# Patient Record
Sex: FEMALE | Race: WHITE | NOT HISPANIC OR LATINO | Employment: OTHER | URBAN - METROPOLITAN AREA
[De-identification: names, ages, dates, MRNs, and addresses within clinical notes are randomized per-mention and may not be internally consistent; named-entity substitution may affect disease eponyms.]

---

## 2017-01-19 ENCOUNTER — COMMUNICATION - HEALTHEAST (OUTPATIENT)
Dept: NURSING | Facility: CLINIC | Age: 62
End: 2017-01-19

## 2017-01-19 ENCOUNTER — AMBULATORY - HEALTHEAST (OUTPATIENT)
Dept: INTERNAL MEDICINE | Facility: CLINIC | Age: 62
End: 2017-01-19

## 2017-01-19 DIAGNOSIS — E03.9 HYPOTHYROID: ICD-10-CM

## 2017-01-23 ENCOUNTER — COMMUNICATION - HEALTHEAST (OUTPATIENT)
Dept: NURSING | Facility: CLINIC | Age: 62
End: 2017-01-23

## 2017-01-23 ENCOUNTER — AMBULATORY - HEALTHEAST (OUTPATIENT)
Dept: LAB | Facility: CLINIC | Age: 62
End: 2017-01-23

## 2017-01-23 DIAGNOSIS — Z95.2 S/P AVR (AORTIC VALVE REPLACEMENT): ICD-10-CM

## 2017-01-23 DIAGNOSIS — E03.9 HYPOTHYROID: ICD-10-CM

## 2017-01-24 ENCOUNTER — COMMUNICATION - HEALTHEAST (OUTPATIENT)
Dept: INTERNAL MEDICINE | Facility: CLINIC | Age: 62
End: 2017-01-24

## 2017-03-01 ENCOUNTER — AMBULATORY - HEALTHEAST (OUTPATIENT)
Dept: LAB | Facility: CLINIC | Age: 62
End: 2017-03-01

## 2017-03-01 ENCOUNTER — COMMUNICATION - HEALTHEAST (OUTPATIENT)
Dept: INTERNAL MEDICINE | Facility: CLINIC | Age: 62
End: 2017-03-01

## 2017-03-01 DIAGNOSIS — Z95.2 S/P AVR (AORTIC VALVE REPLACEMENT): ICD-10-CM

## 2017-03-13 ENCOUNTER — COMMUNICATION - HEALTHEAST (OUTPATIENT)
Dept: CARDIOLOGY | Facility: CLINIC | Age: 62
End: 2017-03-13

## 2017-03-13 ENCOUNTER — COMMUNICATION - HEALTHEAST (OUTPATIENT)
Dept: INTERNAL MEDICINE | Facility: CLINIC | Age: 62
End: 2017-03-13

## 2017-03-13 DIAGNOSIS — I10 BENIGN HYPERTENSION: ICD-10-CM

## 2017-03-28 ENCOUNTER — COMMUNICATION - HEALTHEAST (OUTPATIENT)
Dept: INTERNAL MEDICINE | Facility: CLINIC | Age: 62
End: 2017-03-28

## 2017-03-28 ENCOUNTER — OFFICE VISIT - HEALTHEAST (OUTPATIENT)
Dept: PODIATRY | Age: 62
End: 2017-03-28

## 2017-03-28 DIAGNOSIS — M77.8 CAPSULITIS OF FOOT, LEFT: ICD-10-CM

## 2017-03-28 DIAGNOSIS — M21.6X2 PLANTAR FLEXED METATARSAL, LEFT: ICD-10-CM

## 2017-03-30 ENCOUNTER — COMMUNICATION - HEALTHEAST (OUTPATIENT)
Dept: INTERNAL MEDICINE | Facility: CLINIC | Age: 62
End: 2017-03-30

## 2017-04-08 ENCOUNTER — COMMUNICATION - HEALTHEAST (OUTPATIENT)
Dept: INTERNAL MEDICINE | Facility: CLINIC | Age: 62
End: 2017-04-08

## 2017-04-13 ENCOUNTER — COMMUNICATION - HEALTHEAST (OUTPATIENT)
Dept: NURSING | Facility: CLINIC | Age: 62
End: 2017-04-13

## 2017-04-13 ENCOUNTER — AMBULATORY - HEALTHEAST (OUTPATIENT)
Dept: LAB | Facility: CLINIC | Age: 62
End: 2017-04-13

## 2017-04-13 DIAGNOSIS — Z95.2 S/P AVR (AORTIC VALVE REPLACEMENT): ICD-10-CM

## 2017-04-26 ENCOUNTER — COMMUNICATION - HEALTHEAST (OUTPATIENT)
Dept: PODIATRY | Age: 62
End: 2017-04-26

## 2017-05-03 ENCOUNTER — COMMUNICATION - HEALTHEAST (OUTPATIENT)
Dept: PODIATRY | Age: 62
End: 2017-05-03

## 2017-05-03 ENCOUNTER — AMBULATORY - HEALTHEAST (OUTPATIENT)
Dept: CARDIOLOGY | Facility: CLINIC | Age: 62
End: 2017-05-03

## 2017-05-03 DIAGNOSIS — Z95.0 PACEMAKER: ICD-10-CM

## 2017-05-03 ASSESSMENT — MIFFLIN-ST. JEOR: SCORE: 1229

## 2017-05-16 ENCOUNTER — OFFICE VISIT - HEALTHEAST (OUTPATIENT)
Dept: PODIATRY | Age: 62
End: 2017-05-16

## 2017-05-16 ENCOUNTER — COMMUNICATION - HEALTHEAST (OUTPATIENT)
Dept: INTERNAL MEDICINE | Facility: CLINIC | Age: 62
End: 2017-05-16

## 2017-05-16 DIAGNOSIS — M89.8X7 EXOSTOSIS OF BONE OF FOOT: ICD-10-CM

## 2017-05-17 ENCOUNTER — RECORDS - HEALTHEAST (OUTPATIENT)
Dept: ADMINISTRATIVE | Facility: OTHER | Age: 62
End: 2017-05-17

## 2017-05-22 ENCOUNTER — AMBULATORY - HEALTHEAST (OUTPATIENT)
Dept: CARDIOLOGY | Facility: CLINIC | Age: 62
End: 2017-05-22

## 2017-05-22 DIAGNOSIS — Z95.0 PACEMAKER: ICD-10-CM

## 2017-05-22 LAB — HCC DEVICE COMMENTS: NORMAL

## 2017-05-23 ENCOUNTER — AMBULATORY - HEALTHEAST (OUTPATIENT)
Dept: LAB | Facility: CLINIC | Age: 62
End: 2017-05-23

## 2017-05-23 ENCOUNTER — COMMUNICATION - HEALTHEAST (OUTPATIENT)
Dept: NURSING | Facility: CLINIC | Age: 62
End: 2017-05-23

## 2017-05-23 DIAGNOSIS — Z95.2 S/P AVR (AORTIC VALVE REPLACEMENT): ICD-10-CM

## 2017-06-12 ENCOUNTER — COMMUNICATION - HEALTHEAST (OUTPATIENT)
Dept: INTERNAL MEDICINE | Facility: CLINIC | Age: 62
End: 2017-06-12

## 2017-06-13 ENCOUNTER — COMMUNICATION - HEALTHEAST (OUTPATIENT)
Dept: CARDIOLOGY | Facility: CLINIC | Age: 62
End: 2017-06-13

## 2017-06-13 DIAGNOSIS — I10 BENIGN HYPERTENSION: ICD-10-CM

## 2017-06-16 ENCOUNTER — OFFICE VISIT - HEALTHEAST (OUTPATIENT)
Dept: INTERNAL MEDICINE | Facility: CLINIC | Age: 62
End: 2017-06-16

## 2017-06-16 ENCOUNTER — COMMUNICATION - HEALTHEAST (OUTPATIENT)
Dept: INTERNAL MEDICINE | Facility: CLINIC | Age: 62
End: 2017-06-16

## 2017-06-16 ENCOUNTER — COMMUNICATION - HEALTHEAST (OUTPATIENT)
Dept: NURSING | Facility: CLINIC | Age: 62
End: 2017-06-16

## 2017-06-16 DIAGNOSIS — Z95.2 S/P AVR (AORTIC VALVE REPLACEMENT): ICD-10-CM

## 2017-06-16 DIAGNOSIS — Z01.818 PREOP EXAM FOR INTERNAL MEDICINE: ICD-10-CM

## 2017-06-16 DIAGNOSIS — E03.9 ACQUIRED HYPOTHYROIDISM: ICD-10-CM

## 2017-06-16 ASSESSMENT — MIFFLIN-ST. JEOR: SCORE: 1206.32

## 2017-06-17 LAB
ATRIAL RATE - MUSE: 70 BPM
DIASTOLIC BLOOD PRESSURE - MUSE: NORMAL MMHG
INTERPRETATION ECG - MUSE: NORMAL
P AXIS - MUSE: 59 DEGREES
PR INTERVAL - MUSE: 234 MS
QRS DURATION - MUSE: 146 MS
QT - MUSE: 444 MS
QTC - MUSE: 479 MS
R AXIS - MUSE: 22 DEGREES
SYSTOLIC BLOOD PRESSURE - MUSE: NORMAL MMHG
T AXIS - MUSE: 32 DEGREES
VENTRICULAR RATE- MUSE: 70 BPM

## 2017-06-22 ENCOUNTER — ANESTHESIA - HEALTHEAST (OUTPATIENT)
Dept: SURGERY | Facility: CLINIC | Age: 62
End: 2017-06-22

## 2017-06-23 ENCOUNTER — AMBULATORY - HEALTHEAST (OUTPATIENT)
Dept: PODIATRY | Facility: CLINIC | Age: 62
End: 2017-06-23

## 2017-06-23 ENCOUNTER — COMMUNICATION - HEALTHEAST (OUTPATIENT)
Dept: INTERNAL MEDICINE | Facility: CLINIC | Age: 62
End: 2017-06-23

## 2017-06-23 ENCOUNTER — SURGERY - HEALTHEAST (OUTPATIENT)
Dept: SURGERY | Facility: CLINIC | Age: 62
End: 2017-06-23

## 2017-06-23 DIAGNOSIS — M89.8X7 EXOSTOSIS OF BONE OF FOOT: ICD-10-CM

## 2017-06-23 ASSESSMENT — MIFFLIN-ST. JEOR: SCORE: 1202.64

## 2017-06-27 ENCOUNTER — COMMUNICATION - HEALTHEAST (OUTPATIENT)
Dept: INTERNAL MEDICINE | Facility: CLINIC | Age: 62
End: 2017-06-27

## 2017-06-27 ENCOUNTER — OFFICE VISIT - HEALTHEAST (OUTPATIENT)
Dept: PODIATRY | Age: 62
End: 2017-06-27

## 2017-06-27 DIAGNOSIS — M89.30 HYPERTROPHY OF BONE: ICD-10-CM

## 2017-07-05 ENCOUNTER — COMMUNICATION - HEALTHEAST (OUTPATIENT)
Dept: NURSING | Facility: CLINIC | Age: 62
End: 2017-07-05

## 2017-07-05 ENCOUNTER — AMBULATORY - HEALTHEAST (OUTPATIENT)
Dept: LAB | Facility: CLINIC | Age: 62
End: 2017-07-05

## 2017-07-05 DIAGNOSIS — Z95.2 S/P AVR (AORTIC VALVE REPLACEMENT): ICD-10-CM

## 2017-07-06 ENCOUNTER — OFFICE VISIT - HEALTHEAST (OUTPATIENT)
Dept: PODIATRY | Facility: CLINIC | Age: 62
End: 2017-07-06

## 2017-07-06 ENCOUNTER — COMMUNICATION - HEALTHEAST (OUTPATIENT)
Dept: INTERNAL MEDICINE | Facility: CLINIC | Age: 62
End: 2017-07-06

## 2017-07-06 DIAGNOSIS — M89.30 HYPERTROPHY OF BONE: ICD-10-CM

## 2017-07-14 ENCOUNTER — COMMUNICATION - HEALTHEAST (OUTPATIENT)
Dept: NURSING | Facility: CLINIC | Age: 62
End: 2017-07-14

## 2017-07-14 DIAGNOSIS — Z95.2 S/P AVR (AORTIC VALVE REPLACEMENT): ICD-10-CM

## 2017-07-26 ENCOUNTER — COMMUNICATION - HEALTHEAST (OUTPATIENT)
Dept: NURSING | Facility: CLINIC | Age: 62
End: 2017-07-26

## 2017-08-02 ENCOUNTER — COMMUNICATION - HEALTHEAST (OUTPATIENT)
Dept: NURSING | Facility: CLINIC | Age: 62
End: 2017-08-02

## 2017-08-02 ENCOUNTER — AMBULATORY - HEALTHEAST (OUTPATIENT)
Dept: LAB | Facility: CLINIC | Age: 62
End: 2017-08-02

## 2017-08-02 DIAGNOSIS — Z95.2 S/P AVR (AORTIC VALVE REPLACEMENT): ICD-10-CM

## 2017-08-20 ENCOUNTER — COMMUNICATION - HEALTHEAST (OUTPATIENT)
Dept: INTERNAL MEDICINE | Facility: CLINIC | Age: 62
End: 2017-08-20

## 2017-08-20 DIAGNOSIS — E78.5 HYPERLIPEMIA: ICD-10-CM

## 2017-08-21 ENCOUNTER — AMBULATORY - HEALTHEAST (OUTPATIENT)
Dept: CARDIOLOGY | Facility: CLINIC | Age: 62
End: 2017-08-21

## 2017-08-21 DIAGNOSIS — Z95.0 PACEMAKER: ICD-10-CM

## 2017-08-21 LAB — HCC DEVICE COMMENTS: NORMAL

## 2017-08-30 ENCOUNTER — AMBULATORY - HEALTHEAST (OUTPATIENT)
Dept: LAB | Facility: CLINIC | Age: 62
End: 2017-08-30

## 2017-08-30 ENCOUNTER — COMMUNICATION - HEALTHEAST (OUTPATIENT)
Dept: NURSING | Facility: CLINIC | Age: 62
End: 2017-08-30

## 2017-08-30 DIAGNOSIS — Z95.2 S/P AVR (AORTIC VALVE REPLACEMENT): ICD-10-CM

## 2017-09-28 ENCOUNTER — COMMUNICATION - HEALTHEAST (OUTPATIENT)
Dept: CARDIOLOGY | Facility: CLINIC | Age: 62
End: 2017-09-28

## 2017-09-28 ENCOUNTER — COMMUNICATION - HEALTHEAST (OUTPATIENT)
Dept: INTERNAL MEDICINE | Facility: CLINIC | Age: 62
End: 2017-09-28

## 2017-09-28 DIAGNOSIS — I10 BENIGN HYPERTENSION: ICD-10-CM

## 2017-09-29 ENCOUNTER — COMMUNICATION - HEALTHEAST (OUTPATIENT)
Dept: INTERNAL MEDICINE | Facility: CLINIC | Age: 62
End: 2017-09-29

## 2017-10-02 ENCOUNTER — RECORDS - HEALTHEAST (OUTPATIENT)
Dept: ADMINISTRATIVE | Facility: OTHER | Age: 62
End: 2017-10-02

## 2017-10-03 ENCOUNTER — AMBULATORY - HEALTHEAST (OUTPATIENT)
Dept: LAB | Facility: CLINIC | Age: 62
End: 2017-10-03

## 2017-10-03 ENCOUNTER — COMMUNICATION - HEALTHEAST (OUTPATIENT)
Dept: NURSING | Facility: CLINIC | Age: 62
End: 2017-10-03

## 2017-10-03 DIAGNOSIS — Z95.2 S/P AVR (AORTIC VALVE REPLACEMENT): ICD-10-CM

## 2017-10-05 ENCOUNTER — COMMUNICATION - HEALTHEAST (OUTPATIENT)
Dept: INTERNAL MEDICINE | Facility: CLINIC | Age: 62
End: 2017-10-05

## 2017-10-10 ENCOUNTER — COMMUNICATION - HEALTHEAST (OUTPATIENT)
Dept: INTERNAL MEDICINE | Facility: CLINIC | Age: 62
End: 2017-10-10

## 2017-10-10 ENCOUNTER — RECORDS - HEALTHEAST (OUTPATIENT)
Dept: ADMINISTRATIVE | Facility: OTHER | Age: 62
End: 2017-10-10

## 2017-10-10 ENCOUNTER — RECORDS - HEALTHEAST (OUTPATIENT)
Dept: BONE DENSITY | Facility: CLINIC | Age: 62
End: 2017-10-10

## 2017-10-10 DIAGNOSIS — Z95.2 S/P AVR (AORTIC VALVE REPLACEMENT): ICD-10-CM

## 2017-10-10 DIAGNOSIS — Z13.820 ENCOUNTER FOR SCREENING FOR OSTEOPOROSIS: ICD-10-CM

## 2017-10-10 DIAGNOSIS — Z95.2 S/P AORTIC VALVE REPLACEMENT: ICD-10-CM

## 2017-10-23 ENCOUNTER — OFFICE VISIT - HEALTHEAST (OUTPATIENT)
Dept: INTERNAL MEDICINE | Facility: CLINIC | Age: 62
End: 2017-10-23

## 2017-10-23 ENCOUNTER — RECORDS - HEALTHEAST (OUTPATIENT)
Dept: GENERAL RADIOLOGY | Facility: CLINIC | Age: 62
End: 2017-10-23

## 2017-10-23 DIAGNOSIS — R07.9 CHEST PAIN: ICD-10-CM

## 2017-10-23 DIAGNOSIS — M54.9 BACK PAIN: ICD-10-CM

## 2017-10-23 DIAGNOSIS — M54.9 DORSALGIA, UNSPECIFIED: ICD-10-CM

## 2017-10-23 ASSESSMENT — MIFFLIN-ST. JEOR: SCORE: 1206.32

## 2017-10-25 ENCOUNTER — COMMUNICATION - HEALTHEAST (OUTPATIENT)
Dept: INTERNAL MEDICINE | Facility: CLINIC | Age: 62
End: 2017-10-25

## 2017-11-03 ENCOUNTER — OFFICE VISIT - HEALTHEAST (OUTPATIENT)
Dept: CARDIOLOGY | Facility: CLINIC | Age: 62
End: 2017-11-03

## 2017-11-03 DIAGNOSIS — Z95.0 PACEMAKER: ICD-10-CM

## 2017-11-03 DIAGNOSIS — Z95.2 S/P AVR (AORTIC VALVE REPLACEMENT): ICD-10-CM

## 2017-11-03 DIAGNOSIS — R53.83 FATIGUE: ICD-10-CM

## 2017-11-03 DIAGNOSIS — I49.8 CHRONOTROPIC INCOMPETENCE WITH SINUS NODE DYSFUNCTION: ICD-10-CM

## 2017-11-03 ASSESSMENT — MIFFLIN-ST. JEOR: SCORE: 1210.86

## 2017-11-05 ENCOUNTER — RECORDS - HEALTHEAST (OUTPATIENT)
Dept: ADMINISTRATIVE | Facility: OTHER | Age: 62
End: 2017-11-05

## 2017-11-09 ENCOUNTER — HOSPITAL ENCOUNTER (OUTPATIENT)
Dept: CARDIOLOGY | Facility: CLINIC | Age: 62
Discharge: HOME | End: 2017-11-09
Attending: INTERNAL MEDICINE

## 2017-11-09 DIAGNOSIS — R53.83 FATIGUE: ICD-10-CM

## 2017-11-09 DIAGNOSIS — Z95.2 S/P AVR (AORTIC VALVE REPLACEMENT): ICD-10-CM

## 2017-11-09 ASSESSMENT — MIFFLIN-ST. JEOR: SCORE: 1210.86

## 2017-11-10 LAB
AORTIC VALVE MEAN VELOCITY: 233 CM/S
ASCENDING AORTA: 3.6 CM
AV DIMENSIONLESS INDEX VTI: 0.3
AV MEAN GRADIENT: 26 MMHG
AV PEAK GRADIENT: 57.2 MMHG
AV VALVE AREA: 1.2 CM2
AV VELOCITY RATIO: 0.3
BSA FOR ECHO PROCEDURE: 1.75 M2
CV BLOOD PRESSURE: NORMAL MMHG
CV ECHO HEIGHT: 64 IN
CV ECHO WEIGHT: 149 LBS
DOP CALC AO PEAK VEL: 378 CM/S
DOP CALC AO VTI: 69.7 CM
DOP CALC LVOT AREA: 3.46 CM2
DOP CALC LVOT DIAMETER: 2.1 CM
DOP CALC LVOT PEAK VEL: 120 CM/S
DOP CALC LVOT STROKE VOLUME: 82.7 CM3
DOP CALC MV VTI: 46.9 CM
DOP CALC MV VTI: 48.8 CM
DOP CALC MV VTI: 49.9 CM
DOP CALC MV VTI: 50 CM
DOP CALC MV VTI: 54 CM
DOP CALCLVOT PEAK VEL VTI: 23.9 CM
EJECTION FRACTION: 57 % (ref 55–75)
FRACTIONAL SHORTENING: 38.6 % (ref 28–44)
INTERVENTRICULAR SEPTUM IN END DIASTOLE: 1.5 CM (ref 0.6–0.9)
IVS/PW RATIO: 1.7
LA AREA 1: 20.2 CM2
LA AREA 2: 24 CM2
LEFT ATRIUM LENGTH: 5.27 CM
LEFT ATRIUM SIZE: 3.3 CM
LEFT ATRIUM VOLUME INDEX: 44.7 ML/M2
LEFT ATRIUM VOLUME: 78.2 CM3
LEFT VENTRICLE CARDIAC INDEX: 3.1 L/MIN/M2
LEFT VENTRICLE CARDIAC OUTPUT: 5.5 L/MIN
LEFT VENTRICLE DIASTOLIC VOLUME INDEX: 44 CM3/M2 (ref 34–74)
LEFT VENTRICLE DIASTOLIC VOLUME: 77 CM3 (ref 46–106)
LEFT VENTRICLE HEART RATE: 66 BPM
LEFT VENTRICLE MASS INDEX: 109.3 G/M2
LEFT VENTRICLE SYSTOLIC VOLUME INDEX: 18.9 CM3/M2 (ref 11–31)
LEFT VENTRICLE SYSTOLIC VOLUME: 33 CM3 (ref 14–42)
LEFT VENTRICULAR INTERNAL DIMENSION IN DIASTOLE: 4.4 CM (ref 3.8–5.2)
LEFT VENTRICULAR INTERNAL DIMENSION IN SYSTOLE: 2.7 CM (ref 2.2–3.5)
LEFT VENTRICULAR MASS: 191.3 G
LEFT VENTRICULAR OUTFLOW TRACT MEAN GRADIENT: 3 MMHG
LEFT VENTRICULAR OUTFLOW TRACT MEAN VELOCITY: 83.1 CM/S
LEFT VENTRICULAR OUTFLOW TRACT PEAK GRADIENT: 6 MMHG
LEFT VENTRICULAR POSTERIOR WALL IN END DIASTOLE: 0.9 CM (ref 0.6–0.9)
LV STROKE VOLUME INDEX: 47.3 ML/M2
MITRAL VALVE DECELERATION SLOPE: 4240 MM/S2
MITRAL VALVE DECELERATION SLOPE: 5410 MM/S2
MITRAL VALVE DECELERATION SLOPE: 6580 MM/S2
MITRAL VALVE E/A RATIO: 1
MITRAL VALVE MEAN INFLOW VELOCITY: 102 CM/S
MITRAL VALVE MEAN INFLOW VELOCITY: 108 CM/S
MITRAL VALVE MEAN INFLOW VELOCITY: 92.2 CM/S
MITRAL VALVE MEAN INFLOW VELOCITY: 94.1 CM/S
MITRAL VALVE MEAN INFLOW VELOCITY: 99.1 CM/S
MITRAL VALVE PEAK VELOCITY: 140 CM/S
MITRAL VALVE PEAK VELOCITY: 151 CM/S
MITRAL VALVE PEAK VELOCITY: 152 CM/S
MITRAL VALVE PEAK VELOCITY: 154 CM/S
MITRAL VALVE PEAK VELOCITY: 159 CM/S
MITRAL VALVE PRESSURE HALF-TIME: 80 MS
MV AVERAGE E/E' RATIO: 15.7 CM/S
MV DECELERATION TIME: 327 MS
MV E'TISSUE VEL-LAT: 7.8 CM/S
MV E'TISSUE VEL-MED: 6.34 CM/S
MV LATERAL E/E' RATIO: 14.2
MV MEAN GRADIENT: 5 MMHG
MV MEDIAL E/E' RATIO: 17.5
MV PEAK A VELOCITY: 108 CM/S
MV PEAK E VELOCITY: 111 CM/S
MV VALVE AREA PRESSURE 1/2 METHOD: 2.8 CM2
NUC REST DIASTOLIC VOLUME INDEX: 2384 LBS
NUC REST SYSTOLIC VOLUME INDEX: 64 IN
TRICUSPID REGURGITATION PEAK PRESSURE GRADIENT: 22.7 MMHG
TRICUSPID VALVE ANULAR PLANE SYSTOLIC EXCURSION: 1.5 CM
TRICUSPID VALVE PEAK REGURGITANT VELOCITY: 238 CM/S

## 2017-11-17 ENCOUNTER — AMBULATORY - HEALTHEAST (OUTPATIENT)
Dept: CARDIOLOGY | Facility: CLINIC | Age: 62
End: 2017-11-17

## 2017-11-17 DIAGNOSIS — Z95.0 PACEMAKER: ICD-10-CM

## 2017-11-17 LAB — HCC DEVICE COMMENTS: NORMAL

## 2017-11-21 ENCOUNTER — COMMUNICATION - HEALTHEAST (OUTPATIENT)
Dept: NURSING | Facility: CLINIC | Age: 62
End: 2017-11-21

## 2017-11-28 ENCOUNTER — COMMUNICATION - HEALTHEAST (OUTPATIENT)
Dept: NURSING | Facility: CLINIC | Age: 62
End: 2017-11-28

## 2017-11-28 ENCOUNTER — AMBULATORY - HEALTHEAST (OUTPATIENT)
Dept: LAB | Facility: CLINIC | Age: 62
End: 2017-11-28

## 2017-11-28 DIAGNOSIS — Z95.2 S/P AVR (AORTIC VALVE REPLACEMENT): ICD-10-CM

## 2017-12-14 ENCOUNTER — COMMUNICATION - HEALTHEAST (OUTPATIENT)
Dept: INTERNAL MEDICINE | Facility: CLINIC | Age: 62
End: 2017-12-14

## 2017-12-14 DIAGNOSIS — Z95.2 S/P AORTIC VALVE REPLACEMENT: ICD-10-CM

## 2017-12-16 ENCOUNTER — COMMUNICATION - HEALTHEAST (OUTPATIENT)
Dept: CARDIOLOGY | Facility: CLINIC | Age: 62
End: 2017-12-16

## 2017-12-16 DIAGNOSIS — I10 BENIGN HYPERTENSION: ICD-10-CM

## 2017-12-28 ENCOUNTER — COMMUNICATION - HEALTHEAST (OUTPATIENT)
Dept: INTERNAL MEDICINE | Facility: CLINIC | Age: 62
End: 2017-12-28

## 2018-01-01 ENCOUNTER — COMMUNICATION - HEALTHEAST (OUTPATIENT)
Dept: INTERNAL MEDICINE | Facility: CLINIC | Age: 63
End: 2018-01-01

## 2018-01-03 ENCOUNTER — HOSPITAL ENCOUNTER (OUTPATIENT)
Dept: MAMMOGRAPHY | Facility: CLINIC | Age: 63
Discharge: HOME | End: 2018-01-03
Attending: INTERNAL MEDICINE

## 2018-01-03 DIAGNOSIS — Z12.31 VISIT FOR SCREENING MAMMOGRAM: ICD-10-CM

## 2018-01-11 ENCOUNTER — OFFICE VISIT - HEALTHEAST (OUTPATIENT)
Dept: INTERNAL MEDICINE | Facility: CLINIC | Age: 63
End: 2018-01-11

## 2018-01-11 ENCOUNTER — COMMUNICATION - HEALTHEAST (OUTPATIENT)
Dept: NURSING | Facility: CLINIC | Age: 63
End: 2018-01-11

## 2018-01-11 DIAGNOSIS — Z95.2 S/P AVR (AORTIC VALVE REPLACEMENT): ICD-10-CM

## 2018-01-11 DIAGNOSIS — J06.9 URI (UPPER RESPIRATORY INFECTION): ICD-10-CM

## 2018-01-11 LAB — INR PPP: 3.3 (ref 0.9–1.1)

## 2018-01-11 ASSESSMENT — MIFFLIN-ST. JEOR: SCORE: 1206.32

## 2018-01-22 ENCOUNTER — AMBULATORY - HEALTHEAST (OUTPATIENT)
Dept: LAB | Facility: CLINIC | Age: 63
End: 2018-01-22

## 2018-01-22 ENCOUNTER — COMMUNICATION - HEALTHEAST (OUTPATIENT)
Dept: NURSING | Facility: CLINIC | Age: 63
End: 2018-01-22

## 2018-01-22 DIAGNOSIS — Z95.2 S/P AVR (AORTIC VALVE REPLACEMENT): ICD-10-CM

## 2018-01-22 LAB — INR PPP: 4 (ref 0.9–1.1)

## 2018-02-12 ENCOUNTER — COMMUNICATION - HEALTHEAST (OUTPATIENT)
Dept: NURSING | Facility: CLINIC | Age: 63
End: 2018-02-12

## 2018-02-12 ENCOUNTER — AMBULATORY - HEALTHEAST (OUTPATIENT)
Dept: LAB | Facility: CLINIC | Age: 63
End: 2018-02-12

## 2018-02-12 DIAGNOSIS — Z95.2 S/P AVR (AORTIC VALVE REPLACEMENT): ICD-10-CM

## 2018-02-12 LAB — INR PPP: 2.9 (ref 0.9–1.1)

## 2018-02-15 ENCOUNTER — AMBULATORY - HEALTHEAST (OUTPATIENT)
Dept: CARDIOLOGY | Facility: CLINIC | Age: 63
End: 2018-02-15

## 2018-02-15 DIAGNOSIS — Z95.0 PACEMAKER: ICD-10-CM

## 2018-02-15 LAB — HCC DEVICE COMMENTS: NORMAL

## 2018-02-18 ENCOUNTER — COMMUNICATION - HEALTHEAST (OUTPATIENT)
Dept: INTERNAL MEDICINE | Facility: CLINIC | Age: 63
End: 2018-02-18

## 2018-02-18 DIAGNOSIS — Z95.2 S/P AORTIC VALVE REPLACEMENT: ICD-10-CM

## 2018-02-18 DIAGNOSIS — Z79.01 LONG-TERM (CURRENT) USE OF ANTICOAGULANTS: ICD-10-CM

## 2018-02-26 ENCOUNTER — RECORDS - HEALTHEAST (OUTPATIENT)
Dept: ADMINISTRATIVE | Facility: OTHER | Age: 63
End: 2018-02-26

## 2018-03-02 ENCOUNTER — AMBULATORY - HEALTHEAST (OUTPATIENT)
Dept: LAB | Facility: CLINIC | Age: 63
End: 2018-03-02

## 2018-03-02 ENCOUNTER — COMMUNICATION - HEALTHEAST (OUTPATIENT)
Dept: NURSING | Facility: CLINIC | Age: 63
End: 2018-03-02

## 2018-03-02 DIAGNOSIS — Z95.2 S/P AVR (AORTIC VALVE REPLACEMENT): ICD-10-CM

## 2018-03-02 LAB — INR PPP: 2.7 (ref 0.9–1.1)

## 2018-03-28 ENCOUNTER — COMMUNICATION - HEALTHEAST (OUTPATIENT)
Dept: INTERNAL MEDICINE | Facility: CLINIC | Age: 63
End: 2018-03-28

## 2018-03-31 ENCOUNTER — COMMUNICATION - HEALTHEAST (OUTPATIENT)
Dept: INTERNAL MEDICINE | Facility: CLINIC | Age: 63
End: 2018-03-31

## 2018-04-03 ENCOUNTER — COMMUNICATION - HEALTHEAST (OUTPATIENT)
Dept: ANTICOAGULATION | Facility: CLINIC | Age: 63
End: 2018-04-03

## 2018-04-03 ENCOUNTER — AMBULATORY - HEALTHEAST (OUTPATIENT)
Dept: LAB | Facility: CLINIC | Age: 63
End: 2018-04-03

## 2018-04-03 DIAGNOSIS — Z95.2 S/P AVR (AORTIC VALVE REPLACEMENT): ICD-10-CM

## 2018-04-03 LAB — INR PPP: 2.1 (ref 0.9–1.1)

## 2018-05-16 ENCOUNTER — AMBULATORY - HEALTHEAST (OUTPATIENT)
Dept: LAB | Facility: CLINIC | Age: 63
End: 2018-05-16

## 2018-05-16 ENCOUNTER — COMMUNICATION - HEALTHEAST (OUTPATIENT)
Dept: ANTICOAGULATION | Facility: CLINIC | Age: 63
End: 2018-05-16

## 2018-05-16 DIAGNOSIS — Z95.2 S/P AVR (AORTIC VALVE REPLACEMENT): ICD-10-CM

## 2018-05-16 LAB — INR PPP: 2.3 (ref 0.9–1.1)

## 2018-05-25 ENCOUNTER — AMBULATORY - HEALTHEAST (OUTPATIENT)
Dept: CARDIOLOGY | Facility: CLINIC | Age: 63
End: 2018-05-25

## 2018-05-25 DIAGNOSIS — Z95.0 PACEMAKER: ICD-10-CM

## 2018-05-25 ASSESSMENT — MIFFLIN-ST. JEOR: SCORE: 1232.41

## 2018-05-29 LAB
HCC DEVICE COMMENTS: NORMAL
HCC DEVICE IMPLANTING PROVIDER: NORMAL
HCC DEVICE MANUFACTURE: NORMAL
HCC DEVICE MODEL: NORMAL
HCC DEVICE SERIAL NUMBER: NORMAL
HCC DEVICE TYPE: NORMAL

## 2018-06-09 ENCOUNTER — COMMUNICATION - HEALTHEAST (OUTPATIENT)
Dept: CARDIOLOGY | Facility: CLINIC | Age: 63
End: 2018-06-09

## 2018-06-09 DIAGNOSIS — I10 BENIGN HYPERTENSION: ICD-10-CM

## 2018-06-19 ENCOUNTER — COMMUNICATION - HEALTHEAST (OUTPATIENT)
Dept: ANTICOAGULATION | Facility: CLINIC | Age: 63
End: 2018-06-19

## 2018-06-19 DIAGNOSIS — Z95.2 S/P AVR (AORTIC VALVE REPLACEMENT): ICD-10-CM

## 2018-06-22 ENCOUNTER — COMMUNICATION - HEALTHEAST (OUTPATIENT)
Dept: INTERNAL MEDICINE | Facility: CLINIC | Age: 63
End: 2018-06-22

## 2018-06-22 ENCOUNTER — AMBULATORY - HEALTHEAST (OUTPATIENT)
Dept: INTERNAL MEDICINE | Facility: CLINIC | Age: 63
End: 2018-06-22

## 2018-06-22 DIAGNOSIS — E03.9 HYPOTHYROIDISM: ICD-10-CM

## 2018-06-24 ENCOUNTER — COMMUNICATION - HEALTHEAST (OUTPATIENT)
Dept: INTERNAL MEDICINE | Facility: CLINIC | Age: 63
End: 2018-06-24

## 2018-06-25 ENCOUNTER — AMBULATORY - HEALTHEAST (OUTPATIENT)
Dept: LAB | Facility: CLINIC | Age: 63
End: 2018-06-25

## 2018-06-25 ENCOUNTER — COMMUNICATION - HEALTHEAST (OUTPATIENT)
Dept: ANTICOAGULATION | Facility: CLINIC | Age: 63
End: 2018-06-25

## 2018-06-25 DIAGNOSIS — E03.9 HYPOTHYROIDISM: ICD-10-CM

## 2018-06-25 DIAGNOSIS — Z95.2 S/P AVR (AORTIC VALVE REPLACEMENT): ICD-10-CM

## 2018-06-25 LAB
INR PPP: 4.3 (ref 0.9–1.1)
TSH SERPL DL<=0.005 MIU/L-ACNC: 1.4 UIU/ML (ref 0.3–5)

## 2018-06-26 ENCOUNTER — COMMUNICATION - HEALTHEAST (OUTPATIENT)
Dept: INTERNAL MEDICINE | Facility: CLINIC | Age: 63
End: 2018-06-26

## 2018-06-28 ENCOUNTER — COMMUNICATION - HEALTHEAST (OUTPATIENT)
Dept: INTERNAL MEDICINE | Facility: CLINIC | Age: 63
End: 2018-06-28

## 2018-07-11 ENCOUNTER — AMBULATORY - HEALTHEAST (OUTPATIENT)
Dept: LAB | Facility: CLINIC | Age: 63
End: 2018-07-11

## 2018-07-11 ENCOUNTER — COMMUNICATION - HEALTHEAST (OUTPATIENT)
Dept: ANTICOAGULATION | Facility: CLINIC | Age: 63
End: 2018-07-11

## 2018-07-11 DIAGNOSIS — Z95.2 S/P AVR (AORTIC VALVE REPLACEMENT): ICD-10-CM

## 2018-07-11 LAB — INR PPP: 2.9 (ref 0.9–1.1)

## 2018-08-01 ENCOUNTER — COMMUNICATION - HEALTHEAST (OUTPATIENT)
Dept: ANTICOAGULATION | Facility: CLINIC | Age: 63
End: 2018-08-01

## 2018-08-06 ENCOUNTER — COMMUNICATION - HEALTHEAST (OUTPATIENT)
Dept: ANTICOAGULATION | Facility: CLINIC | Age: 63
End: 2018-08-06

## 2018-08-06 ENCOUNTER — AMBULATORY - HEALTHEAST (OUTPATIENT)
Dept: LAB | Facility: CLINIC | Age: 63
End: 2018-08-06

## 2018-08-06 ENCOUNTER — COMMUNICATION - HEALTHEAST (OUTPATIENT)
Dept: INTERNAL MEDICINE | Facility: CLINIC | Age: 63
End: 2018-08-06

## 2018-08-06 DIAGNOSIS — R30.0 DYSURIA: ICD-10-CM

## 2018-08-06 DIAGNOSIS — Z95.2 S/P AVR (AORTIC VALVE REPLACEMENT): ICD-10-CM

## 2018-08-06 LAB
ALBUMIN UR-MCNC: ABNORMAL MG/DL
APPEARANCE UR: ABNORMAL
BACTERIA #/AREA URNS HPF: ABNORMAL HPF
BILIRUB UR QL STRIP: NEGATIVE
COLOR UR AUTO: YELLOW
GLUCOSE UR STRIP-MCNC: NEGATIVE MG/DL
HGB UR QL STRIP: ABNORMAL
INR PPP: 3.1 (ref 0.9–1.1)
KETONES UR STRIP-MCNC: NEGATIVE MG/DL
LEUKOCYTE ESTERASE UR QL STRIP: ABNORMAL
NITRATE UR QL: NEGATIVE
PH UR STRIP: 6 [PH] (ref 5–8)
RBC #/AREA URNS AUTO: ABNORMAL HPF
SP GR UR STRIP: >=1.03 (ref 1–1.03)
SQUAMOUS #/AREA URNS AUTO: ABNORMAL LPF
UROBILINOGEN UR STRIP-ACNC: ABNORMAL
WBC #/AREA URNS AUTO: ABNORMAL HPF

## 2018-08-08 ENCOUNTER — COMMUNICATION - HEALTHEAST (OUTPATIENT)
Dept: INTERNAL MEDICINE | Facility: CLINIC | Age: 63
End: 2018-08-08

## 2018-08-08 ENCOUNTER — COMMUNICATION - HEALTHEAST (OUTPATIENT)
Dept: ANTICOAGULATION | Facility: CLINIC | Age: 63
End: 2018-08-08

## 2018-08-08 DIAGNOSIS — Z95.2 S/P AVR (AORTIC VALVE REPLACEMENT): ICD-10-CM

## 2018-08-08 LAB — BACTERIA SPEC CULT: ABNORMAL

## 2018-08-10 ENCOUNTER — AMBULATORY - HEALTHEAST (OUTPATIENT)
Dept: LAB | Facility: CLINIC | Age: 63
End: 2018-08-10

## 2018-08-10 ENCOUNTER — COMMUNICATION - HEALTHEAST (OUTPATIENT)
Dept: ANTICOAGULATION | Facility: CLINIC | Age: 63
End: 2018-08-10

## 2018-08-10 DIAGNOSIS — Z95.2 S/P AVR (AORTIC VALVE REPLACEMENT): ICD-10-CM

## 2018-08-10 LAB — INR PPP: 4 (ref 0.9–1.1)

## 2018-08-11 ENCOUNTER — COMMUNICATION - HEALTHEAST (OUTPATIENT)
Dept: INTERNAL MEDICINE | Facility: CLINIC | Age: 63
End: 2018-08-11

## 2018-08-11 DIAGNOSIS — E78.5 HYPERLIPEMIA: ICD-10-CM

## 2018-08-15 ENCOUNTER — AMBULATORY - HEALTHEAST (OUTPATIENT)
Dept: CARDIOLOGY | Facility: CLINIC | Age: 63
End: 2018-08-15

## 2018-08-15 DIAGNOSIS — Z95.0 PACEMAKER: ICD-10-CM

## 2018-08-17 ENCOUNTER — AMBULATORY - HEALTHEAST (OUTPATIENT)
Dept: LAB | Facility: CLINIC | Age: 63
End: 2018-08-17

## 2018-08-17 ENCOUNTER — COMMUNICATION - HEALTHEAST (OUTPATIENT)
Dept: ANTICOAGULATION | Facility: CLINIC | Age: 63
End: 2018-08-17

## 2018-08-17 DIAGNOSIS — Z95.2 S/P AVR (AORTIC VALVE REPLACEMENT): ICD-10-CM

## 2018-08-17 LAB — INR PPP: 2.3 (ref 0.9–1.1)

## 2018-08-27 ENCOUNTER — COMMUNICATION - HEALTHEAST (OUTPATIENT)
Dept: INTERNAL MEDICINE | Facility: CLINIC | Age: 63
End: 2018-08-27

## 2018-08-27 DIAGNOSIS — Z79.01 LONG-TERM (CURRENT) USE OF ANTICOAGULANTS: ICD-10-CM

## 2018-08-27 DIAGNOSIS — Z95.2 S/P AORTIC VALVE REPLACEMENT: ICD-10-CM

## 2018-08-31 ENCOUNTER — COMMUNICATION - HEALTHEAST (OUTPATIENT)
Dept: ANTICOAGULATION | Facility: CLINIC | Age: 63
End: 2018-08-31

## 2018-08-31 ENCOUNTER — AMBULATORY - HEALTHEAST (OUTPATIENT)
Dept: LAB | Facility: CLINIC | Age: 63
End: 2018-08-31

## 2018-08-31 DIAGNOSIS — Z95.2 S/P AVR (AORTIC VALVE REPLACEMENT): ICD-10-CM

## 2018-08-31 LAB — INR PPP: 3.4 (ref 0.9–1.1)

## 2018-09-21 ENCOUNTER — COMMUNICATION - HEALTHEAST (OUTPATIENT)
Dept: INTERNAL MEDICINE | Facility: CLINIC | Age: 63
End: 2018-09-21

## 2018-09-21 ENCOUNTER — AMBULATORY - HEALTHEAST (OUTPATIENT)
Dept: LAB | Facility: CLINIC | Age: 63
End: 2018-09-21

## 2018-09-21 ENCOUNTER — COMMUNICATION - HEALTHEAST (OUTPATIENT)
Dept: ANTICOAGULATION | Facility: CLINIC | Age: 63
End: 2018-09-21

## 2018-09-21 DIAGNOSIS — Z95.2 S/P AVR (AORTIC VALVE REPLACEMENT): ICD-10-CM

## 2018-09-21 LAB — INR PPP: 3.3 (ref 0.9–1.1)

## 2018-09-24 ENCOUNTER — COMMUNICATION - HEALTHEAST (OUTPATIENT)
Dept: INTERNAL MEDICINE | Facility: CLINIC | Age: 63
End: 2018-09-24

## 2018-09-27 ENCOUNTER — COMMUNICATION - HEALTHEAST (OUTPATIENT)
Dept: INTERNAL MEDICINE | Facility: CLINIC | Age: 63
End: 2018-09-27

## 2018-09-28 ENCOUNTER — COMMUNICATION - HEALTHEAST (OUTPATIENT)
Dept: ANTICOAGULATION | Facility: CLINIC | Age: 63
End: 2018-09-28

## 2018-09-28 DIAGNOSIS — Z95.2 H/O MECHANICAL AORTIC VALVE REPLACEMENT: ICD-10-CM

## 2018-10-01 ENCOUNTER — COMMUNICATION - HEALTHEAST (OUTPATIENT)
Dept: CARE COORDINATION | Facility: CLINIC | Age: 63
End: 2018-10-01

## 2018-10-01 ENCOUNTER — COMMUNICATION - HEALTHEAST (OUTPATIENT)
Dept: INTERNAL MEDICINE | Facility: CLINIC | Age: 63
End: 2018-10-01

## 2018-10-04 ENCOUNTER — COMMUNICATION - HEALTHEAST (OUTPATIENT)
Dept: INTERNAL MEDICINE | Facility: CLINIC | Age: 63
End: 2018-10-04

## 2018-10-05 ENCOUNTER — COMMUNICATION - HEALTHEAST (OUTPATIENT)
Dept: ANTICOAGULATION | Facility: CLINIC | Age: 63
End: 2018-10-05

## 2018-10-05 ENCOUNTER — OFFICE VISIT - HEALTHEAST (OUTPATIENT)
Dept: INTERNAL MEDICINE | Facility: CLINIC | Age: 63
End: 2018-10-05

## 2018-10-05 DIAGNOSIS — Z95.2 S/P AVR (AORTIC VALVE REPLACEMENT): ICD-10-CM

## 2018-10-05 DIAGNOSIS — K52.9 COLITIS: ICD-10-CM

## 2018-10-05 DIAGNOSIS — Z95.2 H/O MECHANICAL AORTIC VALVE REPLACEMENT: ICD-10-CM

## 2018-10-05 LAB — INR PPP: 2.5 (ref 0.9–1.1)

## 2018-10-05 ASSESSMENT — MIFFLIN-ST. JEOR: SCORE: 1192.72

## 2018-10-11 ENCOUNTER — COMMUNICATION - HEALTHEAST (OUTPATIENT)
Dept: INTERNAL MEDICINE | Facility: CLINIC | Age: 63
End: 2018-10-11

## 2018-10-12 ENCOUNTER — COMMUNICATION - HEALTHEAST (OUTPATIENT)
Dept: INTERNAL MEDICINE | Facility: CLINIC | Age: 63
End: 2018-10-12

## 2018-10-19 ENCOUNTER — COMMUNICATION - HEALTHEAST (OUTPATIENT)
Dept: ANTICOAGULATION | Facility: CLINIC | Age: 63
End: 2018-10-19

## 2018-10-19 ENCOUNTER — OFFICE VISIT - HEALTHEAST (OUTPATIENT)
Dept: INTERNAL MEDICINE | Facility: CLINIC | Age: 63
End: 2018-10-19

## 2018-10-19 ENCOUNTER — AMBULATORY - HEALTHEAST (OUTPATIENT)
Dept: LAB | Facility: CLINIC | Age: 63
End: 2018-10-19

## 2018-10-19 DIAGNOSIS — K52.9 COLITIS: ICD-10-CM

## 2018-10-19 DIAGNOSIS — Z95.2 S/P AVR (AORTIC VALVE REPLACEMENT): ICD-10-CM

## 2018-10-19 DIAGNOSIS — Z95.2 H/O MECHANICAL AORTIC VALVE REPLACEMENT: ICD-10-CM

## 2018-10-19 LAB — INR PPP: 3.5 (ref 0.9–1.1)

## 2018-10-19 ASSESSMENT — MIFFLIN-ST. JEOR: SCORE: 1188.18

## 2018-11-05 ENCOUNTER — RECORDS - HEALTHEAST (OUTPATIENT)
Dept: ADMINISTRATIVE | Facility: OTHER | Age: 63
End: 2018-11-05

## 2018-11-06 ENCOUNTER — AMBULATORY - HEALTHEAST (OUTPATIENT)
Dept: INTERNAL MEDICINE | Facility: CLINIC | Age: 63
End: 2018-11-06

## 2018-11-06 ENCOUNTER — COMMUNICATION - HEALTHEAST (OUTPATIENT)
Dept: INTERNAL MEDICINE | Facility: CLINIC | Age: 63
End: 2018-11-06

## 2018-11-06 DIAGNOSIS — R10.84 ABDOMINAL PAIN, GENERALIZED: ICD-10-CM

## 2018-11-07 ENCOUNTER — COMMUNICATION - HEALTHEAST (OUTPATIENT)
Dept: INTERNAL MEDICINE | Facility: CLINIC | Age: 63
End: 2018-11-07

## 2018-11-07 DIAGNOSIS — E78.5 HYPERLIPEMIA: ICD-10-CM

## 2018-11-08 ENCOUNTER — RECORDS - HEALTHEAST (OUTPATIENT)
Dept: ADMINISTRATIVE | Facility: OTHER | Age: 63
End: 2018-11-08

## 2018-11-12 ENCOUNTER — AMBULATORY - HEALTHEAST (OUTPATIENT)
Dept: CARDIOLOGY | Facility: CLINIC | Age: 63
End: 2018-11-12

## 2018-11-12 DIAGNOSIS — Z95.0 PACEMAKER: ICD-10-CM

## 2018-11-23 ENCOUNTER — COMMUNICATION - HEALTHEAST (OUTPATIENT)
Dept: ANTICOAGULATION | Facility: CLINIC | Age: 63
End: 2018-11-23

## 2018-11-27 ENCOUNTER — COMMUNICATION - HEALTHEAST (OUTPATIENT)
Dept: ANTICOAGULATION | Facility: CLINIC | Age: 63
End: 2018-11-27

## 2018-11-27 ENCOUNTER — AMBULATORY - HEALTHEAST (OUTPATIENT)
Dept: LAB | Facility: CLINIC | Age: 63
End: 2018-11-27

## 2018-11-27 ENCOUNTER — COMMUNICATION - HEALTHEAST (OUTPATIENT)
Dept: CARDIOLOGY | Facility: CLINIC | Age: 63
End: 2018-11-27

## 2018-11-27 DIAGNOSIS — Z95.2 H/O MECHANICAL AORTIC VALVE REPLACEMENT: ICD-10-CM

## 2018-11-27 DIAGNOSIS — I10 BENIGN HYPERTENSION: ICD-10-CM

## 2018-11-27 DIAGNOSIS — Z95.2 S/P AVR (AORTIC VALVE REPLACEMENT): ICD-10-CM

## 2018-11-27 LAB — INR PPP: 3 (ref 0.9–1.1)

## 2018-12-21 ENCOUNTER — RECORDS - HEALTHEAST (OUTPATIENT)
Dept: LAB | Facility: CLINIC | Age: 63
End: 2018-12-21

## 2018-12-21 LAB
HBV SURFACE AG SERPL QL IA: NEGATIVE
HIV 1+2 AB+HIV1 P24 AG SERPL QL IA: NEGATIVE

## 2018-12-22 LAB — HCV AB SERPL QL IA: NEGATIVE

## 2018-12-24 ENCOUNTER — COMMUNICATION - HEALTHEAST (OUTPATIENT)
Dept: ANTICOAGULATION | Facility: CLINIC | Age: 63
End: 2018-12-24

## 2018-12-25 ENCOUNTER — COMMUNICATION - HEALTHEAST (OUTPATIENT)
Dept: INTERNAL MEDICINE | Facility: CLINIC | Age: 63
End: 2018-12-25

## 2018-12-27 ENCOUNTER — OFFICE VISIT - HEALTHEAST (OUTPATIENT)
Dept: INTERNAL MEDICINE | Facility: CLINIC | Age: 63
End: 2018-12-27

## 2018-12-27 ENCOUNTER — COMMUNICATION - HEALTHEAST (OUTPATIENT)
Dept: ANTICOAGULATION | Facility: CLINIC | Age: 63
End: 2018-12-27

## 2018-12-27 DIAGNOSIS — Z95.2 H/O PROSTHETIC HEART VALVE: ICD-10-CM

## 2018-12-27 DIAGNOSIS — Z95.2 H/O MECHANICAL AORTIC VALVE REPLACEMENT: ICD-10-CM

## 2018-12-27 DIAGNOSIS — Z95.2 S/P AVR (AORTIC VALVE REPLACEMENT): ICD-10-CM

## 2018-12-27 LAB — INR PPP: 3.5 (ref 0.9–1.1)

## 2018-12-27 ASSESSMENT — MIFFLIN-ST. JEOR: SCORE: 1197.25

## 2019-01-15 ENCOUNTER — HOSPITAL ENCOUNTER (OUTPATIENT)
Dept: MAMMOGRAPHY | Facility: CLINIC | Age: 64
Discharge: HOME | End: 2019-01-15
Attending: INTERNAL MEDICINE

## 2019-01-15 DIAGNOSIS — Z12.31 VISIT FOR SCREENING MAMMOGRAM: ICD-10-CM

## 2019-01-17 ENCOUNTER — COMMUNICATION - HEALTHEAST (OUTPATIENT)
Dept: ANTICOAGULATION | Facility: CLINIC | Age: 64
End: 2019-01-17

## 2019-01-17 ENCOUNTER — RECORDS - HEALTHEAST (OUTPATIENT)
Dept: LAB | Facility: CLINIC | Age: 64
End: 2019-01-17

## 2019-01-17 ENCOUNTER — AMBULATORY - HEALTHEAST (OUTPATIENT)
Dept: LAB | Facility: CLINIC | Age: 64
End: 2019-01-17

## 2019-01-17 DIAGNOSIS — Z95.2 S/P AVR (AORTIC VALVE REPLACEMENT): ICD-10-CM

## 2019-01-17 DIAGNOSIS — Z95.2 H/O MECHANICAL AORTIC VALVE REPLACEMENT: ICD-10-CM

## 2019-01-17 LAB
ALBUMIN SERPL-MCNC: 3.6 G/DL (ref 3.5–5)
ALP SERPL-CCNC: 55 U/L (ref 45–120)
ALT SERPL W P-5'-P-CCNC: 25 U/L (ref 0–45)
AST SERPL W P-5'-P-CCNC: 27 U/L (ref 0–40)
BILIRUB DIRECT SERPL-MCNC: 0.2 MG/DL
BILIRUB SERPL-MCNC: 0.7 MG/DL (ref 0–1)
HBV SURFACE AG SERPL QL IA: NEGATIVE
HCV AB SERPL QL IA: NEGATIVE
HIV 1+2 AB+HIV1 P24 AG SERPL QL IA: NEGATIVE
INR PPP: 2.5 (ref 0.9–1.1)
PROT SERPL-MCNC: 6.8 G/DL (ref 6–8)

## 2019-01-31 ENCOUNTER — AMBULATORY - HEALTHEAST (OUTPATIENT)
Dept: LAB | Facility: CLINIC | Age: 64
End: 2019-01-31

## 2019-01-31 ENCOUNTER — COMMUNICATION - HEALTHEAST (OUTPATIENT)
Dept: ANTICOAGULATION | Facility: CLINIC | Age: 64
End: 2019-01-31

## 2019-01-31 DIAGNOSIS — Z95.2 H/O MECHANICAL AORTIC VALVE REPLACEMENT: ICD-10-CM

## 2019-01-31 DIAGNOSIS — Z95.2 S/P AVR (AORTIC VALVE REPLACEMENT): ICD-10-CM

## 2019-01-31 LAB — INR PPP: 2.2 (ref 0.9–1.1)

## 2019-02-04 ENCOUNTER — COMMUNICATION - HEALTHEAST (OUTPATIENT)
Dept: INTERNAL MEDICINE | Facility: CLINIC | Age: 64
End: 2019-02-04

## 2019-02-04 DIAGNOSIS — E78.5 HYPERLIPEMIA: ICD-10-CM

## 2019-02-08 ENCOUNTER — AMBULATORY - HEALTHEAST (OUTPATIENT)
Dept: CARDIOLOGY | Facility: CLINIC | Age: 64
End: 2019-02-08

## 2019-02-08 DIAGNOSIS — Z95.0 CARDIAC PACEMAKER IN SITU: ICD-10-CM

## 2019-02-10 ENCOUNTER — AMBULATORY - HEALTHEAST (OUTPATIENT)
Dept: CARDIOLOGY | Facility: CLINIC | Age: 64
End: 2019-02-10

## 2019-02-10 DIAGNOSIS — Z95.0 CARDIAC PACEMAKER IN SITU: ICD-10-CM

## 2019-02-26 ENCOUNTER — COMMUNICATION - HEALTHEAST (OUTPATIENT)
Dept: ANTICOAGULATION | Facility: CLINIC | Age: 64
End: 2019-02-26

## 2019-02-26 ENCOUNTER — AMBULATORY - HEALTHEAST (OUTPATIENT)
Dept: LAB | Facility: CLINIC | Age: 64
End: 2019-02-26

## 2019-02-26 DIAGNOSIS — Z95.2 S/P AVR (AORTIC VALVE REPLACEMENT): ICD-10-CM

## 2019-02-26 DIAGNOSIS — Z95.2 H/O MECHANICAL AORTIC VALVE REPLACEMENT: ICD-10-CM

## 2019-02-26 LAB — INR PPP: 3 (ref 0.9–1.1)

## 2019-02-27 ENCOUNTER — COMMUNICATION - HEALTHEAST (OUTPATIENT)
Dept: INTERNAL MEDICINE | Facility: CLINIC | Age: 64
End: 2019-02-27

## 2019-02-27 ENCOUNTER — COMMUNICATION - HEALTHEAST (OUTPATIENT)
Dept: CARDIOLOGY | Facility: CLINIC | Age: 64
End: 2019-02-27

## 2019-02-27 DIAGNOSIS — I10 BENIGN HYPERTENSION: ICD-10-CM

## 2019-03-14 ENCOUNTER — COMMUNICATION - HEALTHEAST (OUTPATIENT)
Dept: TELEHEALTH | Facility: CLINIC | Age: 64
End: 2019-03-14

## 2019-03-23 ENCOUNTER — COMMUNICATION - HEALTHEAST (OUTPATIENT)
Dept: INTERNAL MEDICINE | Facility: CLINIC | Age: 64
End: 2019-03-23

## 2019-03-23 DIAGNOSIS — Z95.0 CARDIAC PACEMAKER IN SITU: ICD-10-CM

## 2019-03-27 ENCOUNTER — RECORDS - HEALTHEAST (OUTPATIENT)
Dept: LAB | Facility: CLINIC | Age: 64
End: 2019-03-27

## 2019-03-27 LAB
HCV AB SERPL QL IA: NEGATIVE
HIV 1+2 AB+HIV1 P24 AG SERPL QL IA: NEGATIVE

## 2019-03-28 ENCOUNTER — OFFICE VISIT - HEALTHEAST (OUTPATIENT)
Dept: INTERNAL MEDICINE | Facility: CLINIC | Age: 64
End: 2019-03-28

## 2019-03-28 ENCOUNTER — COMMUNICATION - HEALTHEAST (OUTPATIENT)
Dept: ANTICOAGULATION | Facility: CLINIC | Age: 64
End: 2019-03-28

## 2019-03-28 DIAGNOSIS — Z95.2 S/P AVR (AORTIC VALVE REPLACEMENT): ICD-10-CM

## 2019-03-28 DIAGNOSIS — Z95.2 H/O MECHANICAL AORTIC VALVE REPLACEMENT: ICD-10-CM

## 2019-03-28 DIAGNOSIS — R10.11 ABDOMINAL PAIN, RIGHT UPPER QUADRANT: ICD-10-CM

## 2019-03-28 LAB — INR PPP: 3.7 (ref 0.9–1.1)

## 2019-03-28 ASSESSMENT — MIFFLIN-ST. JEOR: SCORE: 1201.79

## 2019-04-01 ENCOUNTER — COMMUNICATION - HEALTHEAST (OUTPATIENT)
Dept: ANTICOAGULATION | Facility: CLINIC | Age: 64
End: 2019-04-01

## 2019-04-01 ENCOUNTER — AMBULATORY - HEALTHEAST (OUTPATIENT)
Dept: LAB | Facility: CLINIC | Age: 64
End: 2019-04-01

## 2019-04-01 DIAGNOSIS — Z95.2 S/P AVR (AORTIC VALVE REPLACEMENT): ICD-10-CM

## 2019-04-01 DIAGNOSIS — Z95.2 H/O MECHANICAL AORTIC VALVE REPLACEMENT: ICD-10-CM

## 2019-04-01 LAB — INR PPP: 3.2 (ref 0.9–1.1)

## 2019-04-11 ENCOUNTER — COMMUNICATION - HEALTHEAST (OUTPATIENT)
Dept: ANTICOAGULATION | Facility: CLINIC | Age: 64
End: 2019-04-11

## 2019-04-11 ENCOUNTER — AMBULATORY - HEALTHEAST (OUTPATIENT)
Dept: LAB | Facility: CLINIC | Age: 64
End: 2019-04-11

## 2019-04-11 DIAGNOSIS — Z95.2 H/O MECHANICAL AORTIC VALVE REPLACEMENT: ICD-10-CM

## 2019-04-11 DIAGNOSIS — Z95.2 S/P AVR (AORTIC VALVE REPLACEMENT): ICD-10-CM

## 2019-04-11 LAB — INR PPP: 3.3 (ref 0.9–1.1)

## 2019-04-21 ENCOUNTER — COMMUNICATION - HEALTHEAST (OUTPATIENT)
Dept: INTERNAL MEDICINE | Facility: CLINIC | Age: 64
End: 2019-04-21

## 2019-04-21 DIAGNOSIS — Z79.01 LONG TERM CURRENT USE OF ANTICOAGULANT THERAPY: ICD-10-CM

## 2019-04-21 DIAGNOSIS — Z95.2 S/P AORTIC VALVE REPLACEMENT: ICD-10-CM

## 2019-05-02 ENCOUNTER — AMBULATORY - HEALTHEAST (OUTPATIENT)
Dept: LAB | Facility: CLINIC | Age: 64
End: 2019-05-02

## 2019-05-02 ENCOUNTER — AMBULATORY - HEALTHEAST (OUTPATIENT)
Dept: INTERNAL MEDICINE | Facility: CLINIC | Age: 64
End: 2019-05-02

## 2019-05-02 ENCOUNTER — COMMUNICATION - HEALTHEAST (OUTPATIENT)
Dept: ANTICOAGULATION | Facility: CLINIC | Age: 64
End: 2019-05-02

## 2019-05-02 DIAGNOSIS — Z00.00 ROUTINE GENERAL MEDICAL EXAMINATION AT A HEALTH CARE FACILITY: ICD-10-CM

## 2019-05-02 DIAGNOSIS — Z95.2 S/P AVR (AORTIC VALVE REPLACEMENT): ICD-10-CM

## 2019-05-02 DIAGNOSIS — Z95.2 H/O MECHANICAL AORTIC VALVE REPLACEMENT: ICD-10-CM

## 2019-05-02 LAB — INR PPP: 2.8 (ref 0.9–1.1)

## 2019-05-03 ENCOUNTER — COMMUNICATION - HEALTHEAST (OUTPATIENT)
Dept: INTERNAL MEDICINE | Facility: CLINIC | Age: 64
End: 2019-05-03

## 2019-05-03 DIAGNOSIS — E78.5 HYPERLIPEMIA: ICD-10-CM

## 2019-05-04 ENCOUNTER — COMMUNICATION - HEALTHEAST (OUTPATIENT)
Dept: INTERNAL MEDICINE | Facility: CLINIC | Age: 64
End: 2019-05-04

## 2019-05-04 DIAGNOSIS — Z00.00 ROUTINE GENERAL MEDICAL EXAMINATION AT A HEALTH CARE FACILITY: ICD-10-CM

## 2019-05-10 ENCOUNTER — AMBULATORY - HEALTHEAST (OUTPATIENT)
Dept: CARDIOLOGY | Facility: CLINIC | Age: 64
End: 2019-05-10

## 2019-05-10 DIAGNOSIS — Z95.0 CARDIAC PACEMAKER IN SITU: ICD-10-CM

## 2019-05-21 ENCOUNTER — COMMUNICATION - HEALTHEAST (OUTPATIENT)
Dept: ANTICOAGULATION | Facility: CLINIC | Age: 64
End: 2019-05-21

## 2019-05-21 DIAGNOSIS — Z95.2 H/O MECHANICAL AORTIC VALVE REPLACEMENT: ICD-10-CM

## 2019-05-31 ENCOUNTER — AMBULATORY - HEALTHEAST (OUTPATIENT)
Dept: LAB | Facility: CLINIC | Age: 64
End: 2019-05-31

## 2019-05-31 ENCOUNTER — COMMUNICATION - HEALTHEAST (OUTPATIENT)
Dept: ANTICOAGULATION | Facility: CLINIC | Age: 64
End: 2019-05-31

## 2019-05-31 DIAGNOSIS — Z95.2 H/O MECHANICAL AORTIC VALVE REPLACEMENT: ICD-10-CM

## 2019-05-31 DIAGNOSIS — Z00.00 ROUTINE GENERAL MEDICAL EXAMINATION AT A HEALTH CARE FACILITY: ICD-10-CM

## 2019-05-31 LAB
INR PPP: 2.8 (ref 0.9–1.1)
TSH SERPL DL<=0.005 MIU/L-ACNC: 1.86 UIU/ML (ref 0.3–5)

## 2019-06-03 ENCOUNTER — COMMUNICATION - HEALTHEAST (OUTPATIENT)
Dept: INTERNAL MEDICINE | Facility: CLINIC | Age: 64
End: 2019-06-03

## 2019-06-21 ENCOUNTER — COMMUNICATION - HEALTHEAST (OUTPATIENT)
Dept: INTERNAL MEDICINE | Facility: CLINIC | Age: 64
End: 2019-06-21

## 2019-06-21 DIAGNOSIS — Z95.0 CARDIAC PACEMAKER IN SITU: ICD-10-CM

## 2019-06-23 ENCOUNTER — COMMUNICATION - HEALTHEAST (OUTPATIENT)
Dept: INTERNAL MEDICINE | Facility: CLINIC | Age: 64
End: 2019-06-23

## 2019-06-23 DIAGNOSIS — Z00.00 ROUTINE GENERAL MEDICAL EXAMINATION AT A HEALTH CARE FACILITY: ICD-10-CM

## 2019-07-03 ENCOUNTER — COMMUNICATION - HEALTHEAST (OUTPATIENT)
Dept: ANTICOAGULATION | Facility: CLINIC | Age: 64
End: 2019-07-03

## 2019-07-03 ENCOUNTER — AMBULATORY - HEALTHEAST (OUTPATIENT)
Dept: LAB | Facility: CLINIC | Age: 64
End: 2019-07-03

## 2019-07-03 ENCOUNTER — AMBULATORY - HEALTHEAST (OUTPATIENT)
Dept: CARDIOLOGY | Facility: CLINIC | Age: 64
End: 2019-07-03

## 2019-07-03 DIAGNOSIS — Z95.2 H/O MECHANICAL AORTIC VALVE REPLACEMENT: ICD-10-CM

## 2019-07-03 DIAGNOSIS — Z95.0 CARDIAC PACEMAKER IN SITU: ICD-10-CM

## 2019-07-03 LAB
HCC DEVICE COMMENTS: NORMAL
HCC DEVICE IMPLANTING PROVIDER: NORMAL
HCC DEVICE MANUFACTURE: NORMAL
HCC DEVICE MODEL: NORMAL
HCC DEVICE SERIAL NUMBER: NORMAL
HCC DEVICE TYPE: NORMAL
INR PPP: 2.5 (ref 0.9–1.1)

## 2019-07-03 ASSESSMENT — MIFFLIN-ST. JEOR: SCORE: 1179.11

## 2019-07-09 ENCOUNTER — OFFICE VISIT - HEALTHEAST (OUTPATIENT)
Dept: INTERNAL MEDICINE | Facility: CLINIC | Age: 64
End: 2019-07-09

## 2019-07-09 ENCOUNTER — RECORDS - HEALTHEAST (OUTPATIENT)
Dept: GENERAL RADIOLOGY | Facility: CLINIC | Age: 64
End: 2019-07-09

## 2019-07-09 DIAGNOSIS — M25.562 ACUTE PAIN OF LEFT KNEE: ICD-10-CM

## 2019-07-09 DIAGNOSIS — M25.562 PAIN IN LEFT KNEE: ICD-10-CM

## 2019-07-09 ASSESSMENT — MIFFLIN-ST. JEOR: SCORE: 1206.32

## 2019-08-15 ENCOUNTER — AMBULATORY - HEALTHEAST (OUTPATIENT)
Dept: LAB | Facility: CLINIC | Age: 64
End: 2019-08-15

## 2019-08-15 ENCOUNTER — COMMUNICATION - HEALTHEAST (OUTPATIENT)
Dept: ANTICOAGULATION | Facility: CLINIC | Age: 64
End: 2019-08-15

## 2019-08-15 DIAGNOSIS — Z95.2 H/O MECHANICAL AORTIC VALVE REPLACEMENT: ICD-10-CM

## 2019-08-15 LAB — INR PPP: 2.4 (ref 0.9–1.1)

## 2019-09-01 ENCOUNTER — ANCILLARY PROCEDURE (OUTPATIENT)
Dept: GENERAL RADIOLOGY | Facility: CLINIC | Age: 64
End: 2019-09-01
Attending: INTERNAL MEDICINE
Payer: COMMERCIAL

## 2019-09-01 ENCOUNTER — OFFICE VISIT (OUTPATIENT)
Dept: URGENT CARE | Facility: URGENT CARE | Age: 64
End: 2019-09-01
Payer: COMMERCIAL

## 2019-09-01 VITALS
OXYGEN SATURATION: 100 % | TEMPERATURE: 97.1 F | SYSTOLIC BLOOD PRESSURE: 112 MMHG | BODY MASS INDEX: 24 KG/M2 | WEIGHT: 142 LBS | DIASTOLIC BLOOD PRESSURE: 69 MMHG | HEART RATE: 64 BPM

## 2019-09-01 DIAGNOSIS — S80.11XA CONTUSION OF RIGHT LEG, INITIAL ENCOUNTER: ICD-10-CM
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
## 2019-09-01 DIAGNOSIS — S99.911A ANKLE INJURY, RIGHT, INITIAL ENCOUNTER: ICD-10-CM

## 2019-09-01 DIAGNOSIS — S89.91XA INJURY OF LOWER EXTREMITY, RIGHT, INITIAL ENCOUNTER: ICD-10-CM
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
## 2019-09-01 PROCEDURE — 73590 X-RAY EXAM OF LOWER LEG: CPT | Mod: RT

## 2019-09-01 PROCEDURE — 73610 X-RAY EXAM OF ANKLE: CPT | Mod: RT

## 2019-09-01 PROCEDURE — 99203 OFFICE O/P NEW LOW 30 MIN: CPT | Performed by: INTERNAL MEDICINE

## 2019-09-01 RX ORDER — HYDROCHLOROTHIAZIDE 12.5 MG/1
12.5 CAPSULE ORAL DAILY
COMMUNITY
End: 2022-01-12

## 2019-09-01 ASSESSMENT — ENCOUNTER SYMPTOMS: NUMBNESS: 0

## 2019-09-01 NOTE — PROGRESS NOTES
SUBJECTIVE:   Tammy Law is a 63 year old female presenting with a chief complaint of   Chief Complaint   Patient presents with     Urgent Care     Pt in clinic to have eval for right leg pain due to injury .     Musculoskeletal Problem       She is a new patient of [facility].    MS Injury/Pain    Onset of symptoms was 1 week(s) ago.  Location: right ankle and leg  Context:       The injury happened while at brother's home      Mechanism: fall , foot was stuck between two palettes & patient fell foreward      Patient experienced immediate pain, immediate swelling, was able to bear weight  after injury  Course of symptoms is same.    She has been walking since injury  Current and Associated symptoms: Pain, Swelling and Bruising  Denies  Decreased range of motion and Stiffness  Aggravating Factors: none  Therapies to improve symptoms include: ice and Tylenol  This is the first time this type of problem has occurred for this patient.     Patient concerned for blood clot  Review of Systems   Neurological: Negative for numbness.       Past Medical History:   Diagnosis Date     Depression      High cholesterol      Hypertension      Hypothyroidism      No family history on file.  Current Outpatient Medications   Medication Sig Dispense Refill     CITALOPRAM HYDROBROMIDE 20 MG OR TABS 2 TABLETS DAILY       COUMADIN 7.5 MG OR TABS 7.5 day 1 then 5 mg next 6 days       hydrochlorothiazide (MICROZIDE) 12.5 MG capsule Take 12.5 mg by mouth daily       LEVOTHYROXINE SODIUM 75 MCG OR TABS 1 TABLET DAILY       order for DME Equipment being ordered: tall cam boot with crutches 2 Device 0     SIMVASTATIN 20 MG OR TABS 1 TABLET EVERY EVENING       Est Estrogens-Methyltest (ESTRATEST PO) Take  by mouth.       Social History     Tobacco Use     Smoking status: Never Smoker     Smokeless tobacco: Never Used   Substance Use Topics     Alcohol use: Not on file       OBJECTIVE  /69   Pulse 64   Temp 97.1  F (36.2  C)  (Oral)   Wt 64.4 kg (142 lb)   SpO2 100%   BMI 24.00 kg/m      Physical Exam   Constitutional: She appears well-developed and well-nourished.   Musculoskeletal:   right lower extremity    Examination of right knee reveals no effusion, pain or joint instability.  There is significant swelling and ecchymosis noted of right lower extremity from anterior shin to involvement of ankle  Significant pain is elicited with palpation of mid tib-fib area   No calf tenderness  Examination of ankle Reveals medial malleoli and ligamental tenderness     foot exam is normal          Vitals reviewed.                  Labs:  No results found for this or any previous visit (from the past 24 hour(s)).    X-Ray was done, my findings are: no fracture     ASSESSMENT:      ICD-10-CM    1. Fall on same level from slipping, tripping, or stumbling, initial encounter W01.0XXA XR Tibia & Fibula Right 2 Views     XR Ankle Right G/E 3 Views     order for DME   2. Injury of lower extremity, right, initial encounter S89.91XA XR Tibia & Fibula Right 2 Views     XR Ankle Right G/E 3 Views     order for DME   3. Ankle injury, right, initial encounter S99.911A XR Tibia & Fibula Right 2 Views     XR Ankle Right G/E 3 Views     order for DME   4. Contusion of right leg, initial encounter S80.11XA         Medical Decision Making:    Differential Diagnosis:  MS Injury Pain: sprain, fracture and contusion    Serious Comorbid Conditions:  Adult:  Anticoagulation    PLAN:    MS Injury/Pain  ice, elevate, rest, Ibuprofen and cam boot with crutch walking    Followup:    Recheck 2 weeks    Patient Instructions       ice, elevate, rest, tylenol  and   cam boot with crutch walking        Patient Education     Treating Strains and Sprains    Strains and sprains happen when muscles or other soft tissues near your bones stretch or tear. These injuries can cause bruising, swelling, and pain. To ease your discomfort and speed the healing of your strain or sprain,  follow the tips below. Remember, a strain or sprain can take 6 to 8 weeks to heal.  Important Note: Do not give aspirin to children or teens without discussing it with your healthcare provider first.  Ice first, heat later    Use ice for the first 24 to 48 hours after injury. Ice helps prevent swelling and reduce pain. Ice the injury for no more than 20 minutes at a time and allow at least 20 minutes between icing sessions.    Apply heat after the first 72 hours, once the swelling has gone down. Heat relaxes muscles and increases blood flow. Soak the injured area in warm water or use a heating pad set on low for no more than 15 minutes at a time.  Wrap and elevate    Wrap an injured limb firmly with an elastic bandage. This provides support and helps prevent swelling. Don t wear an elastic bandage overnight. Watch for tingling, numbness, or increased pain. Remove the bandage immediately if any of these occurs.    Elevate the injured area to help reduce swelling and throbbing. It s best to raise an injured limb above the level of your heart.  Medicines    Over-the-counter medicines such as acetaminophen or ibuprofen can help reduce pain. Some also help reduce swelling.    Take medicine only as directed.    Rest the area even if medicines are controlling the pain.  Rest    Rest the injured area by not using it for 24 hours.    When you re ready, return slowly to your normal activities. Rest the injured area often.    Don t use or walk on an injured limb if it hurts.  Date Last Reviewed: 1/1/2018 2000-2018 The [employer]. [address] [place] [address], [place] [address]. All rights reserved. This information is not intended as a substitute for professional medical care. Always follow your healthcare professional's instructions.           Patient Education     Understanding Bone Bruise (Bone Contusion)  A bone bruise is an injury to a bone that is less severe than a bone fracture. Bone bruises are fairly common.  They can happen to people of all ages. Any type of bone in your body can be bruised. Other injuries often happen along with a bone bruise, such as damage to nearby ligaments.  What happens when a bone is bruised?  Bone is made of different kinds of tissue. The periosteum is a thin layer of tissue that covers most of a bone. Where bones come together, there is usually a layer of cartilage at the edges. The bone here is called subchondral bone. Deep inside the bone is an area called the medulla. It contains the bone marrow and fibrous tissue called trabeculae.  With a bone fracture, all of the trabeculae in a region of bone have broken. But with a bone bruise, an injury only damages some of these trabeculae. An injury might cause blood to build up in the area beneath the periosteum. This causes a subperiosteal hematoma, a type of bone bruise. An injury might also cause bleeding and swelling in the area between your cartilage and the bone beneath it. This causes a subchondral bone bruise. Or bleeding and swelling can occur in the medulla of your bone. This is called an intraosseous bone bruise.  What causes a bone bruise?  Injury of any kind can cause a bone bruise. Sports injuries, motor vehicle accidents, or falls from a height can cause them. Twisting injuries that cause joint sprains can also cause a bone bruise. Health conditions like arthritis may also lead to a bone bruise. This is because arthritis causes bone surfaces to grind against each other. Child abuse is another cause of bone bruises.  Symptoms of a bone bruise  Symptoms of a bone bruise can include:    Pain and soreness in the injured area    Swelling in the area and soft tissues around it    Change in color of the injured area    Swelling or stiffness of an injured joint  This pain is often more severe and lasts longer than a soft tissue injury. How severe your symptoms are and how long they last depends on how severe the bone bruise is.  Diagnosing a  bone bruise  Your healthcare provider will ask you about your medical history and symptoms. He or she will ask how you got your injury. Your provider will examine the injured area to check for pain, bruising, and swelling. After the exam, your health care provider may be able to tell if you have a bone bruise.  A bone bruise doesn t show up on an X-ray. But you may be given an X-ray to rule out a bone fracture. A fracture may need a different kind of treatment. An MRI can confirm a bone bruise. But your healthcare provider will likely only give you an MRI if your symptoms don t get better.  Date Last Reviewed: 4/1/2017 2000-2018 [employer]. [address] [place] [address], [place] [address]. All rights reserved. This information is not intended as a substitute for professional medical care. Always follow your healthcare professional's instructions.           Patient Education     Treatment for Bone Bruise (Bone Contusion)  A bone bruise is an injury to a bone that is less severe than a bone fracture. Bone bruises are fairly common. They can happen to people of all ages. Any type of bone in your body can get a bone bruise. Other injuries often happen along with a bone bruise, such as damage to nearby ligaments.  Types of treatment  Treatment for a bone bruise may include:    Resting the bone or joint    Putting an ice pack on the area several times a day    Raising the injury above the level of your heart to reduce swelling    Taking medicine to reduce pain and swelling    Wearing a brace or other device to limit movement  Your healthcare provider may give you advice about your diet. This is because eating a diet that is rich in calcium, vitamin D, and protein can help you heal. Your healthcare provider may ask you not to use certain over-the-counter medicines for pain. Some of these may delay normal bone healing. If you smoke, your healthcare provider will advise you to stop smoking. Smoking can also delay  bone healing.  Your healthcare provider will tell you how long you should not put weight on your bone. Most bone bruises slowly heal over 2 to 4 months. A larger bone bruise may take longer to heal. You may not be able to return to sports activities for weeks or months. If your symptoms don t go away, your healthcare provider may order an MRI.  Possible complications of a bone bruise  Most bone bruises heal without any problems. If your bone bruise is very large, your body may have trouble getting blood flow back to the area. This can cause avascular necrosis of the bone. This leads to death of that part of the bone.  When to call the healthcare provider  Call your healthcare provider if your symptoms don t start to get better in a few days. Call him or her right away if you have any severe symptoms, such as a high fever.  Date Last Reviewed: 5/1/2018 2000-2018 [employer]. [address] [place] [address], [place] [address]. All rights reserved. This information is not intended as a substitute for professional medical care. Always follow your healthcare professional's instructions.             '

## 2019-09-01 NOTE — PATIENT INSTRUCTIONS
ice, elevate, rest, tylenol  and   cam boot with crutch walking        Patient Education     Treating Strains and Sprains    Strains and sprains happen when muscles or other soft tissues near your bones stretch or tear. These injuries can cause bruising, swelling, and pain. To ease your discomfort and speed the healing of your strain or sprain, follow the tips below. Remember, a strain or sprain can take 6 to 8 weeks to heal.  Important Note: Do not give aspirin to children or teens without discussing it with your healthcare provider first.  Ice first, heat later    Use ice for the first 24 to 48 hours after injury. Ice helps prevent swelling and reduce pain. Ice the injury for no more than 20 minutes at a time and allow at least 20 minutes between icing sessions.    Apply heat after the first 72 hours, once the swelling has gone down. Heat relaxes muscles and increases blood flow. Soak the injured area in warm water or use a heating pad set on low for no more than 15 minutes at a time.  Wrap and elevate    Wrap an injured limb firmly with an elastic bandage. This provides support and helps prevent swelling. Don t wear an elastic bandage overnight. Watch for tingling, numbness, or increased pain. Remove the bandage immediately if any of these occurs.    Elevate the injured area to help reduce swelling and throbbing. It s best to raise an injured limb above the level of your heart.  Medicines    Over-the-counter medicines such as acetaminophen or ibuprofen can help reduce pain. Some also help reduce swelling.    Take medicine only as directed.    Rest the area even if medicines are controlling the pain.  Rest    Rest the injured area by not using it for 24 hours.    When you re ready, return slowly to your normal activities. Rest the injured area often.    Don t use or walk on an injured limb if it hurts.  Date Last Reviewed: 1/1/2018 2000-2018 The [employer]. [address] [facility], [facility] [place]  [address]. All rights reserved. This information is not intended as a substitute for professional medical care. Always follow your healthcare professional's instructions.           Patient Education     Understanding Bone Bruise (Bone Contusion)  A bone bruise is an injury to a bone that is less severe than a bone fracture. Bone bruises are fairly common. They can happen to people of all ages. Any type of bone in your body can be bruised. Other injuries often happen along with a bone bruise, such as damage to nearby ligaments.  What happens when a bone is bruised?  Bone is made of different kinds of tissue. The periosteum is a thin layer of tissue that covers most of a bone. Where bones come together, there is usually a layer of cartilage at the edges. The bone here is called subchondral bone. Deep inside the bone is an area called the medulla. It contains the bone marrow and fibrous tissue called trabeculae.  With a bone fracture, all of the trabeculae in a region of bone have broken. But with a bone bruise, an injury only damages some of these trabeculae. An injury might cause blood to build up in the area beneath the periosteum. This causes a subperiosteal hematoma, a type of bone bruise. An injury might also cause bleeding and swelling in the area between your cartilage and the bone beneath it. This causes a subchondral bone bruise. Or bleeding and swelling can occur in the medulla of your bone. This is called an intraosseous bone bruise.  What causes a bone bruise?  Injury of any kind can cause a bone bruise. Sports injuries, motor vehicle accidents, or falls from a height can cause them. Twisting injuries that cause joint sprains can also cause a bone bruise. Health conditions like arthritis may also lead to a bone bruise. This is because arthritis causes bone surfaces to grind against each other. Child abuse is another cause of bone bruises.  Symptoms of a bone bruise  Symptoms of a bone bruise can  include:    Pain and soreness in the injured area    Swelling in the area and soft tissues around it    Change in color of the injured area    Swelling or stiffness of an injured joint  This pain is often more severe and lasts longer than a soft tissue injury. How severe your symptoms are and how long they last depends on how severe the bone bruise is.  Diagnosing a bone bruise  Your healthcare provider will ask you about your medical history and symptoms. He or she will ask how you got your injury. Your provider will examine the injured area to check for pain, bruising, and swelling. After the exam, your health care provider may be able to tell if you have a bone bruise.  A bone bruise doesn t show up on an X-ray. But you may be given an X-ray to rule out a bone fracture. A fracture may need a different kind of treatment. An MRI can confirm a bone bruise. But your healthcare provider will likely only give you an MRI if your symptoms don t get better.  Date Last Reviewed: 4/1/2017 2000-2018 The [employer]. [address] [place] [address]. All rights reserved. This information is not intended as a substitute for professional medical care. Always follow your healthcare professional's instructions.           Patient Education     Treatment for Bone Bruise (Bone Contusion)  A bone bruise is an injury to a bone that is less severe than a bone fracture. Bone bruises are fairly common. They can happen to people of all ages. Any type of bone in your body can get a bone bruise. Other injuries often happen along with a bone bruise, such as damage to nearby ligaments.  Types of treatment  Treatment for a bone bruise may include:    Resting the bone or joint    Putting an ice pack on the area several times a day    Raising the injury above the level of your heart to reduce swelling    Taking medicine to reduce pain and swelling    Wearing a brace or other device to limit movement  Your healthcare  provider may give you advice about your diet. This is because eating a diet that is rich in calcium, vitamin D, and protein can help you heal. Your healthcare provider may ask you not to use certain over-the-counter medicines for pain. Some of these may delay normal bone healing. If you smoke, your healthcare provider will advise you to stop smoking. Smoking can also delay bone healing.  Your healthcare provider will tell you how long you should not put weight on your bone. Most bone bruises slowly heal over 2 to 4 months. A larger bone bruise may take longer to heal. You may not be able to return to sports activities for weeks or months. If your symptoms don t go away, your healthcare provider may order an MRI.  Possible complications of a bone bruise  Most bone bruises heal without any problems. If your bone bruise is very large, your body may have trouble getting blood flow back to the area. This can cause avascular necrosis of the bone. This leads to death of that part of the bone.  When to call the healthcare provider  Call your healthcare provider if your symptoms don t start to get better in a few days. Call him or her right away if you have any severe symptoms, such as a high fever.  Date Last Reviewed: 5/1/2018 2000-2018 [employer]. [address] [place] [address], [place] [address]. All rights reserved. This information is not intended as a substitute for professional medical care. Always follow your healthcare professional's instructions.

## 2019-09-19 ENCOUNTER — COMMUNICATION - HEALTHEAST (OUTPATIENT)
Dept: INTERNAL MEDICINE | Facility: CLINIC | Age: 64
End: 2019-09-19

## 2019-09-19 DIAGNOSIS — Z95.0 CARDIAC PACEMAKER IN SITU: ICD-10-CM

## 2019-09-19 DIAGNOSIS — E03.9 HYPOTHYROIDISM, UNSPECIFIED TYPE: ICD-10-CM

## 2019-09-20 ENCOUNTER — OFFICE VISIT - HEALTHEAST (OUTPATIENT)
Dept: CARDIOLOGY | Facility: CLINIC | Age: 64
End: 2019-09-20

## 2019-09-20 DIAGNOSIS — R53.83 FATIGUE: ICD-10-CM

## 2019-09-20 DIAGNOSIS — M54.2 NECK PAIN: ICD-10-CM

## 2019-09-20 DIAGNOSIS — Z95.0 CARDIAC PACEMAKER IN SITU: ICD-10-CM

## 2019-09-20 DIAGNOSIS — Z95.2 H/O MECHANICAL AORTIC VALVE REPLACEMENT: ICD-10-CM

## 2019-09-20 LAB
ATRIAL RATE - MUSE: 77 BPM
DIASTOLIC BLOOD PRESSURE - MUSE: NORMAL
INTERPRETATION ECG - MUSE: NORMAL
P AXIS - MUSE: 74 DEGREES
PR INTERVAL - MUSE: 198 MS
QRS DURATION - MUSE: 144 MS
QT - MUSE: 444 MS
QTC - MUSE: 502 MS
R AXIS - MUSE: 58 DEGREES
SYSTOLIC BLOOD PRESSURE - MUSE: NORMAL
T AXIS - MUSE: 44 DEGREES
VENTRICULAR RATE- MUSE: 77 BPM

## 2019-09-20 ASSESSMENT — MIFFLIN-ST. JEOR: SCORE: 1187.61

## 2019-09-26 ENCOUNTER — COMMUNICATION - HEALTHEAST (OUTPATIENT)
Dept: ANTICOAGULATION | Facility: CLINIC | Age: 64
End: 2019-09-26

## 2019-09-26 ENCOUNTER — AMBULATORY - HEALTHEAST (OUTPATIENT)
Dept: LAB | Facility: CLINIC | Age: 64
End: 2019-09-26

## 2019-09-26 DIAGNOSIS — Z95.2 H/O MECHANICAL AORTIC VALVE REPLACEMENT: ICD-10-CM

## 2019-09-26 LAB — INR PPP: 3.2 (ref 0.9–1.1)

## 2019-10-21 ENCOUNTER — COMMUNICATION - HEALTHEAST (OUTPATIENT)
Dept: INTERNAL MEDICINE | Facility: CLINIC | Age: 64
End: 2019-10-21

## 2019-10-21 DIAGNOSIS — Z79.01 LONG TERM CURRENT USE OF ANTICOAGULANT THERAPY: ICD-10-CM

## 2019-10-21 DIAGNOSIS — Z95.2 S/P AORTIC VALVE REPLACEMENT: ICD-10-CM

## 2019-11-06 ENCOUNTER — AMBULATORY - HEALTHEAST (OUTPATIENT)
Dept: CARDIOLOGY | Facility: CLINIC | Age: 64
End: 2019-11-06

## 2019-11-06 DIAGNOSIS — Z95.0 CARDIAC PACEMAKER IN SITU: ICD-10-CM

## 2019-11-06 DIAGNOSIS — I49.5 SSS (SICK SINUS SYNDROME) (H): ICD-10-CM

## 2019-11-14 ENCOUNTER — AMBULATORY - HEALTHEAST (OUTPATIENT)
Dept: LAB | Facility: CLINIC | Age: 64
End: 2019-11-14

## 2019-11-14 ENCOUNTER — COMMUNICATION - HEALTHEAST (OUTPATIENT)
Dept: ANTICOAGULATION | Facility: CLINIC | Age: 64
End: 2019-11-14

## 2019-11-14 DIAGNOSIS — Z95.2 H/O MECHANICAL AORTIC VALVE REPLACEMENT: ICD-10-CM

## 2019-11-14 LAB — INR PPP: 3.2 (ref 0.9–1.1)

## 2019-12-24 ENCOUNTER — COMMUNICATION - HEALTHEAST (OUTPATIENT)
Dept: INTERNAL MEDICINE | Facility: CLINIC | Age: 64
End: 2019-12-24

## 2019-12-24 DIAGNOSIS — Z95.2 S/P AORTIC VALVE REPLACEMENT: ICD-10-CM

## 2019-12-24 DIAGNOSIS — Z79.01 LONG TERM CURRENT USE OF ANTICOAGULANT THERAPY: ICD-10-CM

## 2019-12-30 ENCOUNTER — AMBULATORY - HEALTHEAST (OUTPATIENT)
Dept: LAB | Facility: CLINIC | Age: 64
End: 2019-12-30

## 2019-12-30 ENCOUNTER — COMMUNICATION - HEALTHEAST (OUTPATIENT)
Dept: ANTICOAGULATION | Facility: CLINIC | Age: 64
End: 2019-12-30

## 2019-12-30 DIAGNOSIS — Z95.2 H/O MECHANICAL AORTIC VALVE REPLACEMENT: ICD-10-CM

## 2019-12-30 LAB — INR PPP: 3.1 (ref 0.9–1.1)

## 2020-01-07 ENCOUNTER — COMMUNICATION - HEALTHEAST (OUTPATIENT)
Dept: ANTICOAGULATION | Facility: CLINIC | Age: 65
End: 2020-01-07

## 2020-01-07 DIAGNOSIS — Z95.2 H/O MECHANICAL AORTIC VALVE REPLACEMENT: ICD-10-CM

## 2020-02-03 ENCOUNTER — AMBULATORY - HEALTHEAST (OUTPATIENT)
Dept: CARDIOLOGY | Facility: CLINIC | Age: 65
End: 2020-02-03

## 2020-02-03 DIAGNOSIS — I49.5 SSS (SICK SINUS SYNDROME) (H): ICD-10-CM

## 2020-02-03 DIAGNOSIS — Z95.0 CARDIAC PACEMAKER IN SITU: ICD-10-CM

## 2020-02-12 ENCOUNTER — COMMUNICATION - HEALTHEAST (OUTPATIENT)
Dept: ANTICOAGULATION | Facility: CLINIC | Age: 65
End: 2020-02-12

## 2020-02-12 ENCOUNTER — AMBULATORY - HEALTHEAST (OUTPATIENT)
Dept: LAB | Facility: CLINIC | Age: 65
End: 2020-02-12

## 2020-02-12 ENCOUNTER — HOSPITAL ENCOUNTER (OUTPATIENT)
Dept: MAMMOGRAPHY | Facility: CLINIC | Age: 65
Discharge: HOME | End: 2020-02-12
Attending: INTERNAL MEDICINE

## 2020-02-12 DIAGNOSIS — Z95.2 H/O MECHANICAL AORTIC VALVE REPLACEMENT: ICD-10-CM

## 2020-02-12 DIAGNOSIS — Z12.31 SCREENING MAMMOGRAM, ENCOUNTER FOR: ICD-10-CM

## 2020-02-12 LAB — INR PPP: 2.3 (ref 0.9–1.1)

## 2020-03-12 ENCOUNTER — COMMUNICATION - HEALTHEAST (OUTPATIENT)
Dept: INTERNAL MEDICINE | Facility: CLINIC | Age: 65
End: 2020-03-12

## 2020-03-12 DIAGNOSIS — E03.9 ACQUIRED HYPOTHYROIDISM: ICD-10-CM

## 2020-03-12 DIAGNOSIS — E03.9 HYPOTHYROIDISM, UNSPECIFIED TYPE: ICD-10-CM

## 2020-03-18 ENCOUNTER — COMMUNICATION - HEALTHEAST (OUTPATIENT)
Dept: PHARMACY | Facility: CLINIC | Age: 65
End: 2020-03-18

## 2020-03-18 DIAGNOSIS — I10 BENIGN HYPERTENSION: ICD-10-CM

## 2020-03-20 ENCOUNTER — COMMUNICATION - HEALTHEAST (OUTPATIENT)
Dept: PHARMACY | Facility: CLINIC | Age: 65
End: 2020-03-20

## 2020-03-20 DIAGNOSIS — E03.9 ACQUIRED HYPOTHYROIDISM: ICD-10-CM

## 2020-03-20 DIAGNOSIS — E03.9 HYPOTHYROIDISM, UNSPECIFIED TYPE: ICD-10-CM

## 2020-03-31 ENCOUNTER — COMMUNICATION - HEALTHEAST (OUTPATIENT)
Dept: ANTICOAGULATION | Facility: CLINIC | Age: 65
End: 2020-03-31

## 2020-03-31 ENCOUNTER — AMBULATORY - HEALTHEAST (OUTPATIENT)
Dept: LAB | Facility: CLINIC | Age: 65
End: 2020-03-31

## 2020-03-31 DIAGNOSIS — Z95.2 H/O MECHANICAL AORTIC VALVE REPLACEMENT: ICD-10-CM

## 2020-03-31 LAB — INR PPP: 3.5 (ref 0.9–1.1)

## 2020-04-22 ENCOUNTER — COMMUNICATION - HEALTHEAST (OUTPATIENT)
Dept: ANTICOAGULATION | Facility: CLINIC | Age: 65
End: 2020-04-22

## 2020-04-22 ENCOUNTER — OFFICE VISIT - HEALTHEAST (OUTPATIENT)
Dept: INTERNAL MEDICINE | Facility: CLINIC | Age: 65
End: 2020-04-22

## 2020-04-22 DIAGNOSIS — Z95.2 S/P AVR (AORTIC VALVE REPLACEMENT): ICD-10-CM

## 2020-04-22 DIAGNOSIS — R10.11 ABDOMINAL PAIN, RIGHT UPPER QUADRANT: ICD-10-CM

## 2020-04-23 ENCOUNTER — OFFICE VISIT - HEALTHEAST (OUTPATIENT)
Dept: INTERNAL MEDICINE | Facility: CLINIC | Age: 65
End: 2020-04-23

## 2020-04-23 ENCOUNTER — HOSPITAL ENCOUNTER (OUTPATIENT)
Dept: CT IMAGING | Facility: CLINIC | Age: 65
Discharge: HOME | End: 2020-04-23

## 2020-04-23 ENCOUNTER — COMMUNICATION - HEALTHEAST (OUTPATIENT)
Dept: INTERNAL MEDICINE | Facility: CLINIC | Age: 65
End: 2020-04-23

## 2020-04-23 DIAGNOSIS — R10.9 FLANK PAIN: ICD-10-CM

## 2020-04-23 DIAGNOSIS — E03.9 HYPOTHYROIDISM, UNSPECIFIED TYPE: ICD-10-CM

## 2020-04-23 DIAGNOSIS — R10.84 ABDOMINAL PAIN, GENERALIZED: ICD-10-CM

## 2020-04-23 LAB
ALBUMIN SERPL-MCNC: 4 G/DL (ref 3.5–5)
ALBUMIN UR-MCNC: NEGATIVE MG/DL
ALP SERPL-CCNC: 66 U/L (ref 45–120)
ALT SERPL W P-5'-P-CCNC: 28 U/L (ref 0–45)
AMYLASE SERPL-CCNC: 60 U/L (ref 5–120)
ANION GAP SERPL CALCULATED.3IONS-SCNC: 9 MMOL/L (ref 5–18)
APPEARANCE UR: CLEAR
AST SERPL W P-5'-P-CCNC: 26 U/L (ref 0–40)
BACTERIA #/AREA URNS HPF: ABNORMAL HPF
BILIRUB SERPL-MCNC: 0.6 MG/DL (ref 0–1)
BILIRUB UR QL STRIP: NEGATIVE
BUN SERPL-MCNC: 10 MG/DL (ref 8–22)
CALCIUM SERPL-MCNC: 9.3 MG/DL (ref 8.5–10.5)
CHLORIDE BLD-SCNC: 101 MMOL/L (ref 98–107)
CO2 SERPL-SCNC: 30 MMOL/L (ref 22–31)
COLOR UR AUTO: YELLOW
CREAT BLD-MCNC: 0.9 MG/DL (ref 0.6–1.1)
CREAT SERPL-MCNC: 0.82 MG/DL (ref 0.6–1.1)
ERYTHROCYTE [DISTWIDTH] IN BLOOD BY AUTOMATED COUNT: 12.1 % (ref 11–14.5)
GFR SERPL CREATININE-BSD FRML MDRD: >60 ML/MIN/1.73M2
GFR SERPL CREATININE-BSD FRML MDRD: >60 ML/MIN/1.73M2
GLUCOSE BLD-MCNC: 70 MG/DL (ref 70–125)
GLUCOSE UR STRIP-MCNC: NEGATIVE MG/DL
HCT VFR BLD AUTO: 40.8 % (ref 35–47)
HGB BLD-MCNC: 13.8 G/DL (ref 12–16)
HGB UR QL STRIP: ABNORMAL
KETONES UR STRIP-MCNC: NEGATIVE MG/DL
LEUKOCYTE ESTERASE UR QL STRIP: NEGATIVE
LIPASE SERPL-CCNC: 30 U/L (ref 0–52)
MCH RBC QN AUTO: 29.7 PG (ref 27–34)
MCHC RBC AUTO-ENTMCNC: 33.8 G/DL (ref 32–36)
MCV RBC AUTO: 88 FL (ref 80–100)
NITRATE UR QL: NEGATIVE
PH UR STRIP: 5.5 [PH] (ref 5–8)
PLATELET # BLD AUTO: 196 THOU/UL (ref 140–440)
PMV BLD AUTO: 6.7 FL (ref 7–10)
POTASSIUM BLD-SCNC: 3.6 MMOL/L (ref 3.5–5)
PROT SERPL-MCNC: 7.1 G/DL (ref 6–8)
RBC # BLD AUTO: 4.64 MILL/UL (ref 3.8–5.4)
RBC #/AREA URNS AUTO: ABNORMAL HPF
SODIUM SERPL-SCNC: 140 MMOL/L (ref 136–145)
SP GR UR STRIP: 1.02 (ref 1–1.03)
SQUAMOUS #/AREA URNS AUTO: ABNORMAL LPF
TSH SERPL DL<=0.005 MIU/L-ACNC: 0.74 UIU/ML (ref 0.3–5)
UROBILINOGEN UR STRIP-ACNC: ABNORMAL
WBC #/AREA URNS AUTO: ABNORMAL HPF
WBC: 4.7 THOU/UL (ref 4–11)

## 2020-04-23 ASSESSMENT — MIFFLIN-ST. JEOR: SCORE: 1178.64

## 2020-04-28 ENCOUNTER — COMMUNICATION - HEALTHEAST (OUTPATIENT)
Dept: LAB | Facility: CLINIC | Age: 65
End: 2020-04-28

## 2020-05-04 ENCOUNTER — AMBULATORY - HEALTHEAST (OUTPATIENT)
Dept: CARDIOLOGY | Facility: CLINIC | Age: 65
End: 2020-05-04

## 2020-05-04 DIAGNOSIS — I49.5 SSS (SICK SINUS SYNDROME) (H): ICD-10-CM

## 2020-05-04 DIAGNOSIS — Z95.0 CARDIAC PACEMAKER IN SITU: ICD-10-CM

## 2020-05-06 ENCOUNTER — COMMUNICATION - HEALTHEAST (OUTPATIENT)
Dept: INTERNAL MEDICINE | Facility: CLINIC | Age: 65
End: 2020-05-06

## 2020-05-06 DIAGNOSIS — Z95.2 H/O MECHANICAL AORTIC VALVE REPLACEMENT: ICD-10-CM

## 2020-05-15 ENCOUNTER — COMMUNICATION - HEALTHEAST (OUTPATIENT)
Dept: ANTICOAGULATION | Facility: CLINIC | Age: 65
End: 2020-05-15

## 2020-05-15 ENCOUNTER — AMBULATORY - HEALTHEAST (OUTPATIENT)
Dept: LAB | Facility: CLINIC | Age: 65
End: 2020-05-15

## 2020-05-15 DIAGNOSIS — Z95.2 S/P AVR (AORTIC VALVE REPLACEMENT): ICD-10-CM

## 2020-05-15 DIAGNOSIS — Z95.2 H/O MECHANICAL AORTIC VALVE REPLACEMENT: ICD-10-CM

## 2020-05-15 LAB — INR PPP: 2.4 (ref 0.9–1.1)

## 2020-05-19 ENCOUNTER — COMMUNICATION - HEALTHEAST (OUTPATIENT)
Dept: PHARMACY | Facility: CLINIC | Age: 65
End: 2020-05-19

## 2020-05-19 DIAGNOSIS — E78.5 HYPERLIPEMIA: ICD-10-CM

## 2020-06-19 ENCOUNTER — COMMUNICATION - HEALTHEAST (OUTPATIENT)
Dept: ANTICOAGULATION | Facility: CLINIC | Age: 65
End: 2020-06-19

## 2020-06-22 ENCOUNTER — COMMUNICATION - HEALTHEAST (OUTPATIENT)
Dept: INTERNAL MEDICINE | Facility: CLINIC | Age: 65
End: 2020-06-22

## 2020-06-22 ENCOUNTER — COMMUNICATION - HEALTHEAST (OUTPATIENT)
Dept: PHARMACY | Facility: CLINIC | Age: 65
End: 2020-06-22

## 2020-06-22 DIAGNOSIS — Z79.01 LONG TERM CURRENT USE OF ANTICOAGULANT THERAPY: ICD-10-CM

## 2020-06-22 DIAGNOSIS — Z95.2 S/P AORTIC VALVE REPLACEMENT: ICD-10-CM

## 2020-06-24 ENCOUNTER — COMMUNICATION - HEALTHEAST (OUTPATIENT)
Dept: ANTICOAGULATION | Facility: CLINIC | Age: 65
End: 2020-06-24

## 2020-06-24 ENCOUNTER — AMBULATORY - HEALTHEAST (OUTPATIENT)
Dept: LAB | Facility: CLINIC | Age: 65
End: 2020-06-24

## 2020-06-24 DIAGNOSIS — Z95.2 S/P AVR (AORTIC VALVE REPLACEMENT): ICD-10-CM

## 2020-06-24 DIAGNOSIS — Z95.2 H/O MECHANICAL AORTIC VALVE REPLACEMENT: ICD-10-CM

## 2020-06-24 LAB — INR PPP: 2.7 (ref 0.9–1.1)

## 2020-07-27 ENCOUNTER — COMMUNICATION - HEALTHEAST (OUTPATIENT)
Dept: PHARMACY | Facility: CLINIC | Age: 65
End: 2020-07-27

## 2020-07-27 DIAGNOSIS — Z95.0 CARDIAC PACEMAKER IN SITU: ICD-10-CM

## 2020-07-29 ENCOUNTER — COMMUNICATION - HEALTHEAST (OUTPATIENT)
Dept: INTERNAL MEDICINE | Facility: CLINIC | Age: 65
End: 2020-07-29

## 2020-07-29 DIAGNOSIS — Z95.0 CARDIAC PACEMAKER IN SITU: ICD-10-CM

## 2020-07-30 ENCOUNTER — AMBULATORY - HEALTHEAST (OUTPATIENT)
Dept: LAB | Facility: CLINIC | Age: 65
End: 2020-07-30

## 2020-07-30 ENCOUNTER — COMMUNICATION - HEALTHEAST (OUTPATIENT)
Dept: ANTICOAGULATION | Facility: CLINIC | Age: 65
End: 2020-07-30

## 2020-07-30 DIAGNOSIS — Z95.2 H/O MECHANICAL AORTIC VALVE REPLACEMENT: ICD-10-CM

## 2020-07-30 DIAGNOSIS — Z95.2 S/P AVR (AORTIC VALVE REPLACEMENT): ICD-10-CM

## 2020-07-30 LAB — INR PPP: 3.6 (ref 0.9–1.1)

## 2020-08-03 ENCOUNTER — AMBULATORY - HEALTHEAST (OUTPATIENT)
Dept: CARDIOLOGY | Facility: CLINIC | Age: 65
End: 2020-08-03

## 2020-08-03 DIAGNOSIS — Z95.0 CARDIAC PACEMAKER IN SITU: ICD-10-CM

## 2020-08-03 DIAGNOSIS — I49.5 SSS (SICK SINUS SYNDROME) (H): ICD-10-CM

## 2020-08-14 ENCOUNTER — AMBULATORY - HEALTHEAST (OUTPATIENT)
Dept: LAB | Facility: CLINIC | Age: 65
End: 2020-08-14

## 2020-08-14 ENCOUNTER — COMMUNICATION - HEALTHEAST (OUTPATIENT)
Dept: ANTICOAGULATION | Facility: CLINIC | Age: 65
End: 2020-08-14

## 2020-08-14 DIAGNOSIS — Z95.2 S/P AVR (AORTIC VALVE REPLACEMENT): ICD-10-CM

## 2020-08-14 DIAGNOSIS — Z95.2 H/O MECHANICAL AORTIC VALVE REPLACEMENT: ICD-10-CM

## 2020-08-14 LAB — INR PPP: 3.2 (ref 0.9–1.1)

## 2020-09-01 ENCOUNTER — COMMUNICATION - HEALTHEAST (OUTPATIENT)
Dept: ANTICOAGULATION | Facility: CLINIC | Age: 65
End: 2020-09-01

## 2020-09-01 ENCOUNTER — AMBULATORY - HEALTHEAST (OUTPATIENT)
Dept: LAB | Facility: CLINIC | Age: 65
End: 2020-09-01

## 2020-09-01 ENCOUNTER — RECORDS - HEALTHEAST (OUTPATIENT)
Dept: ANTICOAGULATION | Facility: CLINIC | Age: 65
End: 2020-09-01

## 2020-09-01 DIAGNOSIS — Z95.2 H/O MECHANICAL AORTIC VALVE REPLACEMENT: ICD-10-CM

## 2020-09-01 DIAGNOSIS — Z95.2 S/P AVR (AORTIC VALVE REPLACEMENT): ICD-10-CM

## 2020-09-01 LAB — INR PPP: 5.1 (ref 0.9–1.1)

## 2020-09-11 ENCOUNTER — COMMUNICATION - HEALTHEAST (OUTPATIENT)
Dept: ANTICOAGULATION | Facility: CLINIC | Age: 65
End: 2020-09-11

## 2020-09-11 ENCOUNTER — AMBULATORY - HEALTHEAST (OUTPATIENT)
Dept: LAB | Facility: CLINIC | Age: 65
End: 2020-09-11

## 2020-09-11 DIAGNOSIS — Z95.2 S/P AVR (AORTIC VALVE REPLACEMENT): ICD-10-CM

## 2020-09-11 DIAGNOSIS — Z95.2 H/O MECHANICAL AORTIC VALVE REPLACEMENT: ICD-10-CM

## 2020-09-11 LAB — INR PPP: 2.8 (ref 0.9–1.1)

## 2020-09-25 ENCOUNTER — COMMUNICATION - HEALTHEAST (OUTPATIENT)
Dept: INTERNAL MEDICINE | Facility: CLINIC | Age: 65
End: 2020-09-25

## 2020-09-25 DIAGNOSIS — Z95.2 H/O MECHANICAL AORTIC VALVE REPLACEMENT: ICD-10-CM

## 2020-09-29 ENCOUNTER — COMMUNICATION - HEALTHEAST (OUTPATIENT)
Dept: ANTICOAGULATION | Facility: CLINIC | Age: 65
End: 2020-09-29

## 2020-09-29 ENCOUNTER — AMBULATORY - HEALTHEAST (OUTPATIENT)
Dept: LAB | Facility: CLINIC | Age: 65
End: 2020-09-29

## 2020-09-29 DIAGNOSIS — Z95.2 H/O MECHANICAL AORTIC VALVE REPLACEMENT: ICD-10-CM

## 2020-09-29 DIAGNOSIS — Z95.2 S/P AVR (AORTIC VALVE REPLACEMENT): ICD-10-CM

## 2020-09-29 LAB — INR PPP: 3.2 (ref 0.9–1.1)

## 2020-10-02 ENCOUNTER — OFFICE VISIT - HEALTHEAST (OUTPATIENT)
Dept: INTERNAL MEDICINE | Facility: CLINIC | Age: 65
End: 2020-10-02

## 2020-10-02 ENCOUNTER — RECORDS - HEALTHEAST (OUTPATIENT)
Dept: GENERAL RADIOLOGY | Facility: CLINIC | Age: 65
End: 2020-10-02

## 2020-10-02 DIAGNOSIS — R60.9 EDEMA, UNSPECIFIED TYPE: ICD-10-CM

## 2020-10-02 DIAGNOSIS — M54.9 BACK PAIN, UNSPECIFIED BACK LOCATION, UNSPECIFIED BACK PAIN LATERALITY, UNSPECIFIED CHRONICITY: ICD-10-CM

## 2020-10-02 DIAGNOSIS — M54.9 DORSALGIA, UNSPECIFIED: ICD-10-CM

## 2020-10-02 DIAGNOSIS — Z13.220 SCREENING FOR HYPERLIPIDEMIA: ICD-10-CM

## 2020-10-02 DIAGNOSIS — R63.4 WEIGHT LOSS: ICD-10-CM

## 2020-10-02 LAB
ALBUMIN SERPL-MCNC: 4.4 G/DL (ref 3.5–5)
ALP SERPL-CCNC: 72 U/L (ref 45–120)
ALT SERPL W P-5'-P-CCNC: 24 U/L (ref 0–45)
AMYLASE SERPL-CCNC: 63 U/L (ref 5–120)
ANION GAP SERPL CALCULATED.3IONS-SCNC: 8 MMOL/L (ref 5–18)
AST SERPL W P-5'-P-CCNC: 22 U/L (ref 0–40)
BILIRUB DIRECT SERPL-MCNC: 0.2 MG/DL
BILIRUB SERPL-MCNC: 0.6 MG/DL (ref 0–1)
BUN SERPL-MCNC: 19 MG/DL (ref 8–22)
CALCIUM SERPL-MCNC: 9.3 MG/DL (ref 8.5–10.5)
CHLORIDE BLD-SCNC: 98 MMOL/L (ref 98–107)
CHOLEST SERPL-MCNC: 176 MG/DL
CO2 SERPL-SCNC: 34 MMOL/L (ref 22–31)
CREAT SERPL-MCNC: 0.91 MG/DL (ref 0.6–1.1)
ERYTHROCYTE [DISTWIDTH] IN BLOOD BY AUTOMATED COUNT: 12.6 % (ref 11–14.5)
FASTING STATUS PATIENT QL REPORTED: YES
GFR SERPL CREATININE-BSD FRML MDRD: >60 ML/MIN/1.73M2
GLUCOSE BLD-MCNC: 93 MG/DL (ref 70–125)
HCT VFR BLD AUTO: 42.7 % (ref 35–47)
HDLC SERPL-MCNC: 80 MG/DL
HGB BLD-MCNC: 14.1 G/DL (ref 12–16)
LDLC SERPL CALC-MCNC: 82 MG/DL
LIPASE SERPL-CCNC: 41 U/L (ref 0–52)
MCH RBC QN AUTO: 29.1 PG (ref 27–34)
MCHC RBC AUTO-ENTMCNC: 32.9 G/DL (ref 32–36)
MCV RBC AUTO: 88 FL (ref 80–100)
PLATELET # BLD AUTO: 247 THOU/UL (ref 140–440)
PMV BLD AUTO: 7 FL (ref 7–10)
POTASSIUM BLD-SCNC: 3.9 MMOL/L (ref 3.5–5)
PROT SERPL-MCNC: 7.2 G/DL (ref 6–8)
RBC # BLD AUTO: 4.83 MILL/UL (ref 3.8–5.4)
SODIUM SERPL-SCNC: 140 MMOL/L (ref 136–145)
TRIGL SERPL-MCNC: 70 MG/DL
TSH SERPL DL<=0.005 MIU/L-ACNC: 1.28 UIU/ML (ref 0.3–5)
WBC: 4.5 THOU/UL (ref 4–11)

## 2020-10-02 ASSESSMENT — MIFFLIN-ST. JEOR: SCORE: 1146.89

## 2020-10-22 ENCOUNTER — COMMUNICATION - HEALTHEAST (OUTPATIENT)
Dept: INTERNAL MEDICINE | Facility: CLINIC | Age: 65
End: 2020-10-22

## 2020-10-27 ENCOUNTER — COMMUNICATION - HEALTHEAST (OUTPATIENT)
Dept: ANTICOAGULATION | Facility: CLINIC | Age: 65
End: 2020-10-27

## 2020-10-27 ENCOUNTER — AMBULATORY - HEALTHEAST (OUTPATIENT)
Dept: LAB | Facility: CLINIC | Age: 65
End: 2020-10-27

## 2020-10-27 DIAGNOSIS — Z95.2 H/O MECHANICAL AORTIC VALVE REPLACEMENT: ICD-10-CM

## 2020-10-27 DIAGNOSIS — Z95.2 S/P AVR (AORTIC VALVE REPLACEMENT): ICD-10-CM

## 2020-10-27 LAB — INR PPP: 3.5 (ref 0.9–1.1)

## 2020-11-02 ENCOUNTER — AMBULATORY - HEALTHEAST (OUTPATIENT)
Dept: CARDIOLOGY | Facility: CLINIC | Age: 65
End: 2020-11-02

## 2020-11-02 DIAGNOSIS — Z95.0 CARDIAC PACEMAKER IN SITU: ICD-10-CM

## 2020-11-02 DIAGNOSIS — I49.5 SSS (SICK SINUS SYNDROME) (H): ICD-10-CM

## 2020-11-09 ENCOUNTER — COMMUNICATION - HEALTHEAST (OUTPATIENT)
Dept: PHARMACY | Facility: CLINIC | Age: 65
End: 2020-11-09

## 2020-11-09 DIAGNOSIS — R60.9 EDEMA, UNSPECIFIED TYPE: ICD-10-CM

## 2020-11-10 ENCOUNTER — COMMUNICATION - HEALTHEAST (OUTPATIENT)
Dept: ANTICOAGULATION | Facility: CLINIC | Age: 65
End: 2020-11-10

## 2020-11-10 ENCOUNTER — AMBULATORY - HEALTHEAST (OUTPATIENT)
Dept: LAB | Facility: CLINIC | Age: 65
End: 2020-11-10

## 2020-11-10 DIAGNOSIS — Z95.2 S/P AVR (AORTIC VALVE REPLACEMENT): ICD-10-CM

## 2020-11-10 DIAGNOSIS — Z95.2 H/O MECHANICAL AORTIC VALVE REPLACEMENT: ICD-10-CM

## 2020-11-10 LAB — INR PPP: 4.6 (ref 0.9–1.1)

## 2020-11-19 ENCOUNTER — AMBULATORY - HEALTHEAST (OUTPATIENT)
Dept: LAB | Facility: CLINIC | Age: 65
End: 2020-11-19

## 2020-11-19 ENCOUNTER — COMMUNICATION - HEALTHEAST (OUTPATIENT)
Dept: ANTICOAGULATION | Facility: CLINIC | Age: 65
End: 2020-11-19

## 2020-11-19 ENCOUNTER — COMMUNICATION - HEALTHEAST (OUTPATIENT)
Dept: INTERNAL MEDICINE | Facility: CLINIC | Age: 65
End: 2020-11-19

## 2020-11-19 DIAGNOSIS — Z95.2 H/O MECHANICAL AORTIC VALVE REPLACEMENT: ICD-10-CM

## 2020-11-19 DIAGNOSIS — Z95.2 S/P AVR (AORTIC VALVE REPLACEMENT): ICD-10-CM

## 2020-11-19 LAB — INR PPP: 2.8 (ref 0.9–1.1)

## 2020-12-15 ENCOUNTER — COMMUNICATION - HEALTHEAST (OUTPATIENT)
Dept: CARDIOLOGY | Facility: CLINIC | Age: 65
End: 2020-12-15

## 2020-12-16 ENCOUNTER — COMMUNICATION - HEALTHEAST (OUTPATIENT)
Dept: ANTICOAGULATION | Facility: CLINIC | Age: 65
End: 2020-12-16

## 2020-12-16 ENCOUNTER — OFFICE VISIT - HEALTHEAST (OUTPATIENT)
Dept: CARDIOLOGY | Facility: CLINIC | Age: 65
End: 2020-12-16

## 2020-12-16 ENCOUNTER — AMBULATORY - HEALTHEAST (OUTPATIENT)
Dept: CARDIOLOGY | Facility: CLINIC | Age: 65
End: 2020-12-16

## 2020-12-16 DIAGNOSIS — I49.5 SSS (SICK SINUS SYNDROME) (H): ICD-10-CM

## 2020-12-16 DIAGNOSIS — Z95.2 S/P AVR (AORTIC VALVE REPLACEMENT): ICD-10-CM

## 2020-12-16 DIAGNOSIS — Z95.2 H/O MECHANICAL AORTIC VALVE REPLACEMENT: ICD-10-CM

## 2020-12-16 LAB — POC INR - HE - HISTORICAL: 2.2 (ref 0.9–1.1)

## 2021-01-25 ENCOUNTER — OFFICE VISIT - HEALTHEAST (OUTPATIENT)
Dept: INTERNAL MEDICINE | Facility: CLINIC | Age: 66
End: 2021-01-25

## 2021-01-25 ENCOUNTER — COMMUNICATION - HEALTHEAST (OUTPATIENT)
Dept: ANTICOAGULATION | Facility: CLINIC | Age: 66
End: 2021-01-25

## 2021-01-25 DIAGNOSIS — R42 DIZZINESS: ICD-10-CM

## 2021-01-25 DIAGNOSIS — Z95.2 H/O MECHANICAL AORTIC VALVE REPLACEMENT: ICD-10-CM

## 2021-01-25 DIAGNOSIS — R10.9 ABDOMINAL DISCOMFORT: ICD-10-CM

## 2021-01-25 DIAGNOSIS — E03.9 ACQUIRED HYPOTHYROIDISM: ICD-10-CM

## 2021-01-25 DIAGNOSIS — Z00.00 ROUTINE GENERAL MEDICAL EXAMINATION AT A HEALTH CARE FACILITY: ICD-10-CM

## 2021-01-25 DIAGNOSIS — Z95.2 S/P AVR (AORTIC VALVE REPLACEMENT): ICD-10-CM

## 2021-01-25 LAB
ERYTHROCYTE [DISTWIDTH] IN BLOOD BY AUTOMATED COUNT: 12.5 % (ref 11–14.5)
HCT VFR BLD AUTO: 36.2 % (ref 35–47)
HGB BLD-MCNC: 12.5 G/DL (ref 12–16)
INR PPP: 2.4 (ref 0.9–1.1)
MCH RBC QN AUTO: 28.8 PG (ref 27–34)
MCHC RBC AUTO-ENTMCNC: 34.4 G/DL (ref 32–36)
MCV RBC AUTO: 84 FL (ref 80–100)
PLATELET # BLD AUTO: 210 THOU/UL (ref 140–440)
PMV BLD AUTO: 6.8 FL (ref 7–10)
RBC # BLD AUTO: 4.32 MILL/UL (ref 3.8–5.4)
WBC: 5.6 THOU/UL (ref 4–11)

## 2021-01-25 ASSESSMENT — MIFFLIN-ST. JEOR: SCORE: 1146.89

## 2021-01-26 LAB
ALBUMIN SERPL-MCNC: 4.1 G/DL (ref 3.5–5)
ALP SERPL-CCNC: 60 U/L (ref 45–120)
ALT SERPL W P-5'-P-CCNC: 21 U/L (ref 0–45)
AMYLASE SERPL-CCNC: 69 U/L (ref 5–120)
ANION GAP SERPL CALCULATED.3IONS-SCNC: 9 MMOL/L (ref 5–18)
AST SERPL W P-5'-P-CCNC: 25 U/L (ref 0–40)
BILIRUB DIRECT SERPL-MCNC: 0.2 MG/DL
BILIRUB SERPL-MCNC: 0.5 MG/DL (ref 0–1)
BUN SERPL-MCNC: 20 MG/DL (ref 8–22)
CALCIUM SERPL-MCNC: 9.4 MG/DL (ref 8.5–10.5)
CHLORIDE BLD-SCNC: 104 MMOL/L (ref 98–107)
CO2 SERPL-SCNC: 27 MMOL/L (ref 22–31)
CREAT SERPL-MCNC: 0.83 MG/DL (ref 0.6–1.1)
GFR SERPL CREATININE-BSD FRML MDRD: >60 ML/MIN/1.73M2
GLUCOSE BLD-MCNC: 86 MG/DL (ref 70–125)
LIPASE SERPL-CCNC: 54 U/L (ref 0–52)
POTASSIUM BLD-SCNC: 3.9 MMOL/L (ref 3.5–5)
PROT SERPL-MCNC: 6.9 G/DL (ref 6–8)
SODIUM SERPL-SCNC: 140 MMOL/L (ref 136–145)
TSH SERPL DL<=0.005 MIU/L-ACNC: 1.59 UIU/ML (ref 0.3–5)

## 2021-01-29 ENCOUNTER — AMBULATORY - HEALTHEAST (OUTPATIENT)
Dept: CARDIOLOGY | Facility: CLINIC | Age: 66
End: 2021-01-29

## 2021-01-29 DIAGNOSIS — Z95.0 CARDIAC PACEMAKER IN SITU: ICD-10-CM

## 2021-01-29 DIAGNOSIS — I49.5 SSS (SICK SINUS SYNDROME) (H): ICD-10-CM

## 2021-02-10 ENCOUNTER — HOSPITAL ENCOUNTER (OUTPATIENT)
Dept: ULTRASOUND IMAGING | Facility: HOSPITAL | Age: 66
Discharge: HOME | End: 2021-02-10

## 2021-02-10 DIAGNOSIS — R10.9 ABDOMINAL DISCOMFORT: ICD-10-CM

## 2021-02-17 ENCOUNTER — AMBULATORY - HEALTHEAST (OUTPATIENT)
Dept: ANTICOAGULATION | Facility: CLINIC | Age: 66
End: 2021-02-17

## 2021-02-17 DIAGNOSIS — Z95.2 H/O MECHANICAL AORTIC VALVE REPLACEMENT: ICD-10-CM

## 2021-03-02 ENCOUNTER — COMMUNICATION - HEALTHEAST (OUTPATIENT)
Dept: ANTICOAGULATION | Facility: CLINIC | Age: 66
End: 2021-03-02

## 2021-03-08 ENCOUNTER — COMMUNICATION - HEALTHEAST (OUTPATIENT)
Dept: PHARMACY | Facility: CLINIC | Age: 66
End: 2021-03-08

## 2021-03-08 DIAGNOSIS — E03.9 HYPOTHYROIDISM, UNSPECIFIED TYPE: ICD-10-CM

## 2021-03-08 DIAGNOSIS — E03.9 ACQUIRED HYPOTHYROIDISM: ICD-10-CM

## 2021-03-10 ENCOUNTER — COMMUNICATION - HEALTHEAST (OUTPATIENT)
Dept: ANTICOAGULATION | Facility: CLINIC | Age: 66
End: 2021-03-10

## 2021-03-10 ENCOUNTER — AMBULATORY - HEALTHEAST (OUTPATIENT)
Dept: LAB | Facility: CLINIC | Age: 66
End: 2021-03-10

## 2021-03-10 DIAGNOSIS — Z95.2 S/P AVR (AORTIC VALVE REPLACEMENT): ICD-10-CM

## 2021-03-10 DIAGNOSIS — Z95.2 H/O MECHANICAL AORTIC VALVE REPLACEMENT: ICD-10-CM

## 2021-03-10 LAB — INR PPP: 2 (ref 0.9–1.1)

## 2021-03-19 ENCOUNTER — RECORDS - HEALTHEAST (OUTPATIENT)
Dept: ADMINISTRATIVE | Facility: OTHER | Age: 66
End: 2021-03-19

## 2021-03-19 ENCOUNTER — COMMUNICATION - HEALTHEAST (OUTPATIENT)
Dept: ANTICOAGULATION | Facility: CLINIC | Age: 66
End: 2021-03-19

## 2021-03-19 DIAGNOSIS — Z95.2 H/O MECHANICAL AORTIC VALVE REPLACEMENT: ICD-10-CM

## 2021-03-19 LAB — INR PPP: 2 (ref 0.9–1.1)

## 2021-03-24 ENCOUNTER — COMMUNICATION - HEALTHEAST (OUTPATIENT)
Dept: ANTICOAGULATION | Facility: CLINIC | Age: 66
End: 2021-03-24

## 2021-03-24 ENCOUNTER — RECORDS - HEALTHEAST (OUTPATIENT)
Dept: ADMINISTRATIVE | Facility: OTHER | Age: 66
End: 2021-03-24

## 2021-03-24 DIAGNOSIS — Z95.2 H/O MECHANICAL AORTIC VALVE REPLACEMENT: ICD-10-CM

## 2021-03-24 LAB — INR PPP: 2.5 (ref 0.9–1.1)

## 2021-03-29 ENCOUNTER — HOSPITAL ENCOUNTER (OUTPATIENT)
Dept: MAMMOGRAPHY | Facility: CLINIC | Age: 66
Discharge: HOME | End: 2021-03-29

## 2021-03-29 DIAGNOSIS — Z00.00 ROUTINE GENERAL MEDICAL EXAMINATION AT A HEALTH CARE FACILITY: ICD-10-CM

## 2021-03-31 ENCOUNTER — RECORDS - HEALTHEAST (OUTPATIENT)
Dept: ADMINISTRATIVE | Facility: OTHER | Age: 66
End: 2021-03-31

## 2021-03-31 ENCOUNTER — COMMUNICATION - HEALTHEAST (OUTPATIENT)
Dept: ANTICOAGULATION | Facility: CLINIC | Age: 66
End: 2021-03-31

## 2021-03-31 DIAGNOSIS — Z95.2 H/O MECHANICAL AORTIC VALVE REPLACEMENT: ICD-10-CM

## 2021-03-31 LAB — INR PPP: 2.7 (ref 0.9–1.1)

## 2021-04-02 ENCOUNTER — COMMUNICATION - HEALTHEAST (OUTPATIENT)
Dept: SCHEDULING | Facility: CLINIC | Age: 66
End: 2021-04-02

## 2021-04-08 ENCOUNTER — RECORDS - HEALTHEAST (OUTPATIENT)
Dept: GENERAL RADIOLOGY | Facility: CLINIC | Age: 66
End: 2021-04-08

## 2021-04-08 ENCOUNTER — OFFICE VISIT - HEALTHEAST (OUTPATIENT)
Dept: FAMILY MEDICINE | Facility: CLINIC | Age: 66
End: 2021-04-08

## 2021-04-08 DIAGNOSIS — M25.551 HIP PAIN, RIGHT: ICD-10-CM

## 2021-04-08 DIAGNOSIS — M25.551 PAIN IN RIGHT HIP: ICD-10-CM

## 2021-04-08 DIAGNOSIS — K59.01 SLOW TRANSIT CONSTIPATION: ICD-10-CM

## 2021-04-14 ENCOUNTER — COMMUNICATION - HEALTHEAST (OUTPATIENT)
Dept: INTERNAL MEDICINE | Facility: CLINIC | Age: 66
End: 2021-04-14

## 2021-04-14 ENCOUNTER — AMBULATORY - HEALTHEAST (OUTPATIENT)
Dept: ANTICOAGULATION | Facility: CLINIC | Age: 66
End: 2021-04-14

## 2021-04-14 ENCOUNTER — COMMUNICATION - HEALTHEAST (OUTPATIENT)
Dept: ANTICOAGULATION | Facility: CLINIC | Age: 66
End: 2021-04-14

## 2021-04-14 ENCOUNTER — RECORDS - HEALTHEAST (OUTPATIENT)
Dept: ADMINISTRATIVE | Facility: OTHER | Age: 66
End: 2021-04-14

## 2021-04-14 DIAGNOSIS — Z95.2 H/O MECHANICAL AORTIC VALVE REPLACEMENT: ICD-10-CM

## 2021-04-14 LAB — INR PPP: 2 (ref 0.9–1.1)

## 2021-04-21 ENCOUNTER — RECORDS - HEALTHEAST (OUTPATIENT)
Dept: ADMINISTRATIVE | Facility: OTHER | Age: 66
End: 2021-04-21

## 2021-04-21 ENCOUNTER — COMMUNICATION - HEALTHEAST (OUTPATIENT)
Dept: ANTICOAGULATION | Facility: CLINIC | Age: 66
End: 2021-04-21

## 2021-04-21 DIAGNOSIS — Z95.2 H/O MECHANICAL AORTIC VALVE REPLACEMENT: ICD-10-CM

## 2021-04-21 LAB — INR PPP: 2.1 (ref 0.9–1.1)

## 2021-04-23 ENCOUNTER — OFFICE VISIT - HEALTHEAST (OUTPATIENT)
Dept: FAMILY MEDICINE | Facility: CLINIC | Age: 66
End: 2021-04-23

## 2021-04-23 DIAGNOSIS — Z76.89 ESTABLISHING CARE WITH NEW DOCTOR, ENCOUNTER FOR: ICD-10-CM

## 2021-04-28 ENCOUNTER — AMBULATORY - HEALTHEAST (OUTPATIENT)
Dept: ANTICOAGULATION | Facility: CLINIC | Age: 66
End: 2021-04-28

## 2021-04-28 DIAGNOSIS — Z95.2 S/P AVR (AORTIC VALVE REPLACEMENT): ICD-10-CM

## 2021-04-29 ENCOUNTER — AMBULATORY - HEALTHEAST (OUTPATIENT)
Dept: CARDIOLOGY | Facility: CLINIC | Age: 66
End: 2021-04-29

## 2021-04-29 DIAGNOSIS — I49.5 SSS (SICK SINUS SYNDROME) (H): ICD-10-CM

## 2021-04-29 DIAGNOSIS — Z95.0 CARDIAC PACEMAKER IN SITU: ICD-10-CM

## 2021-05-05 ENCOUNTER — COMMUNICATION - HEALTHEAST (OUTPATIENT)
Dept: ANTICOAGULATION | Facility: CLINIC | Age: 66
End: 2021-05-05

## 2021-05-05 ENCOUNTER — RECORDS - HEALTHEAST (OUTPATIENT)
Dept: ADMINISTRATIVE | Facility: OTHER | Age: 66
End: 2021-05-05

## 2021-05-05 ENCOUNTER — COMMUNICATION - HEALTHEAST (OUTPATIENT)
Dept: PHARMACY | Facility: CLINIC | Age: 66
End: 2021-05-05

## 2021-05-05 DIAGNOSIS — Z95.2 H/O MECHANICAL AORTIC VALVE REPLACEMENT: ICD-10-CM

## 2021-05-05 DIAGNOSIS — E78.5 HYPERLIPEMIA: ICD-10-CM

## 2021-05-05 LAB — INR PPP: 2.1 (ref 0.9–1.1)

## 2021-05-19 ENCOUNTER — COMMUNICATION - HEALTHEAST (OUTPATIENT)
Dept: ANTICOAGULATION | Facility: CLINIC | Age: 66
End: 2021-05-19

## 2021-05-19 ENCOUNTER — RECORDS - HEALTHEAST (OUTPATIENT)
Dept: ADMINISTRATIVE | Facility: OTHER | Age: 66
End: 2021-05-19

## 2021-05-19 DIAGNOSIS — Z95.2 H/O MECHANICAL AORTIC VALVE REPLACEMENT: ICD-10-CM

## 2021-05-19 LAB — INR PPP: 2.3 (ref 0.9–1.1)

## 2021-05-24 ENCOUNTER — RECORDS - HEALTHEAST (OUTPATIENT)
Dept: ADMINISTRATIVE | Facility: CLINIC | Age: 66
End: 2021-05-24

## 2021-05-25 ENCOUNTER — RECORDS - HEALTHEAST (OUTPATIENT)
Dept: ADMINISTRATIVE | Facility: CLINIC | Age: 66
End: 2021-05-25

## 2021-05-26 ENCOUNTER — RECORDS - HEALTHEAST (OUTPATIENT)
Dept: ADMINISTRATIVE | Facility: CLINIC | Age: 66
End: 2021-05-26

## 2021-05-26 ENCOUNTER — OFFICE VISIT - HEALTHEAST (OUTPATIENT)
Dept: PHYSICAL THERAPY | Facility: REHABILITATION | Age: 66
End: 2021-05-26

## 2021-05-26 DIAGNOSIS — M25.551 ACUTE RIGHT HIP PAIN: ICD-10-CM

## 2021-05-27 ENCOUNTER — RECORDS - HEALTHEAST (OUTPATIENT)
Dept: ADMINISTRATIVE | Facility: CLINIC | Age: 66
End: 2021-05-27

## 2021-05-27 NOTE — PROGRESS NOTES
Office Visit - Follow up    Tammy Law   63 y.o. female    Date of Visit: 3/28/2019    Chief Complaint   Patient presents with     Abdominal Pain     Nasal Congestion     x 6 days     Hypothyroidism       Subjective: Abdominal pain right side.    Status post aortic valve replacement chronic warfarin therapy INR check is due today she is status post also permanent pacemaker.  Aortic valve disease.    Head congestion 1 Z-Kuldeep for possible sinusitis given.      Abdominal pain is described as a dull ache right side comes and goes no features to suggest diarrhea or constipation Metamucil suggested.  Prior hospitalization for about abdominal pain occurred last summer Crohn's versus gallbladder disease but never substantiated.  Multiple diagnostic tests done she is not interested in repeating the diagnostic test at this juncture.    No blood in stool or urine she is short of breath at times but no chest pain medication list is reviewed.  Allergic to meperidine and sulfa.    Mammogram allCLEAR January 15, 2019 colonoscopy dated November 5, 2018 with Dr. Rojas in [place] [facility] normal.  She does have a prior history of colon polyps.  Not found on last colonoscopy.    During the night she is not awakened with abdominal pain there is no blood in stool or urine there is no associated weight loss or anorexia or nausea or vomiting.  The stools are not melanotic.  ROS: A comprehensive review of systems was performed and was otherwise negative    Medications:  Prior to Admission medications    Medication Sig Start Date End Date Taking? Authorizing Provider   acetaminophen (TYLENOL) 500 MG tablet Take 1 tablet (500 mg total) by mouth every 6 (six) hours as needed. 9/27/18  Yes Jesus Smith MD   citalopram (CELEXA) 40 MG tablet TAKE 1/2 TABLET BY MOUTH EVERY DAY 3/23/19  Yes Arnold Anderson MD   furosemide (LASIX) 40 MG tablet TAKE 1 TABLET BY MOUTH EVERY OTHER DAY AS NEEDED FOR SWELLING  Patient taking  differently: 1-2 weekly 6/12/17  Yes Arnold Anderson MD   hydroCHLOROthiazide (MICROZIDE) 12.5 mg capsule TAKE 1 CAPSULE(12.5 MG) BY MOUTH DAILY 2/28/19  Yes Arnold Anderson MD   levothyroxine (SYNTHROID, LEVOTHROID) 75 MCG tablet TAKE 1 TABLET BY MOUTH EVERY DAY 3/28/18  Yes Arnold Anderson MD   simvastatin (ZOCOR) 40 MG tablet TAKE 1/2 TABLET BY MOUTH EVERY DAY 2/4/19  Yes Arnold Anderson MD   warfarin (COUMADIN) 2.5 MG tablet Take by mouth See Admin Instructions. 7.5 mg Mon/Wed; 5 mg ROW   Yes PROVIDER, HISTORICAL   levothyroxine (SYNTHROID, LEVOTHROID) 75 MCG tablet TAKE 1 TABLET BY MOUTH EVERY DAY 3/23/19 3/28/19  Arnold Anderson MD       Allergies:   Allergies   Allergen Reactions     Meperidine Other (See Comments)     hallucinations      Sulfa (Sulfonamide Antibiotics) Itching and Rash       Immunizations:   Immunization History   Administered Date(s) Administered     Pneumo Polysac 23-V 10/25/2004     Tdap 03/11/2008     ZOSTER, LIVE 11/25/2016       Exam Chest clear to auscultation and percussion.  Heart tones regular rhythm without murmur rub or gallop.  Abdomen soft nontender no organomegaly.  No peritoneal signs.  Extremities free of edema cyanosis or clubbing.  Neck veins nondistended no thyromegaly or scleral icterus noted, carotids full.  Skin warm and dry easily conversant good spirited.  Normal intelligence.  Neurologically intact no gross localizing findings.  102/56 pulse 79 regular respirations 18 and unlabored O2 sats 97%.    Weight up 1 pound BMI 25.2.  She does have lateral and rotatory gaze nystagmus congenital.  Normal prosthetic valve tones noted at the aortic position without murmur heard in systole or diastole no S3.  No neck vein distention no leg edema belly soft benign no findings to suggest acute abdominal process no peritoneal signs.  Belly is soft flat scaphoid.  Not distended.  Bowel sounds are present.  And normal in consistency.    Assessment and  Plan  Abdominal pain uncertain etiology negative workup summer 2018 suggest [place] [facility] consult.    Aortic valve disease status post aortic valve replacement permanent pacemaker on chronic warfarin therapy INR check is due today.    Multiple drug allergies including meperidine and sulfa.    Sinusitis since Friday last Z-Kuldeep today.    Time: total time spent with the patient was 40 minutes of which >50% was spent in counseling and coordination of care    The following high BMI interventions were performed this visit: encouragement to exercise    Arnold Anderson MD    Patient Active Problem List   Diagnosis     Congenital Nystagmus     Hypothyroidism, unspecified type     Mammogram Inconclusive Due To Dense Breasts     Anterior Wall Chest Pain With Respiration     H/O mechanical aortic valve replacement     Plantar flexed metatarsal, right     Bradycardia     Chronotropic incompetence with sinus node dysfunction (H)     Acquired hypothyroidism     Cardiac pacemaker in situ     Midline low back pain without sciatica     Hypertrophy of bone     Chest pain     H/O prosthetic heart valve     Epigastric pain     Biliary colic     Preoperative cardiovascular examination

## 2021-05-27 NOTE — TELEPHONE ENCOUNTER
ANTICOAGULATION  MANAGEMENT    Assessment     Today's INR result of 3.2 is Therapeutic (goal INR of 2.0-3.5)        Warfarin taken as previously instructed    No new diet changes affecting INR    Interaction between z alfie and warfarin may be affecting INR finished 3/31    Continues to tolerate warfarin with no reported s/s of bleeding or thromboembolism     Previous INR was Therapeutic    Plan:     Spoke with Tammy regarding INR result and instructed:     Warfarin Dosing Instructions:  Continue current warfarin dose 7.5 mg daily on sat; and 5 mg daily rest of week  (0 % change)    Instructed patient to follow up no later than: 1-2 weeks      Bisi verbalizes understanding and agrees to warfarin dosing plan.    Instructed to call the [facility] for any changes, questions or concerns. (#[phone])   ?   Pamella Sy RN    Subjective/Objective:      Tammy M Ani, a 63 y.o. female is on warfarin.     Tammy reports:     Home warfarin dose: as updated on anticoagulation calendar per template     Missed doses: No     Medication changes:  No     S/S of bleeding or thromboembolism:  No     New Injury or illness:  No     Changes in diet or alcohol consumption:  No     Upcoming surgery, procedure or cardioversion:  No    Anticoagulation Episode Summary     Current INR goal:   2.0-3.5   TTR:   85.4 % (4.3 y)   Next INR check:   4/15/2019   INR from last check:   3.20 (4/1/2019)   Weekly max warfarin dose:      Target end date:      INR check location:      Preferred lab:      Send INR reminders to:   ANTICOAGULATION POOL C (DTN,VAD,CGR,GAV)    Indications    H/O mechanical aortic valve replacement [Z95.2]           Comments:            Anticoagulation Care Providers     Provider Role Specialty Phone number    Arnold Anderson MD Referring Internal Medicine [phone]

## 2021-05-27 NOTE — TELEPHONE ENCOUNTER
ANTICOAGULATION  MANAGEMENT    Assessment     Today's INR result of 3.3 is Therapeutic (goal INR of 2.0-3.5)        Warfarin taken as previously instructed    No new diet changes affecting INR    No new medication/supplements affecting INR    Continues to tolerate warfarin with no reported s/s of bleeding or thromboembolism     Previous INR was Therapeutic    Plan:     Spoke with Tammy regarding INR result and instructed:     Warfarin Dosing Instructions:  Continue current warfarin dose    7.5 mg every Sat; 5 mg all other days        (0 % change)    Instructed patient to follow up no later than: 3 weeks appointment made    Education provided: importance of therapeutic range and target INR goal and significance of current INR result    Tammy verbalizes understanding and agrees to warfarin dosing plan.    Instructed to call the [facility] for any changes, questions or concerns. (#[phone])   ?   Carolynn Lucas RN    Subjective/Objective:      Tammy M Ani, a 63 y.o. female is on warfarin.     Tammy reports:     Home warfarin dose: as updated on anticoagulation calendar per template     Missed doses: No     Medication changes:  No     S/S of bleeding or thromboembolism:  No     New Injury or illness:  No     Changes in diet or alcohol consumption:  No     Upcoming surgery, procedure or cardioversion:  No    Anticoagulation Episode Summary     Current INR goal:   2.0-3.5   TTR:   85.5 % (4.3 y)   Next INR check:   5/2/2019   INR from last check:   3.30 (4/11/2019)   Weekly max warfarin dose:      Target end date:      INR check location:      Preferred lab:      Send INR reminders to:   ANTICOAGULATION POOL C (DTN,VAD,CGR,GAV)    Indications    H/O mechanical aortic valve replacement [Z95.2]           Comments:            Anticoagulation Care Providers     Provider Role Specialty Phone number    Arnold Anderson MD Referring Internal Medicine [phone]

## 2021-05-27 NOTE — TELEPHONE ENCOUNTER
ANTICOAGULATION  MANAGEMENT    Assessment     Today's INR result of 3.70 is Supratherapeutic (goal INR of 2.0 - 3.5)        Warfarin taken as previously instructed    No new diet changes affecting INR    Potential interaction between Azithromycin and warfarin which may affect subsequent INRs    Continues to tolerate warfarin with no reported s/s of bleeding or thromboembolism     Previous INR was Therapeutic at 3.00 on 2/26/19.    Plan:     Spoke with Bisi regarding INR result and instructed:     Warfarin Dosing Instructions:   (has 2.5mg tabs)   - advised one time lower dose tonight, then continue current warfarin dose 7.5 mg daily on Saturdays; and 5 mg daily rest of week.    Instructed patient to follow up no later than:   Check INR on the 4th day of ABX therapy.   - scheduled on 4/1/19 @ [facility].    Education provided: target INR goal and significance of current INR result, potential interaction between warfarin and Azithromycin, importance of notifying clinic for changes in medications and importance of notifying clinic for diarrhea, nausea/vomiting, reduced intake and/or illness    Bisi verbalizes understanding and agrees to warfarin dosing plan.    Instructed to call the [facility] for any changes, questions or concerns. (#[phone])   ?   Andree Rdz RN    Subjective/Objective:      Tammy Law, a 63 y.o. female is on warfarin.     Tammy reports:     Home warfarin dose: as updated on anticoagulation calendar per template     Missed doses: No     Medication changes:  Yes:  Azithromycin.  Reported she will start tonight, 3/28.     S/S of bleeding or thromboembolism:  No     New Injury or illness:  Yes: OV today with Dr. Anderson,  complained of head congestion with possible sinusitis.   - also, reported intermittent right upper quadrant abdominal pains, referred to GI.     Changes in diet or alcohol consumption:  No.  GI problems not affecting her eating.     Upcoming surgery, procedure  or cardioversion:  No    Anticoagulation Episode Summary     Current INR goal:   2.0-3.5   TTR:   85.5 % (4.3 y)   Next INR check:   4/2/2019   INR from last check:   3.70! (3/28/2019)   Weekly max warfarin dose:      Target end date:      INR check location:      Preferred lab:      Send INR reminders to:   ANTICOAGULATION POOL C (DTN,VAD,CGR,GAV)    Indications    H/O mechanical aortic valve replacement [Z95.2]           Comments:            Anticoagulation Care Providers     Provider Role Specialty Phone number    Arnold Anderson MD Referring Internal Medicine [phone]

## 2021-05-28 ENCOUNTER — RECORDS - HEALTHEAST (OUTPATIENT)
Dept: ADMINISTRATIVE | Facility: CLINIC | Age: 66
End: 2021-05-28

## 2021-05-28 NOTE — TELEPHONE ENCOUNTER
ANTICOAGULATION  MANAGEMENT    Assessment     Today's INR result of 2.80 is Therapeutic (goal INR of 2.0 - 3.5)        Warfarin taken as previously instructed    No new diet changes affecting INR    No new medication/supplements affecting INR    Continues to tolerate warfarin with no reported s/s of bleeding or thromboembolism     Previous INR was Therapeutic at 3.30 on 4/11/19.    Plan:     Spoke with Bisi regarding INR result and instructed:    1.  Reported she will be due to TSH level.  Will have Dr. Anderson enter a future order for TSH.   2.  Also wanted to get SHINGREX 2 part vaccination.  Advised to call insurance to check for coverage at the Pharmacy.  Not really paying when injection given at clinic.  She agreed to call her insurance.    Warfarin Dosing Instructions:   (has 2.5mg tabs)   - Continue current warfarin dose 7.5 mg daily on Saturdays; and 5 mg daily rest of week.    Instructed patient to follow up no later than:  4 wks.   - scheduled on 5/30/19 @ [facility].    Education provided: importance of consistent vitamin K intake, target INR goal and significance of current INR result and importance of notifying clinic for changes in medications    Bisi verbalizes understanding and agrees to warfarin dosing plan.    Instructed to call the [facility] for any changes, questions or concerns. (#[phone])   ?   Andree Rdz RN    Subjective/Objective:      Tammy SEGOVIA Ani, a 63 y.o. female is on warfarin.     Tammy reports:     Home warfarin dose: as updated on anticoagulation calendar per template     Missed doses: No     Medication changes:  No     S/S of bleeding or thromboembolism:  No     New Injury or illness:  No     Changes in diet or alcohol consumption:  No     Upcoming surgery, procedure or cardioversion:  No    Anticoagulation Episode Summary     Current INR goal:   2.0-3.5   TTR:   85.7 % (4.4 y)   Next INR check:   5/30/2019   INR from last check:   2.80 (5/2/2019)   Weekly max  warfarin dose:      Target end date:      INR check location:      Preferred lab:      Send INR reminders to:   ANTICOAGULATION POOL C (DTN,VAD,CGR,GAV)    Indications    H/O mechanical aortic valve replacement [Z95.2]           Comments:            Anticoagulation Care Providers     Provider Role Specialty Phone number    Arnold Anderson MD Referring Internal Medicine [phone]

## 2021-05-28 NOTE — TELEPHONE ENCOUNTER
Refill Approved    Rx renewed per Medication Renewal Policy. Medication was last renewed on 2/4/19.    Camryn Carrizales, [facility] Triage/Med Refill 5/3/2019     Requested Prescriptions   Pending Prescriptions Disp Refills     simvastatin (ZOCOR) 40 MG tablet [Pharmacy Med Name: SIMVASTATIN 40MG TABLETS] 45 tablet 0     Sig: TAKE 1/2 TABLET BY MOUTH EVERY DAY       Statins Refill Protocol (Hmg CoA Reductase Inhibitors) Passed - 5/3/2019  3:44 AM        Passed - PCP or prescribing provider visit in past 12 months      Last office visit with prescriber/PCP: 3/28/2019 Arnold Anderson MD OR same dept: 3/28/2019 Arnold Anderson MD OR same specialty: 3/28/2019 Arnold Anderson MD  Last physical: 5/19/2016 Last MTM visit: Visit date not found   Next visit within 3 mo: Visit date not found  Next physical within 3 mo: Visit date not found  Prescriber OR PCP: Arnold Anderson MD  Last diagnosis associated with med order: 1. Hyperlipemia  - simvastatin (ZOCOR) 40 MG tablet [Pharmacy Med Name: SIMVASTATIN 40MG TABLETS]; TAKE 1/2 TABLET BY MOUTH EVERY DAY  Dispense: 45 tablet; Refill: 0    If protocol passes may refill for 12 months if within 3 months of last provider visit (or a total of 15 months).

## 2021-05-28 NOTE — TELEPHONE ENCOUNTER
Raffy Anderson.    Bisi, said she will be due to TSH level.  Can you put in a future order for TSH?   (last TSH was in 6/25/18).    Thank you.

## 2021-05-28 NOTE — TELEPHONE ENCOUNTER
RN cannot approve Refill Request    RN can NOT refill this medication as the directions do not match.     Last office visit: 3/28/2019 Arnold Anderson MD Last Physical: 5/19/2016 Last MTM visit: Visit date not found Last visit same specialty: 3/28/2019 Arnold Anderson MD.  Next visit within 3 mo: Visit date not found  Next physical within 3 mo: Visit date not found      Natalie Ortiz, [facility] Triage/Med Refill 5/5/2019    Requested Prescriptions   Pending Prescriptions Disp Refills     furosemide (LASIX) 40 MG tablet 45 tablet 0       Diuretics/Combination Diuretics Refill Protocol  Passed - 5/4/2019  4:11 PM        Passed - Visit with PCP or prescribing provider visit in past 12 months     Last office visit with prescriber/PCP: 3/28/2019 Arnold Anderson MD OR same dept: 3/28/2019 Arnold Anderson MD OR same specialty: 3/28/2019 Arnold Anderson MD  Last physical: 5/19/2016 Last MTM visit: Visit date not found   Next visit within 3 mo: Visit date not found  Next physical within 3 mo: Visit date not found  Prescriber OR PCP: Arnold Anderson MD  Last diagnosis associated with med order: There are no diagnoses linked to this encounter.  If protocol passes may refill for 12 months if within 3 months of last provider visit (or a total of 15 months).             Passed - Serum Potassium in past 12 months      Lab Results   Component Value Date    Potassium 3.7 12/21/2018    Potassium 3.7 12/21/2018             Passed - Serum Sodium in past 12 months      Lab Results   Component Value Date    Sodium 140 12/21/2018    Sodium 140 12/21/2018             Passed - Blood pressure on file in past 12 months     BP Readings from Last 1 Encounters:   03/28/19 102/56             Passed - Serum Creatinine in past 12 months      Creatinine   Date Value Ref Range Status   12/21/2018 0.78 0.60 - 1.10 mg/dL Final   12/21/2018 0.78 0.60 - 1.10 mg/dL Final

## 2021-05-29 ENCOUNTER — RECORDS - HEALTHEAST (OUTPATIENT)
Dept: ADMINISTRATIVE | Facility: CLINIC | Age: 66
End: 2021-05-29

## 2021-05-29 NOTE — TELEPHONE ENCOUNTER
Anticoagulation Annual Referral Renewal Review    Tammy Law's chart reviewed for annual renewal of referral to anticoagulation monitoring.        Criteria for anticoagulation nurse and/or pharmacist renewal met   Warfarin indication: Mechanical AVR Yes, per indication   Current with INR monitoring/compliant Yes Yes   Date of last office visit 03/28/2019 Yes, had office visit within last year   Time in Therapeutic Range (TTR) 92.46 % Yes, TTR > 60%       Tammy Law met all criteria for anticoagulation management program initiated renewal.  New INR standing orders and anticoagulation referral renewal placed.      Andree Rdz RN  3:03 PM

## 2021-05-29 NOTE — TELEPHONE ENCOUNTER
ANTICOAGULATION  MANAGEMENT    Assessment     Today's INR result of 2.80 is Therapeutic (goal INR of 2.0 - 3.5)        Warfarin taken as previously instructed    No new diet changes affecting INR    No new medication/supplements affecting INR    Continues to tolerate warfarin with no reported s/s of bleeding or thromboembolism     Previous INR was Therapeutic at 2.80 on 5/2/19.    Plan:     Spoke with Bisi regarding INR result and instructed:     Warfarin Dosing Instructions:   (has 2.5mg tabs)   - Continue current warfarin dose 7.5 mg daily on Saturdays; and 5 mg daily rest of week.    Instructed patient to follow up no later than:  4-6 wks.   - scheduled on 7/8/19 during f/u visit with Dr. Anderson.    Education provided: target INR goal and significance of current INR result    Bisi verbalizes understanding and agrees to warfarin dosing plan.    Instructed to call the [facility] for any changes, questions or concerns. (#[phone])   ?   Andree Rdz RN    Subjective/Objective:      Tammy M Ani, a 63 y.o. female is on warfarin.     Tammy reports:     Home warfarin dose: as updated on anticoagulation calendar per template     Missed doses: No     Medication changes:  No     S/S of bleeding or thromboembolism:  No     New Injury or illness:  No     Changes in diet or alcohol consumption:  No     Upcoming surgery, procedure or cardioversion:  No    Anticoagulation Episode Summary     Current INR goal:   2.0-3.5   TTR:   86.0 % (4.4 y)   Next INR check:   7/12/2019   INR from last check:   2.80 (5/31/2019)   Weekly max warfarin dose:      Target end date:      INR check location:      Preferred lab:      Send INR reminders to:   [facility]    Indications    H/O mechanical aortic valve replacement [Z95.2]           Comments:            Anticoagulation Care Providers     Provider Role Specialty Phone number    Arnold Anderson MD Referring Internal Medicine [phone]

## 2021-05-30 ENCOUNTER — RECORDS - HEALTHEAST (OUTPATIENT)
Dept: ADMINISTRATIVE | Facility: CLINIC | Age: 66
End: 2021-05-30

## 2021-05-30 NOTE — PROGRESS NOTES
Office Visit   Tammy Law   63 y.o. female    Date of Visit: 7/9/2019    Chief Complaint   Patient presents with     Knee Pain     For 1 week        Assessment and Plan   1. Acute pain of left knee  This is been going on now for a week.  Unfortunately she cannot take anti-inflammatory medications that she is on Coumadin.  I think an x-ray would be beneficial.  She is having pretty significant pain so I will refer her to Ortho to determine next steps.  She is in agreement with this plan.  Also discussed using compression, ice, and rest if able.  - XR Knee Left 1 or 2 VWS; Future  - Ambulatory referral to Orthopedics         No follow-ups on file.     History of Present Illness   This 63 y.o. old female comes in with left knee pain.  Is been going on for a week.  She did not have any injury but notes an ongoing aching in the medial aspect of her knee.  She works as a  and its difficult with ambulating.  She does not feel that it is going to give out on her.  She has not noticed any redness, warmth, swelling or other abnormalities.  Sometimes the aching goes down her leg a little bit but she is not having any calf pain or swelling.  She has no other acute concerns today.    Review of Systems: As above, systems otherwise reviewed and negative.     Medications, Allergies and Problem List   Patient Active Problem List   Diagnosis     Congenital Nystagmus     Hypothyroidism, unspecified type     Mammogram Inconclusive Due To Dense Breasts     Anterior Wall Chest Pain With Respiration     H/O mechanical aortic valve replacement     Bradycardia     Chronotropic incompetence with sinus node dysfunction (H)     Acquired hypothyroidism     Cardiac pacemaker in situ, dual chamber     Midline low back pain without sciatica     Hypertrophy of bone     Chest pain     H/O prosthetic heart valve     Epigastric pain     Biliary colic     Preoperative cardiovascular examination     Current Outpatient Medications  "  Medication Sig Dispense Refill     acetaminophen (TYLENOL) 500 MG tablet Take 1 tablet (500 mg total) by mouth every 6 (six) hours as needed. (Patient taking differently: Take 500 mg by mouth as needed.       )  0     citalopram (CELEXA) 40 MG tablet TAKE 1/2 TABLET BY MOUTH EVERY DAY 45 tablet 0     furosemide (LASIX) 40 MG tablet TAKE 1 TABLET(40 MG) BY MOUTH DAILY 45 tablet 0     hydroCHLOROthiazide (MICROZIDE) 12.5 mg capsule TAKE 1 CAPSULE(12.5 MG) BY MOUTH DAILY 90 capsule 3     levothyroxine (SYNTHROID, LEVOTHROID) 75 MCG tablet TAKE 1 TABLET BY MOUTH EVERY DAY 90 tablet 0     simvastatin (ZOCOR) 40 MG tablet TAKE 1/2 TABLET BY MOUTH EVERY DAY 45 tablet 3     warfarin (COUMADIN/JANTOVEN) 2.5 MG tablet Take TWO tablets (5mg) to THREE tablets (7.5mg) by mouth daily, as directed.  Adjust dose based on INR result. 190 tablet 1     amoxicillin (AMOXIL) 500 MG capsule Take 2,000 mg by mouth as needed (Before Dental appointments).         0     No current facility-administered medications for this visit.      Allergies   Allergen Reactions     Meperidine Other (See Comments)     hallucinations      Sulfa (Sulfonamide Antibiotics) Itching and Rash          Physical Exam     BP 94/58 (Patient Site: Left Arm, Patient Position: Sitting)   Pulse 66   Ht 5' 4\" (1.626 m)   Wt 148 lb (67.1 kg)   LMP 12/29/1999   SpO2 98%   BMI 25.40 kg/m      General: This is an alert, well-appearing female, no apparent distress.  Knee exam: Not able to reproduce the pain.  There is no significant swelling, redness, or warmth.  No laxity is noted in the joint.  She has no calf pain or swelling.     Additional Information   Social History     Tobacco Use     Smoking status: Never Smoker     Smokeless tobacco: Never Used   Substance Use Topics     Alcohol use: Yes     Comment: twice per year     Drug use: No          Paulette Lopez MD    "

## 2021-05-30 NOTE — TELEPHONE ENCOUNTER
ANTICOAGULATION  MANAGEMENT    Assessment     Today's INR result of 2.50 is Therapeutic (goal INR of 2.0 - 3.5)        Warfarin taken as previously instructed    No new diet changes affecting INR    No new medication/supplements affecting INR    Continues to tolerate warfarin with no reported s/s of bleeding or thromboembolism     Previous INR was Therapeutic at 2.80 on 5/31/19     Plan:     Left a detailed message for Bisi regarding INR result and instructed:     Warfarin Dosing Instructions:  (has 2.5mg tabs)   - Continue current warfarin dose 7.5 mg daily on Saturday; and 5 mg daily rest of week.    Instructed patient to follow up no later than:  6 wks.    Education provided: importance of consistent vitamin K intake, target INR goal and significance of current INR result, importance of notifying clinic for changes in medications, monitoring for bleeding signs and symptoms and importance of notifying clinic for diarrhea, nausea/vomiting, reduced intake and/or illness    Instructed to call the [facility] for any changes, questions or concerns. (#[phone])   ?   Andree Rdz RN    Subjective/Objective:      Tammy LUCA Law, a 63 y.o. female is on warfarin.     Tammy reports:     Home warfarin dose: as updated on anticoagulation calendar per template     Missed doses: No     Medication changes:  No     S/S of bleeding or thromboembolism:  No     New Injury or illness:  No     Changes in diet or alcohol consumption:  No     Upcoming surgery, procedure or cardioversion:  No    Anticoagulation Episode Summary     Current INR goal:   2.0-3.5   TTR:   86.2 % (4.5 y)   Next INR check:   8/14/2019   INR from last check:   2.50 (7/3/2019)   Weekly max warfarin dose:      Target end date:      INR check location:      Preferred lab:      Send INR reminders to:   [facility]    Indications    H/O mechanical aortic valve replacement [Z95.2]           Comments:            Anticoagulation Care  Providers     Provider Role Specialty Phone number    Arnold Anderson MD Referring Internal Medicine [phone]

## 2021-05-30 NOTE — PROGRESS NOTES
In clinic device check with Device RN.  Please see link for full device report.  Patient was informed of results and next follow up during today's visit.

## 2021-05-31 ENCOUNTER — RECORDS - HEALTHEAST (OUTPATIENT)
Dept: ADMINISTRATIVE | Facility: CLINIC | Age: 66
End: 2021-05-31

## 2021-05-31 VITALS — HEIGHT: 64 IN | WEIGHT: 153 LBS | BODY MASS INDEX: 26.12 KG/M2

## 2021-05-31 VITALS — WEIGHT: 147.19 LBS | BODY MASS INDEX: 25.13 KG/M2 | HEIGHT: 64 IN

## 2021-05-31 VITALS — WEIGHT: 148 LBS | HEIGHT: 64 IN | BODY MASS INDEX: 25.27 KG/M2

## 2021-05-31 VITALS — BODY MASS INDEX: 25.27 KG/M2 | HEIGHT: 64 IN | WEIGHT: 148 LBS

## 2021-05-31 VITALS — BODY MASS INDEX: 25.44 KG/M2 | WEIGHT: 149 LBS | HEIGHT: 64 IN

## 2021-05-31 VITALS — WEIGHT: 149 LBS | BODY MASS INDEX: 25.44 KG/M2 | HEIGHT: 64 IN

## 2021-05-31 NOTE — TELEPHONE ENCOUNTER
ANTICOAGULATION  MANAGEMENT    Assessment     Today's INR result of 2.40 is Therapeutic (goal INR of 2.0 - 3.5)        Warfarin taken as previously instructed    No new diet changes affecting INR    No new medication/supplements affecting INR    Continues to tolerate warfarin with no reported s/s of bleeding or thromboembolism     Previous INR was Therapeutic at 2.50 on 7/3/19.    (last 6 INR's therapeutic).    Plan:     Spoke with Bisi regarding INR result and instructed:    1.  If she will be vaccinated with 2-part Shingrex on 8/16, advised to check INR I 7-10 days, to ensure INR stability.  Verbalized and agreed with plan.    Warfarin Dosing Instructions:    - Continue current warfarin dose 7.5 mg daily on Saturday; and 5 mg daily rest of week.    Instructed patient to follow up no later than:  6-8 wks.    Education provided: importance of consistent vitamin K intake, target INR goal and significance of current INR result and importance of notifying clinic for changes in medications    Bisi verbalizes understanding and agrees to warfarin dosing plan.    Instructed to call the [facility] for any changes, questions or concerns. (#[phone])   ?   Andree Rdz RN    Subjective/Objective:      Tammy SEGOVIA Sedaryl, a 63 y.o. female is on warfarin.     Tammy reports:     Home warfarin dose: as updated on anticoagulation calendar per template     Missed doses: No     Medication changes:  No.  However, reported getting the 2-part Shingrex vaccination on 8/16/19.     S/S of bleeding or thromboembolism:  No     New Injury or illness:  No     Changes in diet or alcohol consumption:  No     Upcoming surgery, procedure or cardioversion:  No    Anticoagulation Episode Summary     Current INR goal:   2.0-3.5   TTR:   86.6 % (4.7 y)   Next INR check:   10/10/2019   INR from last check:   2.40 (8/15/2019)   Weekly max warfarin dose:      Target end date:      INR check location:      Preferred lab:      Send INR  reminders to:   [facility]    Indications    H/O mechanical aortic valve replacement [Z95.2]           Comments:            Anticoagulation Care Providers     Provider Role Specialty Phone number    Arnold Anderson MD Referring Internal Medicine [phone]

## 2021-06-01 ENCOUNTER — RECORDS - HEALTHEAST (OUTPATIENT)
Dept: ADMINISTRATIVE | Facility: CLINIC | Age: 66
End: 2021-06-01

## 2021-06-01 VITALS — WEIGHT: 152 LBS | BODY MASS INDEX: 25.33 KG/M2 | HEIGHT: 65 IN

## 2021-06-01 NOTE — TELEPHONE ENCOUNTER
Refill Approved    Rx renewed per Medication Renewal Policy. Medication was last renewed on 6/21/19.3/28/18    Camryn Carrizales, [facility] Triage/Med Refill 9/19/2019     Requested Prescriptions   Pending Prescriptions Disp Refills     levothyroxine (SYNTHROID, LEVOTHROID) 75 MCG tablet [Pharmacy Med Name: LEVOTHYROXINE 0.075MG (75MCG) TABS] 90 tablet 0     Sig: TAKE 1 TABLET BY MOUTH EVERY DAY       Thyroid Hormones Protocol Passed - 9/19/2019  3:44 AM        Passed - Provider visit in past 12 months or next 3 months     Last office visit with prescriber/PCP: 3/28/2019 Arnold Anderson MD OR same dept: 7/9/2019 Paulette Lopez MD OR same specialty: 7/9/2019 Paulette Lopez MD  Last physical: 5/19/2016 Last MTM visit: Visit date not found   Next visit within 3 mo: Visit date not found  Next physical within 3 mo: Visit date not found  Prescriber OR PCP: Arnold Anderson MD  Last diagnosis associated with med order: 1. Cardiac pacemaker in situ  - citalopram (CELEXA) 40 MG tablet [Pharmacy Med Name: CITALOPRAM 40MG TABLETS]; TAKE 1/2 TABLET BY MOUTH EVERY DAY  Dispense: 45 tablet; Refill: 0    If protocol passes may refill for 12 months if within 3 months of last provider visit (or a total of 15 months).             Passed - TSH on file in past 12 months for patient age 12 & older     TSH   Date Value Ref Range Status   05/31/2019 1.86 0.30 - 5.00 uIU/mL Final                   citalopram (CELEXA) 40 MG tablet [Pharmacy Med Name: CITALOPRAM 40MG TABLETS] 45 tablet 0     Sig: TAKE 1/2 TABLET BY MOUTH EVERY DAY       SSRI Refill Protocol  Passed - 9/19/2019  3:44 AM        Passed - PCP or prescribing provider visit in last year     Last office visit with prescriber/PCP: 3/28/2019 Arnold Anderson MD OR same dept: 7/9/2019 Paulette Lopez MD OR same specialty: 7/9/2019 Paulette Lopez MD  Last physical: 5/19/2016 Last MTM visit: Visit date not found   Next visit within 3 mo: Visit date not found   Next physical within 3 mo: Visit date not found  Prescriber OR PCP: Arnold Anderson MD  Last diagnosis associated with med order: 1. Cardiac pacemaker in situ  - citalopram (CELEXA) 40 MG tablet [Pharmacy Med Name: CITALOPRAM 40MG TABLETS]; TAKE 1/2 TABLET BY MOUTH EVERY DAY  Dispense: 45 tablet; Refill: 0    If protocol passes may refill for 12 months if within 3 months of last provider visit (or a total of 15 months).

## 2021-06-01 NOTE — TELEPHONE ENCOUNTER
ANTICOAGULATION  MANAGEMENT    Assessment     Today's INR result of 3.20 is Therapeutic (goal INR of 2.0 - 3.5)        Warfarin taken as previously instructed    No new diet changes affecting INR    No new medication/supplements affecting INR    Continues to tolerate warfarin with no reported s/s of bleeding or thromboembolism     Previous INR was Therapeutic at 2.40 on 8/15/19.    Plan:     Spoke with Bisi regarding INR result and instructed:     Warfarin Dosing Instructions:    - Continue current warfarin dose 7.5 mg daily on Saturday; and 5 mg daily rest of week.    Instructed patient to follow up no later than:  8 wks.    Education provided: importance of consistent vitamin K intake, target INR goal and significance of current INR result and importance of notifying clinic for changes in medications    Bisi verbalizes understanding and agrees to warfarin dosing plan.    Instructed to call the [facility] for any changes, questions or concerns. (#[phone])   ?   Andree Rdz RN    Subjective/Objective:      Tammy M Ani, a 63 y.o. female is on warfarin.     Tammy reports:     Home warfarin dose: verbally confirmed home dose with Bisi and updated on anticoagulation calendar     Missed doses: No     Medication changes:  No     S/S of bleeding or thromboembolism:  No     New Injury or illness:  No     Changes in diet or alcohol consumption:  No     Upcoming surgery, procedure or cardioversion:  No    Anticoagulation Episode Summary     Current INR goal:   2.0-3.5   TTR:   95.9 %   Next INR check:   11/21/2019   INR from last check:   3.20 (9/26/2019)   Weekly max warfarin dose:      Target end date:      INR check location:      Preferred lab:      Send INR reminders to:   [facility]    Indications    H/O mechanical aortic valve replacement [Z95.2]           Comments:            Anticoagulation Care Providers     Provider Role Specialty Phone number    Arnold Anderson MD  [facility] Internal Medicine [phone]

## 2021-06-02 ENCOUNTER — RECORDS - HEALTHEAST (OUTPATIENT)
Dept: ADMINISTRATIVE | Facility: CLINIC | Age: 66
End: 2021-06-02

## 2021-06-02 ENCOUNTER — AMBULATORY - HEALTHEAST (OUTPATIENT)
Dept: ANTICOAGULATION | Facility: CLINIC | Age: 66
End: 2021-06-02

## 2021-06-02 VITALS — HEIGHT: 64 IN | WEIGHT: 147 LBS | BODY MASS INDEX: 25.1 KG/M2

## 2021-06-02 VITALS — WEIGHT: 146 LBS | BODY MASS INDEX: 24.92 KG/M2 | HEIGHT: 64 IN

## 2021-06-02 VITALS — BODY MASS INDEX: 24.75 KG/M2 | WEIGHT: 145 LBS | HEIGHT: 64 IN

## 2021-06-02 VITALS — BODY MASS INDEX: 24.59 KG/M2 | HEIGHT: 64 IN | WEIGHT: 144 LBS

## 2021-06-02 DIAGNOSIS — Z95.2 H/O MECHANICAL AORTIC VALVE REPLACEMENT: ICD-10-CM

## 2021-06-02 LAB — INR PPP: 2.5 (ref 0.9–1.1)

## 2021-06-03 VITALS — BODY MASS INDEX: 24.24 KG/M2 | WEIGHT: 142 LBS | HEIGHT: 64 IN

## 2021-06-03 VITALS
HEART RATE: 80 BPM | SYSTOLIC BLOOD PRESSURE: 100 MMHG | BODY MASS INDEX: 24.41 KG/M2 | RESPIRATION RATE: 16 BRPM | WEIGHT: 143 LBS | HEIGHT: 64 IN | DIASTOLIC BLOOD PRESSURE: 58 MMHG

## 2021-06-03 VITALS — BODY MASS INDEX: 25.27 KG/M2 | HEIGHT: 64 IN | WEIGHT: 148 LBS

## 2021-06-03 NOTE — TELEPHONE ENCOUNTER
ANTICOAGULATION  MANAGEMENT    Assessment     Today's INR result of 3.20 is Therapeutic (goal INR of 2.0 - 3.5)        Warfarin taken as previously instructed    No new diet changes affecting INR    No new medication/supplements affecting INR    Continues to tolerate warfarin with no reported s/s of bleeding or thromboembolism     Previous INR was Therapeutic at 3.20 on 9/26/19.   (last 8 INR's therapeutic).    Plan:     Spoke with Bisi regarding INR result and instructed:     Warfarin Dosing Instructions:    - Continue current warfarin dose 7.5 mg daily on Saturdays; and 5 mg daily rest of week.    Instructed patient to follow up no later than:  8 wks.    Education provided: importance of consistent vitamin K intake, target INR goal and significance of current INR result, monitoring for bleeding signs and symptoms and when to seek medical attention/emergency care    Bisi verbalizes understanding and agrees to warfarin dosing plan.    Instructed to call the [facility] for any changes, questions or concerns. (#[phone])   ?   Andree Rdz RN    Subjective/Objective:      Tammy LUCA Law, a 64 y.o. female is on warfarin.     Tammy reports:     Home warfarin dose: as updated on anticoagulation calendar per template     Missed doses: No     Medication changes:  No     S/S of bleeding or thromboembolism:  Yes: reported noticing a bruise on her arm, but does not recall hitting and had no injury.     New Injury or illness:  No     Changes in diet or alcohol consumption:  No     Upcoming surgery, procedure or cardioversion:  No    Anticoagulation Episode Summary     Current INR goal:   2.0-3.5   TTR:   97.2 %   Next INR check:   1/9/2020   INR from last check:   3.20 (11/14/2019)   Weekly max warfarin dose:      Target end date:      INR check location:      Preferred lab:      Send INR reminders to:   [facility]    Indications    H/O mechanical aortic valve replacement [Z95.2]            Comments:            Anticoagulation Care Providers     Provider Role Specialty Phone number    Arnold Anderson MD Referring Internal Medicine [phone]

## 2021-06-04 NOTE — TELEPHONE ENCOUNTER
ANTICOAGULATION  MANAGEMENT    Assessment     Today's INR result of 3.1 is Therapeutic (goal INR of 2.0-3.5)        Warfarin taken as previously instructed    No new diet changes affecting INR    No new medication/supplements affecting INR    Continues to tolerate warfarin with no reported s/s of bleeding or thromboembolism     Previous INR was Therapeutic    Plan:     Spoke with Tammy regarding INR result and instructed:     Warfarin Dosing Instructions:  Continue current warfarin dose 7.5 mg daily on Sundays; and 5 mg daily rest of week  (0 % change)    Instructed patient to follow up no later than: 8 weeks.    Education provided: importance of therapeutic range, importance of following up for INR monitoring at instructed interval and importance of taking warfarin as instructed    Tammy verbalizes understanding and agrees to warfarin dosing plan.    Instructed to call the [facility] for any changes, questions or concerns. (#[phone])   ?   Agnieszka Ruth RN    Subjective/Objective:      Tammy M Ani, a 64 y.o. female is on warfarin.     Tammy reports:     Home warfarin dose: verbally confirmed home dose with Tammy and updated on anticoagulation calendar     Missed doses: No     Medication changes:  No     S/S of bleeding or thromboembolism:  No     New Injury or illness:  No     Changes in diet or alcohol consumption:  No     Upcoming surgery, procedure or cardioversion:  No    Anticoagulation Episode Summary     Current INR goal:   2.0-3.5   TTR:   97.2 % (1 y)   Next INR check:   2/24/2020   INR from last check:   3.10 (12/30/2019)   Weekly max warfarin dose:      Target end date:      INR check location:      Preferred lab:      Send INR reminders to:   [facility]    Indications    H/O mechanical aortic valve replacement [Z95.2]           Comments:            Anticoagulation Care Providers     Provider Role Specialty Phone number    Arnold Anderson MD  [facility] Internal Medicine [phone]

## 2021-06-05 NOTE — TELEPHONE ENCOUNTER
ANTICOAGULATION  MANAGEMENT: Discharge Review    Tammy Law chart reviewed for anticoagulation continuity of care    Emergency room visit on  1/6/2020 for lightheadedness, lost her balance, and right arm weakness.  Stroke code was called.    - not TPA candidate d/t on warfarin.   - CT of head / neck was negative for evidence of hge, stroke, bleeding or cancer.    Discharge disposition: Home    INR Results:       Recent labs: (last 7 days)     01/06/20  1520   INR 2.30*       Warfarin inpatient management: home regimen continued    Warfarin discharge instructions: home regimen continued     Medication Changes Affecting Anticoagulation: No    Additional Factors Affecting Anticoagulation: No    Plan     No adjustment to anticoagulation plan needed      Patient not contacted    Anticoagulation calendar updated    Andree Rdz RN

## 2021-06-06 NOTE — TELEPHONE ENCOUNTER
Refill Approved    Rx renewed per Medication Renewal Policy. Medication was last renewed on 9/19/19  OV 9/20/19.    Karlene Bunch, [facility] Triage/Med Refill 3/14/2020     Requested Prescriptions   Pending Prescriptions Disp Refills     levothyroxine (SYNTHROID, LEVOTHROID) 75 MCG tablet [Pharmacy Med Name: LEVOTHYROXINE 0.075MG (75MCG) TABS] 90 tablet 0     Sig: TAKE 1 TABLET BY MOUTH EVERY DAY       Thyroid Hormones Protocol Passed - 3/12/2020  3:45 AM        Passed - Provider visit in past 12 months or next 3 months     Last office visit with prescriber/PCP: 3/28/2019 Arnold Anderson MD OR same dept: 7/9/2019 Paulette Lopez MD OR same specialty: 7/9/2019 Paulette Lopez MD  Last physical: 5/19/2016 Last MTM visit: Visit date not found   Next visit within 3 mo: Visit date not found  Next physical within 3 mo: Visit date not found  Prescriber OR PCP: Arnold Anderson MD  Last diagnosis associated with med order: There are no diagnoses linked to this encounter.  If protocol passes may refill for 12 months if within 3 months of last provider visit (or a total of 15 months).             Passed - TSH on file in past 12 months for patient age 12 & older     TSH   Date Value Ref Range Status   05/31/2019 1.86 0.30 - 5.00 uIU/mL Final

## 2021-06-07 NOTE — PROGRESS NOTES
Office Visit   Tammy Law   64 y.o. female    Date of Visit: 4/23/2020    Chief Complaint   Patient presents with     Abdominal Pain     For 1 week, no constipation or diarrhea, no appetite        Assessment and Plan   1. Abdominal pain, generalized  She has pretty significant abdominal pain.  Her exam is nonspecific today.  I am going to check blood work including a CBC, amylase, lipase and metabolic panel.  We will also send her urine test.  I am going to set up a CT scan to look at her abdomen.  She was due for 1 anyway due to a history of a pancreatic cyst that needed follow-up.  I will get back to her with her test results.  We did discuss the possibility that this could be due to dyspepsia or gastritis.  Therefore I am going to give her a prescription for omeprazole to take daily.  If that helps then she will continue on it for 2 to 3 months.  We will get back to her with the results.  She is in agreement with this plan.  - HM2(CBC w/o Differential)  - Urinalysis-UC if Indicated  - Comprehensive Metabolic Panel  - Amylase  - Lipase  - CT Abdomen Pelvis Without Oral With IV Contrast; Future  - omeprazole (PRILOSEC) 20 MG capsule; Take 1 capsule (20 mg total) by mouth daily before breakfast.  Dispense: 90 capsule; Refill: 1    2. Flank pain  - HM2(CBC w/o Differential)  - Urinalysis-UC if Indicated  - Comprehensive Metabolic Panel  - Amylase  - Lipase  - CT Abdomen Pelvis Without Oral With IV Contrast; Future    3. Hypothyroidism, unspecified type  - Thyroid Cascade       Return if symptoms worsen or fail to improve.     History of Present Illness   This 64 y.o. old female comes in with 1 week of abdominal discomfort.  She describes a 5 out of 10 pain that is in the right upper quadrant and into the right flank area.  It is a constant pain but does wax and wane somewhat.  Her appetite has been decreased and actually in the last 2 days has not eaten much at all.  She has felt chilled but has not had  a documented fever.  No diarrhea.  No black or tarry stools.  No vomiting.  She has no other acute concerns today.    Review of Systems: As above, systems otherwise reviewed and negative.     Medications, Allergies and Problem List   Patient Active Problem List   Diagnosis     Congenital Nystagmus     Hypothyroidism, unspecified type     Mammogram Inconclusive Due To Dense Breasts     Anterior Wall Chest Pain With Respiration     H/O mechanical aortic valve replacement     Bradycardia     Chronotropic incompetence with sinus node dysfunction (H)     Acquired hypothyroidism     Cardiac pacemaker in situ, dual chamber     Midline low back pain without sciatica     Hypertrophy of bone     Chest pain     H/O prosthetic heart valve     Epigastric pain     Biliary colic     Preoperative cardiovascular examination     SSS (sick sinus syndrome) (H)     Current Outpatient Medications   Medication Sig Dispense Refill     acetaminophen (TYLENOL) 500 MG tablet Take 1 tablet (500 mg total) by mouth every 6 (six) hours as needed. (Patient taking differently: Take 500 mg by mouth as needed.       )  0     citalopram (CELEXA) 40 MG tablet TAKE 1/2 TABLET BY MOUTH EVERY DAY 45 tablet 2     furosemide (LASIX) 40 MG tablet TAKE 1 TABLET(40 MG) BY MOUTH DAILY 45 tablet 0     hydroCHLOROthiazide (MICROZIDE) 12.5 mg capsule TAKE 1 CAPSULE(12.5 MG) BY MOUTH DAILY 90 capsule 3     levothyroxine (SYNTHROID, LEVOTHROID) 75 MCG tablet Take 1 tablet (75 mcg total) by mouth daily. 90 tablet 3     simvastatin (ZOCOR) 40 MG tablet TAKE 1/2 TABLET BY MOUTH EVERY DAY 45 tablet 3     warfarin ANTICOAGULANT (COUMADIN/JANTOVEN) 2.5 MG tablet Take 2 tablets (5mg) to 3 tablets (7.5mg) by mouth daily, as directed.  Adjust dose based on INR results. 190 tablet 1     amoxicillin (AMOXIL) 500 MG capsule Take 2,000 mg by mouth as needed (Before Dental appointments).         0     omeprazole (PRILOSEC) 20 MG capsule Take 1 capsule (20 mg total) by mouth daily  "before breakfast. 90 capsule 1     No current facility-administered medications for this visit.      Allergies   Allergen Reactions     Meperidine Other (See Comments)     hallucinations      Sulfa (Sulfonamide Antibiotics) Itching and Rash          Physical Exam     /70 (Patient Site: Left Arm, Patient Position: Sitting)   Pulse 76   Temp 98.7  F (37.1  C)   Ht 5' 4\" (1.626 m)   Wt 143 lb (64.9 kg)   LMP 12/29/1999   SpO2 99%   BMI 24.55 kg/m      General:  Patient is alert and in no apparent distress.  Neck:  Supple with no adenopathy or thyroid abnormality noted.  Cardiovascular:  Regular rate and rhythm, mechanical S1 and normal S2.  Pulmonary:  Lungs are clear to auscultation bilaterally with normal respiratory effort.  Gastrointestinal:  Abdomen is soft, non-distended, with no organomegaly, rebound or guarding.  She has no severe pain with deep palpation.  .         Additional Information   Social History     Tobacco Use     Smoking status: Never Smoker     Smokeless tobacco: Never Used   Substance Use Topics     Alcohol use: Yes     Comment: twice per year     Drug use: No            Paulette Lopez MD    "

## 2021-06-07 NOTE — PROGRESS NOTES
"Tammy Law is a 64 y.o. female who is being evaluated via a billable telephone visit.      The patient has been notified of following:     \"This telephone visit will be conducted via a call between you and your physician/provider. We have found that certain health care needs can be provided without the need for a physical exam.  This service lets us provide the care you need with a short phone conversation.  If a prescription is necessary we can send it directly to your pharmacy.  If lab work is needed we can place an order for that and you can then stop by our lab to have the test done at a later time.    Telephone visits are billed at different rates depending on your insurance coverage. During this emergency period, for some insurers they may be billed the same as an in-person visit.  Please reach out to your insurance provider with any questions.    If during the course of the call the physician/provider feels a telephone visit is not appropriate, you will not be charged for this service.\"    Patient has given verbal consent to a Telephone visit? Yes    Patient would like to receive their AVS by AVS Preference: Daryl.    Additional provider notes: See note. This is a 64-year-old woman who is a patient of Dr. Arnold Laurent.  Calls for a visit today because of some right upper quadrant abdominal pain.  This started about 1 week ago.  She does not relate it to any specific foods or to meals.  There does not seem to be any worsening with any particular movement or activity.  No nausea or vomiting.  No bowel issues.  She feels that the pain is now becoming more constant and a little more severe.  She denies any fever or chills.  She reports a similar incident a year or so ago and had an a fairly extensive work-up in the hospital but has not had any significant issues requiring follow-up until this time.  No other specific symptoms or concerns at this time.  Denies the presence of any rash in the " area.    Assessment/Plan:    Persistent right upper quadrant abdominal pain.  Given her symptoms I really think she needs to be examined.  I discussed this with her.  I do not have any good recommendations to make in terms of medications or other treatments without better evaluation.  We will see if we can set her up for a lab appointment with 1 of our physicians at the [place] [facility] tomorrow.    Phone call duration:  10 minutes    Shea Cancino CMA

## 2021-06-07 NOTE — TELEPHONE ENCOUNTER
Tammy has a lab appointment at [facility] for INR.  She will need to go to another location for this.

## 2021-06-08 NOTE — TELEPHONE ENCOUNTER
FYI - Status Update  Who is Calling: Patient  Update: Wanting to speak to [facility] INR nurse in regards to a new medication she started taking about a week ago. She stated that the pills could effect her INR and would like to discuss her concerns.  Okay to leave a detailed message?:  Yes

## 2021-06-08 NOTE — TELEPHONE ENCOUNTER
Called and spoke with Bisi,     - new RX for Prilosec 20mg daily since 4/23/20.    (per [employer], Concurrent use of WARFARIN and OMEPRAZOLE may result in elevations of International Normalized Ratio serum values and potentiation of anticoagulant effects).     - INR scheduled on 5/7/20 @ MID.

## 2021-06-09 NOTE — TELEPHONE ENCOUNTER
ANTICOAGULATION  MANAGEMENT PROGRAM    Tammy Law is overdue for INR check.     Left message to call and schedule INR appointment as soon as possible.      Andree Rdz RN

## 2021-06-09 NOTE — PROGRESS NOTES
Subjective findings: The patient returned to the clinic today for pain at the second metatarsal phalangeal joint left foot.  The patient's approximately 8 months status post second metatarsal head resection.  She describes it as a very sharp pain with ambulation and pressure.       Objective findings: Nails bilateral feet normal length and color.  Skin bilateral feet warm and intact.  DP and PT pulses are palpable bilaterally.  Capillary refill less than 2 seconds bilaterally.  Negative clonus, negative Babinski bilateral feet.  Range of motion within normal limits bilateral feet.  Muscle power +5 over 5 bilaterally in all compartments.  There is pain at the level of the second metatarsal phalangeal joint right foot.  There is pain on dorsiflexion of the second MPJ right foot.  No erythema noted.  There is a firm palpable mass on the dorsal aspect of the second metatarsal phalangeal joint right foot.      Assessment: Capsulitis second MPJ right foot      Plan: An x-ray of the right foot was taken today.  I informed the patient that it's appears to be a small bone spur on the dorsal aspect of the remaining portion of the head of the second metatarsal.  I told the patient that I also believe there is some inflammation of the joint capsule.  The patient was given a cortisone injection today at the second MPJ right foot consisting of 1/4 mL of dexamethasone sodium phosphate and 1/4 mL of 2% lidocaine plain.  I informed the patient that if she does not improve and exostectomy may be necessary.

## 2021-06-09 NOTE — TELEPHONE ENCOUNTER
ANTICOAGULATION  MANAGEMENT    Assessment     Today's INR result of 2.70 is Therapeutic (goal INR of 2.0 - 3.5)        Warfarin taken as previously instructed    No new diet changes affecting INR    No new medication/supplements affecting INR    Continues to tolerate warfarin with no reported s/s of bleeding or thromboembolism     Previous INR was Therapeutic at 2.40 on 5/15/20.  (8+ therapeutic INR's)    Plan:     Spoke with Bisi regarding INR result and instructed:     Warfarin Dosing Instructions:  (has 2.5mg tabs)   - Continue current warfarin dose 7.5 mg daily on Saturdays; and 5 mg daily rest of week.    Instructed patient to follow up no later than:  8 wks.    Education provided: importance of consistent vitamin K intake and target INR goal and significance of current INR result    Bisi verbalizes understanding and agrees to warfarin dosing plan.    Instructed to call the [facility] for any changes, questions or concerns. (#[phone])   ?   Andree Rdz RN    Subjective/Objective:      Tammy M Ani, a 64 y.o. female is on warfarin.     Tammy reports:     Home warfarin dose: as updated on anticoagulation calendar per template     Missed doses: No     Medication changes:  No     S/S of bleeding or thromboembolism:  No     New Injury or illness:  No     Changes in diet or alcohol consumption:  No     Upcoming surgery, procedure or cardioversion:  No    Anticoagulation Episode Summary     Current INR goal:   2.0-3.5   TTR:   100.0 % (1 y)   Next INR check:   8/19/2020   INR from last check:   2.70 (6/24/2020)   Weekly max warfarin dose:      Target end date:      INR check location:      Preferred lab:      Send INR reminders to:   [facility]    Indications    H/O mechanical aortic valve replacement [Z95.2]           Comments:            Anticoagulation Care Providers     Provider Role Specialty Phone number    Arnold Anderson MD Referring Internal Medicine  [phone]

## 2021-06-10 NOTE — PROGRESS NOTES
Subjective findings: The patient returned to the clinic today for pain at the second metatarsal phalangeal joint left foot. The patient's status post second metatarsal head resection. She describes it as a very sharp pain with ambulation and pressure.       Objective findings: Nails bilateral feet normal length and color. Skin bilateral feet warm and intact. DP and PT pulses are palpable bilaterally. Capillary refill less than 2 seconds bilaterally. Negative clonus, negative Babinski bilateral feet. Range of motion within normal limits bilateral feet. Muscle power +5 over 5 bilaterally in all compartments.  There is pain at the level of the second metatarsal phalangeal joint right foot.  There is pain on dorsiflexion of the second MPJ right foot.  There is a sharp pain on palpation of the dorsal aspect of the head of the second metatarsal.  No erythema noted. There is a firm palpable mass on the dorsal aspect of the second metatarsal phalangeal joint right foot.       Assessment: Dorsal exostosis second metatarsal right foot      Plan: I am recommending an exostectomy of the second metatarsal right foot.  I informed the patient that this should increase her range of motion and decrease her pain.  She was told that this procedure will be done on an outpatient basis under local anesthesia with IV sedation.  She was told to proceed with take approximately 10 minutes to perform and she will be then discharged and able to weight-bear in a postop shoe for 2 weeks.  I have asked the patient to go n.p.o. at midnight prior to the procedure.  She is to discontinue her Coumadin for 7 days prior to the procedure.  She is also to see her primary care physician for preoperative consultation.

## 2021-06-10 NOTE — TELEPHONE ENCOUNTER
ANTICOAGULATION  MANAGEMENT    Assessment     Today's INR result of 3.2 is Therapeutic (goal INR of 2.0-3.5)        Warfarin taken as previously instructed    No new diet changes affecting INR    No new medication/supplements affecting INR    Continues to tolerate warfarin with no reported s/s of bleeding or thromboembolism     Previous INR was Supratherapeutic    Plan:     Spoke on phone with Tammy regarding INR result and instructed:     Warfarin Dosing Instructions:  Continue current warfarin dose 7.5 mg daily on Sat; and 5 mg daily rest of week  (0 % change)    Instructed patient to follow up no later than: 2-3 weeks    Education provided: target INR goal and significance of current INR result, importance of following up for INR monitoring at instructed interval, importance of taking warfarin as instructed, importance of notifying clinic for changes in medications and importance of notifying clinic for diarrhea, nausea/vomiting, reduced intake and/or illness    Tammy verbalizes understanding and agrees to warfarin dosing plan.    Instructed to call the [facility] for any changes, questions or concerns. (#[phone])   ?   Cheyanne Hampton RN    Subjective/Objective:      Tammy Law, a 64 y.o. female is on warfarin.     Tammy reports:     Home warfarin dose: verbally confirmed home dose with Tammy and updated on anticoagulation calendar     Missed doses: No     Medication changes:  No     S/S of bleeding or thromboembolism:  No     New Injury or illness:  No     Changes in diet or alcohol consumption:  No     Upcoming surgery, procedure or cardioversion:  No    Anticoagulation Episode Summary     Current INR goal:   2.0-3.5   TTR:   97.9 % (1 y)   Next INR check:   9/4/2020   INR from last check:   3.20 (8/14/2020)   Weekly max warfarin dose:      Target end date:      INR check location:      Preferred lab:      Send INR reminders to:   [facility]    Indications    H/O  mechanical aortic valve replacement [Z95.2]           Comments:            Anticoagulation Care Providers     Provider Role Specialty Phone number    Arnold Anderosn MD Referring Internal Medicine [phone]

## 2021-06-10 NOTE — TELEPHONE ENCOUNTER
ANTICOAGULATION  MANAGEMENT    Assessment     Today's INR result of 3.60 is Supratherapeutic (goal INR of 2.0 - 3.5)        Warfarin taken as previously instructed    No new diet changes affecting INR    No new medication/supplements affecting INR    Continues to tolerate warfarin with no reported s/s of bleeding or thromboembolism     Previous INR was Therapeutic at 2.70 on 6/24/20.   (last 14 INR's therapeutic).    Feeling very stressed out, personal issues going on, and took time off from work. Also her mom recently passed away.    Plan:     Spoke with Bisi regarding INR result and instructed:     Warfarin Dosing Instructions:  (has 2.5mg tabs)   - just for today, advised one time lower dose with 2.5mg warfarin,   - then continue current warfarin dose 7.5 mg daily on Saturdays; and 5 mg daily rest of week.    Instructed patient to follow up no later than:  2 wks.   - INR scheduled on 8/12/20 @ [facility].    Education provided: target INR goal and significance of current INR result    Bisi verbalizes understanding and agrees to warfarin dosing plan.    Instructed to call the [facility] for any changes, questions or concerns. (#[phone])   ?   Andree Rdz RN    Subjective/Objective:      Tammy SEGOVIA Ani, a 64 y.o. female is on warfarin.     Tammy reports:     Home warfarin dose: as updated on anticoagulation calendar per template     Missed doses: No     Medication changes:  No     S/S of bleeding or thromboembolism:  No     New Injury or illness:  No     Changes in diet or alcohol consumption:  No     Upcoming surgery, procedure or cardioversion:  No    Anticoagulation Episode Summary     Current INR goal:   2.0-3.5   TTR:   98.9 % (1 y)   Next INR check:   8/13/2020   INR from last check:   3.60! (7/30/2020)   Weekly max warfarin dose:      Target end date:      INR check location:      Preferred lab:      Send INR reminders to:   [facility]    Indications    H/O mechanical aortic  valve replacement [Z95.2]           Comments:            Anticoagulation Care Providers     Provider Role Specialty Phone number    Arnold Anderson MD Referring Internal Medicine [phone]

## 2021-06-11 ENCOUNTER — OFFICE VISIT - HEALTHEAST (OUTPATIENT)
Dept: PHYSICAL THERAPY | Facility: REHABILITATION | Age: 66
End: 2021-06-11

## 2021-06-11 DIAGNOSIS — M25.551 ACUTE RIGHT HIP PAIN: ICD-10-CM

## 2021-06-11 NOTE — PROGRESS NOTES
[facility] Follow Up Note    Tammy Law   61 y.o. female    Date of Visit: 6/16/2017    Chief Complaint   Patient presents with     Pre-op Exam     Dr Mccauley, Right 2nd toe, [place], 6/23     Subjective  This is a 61-year-old patient of Dr. Arnold Anderson.  She comes in today for preoperative evaluation.  She has had ongoing toe problems and had a procedure last year.  She sees Dr. Jessee Mccauley and he has scheduled her for another procedure on the toe on June 23.  For the most part she has been feeling fine and has no particular problems or concerns other than the toe at this time.  Her cardiac status has been stable.  She continues to work on a regular basis.  There have been no apparent changes in her health history since her last visit to the office.  Her medications are as listed.    Past surgical history: She has had an aortic valve replacement done in 1995, removal of breast cyst, hysterectomy, hemorrhoidectomy, carpal tunnel repair, and previous toe surgery.    Past medical history: She has the history of the valvular heart disease, hyperlipidemia, hypothyroidism, mild anxiety, and last year was noticed to have a sinus node dysfunction and had a pacemaker placed.    Social history: She is not a smoker.  She currently works at [employer].    Family history: This is noncontributory to the current issue.    ROS A comprehensive review of systems was performed and was otherwise negative    Medications, allergies, and problem list were reviewed and updated    Exam  General Appearance:   On examination her blood pressure is 110/60.  Weight is 148 pounds and height is 64 inches.  BMI is 25.40.    Eyes: Pupils are equal and conjunctiva are normal.    Ears nose and throat: Normal.    Neck: Supple with no masses and no neck vein distention.  No thyroid enlargement.    Lungs: Clear.    Cardiovascular: Heart rhythm is stable with pacemaker.  Rate is in the 60s.  No  gallops or murmurs.  Carotid pulses are full with no bruits.  No peripheral edema.    GI: Abdomen is soft and nontender with no distention.  No masses or organomegaly.    Musculoskeletal: Head and neck are normal to inspection with good range of motion.  Good strength and range of motion in all extremities.    Neurologic: Cranial nerves are intact.  Gait is normal.    Psychiatric: The patient is alert and oriented ×3.      Assessment/Plan  1. Preop exam for internal medicine  Electrocardiogram Perform and Read    Hemoglobin    Potassium   2. Acquired hypothyroidism  Thyroid Stimulating Hormone (TSH)   3. S/P AVR (aortic valve replacement)  INR     I do not see any contraindication to the proposed surgery.  There have been no apparent bleeding or anesthesia problems in the past.  Surgery recommended staying off the Coumadin for 1 week and so she will stop that tonight.  As the valve replacement is over 20 years in the past there is probably no need to bridge the patient with Lovenox.  We will check her hemoglobin and potassium.  I have no additional recommendations.  All information will be available to surgery next week.      Sumanth Patel MD      Current Outpatient Prescriptions on File Prior to Visit   Medication Sig     cholecalciferol, vitamin D3, 1,000 unit tablet Take 1,000 Units by mouth daily.     citalopram (CELEXA) 40 MG tablet TAKE 1/2 TABLET BY MOUTH EVERY DAY     estrogens, conjugated,-methylTESTOSTERone (ESTRATEST HS) 0.625-1.25 mg per tablet Take 1 tablet by mouth daily.     furosemide (LASIX) 40 MG tablet TAKE 1 TABLET BY MOUTH EVERY OTHER DAY AS NEEDED FOR SWELLING     gabapentin (NEURONTIN) 100 MG capsule Increase dose as directed by chart.  May increase to 1 tabs 3 times daily     hydroCHLOROthiazide (MICROZIDE) 12.5 mg capsule TAKE 1 CAPSULE BY MOUTH DAILY     levothyroxine (SYNTHROID, LEVOTHROID) 75 MCG tablet TAKE 1 TABLET BY MOUTH EVERY DAY     simvastatin (ZOCOR) 40 MG tablet TAKE ONE-HALF  TABLET BY MOUTH EVERY DAY     warfarin (COUMADIN) 2.5 MG tablet TAKE UP TO 2 TABLETS BY MOUTH DAILY AS DIRECTED BY COUMADIN CLINIC     warfarin (COUMADIN) 2.5 MG tablet TAKE UP TO 2 TABLETS BY MOUTH DAILY AS DIRECTED BY COUMADIN CLINIC     citalopram (CELEXA) 40 MG tablet TAKE 1/2 TABLET BY MOUTH EVERY DAY     citalopram (CELEXA) 40 MG tablet TAKE 1/2 TABLET BY MOUTH EVERY DAY     levothyroxine (SYNTHROID, LEVOTHROID) 75 MCG tablet TAKE 1 TABLET BY MOUTH EVERY DAY     levothyroxine (SYNTHROID, LEVOTHROID) 75 MCG tablet TAKE 1 TABLET BY MOUTH EVERY DAY     warfarin (COUMADIN) 2.5 MG tablet TAKE UP TO 2 TABLETS BY MOUTH DAILY AS DIRECTED BY COUMADIN CLINIC     No current facility-administered medications on file prior to visit.      Allergies   Allergen Reactions     Meperidine Other (See Comments)     hallucinations      Sulfa (Sulfonamide Antibiotics) Itching and Rash     Social History   Substance Use Topics     Smoking status: Never Smoker     Smokeless tobacco: Never Used     Alcohol use Yes      Comment: twice per year

## 2021-06-11 NOTE — PROGRESS NOTES
Subjective findings: The patient return to the clinic today for postop visit #2, 2 weesk status post partial second metatarsal head resection right foot.  The patient is in good spirits.     Objective findings: The dressings were removed and the wound margins are well healed.  There is minimal edema noted.  There is no erythema or cellulitis noted.  Neurovascular status is intact.      Assessment: Hypertrophic bone right foot     Plan: Removed all sutures.  The patient was instructed to begin wearing normal shoes and returning to normal activity.  She is to return to the clinic as needed.

## 2021-06-11 NOTE — TELEPHONE ENCOUNTER
ANTICOAGULATION  MANAGEMENT    Assessment     Today's INR result of 5.1 is Supratherapeutic (goal INR of 2.0-3.5)        Warfarin taken as previously instructed    Change in alcohol intake may be affecting INR - had 3 beers last Sat and she stated that she does not usually drink.    No new medication/supplements affecting INR    Continues to tolerate warfarin with no reported s/s of      thromboembolism     Reported bruise on her left elbow- stated that she cannot remember how she got it but it is going down now.    Previous INR was Therapeutic    Plan:     Spoke on phone with Tammy regarding INR result and instructed:     Warfarin Dosing Instructions:  Hold warfarin doses today and tomorrow then continue current warfarin dose    7.5 mg every Sat; 5 mg all other days      (0 % change)    Instructed patient to follow up no later than: 7-10 days- appointment made.    Education provided: impact of vitamin K foods on INR, potential interaction between warfarin and alcohol, monitoring for bleeding signs and symptoms and when to seek medical attention/emergency care    Tammy verbalizes understanding and agrees to warfarin dosing plan.    Instructed to call the [facility] for any changes, questions or concerns. (#[phone])   ?   Carolynn Lucas RN    Subjective/Objective:      Tammy SEGOVIA Ani, a 64 y.o. female is on warfarin.     Tammy reports:     Home warfarin dose: as updated on anticoagulation calendar per template     Missed doses: No     Medication changes:  No     S/S of bleeding or thromboembolism:  Yes: bruise on left elbow- see above.     New Injury or illness:  No     Changes in diet or alcohol consumption:  Yes: had 3 beers last Sat and stated that she does not normally drink alcohol.     Upcoming surgery, procedure or cardioversion:  No    Anticoagulation Episode Summary     Current INR goal:   2.0-3.5   TTR:   93.7 % (1 y)   Next INR check:   9/8/2020   INR from last check:   5.10!  (9/1/2020)   Weekly max warfarin dose:      Target end date:      INR check location:      Preferred lab:      Send INR reminders to:   [facility]    Indications    H/O mechanical aortic valve replacement [Z95.2]           Comments:            Anticoagulation Care Providers     Provider Role Specialty Phone number    Arnold Anderson MD Referring Internal Medicine [phone]

## 2021-06-11 NOTE — TELEPHONE ENCOUNTER
Who is calling:  Tammy  Reason for Call:  Patient would like to know if she should adjust her dosing if she has a couple beers this weekend.  Date of last appointment with primary care: 4/23/2020  Okay to leave a detailed message: Yes

## 2021-06-11 NOTE — ANESTHESIA CARE TRANSFER NOTE
Last vitals:   Vitals:    06/23/17 1124   BP: 114/72   Pulse: 65   Resp: 14   Temp: 36.4  C (97.5  F)   SpO2: 100%     Patient's level of consciousness is drowsy  Spontaneous respirations: yes  Maintains airway independently: yes  Dentition unchanged: yes  Oropharynx: oropharynx clear of all foreign objects    QCDR Measures:  ASA# 20 - Surgical Safety Checklist: ASA20A - Safety Checks Done  PQRS# 430 - Adult PONV Prevention: 4558F - Pt received => 2 anti-emetic agents (different classes) preop & intraop  ASA# 8 - Peds PONV Prevention: NA - Not pediatric patient, not GA or 2 or more risk factors NOT present  PQRS# 424 - Josselin-op Temp Management: 4559F - At least one body temp DOCUMENTED => 35.5C or 95.9F within required timeframe  PQRS# 426 - PACU Transfer Protocol: - Transfer of care checklist used  ASA# 14 - Acute Post-op Pain: ASA14B - Patient did NOT experience pain >= 7 out of 10

## 2021-06-11 NOTE — ANESTHESIA POSTPROCEDURE EVALUATION
Patient: Tammy Law  EXOSTECTOMY 2ND METATARSAL RIGHT FOOT  Anesthesia type: MAC    Patient location: PACU phase 2  Last vitals:   Vitals:    06/23/17 1212   BP: 107/61   Pulse: 64   Resp: 16   Temp:    SpO2: 98%   Late entry owing to clinical demands  Case done mAC  Post vital signs: stable  Level of consciousness: awake and responds to simple questions  Post-anesthesia pain: pain controlled  Post-anesthesia nausea and vomiting: no  Pulmonary: unassisted, return to baseline  Cardiovascular: stable and blood pressure at baseline  Hydration: adequate  Anesthetic events: no    QCDR Measures:  ASA# 11 - Josselin-op Cardiac Arrest: ASA11B - Patient did NOT experience unanticipated cardiac arrest  ASA# 12 - Josselin-op Mortality Rate: ASA12B - Patient did NOT die  ASA# 13 - PACU Re-Intubation Rate: ASA13B - Patient did NOT require a new airway mgmt  ASA# 10 - Composite Anes Safety: ASA10A - No serious adverse event  ASA# 38 - New Corneal Injury: ASA38A - No new exposure keratitis or corneal abrasion in PACU    Additional Notes:

## 2021-06-11 NOTE — PROGRESS NOTES
Subjective findings: The patient return to the clinic today for postop visit #1, 1 week status post partial second metatarsal head resection right foot.  The patient is in good spirits.  She complained of mild to moderate pain.    Objective findings: The dressings were removed and the wound margins are well coaptated and maintained.  There is minimal edema noted.  There is no erythema or cellulitis noted.  Neurovascular status is intact.  Vital signs are stable.    Assessment: Hypertrophic bone right foot    Plan: Applied a sterile dressing to the right foot.  The patient was instructed to return to the clinic in 1 week for postop visit #2 at which time sutures will be removed in a postop x-ray will be taken.

## 2021-06-11 NOTE — TELEPHONE ENCOUNTER
ACN called and spoke with patient and she stated that she will be having alcoholic drinks this weekend.  Noted that INR was high at 5.1 on 9/1/2020 due to interaction between alcohol and warfarin.    Patient stated that she does not eat as much greens and will not be increasing intake over the weekend.     Instructed patient to take one time lower dose of 2.5 mg tomorrow then continue current doses of 7.5 mg on Sat then 5 mg all other days.    Appointment made for 9/29/2020.    She has no other concerns at this time.    Carolynn Lucas RN

## 2021-06-11 NOTE — ANESTHESIA PREPROCEDURE EVALUATION
Anesthesia Evaluation      Patient summary reviewed   No history of anesthetic complications     Airway   Mallampati: II  Neck ROM: full   Pulmonary - negative ROS and normal exam                          Cardiovascular - normal exam  Exercise tolerance: > or = 4 METS  (+) pacemaker, valvular problems/murmurs, , hypercholesterolemia,      Neuro/Psych    (+) anxiety/panic attacks,     Endo/Other    (+) hypothyroidism,      GI/Hepatic/Renal - negative ROS      Other findings: Congenital nystagmus. Pacer for sinus node dysfunction.  S/P AVR, off coumadin 1 week, INR pending.  ^/16/17: K 3.9, Hg 12.1, INR 2.10.      Dental - normal exam                        Anesthesia Plan  Planned anesthetic: MAC    ASA 3     Anesthetic plan and risks discussed with: patient    Post-op plan: routine recovery

## 2021-06-11 NOTE — TELEPHONE ENCOUNTER
ANTICOAGULATION  MANAGEMENT    Assessment     Today's INR result of 2.80 is Therapeutic (goal INR of 2.0 - 3.5)        Warfarin taken as previously instructed    No new diet changes affecting INR    No new medication/supplements affecting INR    Continues to tolerate warfarin with no reported s/s of bleeding or thromboembolism     Previous INR was Supratherapeutic at 5.10 on 9/1/20.    Plan:     Left detailed message for Bisi regarding INR result and instructed:     Warfarin Dosing Instructions:    - Continue current warfarin dose 7.5 mg daily on Saturday; and 5 mg daily rest of week.    Instructed patient to follow up no later than:  2 wks.    Education provided: potential interaction between warfarin and alcohol and target INR goal and significance of current INR result    Instructed to call the [facility] for any changes, questions or concerns. (#[phone])   ?   Andree Rdz RN    Subjective/Objective:      Tammy M Ani, a 64 y.o. female is on warfarin.     Tammy reports:     Home warfarin dose: as updated on anticoagulation calendar per template     Missed doses: No     Medication changes:  No     S/S of bleeding or thromboembolism:  No     New Injury or illness:  No     Changes in diet or alcohol consumption:  No     Upcoming surgery, procedure or cardioversion:  No    Anticoagulation Episode Summary     Current INR goal:   2.0-3.5   TTR:   91.8 % (1 y)   Next INR check:   9/25/2020   INR from last check:   2.80 (9/11/2020)   Weekly max warfarin dose:      Target end date:      INR check location:      Preferred lab:      Send INR reminders to:   [facility]    Indications    H/O mechanical aortic valve replacement [Z95.2]           Comments:            Anticoagulation Care Providers     Provider Role Specialty Phone number    Arnold Anderson MD Referring Internal Medicine [phone]

## 2021-06-11 NOTE — TELEPHONE ENCOUNTER
ANTICOAGULATION  MANAGEMENT    Assessment     Today's INR result of 3.20 is Therapeutic (goal INR of 2.0 - 3.5)        Warfarin taken as previously instructed    No new diet changes affecting INR    No new medication/supplements affecting INR    Continues to tolerate warfarin with no reported s/s of bleeding or thromboembolism     Previous INR was Therapeutic at 2.80 on 9/11/20.    Plan:     Left detailed message for Bisi regarding INR result and instructed:     Warfarin Dosing Instructions:    - Continue current warfarin dose 7.5 mg daily on Saturdays; and 5 mg daily rest of week.    Instructed patient to follow up no later than:  4 wks.    Education provided: target INR goal and significance of current INR result    Instructed to call the [facility] for any changes, questions or concerns. (#[phone])   ?   Andree Rdz RN    Subjective/Objective:      Tammy SEGOVIA Sedaryl, a 64 y.o. female is on warfarin.     Tammy reports:     Home warfarin dose: as updated on anticoagulation calendar per template     Missed doses: No     Medication changes:  No     S/S of bleeding or thromboembolism:  No     New Injury or illness:  No     Changes in diet or alcohol consumption:  No     Upcoming surgery, procedure or cardioversion:  No    Anticoagulation Episode Summary     Current INR goal:   2.0-3.5   TTR:   91.8 % (1 y)   Next INR check:   10/27/2020   INR from last check:   3.20 (9/29/2020)   Weekly max warfarin dose:      Target end date:      INR check location:      Preferred lab:      Send INR reminders to:   [facility]    Indications    H/O mechanical aortic valve replacement [Z95.2]           Comments:            Anticoagulation Care Providers     Provider Role Specialty Phone number    Arnold Anderson MD Referring Internal Medicine [phone]

## 2021-06-12 NOTE — PROGRESS NOTES
Office Visit   Tammy Law   65 y.o. female    Date of Visit: 10/2/2020    Chief Complaint   Patient presents with     Back Pain     Burning sharp back pain for a couple months        Assessment and Plan   1. Back pain, unspecified back location, unspecified back pain laterality, unspecified chronicity  Back pain continues.  It seems to be worsening.  I am going to get an x-ray of her spine and have her seen in the clinic.  We will also recheck some blood work to make sure that this is not an underlying problem with her pancreas or liver.  She is in agreement with this plan.  - XR Lumbar Spine 2 or 3 VWS  - XR Thoracic Spine 2 VWS; Future  - Ambulatory referral to [facility]  - Amylase  - Lipase  - Hepatic Profile    2. Weight loss  Due to the weight loss I will get a chest x-ray.  I also reviewed her CT scan from April that did not show any worrisome abnormalities.  Again I will recheck her labs today.  We will see her back in about 4 to 6 weeks to reassess and make sure that her weight loss stabilizes.  - HM2(CBC w/o Differential)  - Thyroid Cascade  - Basic Metabolic Panel  - Amylase  - Lipase  - Hepatic Profile  - XR Chest 2 Views    3. Edema, unspecified type  - furosemide (LASIX) 40 MG tablet; Take 1 tablet (40 mg total) by mouth daily.  Dispense: 45 tablet; Refill: 0  - Thyroid Hubbard  - Basic Metabolic Panel    4. Screening for hyperlipidemia  - Lipid Cascade         Return in about 6 weeks (around 2020) for Routine preventive.     History of Present Illness   This 65 y.o. old female comes in due to worsening back pain.  Over the last couple of months she has had worsening pain in the mid spine.  It seems to wrap around her abdomen.  Seems to get worse with certain movements.  It does not bother her at night.  She does not have any symptoms after eating.  She does note that she has lost weight about 8 pounds over the last few months.  Her mom  and she has had some stressors but also  she just feels that there is something wrong.  She has not had a change in her bowels.  She does not get any pain when she eats.  She has not had any cough or other new symptoms.    Review of Systems: As above, systems otherwise reviewed and negative.     Medications, Allergies and Problem List   Patient Active Problem List   Diagnosis     Congenital Nystagmus     H/O mechanical aortic valve replacement     Acquired hypothyroidism     Cardiac pacemaker in situ, dual chamber     Epigastric pain     Current Outpatient Medications   Medication Sig Dispense Refill     acetaminophen (TYLENOL) 500 MG tablet Take 1 tablet (500 mg total) by mouth every 6 (six) hours as needed. (Patient taking differently: Take 500 mg by mouth as needed.       )  0     cholecalciferol, vitamin D3, 1,000 unit (25 mcg) tablet Take 2,000 Units by mouth daily.       citalopram (CELEXA) 40 MG tablet Take 0.5 tablets (20 mg total) by mouth daily. 45 tablet 3     hydroCHLOROthiazide (MICROZIDE) 12.5 mg capsule TAKE 1 CAPSULE(12.5 MG) BY MOUTH DAILY 90 capsule 3     levothyroxine (SYNTHROID, LEVOTHROID) 75 MCG tablet Take 1 tablet (75 mcg total) by mouth daily. 90 tablet 3     simvastatin (ZOCOR) 20 MG tablet Take 1 tablet (20 mg total) by mouth daily. 90 tablet 3     warfarin ANTICOAGULANT (COUMADIN/JANTOVEN) 2.5 MG tablet Take 2 tablets (5mg) to 3 tablets (7.5mg) by mouth daily, as directed.  Adjust dose based on INR results. 190 tablet 3     amoxicillin (AMOXIL) 500 MG capsule Take 2,000 mg by mouth as needed (Before Dental appointments).         0     furosemide (LASIX) 40 MG tablet Take 1 tablet (40 mg total) by mouth daily. 45 tablet 0     No current facility-administered medications for this visit.      Allergies   Allergen Reactions     Meperidine Other (See Comments)     hallucinations      Sulfa (Sulfonamide Antibiotics) Itching and Rash          Physical Exam     /64 (Patient Site: Left Arm, Patient Position: Sitting)   Pulse 76    "Ht 5' 4\" (1.626 m)   Wt 136 lb (61.7 kg)   LMP 12/29/1999   SpO2 98%   BMI 23.34 kg/m      General:  Patient is alert and in no apparent distress.  Neck:  Supple with no adenopathy or thyroid abnormality noted.  Cardiovascular:  Regular rate and rhythm, normal S1/S2, no murmurs, rubs, or gallop.  Pulmonary:  Lungs are clear to auscultation bilaterally with normal respiratory effort.  Gastrointestinal:  Abdomen is soft, non-tender, non-distended, with no organomegaly, rebound or guarding.  Back: She does have some reproducible pain with palpation in the mid thoracic spine.  No CVA tenderness or other abnormalities are noted.  Neurologic Cranial nerves are intact.  No focal deficits.           Additional Information   Social History     Tobacco Use     Smoking status: Never Smoker     Smokeless tobacco: Never Used   Substance Use Topics     Alcohol use: Yes     Comment: twice per year     Drug use: No            Paulette Lopez MD    "

## 2021-06-12 NOTE — TELEPHONE ENCOUNTER
ANTICOAGULATION  MANAGEMENT    Assessment     Today's INR result of 3.5 is Therapeutic (goal INR of 2.0-3.5)        Less warfarin taken than instructed which may be affecting INR    No new diet changes affecting INR    No new medication/supplements affecting INR    Continues to tolerate warfarin with no reported s/s or thromboembolism     Bleeding from gums this past weekend x 2 days. See notes below    Previous INR was Therapeutic    Plan:     Spoke on phone with Tammy regarding INR result and instructed:     Warfarin Dosing Instructions:  Continue current warfarin dose 7.5 mg daily on Sat; and 5 mg daily rest of week      Instructed patient to follow up no later than: 2 weeks, d/t INR at higher end of goal range with less warfarin taken this week and patient had bleeding this past weekend    Education provided: target INR goal and significance of current INR result, monitoring for bleeding signs and symptoms and when to seek medical attention/emergency care    Tammy verbalizes understanding and agrees to warfarin dosing plan.    Instructed to call the [facility] for any changes, questions or concerns. (#[phone])   ?   Lisa Mayer RN    Subjective/Objective:      Tammy Law, a 65 y.o. female is on warfarin.     Tammy reports:     Home warfarin dose: verbally confirmed home dose with Tammy and updated on anticoagulation calendar     Missed doses: Not missed, but d/t gums oozing on Saturday she elected to only take 5mg instead of her planned 7.5mg dose     Medication changes:  No     S/S of bleeding or thromboembolism:  Yes: noted her gums were bleeding a lot with flossing on Saturday. Continued to ooze for almost 2 days. Now has stopped.      New Injury or illness:  No     Changes in diet or alcohol consumption:  No     Upcoming surgery, procedure or cardioversion:  No    Anticoagulation Episode Summary     Current INR goal:  2.0-3.5   TTR:  91.8 % (1 y)   Next INR check:   11/17/2020   INR from last check:  3.50 (10/27/2020)   Weekly max warfarin dose:     Target end date:     INR check location:     Preferred lab:     Send INR reminders to:  [facility]    Indications    H/O mechanical aortic valve replacement [Z95.2]           Comments:           Anticoagulation Care Providers     Provider Role Specialty Phone number    Arnold Anderson MD Referring Internal Medicine [phone]

## 2021-06-13 NOTE — TELEPHONE ENCOUNTER
Called Bisi back.     - gave her instructions - to continue 5mg warfarin daily.     - INR scheduled on 12/16/20 during f/u visit @ [facility]/    (she does not drive; and will combine INR visit on this day.)

## 2021-06-13 NOTE — TELEPHONE ENCOUNTER
Wellness Screening Tool  Symptom Screening:  Do you have one of the following NEW symptoms:    Fever (subjective or >100.0)?  No    A new cough?  No    Shortness of breath?  No     Chills? No     New loss of taste or smell? No     Generalized body aches? No     New persistent headache? No     New sore throat? No     Nausea, vomiting, or diarrhea?  No    Within the past 2 weeks, have you been exposed to someone with a known positive illness below:    COVID-19 (known or suspected)?  No    Chicken pox?  No    Mealses?  No    Pertussis?  No    Patient notified of visitor policy- No visitors are allowed to accompany the patient at this time. Yes  Pt informed to wear a mask: Yes  Pt notified if they develop any symptoms listed above, prior to their appointment, they are to call the clinic directly at [phone] for further instructions.  Yes  Patient's appointment status: Patient will be seen in clinic as scheduled on 12/16

## 2021-06-13 NOTE — TELEPHONE ENCOUNTER
Who is calling:  patient  Reason for Call:  Patient states she was returning a missed call from [facility].  Patient states when [facility] called she accidentally hit the hang up button instead of the answer one.   Date of last appointment with primary care:   Okay to leave a detailed message: Yes

## 2021-06-13 NOTE — PROGRESS NOTES
[facility] Follow Up Note    Tammy Law   62 y.o. female    Date of Visit: 10/23/2017    Chief Complaint   Patient presents with     Back Pain     starting to move to the front     Subjective  This is a 62-year-old lady who is a patient of Dr. Arnold Anderson.  I was actually the last physician in our office to see her for a preoperative evaluation earlier this summer prior to foot surgery.  She comes in today with a somewhat unusual story of back pain.  She actually tells me this started almost a year ago and is just to the right lateral side of the thoracic spine.  The pain is occasionally radiated around to the right side of the chest wall.  She denies ever having seen a rash in the area.  The pain has continued off and on since that time.  She did not mention this at the time of her preoperative evaluation.  She comes in now, however, because over the past 1 month the pain has become significantly worse.  It does not hurt with coughing or deep breathing and she has not felt short of breath.  But if she is up and moving around at times it would be fairly severe pain to the right of the thoracic spine with radiation around the right chest wall.  It apparently has not kept her from doing her usual job.  Otherwise she has been doing well and offers no other complaints.    ROS A comprehensive review of systems was performed and was otherwise negative    Medications, allergies, and problem list were reviewed and updated    Exam  General Appearance:   On examination her blood pressure is 108/52.  Weight is 148 pounds and height is 64 inches.  BMI is 25.40.    Heart is in a sinus rhythm with a rate of 82 and no ectopy.    Lungs are clear.    There is some tenderness to the right of the thoracic spine at about the T5 or T6 level.  There is no palpable tenderness along the chest wall to the right of that.  No palpable muscle spasm.  No palpable masses.    The patient is alert and  oriented ×3.      Assessment/Plan  1. Back pain  XR Thoracic Spine 2 VWS   2. Chest pain  XR Chest PA and Lateral     Mid back pain at the thoracic level with radiation around the right side of the chest wall.  This could be neuropathic pain but this seems a little unusual for a postherpetic shingles pain.  Examination is unremarkable.  I did discuss this with patient at length and suggested we start by doing a thoracic spine x-ray as well as a chest x-ray.  Once these reports are back I will call her and we will decide what our next step needs to be.  She seems comfortable with this approach we will be following up with her within the next day or 2.    Total time of this office visit was 25 minutes with greater than 50% of the time spent in care coordination and patient counseling.      Sumanth Patel MD      Current Outpatient Prescriptions on File Prior to Visit   Medication Sig     cholecalciferol, vitamin D3, 1,000 unit tablet Take 1,000 Units by mouth daily.     citalopram (CELEXA) 40 MG tablet TAKE 1/2 TABLET BY MOUTH EVERY DAY     estrogens, conjugated,-methylTESTOSTERone (ESTRATEST HS) 0.625-1.25 mg per tablet Take 1 tablet by mouth daily.     furosemide (LASIX) 40 MG tablet TAKE 1 TABLET BY MOUTH EVERY OTHER DAY AS NEEDED FOR SWELLING     hydroCHLOROthiazide (MICROZIDE) 12.5 mg capsule Take 1 capsule (12.5 mg total) by mouth daily.     levothyroxine (SYNTHROID, LEVOTHROID) 75 MCG tablet TAKE 1 TABLET BY MOUTH EVERY DAY     oxyCODONE-acetaminophen (PERCOCET) 5-325 mg per tablet Take 1 tablet by mouth every 4 (four) hours as needed for pain.     simvastatin (ZOCOR) 40 MG tablet TAKE 1/2 TABLET BY MOUTH EVERY DAY     warfarin (COUMADIN) 2.5 MG tablet Take 2 to 3 tablets (5 to 7.5 mg) by mouth daily. Adjust dose based on INR results as directed.     levothyroxine (SYNTHROID, LEVOTHROID) 75 MCG tablet TAKE 1 TABLET BY MOUTH EVERY DAY     levothyroxine (SYNTHROID, LEVOTHROID) 75 MCG tablet TAKE 1 TABLET BY  MOUTH EVERY DAY     warfarin (COUMADIN) 2.5 MG tablet TAKE UP TO 2 TABLETS BY MOUTH DAILY AS DIRECTED BY [facility] CLINIC     No current facility-administered medications on file prior to visit.      Allergies   Allergen Reactions     Meperidine Other (See Comments)     hallucinations      Sulfa (Sulfonamide Antibiotics) Itching and Rash     Social History   Substance Use Topics     Smoking status: Never Smoker     Smokeless tobacco: Never Used     Alcohol use Yes      Comment: twice per year

## 2021-06-13 NOTE — TELEPHONE ENCOUNTER
Anticoagulation Management    Discussed INR home monitoring program with Bisi reviewing:      [employer] requirements: >= 3 months of anticoagulation therapy, indication for chronic anticoagulation and order from provider    Required testing frequency (q1-2 weeks)    Home meters, testing supplies, meter training, and reporting of INR results done through an outside company. Patient would be contacted by home monitoring company to review insurance coverage with home monitoring company prior to enrolling.    [facility].    Home monitoring application may take several weeks and must continue to follow up with recommended INR monitoring in clinic until receives monitor and training completed.     Home monitoring terms reviewed with patient      Patient agrees to frequency of testing as directed by referring provider ( weekly or biweekly) Yes    Testing to be performed during business hours of [facility] Yes    Patient agrees they have the skill ( or a designated caregiver) necessary to perform the self test Yes    Patient agrees to report all INR results to INR home monitoring company Yes    Patient agrees to have additional INR test in clinic if a home result is critical Yes    Patient agrees to schedule an INR test at a [facility] clinic yearly for technique observation and quality check of INR results with their home meter Yes    Patient agrees to use a [facility] approved service provider and device for home monitoring Yes          Tammy Law is interested home INR monitoring and requests order be submitted.        Andree Rdz RN

## 2021-06-13 NOTE — TELEPHONE ENCOUNTER
ANTICOAGULATION  MANAGEMENT    Assessment     Today's INR result of 2.80 is Therapeutic (goal INR of 2.0 - 3.5)        Warfarin recently held as instructed which may be affecting INR    - held warfarin on 11/10, as directed.    No new diet changes affecting INR    No new medication/supplements affecting INR    Continues to tolerate warfarin with no reported s/s of bleeding or thromboembolism     Previous INR was Supratherapeutic at 4.60 on 11/10/20.    Plan:     Left detailed message for Bisi  regarding INR result and instructed:     Warfarin Dosing Instructions:    - Continue current warfarin dose 5 mg daily.    Instructed patient to follow up no later than:  2 wks.    Education provided: importance of consistent vitamin K intake and target INR goal and significance of current INR result    Instructed to call the [facility] for any changes, questions or concerns. (#[phone])   ?   Andree Rdz RN    Subjective/Objective:      Tammy Law, a 65 y.o. female is on warfarin.     Tammy reports:     Home warfarin dose: as updated on anticoagulation calendar per template     Missed doses: No     Medication changes:  No     S/S of bleeding or thromboembolism:  No     New Injury or illness:  No     Changes in diet or alcohol consumption:  No     Upcoming surgery, procedure or cardioversion:  No    Anticoagulation Episode Summary     Current INR goal:  2.0-3.5   TTR:  86.5 % (1 y)   Next INR check:  12/3/2020   INR from last check:  2.80 (11/19/2020)   Weekly max warfarin dose:     Target end date:     INR check location:     Preferred lab:     Send INR reminders to:  [facility]    Indications    H/O mechanical aortic valve replacement [Z95.2]           Comments:           Anticoagulation Care Providers     Provider Role Specialty Phone number    Arnold Anderson MD Referring Internal Medicine [phone]

## 2021-06-13 NOTE — TELEPHONE ENCOUNTER
ANTICOAGULATION  MANAGEMENT    Assessment     Today's INR result of 2.20 is Therapeutic (goal INR of 2.0 - 3.5)        Warfarin taken as previously instructed    No new diet changes affecting INR    No new medication/supplements affecting INR    Continues to tolerate warfarin with no reported s/s of bleeding or thromboembolism     Previous INR was Therapeutic at 2.80 on 11/19/20.    Plan:     Spoke on phone with Bisi regarding INR result and instructed:   - Bisi is interested in obtaining an INR home monitor.  Will get her enrolled to get one.    Warfarin Dosing Instructions:    - Continue current warfarin dose 5 mg daily.    Instructed patient to follow up no later than:  4 wks.    Education provided: importance of consistent vitamin K intake, target INR goal and significance of current INR result, importance of notifying clinic for changes in medications, monitoring for bleeding signs and symptoms, when to seek medical attention/emergency care and importance of notifying clinic for diarrhea, nausea/vomiting, reduced intake and/or illness    Bisi verbalizes understanding and agrees to warfarin dosing plan.    Instructed to call the [facility] for any changes, questions or concerns. (#[phone])   ?   Andree Rdz RN    Subjective/Objective:      Tammy Law, a 65 y.o. female is on warfarin.     Tammy reports:     Home warfarin dose: as updated on anticoagulation calendar per template     Missed doses: No     Medication changes:  No     S/S of bleeding or thromboembolism:  No     New Injury or illness:  No     Changes in diet or alcohol consumption:  No     Upcoming surgery, procedure or cardioversion:  No    Anticoagulation Episode Summary     Current INR goal:  2.0-3.5   TTR:  86.5 % (1 y)   Next INR check:  1/13/2021   INR from last check:  2.20 (12/16/2020)   Weekly max warfarin dose:     Target end date:     INR check location:     Preferred lab:     [facility]    Indications    H/O mechanical aortic valve replacement [Z95.2]           Comments:           Anticoagulation Care Providers     Provider Role Specialty Phone number    Arnold Anderson MD Referring Internal Medicine [phone]

## 2021-06-14 NOTE — TELEPHONE ENCOUNTER
Anticoagulation Management    Unable to reach Tammy today.    Today's INR result of 2.4 is Therapeutic (goal INR of 2.0-3.5).     Follow up required to follow with changes after OV today.    Left message to continue current dose of warfarin 5 mg tonight.       ACN to follow up    Carolynn Lucas RN

## 2021-06-14 NOTE — PROGRESS NOTES
Office Visit - Physical   Tammy Law   65 y.o.  female    Date of visit: 1/25/2021  Physician: Paulette Lopez MD     Assessment and Plan   1. Routine general medical examination at a health care facility  She has a normal examination today.  We will go ahead and set up her mammogram.  She is otherwise up-to-date on age-appropriate health maintenance.  - Mammo Screening Bilateral; Future    2. H/O mechanical aortic valve replacement  She has done well and is on Coumadin.  I do think that she would benefit from a home monitoring system and I have signed an order for that.    3. Acquired hypothyroidism  - Thyroid Cascade    4. Dizziness  She has been having some mild dizziness and her blood pressure is little low today.  Going to have her stop the hydrochlorothiazide and continue to monitor her blood pressure.  We will also check blood work today.    5. Abdominal discomfort  She gets some occasional abdominal pain that could be gallbladder related.  We will check her ultrasound and her labs.  - HM2(CBC w/o Differential)  - Basic Metabolic Panel  - Hepatic Profile  - Lipase  - Amylase  - US Abdomen Limited; Future          No follow-ups on file.     Chief Complaint   Chief Complaint   Patient presents with     Annual Exam     Pt is not fasting        Patient Profile   Social History     Social History Narrative     Not on file        Past Medical History   Patient Active Problem List   Diagnosis     Congenital Nystagmus     H/O mechanical aortic valve replacement     Acquired hypothyroidism     Cardiac pacemaker in situ, dual chamber     Epigastric pain     SSS (sick sinus syndrome) (H)       Past Surgical History  She has a past surgical history that includes pr rplcmt prost aortic valve open xcp homogrf/stent; Breast biopsy (Left, y-6); Breast cyst excision; Hemmorhoidectomy; Tympanostomy tube placement; Carpal tunnel release (Bilateral); Hysterectomy (2000); Oophorectomy (2000); Toe Surgery; Cardiac  catheterization; Cardiac pacemaker placement; and pr part remv bone metatarsal head,ea (Right, 6/23/2017).     History of Present Illness   This 65 y.o. old comes in for general exam.  She is going to be due for mammogram.  We reviewed age-appropriate health maintenance and she is otherwise up-to-date.  She has a history of an aortic valve replacement and has not had any problems with chest pain or palpitations.  She would like to get a home INR monitoring system which I think would be beneficial.  Her blood pressure is a little bit on the low side today and she does have some episodes of dizziness.  She is on hydrochlorothiazide and I think she could stop it.  Recommend she monitor her blood pressure outside the clinic.  She also occasionally gets right upper quadrant abdominal pain.  It comes on after eating.  It lasts about an hour or so.  Wonders about her gallbladder.  She has no other acute concerns today.    Review of Systems: A comprehensive review of systems was negative except as noted.     Medications and Allergies   Current Outpatient Medications   Medication Sig Dispense Refill     cholecalciferol, vitamin D3, 1,000 unit (25 mcg) tablet Take 2,000 Units by mouth daily.       citalopram (CELEXA) 40 MG tablet Take 0.5 tablets (20 mg total) by mouth daily. 45 tablet 3     furosemide (LASIX) 40 MG tablet Take 1 tablet (40 mg total) by mouth daily. (Patient taking differently: Take 40 mg by mouth as needed. ) 90 tablet 3     levothyroxine (SYNTHROID, LEVOTHROID) 75 MCG tablet Take 1 tablet (75 mcg total) by mouth daily. 90 tablet 3     simvastatin (ZOCOR) 20 MG tablet Take 1 tablet (20 mg total) by mouth daily. 90 tablet 3     warfarin ANTICOAGULANT (COUMADIN/JANTOVEN) 2.5 MG tablet Take 2 tablets (5mg) to 3 tablets (7.5mg) by mouth daily, as directed.  Adjust dose based on INR results. 190 tablet 3     amoxicillin (AMOXIL) 500 MG capsule Take 2,000 mg by mouth as needed (Before Dental appointments).          "0     No current facility-administered medications for this visit.      Allergies   Allergen Reactions     Meperidine Other (See Comments)     hallucinations      Sulfa (Sulfonamide Antibiotics) Itching and Rash        Family and Social History   Family History   Problem Relation Age of Onset     Cancer Paternal Grandfather         Stomach     Ovarian cancer Cousin      Pacemaker Mother      Diabetes Mother      Heart attack Father      No Medical Problems Sister      No Medical Problems Daughter      No Medical Problems Maternal Grandmother      No Medical Problems Maternal Grandfather      No Medical Problems Paternal Grandmother      No Medical Problems Maternal Aunt      No Medical Problems Paternal Aunt      BRCA 1/2 Neg Hx      Breast cancer Neg Hx      Colon cancer Neg Hx      Endometrial cancer Neg Hx         Social History     Tobacco Use     Smoking status: Never Smoker     Smokeless tobacco: Never Used   Substance Use Topics     Alcohol use: Yes     Comment: twice per year     Drug use: No        Physical Exam   General Appearance:   This is an alert female, sitting comfortably, no apparent distress.    BP 90/58 (Patient Site: Left Arm, Patient Position: Sitting)   Pulse 72   Temp 97.4  F (36.3  C)   Ht 5' 4\" (1.626 m)   Wt 136 lb (61.7 kg)   LMP 12/29/1999   SpO2 98%   BMI 23.34 kg/m      HEENT:  Normocephalic, atraumatic.    NECK: Supple with no lymphadenopathy.  Thyroid was normal.  RESPIRATORY: Normal respiratory effort.  Lungs were clear to ausculation both anteriorly and posteriorly.  No wheezes or rales were detected.   CARDIOVASCULAR: Heart rate and rhythm were normal.  Mechanical S1 and S2 were normal and there were no extra sounds or murmurs. Dorsalis pedis pulses were normal.  Jugular venous pressure was normal.  There was no peripheral edema.  GASTROINTESTINAL:  Bowel sounds were present.  Abdomen was soft, nontender, nondistended, with no organomegaly detected.  No rebound or guarding. "  I am not able to reproduce her abdominal pain.  MUSCULOSKELETAL: Skeletal configuration was normal and muscle mass was normal for age. Joint appearance was overall normal.  LYMPHATIC: There were no enlarged nodes.  SKIN/HAIR/NAILS: Skin was warm and well perfused.  There were no skin lesions or rashes noted.  Hair and nails were normal.  NEUROLOGIC: The patient was alert and oriented to person, place, time, and circumstance. Speech was normal. Cranial nerves were normal. No focal defecits were noted.  PSYCHIATRIC:  Mood and affect were normal and the patient had normal recent and remote memory. The patient's judgment and insight were normal.  CHEST WALL/BREASTS: Normal breast tissue with no masses or abnormalities.       Additional Information        Paulette Lopez MD  Internal Medicine  Contact me at [phone]

## 2021-06-14 NOTE — TELEPHONE ENCOUNTER
ANTICOAGULATION  MANAGEMENT    Assessment     Today's INR result of 2.40 is Therapeutic (goal INR of 2.0 - 3.5)        Warfarin taken as previously instructed    No new diet changes affecting INR    No new medication/supplements affecting INR    Continues to tolerate warfarin with no reported s/s of bleeding or thromboembolism     Previous INR was Therapeutic at 2.20 on 12/16/20.    Transferred care with Dr. Paulette Lopez @ [facility].  (former patient of Dr. Anderson)    Plan:     Left detailed message for Bisi regarding INR result and instructed:     Warfarin Dosing Instructions:    - Continue current warfarin dose 5 mg daily.    Instructed patient to follow up no later than:  4 wks.    Education provided: importance of consistent vitamin K intake and target INR goal and significance of current INR result    Instructed to call the [facility] for any changes, questions or concerns. (#[phone])   ?   Andree Rdz RN    Subjective/Objective:      Tammy Law, a 65 y.o. female is on warfarin.     Tammy reports:     Home warfarin dose: as updated on anticoagulation calendar per template     Missed doses: No     Medication changes:  No     S/S of bleeding or thromboembolism:  No     New Injury or illness:  No     Changes in diet or alcohol consumption:  No     Upcoming surgery, procedure or cardioversion:  No    Anticoagulation Episode Summary     Current INR goal:  2.0-3.5   TTR:  86.5 % (1 y)   Next INR check:  2/22/2021   INR from last check:  2.40 (1/25/2021)   Weekly max warfarin dose:     Target end date:     INR check location:     Preferred lab:     Send INR reminders to:  [facility]    Indications    H/O mechanical aortic valve replacement [Z95.2]           Comments:           Anticoagulation Care Providers     Provider Role Specialty Phone number    Arnold Anderson MD Referring Internal Medicine [phone]

## 2021-06-15 NOTE — TELEPHONE ENCOUNTER
ANTICOAGULATION  MANAGEMENT    Assessment     Today's INR result of 2.00 is Therapeutic (goal INR of 2.0 - 3.5)        Warfarin taken as previously instructed    No new diet changes affecting INR    No new medication/supplements affecting INR    Continues to tolerate warfarin with no reported s/s of bleeding or thromboembolism     Previous INR was Therapeutic at 2.40 on 1/25/21.    Still waiting for home monitor.  [employer] has received her information and checking with Insurance and Demographics.    Plan:     Spoke on phone with Bisi regarding INR result and instructed:      Warfarin Dosing Instructions:    - Continue current warfarin dose 5 mg daily.    Instructed patient to follow up no later than: 4 wks.    Education provided: importance of consistent vitamin K intake and target INR goal and significance of current INR result    Bisi verbalizes understanding and agrees to warfarin dosing plan.    Instructed to call the [facility] for any changes, questions or concerns. (#[phone])   ?   Andree Rdz RN    Subjective/Objective:      Tammy Law, a 65 y.o. female is on warfarin. Bisi Mathews reports:     Home warfarin dose: as updated on anticoagulation calendar per template     Missed doses: No     Medication changes:  No     S/S of bleeding or thromboembolism:  No     New Injury or illness:  No     Changes in diet or alcohol consumption:  No     Upcoming surgery, procedure or cardioversion:  No    Anticoagulation Episode Summary     Current INR goal:  2.0-3.5   TTR:  86.5 % (1 y)   Next INR check:  4/7/2021   INR from last check:  2.00 (3/10/2021)   Weekly max warfarin dose:     Target end date:     INR check location:     Preferred lab:     Send INR reminders to:  [facility]    Indications    H/O mechanical aortic valve replacement [Z95.2]           Comments:           Anticoagulation Care Providers     Provider Role Specialty Phone number    Arnold Anderson MD Referring  Internal Medicine [phone]

## 2021-06-16 ENCOUNTER — COMMUNICATION - HEALTHEAST (OUTPATIENT)
Dept: ANTICOAGULATION | Facility: CLINIC | Age: 66
End: 2021-06-16

## 2021-06-16 ENCOUNTER — TRANSFERRED RECORDS (OUTPATIENT)
Dept: HEALTH INFORMATION MANAGEMENT | Facility: CLINIC | Age: 66
End: 2021-06-16

## 2021-06-16 DIAGNOSIS — Z95.2 H/O MECHANICAL AORTIC VALVE REPLACEMENT: ICD-10-CM

## 2021-06-16 LAB — INR PPP: 2.3 (ref 0.9–1.1)

## 2021-06-16 NOTE — TELEPHONE ENCOUNTER
ANTICOAGULATION  MANAGEMENT-Patient Home Monitoring Result    Assessment     Therapeutic INR result of 2.0 (goal INR of 2.0 - 3.5) received via fax from [employer] home INR monitoring company.        Previous INR was Therapeutic at 2.70 on 3/31/21.    Tammy Law last contacted by phone: 3/24/21.    Plan     Per home monitoring agreement with patient, patient was NOT contacted regarding therapeutic result today.  Patient is to continue current dose and continue to check INR with home monitor per protocol.  ?   Andree Rdz RN    Subjective/Objective:      Tammy Law, a 65 y.o. female  is established on warfarin using a home INR monitor.    Anticoagulation Episode Summary     Current INR goal:  2.0-3.5   TTR:  86.5 % (1 y)   Next INR check:  4/28/2021   INR from last check:  2.00 (4/14/2021)   Weekly max warfarin dose:     Target end date:     INR check location:     Preferred lab:     Send INR reminders to:  [facility]    Indications    H/O mechanical aortic valve replacement [Z95.2]           Comments:           Anticoagulation Care Providers     Provider Role Specialty Phone number    Arnold Anderson MD Referring Internal Medicine [phone]

## 2021-06-16 NOTE — PATIENT INSTRUCTIONS - HE
1) Take miralax 1 capful daily until you have a large or loose stool.  Let us know if your right abdominal pain does not improve after you have this.

## 2021-06-16 NOTE — TELEPHONE ENCOUNTER
ANTICOAGULATION  MANAGEMENT PROGRAM    Tammy Law is overdue for INR check.     Left message to check INR with home meter and call results to home monitoring company as soon as possible.      Andree Rdz RN

## 2021-06-16 NOTE — TELEPHONE ENCOUNTER
ANTICOAGULATION  MANAGEMENT    Assessment     Today's INR result of 2.00 is Therapeutic (goal INR of 2.0-3.5)        Warfarin taken as previously instructed    No new diet changes affecting INR    No new medication/supplements affecting INR    Continues to tolerate warfarin with no reported s/s of bleeding or thromboembolism     Previous INR was Therapeutic at 2.00 on 3/10/21    Just received her home monitor with education and 1st INR testing today.     Plan:     Spoke on phone with Bisi regarding INR result and instructed:      Warfarin Dosing Instructions:    - Continue current warfarin dose 5 mg daily.    Instructed patient to follow up no later than:  One wk.   - will check INR wkly for now, till she feels comfortable checking INR herself.    Education provided: importance of consistent vitamin K intake, target INR goal and significance of current INR result and importance of notifying clinic for changes in medications    Bisi verbalizes understanding and agrees to warfarin dosing plan.    Instructed to call the [facility] for any changes, questions or concerns. (#[phone])   ?   Andree Rdz RN    Subjective/Objective:      Tammy SEGOVIA Ani, a 65 y.o. female is on warfarin. Bisi Mathews reports:     Home warfarin dose: as updated on anticoagulation calendar per template     Missed doses: No     Medication changes:  No     S/S of bleeding or thromboembolism:  No     New Injury or illness:  No     Changes in diet or alcohol consumption:  No     Upcoming surgery, procedure or cardioversion:  No    Anticoagulation Episode Summary     Current INR goal:  2.0-3.5   TTR:  86.5 % (1 y)   Next INR check:  3/26/2021   INR from last check:  2.00 (3/19/2021)   Weekly max warfarin dose:     Target end date:     INR check location:     Preferred lab:     Send INR reminders to:  [facility]    Indications    H/O mechanical aortic valve replacement [Z95.2]           Comments:           Anticoagulation  Care Providers     Provider Role Specialty Phone number    Arnold Anderson MD Referring Internal Medicine [phone]

## 2021-06-16 NOTE — PROGRESS NOTES
"    Assessment & Plan     Tammy was seen today for hip pain.    Diagnoses and all orders for this visit:    Hip pain, right  -     XR Pelvis W 2 Vw Hips Bilateral; Future  -     Ambulatory referral to Adult PT- Internal  I reviewed the x-ray personally with the patient.  Her hip joints appear to be normal without fracture.  No bone lesions noted, which was one of her main concerns.  I did discuss that likely this is muscular in nature.  I will refer her to physical therapy.  I discussed if her symptoms worsen or do not improve, to notify our clinic.  I did recommend Tylenol for pain which should be compatible with Coumadin.    Slow transit constipation  Incidentally on the x-ray, patient's colon did appear to be full of stool.  This could account for her mid abdominal pain.  I did recommend MiraLAX daily until she has a large or loose stool.  After that I did recommend starting on daily Benefiber.         Return in about 2 weeks (around 4/22/2021) for if symptoms worsen or do not improve..    Kinza Hammond MD  [facility]    Subjective   Tammy Law is 65 y.o. and presents today for the following health issues   HPI   1) Right hip pain:  Problems with right hip \"for awhile\" but in the past week, now going down the leg and aches. There is no injury that she can recall.  Prolonged sitting or standing can hurt. Putting her shoe on hurts.  Cousin had bone cancer in his hip.  No swelling or locking, no pain with positional changes.  Sleep is okay.  She cannot take NSAIDs due to being on coumadin for her AVR.    Patient Active Problem List    Diagnosis Date Noted     SSS (sick sinus syndrome) (H) 11/02/2020     Epigastric pain      Cardiac pacemaker in situ, dual chamber 10/21/2016     Acquired hypothyroidism      H/O mechanical aortic valve replacement 11/04/2014     Congenital Nystagmus      Current Outpatient Medications   Medication Instructions     amoxicillin (AMOXIL) 2,000 " mg, Oral, As needed     cholecalciferol (vitamin D3) 2,000 Units, Oral, DAILY     citalopram (CELEXA) 20 mg, Oral, DAILY     furosemide (LASIX) 40 mg, Oral, DAILY     levothyroxine (SYNTHROID, LEVOTHROID) 75 mcg, Oral, DAILY     simvastatin (ZOCOR) 20 mg, Oral, DAILY     warfarin ANTICOAGULANT (COUMADIN/JANTOVEN) 2.5 MG tablet Take 2 tablets (5mg) to 3 tablets (7.5mg) by mouth daily, as directed.  Adjust dose based on INR results.           Objective    /62 (Patient Site: Left Arm, Patient Position: Sitting, Cuff Size: Adult Regular)   Pulse 79   Wt 138 lb 5 oz (62.7 kg)   LMP 12/29/1999   SpO2 97%   BMI 23.74 kg/m    Body mass index is 23.74 kg/m .  Physical Exam  Gen:  A+O x 3, NAD  MSK: Right hip  Inspection: No swelling or redness  Palpation: There is no pain to palpation of the lateral trochanteric bursa.  She does have pain of the right tensor fascia александр anterior to the right trochanter.  She also has pain along the iliopsoas.  Patient also has pain at the mid right abdomen.  This is not near McBurney's point.  No evidence of hernia.  Range of motion: Normal hip flexion and extension.  She does have pain in the spaces indicated above when she is flexing against resistance.  Normal internal and external rotation.        EXAM: XR PELVIS W 2 VW HIPS BILATERAL  LOCATION: [facility]  DATE/TIME: 4/8/2021 6:00 PM     INDICATION: Right sided hip pain radiating to lateral thigh and mid abdomen.  COMPARISON: None.     IMPRESSION:   Normal joint spaces and alignment. No fracture. Symphyseal and SI joints appear normal

## 2021-06-16 NOTE — TELEPHONE ENCOUNTER
ANTICOAGULATION  MANAGEMENT    Assessment     Today's INR result of 2.50 is Therapeutic (goal INR of 2.0 - 3.5)        Warfarin taken as previously instructed    No new diet changes affecting INR    No new medication/supplements affecting INR    Continues to tolerate warfarin with no reported s/s of bleeding or thromboembolism     Previous INR was Therapeutic at 2.00 on 3/19/21.    Plan:     Spoke on phone with Bisi regarding INR result and instructed:   - if Bisi feels comfortable in checking INR, OK to go every 2 wks on Wednesdays on next INR.      Warfarin Dosing Instructions:    - Continue current warfarin dose 5 mg daily.    Instructed patient to follow up no later than:  One wk with home INR monitor thru [employer].    Education provided: importance of consistent vitamin K intake and target INR goal and significance of current INR result    Bisi verbalizes understanding and agrees to warfarin dosing plan.    Instructed to call the [facility] for any changes, questions or concerns. (#[phone])   ?   Andree Rdz RN    Subjective/Objective:      Tammy SEGOVIA Ani, a 65 y.o. female is on warfarin. Bisi Mathews reports:     Home warfarin dose: verbally confirmed home dose with Bisi and updated on anticoagulation calendar     Missed doses: No     Medication changes:  No     S/S of bleeding or thromboembolism:  No     New Injury or illness:  No     Changes in diet or alcohol consumption:  No     Upcoming surgery, procedure or cardioversion:  No    Anticoagulation Episode Summary     Current INR goal:  2.0-3.5   TTR:  86.5 % (1 y)   Next INR check:  3/31/2021   INR from last check:  2.50 (3/24/2021)   Weekly max warfarin dose:     Target end date:     INR check location:     Preferred lab:     Send INR reminders to:  [facility]    Indications    H/O mechanical aortic valve replacement [Z95.2]           Comments:           Anticoagulation Care Providers     Provider Role Specialty Phone number     Arnold Anderson MD [facility] Internal Medicine [phone]

## 2021-06-16 NOTE — TELEPHONE ENCOUNTER
INR result is 2.0  INR   Date Value Ref Range Status   03/31/2021 2.70 (!) 0.90 - 1.10 Final       Will the patient be seen, or did they already see, MD or CNP today? No    Most Recent Warfarin dose day/week  Sunday Monday Tuesday Wednesday Thursday Friday Saturday      5 mg 5 mg 5 mg 5 mg     Sunday Monday Tuesday Wednesday Thursday Friday Saturday   missed 5 mg 5 mg           Has the patient missed any doses of Coumadin, Warfarin, Jantoven in the past 7 days? Yes 4/11    Has the patients medications changed since the last visit? No    Has the patient experienced any bleeding recently? No    Has the patient experienced any injuries or illness recently? No    Has the patient experienced any 'new' shortness of breath, severe headaches, or changes in vision recently? No    Has the patient had any changes in their diet, or alcohol consumption? No    Is the patient here today to prepare for any type of upcoming surgery, procedure, or for a cardioversion procedure? No    What phone number can we reach the patient at today? [phone] [place].

## 2021-06-16 NOTE — TELEPHONE ENCOUNTER
Called and spoke to patient, reviewed call by exception for home monitoring.     Patient verbalized understanding and had no other questions.

## 2021-06-16 NOTE — TELEPHONE ENCOUNTER
Received a faxed INR result for Tammy Law  From [facility]  INR result dated 4/21/2021 is 2.1

## 2021-06-16 NOTE — TELEPHONE ENCOUNTER
Received a faxed INR result for Tammy Law  From [facility]  INR result dated 3/24/2021 is 2.5

## 2021-06-16 NOTE — TELEPHONE ENCOUNTER
Incoming fax received from [employer].  INR result of 2.0 on 3/19/2021.    Appears this is Bisi's first home monitor result.

## 2021-06-16 NOTE — PROGRESS NOTES
SUBJECTIVE       Tammy Law is a 65 y.o.  female with a PMH significant for:     Patient Active Problem List   Diagnosis     Congenital Nystagmus     H/O mechanical aortic valve replacement     Acquired hypothyroidism     Cardiac pacemaker in situ, dual chamber     Epigastric pain     SSS (sick sinus syndrome) (H)     She presents to establish care.    Patient is due for tetanus shot and would like to complete this today.    Patient is mildly concerned about her weight.  Patient notes she recently lost a few pounds.  Over the last couple days she has gained this back.  Patient notes no change in her eating habits.  Patient does feel little puffy overall.  Here are her last weights.    Wt Readings from Last 3 Encounters:   04/23/21 140 lb 9.6 oz (63.8 kg)   04/08/21 138 lb 5 oz (62.7 kg)   01/25/21 136 lb (61.7 kg)     Patient's TSH was normal on last check.  Advised that a change in weight of 5 to 10 pounds can be normal.  This may be due to fluctuations in water and salt.  Patient has no history of heart failure.  Patient denies any swelling or difficulty breathing upon laying.  Advised that we can continue to watch this.  Her weight is currently within normal limits.    Patient has no other acute concerns today    PMHx, PSHx, Social Hx, Family Hx, Medications, and Allergies reviewed and updated in chart as needed.         REVIEW OF SYSTEMS     Pertinent items are noted in HPI.        OBJECTIVE     Vitals:    04/23/21 1239   BP: 110/60   Patient Site: Left Arm   Patient Position: Sitting   Cuff Size: Adult Regular   Pulse: 80   Temp: 98.1  F (36.7  C)   TempSrc: Tympanic   SpO2: 98%   Weight: 140 lb 9.6 oz (63.8 kg)     Body mass index is 24.13 kg/m .    Constitutional: Awake, alert, cooperative, no apparent distress, and appears stated age.  Eyes: Lids and lashes normal, sclera clear, conjunctiva normal.  ENT: Normocephalic, without obvious abnormality, atramatic,   Lungs: No increased work of  breathing, good air exchange, clear to auscultation bilaterally, no crackles or wheezing.  Cardiovascular: Regular rate and rhythm, systolic murmur with harsh S2 best auscultated over pulmonic pole, and no murmur noted.  Abdomen: Normal bowel sounds, soft, non-distended, non-tender, no masses palpated, no hepatosplenomegally.  Musculoskeletal: No redness, warmth, or swelling of the joints.  Full range of motion noted.   Neurologic: Awake, alert, oriented to name, place and time.  Cranial nerves II-XII are grossly intact and gait is normal.    Results for orders placed or performed in visit on 04/21/21   INR   Result Value Ref Range    INR 2.10 (!) 0.90 - 1.10       ASSESSMENT AND PLAN     Tammy was seen today for establish care and medication management.    Diagnoses and all orders for this visit:    Establishing care with new doctor, encounter for: Offered reassurance regarding patient's weight.  Patient due for Tdap but otherwise up-to-date with all other screenings.  Patient to return for routine preventative in January.    Other orders  -     Td, Preservative Free (green label)    Christina Hernandez DO

## 2021-06-16 NOTE — TELEPHONE ENCOUNTER
ANTICOAGULATION  MANAGEMENT-Patient Home Monitoring Result    Assessment     Therapeutic INR result of 2.7 (goal INR of 2.0-3.5) received via fax from [employer] home INR monitoring company.        Previous INR was Therapeutic    Tammy Law last contacted by phone: 3/24/21    Plan     Per home monitoring agreement with patient, patient was NOT contacted regarding therapeutic result today.  Patient is to continue current dose and continue to check INR with home monitor per protocol.  ?   Agnieszka Ruth RN    Subjective/Objective:      Tammy Law, a 65 y.o. female  is established on warfarin using a home INR monitor.    Anticoagulation Episode Summary     Current INR goal:  2.0-3.5   TTR:  86.5 % (1 y)   Next INR check:  4/7/2021   INR from last check:  2.70 (3/31/2021)   Weekly max warfarin dose:     Target end date:     INR check location:     Preferred lab:     Send INR reminders to:  [facility]    Indications    H/O mechanical aortic valve replacement [Z95.2]           Comments:           Anticoagulation Care Providers     Provider Role Specialty Phone number    Arnold Anderson MD Referring Internal Medicine [phone]

## 2021-06-16 NOTE — TELEPHONE ENCOUNTER
ANTICOAGULATION  MANAGEMENT    Assessment     Today's INR result of 2.1 is Therapeutic (goal INR of 2.0-3.5)  [employer] home monitor result       Warfarin taken as previously instructed    No new diet changes affecting INR    No interaction expected between amoxicillin and warfarin.  Patient took a one dose dose of amoxicillin for dental work       Continues to tolerate warfarin with no reported s/s of bleeding or thromboembolism     Previous INR was Therapeutic    Plan:     Spoke on phone with Bisi regarding INR result and instructed:      Warfarin Dosing Instructions:  Continue current warfarin dose 5 mg daily     Instructed patient to follow up no later than: 1 week with home monitor     Education provided: potential interaction between warfarin and Amoxicillin and importance of notifying clinic for changes in medications    Bisi verbalizes understanding and agrees to warfarin dosing plan.    Instructed to call the [facility] for any changes, questions or concerns. (#[phone])   ?   Marlyn Taveras RN    Subjective/Objective:      Tammy Law, a 65 y.o. female is on warfarin. Bisi Mathews reports:     Home warfarin dose: verbally confirmed home dose with Bisi and updated on anticoagulation calendar     Missed doses: No     Medication changes:  Yes: one time amoxicillin dose      S/S of bleeding or thromboembolism:  No     New Injury or illness:  No     Changes in diet or alcohol consumption:  No     Upcoming surgery, procedure or cardioversion:  No    Anticoagulation Episode Summary     Current INR goal:  2.0-3.5   TTR:  86.5 % (1 y)   Next INR check:  4/28/2021   INR from last check:  2.10 (4/21/2021)   Weekly max warfarin dose:     Target end date:     INR check location:     Preferred lab:     Send INR reminders to:  [facility]    Indications    H/O mechanical aortic valve replacement [Z95.2]           Comments:           Anticoagulation Care Providers     Provider Role Specialty Phone  number    Arnold Anderson MD [facility] Internal Medicine [phone]

## 2021-06-16 NOTE — TELEPHONE ENCOUNTER
Who is calling:  Patient   Reason for Call:  Discuss INR  Date of last appointment with primary care:   Okay to leave a detailed message: Yes    Patient states she never got a call yesterday.

## 2021-06-17 NOTE — TELEPHONE ENCOUNTER
ANTICOAGULATION  MANAGEMENT-Patient Home Monitoring Result    Assessment     Therapeutic INR result of 2.1 (goal INR of 2.0-3.5) received via fax from [employer] home INR monitoring company.        Previous INR was Therapeutic    Tammy Law last contacted by phone: 4/21    Plan     Per home monitoring agreement with patient, patient was NOT contacted regarding therapeutic result today.  Patient is to continue current dose and continue to check INR with home monitor per protocol.  ?   Pamella Sy RN    Subjective/Objective:      Tammy Law, a 65 y.o. female  is established on warfarin using a home INR monitor.    Anticoagulation Episode Summary     Current INR goal:  2.0-3.5   TTR:  86.5 % (1 y)   Next INR check:  5/19/2021   INR from last check:  2.10 (5/5/2021)   Weekly max warfarin dose:     Target end date:     INR check location:     Preferred lab:     Send INR reminders to:  [facility]    Indications    H/O mechanical aortic valve replacement [Z95.2]           Comments:           Anticoagulation Care Providers     Provider Role Specialty Phone number    Arnold Anderson MD Referring Internal Medicine [phone]

## 2021-06-17 NOTE — PROGRESS NOTES
[facility]   Hip Initial Evaluation    Patient Name: Tammy Law  Date of evaluation: 5/26/2021  Referral Diagnosis: hip pain, right  (Right hip pain: suspect psoas and right tensor fascia александр.)  Referring provider: Kinza Hammond MD  Visit Diagnosis:     ICD-10-CM    1. Acute right hip pain  M25.551      Precautions:    Congenital Nystagmus     H/O mechanical aortic valve replacement     Acquired hypothyroidism     Cardiac pacemaker in situ, dual chamber     Epigastric pain     SSS (sick sinus syndrome) (H)     Assessment:   Pt is a 65 y.o. year old female with right hip pain and associated sciatica that started 1 month ago.  Patient has difficulty with sleeping on her right side, sitting and performing sit to stand secondary to generalized hip pain with pain down posterior leg/knee/ankle.  Patient appears motivated to participate in Physical Therapy and present with a good Physical Therapy prognosis for resolution of activities limitations.     Pt. is appropriate for skilled PT intervention as outlined in the Plan of Care (POC).  Pt. is a good candidate for skilled PT services to improve pain levels and function.    Goals:  Pt. will be independent with home exercise program in : 6 weeks  Pt. will report decreased intensity, frequency of : Pain;Comment  Comment:: in right hip <4/10 in 10 weeks    Pt will: put on shoes without difficulty in 10 weeks.  Pt will: sit cross legged on floor without pain >2/10 in 10 weeks.  Pt will: sleep on left hip without pain >1/10 in 12 weeks.    Patient's expectations/goals are realistic.    Barriers to Learning or Achieving Goals:  No Barriers.       Plan / Patient Instructions:        Plan of Care:   Authorization / Certification Start Date: 05/26/21  Authorization / Certification End Date: 08/04/21  Authorization / Certification Number of Visits: 8-10  Patient Related Instruction: Nature of Condition;Treatment plan and rationale;Self Care instruction;Basis  "of treatment;Body mechanics;Next steps;Expected outcome;Precautions  Times per Week: 1  Number of Weeks: 8-10  Number of Visits: 8-10  Discharge Planning: met goals  Therapeutic Exercise: ROM;Stretching;Strengthening  Neuromuscular Reeducation: kinesio tape;posture;core  Equipment: theraband      POC and pathology of condition were reviewed with patient.  Pt. is in agreement with the Plan of Care     Plan for next visit:   Counterstrain   Butterfly stretch   Adductor stretch   HS stretch   Clam   Assess pt sitting in cross legged position   Review HEP.   Ask about ice   Walking program      Subjective:     21: Kinza Hammond MD: Problems with right hip \"for awhile\" but in the past week, now going down the leg and aches. There is no injury that she can recall.  Prolonged sitting or standing can hurt. Putting her shoe on hurts.  Cousin had bone cancer in his hip.  No swelling or locking, no pain with positional changes.  Sleep is okay.    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     Pain started 1 month ago gradual. At times pain feels like it is improving.     Functional limitations are described as occurring with:   Pain with laying on right side - wakes up 2x a night.   Sitting is the worse.   No increase pain with standing or walking.   Sit to stand.     Patient reports benefit from:  nothing  - tried ice but not helpful, tried Tylenol     Objective:      Note: Items left blank indicates the item was not performed or not indicated at the time of the evaluation.    Patient Outcome Measures :    Lower Extremity Functional Scale (_/80): 36     Scores range from 0-80, where a score of 80 represents maximum " function. The minimal clinically important difference is a positive change of 9 points.    Hip Examination  1. Acute right hip pain       Precautions/Restrictions: see above   Involved Side: Right  Posture Observation:      General sitting posture is  fair.  Assistive Device: None  Lumbar Clearing:  Flexion: fingertips to floor- achy with return to standing   SB limited and painful to the R - pain with weight bearing on R   Limited and painful to L as well but not as severe   Rotation WFL     Hip ROM:    Date: 05/26/21     Hip ROM( ) PROM in degrees AROM in degrees AROM in degrees    Right Left Right Left Right Left   Hip Flexion (0-120 ) 120 120       Hip Abduction (0-45 ) 45 - pain with return to neutral  45       Hip External Rotation (0-50 ) 50+ mild pain  50+       Hip Internal Rotation (0-40 ) 40 40       Hip Extension (0-15 )          PROM in degrees PROM in degrees PROM in degrees    Right Left Right Left Right Left   Hip Flexion (0-120 )         Hip Abduction (0-45 )         Hip External Rotation (0-50 )         Hip Internal Rotation (0-40 )         Hip Extension (0-15 )           Hip/Knee Strength     Date: 05/26/21     Hip/Knee Strength (/5) MMT MMT MMT    Right Left Right Left Right Left   Hip Flexion Pain 4 4+       Hip Abduction         Hip Adduction         Hip Extension         Hip External Rotation 4+ 4+       Hip Internal Rotation 4+ 4+       Knee Extension 5 5       Knee Flexion 4+ 4+         Ankle/Foot Strength (/5) MMT MMT MMT    Right Left Right Left Right Left   Dorsiflexion 5 5       Great toe extension: 5/5 on L 4-/5 on R    Palpation: soreness in piriformis in S/L position   SI joint in prone   L2-3, L1-2 spring testing pain   Tightness in QL and lumbar erectors bilaterally.     SLR: + on R at 80 degrees   L 90+ degrees - negative     Hip Special Tests     OA Right (+/-) Left (+/-) Intra Articular Right (+/-) Left (+/-)   Hip Scour   VIRGILIO - SI joint, GINO, labral tear  + -    Test  Cluster  -Hip pain  -Hip IR <15   -Hip Flex <115    FADIR - anterior impingement      Test Cluster  -Painful Hip IR  ->50 years old  -Morning Stiffness <60 min   Passive Supine Rotation Test     Misc. Right (+/-) Left (+/-) Stinchfield Test (SLR Against Resistance) - anterior groin pain      Ely s - prone rectus   DEXRIT - dynamic external rotatory impingement test (flex - wide arc abd, ER, ext)      Taina s - ITB    DIRI - dynamic internal rotatory impingement test (flex- wide arc add, IR, ext)     Trendelenburg   Posterior Rim Impingement     SIJ Right (+/-) Left (+/-) Lateral Rim Impingement      SIJ Compression   Sumanth test (iliopsoas/RF vs TFL)     SIJ Distraction   Other     POSH Test - thigh thrust   Other     Sacral Thrust   Other       Exercises:  Exercise #1: SKTC hold 20-30 seconds 1-2 sets 2-3x a dayu  Comment #1: DKTC hold 20-30 seconds 1-2 sets 2-3x a day  Exercise #2: piriformis stretch in sitting with right knee to contralateral shoulder hold 20-30 seconds 2-3x a day (increased LE pain with pushing knee away)  Comment #2: Figure-4 piriformis stretch in supine hold 20-30 seconds on 1-2 sets  R 2-3x a day  Exercise #3: apply ice to R low back/PSIS area 1x a day 20 min after exercsies    Treatment Today     TREATMENT MINUTES COMMENTS   Evaluation 30 Hip: Patient educated on pathology  Discussed POC   Self-care/ Home management     Manual therapy 8  STM to R hip in S/L: piriformis - general hip mobility    Neuromuscular Re-education     Therapeutic Activity     Therapeutic Exercises 10 Demo/performance of HEP     Gait training     Modality__________________                Total 48    Blank areas are intentional and mean the treatment did not include these items.     PT Evaluation Code: (Please list factors)  Patient History/Comorbidities: see above  Examination: see above  Clinical Presentation: stable  Clinical Decision Making: low    Patient History/  Comorbidities Examination  (body structures and  functions, activity limitations, and/or participation restrictions) Clinical Presentation Clinical Decision Making (Complexity)   No documented Comorbidities or personal factors 1-2 Elements Stable and/or uncomplicated Low   1-2 documented comorbidities or personal factor 3 Elements Evolving clinical presentation with changing characteristics Moderate   3-4 documented comorbidities or personal factors 4 or more Unstable and unpredictable High                Veronica Haider, PT, DPT  5/26/2021  7:04 AM

## 2021-06-17 NOTE — TELEPHONE ENCOUNTER
Telephone Encounter by Agnieszka Ruth RN at 3/31/2020  1:57 PM     Author: Agnieszka Ruth RN Service: -- Author Type: Registered Nurse    Filed: 3/31/2020  2:13 PM Encounter Date: 3/31/2020 Status: Attested    : Agnieszka Ruth RN (Registered Nurse) Cosigner: Arnold Anderson MD at 3/31/2020  2:26 PM    Attestation signed by Arnold Anderson MD at 3/31/2020  2:26 PM    ok                ANTICOAGULATION  MANAGEMENT    Assessment     Today's INR result of 3.5 is Therapeutic (goal INR of 2.0-3.5)        Warfarin taken as previously instructed    No new diet changes affecting INR    No new medication/supplements affecting INR    Continues to tolerate warfarin with no reported s/s of bleeding or thromboembolism     Previous INR was Therapeutic    Plan:     Spoke with Tammy regarding INR result and instructed:     Warfarin Dosing Instructions:  Continue current warfarin dose 7.5 mg daily on Saturdays; and 5 mg daily rest of week  (0 % change)    Instructed patient to follow up no later than: 8 weeks.    Education provided: importance of therapeutic range, importance of following up for INR monitoring at instructed interval and importance of taking warfarin as instructed    Tammy verbalizes understanding and agrees to warfarin dosing plan.    Instructed to call the [facility] for any changes, questions or concerns. (#[phone])   ?   Agnieszka Ruth RN    Subjective/Objective:      Tammy Law, a 64 y.o. female is on warfarin.     Tammy reports:     Home warfarin dose: verbally confirmed home dose with Tammy and updated on anticoagulation calendar     Missed doses: No     Medication changes:  No     S/S of bleeding or thromboembolism:  No     New Injury or illness:  No     Changes in diet or alcohol consumption:  No     Upcoming surgery, procedure or cardioversion:  No    Anticoagulation Episode Summary     Current INR goal:   2.0-3.5   TTR:   100.0 % (1  y)   Next INR check:   5/26/2020   INR from last check:   3.50 (3/31/2020)   Weekly max warfarin dose:      Target end date:      INR check location:      Preferred lab:      Send INR reminders to:   [facility]    Indications    H/O mechanical aortic valve replacement [Z95.2]           Comments:            Anticoagulation Care Providers     Provider Role Specialty Phone number    Arnold Anderson MD Referring Internal Medicine [phone]

## 2021-06-17 NOTE — TELEPHONE ENCOUNTER
Telephone Encounter by Andree Rdz RN at 11/10/2020  4:19 PM     Author: Andree Rdz RN Service: -- Author Type: Registered Nurse    Filed: 11/10/2020  4:34 PM Encounter Date: 11/10/2020 Status: Signed    : Andree Rdz RN (Registered Nurse)       ANTICOAGULATION  MANAGEMENT    Assessment     Today's INR result of 4.60 is Supratherapeutic (goal INR of 2.0 - 3.5)        Warfarin taken as previously instructed    No new diet changes affecting INR    No new medication/supplements affecting INR    Continues to tolerate warfarin with  reported s/sx of BRUISING ON HER LEGS.    Previous INR was Therapeutic at 3.50 on 10/27/20.    Reported has lost 8 lbs in the last 2 mos.    Plan:     Spoke on phone with Bisi regarding INR result and instructed:    1.  Advised to follow safety precautions.    Warfarin Dosing Instructions:    - today, advised to HOLD warfarin dose.   - tomorrow, take one time lower dose with 2.5mg warfarin.   - then change warfarin dose to 5 mg daily.   - (6.7 % change)    Instructed patient to follow up no later than:  1-2 wks.   - INR scheduled on 11/19/20 @ [facility].    Education provided: importance of consistent vitamin K intake, impact of vitamin K foods on INR, target INR goal and significance of current INR result, monitoring for bleeding signs and symptoms and when to seek medical attention/emergency care    Bisi verbalizes understanding and agrees to warfarin dosing plan.    Instructed to call the [facility] for any changes, questions or concerns. (#[phone])   ?   Andree Rdz RN    Subjective/Objective:      Tammy Law, a 65 y.o. female is on warfarin.     Tammy reports:     Home warfarin dose: as updated on anticoagulation calendar per template     Missed doses: No     Medication changes:  No     S/S of bleeding or thromboembolism:  No.  However, bruising on her legs.     New Injury or illness:  No     Changes in diet or alcohol consumption:   No     Upcoming surgery, procedure or cardioversion:  No    Anticoagulation Episode Summary     Current INR goal:  2.0-3.5   TTR:  88.0 % (1 y)   Next INR check:  11/19/2020   INR from last check:  4.60 (11/10/2020)   Weekly max warfarin dose:     Target end date:     INR check location:     Preferred lab:     Send INR reminders to:  [facility]    Indications    H/O mechanical aortic valve replacement [Z95.2]           Comments:           Anticoagulation Care Providers     Provider Role Specialty Phone number    Arnold Anderson MD Referring Internal Medicine [phone]

## 2021-06-17 NOTE — TELEPHONE ENCOUNTER
Telephone Encounter by Andree Rdz RN at 4/22/2020  9:07 AM     Author: Andree Rdz RN Service: -- Author Type: Registered Nurse    Filed: 4/22/2020  9:11 AM Encounter Date: 4/22/2020 Status: Attested    : Andree Rdz RN (Registered Nurse) Cosigner: Sumanth Looney MD at 4/22/2020  9:39 AM    Attestation signed by Sumanth Looney MD at 4/22/2020  9:39 AM    [facility]                Anticoagulation Annual Referral Renewal Review    Tammy Law's chart reviewed for annual renewal of referral to anticoagulation monitoring.        Criteria for anticoagulation nurse and/or pharmacist renewal met   Warfarin indication: Mechanical AVR Yes, per indication   Current with INR monitoring/compliant Yes Yes   Date of last office visit 07/09/2019 Yes, had office visit within last year   Time in Therapeutic Range (TTR) 100.0 % Yes, TTR > 60%       Tammy Law met all criteria for anticoagulation management program initiated renewal.  New INR standing orders and anticoagulation referral renewal placed.      Andree Rdz RN  9:07 AM

## 2021-06-17 NOTE — TELEPHONE ENCOUNTER
Telephone Encounter by Andree Rdz RN at 5/15/2020  2:17 PM     Author: Andree Rdz RN Service: -- Author Type: Registered Nurse    Filed: 5/15/2020  3:20 PM Encounter Date: 5/15/2020 Status: Attested    : Andree Rdz RN (Registered Nurse) Cosigner: Paulette Lopez MD at 5/17/2020  1:34 PM    Attestation signed by Paulette Lopez MD at 5/17/2020  1:34 PM    As above                ANTICOAGULATION  MANAGEMENT    Assessment     Today's INR result of 2.40 is Therapeutic (goal INR of 2.0 - 3.5)        Warfarin taken as previously instructed    No new diet changes affecting INR    Potential interaction between Prilosec and warfarin which may affect subsequent INRs    - has been on Prilosec for the past 3 wks.    Continues to tolerate warfarin with no reported s/s of bleeding or thromboembolism     Previous INR was Therapeutic at 3.50 on 3/31/20.    Reported abdominal pains are not so constant anymore, and has noticed some improvements.  She will continue with Prilosec for total of 3 months, as Dr. Lopez ordered.    Plan:     Spoke with Bisi regarding INR result and instructed:     Warfarin Dosing Instructions:    - Continue current warfarin dose 7.5 mg daily on Saturdays; and 5 mg daily rest of week.    Instructed patient to follow up no later than:  6-8 wks.    Education provided: importance of consistent vitamin K intake, target INR goal and significance of current INR result and importance of notifying clinic for changes in medications    Bisi verbalizes understanding and agrees to warfarin dosing plan.    Instructed to call the [facility] for any changes, questions or concerns. (#[phone])   ?   Andree Rdz RN    Subjective/Objective:      Tammy SEGOVIA Ani, a 64 y.o. female is on warfarin.     Tammy reports:     Home warfarin dose: as updated on anticoagulation calendar per template     Missed doses: No     Medication changes:  Yes:  Reported has been on  Prilosec 20mg daily since 4/23/20 for the next 2 to 3 months, per Dr. Lopez.     S/S of bleeding or thromboembolism:  No     New Injury or illness:  No     Changes in diet or alcohol consumption:  No     Upcoming surgery, procedure or cardioversion:  No    Anticoagulation Episode Summary     Current INR goal:   2.0-3.5   TTR:   100.0 % (1 y)   Next INR check:   6/12/2020   INR from last check:   2.40 (5/15/2020)   Weekly max warfarin dose:      Target end date:      INR check location:      Preferred lab:      Send INR reminders to:   [facility]    Indications    H/O mechanical aortic valve replacement [Z95.2]           Comments:            Anticoagulation Care Providers     Provider Role Specialty Phone number    Arnold Anderson MD Referring Internal Medicine [phone]

## 2021-06-17 NOTE — TELEPHONE ENCOUNTER
ANTICOAGULATION  MANAGEMENT-Patient Home Monitoring Result    Assessment     Therapeutic INR result of 2.30 (goal INR of 2.0 - 3.5) received via fax from [employer] home INR monitoring company.        Previous INR was Therapeutic at 2.10 on 5/5/21.    Tammyrober Law last contacted by phone: 4/21/21    Plan     Per home monitoring agreement with patient, patient was NOT contacted regarding therapeutic result today.  Patient is to continue current dose and continue to check INR with home monitor per protocol.  ?   Andree Rdz RN    Subjective/Objective:      Tammy Law, a 65 y.o. female  is established on warfarin using a home INR monitor.    Anticoagulation Episode Summary     Current INR goal:  2.0-3.5   TTR:  86.5 % (1 y)   Next INR check:  6/2/2021   INR from last check:  2.30 (5/19/2021)   Weekly max warfarin dose:     Target end date:     INR check location:     Preferred lab:     Send INR reminders to:  [facility]    Indications    H/O mechanical aortic valve replacement [Z95.2]           Comments:           Anticoagulation Care Providers     Provider Role Specialty Phone number    Arnold Anderson MD Referring Internal Medicine [phone]

## 2021-06-17 NOTE — TELEPHONE ENCOUNTER
Received a faxed INR result for Tammy Law  From [facility]  INR result dated 5/19/2021 is 2.3

## 2021-06-18 NOTE — LETTER
Letter by Vero Murillo EPS at      Author: Vero Murillo EPS Service: -- Author Type: --    Filed:  Encounter Date: 2/10/2019 Status: (Other)       Tammy Law  [address] [place] [address]      February 11, 2019      Dear Ms. Silveriomiltonpriscilla,    RE: Remote Results    We are writing to you regarding your recent Remote Pacemaker check from home. Your transmission was received successfully. Battery status is satisfactory at this time.     Your results are within normal limits.    Your next device appointment will be a clinic visit.  Please call in February to schedule.      To schedule or reschedule, please call [phone] and press 1.    NOTE: If you would like to do an extra transmission, please call [phone] and press 3 to speak to a nurse BEFORE transmitting. This ensures that the Device Clinic staff is aware of the reason you are sending a transmission, and can follow-up with you after it has been reviewed.    We will be checking your implanted device from home (remotely) every three months unless otherwise instructed. We will need to see you in the clinic at least once a year. You may need to be seen in the clinic sooner depending on the results of your check.    Please be aware:    The follow-up schedule is like a Physician prescription.    Your remote monitor is paired to your specific implanted device.      Sincerely,    [facility]

## 2021-06-19 NOTE — LETTER
Letter by Arnold Anderson MD at      Author: Arnold Anderson MD Service: -- Author Type: --    Filed:  Encounter Date: 6/3/2019 Status: (Other)         Tammy Law  [address] [place] [address]             Candy 3, 2019         Dear Ms. Law,    Below are the results from your recent visit:    Resulted Orders   Thyroid Stimulating Hormone (TSH)   Result Value Ref Range    TSH 1.86 0.30 - 5.00 uIU/mL       All very good results    Please call with questions or contact us using [employer].    Sincerely,        Electronically signed by Arnold Anderson MD

## 2021-06-19 NOTE — LETTER
Letter by Sugey Nina EPS at      Author: Sugey Nina EPS Service: -- Author Type: --    Filed:  Encounter Date: 5/10/2019 Status: (Other)         Tammy Law  [address] [place] [address]      May 10, 2019      Dear Ms. Law,    RE: Remote Results    We are writing to you regarding your recent Remote Pacemaker check from home. Your transmission was received successfully. Battery status is satisfactory at this time.     Your results are within normal limits.    Your next device appointment will be a clinic visit.  Please call to schedule.      To schedule or reschedule, please call [phone] and press 1.    NOTE: If you would like to do an extra transmission, please call [phone] and press 3 to speak to a nurse BEFORE transmitting. This ensures that the Device Clinic staff is aware of the reason you are sending a transmission, and can follow-up with you after it has been reviewed.    We will be checking your implanted device from home (remotely) every three months unless otherwise instructed. We will need to see you in the clinic at least once a year. You may need to be seen in the clinic sooner depending on the results of your check.    Please be aware:    The follow-up schedule is like a Physician prescription.    Your remote monitor is paired to your specific implanted device.      Sincerely,    [facility]

## 2021-06-19 NOTE — LETTER
Letter by Malena Cisneros at      Author: Malena Cisneros Service: -- Author Type: --    Filed:  Encounter Date: 11/6/2019 Status: Signed         Tammy Law  [address] [place] [address]      November 6, 2019      Dear MsTania miltonpriscilla,    RE: Remote Results    We are writing to you regarding your recent Remote Pacemaker check from home. Your transmission was received successfully. Battery status is satisfactory at this time.     Your results are within normal limits.    Your next device appointment will be a remote check on February 7, 2020; this will occur automatically.    To schedule or reschedule, please call [phone] and press 1.    NOTE: If you would like to do an extra transmission, please call [phone] and press 3 to speak to a nurse BEFORE transmitting. This ensures that the Device Clinic staff is aware of the reason you are sending a transmission, and can follow-up with you after it has been reviewed.    We will be checking your implanted device from home (remotely) every three months unless otherwise instructed. We will need to see you in the clinic at least once a year. You may need to be seen in the clinic sooner depending on the results of your check.    Please be aware:    The follow-up schedule is like a Physician prescription.    Your remote monitor is paired to your specific implanted device.      Sincerely,    [facility]

## 2021-06-20 NOTE — LETTER
Letter by Mckenzie Patterson RDCS at      Author: Mckenzie Patterson RDCS Service: -- Author Type: --    Filed:  Encounter Date: 2/3/2020 Status: (Other)         Tammy SEGOVIA Semiltonpriscilla  [address] [place] [address]      February 4, 2020      Dear Ms. Law,    RE: Remote Results    We are writing to you regarding your recent Remote Pacemaker check from home. Your transmission was received successfully. Battery status is satisfactory at this time.     Your results are showing no significant changes.    Your next device appointment will be a remote check on or near May 3, 2020; this will occur automatically.    To schedule or reschedule, please call [phone] and press 1.    NOTE: If you would like to do an extra transmission, please call [phone] and press 3 to speak to a nurse BEFORE transmitting. This ensures that the Device Clinic staff is aware of the reason you are sending a transmission, and can follow-up with you after it has been reviewed.    We will be checking your implanted device from home (remotely) every three months unless otherwise instructed. We will need to see you in the clinic at least once a year. You may need to be seen in the clinic sooner depending on the results of your check.    Please be aware:    The follow-up schedule is like a Physician prescription.    Your remote monitor is paired to your specific implanted device.      Sincerely,    [facility]

## 2021-06-20 NOTE — LETTER
Letter by Mckenzie Patterson RDCS at      Author: Mckenzie Patterson RDCS Service: -- Author Type: --    Filed:  Encounter Date: 8/3/2020 Status: (Other)         Tammy SEGOVIA Semiltonpriscilla  [address] [place] [address]      August 3, 2020      Dear Ms. Silveriomiltonpriscilla,    RE: Remote Results    We are writing to you regarding your recent Remote Pacemaker check from home. Your transmission was received successfully. Battery status is satisfactory at this time.     Your results are showing no significant changes.    Your next device appointment will be a remote check on November 2, 2020; this will occur automatically.   You are due to see Dr. Vora in the clinic. Please call to schedule an appointment.    To schedule or reschedule, please call [phone] and press 1.    NOTE: If you would like to do an extra transmission, please call [phone] and press 3 to speak to a nurse BEFORE transmitting. This ensures that the Device Clinic staff is aware of the reason you are sending a transmission, and can follow-up with you after it has been reviewed.    We will be checking your implanted device from home (remotely) every three months unless otherwise instructed. We will need to see you in the clinic at least once a year. You may need to be seen in the clinic sooner depending on the results of your check.    Please be aware:    The follow-up schedule is like a Physician prescription.    Your remote monitor is paired to your specific implanted device.      Sincerely,    [facility]

## 2021-06-20 NOTE — PROGRESS NOTES
Office Visit - Follow Up   Tammy Law   63 y.o. female    Date of Visit: 10/5/2018    Chief Complaint   Patient presents with     Form     Hospital Visit Follow Up     Flank Pain     right side pain        Assessment and Plan   1. Colitis   Was hospitalized 9/20 through through 9/27 2018 with right-handed abdominal pain. Her pain resolved while in the hospital. She had surgical consultation by Dr. Sinha .  CT scan of the abdomen showed a mild right ascending: colitis..  It was felt to be mild enough that she did not require any antibiotics. They recommended G.I. Consultation on outpatient basis. She comes here instead. At this point she feels rather well but she still has some remnants of right mid abdomen and abdominal pain. No blood in stools. No diarrhea no constipation. No fevers no chills. The aching comes and goes. It's not constant.  I suggested she should schedule a colonoscopy but they should be scheduled at least a month from now and no sooner. Call of her abdominal pain worsens.  - Ambulatory referral for Colonoscopy    2. S/P AVR (aortic valve replacement)   She is on chronic warfarin therapy with her aortic valve replacement. Needs an INR today.  - INR    3.  Pancreatic cyst. Needs a follow-up CT scan in about 3-6 months. When she is seen by  G.I. For her colonoscopy they could consider earlier studies.      No Follow-up on file.     History of Present Illness   This 63 y.o. old  Was in the hospital for five days as described above. At that time she had right upper quadrant/right-sided abdominal pain. It seemed to  Come and go in the hospital but then did resolve in the hospital. Her liver enzymes were normal.   Ultrasound showed a 2 mm polyp versus possible sludge. HIDA scan was normal. CT scan of the admin showed no biliary disease 69/24.  The CT scan of the abdomen was repeated on nine such 26 and showed a suggestion of some mild colitis in the ascending colon. There was a   6 mm  "pancreatic cyst also discovered .  She was felt able to be discharged on no medications.    Review of Systems: A comprehensive review of systems was negative except as noted.     Medications, Allergies and Problem List   Reviewed, reconciled and updated     Physical Exam   General Appearance:       /58 (Patient Site: Right Arm, Patient Position: Sitting)  Pulse 71  Ht 5' 4\" (1.626 m)  Wt 145 lb (65.8 kg)  LMP 12/29/1999  SpO2 98%  BMI 24.89 kg/m2     Abdomen is soft. She's not jaundice. No right upper quadrant tenderness. No right flank pain. No  abdominaldistention. No epigastric tenderness no abdominal masses     Additional Information   Current Outpatient Prescriptions   Medication Sig Dispense Refill     citalopram (CELEXA) 40 MG tablet TAKE 1/2 TABLET BY MOUTH EVERY DAY 45 tablet 0     hydroCHLOROthiazide (MICROZIDE) 12.5 mg capsule TAKE 1 CAPSULE(12.5 MG) BY MOUTH DAILY 90 capsule 1     levothyroxine (SYNTHROID, LEVOTHROID) 75 MCG tablet TAKE 1 TABLET BY MOUTH EVERY DAY 90 tablet 0     simvastatin (ZOCOR) 40 MG tablet TAKE 1/2 TABLET BY MOUTH EVERY DAY 45 tablet 0     warfarin (COUMADIN) 2.5 MG tablet Take by mouth See Admin Instructions. 7.5 mg Mon/Wed; 5 mg ROW       acetaminophen (TYLENOL) 500 MG tablet Take 1 tablet (500 mg total) by mouth every 6 (six) hours as needed.  0     furosemide (LASIX) 40 MG tablet TAKE 1 TABLET BY MOUTH EVERY OTHER DAY AS NEEDED FOR SWELLING 45 tablet 0     No current facility-administered medications for this visit.      Allergies   Allergen Reactions     Meperidine Other (See Comments)     hallucinations      Sulfa (Sulfonamide Antibiotics) Itching and Rash     Social History   Substance Use Topics     Smoking status: Never Smoker     Smokeless tobacco: Never Used     Alcohol use Yes      Comment: twice per year       Review and/or order of clinical lab tests:  Review and/or order of radiology tests:  Review and/or order of medicine tests:  Discussion of test " results with performing physician:  Decision to obtain old records and/or obtain history from someone other than the patient:  Review and summarization of old records and/or obtaining history from someone other than the patient and.or discussion of case with another health care provider:  Independent visualization of image, tracing or specimen itself:    Time: total time spent with the patient was 25 minutes of which >50% was spent in counseling and coordination of care     Dheeraj Daugherty MD

## 2021-06-20 NOTE — PROGRESS NOTES
TCM DISCHARGE FOLLOW UP CALL    Discharge Date:  9/27/2018  Reason for hospital stay (discharge diagnosis)::  Chest pain. mild colitis  Are you feeling better, the same or worse since your discharge?:  Patient is feeling better (states she is feeling better but still having abd cramping and diarrhea. )  Do you feel like you have a plan in the event of a health emergency?: Yes    (RN) Patient provided with information to call us in the event of a health emergency: Yes (pt has clinic and care connection numbers as well as given walk in hours and can call 911)    As part of your discharge plan, were  home care services ordered for you?: No    Did you receive any new medications, or was there a change to your medications?: Yes    Are you taking those medications, or do you have any established regiment?:  Went over d/c med's with Pt. Pt stated she has had increased abd cramping and diarrhea since starting Omeprazole and has chosen to stop it at this time. She will follow up with PCP on Friday and further discuss med. Directed Pt to contact PCP earlier if need be if cramping continues and diarrhea does not subside.  Do you have any follow up visits scheduled with your PCP or Specialist?:  Yes, with PCP  (RN) Is PCP appt scheduled soon enough (within 14 days of discharge date)?: Yes (10/5 4pm)    RN NOTES::  Denies chest pain, shortness of breath. Reported increased abd cramping and diarrhea. Pt believes its r/t new omeprazole RX and has chosen to stop this med until seeing MD on Friday. Education provided. Pt states appetite is poor but she is forcing self to eat/drink. RN encouraged food and fluid intake and encouraged Pt to contact MD if s/s get worse.

## 2021-06-20 NOTE — LETTER
Letter by Vanessa Dietz RN at      Author: Vanessa Dietz RN Service: -- Author Type: --    Filed:  Encounter Date: 5/4/2020 Status: (Other)         Tammy Law  [address]  [place] [address]      May 4, 2020      Dear Ms. Law,    RE: Remote Results    We are writing to you regarding your recent Remote Pacemaker check from home. Your transmission was received successfully. Battery status is satisfactory at this time.     Your results are within normal limits.    Your next device appointment will be a remote check on  or around 08/05/2020; this will occur automatically.    To schedule or reschedule, please call [phone] and press 1.    NOTE: If you would like to do an extra transmission, please call [phone] and press 3 to speak to a nurse BEFORE transmitting. This ensures that the Device Clinic staff is aware of the reason you are sending a transmission, and can follow-up with you after it has been reviewed.    We will be checking your implanted device from home (remotely) every three months unless otherwise instructed. We will need to see you in the clinic at least once a year. You may need to be seen in the clinic sooner depending on the results of your check.    Please be aware:    The follow-up schedule is like a Physician prescription.    Your remote monitor is paired to your specific implanted device.      Sincerely,    [facility]

## 2021-06-21 ENCOUNTER — AMBULATORY - HEALTHEAST (OUTPATIENT)
Dept: CARDIOLOGY | Facility: CLINIC | Age: 66
End: 2021-06-21

## 2021-06-21 DIAGNOSIS — I49.5 SSS (SICK SINUS SYNDROME) (H): ICD-10-CM

## 2021-06-21 DIAGNOSIS — Z95.0 CARDIAC PACEMAKER IN SITU: ICD-10-CM

## 2021-06-21 ASSESSMENT — MIFFLIN-ST. JEOR: SCORE: 1184.09

## 2021-06-21 NOTE — LETTER
Letter by Mckenzie Patterson RDCS at      Author: Mckenzie Patterson RDCS Service: -- Author Type: --    Filed:  Encounter Date: 1/29/2021 Status: (Other)         Tammy SEGOVIA Semiltonpriscilla  [address] [place] [address]      January 29, 2021      Dear Ms. Silveriomiltonpriscilla,    RE: Remote Results    We are writing to you regarding your recent Remote Pacemaker check from home. Your transmission was received successfully. Battery status is satisfactory at this time.     Your results are showing no significant changes.    Your next device appointment will be a remote check on or near April 29, 2021; this will occur automatically.    To schedule or reschedule, please call [phone] and press 1.    NOTE: If you would like to do an extra transmission, please call [phone] and press 3 to speak to a nurse BEFORE transmitting. This ensures that the Device Clinic staff is aware of the reason you are sending a transmission, and can follow-up with you after it has been reviewed.    We will be checking your implanted device from home (remotely) every three months unless otherwise instructed. We will need to see you in the clinic at least once a year. You may need to be seen in the clinic sooner depending on the results of your check.    Please be aware:    The follow-up schedule is like a Physician prescription.    Your remote monitor is paired to your specific implanted device.      Sincerely,    [facility]

## 2021-06-21 NOTE — LETTER
Letter by Arnold Anderson MD at      Author: Arnold Anderson MD Service: -- Author Type: --    Filed:  Encounter Date: 10/22/2020 Status: (Other)         October 23, 2020    To whom it may concern:    I am writing this letter in regard to my patient, Tammy Law.  Tammy has a history of heart disease which greatly increases her risk of severe illness if she were to get COVID-19.  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    I appreciate your consideration in this matter.  Please contact me if you have any questions.    Sincerely,      Paulette Lopez MD

## 2021-06-21 NOTE — LETTER
Letter by Malena Cisneros at      Author: Malena Cisneros Service: -- Author Type: --    Filed:  Encounter Date: 11/2/2020 Status: (Other)         Tammy Law  [address] [place] [address]      November 2, 2020      Dear Ms. Silveriomiltonpriscilla,    RE: Remote Results    We are writing to you regarding your recent Remote Pacemaker check from home. Your transmission was received successfully. Battery status is satisfactory at this time.     Your results are within normal limits.    Your next device appointment will be a remote check on February 1, 2021; this will occur automatically.    To schedule or reschedule, please call [phone] and press 1.    NOTE: If you would like to do an extra transmission, please call [phone] and press 3 to speak to a nurse BEFORE transmitting. This ensures that the Device Clinic staff is aware of the reason you are sending a transmission, and can follow-up with you after it has been reviewed.    We will be checking your implanted device from home (remotely) every three months unless otherwise instructed. We will need to see you in the clinic at least once a year. You may need to be seen in the clinic sooner depending on the results of your check.    Please be aware:    The follow-up schedule is like a Physician prescription.    Your remote monitor is paired to your specific implanted device.      Sincerely,    [facility]

## 2021-06-21 NOTE — LETTER
Letter by Mckenzie Patterson RDCS at      Author: Mckenzie Patterson RDCS Service: -- Author Type: --    Filed:  Encounter Date: 4/29/2021 Status: (Other)         Tammy Law  [address] [place] [address]      April 29, 2021      Dear Ms. Silveriomiltonpriscilla,    RE: Remote Results    We are writing to you regarding your recent Remote Pacemaker check from home. Your transmission was received successfully. Battery status is satisfactory at this time.     Your results are showing no significant changes.    Your next device appointment will be a clinic visit.  Please call in late May to schedule.      To schedule or reschedule, please call [phone] and press 1.    NOTE: If you would like to do an extra transmission, please call [phone] and press 3 to speak to a nurse BEFORE transmitting. This ensures that the Device Clinic staff is aware of the reason you are sending a transmission, and can follow-up with you after it has been reviewed.    We will be checking your implanted device from home (remotely) every three months unless otherwise instructed. We will need to see you in the clinic at least once a year. You may need to be seen in the clinic sooner depending on the results of your check.    Please be aware:    The follow-up schedule is like a Physician prescription.    Your remote monitor is paired to your specific implanted device.      Sincerely,    [facility]

## 2021-06-21 NOTE — PROGRESS NOTES
Office Visit - Follow up    Tammy Law   63 y.o. female    Date of Visit: 10/19/2018    Chief Complaint   Patient presents with     Hyperlipidemia     Follow-up     CT abdomen,and pelvis       Subjective: Colitis right-sided.    Also a pancreatic cyst cyst 6 mm in size.  Endoscopic ultrasound may be necessary and repeat colonoscopy.  Last colonoscopy done less than a year ago on November 6, 2017 with Dr. COREY Mathis normal.  Mammogram allCLEAR January 3, 2018.    Patient has been ill for a month and even hospitalized for 5-day.  For abdominal pain found to have right-sided distal ascending colon involvement of colitis.  There is no vomiting no diarrhea no chest pain no blood in stool or urine the abdominal pain seems to come and go there can be radiation to the back.  Her weight is down 1 pound.  Medication list reviewed well-tolerated.    Allergies meperidine sulfa she does not smoke she has had a history of aortic valve replacement and permanent pacemaker as well as rotatory lateral gaze nystagmus.  Congenital.    ROS: A comprehensive review of systems was performed and was otherwise negative    Medications:  Prior to Admission medications    Medication Sig Start Date End Date Taking? Authorizing Provider   citalopram (CELEXA) 40 MG tablet TAKE 1/2 TABLET BY MOUTH EVERY DAY 9/24/18  Yes Arnold Anderson MD   furosemide (LASIX) 40 MG tablet TAKE 1 TABLET BY MOUTH EVERY OTHER DAY AS NEEDED FOR SWELLING  Patient taking differently: 1-2 weekly 6/12/17  Yes Arnold Anderson MD   hydroCHLOROthiazide (MICROZIDE) 12.5 mg capsule TAKE 1 CAPSULE(12.5 MG) BY MOUTH DAILY 6/11/18  Yes Kirsten Vora MD   levothyroxine (SYNTHROID, LEVOTHROID) 75 MCG tablet TAKE 1 TABLET BY MOUTH EVERY DAY 3/28/18  Yes Arnold Anderson MD   simvastatin (ZOCOR) 40 MG tablet TAKE 1/2 TABLET BY MOUTH EVERY DAY 8/11/18  Yes Arnold Anderson MD   warfarin (COUMADIN) 2.5 MG tablet Take by mouth See Admin Instructions. 7.5 mg  Mon/Wed; 5 mg ROW   Yes PROVIDER, HISTORICAL   acetaminophen (TYLENOL) 500 MG tablet Take 1 tablet (500 mg total) by mouth every 6 (six) hours as needed. 9/27/18   Jesus Smith MD       Allergies:   Allergies   Allergen Reactions     Meperidine Other (See Comments)     hallucinations      Sulfa (Sulfonamide Antibiotics) Itching and Rash       Immunizations:   Immunization History   Administered Date(s) Administered     Pneumo Polysac 23-V 10/25/2004     Tdap 03/11/2008     ZOSTER, LIVE 11/25/2016       Exam Chest clear to auscultation and percussion.  Heart tones regular rhythm without murmur rub or gallop.  Abdomen soft nontender no organomegaly.  No peritoneal signs.  Extremities free of edema cyanosis or clubbing.  Neck veins nondistended no thyromegaly or scleral icterus noted, carotids full.  Skin warm and dry easily conversant good spirited.  Normal intelligence.  Neurologically intact no gross localizing findings.  Permanent pacemaker in place plus rotatory lateral gaze nystagmus.  Normal prosthetic valve tones noted at the aortic position without murmur heard in systole or diastole no S3.    Assessment and Plan  Colitis distal ascending colon with associated pancreatic cyst 6 mm in size needs [facility] consult for possible repeat colonoscopy and endoscopic ultrasound.    Normal colonoscopy on November 6, 2017 Dr. COREY Mathis.    Aortic valve disease status post aortic valve replacement for aortic stenosis with permanent pacemaker in place as well.    Bladder lateral and rotatory gaze nystagmus.    Hypothyroidism on replacement.    Hyperlipidemia on statin therapy no history of target organ damage related to same.    Chronic warfarin therapy after aortic valve replacement INR is pending today.    Multiple drug allergies see list meperidine and sulfa.  RTC 2 months time [facility] consult in the meantime.    Time: total time spent with the patient was 40 minutes of which >50% was spent in counseling  and coordination of care        Arnold Anderson MD    Patient Active Problem List   Diagnosis     Congenital Nystagmus     Hypothyroidism, unspecified type     Mammogram Inconclusive Due To Dense Breasts     Anterior Wall Chest Pain With Respiration     H/O mechanical aortic valve replacement     Plantar flexed metatarsal, right     Bradycardia     Chronotropic incompetence with sinus node dysfunction (H)     Acquired hypothyroidism     Cardiac pacemaker in situ     Midline low back pain without sciatica     Hypertrophy of bone     Chest pain     H/O prosthetic heart valve     Epigastric pain     Biliary colic     Preoperative cardiovascular examination

## 2021-06-22 NOTE — PROGRESS NOTES
Office Visit - Follow up    Tammy Law   63 y.o. female    Date of Visit: 12/27/2018    Chief Complaint   Patient presents with     Abdominal Pain     Hospital follow up  9/22-27/2018  pain  gone     Fatigue       Subjective: Prosthetic valve placement aortic position.  On chronic warfarin.  INR check is due today.    Needlestick last week at work seen in the emergency room.    Abdominal pain with hospital stay September 22-27, 2018 [facility].  Workup there centered around colitis right colon plus a cyst in the liver.  Colonoscopy with [facility] presiding November 5, 2018 for the colonoscopy all normal personal history of colon polyps none seen no colitis seen on colonoscopy direct vision although suggested by CT scan.    Overall right-sided abdominal pain is better.  No nausea or vomiting appetite is so-so no weight loss no diarrhea no blood in stool or urine the patient was advised regarding Metamucil.    No chest pain or shortness of breath medication list reviewed well-tolerated normal effects.    ROS: A comprehensive review of systems was performed and was otherwise negative    Medications:  Prior to Admission medications    Medication Sig Start Date End Date Taking? Authorizing Provider   citalopram (CELEXA) 40 MG tablet TAKE 1/2 TABLET BY MOUTH EVERY DAY 12/25/18  Yes Arnold Anderson MD   furosemide (LASIX) 40 MG tablet TAKE 1 TABLET BY MOUTH EVERY OTHER DAY AS NEEDED FOR SWELLING  Patient taking differently: 1-2 weekly 6/12/17  Yes Arnold Anderson MD   hydroCHLOROthiazide (MICROZIDE) 12.5 mg capsule TAKE 1 CAPSULE(12.5 MG) BY MOUTH DAILY 11/27/18  Yes Kirsten Vora MD   levothyroxine (SYNTHROID, LEVOTHROID) 75 MCG tablet TAKE 1 TABLET BY MOUTH EVERY DAY 3/28/18  Yes Arnold Anderson MD   simvastatin (ZOCOR) 40 MG tablet TAKE 1/2 TABLET BY MOUTH EVERY DAY 11/8/18  Yes Arnold Anderson MD   warfarin (COUMADIN) 2.5 MG tablet Take by mouth See Admin  Instructions. 7.5 mg Mon/Wed; 5 mg ROW   Yes PROVIDER, HISTORICAL   acetaminophen (TYLENOL) 500 MG tablet Take 1 tablet (500 mg total) by mouth every 6 (six) hours as needed. 9/27/18   Jesus Smith MD   emtricitabine-tenofovir, TDF, (TRUVADA) 200-300 mg per tablet Take 1 tablet by mouth daily. 12/21/18 12/27/18  Ger Bosch MD   levothyroxine (SYNTHROID, LEVOTHROID) 75 MCG tablet TAKE 1 TABLET BY MOUTH EVERY DAY 12/25/18 12/27/18  Arnold Anderson MD       Allergies:   Allergies   Allergen Reactions     Meperidine Other (See Comments)     hallucinations      Sulfa (Sulfonamide Antibiotics) Itching and Rash       Immunizations:   Immunization History   Administered Date(s) Administered     Pneumo Polysac 23-V 10/25/2004     Tdap 03/11/2008     ZOSTER, LIVE 11/25/2016       Exam Chest clear to auscultation and percussion.  Heart tones regular rhythm without murmur rub or gallop.  Abdomen soft nontender no organomegaly.  No peritoneal signs.  Extremities free of edema cyanosis or clubbing.  Neck veins nondistended no thyromegaly or scleral icterus noted, carotids full.  Skin warm and dry easily conversant good spirited.  Normal intelligence.  Neurologically intact no gross localizing findings.  Lateral and rotatory gaze nystagmus congenital.  100/56 pulse 67 respirations 18 O2 sats 97%.  BMI 25.06 weight up 2 pounds not in acute distress easily conversant.    Abdomen soft benign nontender scaphoid no distention no scleral icterus rotatory gaze and lateral gaze nystagmus extremities free of edema cyanosis or clubbing neck veins are nondistended.    Assessment and Plan  Prosthetic valve replacement at the aortic position.  Check INR today chronic warfarin therapy.    Multiple drug allergies meperidine and sulfa.    History of colon polyps normal colonoscopy November 5, 2018 [facility] Dr. Abdi Rojas presiding.    Colitis with liver cysts during hospital stay non-verifiable on direct vision.   Colonoscopy.    Needlestick last week at work seen in the emergency room no further follow-up or treatment.  RTC 3 months time INR checked today on chronic warfarin therapy for aortic valve replacement.    Time: total time spent with the patient was 25 minutes of which >50% was spent in counseling and coordination of care    The following high BMI interventions were performed this visit: encouragement to exercise    Arnold Anderson MD    Patient Active Problem List   Diagnosis     Congenital Nystagmus     Hypothyroidism, unspecified type     Mammogram Inconclusive Due To Dense Breasts     Anterior Wall Chest Pain With Respiration     H/O mechanical aortic valve replacement     Plantar flexed metatarsal, right     Bradycardia     Chronotropic incompetence with sinus node dysfunction (H)     Acquired hypothyroidism     Cardiac pacemaker in situ     Midline low back pain without sciatica     Hypertrophy of bone     Chest pain     H/O prosthetic heart valve     Epigastric pain     Biliary colic     Preoperative cardiovascular examination

## 2021-06-23 NOTE — TELEPHONE ENCOUNTER
ANTICOAGULATION  MANAGEMENT    Assessment     Today's INR result of 2.50 is Therapeutic (goal INR of 2.0 - 3.5)        Less warfarin taken than instructed which may be affecting INR    No new diet changes affecting INR    No new medication/supplements affecting INR    Continues to tolerate warfarin with no reported s/s of bleeding or thromboembolism     Previous INR was Therapeutic at 3.50 on 12/27/18.    Leaving on vacation 2/4/19.    Plan:     Spoke with Bisi regarding INR result and instructed:     Warfarin Dosing Instructions:    - Continue current warfarin dose 7.5 mg daily on Wed/Sat; and  5 mg daily rest of week.    Instructed patient to follow up no later than:  6 wks.  However, Bisi lowered wkly warfairn dose, advised to recheck in 2 wks.   - scheduled on 1/31/19.    Education provided: target INR goal and significance of current INR result, importance of taking warfarin as instructed, importance of notifying clinic for changes in medications and when to seek medical attention/emergency care    Bisi verbalizes understanding and agrees to warfarin dosing plan.    Instructed to call the [facility] for any changes, questions or concerns. (#[phone])   ?   Andree Rdz RN    Subjective/Objective:      Tammy SEGOVIA Ani, a 63 y.o. female is on warfarin.     Tammy reports:     Home warfarin dose: template incorrect; verbally confirmed home dose with Bisi and updated on anticoagulation calendar     Missed doses: Yes: Bisi reported self-dosing and lowered warfarin dose to 5mg daily, r/t increased bruising with no known injuries.     Medication changes:  No   - previously was administered on 12/21/18, 2 Antiretroviral  RX's - Emtricitabine/tenofovir and Raltegravir potassium     S/S of bleeding or thromboembolism:  Yes: had reported previously increased bruising, which has resolved.     New Injury or illness:  No   - on 12/21/18 had needle stick injury cleaning the public bathroom near  chemical dependency unit @ [facility].     Changes in diet or alcohol consumption:  No     Upcoming surgery, procedure or cardioversion:  No    Anticoagulation Episode Summary     Current INR goal:   2.0-3.5   TTR:   85.4 % (4.1 y)   Next INR check:   1/31/2019   INR from last check:   2.50 (1/17/2019)   Weekly max warfarin dose:      Target end date:      INR check location:      Preferred lab:      Send INR reminders to:   ANTICOAGULATION POOL C (DTN,VAD,CGR,GAV)    Indications    H/O mechanical aortic valve replacement [Z95.2]           Comments:            Anticoagulation Care Providers     Provider Role Specialty Phone number    Arnold Anderson MD Referring Internal Medicine [phone]

## 2021-06-23 NOTE — TELEPHONE ENCOUNTER
Who is calling:  Patient  Reason for Call:  Returning [facility] phone call regarding INR results  Date of last appointment with primary care: none  Has the patient been recently seen:  No  Okay to leave a detailed message: Yes

## 2021-06-23 NOTE — TELEPHONE ENCOUNTER
Anticoagulation Management    Unable to reach Tammy    Today's INR result of 2.20 is Therapeutic (goal INR of 2.0 - 3.5)     Follow up required to discuss dose change and confirm understanding of instructions (per template took less than instructed 35 mg/week instead of instructed 40 mg/week)     Left message to continue current dose of warfarin 5 mg tonight     ACN to follow up    Andree Rdz RN\

## 2021-06-23 NOTE — TELEPHONE ENCOUNTER
Patient Returning Call  Reason for call:  INR results  Information relayed to patient:  no  Patient has additional questions:  Yes  If YES, what are your questions/concerns:  results  Okay to leave a detailed message?: Yes

## 2021-06-23 NOTE — TELEPHONE ENCOUNTER
"ANTICOAGULATION  MANAGEMENT    Assessment     Today's INR result of 2.20 is Therapeutic (goal INR of 2.0 - 3.5)        Less warfarin taken than instructed which may be affecting INR    No new diet changes affecting INR    Potential interaction between \" Tan Me Boost \" supplement bought thru email and warfarin which may affect subsequent INRs    Continues to tolerate warfarin with no reported s/s of bleeding or thromboembolism     Previous INR was Therapeutic at 2.50 on 1/17/19.    Plan:     Spoke with Bisi regarding INR result and instructed:    1.  With INR trending lower, advised Bisi to increase warfarin dose from 35mg to 37mg/wk, to ensure INR stability.  Verbalized understanding and agreed with plan.    Warfarin Dosing Instructions:  (has 2.5mg tabs).   - Change warfarin dose to 7.5 mg daily on Saturdays; and 5 mg daily rest of week.     - (6.3 % change)    Instructed patient to follow up no later than:  Check INR after returning from vacation.    Education provided: target INR goal and significance of current INR result, importance of taking warfarin as instructed, potential interaction between warfarin and \" Tan Me Boost \" supplement bought thru internet, is not FDA regulated, and travel related clotting risk and prevention    Bisi verbalizes understanding and agrees to warfarin dosing plan.    Instructed to call the [facility] for any changes, questions or concerns. (#[phone])   ?   Andree Rdz RN    Subjective/Objective:      Tammy Law, a 63 y.o. female is on warfarin.     Tammy reports:     Home warfarin dose: verbally confirmed home dose with Bisi and updated on anticoagulation calendar     Missed doses: No     Medication changes:  Yes:  Bisi continued with lower warfarin dose with 35mg/wk, instead of 40mg/wk, r/t bruising issues.     S/S of bleeding or thromboembolism:  No.  Reported no bruising problems now.     New Injury or illness:  No     Changes in diet or alcohol " consumption:  No     Upcoming surgery, procedure or cardioversion:  No    Anticoagulation Episode Summary     Current INR goal:   2.0-3.5   TTR:   85.5 % (4.1 y)   Next INR check:   2/18/2019   INR from last check:   2.20 (1/31/2019)   Weekly max warfarin dose:      Target end date:      INR check location:      Preferred lab:      Send INR reminders to:   ANTICOAGULATION POOL C (DTN,VAD,CGR,GAV)    Indications    H/O mechanical aortic valve replacement [Z95.2]           Comments:            Anticoagulation Care Providers     Provider Role Specialty Phone number    Arnold Anderson MD Referring Internal Medicine [phone]

## 2021-06-24 NOTE — TELEPHONE ENCOUNTER
ANTICOAGULATION  MANAGEMENT    Assessment     Today's INR result of 3.00 is Therapeutic (goal INR of 2.0 - 3.5)        Warfarin taken as previously instructed    No new diet changes affecting INR    No new medication/supplements affecting INR    Continues to tolerate warfarin with no reported s/s of bleeding or thromboembolism     Previous INR was Therapeutic at 2.20 on 1/31/19.    Plan:     Left a detailed message for Bisi regarding INR result and instructed:     Warfarin Dosing Instructions:  (has 2.5mg tabs)   - Continue current warfarin dose 7.5 mg daily on Saturdays; and 5 mg daily rest of week.    Instructed patient to follow up no later than:  4 wks.   - scheduled on 3/28/19 during f/u visit with Dr. Anderson.    Education provided: importance of consistent vitamin K intake and target INR goal and significance of current INR result    Instructed to call the [facility] for any changes, questions or concerns. (#[phone])   ?   Andree Rdz RN    Subjective/Objective:      Tammy SEGOVIA Ani, a 63 y.o. female is on warfarin.     Tammy reports:     Home warfarin dose: as updated on anticoagulation calendar per template     Missed doses: No     Medication changes:  No     S/S of bleeding or thromboembolism:  No     New Injury or illness:  No     Changes in diet or alcohol consumption:  No     Upcoming surgery, procedure or cardioversion:  No    Anticoagulation Episode Summary     Current INR goal:   2.0-3.5   TTR:   85.8 % (4.2 y)   Next INR check:   3/26/2019   INR from last check:   3.00 (2/26/2019)   Weekly max warfarin dose:      Target end date:      INR check location:      Preferred lab:      Send INR reminders to:   ANTICOAGULATION POOL C (DTN,VAD,CGR,GAV)    Indications    H/O mechanical aortic valve replacement [Z95.2]           Comments:            Anticoagulation Care Providers     Provider Role Specialty Phone number    Arnold Anderson MD Referring Internal Medicine [phone]

## 2021-06-25 NOTE — PROGRESS NOTES
Progress Notes by Kirsten Vora MD at 11/3/2017  3:30 PM     Author: Kirsten Vora MD Service: -- Author Type: Physician    Filed: 11/3/2017  4:16 PM Encounter Date: 11/3/2017 Status: Signed    : Kirsten Vora MD (Physician)           Click to link to [facility] HEART CARE NOTE    Thank you, Dr. Anderson, for asking the [facility] team to see Ms. Tammy Law to follow-up on her aortic valve disease and fatigue.    Assessment/Recommendations   Assessment:    1.  Fatigue, etiology unclear.  Last year we thought it may be related to her bradycardia and chronotropic incompetence although she has not noted any improvement in symptoms since implant of her permanent pacemaker.  No arrhythmias have been identified on the telemetry portion.  Her echocardiogram last August showed no worsening of her valve disease as a cause.  At this point, we will have her follow-up with her primary physician.  Could consider formal sleep evaluation although no reports of excessive snoring and she awakens in the morning feeling fairly rested.  2.  Aortic valve disease, status post mechanical aortic valve replacement in 1994.  Her valve tones sound crisp on examination.  I recommended a follow-up echo given her mildly elevated gradients and mild regurgitation.  3.  Mild mitral stenosis.      Plan:  1.  Continue current medications  2.  Set up echocardiogram to reassess her valve disease       History of Present Illness    Ms. Tammy Law is a 62 y.o. female with history of aortic valve disease status post mechanical aortic valve replacement in 1994 was slightly elevated gradient who I saw last year following an syncopal episode.  A subsequent Holter monitor demonstrated bradycardia but no evidence of high-grade AV block.  She subsequently had a stress study demonstrating sinus node dysfunction and underwent a dual-chamber pacemaker implant.  Despite having heart rates in the 60-70  range, she has noted no improvement in her symptoms of fatigue which have been chronic for the last couple of years.  She denies any exertional chest discomfort or exertional dyspnea.  No orthopnea, PND or lower extremity edema.    ECG (personally reviewed): No ECG today    Cardiac Imaging Studies (personally reviewed): No recent echo     Physical Examination Review of Systems   Vitals:    11/03/17 1540   BP: 94/62   Pulse: 80   Resp: 16     Body mass index is 25.58 kg/(m^2).  Wt Readings from Last 3 Encounters:   11/03/17 149 lb (67.6 kg)   10/23/17 148 lb (67.1 kg)   06/23/17 147 lb 3 oz (66.8 kg)     General Appearance:   Awake, Alert, No acute distress.   HEENT:  No scleral icterus; the mucous membranes were pink and moist.   Neck: No cervical bruits or jugular venous distention    Chest: The spine was straight. The chest was symmetric.   Lungs:   Respirations unlabored; the lungs are clear to auscultation. No wheezing   Cardiovascular:    Regular rate and rhythm.  S1 normal, S2 crisp and mechanical.  1/6 diastolic decrescendo murmur heard at the left sternal border.  No S3 gallop.   Abdomen:  No organomegaly, masses, bruits, or tenderness. Bowels sounds are present   Extremities:  No peripheral edema bilaterally   Skin: No xanthelasma. Warm, Dry.   Musculoskeletal: No tenderness.   Neurologic: Mood and affect are appropriate.    General: WNL  Eyes: WNL  Ears/Nose/Throat: WNL  Lungs: Shortness of Breath  Heart: Shortness of Breath with activity  Stomach: WNL  Bladder: WNL  Muscle/Joints: Muscle Pain  Skin: WNL  Nervous System: Loss of Balance  Mental Health: WNL     Blood: WNL     Medical History  Surgical History Family History Social History   Past Medical History:   Diagnosis Date   ? Anxiety    ? Chronic anticoagulation    ? Disease of thyroid gland     Hypothyroidism   ? H/O aortic valve replacement    ? Hyperlipidemia    ? Pacemaker     biotronik    Past Surgical History:   Procedure Laterality Date   ?  BREAST BIOPSY Left y-6   ? BREAST CYST EXCISION     ? CARDIAC CATHETERIZATION     ? CARDIAC PACEMAKER PLACEMENT     ? CARPAL TUNNEL RELEASE Bilateral    ? Hemmorhoidectomy     ? HYSTERECTOMY  2000   ? OOPHORECTOMY  2000   ? ID PART REMV BONE METATARSAL HEAD,EA Right 6/23/2017    Procedure: EXOSTECTOMY 2ND METATARSAL RIGHT FOOT;  Surgeon: Jessee Mccauley DPM;  Location: [facility];  Service: Podiatry   ? ID [id number] PROST AORTIC VALVE OPEN XCP HOMOGRF/STENT      Description: Aortic Valve Replacement;  Proc Date: 01/01/1995;   ? TOE SURGERY     ? TYMPANOSTOMY TUBE PLACEMENT      Family History   Problem Relation Age of Onset   ? Cancer Paternal Grandfather      Stomach   ? Ovarian cancer Cousin    ? No Medical Problems Mother    ? No Medical Problems Father    ? No Medical Problems Sister    ? No Medical Problems Daughter    ? No Medical Problems Maternal Grandmother    ? No Medical Problems Maternal Grandfather    ? No Medical Problems Paternal Grandmother    ? No Medical Problems Maternal Aunt    ? No Medical Problems Paternal Aunt    ? BRCA 1/2 Neg Hx    ? Breast cancer Neg Hx    ? Colon cancer Neg Hx    ? Endometrial cancer Neg Hx     Social History     Social History   ? Marital status: Single     Spouse name: N/A   ? Number of children: N/A   ? Years of education: N/A     Occupational History   ? Not on file.     Social History Main Topics   ? Smoking status: Never Smoker   ? Smokeless tobacco: Never Used   ? Alcohol use Yes      Comment: twice per year   ? Drug use: No   ? Sexual activity: Not on file     Other Topics Concern   ? Not on file     Social History Narrative          Medications  Allergies   Current Outpatient Prescriptions   Medication Sig Dispense Refill   ? cholecalciferol, vitamin D3, 1,000 unit tablet Take 1,000 Units by mouth daily.     ? citalopram (CELEXA) 40 MG tablet TAKE 1/2 TABLET BY MOUTH EVERY DAY 45 tablet 0   ? furosemide (LASIX) 40 MG tablet TAKE 1 TABLET BY MOUTH EVERY  OTHER DAY AS NEEDED FOR SWELLING 45 tablet 0   ? hydroCHLOROthiazide (MICROZIDE) 12.5 mg capsule Take 1 capsule (12.5 mg total) by mouth daily. 90 capsule 0   ? levothyroxine (SYNTHROID, LEVOTHROID) 75 MCG tablet TAKE 1 TABLET BY MOUTH EVERY DAY 90 tablet 0   ? simvastatin (ZOCOR) 40 MG tablet TAKE 1/2 TABLET BY MOUTH EVERY DAY 45 tablet 3   ? warfarin (COUMADIN) 2.5 MG tablet Take 2 to 3 tablets (5 to 7.5 mg) by mouth daily. Adjust dose based on INR results as directed. 200 tablet 0   ? amoxicillin (AMOXIL) 500 MG capsule Take 4 tablets prior to procedure 4 capsule 3   ? estrogens, conjugated,-methylTESTOSTERone (ESTRATEST HS) 0.625-1.25 mg per tablet Take 1 tablet by mouth daily. 84 tablet 0   ? levothyroxine (SYNTHROID, LEVOTHROID) 75 MCG tablet TAKE 1 TABLET BY MOUTH EVERY DAY 90 tablet 0   ? levothyroxine (SYNTHROID, LEVOTHROID) 75 MCG tablet TAKE 1 TABLET BY MOUTH EVERY DAY 90 tablet 0   ? oxyCODONE-acetaminophen (PERCOCET) 5-325 mg per tablet Take 1 tablet by mouth every 4 (four) hours as needed for pain. 30 tablet 0   ? warfarin (COUMADIN) 2.5 MG tablet TAKE UP TO 2 TABLETS BY MOUTH DAILY AS DIRECTED BY COUMADIN CLINIC 180 tablet 0     No current facility-administered medications for this visit.       Allergies   Allergen Reactions   ? Meperidine Other (See Comments)     hallucinations    ? Sulfa (Sulfonamide Antibiotics) Itching and Rash         Lab Results    Chemistry/lipid CBC Cardiac Enzymes/BNP/TSH/INR   Lab Results   Component Value Date    CHOL 158 12/26/2013    HDL 57 12/26/2013    LDLCALC 90.2 12/26/2013    TRIG 54 12/26/2013    CREATININE 0.77 10/07/2016    BUN 16 10/07/2016    K 3.9 06/16/2017     10/07/2016     10/07/2016    CO2 29 10/07/2016    Lab Results   Component Value Date    WBC 5.1 10/07/2016    HGB 12.1 06/16/2017    HCT 38.2 10/07/2016    MCV 88 10/07/2016     10/07/2016    Lab Results   Component Value Date    CKTOTAL 101 12/11/2009    CKMB 3 01/15/2014     TROPONINI <0.01 01/15/2014    BNP 87 01/15/2014    TSH 1.31 06/16/2017    INR 2.60 (H) 10/03/2017

## 2021-06-25 NOTE — TELEPHONE ENCOUNTER
ANTICOAGULATION  MANAGEMENT-Patient Home Monitoring Result    Assessment     Therapeutic INR result of 2.3 (goal INR of 2.0-3.0) received via fax from [employer] home INR monitoring company.        Previous INR was Therapeutic    Tammy Law last contacted by phone: 4/21    Plan     Per home monitoring agreement with patient, patient was NOT contacted regarding therapeutic result today.  Patient is to continue current dose and continue to check INR with home monitor per protocol.  ?   Pamella Sy RN    Subjective/Objective:      Tammy Law, a 65 y.o. female  is established on warfarin using a home INR monitor.    Anticoagulation Episode Summary     Current INR goal:  2.0-3.5   TTR:  86.5 % (1 y)   Next INR check:  6/30/2021   INR from last check:  2.30 (6/16/2021)   Weekly max warfarin dose:     Target end date:     INR check location:     Preferred lab:     Send INR reminders to:  [facility]    Indications    H/O mechanical aortic valve replacement [Z95.2]           Comments:           Anticoagulation Care Providers     Provider Role Specialty Phone number    Arnlod Anderson MD Referring Internal Medicine [phone]

## 2021-06-25 NOTE — PROGRESS NOTES
ANTICOAGULATION  MANAGEMENT-Patient Home Monitoring Result    Assessment     Therapeutic INR result of 2.5 (goal INR of 2.0-3.5) received via fax from [employer] home INR monitoring company.        Previous INR was Therapeutic    Tammy Law last contacted by phone: 4/21/21    Plan     Per home monitoring agreement with patient, patient was NOT contacted regarding therapeutic result today.  Patient is to continue current dose and continue to check INR with home monitor per protocol.  ?   Agnieszka Ruth RN    Subjective/Objective:      Tammy Law, a 65 y.o. female  is established on warfarin using a home INR monitor.    Anticoagulation Episode Summary     Current INR goal:  2.0-3.5   TTR:  86.5 % (1 y)   Next INR check:  6/16/2021   INR from last check:  2.50 (6/2/2021)   Weekly max warfarin dose:     Target end date:     INR check location:     Preferred lab:     Send INR reminders to:  [facility]    Indications    H/O mechanical aortic valve replacement [Z95.2]           Comments:           Anticoagulation Care Providers     Provider Role Specialty Phone number    Arnold Anderson MD Referring Internal Medicine [phone]

## 2021-06-26 NOTE — PROGRESS NOTES
Progress Notes by Veronica Haider PT at 6/11/2021  8:00 AM     Author: Veronica Haider PT Service: -- Author Type: Physical Therapist    Filed: 7/5/2021  8:43 AM Encounter Date: 6/11/2021 Status: Addendum    : Veronica Haider PT (Physical Therapist)    Related Notes: Original Note by Veronica Haider PT (Physical Therapist) filed at 6/11/2021  8:29 AM       [facility] Daily Progress     Patient Name: Tammy Law  Date: 6/11/2021  Visit #: 2/8-10  Referral Diagnosis: hip pain, right  (Right hip pain: suspect psoas and right tensor fascia александр.)  Referring provider: Kinza Hammond MD  Visit Diagnosis:     ICD-10-CM    1. Acute right hip pain  M25.551      Precautions:  ? Congenital Nystagmus   ? H/O mechanical aortic valve replacement   ? Acquired hypothyroidism   ? Cardiac pacemaker in situ, dual chamber   ? Epigastric pain   ? SSS (sick sinus syndrome) (H)      Assessment:   From Evaluation: Pt is a 65 y.o. year old female with right hip pain and associated sciatica that started 1 month ago.  Patient has difficulty with sleeping on her right side, sitting and performing sit to stand secondary to generalized hip pain with pain down posterior leg/knee/ankle.  Patient appears motivated to participate in Physical Therapy and present with a good Physical Therapy prognosis for resolution of activities limitations.     Patient demonstrates understanding/independence with home program.  Patient is benefitting from skilled physical therapy and is making steady progress toward functional goals.  Patient is appropriate to continue with skilled physical therapy intervention, as indicated by initial plan of care.     Pt is feeling significantly better. Mild pain behind knee for prolonged sitting or sit to stand. No longer feeling pain into ankle.     Goal Status:  Pt. will be independent with home exercise program in : 6 weeks Has performed 2x a week - moderate cueing needed   Pt.  will report decreased intensity, frequency of : Pain;Comment  Comment:: in right hip <4/10 in 10 weeks MET no longer having hip pain - pain is behind her knee only - mild  Pt will: put on shoes without difficulty in 10 weeks. Gotten better   Pt will: sit cross legged on floor without pain >2/10 in 10 weeks. MET - demonstrated today - mild discomfort in R hip but full ROM  Pt will: sleep on left hip without pain >1/10 in 12 weeks. MET       Plan / Patient Education:     Continue with initial plan of care.  Progress with home program as tolerated.     Plan for next visit:   [email or web address]  Adductor stretch   Clam   Review: HS stretch, Adductor stretch, sciatic nerve glide.   Address pain in posterior knee   Pt will call and cancel appointment by Monday if not needed.   Pelvic tilts/TA as pt does have low back pain with laying down .  Subjective:   Sitting for a while - feels some pain.   Pain Rating: discomfort behind her knees - achy    Objective:   Hip ROM:             Date: 05/26/21 6/11/21     Hip ROM( ) PROM in degrees AROM in degrees AROM in degrees     Right Left Right Left Right Left   Hip Flexion (0-120 ) 120 120           Hip Abduction (0-45 ) 45 - pain with return to neutral  45  45 - pain with return to neutral          Hip External Rotation (0-50 ) 50+ mild pain  50+  50+ mild low back pain          Hip Internal Rotation (0-40 ) 40 40          Exercises:  Exercise #1: SKTC hold 20-30 seconds 1-2 sets 2-3x a dayu  Comment #1: DKTC hold 20-30 seconds 1-2 sets 2-3x a day  Exercise #2: piriformis stretch in sitting with right knee to contralateral shoulder hold 20-30 seconds 2-3x a day (increased LE pain with pushing knee away)  Comment #2: Figure-4 piriformis stretch in supine hold 20-30 seconds on 1-2 sets  R 2-3x a day  Exercise #3: Sciatic nerve glide in supine x10  Comment #3: Hamstring stretch in supine with towel hold 20-30 seconds  Exercise #4: Adductor stretch in  supine with towel hold 20-30 seconds  Comment #4: butterfly stretch hold 20-30 seconds    Palpation: no pain with palpation to posterior knee, ITB, HS or quad    Treatment Today     TREATMENT MINUTES COMMENTS   Evaluation     Self-care/ Home management     Manual therapy     Neuromuscular Re-education     Therapeutic Activity     Therapeutic Exercises 25 See flow sheet above    Gait training     Modality__________________                Total 25    Blank areas are intentional and mean the treatment did not include these items.       Veronica Haider, PT  [facility] Discharge Summary  Patient Name: Tammy Law  Date: 7/5/2021  Referral Diagnosis: [unfilled]  Referring provider: Kinza Hammond MD  Visit Diagnosis:   1. Acute right hip pain         Goals:Goal Status:  Pt. will be independent with home exercise program in : 6 weeks Has performed 2x a week - moderate cueing needed   Pt. will report decreased intensity, frequency of : Pain;Comment  Comment:: in right hip <4/10 in 10 weeks MET no longer having hip pain - pain is behind her knee only - mild  Pt will: put on shoes without difficulty in 10 weeks. Gotten better   Pt will: sit cross legged on floor without pain >2/10 in 10 weeks. MET - demonstrated today - mild discomfort in R hip but full ROM  Pt will: sleep on left hip without pain >1/10 in 12 weeks. MET     Patient was seen for 2 visits from 5/26/21 to 6/11/21 with 0 missed appointments.  The patient met goals or was progressing and has demonstrated understanding of and independence in the home program for self-care, and progression to next steps.  She will initiate contact if questions or concerns arise.    Therapy will be discontinued at this time.  The patient will need a new referral to resume.    Thank you for your referral.  Veronica Haider  7/5/2021  8:42 AM  6/11/2021

## 2021-06-26 NOTE — PROGRESS NOTES
In clinic device check with Device RN.  Please see link for full device report.  Patient was informed of results and next follow up during today's visit.     IEGM is expected around 09/29/2021.

## 2021-06-28 NOTE — PROGRESS NOTES
Progress Notes by Kirsten Vora MD at 9/20/2019 12:50 PM     Author: Kirsten Vora MD Service: -- Author Type: Physician    Filed: 9/20/2019  1:36 PM Encounter Date: 9/20/2019 Status: Signed    : Kirsten Vora MD (Physician)           Click to link to [facility] HEART CARE NOTE    Thank you, Dr. Anderson, for asking the [facility] Heart Care team to see Ms. 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    Assessment/Recommendations   Assessment:    1.  Aortic valve disease, status post mechanical aortic valve replacement in 1994.  Her last echocardiogram demonstrated normal prosthetic valve function with no significant insufficiency.  Examination today again suggests normal bowel function.  INRs have remained therapeutic on warfarin anticoagulation.  2.  Sick sinus syndrome, status post dual-chamber pacemaker insertion.  Her last device check demonstrated normal device operation.  No evidence of atrial or ventricular arrhythmias.  3.  Fatigue, etiology unclear.  She has reported the symptoms to me over the last several years.  Again echocardiogram 1 year ago was unremarkable for progression of her valve disease and a nuclear stress study demonstrating no evidence of ischemia.  No history of excessive snoring or daytime somnolence to suggest unrecognized SALVADOR.  She does report jaw pain today so we will check an ECG to look for any evidence of ischemic changes as she predominately paces in the atrium.      Plan:  1.  Continue current medications  2.  Check ECG for evidence of any ischemic changes; this was subsequently found to be unchanged from her previous ECG  3.  Patient to follow-up in 1 year, sooner if any progression of symptoms.       History of Present Illness    Ms. Tammy Law is a 63 y.o. female with history of aortic valve disease, status post mechanical aortic valve replacement in 1994 with normal valve function by  echocardiogram 1 year ago, sinus node dysfunction status post dual-chamber pacemaker insertion, chronic fatigue and atypical chest discomfort symptoms with negative stress test 1 year ago who presents today for routine follow-up visit.  She tells me she continues to feel fatigued which has been a recurring complaint over the last several years.  I initially thought it may be due to bradycardia and chronotropic incompetence; however, her symptoms have continued despite implant of her permanent pacemaker and excellent heart rate response noted on her recent device check.  She reports some intermittent throat and jaw pain although not clearly related to activity.  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  No history of excessive snoring or witnessed apneic episodes.  She denies feeling excessively tired during the daytime.    ECG (personally reviewed): ECG demonstrates atrial paced ventricular sensed rhythm, right bundle branch block pattern.  No acute ST or T wave changes when compared to previous ECG.    Cardiac Imaging Studies (personally reviewed): No recent imaging     Physical Examination Review of Systems   Vitals:    09/20/19 1248   BP: 100/58   Pulse: 80   Resp: 16     Body mass index is 24.36 kg/m .  Wt Readings from Last 3 Encounters:   09/20/19 143 lb (64.9 kg)   07/09/19 148 lb (67.1 kg)   07/03/19 142 lb (64.4 kg)     General Appearance:   Awake, Alert, No acute distress.   HEENT:  No scleral icterus; the mucous membranes were pink and moist.   Neck: No cervical bruits or jugular venous distention    Chest: The spine was straight. The chest was symmetric.   Lungs:   Respirations unlabored; the lungs are clear to auscultation. No wheezing   Cardiovascular:    Regular rate and rhythm.  S1 normal, S2 crisp and mechanical.  1/6 holosystolic murmur heard at the apex.   Abdomen:  No organomegaly, masses, bruits, or tenderness. Bowels sounds are present    Extremities:  No peripheral edema bilaterally.   Skin: No xanthelasma. Warm, Dry.   Musculoskeletal: No tenderness.   Neurologic: Mood and affect are appropriate.    General: Weight Loss  Eyes: WNL  Ears/Nose/Throat: WNL  Lungs: WNL  Heart: Chest Pain, Shortness of Breath with activity(Pt had chest pain on the way to appointment, but denies experiencing it now. )  Stomach: WNL  Bladder: WNL  Muscle/Joints: WNL  Skin: WNL  Nervous System: WNL  Mental Health: WNL     Blood: WNL     Medical History  Surgical History Family History Social History   Past Medical History:   Diagnosis Date   ? Anxiety    ? Chronic anticoagulation    ? Disease of thyroid gland     Hypothyroidism   ? H/O aortic valve replacement    ? Hyperlipidemia    ? Pacemaker     biotronik    Past Surgical History:   Procedure Laterality Date   ? BREAST BIOPSY Left y-6   ? BREAST CYST EXCISION     ? CARDIAC CATHETERIZATION     ? CARDIAC PACEMAKER PLACEMENT     ? CARPAL TUNNEL RELEASE Bilateral    ? Hemmorhoidectomy     ? HYSTERECTOMY  2000   ? OOPHORECTOMY  2000   ? IA PART REMV BONE METATARSAL HEAD,EA Right 6/23/2017    Procedure: EXOSTECTOMY 2ND METATARSAL RIGHT FOOT;  Surgeon: Jessee Mccauley DPM;  Location: [facility];  Service: Podiatry   ? IA RPLCMT PROST AORTIC VALVE OPEN XCP HOMOGRF/STENT      Description: Aortic Valve Replacement;  Proc Date: 01/01/1995;   ? TOE SURGERY     ? TYMPANOSTOMY TUBE PLACEMENT      Family History   Problem Relation Age of Onset   ? Cancer Paternal Grandfather         Stomach   ? Ovarian cancer Cousin    ? No Medical Problems Mother    ? No Medical Problems Father    ? No Medical Problems Sister    ? No Medical Problems Daughter    ? No Medical Problems Maternal Grandmother    ? No Medical Problems Maternal Grandfather    ? No Medical Problems Paternal Grandmother    ? No Medical Problems Maternal Aunt    ? No Medical Problems Paternal Aunt    ? BRCA 1/2 Neg Hx    ? Breast cancer Neg Hx    ? Colon cancer  Neg Hx    ? Endometrial cancer Neg Hx     Social History     Socioeconomic History   ? Marital status: Single     Spouse name: Not on file   ? Number of children: Not on file   ? Years of education: Not on file   ? Highest education level: Not on file   Occupational History   ? Not on file   Social Needs   ? Financial resource strain: Not on file   ? Food insecurity:     Worry: Not on file     Inability: Not on file   ? Transportation needs:     Medical: Not on file     Non-medical: Not on file   Tobacco Use   ? Smoking status: Never Smoker   ? Smokeless tobacco: Never Used   Substance and Sexual Activity   ? Alcohol use: Yes     Comment: twice per year   ? Drug use: No   ? Sexual activity: Not on file   Lifestyle   ? Physical activity:     Days per week: Not on file     Minutes per session: Not on file   ? Stress: Not on file   Relationships   ? Social connections:     Talks on phone: Not on file     Gets together: Not on file     Attends Sikhism service: Not on file     Active member of club or organization: Not on file     Attends meetings of clubs or organizations: Not on file     Relationship status: Not on file   ? Intimate partner violence:     Fear of current or ex partner: Not on file     Emotionally abused: Not on file     Physically abused: Not on file     Forced sexual activity: Not on file   Other Topics Concern   ? Not on file   Social History Narrative   ? Not on file          Medications  Allergies   Current Outpatient Medications   Medication Sig Dispense Refill   ? acetaminophen (TYLENOL) 500 MG tablet Take 1 tablet (500 mg total) by mouth every 6 (six) hours as needed. (Patient taking differently: Take 500 mg by mouth as needed.       )  0   ? citalopram (CELEXA) 40 MG tablet TAKE 1/2 TABLET BY MOUTH EVERY DAY 45 tablet 2   ? furosemide (LASIX) 40 MG tablet TAKE 1 TABLET(40 MG) BY MOUTH DAILY 45 tablet 0   ? hydroCHLOROthiazide (MICROZIDE) 12.5 mg capsule TAKE 1 CAPSULE(12.5 MG) BY MOUTH DAILY  90 capsule 3   ? levothyroxine (SYNTHROID, LEVOTHROID) 75 MCG tablet TAKE 1 TABLET BY MOUTH EVERY DAY 90 tablet 0   ? simvastatin (ZOCOR) 40 MG tablet TAKE 1/2 TABLET BY MOUTH EVERY DAY 45 tablet 3   ? warfarin (COUMADIN/JANTOVEN) 2.5 MG tablet Take TWO tablets (5mg) to THREE tablets (7.5mg) by mouth daily, as directed.  Adjust dose based on INR result. 190 tablet 1   ? amoxicillin (AMOXIL) 500 MG capsule Take 2,000 mg by mouth as needed (Before Dental appointments).         0   ? levothyroxine (SYNTHROID, LEVOTHROID) 75 MCG tablet TAKE 1 TABLET BY MOUTH EVERY DAY 90 tablet 1     No current facility-administered medications for this visit.       Allergies   Allergen Reactions   ? Meperidine Other (See Comments)     hallucinations    ? Sulfa (Sulfonamide Antibiotics) Itching and Rash         Lab Results    Chemistry/lipid CBC Cardiac Enzymes/BNP/TSH/INR   Lab Results   Component Value Date    CHOL 158 12/26/2013    HDL 57 12/26/2013    LDLCALC 90.2 12/26/2013    TRIG 54 12/26/2013    CREATININE 0.78 12/21/2018    CREATININE 0.78 12/21/2018    BUN 16 12/21/2018    BUN 16 12/21/2018    K 3.7 12/21/2018    K 3.7 12/21/2018     12/21/2018     12/21/2018     12/21/2018     12/21/2018    CO2 27 12/21/2018    CO2 27 12/21/2018    Lab Results   Component Value Date    WBC 5.5 12/21/2018    HGB 12.3 12/21/2018    HCT 37.8 12/21/2018    MCV 90 12/21/2018     12/21/2018    Lab Results   Component Value Date    CKTOTAL 94 12/21/2018    CKMB 3 01/15/2014    TROPONINI <0.01 09/24/2018    BNP 87 01/15/2014    TSH 1.86 05/31/2019    INR 2.40 (H) 08/15/2019

## 2021-06-30 ENCOUNTER — AMBULATORY - HEALTHEAST (OUTPATIENT)
Dept: ANTICOAGULATION | Facility: CLINIC | Age: 66
End: 2021-06-30

## 2021-06-30 ENCOUNTER — TRANSFERRED RECORDS (OUTPATIENT)
Dept: HEALTH INFORMATION MANAGEMENT | Facility: CLINIC | Age: 66
End: 2021-06-30

## 2021-06-30 DIAGNOSIS — Z95.2 H/O MECHANICAL AORTIC VALVE REPLACEMENT: ICD-10-CM

## 2021-06-30 LAB — INR PPP: 2.2 (ref 0.9–1.1)

## 2021-06-30 NOTE — PROGRESS NOTES
Progress Notes by Pamella Sy RN at 4/28/2021  3:43 PM     Author: Pamella Sy RN Service: -- Author Type: Registered Nurse    Filed: 4/28/2021  3:49 PM Encounter Date: 4/28/2021 Status: Attested    : Pamella Sy RN (Registered Nurse) Cosigner: Christina Hernandez DO at 4/30/2021  6:58 AM    Attestation signed by Christina Hernandez DO at 4/30/2021  6:58 AM    I have reviewed the notes, assessments, and/or procedures performed by Radha Sy, I concur with her/his documentation of Tammy Law.    Christina Hernandez D.O.  [facility]                  Anticoagulation Annual Referral Renewal Review    Tammy Law's chart reviewed for annual renewal of referral to anticoagulation monitoring.        Criteria for anticoagulation nurse and/or pharmacist renewal met   Warfarin indication: Mechanical AVR Yes, per indication   Current with INR monitoring/compliant Yes Yes   Date of last office visit 4/27/21 Yes, had office visit within last year   Time in Therapeutic Range (TTR) 86.5 % Yes, TTR > 60%       Tammy Law met all criteria for anticoagulation management program initiated renewal.  New INR standing orders and anticoagulation referral renewal placed.      Pamella Sy RN  3:44 PM

## 2021-06-30 NOTE — PROGRESS NOTES
Progress Notes by Kirsten Vora MD at 12/16/2020 11:20 AM     Author: Kirsten Vora MD Service: -- Author Type: Physician    Filed: 12/16/2020 11:56 AM Encounter Date: 12/16/2020 Status: Signed    : Kirsten Vora MD (Physician)           Thank you, Dr. Lopez, for asking the [facility] Heart Care team to see Ms. Tammy Law to follow-up on aortic valve disease and sick sinus syndrome.    Assessment/Recommendations   Assessment:    1.  Aortic valve disease, status post mechanical aortic valve replacement in 1994.  Her last echocardiogram in 2018 demonstrating normal valve function.  By examination today, her valve is crisp with no significant murmur.  Have recommended pursuing an echocardiogram in 6 months for reevaluation.  2.  Sick sinus syndrome, status post dual-chamber pacemaker insertion.  Remote checks over the past year demonstrated no significant arrhythmias.      Plan:  1.  Continue current medications  2.  Plan echocardiogram in 6 months to reevaluate her mechanical aortic valve  3.  Tentative follow-up with me in 1 year       History of Present Illness    Ms. Tammy Law is a 65 y.o. female with history of aortic valve disease, status post mechanical aortic valve replacement in 1994, sick sinus syndrome status post dual-chamber pacemaker insertion who presents to the office today for routine yearly visit.  She tells me she has continued to do well over the course of the last year with no complaints of exertional chest discomfort or dyspnea, orthopnea, PND or lower extremity edema.  No decline in exercise capacity.  She does occasionally feel some shortness of breath when climbing stairs but otherwise has not noted any concerning symptoms.    ECG (personally reviewed): No ECG today    Cardiac Imaging Studies (personally reviewed): No new imaging     Physical Examination Review of Systems   Vitals:    12/16/20 1116   BP: 102/64   Pulse: 66   Resp: 16   SpO2: 98%     Body  mass index is 23 kg/m .  Wt Readings from Last 3 Encounters:   12/16/20 134 lb (60.8 kg)   10/02/20 136 lb (61.7 kg)   04/23/20 143 lb (64.9 kg)     General Appearance:   Awake, Alert, No acute distress.   HEENT:  No scleral icterus; the mucous membranes were pink and moist.   Neck: No cervical bruits or jugular venous distention    Chest: The spine was straight. The chest was symmetric.   Lungs:   Respirations unlabored; the lungs are clear to auscultation. No wheezing   Cardiovascular:    Regular rate and rhythm.  S1 normal, S2 crisp and mechanical.  No murmur or gallop.   Abdomen:  No organomegaly, masses, bruits, or tenderness. Bowels sounds are present   Extremities:  No peripheral edema bilaterally.   Skin: No xanthelasma. Warm, Dry.   Musculoskeletal: No tenderness.   Neurologic: Mood and affect are appropriate.    General: Weight Loss  Eyes: WNL  Ears/Nose/Throat: WNL  Lungs: WNL  Heart: Shortness of Breath with activity  Stomach: WNL  Bladder: WNL  Muscle/Joints: WNL  Skin: WNL  Nervous System: WNL  Mental Health: WNL     Blood: Easy Bleeding, Easy Bruising     Medical History  Surgical History Family History Social History   Past Medical History:   Diagnosis Date   ? Anxiety    ? Chronic anticoagulation    ? Disease of thyroid gland     Hypothyroidism   ? H/O aortic valve replacement    ? Hyperlipidemia    ? Pacemaker     biotronik   ? SSS (sick sinus syndrome) (H) 2/4/2020    Past Surgical History:   Procedure Laterality Date   ? BREAST BIOPSY Left y-6   ? BREAST CYST EXCISION     ? CARDIAC CATHETERIZATION     ? CARDIAC PACEMAKER PLACEMENT     ? CARPAL TUNNEL RELEASE Bilateral    ? Hemmorhoidectomy     ? HYSTERECTOMY  2000   ? OOPHORECTOMY  2000   ? AL PART REMV BONE METATARSAL HEAD,EA Right 6/23/2017    Procedure: EXOSTECTOMY 2ND METATARSAL RIGHT FOOT;  Surgeon: Jessee Mccauley DPM;  Location: [facility] OR;  Service: Podiatry   ? AL RPLCMT PROST AORTIC VALVE OPEN XCP HOMOGRF/STENT       Description: Aortic Valve Replacement;  Proc Date: 01/01/1995;   ? TOE SURGERY     ? TYMPANOSTOMY TUBE PLACEMENT      Family History   Problem Relation Age of Onset   ? Cancer Paternal Grandfather         Stomach   ? Ovarian cancer Cousin    ? Pacemaker Mother    ? Diabetes Mother    ? Heart attack Father    ? No Medical Problems Sister    ? No Medical Problems Daughter    ? No Medical Problems Maternal Grandmother    ? No Medical Problems Maternal Grandfather    ? No Medical Problems Paternal Grandmother    ? No Medical Problems Maternal Aunt    ? No Medical Problems Paternal Aunt    ? BRCA 1/2 Neg Hx    ? Breast cancer Neg Hx    ? Colon cancer Neg Hx    ? Endometrial cancer Neg Hx     Social History     Socioeconomic History   ? Marital status: Single     Spouse name: Not on file   ? Number of children: 0   ? Years of education: Not on file   ? Highest education level: Not on file   Occupational History   ? Occupation:  - [employer]   Social Needs   ? Financial resource strain: Not on file   ? Food insecurity     Worry: Not on file     Inability: Not on file   ? Transportation needs     Medical: Not on file     Non-medical: Not on file   Tobacco Use   ? Smoking status: Never Smoker   ? Smokeless tobacco: Never Used   Substance and Sexual Activity   ? Alcohol use: Yes     Comment: twice per year   ? Drug use: No   ? Sexual activity: Not Currently   Lifestyle   ? Physical activity     Days per week: Not on file     Minutes per session: Not on file   ? Stress: Not on file   Relationships   ? Social connections     Talks on phone: Not on file     Gets together: Not on file     Attends [employer] service: Not on file     Active member of club or organization: Not on file     Attends meetings of clubs or organizations: Not on file     Relationship status: Not on file   ? Intimate partner violence     Fear of current or ex partner: Not on file     Emotionally abused: Not on file     Physically abused: Not on  file     Forced sexual activity: Not on file   Other Topics Concern   ? Not on file   Social History Narrative   ? Not on file          Medications  Allergies   Current Outpatient Medications   Medication Sig Dispense Refill   ? cholecalciferol, vitamin D3, 1,000 unit (25 mcg) tablet Take 2,000 Units by mouth daily.     ? citalopram (CELEXA) 40 MG tablet Take 0.5 tablets (20 mg total) by mouth daily. 45 tablet 3   ? furosemide (LASIX) 40 MG tablet Take 1 tablet (40 mg total) by mouth daily. (Patient taking differently: Take 40 mg by mouth as needed. ) 90 tablet 3   ? hydroCHLOROthiazide (MICROZIDE) 12.5 mg capsule TAKE 1 CAPSULE(12.5 MG) BY MOUTH DAILY 90 capsule 3   ? levothyroxine (SYNTHROID, LEVOTHROID) 75 MCG tablet Take 1 tablet (75 mcg total) by mouth daily. 90 tablet 3   ? simvastatin (ZOCOR) 20 MG tablet Take 1 tablet (20 mg total) by mouth daily. 90 tablet 3   ? warfarin ANTICOAGULANT (COUMADIN/JANTOVEN) 2.5 MG tablet Take 2 tablets (5mg) to 3 tablets (7.5mg) by mouth daily, as directed.  Adjust dose based on INR results. 190 tablet 3   ? acetaminophen (TYLENOL) 500 MG tablet Take 1 tablet (500 mg total) by mouth every 6 (six) hours as needed. (Patient taking differently: Take 500 mg by mouth as needed.       )  0   ? amoxicillin (AMOXIL) 500 MG capsule Take 2,000 mg by mouth as needed (Before Dental appointments).         0     No current facility-administered medications for this visit.       Allergies   Allergen Reactions   ? Meperidine Other (See Comments)     hallucinations    ? Sulfa (Sulfonamide Antibiotics) Itching and Rash         Lab Results    Chemistry/lipid CBC Cardiac Enzymes/BNP/TSH/INR   Lab Results   Component Value Date    CHOL 176 10/02/2020    HDL 80 10/02/2020    LDLCALC 82 10/02/2020    TRIG 70 10/02/2020    CREATININE 0.91 10/02/2020    BUN 19 10/02/2020    K 3.9 10/02/2020     10/02/2020    CL 98 10/02/2020    CO2 34 (H) 10/02/2020    Lab Results   Component Value Date    WBC  4.5 10/02/2020    HGB 14.1 10/02/2020    HCT 42.7 10/02/2020    MCV 88 10/02/2020     10/02/2020    Lab Results   Component Value Date    CKTOTAL 94 12/21/2018    CKMB 3 01/15/2014    TROPONINI <0.01 01/06/2020    BNP 87 01/15/2014    TSH 1.28 10/02/2020    INR 2.20 (!) 12/16/2020

## 2021-07-04 NOTE — PROGRESS NOTES
Progress Notes by Lisa Mayer RN at 6/30/2021 10:45 AM     Author: Lisa Mayer RN Service: -- Author Type: Registered Nurse    Filed: 6/30/2021 10:46 AM Encounter Date: 6/30/2021 Status: Signed    : Lisa Mayer RN (Registered Nurse)       ANTICOAGULATION  MANAGEMENT-Patient Home Monitoring Result    Assessment     Therapeutic INR result of 2.2 (goal INR of 2.0-3.0) received via fax from [employer] home INR monitoring company.        Previous INR was Therapeutic    Tammy Law last contacted by phone: 4/21    Plan     Per home monitoring agreement with patient, patient was NOT contacted regarding therapeutic result today.  Patient is to continue current dose and continue to check INR with home monitor per protocol.  ?   Lisa Mayer RN    Subjective/Objective:      Tammy LUCA Law, a 65 y.o. female  is established on warfarin using a home INR monitor.    Anticoagulation Episode Summary     Current INR goal:  2.0-3.5   TTR:  86.5 % (1 y)   Next INR check:  7/14/2021   INR from last check:  2.20 (6/30/2021)   Weekly max warfarin dose:     Target end date:     INR check location:     Preferred lab:     Send INR reminders to:  [facility]    Indications    H/O mechanical aortic valve replacement [Z95.2]           Comments:           Anticoagulation Care Providers     Provider Role Specialty Phone number    Arnold Anderson MD Referring Internal Medicine [phone]

## 2021-07-11 PROBLEM — Z95.2 H/O MECHANICAL AORTIC VALVE REPLACEMENT: Status: ACTIVE | Noted: 2021-07-11

## 2021-07-13 ENCOUNTER — RECORDS - HEALTHEAST (OUTPATIENT)
Dept: ADMINISTRATIVE | Facility: CLINIC | Age: 66
End: 2021-07-13

## 2021-07-14 ENCOUNTER — TRANSFERRED RECORDS (OUTPATIENT)
Dept: HEALTH INFORMATION MANAGEMENT | Facility: CLINIC | Age: 66
End: 2021-07-14

## 2021-07-14 ENCOUNTER — ANTICOAGULATION THERAPY VISIT (OUTPATIENT)
Dept: ANTICOAGULATION | Facility: CLINIC | Age: 66
End: 2021-07-14

## 2021-07-14 DIAGNOSIS — Z95.2 H/O MECHANICAL AORTIC VALVE REPLACEMENT: Primary | ICD-10-CM

## 2021-07-14 PROBLEM — I49.5 SSS (SICK SINUS SYNDROME) (H): Status: RESOLVED | Noted: 2020-02-04 | Resolved: 2020-10-02

## 2021-07-14 LAB — INR PPP: 1.6

## 2021-07-14 NOTE — PROGRESS NOTES
ANTICOAGULATION MANAGEMENT     Tammy Law 65 year old female is on warfarin with subtherapeutic INR result. (Goal INR 2.0 - 3.5)    Recent labs: (last 7 days)     07/14/21  0000   INR 1.60       ASSESSMENT     Source(s): Patient/Caregiver Call       Warfarin doses taken: Less warfarin taken than planned which may be affecting INR    - reported cutting her warfarin dose, since she was drinking a little more alcohol over the weekend.    Diet: Change in alcohol intake may be affecting INR.   - drinking a little more (beer) per week.    New illness, injury, or hospitalization: No    Medication/supplement changes: None noted    Signs or symptoms of bleeding or clotting: No    Previous INR: Therapeutic last 2(+) visits    Additional findings: None     PLAN     Recommended plan for temporary change(s) affecting INR     Dosing Instructions: Booster dose then continue your current warfarin dose with next INR in 2 weeks       Summary  As of 7/14/2021    Full warfarin instructions:  5 mg every day   Next INR check:  7/28/2021             Telephone call with Tammy who verbalizes understanding and agrees to plan    Patient to recheck with home meter in 2 wks thru [employer].    Education provided: Importance of consistent vitamin K intake, Potential interaction between warfarin and alcohol, Target INR goal and significance of current INR result and Importance of taking warfarin as instructed    Plan made per [facility] anticoagulation protocol    Andree Rdz RN  Anticoagulation Clinic  7/14/2021    _______________________________________________________________________     Anticoagulation Episode Summary     Current INR goal:     TTR:  --   Target end date:     Send INR reminders to:  [facility]    Indications    H/O mechanical aortic valve replacement [Z95.2]           Comments:           Anticoagulation Care Providers     Provider Role Specialty Phone number    Arnold Anderson MD Referring Internal  Medicine [phone]

## 2021-07-16 ENCOUNTER — TELEPHONE (OUTPATIENT)
Dept: FAMILY MEDICINE | Facility: CLINIC | Age: 66
End: 2021-07-16

## 2021-07-16 RX ORDER — CITALOPRAM HYDROBROMIDE 20 MG/1
40 TABLET ORAL DAILY
Status: CANCELLED | OUTPATIENT
Start: 2021-07-16

## 2021-07-19 DIAGNOSIS — F33.42 RECURRENT MAJOR DEPRESSIVE DISORDER, IN FULL REMISSION (H): Primary | ICD-10-CM

## 2021-07-20 NOTE — TELEPHONE ENCOUNTER
Reason for Call:  Other prescription    Detailed comments: pt is calling back and does take 40 mg and then she breaks in half so actually 20.  She states they do break easy for her for 20 mgs-either way is fine as she is used to breaking them in half.    Phone Number Patient can be reached at: Home number on file [phone] (home)    Best Time: any    Can we leave a detailed message on this number? YES    Call taken on 7/20/2021 at 4:21 PM by Pam J. Behr

## 2021-07-21 ENCOUNTER — RECORDS - HEALTHEAST (OUTPATIENT)
Dept: ADMINISTRATIVE | Facility: CLINIC | Age: 66
End: 2021-07-21

## 2021-07-21 PROBLEM — E03.9 ACQUIRED HYPOTHYROIDISM: Status: ACTIVE | Noted: 2021-07-21

## 2021-07-21 PROBLEM — R10.13 EPIGASTRIC PAIN: Status: ACTIVE | Noted: 2021-07-21

## 2021-07-21 PROBLEM — H55.01 CONGENITAL NYSTAGMUS: Status: ACTIVE | Noted: 2021-07-21

## 2021-07-21 RX ORDER — CITALOPRAM HYDROBROMIDE 20 MG/1
20 TABLET ORAL DAILY
Qty: 90 TABLET | Refills: 3 | Status: SHIPPED | OUTPATIENT
Start: 2021-07-21 | End: 2022-07-08

## 2021-07-21 RX ORDER — FUROSEMIDE 40 MG
40 TABLET ORAL DAILY
COMMUNITY
Start: 2020-11-09 | End: 2022-07-08

## 2021-07-27 DIAGNOSIS — Z95.2 H/O MECHANICAL AORTIC VALVE REPLACEMENT: Primary | ICD-10-CM

## 2021-07-27 DIAGNOSIS — Z95.2 S/P AVR (AORTIC VALVE REPLACEMENT): ICD-10-CM

## 2021-07-28 ENCOUNTER — TRANSFERRED RECORDS (OUTPATIENT)
Dept: HEALTH INFORMATION MANAGEMENT | Facility: CLINIC | Age: 66
End: 2021-07-28

## 2021-07-28 ENCOUNTER — ANTICOAGULATION THERAPY VISIT (OUTPATIENT)
Dept: ANTICOAGULATION | Facility: CLINIC | Age: 66
End: 2021-07-28

## 2021-07-28 ENCOUNTER — HOSPITAL ENCOUNTER (OUTPATIENT)
Dept: CARDIOLOGY | Facility: HOSPITAL | Age: 66
Discharge: HOME | End: 2021-07-28
Attending: INTERNAL MEDICINE | Admitting: INTERNAL MEDICINE
Payer: COMMERCIAL

## 2021-07-28 DIAGNOSIS — I35.9 AORTIC VALVE DISORDER: ICD-10-CM

## 2021-07-28 DIAGNOSIS — Z95.2 H/O MECHANICAL AORTIC VALVE REPLACEMENT: Primary | ICD-10-CM

## 2021-07-28 LAB
INR (EXTERNAL): 2.2 (ref 0.1–1.1)
LVEF ECHO: NORMAL

## 2021-07-28 PROCEDURE — 93306 TTE W/DOPPLER COMPLETE: CPT | Mod: 26 | Performed by: INTERNAL MEDICINE

## 2021-07-28 PROCEDURE — 93306 TTE W/DOPPLER COMPLETE: CPT

## 2021-07-28 NOTE — PROGRESS NOTES
ANTICOAGULATION MANAGEMENT     Tammy Law 65 year old female is on warfarin with therapeutic INR result. (Goal INR The patient does not have an INR goal.)    Recent labs: (last 7 days)     07/28/21  0000   INR 2.2*       ASSESSMENT     Source(s): Chart Review and Patient/Caregiver Call       Warfarin doses taken: Warfarin taken as instructed    Diet: No new diet changes identified    New illness, injury, or hospitalization: No    Medication/supplement changes: None noted    Signs or symptoms of bleeding or clotting: No    Previous INR: Subtherapeutic    Additional findings: None     PLAN     Recommended plan for no diet, medication or health factor changes affecting INR     Dosing Instructions: Continue your current warfarin dose with next INR in 2 weeks       Summary  As of 7/28/2021    Full warfarin instructions:  5 mg every day   Next INR check:  8/11/2021             Telephone call with Tammy who verbalizes understanding and agrees to plan    Patient to recheck with home meter    Education provided: None required    Plan made per [facility] anticoagulation protocol    Pamella Sy RN  Anticoagulation Clinic  7/28/2021    _______________________________________________________________________     Anticoagulation Episode Summary     Current INR goal:     TTR:  --   Target end date:     Send INR reminders to:  MORENO [facility]WAY    Indications    H/O mechanical aortic valve replacement [Z95.2]           Comments:           Anticoagulation Care Providers     Provider Role Specialty Phone number    Arnold Anderson MD Referring Internal Medicine [phone]

## 2021-07-30 DIAGNOSIS — Z95.2 H/O MECHANICAL AORTIC VALVE REPLACEMENT: Primary | ICD-10-CM

## 2021-07-30 DIAGNOSIS — Z95.0 CARDIAC PACEMAKER IN SITU: ICD-10-CM

## 2021-08-04 DIAGNOSIS — Z95.2 H/O MECHANICAL AORTIC VALVE REPLACEMENT: Primary | ICD-10-CM

## 2021-08-04 RX ORDER — WARFARIN SODIUM 7.5 MG/1
TABLET ORAL
Qty: 190 TABLET | Refills: 3 | Status: SHIPPED | OUTPATIENT
Start: 2021-08-04 | End: 2021-08-06

## 2021-08-06 RX ORDER — WARFARIN SODIUM 2.5 MG/1
2.5-7.5 TABLET ORAL DAILY
Qty: 190 TABLET | Refills: 3 | Status: SHIPPED | OUTPATIENT
Start: 2021-08-06 | End: 2022-04-20

## 2021-08-11 ENCOUNTER — DOCUMENTATION ONLY (OUTPATIENT)
Dept: FAMILY MEDICINE | Facility: CLINIC | Age: 66
End: 2021-08-11

## 2021-08-11 ENCOUNTER — TRANSFERRED RECORDS (OUTPATIENT)
Dept: HEALTH INFORMATION MANAGEMENT | Facility: CLINIC | Age: 66
End: 2021-08-11

## 2021-08-11 DIAGNOSIS — Z95.2 H/O MECHANICAL AORTIC VALVE REPLACEMENT: Primary | ICD-10-CM

## 2021-08-11 LAB — INR (EXTERNAL): 2.2 (ref 0.9–1.1)

## 2021-08-11 NOTE — PROGRESS NOTES
ANTICOAGULATION  MANAGEMENT-Patient Home Monitoring Result    Assessment     Therapeutic INR result of 2.20 . Goal range 2.0 - 3.5. Received via fax from "[employer] home INR monitoring company.        Previous INR was therapeutic at 2.20 on 21.    Tammy was last contacted by phone: 21.    Plan     Per home monitoring agreement with patient, patient was NOT contacted regarding therapeutic result today.  Patient is to continue current dose and continue to check INR with home monitor per protocol.  ?       OBJECTIVE    INR (External)   Date Value Ref Range Status   2021 2.2 (A) 0.9 - 1.1 Final       ASSESSMENT / PLAN  No question data found.  Anticoagulation Summary  As of 2021    INR goal:     TTR:  --   INR used for dosin.2 (2021)   Warfarin maintenance plan:  5 mg (2.5 mg x 2) every day   Full warfarin instructions:  5 mg every day   Weekly warfarin total:  35 mg   No change documented:  Andree Rdz RN   Next INR check:  2021   Priority:  Maintenance   Target end date:      Indications    H/O mechanical aortic valve replacement [Z95.2]             Anticoagulation Episode Summary     INR check location:      Preferred lab:      Send INR reminders to:  MORENO MIDWAY    Comments:        Anticoagulation Care Providers     Provider Role Specialty Phone number    Arnold Anderson MD Referring Internal Medicine [phone]

## 2021-08-25 ENCOUNTER — DOCUMENTATION ONLY (OUTPATIENT)
Dept: ANTICOAGULATION | Facility: CLINIC | Age: 66
End: 2021-08-25

## 2021-08-25 ENCOUNTER — TRANSFERRED RECORDS (OUTPATIENT)
Dept: HEALTH INFORMATION MANAGEMENT | Facility: CLINIC | Age: 66
End: 2021-08-25

## 2021-08-25 DIAGNOSIS — Z95.2 H/O MECHANICAL AORTIC VALVE REPLACEMENT: Primary | ICD-10-CM

## 2021-08-25 LAB — INR (EXTERNAL): 2.4 (ref 2–3.5)

## 2021-08-25 NOTE — PROGRESS NOTES
ANTICOAGULATION  MANAGEMENT-Patient Home Monitoring Result    Assessment     Therapeutic INR result of 2.4 . Goal range 2.0-3.5. Received via fax from [employer] home INR monitoring company.        Previous INR was therapeutic    Tammy was last contacted by phone:     Plan     Per home monitoring agreement with patient, patient was NOT contacted regarding therapeutic result today.  Patient is to continue current dose and continue to check INR with home monitor per protocol.  ?       OBJECTIVE    INR (External)   Date Value Ref Range Status   2021 2.4 (A) 0.9 - 1.1 Final       ASSESSMENT / PLAN  No question data found.  Anticoagulation Summary  As of 2021    INR goal:     TTR:  --   INR used for dosin.4 (2021)   Warfarin maintenance plan:  5 mg (2.5 mg x 2) every day   Full warfarin instructions:  5 mg every day   Weekly warfarin total:  35 mg   No change documented:  Vijaya Vasquez RN   Next INR check:  2021   Priority:  Maintenance   Target end date:      Indications    H/O mechanical aortic valve replacement [Z95.2]             Anticoagulation Episode Summary     INR check location:      Preferred lab:      Send INR reminders to:  MORENO MIDWAY    Comments:        Anticoagulation Care Providers     Provider Role Specialty Phone number    Arnold Anderson MD Referring Internal Medicine [phone]

## 2021-09-05 ENCOUNTER — TRANSFERRED RECORDS (OUTPATIENT)
Dept: HEALTH INFORMATION MANAGEMENT | Facility: CLINIC | Age: 66
End: 2021-09-05

## 2021-09-05 LAB — INR (EXTERNAL): 2.2 (ref 0.9–1.1)

## 2021-09-07 ENCOUNTER — DOCUMENTATION ONLY (OUTPATIENT)
Dept: ANTICOAGULATION | Facility: CLINIC | Age: 66
End: 2021-09-07

## 2021-09-07 DIAGNOSIS — Z95.2 H/O MECHANICAL AORTIC VALVE REPLACEMENT: Primary | ICD-10-CM

## 2021-09-07 NOTE — PROGRESS NOTES
ANTICOAGULATION  MANAGEMENT-Patient Home Monitoring Result    Assessment     Therapeutic INR result of 2.2 . Goal range 2.0-3.5. Received via fax from [employer] home INR monitoring company.        Previous INR was therapeutic    Tammy was last contacted by phone: 21    Plan     Per home monitoring agreement with patient, patient was NOT contacted regarding therapeutic result today.  Patient is to continue current dose and continue to check INR with home monitor per protocol.  ?       OBJECTIVE    INR (External)   Date Value Ref Range Status   2021 2.2 (A) 0.9 - 1.1 Final       ASSESSMENT / PLAN  No question data found.  Anticoagulation Summary  As of 2021    INR goal:     TTR:  --   INR used for dosin.2 (2021)   Warfarin maintenance plan:  5 mg (2.5 mg x 2) every day   Full warfarin instructions:  5 mg every day   Weekly warfarin total:  35 mg   Next INR check:     Priority:  Maintenance   Target end date:      Indications    H/O mechanical aortic valve replacement [Z95.2]             Anticoagulation Episode Summary     INR check location:      Preferred lab:      Send INR reminders to:  MORENO MIDWAY    Comments:        Anticoagulation Care Providers     Provider Role Specialty Phone number    Arnold Anderson MD Referring Internal Medicine [phone]

## 2021-09-11 ENCOUNTER — HEALTH MAINTENANCE LETTER (OUTPATIENT)
Age: 66
End: 2021-09-11

## 2021-09-22 ENCOUNTER — TRANSFERRED RECORDS (OUTPATIENT)
Dept: HEALTH INFORMATION MANAGEMENT | Facility: CLINIC | Age: 66
End: 2021-09-22

## 2021-09-22 ENCOUNTER — DOCUMENTATION ONLY (OUTPATIENT)
Dept: ANTICOAGULATION | Facility: CLINIC | Age: 66
End: 2021-09-22

## 2021-09-22 DIAGNOSIS — Z95.2 H/O MECHANICAL AORTIC VALVE REPLACEMENT: Primary | ICD-10-CM

## 2021-09-22 LAB — INR (EXTERNAL): 2 (ref 0.9–1.1)

## 2021-09-22 NOTE — PROGRESS NOTES
ANTICOAGULATION  MANAGEMENT-Home Monitor Managed by Exception    Tammy M Ani 65 year old female is on warfarin with therapeutic INR result. (Goal INR 2.0-3.5.)    Recent labs: (last 7 days)     09/22/21  0000   INR 2.0*         Previous INR was Therapeutic    Medication, diet, health changes since last INR:chart reviewed; none idientified    Contacted within the last 12 weeks by phone on 7/28      ZAIN     Tammy was NOT contacted regarding therapeutic result today per home monitoring policy manage by exception agreement.   Current warfarin dose is to be continued:     Summary  As of 9/22/2021    Full warfarin instructions:  5 mg every day   Next INR check:             ?   Mary Carmen Jordan RN  Anticoagulation Clinic  9/22/2021    _______________________________________________________________________     Anticoagulation Episode Summary     Current INR goal:     TTR:  --   Target end date:     Send INR reminders to:  ANTICOLU [facility]WAY    Indications    H/O mechanical aortic valve replacement [Z95.2]           Comments:           Anticoagulation Care Providers     Provider Role Specialty Phone number    Arnold Anderson MD Referring Internal Medicine [phone]

## 2021-09-28 ENCOUNTER — ANCILLARY PROCEDURE (OUTPATIENT)
Dept: CARDIOLOGY | Facility: CLINIC | Age: 66
End: 2021-09-28
Attending: INTERNAL MEDICINE
Payer: COMMERCIAL

## 2021-09-28 DIAGNOSIS — Z95.0 CARDIAC PACEMAKER IN SITU: ICD-10-CM

## 2021-09-28 PROCEDURE — 93296 REM INTERROG EVL PM/IDS: CPT | Performed by: INTERNAL MEDICINE

## 2021-09-28 PROCEDURE — 93294 REM INTERROG EVL PM/LDLS PM: CPT | Performed by: INTERNAL MEDICINE

## 2021-09-29 LAB
MDC_IDC_LEAD_IMPLANT_DT: NORMAL
MDC_IDC_LEAD_IMPLANT_DT: NORMAL
MDC_IDC_LEAD_LOCATION: NORMAL
MDC_IDC_LEAD_LOCATION: NORMAL
MDC_IDC_LEAD_LOCATION_DETAIL_1: NORMAL
MDC_IDC_LEAD_LOCATION_DETAIL_1: NORMAL
MDC_IDC_LEAD_MFG: NORMAL
MDC_IDC_LEAD_MFG: NORMAL
MDC_IDC_LEAD_MODEL: NORMAL
MDC_IDC_LEAD_MODEL: NORMAL
MDC_IDC_LEAD_POLARITY_TYPE: NORMAL
MDC_IDC_LEAD_POLARITY_TYPE: NORMAL
MDC_IDC_LEAD_SERIAL: NORMAL
MDC_IDC_LEAD_SERIAL: NORMAL
MDC_IDC_LEAD_SPECIAL_FUNCTION: NORMAL
MDC_IDC_LEAD_SPECIAL_FUNCTION: NORMAL
MDC_IDC_MSMT_BATTERY_REMAINING_PERCENTAGE: 65 %
MDC_IDC_MSMT_BATTERY_STATUS: NORMAL
MDC_IDC_MSMT_LEADCHNL_RA_IMPEDANCE_VALUE: 624 OHM
MDC_IDC_MSMT_LEADCHNL_RA_IMPEDANCE_VALUE: 632 OHM
MDC_IDC_MSMT_LEADCHNL_RA_LEAD_CHANNEL_STATUS: NORMAL
MDC_IDC_MSMT_LEADCHNL_RV_IMPEDANCE_VALUE: 624 OHM
MDC_IDC_MSMT_LEADCHNL_RV_IMPEDANCE_VALUE: 635 OHM
MDC_IDC_MSMT_LEADCHNL_RV_LEAD_CHANNEL_STATUS: NORMAL
MDC_IDC_PG_IMPLANT_DTM: NORMAL
MDC_IDC_PG_MFG: NORMAL
MDC_IDC_PG_MODEL: NORMAL
MDC_IDC_PG_SERIAL: NORMAL
MDC_IDC_PG_TYPE: NORMAL
MDC_IDC_SESS_CLINIC_NAME: NORMAL
MDC_IDC_SESS_DTM: NORMAL
MDC_IDC_SESS_REPROGRAMMED: NO
MDC_IDC_SESS_TYPE: NORMAL
MDC_IDC_SET_BRADY_AT_MODE_SWITCH_MODE: NORMAL
MDC_IDC_SET_BRADY_AT_MODE_SWITCH_RATE: 160 {BEATS}/MIN
MDC_IDC_SET_BRADY_LOWRATE: 60 {BEATS}/MIN
MDC_IDC_SET_BRADY_MAX_SENSOR_RATE: 120 {BEATS}/MIN
MDC_IDC_SET_BRADY_MAX_TRACKING_RATE: 130 {BEATS}/MIN
MDC_IDC_SET_BRADY_MODE: NORMAL
MDC_IDC_SET_BRADY_PAV_DELAY_LOW: 200 MS
MDC_IDC_SET_BRADY_SAV_DELAY_LOW: 180 MS
MDC_IDC_SET_LEADCHNL_RA_PACING_AMPLITUDE: 1.6 V
MDC_IDC_SET_LEADCHNL_RA_PACING_POLARITY: NORMAL
MDC_IDC_SET_LEADCHNL_RA_PACING_PULSEWIDTH: 0.4 MS
MDC_IDC_SET_LEADCHNL_RA_SENSING_ADAPTATION_MODE: NORMAL
MDC_IDC_SET_LEADCHNL_RA_SENSING_POLARITY: NORMAL
MDC_IDC_SET_LEADCHNL_RV_PACING_AMPLITUDE: 1.8 V
MDC_IDC_SET_LEADCHNL_RV_PACING_POLARITY: NORMAL
MDC_IDC_SET_LEADCHNL_RV_PACING_PULSEWIDTH: 0.4 MS
MDC_IDC_SET_LEADCHNL_RV_SENSING_ADAPTATION_MODE: NORMAL
MDC_IDC_SET_LEADCHNL_RV_SENSING_POLARITY: NORMAL
MDC_IDC_STAT_AT_BURDEN_PERCENT: 0 %
MDC_IDC_STAT_AT_DTM_END: NORMAL
MDC_IDC_STAT_AT_DTM_START: NORMAL
MDC_IDC_STAT_AT_MODE_SW_COUNT_PER_DAY: 0
MDC_IDC_STAT_AT_MODE_SW_PERCENT_TIME_PER_DAY: 0 %
MDC_IDC_STAT_BRADY_AP_VP_PERCENT: 0 %
MDC_IDC_STAT_BRADY_AP_VS_PERCENT: 90 %
MDC_IDC_STAT_BRADY_AS_VP_PERCENT: 0 %
MDC_IDC_STAT_BRADY_AS_VS_PERCENT: 9 %
MDC_IDC_STAT_BRADY_DTM_END: NORMAL
MDC_IDC_STAT_BRADY_DTM_START: NORMAL
MDC_IDC_STAT_BRADY_RA_PERCENT_PACED: 91 %
MDC_IDC_STAT_BRADY_RV_PERCENT_PACED: 0 %
MDC_IDC_STAT_CRT_DTM_END: NORMAL
MDC_IDC_STAT_CRT_DTM_START: NORMAL

## 2021-10-06 ENCOUNTER — DOCUMENTATION ONLY (OUTPATIENT)
Dept: ANTICOAGULATION | Facility: CLINIC | Age: 66
End: 2021-10-06

## 2021-10-06 ENCOUNTER — TRANSFERRED RECORDS (OUTPATIENT)
Dept: HEALTH INFORMATION MANAGEMENT | Facility: CLINIC | Age: 66
End: 2021-10-06

## 2021-10-06 DIAGNOSIS — Z95.2 H/O MECHANICAL AORTIC VALVE REPLACEMENT: Primary | ICD-10-CM

## 2021-10-06 LAB — INR (EXTERNAL): 2.2 (ref 2–3.5)

## 2021-10-06 NOTE — PROGRESS NOTES
ANTICOAGULATION  MANAGEMENT-Home Monitor Managed by Exception    Tammy LUCA Ani 66 year old female is on warfarin with therapeutic INR result. (Goal INR The patient does not have an INR goal.)    Recent labs: (last 7 days)     10/06/21  0627   INR 2.2*         Previous INR was Therapeutic    Medication, diet, health changes since last INR:chart reviewed; none idientified    Contacted within the last 12 weeks by phone on 7/28/21      ZAIN     Tammy was NOT contacted regarding therapeutic result today per home monitoring policy manage by exception agreement.   Current warfarin dose is to be continued:     Summary  As of 10/6/2021    Full warfarin instructions:  5 mg every day   Next INR check:  10/20/2021           ?   Agnieszka Ruth RN  Anticoagulation Clinic  10/6/2021    _______________________________________________________________________     Anticoagulation Episode Summary     Current INR goal:     TTR:  --   Target end date:     Send INR reminders to:  ANTICOLU [facility]    Indications    H/O mechanical aortic valve replacement [Z95.2]           Comments:           Anticoagulation Care Providers     Provider Role Specialty Phone number    Arnold Anderson MD Referring Internal Medicine [phone]

## 2021-10-18 ENCOUNTER — NURSE TRIAGE (OUTPATIENT)
Dept: NURSING | Facility: CLINIC | Age: 66
End: 2021-10-18

## 2021-10-18 ENCOUNTER — VIRTUAL VISIT (OUTPATIENT)
Dept: URGENT CARE | Facility: CLINIC | Age: 66
End: 2021-10-18
Payer: COMMERCIAL

## 2021-10-18 DIAGNOSIS — M62.830 SPASM OF MUSCLE OF LOWER BACK: ICD-10-CM

## 2021-10-18 DIAGNOSIS — R05.9 COUGH: Primary | ICD-10-CM

## 2021-10-18 PROCEDURE — 99213 OFFICE O/P EST LOW 20 MIN: CPT | Mod: TEL | Performed by: PHYSICIAN ASSISTANT

## 2021-10-18 RX ORDER — CYCLOBENZAPRINE HCL 5 MG
5-10 TABLET ORAL 3 TIMES DAILY PRN
Qty: 30 TABLET | Refills: 0 | Status: SHIPPED | OUTPATIENT
Start: 2021-10-18 | End: 2022-01-11

## 2021-10-18 NOTE — TELEPHONE ENCOUNTER
"Pt returned from [place] within the past two weeks.  Reports \"head cold\" symptoms x one week.  Cough  Sore throat  Nasal congestion  \"Night sweats\"  \"Maybe some chills\"    \"Went to work today.\"  \"However somebody at work told me I should get a covid test.\"  Fully covid vaccinated.    Current sxs:  Cough \"causes spasms into my tailbone -> \"very painful.\"  Sore throat is mild -> able to remain hydrated.  \"Mild chest pressure.\"    Cardiac history.  Takes warfarin.    Second level triage to be completed promptly via virtual provider visit.  Pt agrees to plan.  Transferred to a  for appointment.    (Also discussed home care measures to relieve cough spells and remain well hydrated.)    Joleen SEGOV[facility] Nurse Advisor     Reason for Disposition    MILD difficulty breathing (e.g., minimal/no SOB at rest, SOB with walking, pulse <100)    Chest pain or pressure    Additional Information    Negative: SEVERE difficulty breathing (e.g., struggling for each breath, speaks in single words)    Negative: Difficult to awaken or acting confused (e.g., disoriented, slurred speech)    Negative: Bluish (or gray) lips or face now    Negative: Shock suspected (e.g., cold/pale/clammy skin, too weak to stand, low BP, rapid pulse)    Negative: Sounds like a life-threatening emergency to the triager    Negative: [1] COVID-19 exposure AND [2] no symptoms    Negative: COVID-19 vaccine reaction suspected (e.g., fever, headache, muscle aches) occurring 1 to 3 days after getting vaccine    Negative: COVID-19 vaccine, questions about    Negative: [1] Lives with someone known to have influenza (flu test positive) AND [2] flu-like symptoms (e.g., cough, runny nose, sore throat, SOB; with or without fever)    Negative: [1] Adult with possible COVID-19 symptoms AND [2] triager concerned about severity of symptoms or other causes    Negative: COVID-19 and breastfeeding, questions about    Negative: SEVERE or constant chest pain or pressure " "(Exception: mild central chest pain, present only when coughing)    Negative: MODERATE difficulty breathing (e.g., speaks in phrases, SOB even at rest, pulse 100-120)    Negative: [1] Headache AND [2] stiff neck (can't touch chin to chest)    Protocols used: CORONAVIRUS (COVID-19) DIAGNOSED OR [id number] 8.25.2021      _______________________    COVID 19 Nurse Triage Plan/Patient Instructions    Please be aware that novel coronavirus (COVID-19) may be circulating in the community. If you develop symptoms such as fever, cough, or SOB or if you have concerns about the presence of another infection including coronavirus (COVID-19), please contact your health care provider or visit [email or web address].     Disposition/Instructions    Additional COVID19 information to add for patients.   How can I protect others?  If you have symptoms (fever, cough, body aches or trouble breathing): Stay home and away from others (self-isolate) until:    At least 10 days have passed since your symptoms started, And     You ve had no fever--and no medicine that reduces fever--for 1 full day (24 hours), And      Your other symptoms have resolved (gotten better).     If you don t have symptoms, but a test showed that you have COVID-19 (you tested positive):    Stay home and away from others (self-isolate). Follow the tips under \"How do I self-isolate?\" below for 10 days (20 days if you have a weak immune system).    You don't need to be retested for COVID-19 before going back to school or work. As long as you're fever-free and feeling better, you can go back to school, work and other activities after waiting the 10 or 20 days.     How do I self-isolate?    Stay in your own room, even for meals. Use your own bathroom if you can.     Stay away from others in your home. No hugging, kissing or shaking hands. No visitors.    Don t go to work, school or anywhere else.     Clean  high touch  surfaces often (doorknobs, counters, " handles, etc.). Use a household cleaning spray or wipes. You ll find a full list on the EPA website:  www.epa.gov/pesticide-registration/list-n-disinfectants-use-against-sars-cov-2.    Cover your mouth and nose with a mask, tissue or washcloth to avoid spreading germs.    Wash your hands and face often. Use soap and water.    Caregivers in these groups are at risk for severe illness due to COVID-19:  o People 65 years and older  o People who live in a nursing home or long-term care facility  o People with chronic disease (lung, heart, cancer, diabetes, kidney, liver, immunologic)  o People who have a weakened immune system, including those who:  - Are in cancer treatment  - Take medicine that weakens the immune system, such as corticosteroids  - Had a bone marrow or organ transplant  - Have an immune deficiency  - Have poorly controlled HIV or AIDS  - Are obese (body mass index of 40 or higher)  - Smoke regularly    Caregivers should wear gloves while washing dishes, handling laundry and cleaning bedrooms and bathrooms.    Use caution when washing and drying laundry: Don t shake dirty laundry, and use the warmest water setting that you can.    For more tips, go to www.cdc.gov/coronavirus/2019-ncov/downloads/10Things.pdf.    How can I take care of myself?  1. Get lots of rest. Drink extra fluids (unless a doctor has told you not to).     2. Take Tylenol (acetaminophen) for fever or pain. If you have liver or kidney problems, ask your family doctor if it s okay to take Tylenol.     Adults can take either:     650 mg (two 325 mg pills) every 4 to 6 hours, or     1,000 mg (two 500 mg pills) every 8 hours as needed.     Note: Don t take more than 3,000 mg in one day.   Acetaminophen is found in many medicines (both prescribed and over-the-counter medicines). Read all labels to be sure you don t take too much.     For children, check the Tylenol bottle for the right dose. The dose is based on the child s age or  weight.    3. If you have other health problems (like cancer, heart failure, an organ transplant or severe kidney disease): Call your specialty clinic if you don t feel better in the next 2 days.    4. Know when to call 911: Emergency warning signs include:    Trouble breathing or shortness of breath    Pain or pressure in the chest that doesn t go away    Feeling confused like you haven t felt before, or not being able to wake up    Bluish-colored lips or face    What are the symptoms of COVID-19?     The most common symptoms are cough, fever and trouble breathing.     Less common symptoms include body aches, chills, diarrhea (loose, watery poops), fatigue (feeling very tired), headache, runny nose, sore throat and loss of smell.    COVID-19 can cause severe coughing (bronchitis) and lung infection (pneumonia).    How does it spread?     The virus may spread when a person coughs or sneezes into the air. The virus can travel about 6 feet this way, and it can live on surfaces.      Common  (household disinfectants) will kill the virus.    Who is at risk?  Anyone can catch COVID-19 if they re around someone who has the virus.    How can others protect themselves?     Stay away from people who have COVID-19 (or symptoms of COVID-19).    Wash hands often with soap and water. Or, use hand  with at least 60% alcohol.    Avoid touching the eyes, nose or mouth.     Wear a face mask when you go out in public, when sick or when caring for a sick person.    Where can I get more information?     [facility] [place]: About COVID-19: [email or web address]    CDC: What to Do If You re Sick: www.cdc.gov/coronavirus/2019-ncov/about/steps-when-sick.html    CDC: Ending Home Isolation: www.cdc.gov/coronavirus/2019-ncov/hcp/disposition-in-home-patients.html     CDC: Caring for Someone: www.cdc.gov/coronavirus/2019-ncov/if-you-are-sick/care-for-someone.html     MD: Interim Guidance for Hospital Discharge to  Home: [email or web address].[email or web address]/hcp/hospdischarge.pdf    [facility] clinical trials (COVID-19 research studies): [email or web address]-clinical-trials     Below are the COVID-19 hotlines at the [place] [facility] ([facility]). Interpreters are available.   o For health questions: Call 869-451-4053 or 1-248.240.3135 (7 a.m. to 7 p.m.)  o For questions about schools and childcare: Call 014-626-2449 or 1-716.737.9812 (7 a.m. to 7 p.m.)          Thank you for taking steps to prevent the spread of this virus.  o Limit your contact with others.  o Wear a simple mask to cover your cough.  o Wash your hands well and often.    Resources    [facility]: About COVID-19: [email or web address]    CDC: What to Do If You're Sick: www.cdc.gov/coronavirus/2019-ncov/about/steps-when-sick.html    CDC: Ending Home Isolation: www.cdc.gov/coronavirus/2019-ncov/hcp/disposition-in-home-patients.html     CDC: Caring for Someone: www.cdc.gov/coronavirus/2019-ncov/if-you-are-sick/care-for-someone.html     [facility]: Interim Guidance for Hospital Discharge to Home: www.[facility]./diseases/[email or web address]/hcp/hospdischarge.pdf    [facility] clinical trials (COVID-19 research studies): [email or web address]-clinical-trials     Below are the COVID-19 hotlines at the [place] [facility] ([facility]). Interpreters are available.   o For health questions: Call 476-967-3351 or 1-365.254.4909 (7 a.m. to 7 p.m.)  o For questions about schools and childcare: Call 867-433-1169 or 1-859.697.8016 (7 a.m. to 7 p.m.)

## 2021-10-18 NOTE — PROGRESS NOTES
Bisi is a 66 year old who is being evaluated via a billable telephone visit.      Assessment & Plan     Cough  - Symptomatic COVID-19 Virus (Coronavirus) by PCR; Future    Spasm of muscle of lower back  - cyclobenzaprine (FLEXERIL) 5 MG tablet; Take 1-2 tablets (5-10 mg) by mouth 3 times daily as needed for muscle spasms    I will order Covid testing and treat back spasm with a muscle relaxant. If worsening cough or difficulty breathing I would have her be seen in person in an urgent care or ER.     Kimberly Pedersen PA-C  Virtual Urgent Care  [facility]    Subjective   Bisi is a 66 year old who presents for the following health issues : covid concern    HPI - Patient is having a telephone visit due to concern about breathing. She says she was out of town last week and woke on 10/10 with a sore throat and then developed a cough and cold symptoms. She has not had fevers has had a little low back spasms with cough but denies generalized body aches. She does not have a productive cough but feels like there should be more coming up. She denies any SOB. Her work wants a Covid test before she can return. She thinks that her symptoms have been worsening a little over the last couple days.     Review of Systems   Constitutional, HEENT, cardiovascular, pulmonary, gi and gu systems are negative, except as otherwise noted.      Objective           Vitals:  No vitals were obtained today due to virtual visit.    Physical Exam   alert and no distress  PSYCH: Alert and oriented times 3; coherent speech, normal   rate and volume, able to articulate logical thoughts, able   to abstract reason, no tangential thoughts, no hallucinations   or delusions  Her affect is normal  RESP: Deep cough, no audible wheezing, able to talk in full sentences  Remainder of exam unable to be completed due to telephone visits        Phone call duration: 9 minutes

## 2021-10-19 ENCOUNTER — LAB (OUTPATIENT)
Dept: FAMILY MEDICINE | Facility: CLINIC | Age: 66
End: 2021-10-19
Attending: PHYSICIAN ASSISTANT
Payer: COMMERCIAL

## 2021-10-19 DIAGNOSIS — R05.9 COUGH: ICD-10-CM

## 2021-10-19 LAB — SARS-COV-2 RNA RESP QL NAA+PROBE: NEGATIVE

## 2021-10-19 PROCEDURE — U0005 INFEC AGEN DETEC AMPLI PROBE: HCPCS

## 2021-10-19 PROCEDURE — U0003 INFECTIOUS AGENT DETECTION BY NUCLEIC ACID (DNA OR RNA); SEVERE ACUTE RESPIRATORY SYNDROME CORONAVIRUS 2 (SARS-COV-2) (CORONAVIRUS DISEASE [COVID-19]), AMPLIFIED PROBE TECHNIQUE, MAKING USE OF HIGH THROUGHPUT TECHNOLOGIES AS DESCRIBED BY CMS-2020-01-R: HCPCS

## 2021-10-20 ENCOUNTER — TRANSFERRED RECORDS (OUTPATIENT)
Dept: HEALTH INFORMATION MANAGEMENT | Facility: CLINIC | Age: 66
End: 2021-10-20
Payer: COMMERCIAL

## 2021-10-20 ENCOUNTER — DOCUMENTATION ONLY (OUTPATIENT)
Dept: ANTICOAGULATION | Facility: CLINIC | Age: 66
End: 2021-10-20

## 2021-10-20 DIAGNOSIS — Z95.2 H/O MECHANICAL AORTIC VALVE REPLACEMENT: Primary | ICD-10-CM

## 2021-10-20 LAB — INR (EXTERNAL): 2.6 (ref 0.9–1.1)

## 2021-10-20 NOTE — PROGRESS NOTES
ANTICOAGULATION  MANAGEMENT-Home Monitor Managed by Exception    Tammy M Ani 66 year old female is on warfarin with therapeutic INR result. (Goal INR 2.0-3.5)    Recent labs: (last 7 days)     10/20/21  1104   INR 2.6*         Previous INR was Therapeutic    Medication, diet, health changes since last INR:chart reviewed; none idientified    Contacted within the last 12 weeks by phone on 7/28      ZAIN     Tammy was NOT contacted regarding therapeutic result today per home monitoring policy manage by exception agreement.   Current warfarin dose is to be continued:     Summary  As of 10/20/2021    Full warfarin instructions:  5 mg every day   Next INR check:  11/3/2021           ?   Lisa Mayer RN  Anticoagulation Clinic  10/20/2021    _______________________________________________________________________     Anticoagulation Episode Summary     Current INR goal:     TTR:  --   Target end date:     Send INR reminders to:  MORENO [facility]WAY    Indications    H/O mechanical aortic valve replacement [Z95.2]           Comments:  Target goal 2.0-3.5         Anticoagulation Care Providers     Provider Role Specialty Phone number    Arnold Anderson MD Referring Internal Medicine [phone]

## 2021-11-04 ENCOUNTER — ANTICOAGULATION THERAPY VISIT (OUTPATIENT)
Dept: ANTICOAGULATION | Facility: CLINIC | Age: 66
End: 2021-11-04

## 2021-11-04 ENCOUNTER — TRANSFERRED RECORDS (OUTPATIENT)
Dept: HEALTH INFORMATION MANAGEMENT | Facility: CLINIC | Age: 66
End: 2021-11-04
Payer: COMMERCIAL

## 2021-11-04 DIAGNOSIS — Z95.2 H/O MECHANICAL AORTIC VALVE REPLACEMENT: Primary | ICD-10-CM

## 2021-11-04 LAB — INR (EXTERNAL): 2.2 (ref 0.9–1.1)

## 2021-11-04 NOTE — PROGRESS NOTES
ANTICOAGULATION MANAGEMENT     Tammy Law 66 year old female is on warfarin with therapeutic INR result. (Goal INR The patient does not have an INR goal.)    Recent labs: (last 7 days)     11/03/21  1200   INR 2.2*       ASSESSMENT     Source(s): Chart Review and Patient/Caregiver Call       Warfarin doses taken: Warfarin taken as instructed    Diet: No new diet changes identified    New illness, injury, or hospitalization: No    Medication/supplement changes: None noted    Signs or symptoms of bleeding or clotting: No    Previous INR: Therapeutic last 6 INR visits    Additional findings: None     PLAN     Recommended plan for no diet, medication or health factor changes affecting INR     Dosing Instructions:    Continue your current warfarin dose with next INR in 2 weeks       Summary  As of 11/4/2021    Full warfarin instructions:  5 mg every day   Next INR check:  11/18/2021             Telephone call with Bisi ([phone]) who verbalizes understanding and agrees to plan    Patient to recheck with home meter thru [employer] and checks every 2 wks.    Education provided: Importance of consistent vitamin K intake, Goal range and significance of current result, Monitoring for bleeding signs and symptoms, Monitoring for clotting signs and symptoms, When to seek medical attention/emergency care, Importance of notifying clinic for changes in medications; a sooner lab recheck maybe needed., Importance of notifying clinic for diarrhea, nausea/vomiting, reduced intake, and/or illness; a sooner lab recheck maybe needed. and Importance of notifying clinic of upcoming surgeries and procedures 2 weeks in advance    Plan made per [facility] anticoagulation protocol    Andree Rdz RN  Anticoagulation Clinic  11/4/2021    _______________________________________________________________________     Anticoagulation Episode Summary     Current INR goal:     TTR:  --   Target end date:     Send INR reminders to:  MORENO  MIDWAY    Indications    H/O mechanical aortic valve replacement [Z95.2]           Comments:  Target goal 2.0-3.5         Anticoagulation Care Providers     Provider Role Specialty Phone number    Arnold Anderson MD Referring Internal Medicine [phone]

## 2021-11-04 NOTE — PROGRESS NOTES
Incoming fax from [employer] home monitoring company    Date of result 11/3    INR result 2.2      Due for quarterly call.

## 2021-11-06 ENCOUNTER — HEALTH MAINTENANCE LETTER (OUTPATIENT)
Age: 66
End: 2021-11-06

## 2021-11-17 ENCOUNTER — TRANSFERRED RECORDS (OUTPATIENT)
Dept: HEALTH INFORMATION MANAGEMENT | Facility: CLINIC | Age: 66
End: 2021-11-17
Payer: COMMERCIAL

## 2021-11-17 LAB — INR (EXTERNAL): 2 (ref 0.9–1.1)

## 2021-11-18 ENCOUNTER — DOCUMENTATION ONLY (OUTPATIENT)
Dept: ANTICOAGULATION | Facility: CLINIC | Age: 66
End: 2021-11-18
Payer: COMMERCIAL

## 2021-11-18 DIAGNOSIS — Z95.2 H/O MECHANICAL AORTIC VALVE REPLACEMENT: Primary | ICD-10-CM

## 2021-11-18 NOTE — PROGRESS NOTES
ANTICOAGULATION  MANAGEMENT-Home Monitor Managed by Exception    Tammy M Ani 66 year old female is on warfarin with therapeutic INR result. (Goal INR 2.0-3.5)    Recent labs: (last 7 days)     11/17/21 1947   INR 2.0*         Previous INR was Therapeutic    Medication, diet, health changes since last INR:chart reviewed; none identified    Contacted within the last 12 weeks by phone on 11/4/21      PLAN     Tammy was NOT contacted regarding therapeutic result today per home monitoring policy manage by exception agreement.   Current warfarin dose is to be continued:     Summary  As of 11/18/2021    Full warfarin instructions:  5 mg every day   Next INR check:  12/1/2021           ?   Agnieszka Ruth RN  [facility]tico[facility]  11/18/2021    _______________________________________________________________________     Anticoagulation Episode Summary     Current INR goal:     TTR:  --   Target end date:     Send INR reminders to:  [facility]    Indications    H/O mechanical aortic valve replacement [Z95.2]           Comments:  Target goal 2.0-3.5         Anticoagulation Care Providers     Provider Role Specialty Phone number    Arnold Anderson MD Referring Internal Medicine [phone]

## 2021-12-01 ENCOUNTER — TRANSFERRED RECORDS (OUTPATIENT)
Dept: HEALTH INFORMATION MANAGEMENT | Facility: CLINIC | Age: 66
End: 2021-12-01
Payer: COMMERCIAL

## 2021-12-01 ENCOUNTER — DOCUMENTATION ONLY (OUTPATIENT)
Dept: ANTICOAGULATION | Facility: CLINIC | Age: 66
End: 2021-12-01
Payer: COMMERCIAL

## 2021-12-01 DIAGNOSIS — Z95.2 H/O MECHANICAL AORTIC VALVE REPLACEMENT: Primary | ICD-10-CM

## 2021-12-01 LAB — INR (EXTERNAL): 2.1 (ref 0.9–1.1)

## 2021-12-01 NOTE — PROGRESS NOTES
ANTICOAGULATION  MANAGEMENT-Home Monitor Managed by Exception    Tammyrober Narayanpriscilla 66 year old female is on warfarin with therapeutic INR result. (Goal INR 2.0-3.5)    Recent labs: (last 7 days)     12/01/21  1059   INR 2.1*         Previous INR was Therapeutic    Medication, diet, health changes since last INR:chart reviewed; none identified    Contacted within the last 12 weeks by phone on 11/4      PLAN     Tammy was NOT contacted regarding therapeutic result today per home monitoring policy manage by exception agreement.   Current warfarin dose is to be continued:     Summary  As of 12/1/2021    Full warfarin instructions:  5 mg every day   Next INR check:  12/15/2021           ?   Lisa Mayer RN  Anticoagulation Clinic  12/1/2021    _______________________________________________________________________     Anticoagulation Episode Summary     Current INR goal:     TTR:  --   Target end date:     Send INR reminders to:  [facility]    Indications    H/O mechanical aortic valve replacement [Z95.2]           Comments:  Target goal 2.0-3.5         Anticoagulation Care Providers     Provider Role Specialty Phone number    Arnold Anderson MD Referring Internal Medicine [phone]

## 2021-12-15 ENCOUNTER — TRANSFERRED RECORDS (OUTPATIENT)
Dept: HEALTH INFORMATION MANAGEMENT | Facility: CLINIC | Age: 66
End: 2021-12-15
Payer: COMMERCIAL

## 2021-12-15 ENCOUNTER — ANTICOAGULATION THERAPY VISIT (OUTPATIENT)
Dept: ANTICOAGULATION | Facility: CLINIC | Age: 66
End: 2021-12-15
Payer: COMMERCIAL

## 2021-12-15 DIAGNOSIS — Z95.2 H/O MECHANICAL AORTIC VALVE REPLACEMENT: Primary | ICD-10-CM

## 2021-12-15 LAB — INR HOME MONITORING: 2.1 (ref 2–3.5)

## 2021-12-15 NOTE — PROGRESS NOTES
ANTICOAGULATION MANAGEMENT     Tammy Law 66 year old female is on warfarin with subtherapeutic INR result. (Goal INR The patient does not have an INR goal.)    Recent labs: (last 7 days)     12/15/21  0000   INR 2.10       ASSESSMENT     Source(s): Chart Review and Patient/Caregiver Call       Warfarin doses taken: Warfarin taken as instructed    Diet: No new diet changes identified    New illness, injury, or hospitalization: No    Medication/supplement changes: None noted    Signs or symptoms of bleeding or clotting: No    Previous INR: Therapeutic last 2(+) visits    Additional findings: None     PLAN     Recommended plan for no diet, medication or health factor changes affecting INR     Dosing Instructions: Continue your current warfarin dose with next INR in 2 weeks       Summary  As of 12/15/2021    Full warfarin instructions:  5 mg every day   Next INR check:  12/29/2021             Telephone call with Tammy who verbalizes understanding and agrees to plan    Patient to recheck with home meter    Education provided: None required    Plan made per [facility] anticoagulation protocol    Pamella Sy RN  Anticoagulation Clinic  12/15/2021    _______________________________________________________________________     Anticoagulation Episode Summary     Current INR goal:     TTR:  --   Target end date:     Send INR reminders to:  [facility]    Indications    H/O mechanical aortic valve replacement [Z95.2]           Comments:  Target goal 2.0-3.5         Anticoagulation Care Providers     Provider Role Specialty Phone number    Arnold Anderson MD Referring Internal Medicine [phone]

## 2021-12-27 ENCOUNTER — ANCILLARY PROCEDURE (OUTPATIENT)
Dept: CARDIOLOGY | Facility: CLINIC | Age: 66
End: 2021-12-27
Attending: INTERNAL MEDICINE
Payer: COMMERCIAL

## 2021-12-27 DIAGNOSIS — I49.5 SICK SINUS SYNDROME (H): ICD-10-CM

## 2021-12-27 DIAGNOSIS — Z95.0 CARDIAC PACEMAKER IN SITU: ICD-10-CM

## 2021-12-27 LAB
MDC_IDC_LEAD_IMPLANT_DT: NORMAL
MDC_IDC_LEAD_IMPLANT_DT: NORMAL
MDC_IDC_LEAD_LOCATION: NORMAL
MDC_IDC_LEAD_LOCATION: NORMAL
MDC_IDC_LEAD_LOCATION_DETAIL_1: NORMAL
MDC_IDC_LEAD_LOCATION_DETAIL_1: NORMAL
MDC_IDC_LEAD_MFG: NORMAL
MDC_IDC_LEAD_MFG: NORMAL
MDC_IDC_LEAD_MODEL: NORMAL
MDC_IDC_LEAD_MODEL: NORMAL
MDC_IDC_LEAD_POLARITY_TYPE: NORMAL
MDC_IDC_LEAD_POLARITY_TYPE: NORMAL
MDC_IDC_LEAD_SERIAL: NORMAL
MDC_IDC_LEAD_SERIAL: NORMAL
MDC_IDC_LEAD_SPECIAL_FUNCTION: NORMAL
MDC_IDC_LEAD_SPECIAL_FUNCTION: NORMAL
MDC_IDC_MSMT_BATTERY_REMAINING_PERCENTAGE: 65 %
MDC_IDC_MSMT_BATTERY_STATUS: NORMAL
MDC_IDC_MSMT_LEADCHNL_RA_IMPEDANCE_VALUE: 627 OHM
MDC_IDC_MSMT_LEADCHNL_RA_LEAD_CHANNEL_STATUS: NORMAL
MDC_IDC_MSMT_LEADCHNL_RV_IMPEDANCE_VALUE: 638 OHM
MDC_IDC_MSMT_LEADCHNL_RV_LEAD_CHANNEL_STATUS: NORMAL
MDC_IDC_PG_IMPLANT_DTM: NORMAL
MDC_IDC_PG_MFG: NORMAL
MDC_IDC_PG_MODEL: NORMAL
MDC_IDC_PG_SERIAL: NORMAL
MDC_IDC_PG_TYPE: NORMAL
MDC_IDC_SESS_CLINIC_NAME: NORMAL
MDC_IDC_SESS_DTM: NORMAL
MDC_IDC_SESS_REPROGRAMMED: NO
MDC_IDC_SESS_TYPE: NORMAL
MDC_IDC_SET_BRADY_AT_MODE_SWITCH_MODE: NORMAL
MDC_IDC_SET_BRADY_AT_MODE_SWITCH_RATE: 160 {BEATS}/MIN
MDC_IDC_SET_BRADY_LOWRATE: 60 {BEATS}/MIN
MDC_IDC_SET_BRADY_MAX_SENSOR_RATE: 120 {BEATS}/MIN
MDC_IDC_SET_BRADY_MAX_TRACKING_RATE: 130 {BEATS}/MIN
MDC_IDC_SET_BRADY_MODE: NORMAL
MDC_IDC_SET_BRADY_PAV_DELAY_LOW: 200 MS
MDC_IDC_SET_BRADY_SAV_DELAY_LOW: 180 MS
MDC_IDC_SET_LEADCHNL_RA_PACING_AMPLITUDE: 1.6 V
MDC_IDC_SET_LEADCHNL_RA_PACING_POLARITY: NORMAL
MDC_IDC_SET_LEADCHNL_RA_PACING_PULSEWIDTH: 0.4 MS
MDC_IDC_SET_LEADCHNL_RA_SENSING_ADAPTATION_MODE: NORMAL
MDC_IDC_SET_LEADCHNL_RA_SENSING_POLARITY: NORMAL
MDC_IDC_SET_LEADCHNL_RV_PACING_AMPLITUDE: 1.8 V
MDC_IDC_SET_LEADCHNL_RV_PACING_POLARITY: NORMAL
MDC_IDC_SET_LEADCHNL_RV_PACING_PULSEWIDTH: 0.4 MS
MDC_IDC_SET_LEADCHNL_RV_SENSING_ADAPTATION_MODE: NORMAL
MDC_IDC_SET_LEADCHNL_RV_SENSING_POLARITY: NORMAL
MDC_IDC_STAT_AT_BURDEN_PERCENT: 0 %
MDC_IDC_STAT_AT_DTM_END: NORMAL
MDC_IDC_STAT_AT_DTM_START: NORMAL
MDC_IDC_STAT_AT_MODE_SW_COUNT_PER_DAY: 0
MDC_IDC_STAT_AT_MODE_SW_PERCENT_TIME_PER_DAY: 0 %
MDC_IDC_STAT_BRADY_AP_VP_PERCENT: 0 %
MDC_IDC_STAT_BRADY_AP_VS_PERCENT: 90 %
MDC_IDC_STAT_BRADY_AS_VP_PERCENT: 0 %
MDC_IDC_STAT_BRADY_AS_VS_PERCENT: 9 %
MDC_IDC_STAT_BRADY_DTM_END: NORMAL
MDC_IDC_STAT_BRADY_DTM_START: NORMAL
MDC_IDC_STAT_BRADY_RA_PERCENT_PACED: 90 %
MDC_IDC_STAT_BRADY_RV_PERCENT_PACED: 1 %
MDC_IDC_STAT_CRT_DTM_END: NORMAL
MDC_IDC_STAT_CRT_DTM_START: NORMAL

## 2021-12-27 PROCEDURE — 93296 REM INTERROG EVL PM/IDS: CPT | Performed by: INTERNAL MEDICINE

## 2021-12-27 PROCEDURE — 93294 REM INTERROG EVL PM/LDLS PM: CPT | Performed by: INTERNAL MEDICINE

## 2021-12-29 ENCOUNTER — ANTICOAGULATION THERAPY VISIT (OUTPATIENT)
Dept: ANTICOAGULATION | Facility: CLINIC | Age: 66
End: 2021-12-29
Payer: COMMERCIAL

## 2021-12-29 ENCOUNTER — TRANSFERRED RECORDS (OUTPATIENT)
Dept: HEALTH INFORMATION MANAGEMENT | Facility: CLINIC | Age: 66
End: 2021-12-29
Payer: COMMERCIAL

## 2021-12-29 DIAGNOSIS — Z95.2 H/O MECHANICAL AORTIC VALVE REPLACEMENT: Primary | ICD-10-CM

## 2021-12-29 LAB — INR HOME MONITORING: 1.9 (ref 2–3.5)

## 2021-12-29 NOTE — PROGRESS NOTES
ANTICOAGULATION MANAGEMENT     Tammy Law 66 year old female is on warfarin with subtherapeutic INR result. (Goal INR 2.0-3.5.)    Recent labs: (last 7 days)     12/29/21  0000   INR 1.90*       ASSESSMENT     Source(s): Chart Review and Patient/Caregiver Call       Warfarin doses taken: Warfarin taken as instructed    Diet: No new diet changes identified    New illness, injury, or hospitalization: No    Medication/supplement changes: None noted    Signs or symptoms of bleeding or clotting: No    Previous INR: Therapeutic last 2(+) visits    Additional findings: None     PLAN     Recommended plan for no diet, medication or health factor changes affecting INR     Dosing Instructions:  Increase your warfarin dose (7.1% change) with next INR in 2 weeks       Summary  As of 12/29/2021    Full warfarin instructions:  7.5 mg every Wed; 5 mg all other days   Next INR check:  1/12/2022             Telephone call with Tammy who verbalizes understanding and agrees to plan    Patient to recheck with home meter    Education provided: None required    Plan made per [facility] anticoagulation protocol    Pamella Sy RN  Anticoagulation Clinic  12/29/2021    _______________________________________________________________________     Anticoagulation Episode Summary     Current INR goal:     TTR:  --   Target end date:     Send INR reminders to:  [facility]    Indications    H/O mechanical aortic valve replacement [Z95.2]           Comments:  Target goal 2.0-3.5         Anticoagulation Care Providers     Provider Role Specialty Phone number    Arnold Anderson MD Referring Internal Medicine [phone]

## 2022-01-09 ENCOUNTER — OFFICE VISIT (OUTPATIENT)
Dept: URGENT CARE | Facility: URGENT CARE | Age: 67
End: 2022-01-09
Payer: COMMERCIAL

## 2022-01-09 VITALS
OXYGEN SATURATION: 98 % | SYSTOLIC BLOOD PRESSURE: 116 MMHG | HEART RATE: 75 BPM | TEMPERATURE: 98.2 F | DIASTOLIC BLOOD PRESSURE: 69 MMHG

## 2022-01-09 DIAGNOSIS — U07.1 INFECTION DUE TO 2019 NOVEL CORONAVIRUS: ICD-10-CM

## 2022-01-09 DIAGNOSIS — R05.9 COUGH: Primary | ICD-10-CM

## 2022-01-09 PROCEDURE — U0003 INFECTIOUS AGENT DETECTION BY NUCLEIC ACID (DNA OR RNA); SEVERE ACUTE RESPIRATORY SYNDROME CORONAVIRUS 2 (SARS-COV-2) (CORONAVIRUS DISEASE [COVID-19]), AMPLIFIED PROBE TECHNIQUE, MAKING USE OF HIGH THROUGHPUT TECHNOLOGIES AS DESCRIBED BY CMS-2020-01-R: HCPCS | Performed by: PHYSICIAN ASSISTANT

## 2022-01-09 PROCEDURE — 99214 OFFICE O/P EST MOD 30 MIN: CPT | Performed by: PHYSICIAN ASSISTANT

## 2022-01-09 PROCEDURE — U0005 INFEC AGEN DETEC AMPLI PROBE: HCPCS | Performed by: PHYSICIAN ASSISTANT

## 2022-01-09 NOTE — PATIENT INSTRUCTIONS
Follow up immediately with severe headache, chest pain, or shortness of breath    Rest, isolate for 10 days, hydrate, follow up if worsening or new symptoms  Household members to isolate until test results, if positive isolate for 2 weeks and follow up for testing if symptoms occur         Patient Education     Coronavirus Disease 2019 (COVID-19): Caring for Yourself or Others   If you or a household member have symptoms of COVID-19, follow the guidelines below. This will help you manage symptoms and keep the virus from spreading.  If you have symptoms of COVID-19    Stay home and contact your care team. They will tell you what to do.    Don t panic. Keep in mind that other illnesses can cause similar symptoms.    Stay away from work, school, and public places.    Limit physical contact with others in your home. Limit visitors. No kissing.  Clean surfaces you touch with disinfectant.  If you need to cough or sneeze, do it into a tissue. Then throw the tissue into the trash. If you don't have tissues, cough or sneeze into the bend of your elbow.  Don t share food or personal items with people in your household. This includes items like eating and drinking utensils, towels, and bedding.  Wear a cloth face mask around other people. During a public health emergency, medical face masks may be reserved for healthcare workers. You may need to make a cloth face mask of your own. You can do this using a bandana, T-shirt, or other cloth. The CDC has instructions on how to make a face mask. Wear the mask so that it covers both your nose and mouth.  If you need to go to a hospital or clinic, call ahead to let them know. Expect the care team to wear masks, gowns, gloves, and eye protection. You may need to come to a different entrance or wait in a separate room.  Follow all instructions from your care team.    If you ve been diagnosed with COVID-19    Stay home and away from others, including other people in your home. (This is  called self-isolation.) Don t leave home unless you need to get medical care. Don t go to work, school, or public places. Don t use buses, taxis, or other public transportation.    Follow all instructions from your care team.    If you need to go to a hospital or clinic, call ahead to let them know. Expect the care team to wear masks, gowns, gloves, and eye protection. You may need to come to a different entrance or wait in a separate room.    Wear a face mask over your nose and mouth. This is to protect others from your germs. If you can t wear a mask, your caregivers should wear one. You may need to make your own mask using a bandana, T-shirt, or other cloth. See the CDC s instructions on how to make a face mask.    Have no contact with pets and other animals.    Don t share food or personal items with people in your household. This includes items like eating and drinking utensils, towels, and bedding.    If you need to cough or sneeze, do it into a tissue. Then throw the tissue into the trash. If you don't have tissues, cough or sneeze into the bend of your elbow.    Wash your hands often.    Self-care at home   At this time, there is no medicine approved to prevent or treat COVID-19. The FDA has approved an antiviral medicine (called remdesivir) for people being treated in the hospital. This is for people 12 years and older who weigh more than about 88 pounds (40 kgs). In certain cases, it may also be used for people younger than 12 years or who weigh less than about 88 pounds (40 kgs)..  Currently, treatment is mostly aimed at helping your body while it fights the virus.    Getting extra rest.    Drink extra fluids 6 to 8 glasses of liquids each day), unless a doctor has told you not to. Ask your care team which fluids are best for you. Avoid fluids that contain caffeine or alcohol.    Taking over-the-counter (OTC) medicine to reduce pain and fever. Your care team will tell you which medicine to use.  If you ve  been in the hospital for COVID-19, follow your care team s instructions. They will tell you when to stop self-isolation. They may also have you change positions to help your breathing (such as lying on your belly).  If a test showed that you have COVID-19, you may be asked to donate plasma after you ve recovered. (This is called COVID-19 convalescent plasma donation.) The plasma may contain antibodies to help fight the virus in others. Experts don't know if the plasma will work well as a treatment. Research continues, and the FDA has approved it for emergency use in certain people with serious or life-threatening COVID-19. For more information, talk to your care team.  Caring for a sick person     Follow all instructions from the care team.    Wash your hands often.    Wear protective clothing as advised.    Make sure the sick person wears a mask. If they can't wear a mask, don t stay in the same room with them. If you must be in the same room, wear a face mask. Make sure the mask covers both the nose and mouth.    Keep track of the sick person s symptoms.    Clean surfaces often with disinfectant. This includes phones, kitchen counters, fridge door handles, bathroom surfaces, and others.    Don t let anyone share household items with the sick person. This includes eating and drinking tools, towels, sheets, or blankets.    Clean fabrics and laundry well.    Keep other people and pets away from the sick person.    When you can stop self-isolation  When you are sick with COVID-19, you should stay away from other people. This is called self-isolation. The rules for ending self-isolation depend on your health in general.  If you are normally healthy:  You can stop self-isolation when all 3 of these are true:    You ve had no fever--and no medicine that reduces fever--for at least 24 hours. And     Your symptoms are better, such as cough or trouble breathing. And     At least 10 days have passed since your symptoms first  started.  Talk with your care team before you leave home. They may tell you it s okay to leave, or they may give you different advice. You do not need to be re-tested.  If you have a weak immune system, or you ve had severe COVID-19:  Follow your care team s instructions. You may be asked to self-isolate for 10 days to 20 days after your symptoms first started. Your care team may want to re-test you for COVID-19.  Note: If you re being treated for cancer, have an immune disorder (such as HIV), or have had a transplant (organ or bone marrow), you may have a weak immune system.  If you've already had COVID-19 once:  If you had the virus over 3 months ago, and you ve been exposed again, treat it like you've never had COVID-19. Stay home, limit your contact with others, call your care team, and watch for symptoms.  If it s been less than 3 months since you had the virus, you re symptom-free, and you ve been exposed again: You don t need to self-isolate or be re-tested. But if you develop new symptoms that can t be linked to another illness, please self-isolate and contact your care team.  When you return to public settings  When you are well enough to go outside your home, follow the CDC s guidance on cloth face masks.    Anyone over age 2 should wear a face mask in public, especially when it's hard to socially distance. This includes public transit, public protests and marches, and crowded stores, bars, and restaurants.    Face masks are most likely to reduce the spread of COVID-19 when they are widely used by people who are out in the public.  Certain people should not wear a face covering. These include:    Children under 2 years old    Anyone with a health, developmental, or mental health condition that can be made worse by wearing a mask    Anyone who is unconscious or unable to remove the face covering without help. See the CDC's guidance on who should not wear a face mask.  When to call your care team  Call your  care team right away if a sick person has any of these:    Trouble breathing    Pain or pressure in chest  If a sick person has any of these, call 911:    Trouble breathing that gets worse    Pain or pressure in chest that gets worse    Blue tint to lips or face    Fast or irregular heartbeat    Confusion or trouble waking    Fainting or loss of consciousness    Coughing up blood  Going home from the hospital   If you have COVID-19 and were recently in the hospital:    Follow the instructions above for self-care and isolation.    Follow the hospital care team s instructions.    Ask questions if anything is unclear to you. Write down answers so you remember them.  Date last modified: 11/23/2020  [employer] last reviewed this educational content on 4/1/2020  This information has been modified by your health care provider with permission from the publisher.    9104-8559 [employer]. [address] [place] [id number]. All rights reserved. This information is not intended as a substitute for professional medical care. Always follow your healthcare professional's instructions.           Patient Education     Viral Upper Respiratory Illness (Adult)    You have a viral upper respiratory illness (URI), which is another term for the common cold. This illness is contagious during the first few days. It is spread through the air by coughing and sneezing. It may also be spread by direct contact (touching the sick person and then touching your own eyes, nose, or mouth). Frequent handwashing will decrease risk of spread. Most viral illnesses go away within 7 to 10 days with rest and simple home remedies. Sometimes the illness may last for several weeks. Antibiotics will not kill a virus, and they are generally not prescribed for this condition.  Home care    If symptoms are severe, rest at home for the first 2 to 3 days. When you resume activity, don't let yourself get too tired.    Don't smoke. If you need  help stopping, talk with your healthcare provider.    Avoid being exposed to cigarette smoke (yours or others ).    You may use acetaminophen or ibuprofen to control pain and fever, unless another medicine was prescribed. If you have chronic liver or kidney disease, have ever had a stomach ulcer or gastrointestinal bleeding, or are taking blood-thinning medicines, talk with your healthcare provider before using these medicines. Aspirin should never be given to anyone under 18 years of age who is ill with a viral infection or fever. It may cause severe liver or brain damage.    Your appetite may be poor, so a light diet is fine. Stay well hydrated by drinking 6 to 8 glasses of fluids per day (water, soft drinks, juices, tea, or soup). Extra fluids will help loosen secretions in the nose and lungs.    Over-the-counter cold medicines will not shorten the length of time you re sick, but they may be helpful for the following symptoms: cough, sore throat, and nasal and sinus congestion. If you take prescription medicines, ask your healthcare provider or pharmacist which over-the-counter medicines are safe to use. (Note: Don't use decongestants if you have high blood pressure.)  Follow-up care  Follow up with your healthcare provider, or as advised.  When to seek medical advice  Call your healthcare provider right away if any of these occur:    Cough with lots of colored sputum (mucus)    Severe headache; face, neck, or ear pain    Difficulty swallowing due to throat pain    Fever of 100.4 F (38 C) or higher, or as directed by your healthcare provider  Call 911  Call 911 if any of these occur:    Chest pain, shortness of breath, wheezing, or difficulty breathing    Coughing up blood    Very severe pain with swallowing, especially if it goes along with a muffled voice   [employer] last reviewed this educational content on 6/1/2018 2000-2021 [employer]. All rights reserved. This information is not intended as  a substitute for professional medical care. Always follow your healthcare professional's instructions.               1.  Plenty of fluids, rest, warm compresses on face  2.  Mucinex twice daily for at least 4 days  3.  Mandy Pot 1x in the morning 1x at night (SALINE MIST SPRAY IS AN ACCEPTABLE, THOUGH NOT AS EFFECTIVE REPLACEMENT)  4.  Benadryl (diphenhydramine) at bedtime   5.  Either Claritin (Loratadine), Allegra (Fexofenadine), or Zyrtec (Cetirizine) in the day  6.  Flonase (Fluticasone) 2x each nostril twice a day for two weeks, then once each nostril once a day       Please let us know if symptoms persist, or worsen.  Fever and focal pain may be a sign of a bacterial infection which may need antibiotic treatment

## 2022-01-09 NOTE — PROGRESS NOTES
SUBJECTIVE:    Tammy Law is a 66 year old female who presents to the clinic today with a chief complaint of cough for 3 day(s). Has been working over the course of illness  Her cough is described as persistent and nonproductive.    The patient's symptoms are moderate and stable.  Associated symptoms include fever, nasal congestion, headache and chills. The patient's symptoms are exacerbated by no particular triggers  Patient has been using nothing  to improve symptoms.    Immunization History   Administered Date(s) Administered     COVID-19,PF,Pfizer (12+ Yrs) 12/21/2020, 01/07/2021         Past Medical History:   Diagnosis Date     Anxiety      Chronic anticoagulation      Depression      Disease of thyroid gland     Hypothyroidism     H/O aortic valve replacement      High cholesterol      Hyperlipidemia      Hypertension      Hypothyroidism      Pacemaker     biotronik     SSS (sick sinus syndrome) (H) 2/4/2020       Current Outpatient Medications   Medication Sig Dispense Refill     cholecalciferol 25 MCG (1000 UT) TABS Take 2,000 Units by mouth daily       citalopram (CELEXA) 20 MG tablet Take 1 tablet (20 mg) by mouth daily 90 tablet 3     cyclobenzaprine (FLEXERIL) 5 MG tablet Take 1-2 tablets (5-10 mg) by mouth 3 times daily as needed for muscle spasms 30 tablet 0     Est Estrogens-Methyltest (ESTRATEST PO) Take  by mouth.       furosemide (LASIX) 40 MG tablet Take 40 mg by mouth daily       hydrochlorothiazide (MICROZIDE) 12.5 MG capsule Take 12.5 mg by mouth daily       LEVOTHYROXINE SODIUM 75 MCG OR TABS 1 TABLET DAILY       order for DME Equipment being ordered: tall cam boot with crutches 2 Device 0     SIMVASTATIN 20 MG OR TABS 1 TABLET EVERY EVENING       warfarin ANTICOAGULANT (COUMADIN ANTICOAGULANT) 2.5 MG tablet Take 1-3 tablets (2.5-7.5 mg) by mouth daily Take two to three tablets by mouth once daily as directed. Adjust dosed based on INR results. 190 tablet 3       Social History      Tobacco Use     Smoking status: Never Smoker     Smokeless tobacco: Never Used   Substance Use Topics     Alcohol use: Yes     Alcohol/week: 0.0 standard drinks     Comment: Alcoholic Drinks/day: twice per year       ROS  Review of systems negative except as stated above.    OBJECTIVE:  /69   Pulse 75   Temp 98.2  F (36.8  C) (Temporal)   SpO2 98%   GENERAL APPEARANCE: healthy, alert and no distress  EYES: EOMI,  PERRL, conjunctiva clear  HENT: ear canals and TM's normal.  Nose and mouth without ulcers, erythema or lesions  NECK: supple, nontender, no lymphadenopathy  RESP: lungs clear to auscultation - no rales, rhonchi or wheezes  CV: regular rates and rhythm, normal S1 S2, no murmur noted  NEURO: Normal strength and tone, sensory exam grossly normal,  normal speech and mentation  SKIN: no suspicious lesions or rashes      Results for orders placed or performed in visit on 01/09/22   Symptomatic; Yes; 1/7/2022 COVID-19 Virus (Coronavirus) by PCR Nose     Status: Abnormal    Specimen: Nose; Swab   Result Value Ref Range    SARS CoV2 PCR Positive (A) Negative, Testing sent to reference lab. Results will be returned via unsolicited result    Narrative    Testing was performed using the Xpert Xpress SARS-CoV-2 Assay on the  Cepheid Gene-Xpert Instrument Systems. Additional information about  this Emergency Use Authorization (EUA) assay can be found via the Lab  Guide. This test should be ordered for the detection of SARS-CoV-2 in  individuals who meet SARS-CoV-2 clinical and/or epidemiological  criteria. Test performance is unknown in asymptomatic patients. This  test is for in vitro diagnostic use under the FDA EUA for  laboratories certified under CLIA to perform high complexity testing.  This test has not been FDA cleared or approved. A negative result  does not rule out the presence of PCR inhibitors in the specimen or  target RNA in concentration below the limit of detection for the  assay. The  possibility of a false negative should be considered if  the patient's recent exposure or clinical presentation suggests  COVID-19. This test was validated by the [facility]. This laboratory is certified under  the Clinical Laboratory Improvement Amendments of 1988 (CLIA-88) as  qualified to perform high complexity laboratory testing.         ASSESSMENT:   (U07.1) Infection due to 2019 novel coronavirus  (R05.9) Cough  (primary encounter diagnosis)  Plan: Symptomatic; Yes; 1/7/2022 COVID-19 Virus         (Coronavirus) by PCR Nose      Red flags and emergent follow up discussed, and understood by patient  Follow up with PCP if symptoms worsen or fail to improve        Covid-19  Pt was evaluated during a global COVID-19 pandemic, which necessitated consideration that the patient might be at risk for infection with the SARS-CoV-2 virus that causes COVID-19.   Applicable protocols for evaluation were followed during the patient's care.   COVID-19 was considered as part of the patient's evaluation. The plan for testing is:  a test was ordered during this visit.    No severe headache, chest pain, shortness of breath  No additional infectious symptoms  Rest, isolate for 10 days, hydrate, test, follow up if worsening or new symptoms  HH members to isolate until test results, if positive isolate for 2 weeks and follow up for testing if symptoms occur  Red flags and emergent follow up discussed, and understood by patient  Follow up with PCP if symptoms worsen or fail to improve    Surgical mask, gown, shield, hairnet, gloves worn by provider      Patient Instructions     Follow up immediately with severe headache, chest pain, or shortness of breath    Rest, isolate for 10 days, hydrate, follow up if worsening or new symptoms  Household members to isolate until test results, if positive isolate for 2 weeks and follow up for testing if symptoms occur         Patient Education      Coronavirus Disease 2019 (COVID-19): Caring for Yourself or Others   If you or a household member have symptoms of COVID-19, follow the guidelines below. This will help you manage symptoms and keep the virus from spreading.  If you have symptoms of COVID-19    Stay home and contact your care team. They will tell you what to do.    Don t panic. Keep in mind that other illnesses can cause similar symptoms.    Stay away from work, school, and public places.    Limit physical contact with others in your home. Limit visitors. No kissing.  Clean surfaces you touch with disinfectant.  If you need to cough or sneeze, do it into a tissue. Then throw the tissue into the trash. If you don't have tissues, cough or sneeze into the bend of your elbow.  Don t share food or personal items with people in your household. This includes items like eating and drinking utensils, towels, and bedding.  Wear a cloth face mask around other people. During a public health emergency, medical face masks may be reserved for healthcare workers. You may need to make a cloth face mask of your own. You can do this using a bandana, T-shirt, or other cloth. The CDC has instructions on how to make a face mask. Wear the mask so that it covers both your nose and mouth.  If you need to go to a hospital or clinic, call ahead to let them know. Expect the care team to wear masks, gowns, gloves, and eye protection. You may need to come to a different entrance or wait in a separate room.  Follow all instructions from your care team.    If you ve been diagnosed with COVID-19    Stay home and away from others, including other people in your home. (This is called self-isolation.) Don t leave home unless you need to get medical care. Don t go to work, school, or public places. Don t use buses, taxis, or other public transportation.    Follow all instructions from your care team.    If you need to go to a hospital or clinic, call ahead to let them know. Expect  the care team to wear masks, gowns, gloves, and eye protection. You may need to come to a different entrance or wait in a separate room.    Wear a face mask over your nose and mouth. This is to protect others from your germs. If you can t wear a mask, your caregivers should wear one. You may need to make your own mask using a bandana, T-shirt, or other cloth. See the CDC s instructions on how to make a face mask.    Have no contact with pets and other animals.    Don t share food or personal items with people in your household. This includes items like eating and drinking utensils, towels, and bedding.    If you need to cough or sneeze, do it into a tissue. Then throw the tissue into the trash. If you don't have tissues, cough or sneeze into the bend of your elbow.    Wash your hands often.    Self-care at home   At this time, there is no medicine approved to prevent or treat COVID-19. The FDA has approved an antiviral medicine (called remdesivir) for people being treated in the hospital. This is for people 12 years and older who weigh more than about 88 pounds (40 kgs). In certain cases, it may also be used for people younger than 12 years or who weigh less than about 88 pounds (40 kgs)..  Currently, treatment is mostly aimed at helping your body while it fights the virus.    Getting extra rest.    Drink extra fluids 6 to 8 glasses of liquids each day), unless a doctor has told you not to. Ask your care team which fluids are best for you. Avoid fluids that contain caffeine or alcohol.    Taking over-the-counter (OTC) medicine to reduce pain and fever. Your care team will tell you which medicine to use.  If you ve been in the hospital for COVID-19, follow your care team s instructions. They will tell you when to stop self-isolation. They may also have you change positions to help your breathing (such as lying on your belly).  If a test showed that you have COVID-19, you may be asked to donate plasma after you ve  recovered. (This is called COVID-19 convalescent plasma donation.) The plasma may contain antibodies to help fight the virus in others. Experts don't know if the plasma will work well as a treatment. Research continues, and the FDA has approved it for emergency use in certain people with serious or life-threatening COVID-19. For more information, talk to your care team.  Caring for a sick person     Follow all instructions from the care team.    Wash your hands often.    Wear protective clothing as advised.    Make sure the sick person wears a mask. If they can't wear a mask, don t stay in the same room with them. If you must be in the same room, wear a face mask. Make sure the mask covers both the nose and mouth.    Keep track of the sick person s symptoms.    Clean surfaces often with disinfectant. This includes phones, kitchen counters, fridge door handles, bathroom surfaces, and others.    Don t let anyone share household items with the sick person. This includes eating and drinking tools, towels, sheets, or blankets.    Clean fabrics and laundry well.    Keep other people and pets away from the sick person.    When you can stop self-isolation  When you are sick with COVID-19, you should stay away from other people. This is called self-isolation. The rules for ending self-isolation depend on your health in general.  If you are normally healthy:  You can stop self-isolation when all 3 of these are true:    You ve had no fever--and no medicine that reduces fever--for at least 24 hours. And     Your symptoms are better, such as cough or trouble breathing. And     At least 10 days have passed since your symptoms first started.  Talk with your care team before you leave home. They may tell you it s okay to leave, or they may give you different advice. You do not need to be re-tested.  If you have a weak immune system, or you ve had severe COVID-19:  Follow your care team s instructions. You may be asked to  self-isolate for 10 days to 20 days after your symptoms first started. Your care team may want to re-test you for COVID-19.  Note: If you re being treated for cancer, have an immune disorder (such as HIV), or have had a transplant (organ or bone marrow), you may have a weak immune system.  If you've already had COVID-19 once:  If you had the virus over 3 months ago, and you ve been exposed again, treat it like you've never had COVID-19. Stay home, limit your contact with others, call your care team, and watch for symptoms.  If it s been less than 3 months since you had the virus, you re symptom-free, and you ve been exposed again: You don t need to self-isolate or be re-tested. But if you develop new symptoms that can t be linked to another illness, please self-isolate and contact your care team.  When you return to public settings  When you are well enough to go outside your home, follow the CDC s guidance on cloth face masks.    Anyone over age 2 should wear a face mask in public, especially when it's hard to socially distance. This includes public transit, public protests and marches, and crowded stores, bars, and restaurants.    Face masks are most likely to reduce the spread of COVID-19 when they are widely used by people who are out in the public.  Certain people should not wear a face covering. These include:    Children under 2 years old    Anyone with a health, developmental, or mental health condition that can be made worse by wearing a mask    Anyone who is unconscious or unable to remove the face covering without help. See the CDC's guidance on who should not wear a face mask.  When to call your care team  Call your care team right away if a sick person has any of these:    Trouble breathing    Pain or pressure in chest  If a sick person has any of these, call 911:    Trouble breathing that gets worse    Pain or pressure in chest that gets worse    Blue tint to lips or face    Fast or irregular  heartbeat    Confusion or trouble waking    Fainting or loss of consciousness    Coughing up blood  Going home from the hospital   If you have COVID-19 and were recently in the hospital:    Follow the instructions above for self-care and isolation.    Follow the hospital care team s instructions.    Ask questions if anything is unclear to you. Write down answers so you remember them.  Date last modified: 11/23/2020  No last reviewed this educational content on 4/1/2020  This information has been modified by your health care provider with permission from the publisher.    5311-1939 [employer]. [address] [place] [id number]. All rights reserved. This information is not intended as a substitute for professional medical care. Always follow your healthcare professional's instructions.           Patient Education     Viral Upper Respiratory Illness (Adult)    You have a viral upper respiratory illness (URI), which is another term for the common cold. This illness is contagious during the first few days. It is spread through the air by coughing and sneezing. It may also be spread by direct contact (touching the sick person and then touching your own eyes, nose, or mouth). Frequent handwashing will decrease risk of spread. Most viral illnesses go away within 7 to 10 days with rest and simple home remedies. Sometimes the illness may last for several weeks. Antibiotics will not kill a virus, and they are generally not prescribed for this condition.  Home care    If symptoms are severe, rest at home for the first 2 to 3 days. When you resume activity, don't let yourself get too tired.    Don't smoke. If you need help stopping, talk with your healthcare provider.    Avoid being exposed to cigarette smoke (yours or others ).    You may use acetaminophen or ibuprofen to control pain and fever, unless another medicine was prescribed. If you have chronic liver or kidney disease, have ever had a  stomach ulcer or gastrointestinal bleeding, or are taking blood-thinning medicines, talk with your healthcare provider before using these medicines. Aspirin should never be given to anyone under 18 years of age who is ill with a viral infection or fever. It may cause severe liver or brain damage.    Your appetite may be poor, so a light diet is fine. Stay well hydrated by drinking 6 to 8 glasses of fluids per day (water, soft drinks, juices, tea, or soup). Extra fluids will help loosen secretions in the nose and lungs.    Over-the-counter cold medicines will not shorten the length of time you re sick, but they may be helpful for the following symptoms: cough, sore throat, and nasal and sinus congestion. If you take prescription medicines, ask your healthcare provider or pharmacist which over-the-counter medicines are safe to use. (Note: Don't use decongestants if you have high blood pressure.)  Follow-up care  Follow up with your healthcare provider, or as advised.  When to seek medical advice  Call your healthcare provider right away if any of these occur:    Cough with lots of colored sputum (mucus)    Severe headache; face, neck, or ear pain    Difficulty swallowing due to throat pain    Fever of 100.4 F (38 C) or higher, or as directed by your healthcare provider  Call 911  Call 911 if any of these occur:    Chest pain, shortness of breath, wheezing, or difficulty breathing    Coughing up blood    Very severe pain with swallowing, especially if it goes along with a muffled voice   [employer] last reviewed this educational content on 6/1/2018 2000-2021 [employer]. All rights reserved. This information is not intended as a substitute for professional medical care. Always follow your healthcare professional's instructions.               1.  Plenty of fluids, rest, warm compresses on face  2.  Mucinex twice daily for at least 4 days  3.  Mandy Pot 1x in the morning 1x at night (SALINE MIST SPRAY IS  AN ACCEPTABLE, THOUGH NOT AS EFFECTIVE REPLACEMENT)  4.  Benadryl (diphenhydramine) at bedtime   5.  Either Claritin (Loratadine), Allegra (Fexofenadine), or Zyrtec (Cetirizine) in the day  6.  Flonase (Fluticasone) 2x each nostril twice a day for two weeks, then once each nostril once a day       Please let us know if symptoms persist, or worsen.  Fever and focal pain may be a sign of a bacterial infection which may need antibiotic treatment

## 2022-01-10 LAB — SARS-COV-2 RNA RESP QL NAA+PROBE: POSITIVE

## 2022-01-11 ENCOUNTER — VIRTUAL VISIT (OUTPATIENT)
Dept: FAMILY MEDICINE | Facility: CLINIC | Age: 67
End: 2022-01-11
Payer: COMMERCIAL

## 2022-01-11 ENCOUNTER — NURSE TRIAGE (OUTPATIENT)
Dept: NURSING | Facility: CLINIC | Age: 67
End: 2022-01-11

## 2022-01-11 ENCOUNTER — TELEPHONE (OUTPATIENT)
Dept: FAMILY MEDICINE | Facility: CLINIC | Age: 67
End: 2022-01-11

## 2022-01-11 ENCOUNTER — HOSPITAL ENCOUNTER (OUTPATIENT)
Facility: CLINIC | Age: 67
Setting detail: OBSERVATION
Discharge: HOME | End: 2022-01-12
Attending: EMERGENCY MEDICINE | Admitting: EMERGENCY MEDICINE
Payer: OTHER MISCELLANEOUS

## 2022-01-11 DIAGNOSIS — Z95.0 CARDIAC PACEMAKER IN SITU: ICD-10-CM

## 2022-01-11 DIAGNOSIS — U07.1 PNEUMONIA DUE TO 2019 NOVEL CORONAVIRUS: Primary | ICD-10-CM

## 2022-01-11 DIAGNOSIS — R07.89 OTHER CHEST PAIN: ICD-10-CM

## 2022-01-11 DIAGNOSIS — R07.9 CHEST PAIN, UNSPECIFIED TYPE: ICD-10-CM

## 2022-01-11 DIAGNOSIS — Z95.2 HEART VALVE REPLACED BY TRANSPLANT: ICD-10-CM

## 2022-01-11 DIAGNOSIS — J12.82 PNEUMONIA DUE TO 2019 NOVEL CORONAVIRUS: Primary | ICD-10-CM

## 2022-01-11 DIAGNOSIS — Z79.01 LONG TERM (CURRENT) USE OF ANTICOAGULANTS: ICD-10-CM

## 2022-01-11 DIAGNOSIS — U07.1 LAB TEST POSITIVE FOR DETECTION OF COVID-19 VIRUS: ICD-10-CM

## 2022-01-11 DIAGNOSIS — U07.1 INFECTION DUE TO 2019 NOVEL CORONAVIRUS: Primary | ICD-10-CM

## 2022-01-11 DIAGNOSIS — R06.03 RESPIRATORY DISTRESS: ICD-10-CM

## 2022-01-11 LAB
ALBUMIN SERPL-MCNC: 4 G/DL (ref 3.4–5)
ALP SERPL-CCNC: 66 U/L (ref 40–150)
ALT SERPL W P-5'-P-CCNC: 40 U/L (ref 0–50)
ANION GAP SERPL CALCULATED.3IONS-SCNC: 5 MMOL/L (ref 3–14)
AST SERPL W P-5'-P-CCNC: 35 U/L (ref 0–45)
BASOPHILS # BLD AUTO: 0 10E3/UL (ref 0–0.2)
BASOPHILS NFR BLD AUTO: 1 %
BILIRUB SERPL-MCNC: 0.8 MG/DL (ref 0.2–1.3)
BUN SERPL-MCNC: 11 MG/DL (ref 7–30)
CALCIUM SERPL-MCNC: 9.5 MG/DL (ref 8.5–10.1)
CHLORIDE BLD-SCNC: 106 MMOL/L (ref 94–109)
CO2 SERPL-SCNC: 29 MMOL/L (ref 20–32)
CREAT SERPL-MCNC: 0.78 MG/DL (ref 0.52–1.04)
D DIMER PPP FEU-MCNC: <0.27 UG/ML FEU (ref 0–0.5)
EOSINOPHIL # BLD AUTO: 0.1 10E3/UL (ref 0–0.7)
EOSINOPHIL NFR BLD AUTO: 2 %
ERYTHROCYTE [DISTWIDTH] IN BLOOD BY AUTOMATED COUNT: 13 % (ref 10–15)
GFR SERPL CREATININE-BSD FRML MDRD: 84 ML/MIN/1.73M2
GLUCOSE BLD-MCNC: 86 MG/DL (ref 70–99)
HCO3 BLDV-SCNC: 30 MMOL/L (ref 21–28)
HCT VFR BLD AUTO: 43.9 % (ref 35–47)
HGB BLD-MCNC: 14.1 G/DL (ref 11.7–15.7)
HOLD SPECIMEN: NORMAL
IMM GRANULOCYTES # BLD: 0 10E3/UL
IMM GRANULOCYTES NFR BLD: 0 %
INR PPP: 2.11 (ref 0.85–1.15)
LACTATE BLD-SCNC: 0.5 MMOL/L
LIPASE SERPL-CCNC: 124 U/L (ref 73–393)
LYMPHOCYTES # BLD AUTO: 1.7 10E3/UL (ref 0.8–5.3)
LYMPHOCYTES NFR BLD AUTO: 41 %
MCH RBC QN AUTO: 28.7 PG (ref 26.5–33)
MCHC RBC AUTO-ENTMCNC: 32.1 G/DL (ref 31.5–36.5)
MCV RBC AUTO: 89 FL (ref 78–100)
MONOCYTES # BLD AUTO: 0.3 10E3/UL (ref 0–1.3)
MONOCYTES NFR BLD AUTO: 8 %
NEUTROPHILS # BLD AUTO: 2.1 10E3/UL (ref 1.6–8.3)
NEUTROPHILS NFR BLD AUTO: 48 %
NRBC # BLD AUTO: 0 10E3/UL
NRBC BLD AUTO-RTO: 0 /100
NT-PROBNP SERPL-MCNC: 445 PG/ML (ref 0–900)
PCO2 BLDV: 53 MM HG (ref 40–50)
PH BLDV: 7.36 [PH] (ref 7.32–7.43)
PLATELET # BLD AUTO: 184 10E3/UL (ref 150–450)
PO2 BLDV: 16 MM HG (ref 25–47)
POTASSIUM BLD-SCNC: 4.3 MMOL/L (ref 3.4–5.3)
PROT SERPL-MCNC: 8.3 G/DL (ref 6.8–8.8)
RBC # BLD AUTO: 4.91 10E6/UL (ref 3.8–5.2)
SAO2 % BLDV: 20 % (ref 94–100)
SODIUM SERPL-SCNC: 140 MMOL/L (ref 133–144)
TROPONIN I SERPL HS-MCNC: 27 NG/L
WBC # BLD AUTO: 4.3 10E3/UL (ref 4–11)

## 2022-01-11 PROCEDURE — 85025 COMPLETE CBC W/AUTO DIFF WBC: CPT | Performed by: EMERGENCY MEDICINE

## 2022-01-11 PROCEDURE — 80053 COMPREHEN METABOLIC PANEL: CPT | Performed by: EMERGENCY MEDICINE

## 2022-01-11 PROCEDURE — 86901 BLOOD TYPING SEROLOGIC RH(D): CPT

## 2022-01-11 PROCEDURE — 99207 PR NO CHARGE LOS: CPT | Mod: 95 | Performed by: FAMILY MEDICINE

## 2022-01-11 PROCEDURE — 99285 EMERGENCY DEPT VISIT HI MDM: CPT | Performed by: EMERGENCY MEDICINE

## 2022-01-11 PROCEDURE — 83690 ASSAY OF LIPASE: CPT | Performed by: EMERGENCY MEDICINE

## 2022-01-11 PROCEDURE — 84484 ASSAY OF TROPONIN QUANT: CPT | Performed by: EMERGENCY MEDICINE

## 2022-01-11 PROCEDURE — 85610 PROTHROMBIN TIME: CPT | Performed by: EMERGENCY MEDICINE

## 2022-01-11 PROCEDURE — 82803 BLOOD GASES ANY COMBINATION: CPT

## 2022-01-11 PROCEDURE — 36415 COLL VENOUS BLD VENIPUNCTURE: CPT | Performed by: EMERGENCY MEDICINE

## 2022-01-11 PROCEDURE — 85379 FIBRIN DEGRADATION QUANT: CPT | Performed by: EMERGENCY MEDICINE

## 2022-01-11 PROCEDURE — 99285 EMERGENCY DEPT VISIT HI MDM: CPT | Mod: 25 | Performed by: EMERGENCY MEDICINE

## 2022-01-11 PROCEDURE — 93005 ELECTROCARDIOGRAM TRACING: CPT | Performed by: EMERGENCY MEDICINE

## 2022-01-11 PROCEDURE — 83880 ASSAY OF NATRIURETIC PEPTIDE: CPT | Performed by: EMERGENCY MEDICINE

## 2022-01-11 ASSESSMENT — ENCOUNTER SYMPTOMS
NECK PAIN: 0
HEADACHES: 0
TROUBLE SWALLOWING: 0
NECK STIFFNESS: 0
COUGH: 1
ABDOMINAL PAIN: 0
NAUSEA: 0
CHEST TIGHTNESS: 1
NUMBNESS: 0
WEAKNESS: 0
VOMITING: 0
BACK PAIN: 0
SORE THROAT: 0
FATIGUE: 1
APPETITE CHANGE: 1
SHORTNESS OF BREATH: 1

## 2022-01-11 NOTE — PROGRESS NOTES
Bisi is a 66 year old who is being evaluated via a billable video visit.      How would you like to obtain your AVS? MyChart  If the video visit is dropped, the invitation should be resent by: Text to cell phone: [phone]  Will anyone else be joining your video visit? No    Problem List Items Addressed This Visit     None      Visit Diagnoses     Infection due to 2019 novel coronavirus    -  Primary    Respiratory distress          Patient appears somewhat short of breath. She does not have the ability to check on oxygen saturation at home. She is not tachycardic but pulse is above baseline and high normal. I am recommending in person evaluation in the emergency department. The patient voiced understanding and agreement. She reports that a family member will be able to bring her for evaluation    Patient Instructions   1. Please proceed to the ER for evaluation as we discussed.         Subjective: Tammy is a 66 year old who is being evaluated via billable video visit.  The patient tested positive for COVID on 1/9/2022. She started having symptoms on 1/7/2022. She is currently having throat pressure, congestion, headache, cough. No fever. She is feeling a bit short or breath. She is having some chest tightness as well, increasing today.     Objective:   Vitals: No vitals were obtained today due to virtual visit. BP: 117/73, pulse 90  Patient is awake and alert. She is having to pause some while talking to breath and has pursed lip breathing at times. No clear cyanosis. No focal neurologic findings.       Video-Visit Details    Type of service:  Video Visit    Video Start Time: 3:39 PM    Video End Time:3:48 PM    Originating Location (pt. Location): Home    Distant Location (provider location):  [facility]     Platform used for Video Visit: [employer]

## 2022-01-11 NOTE — TELEPHONE ENCOUNTER
"Pt tested positive for covid 1/9/22.  Symptoms onset 1/6/22:  - nasal congestion  - body aches  - weakness  - fatigue  - \"no energy\"  - \"can't think straight\"  - headache above eyes  - \"eyes burn\"  - \"forehead feels warm\"  - \"tingling around cheeks and jaw\"  - \"like a sinus infection\"  \"Whole face feels tingly.\"    Pt reports \"Continuous low-level chest pressure.\"  Pt rates chest pressure 3/10.  Continuous x one hour.  No shortness of breath.    Temp 98.6 (orally).  However pt cannot remember whether she had any oral intake within the 30 mins prior to checking oral temp.  \"Little bit of chills this morning.\"    Oral intake today includes:  - water (just enough to take \"pills\")  - soup  Advised pt to increase overall hydration.  Also discussed online monoclonal antibody application due to pt's high risk cardiac history.  Pt's friend (Jayson) now accesses [employer] website link to begin application for MAB.    Due to pt's report of continuous low-level chest pressure combined with high risk medical history, pt agrees to second level triage via virtual provider visit.  Transferred to a  for appointment.  Appt found promptly -> within the next few minutes.    Primary Question for provider virtual visit -> Can ED be avoided (with possible Rx treatments and parameters for self-monitoring)?    Joleen SEGOVIA Health Nurse Advisor     Reason for Disposition    SEVERE or constant chest pain or pressure (Exception: mild central chest pain, present only when coughing)     One full hour of continuous chest pressure.  Pt rates \"3/10 in terms of tightness.\"    Additional Information    Negative: SEVERE difficulty breathing (e.g., struggling for each breath, speaks in single words)    Negative: Difficult to awaken or acting confused (e.g., disoriented, slurred speech)    Negative: Bluish (or gray) lips or face now    Negative: Shock suspected (e.g., cold/pale/clammy skin, too weak to stand, low BP, rapid pulse)    Negative: " Sounds like a life-threatening emergency to the triager    Negative: [1] COVID-19 exposure AND [2] no symptoms    Negative: COVID-19 vaccine reaction suspected (e.g., fever, headache, muscle aches) occurring 1 to 3 days after getting vaccine    Negative: COVID-19 vaccine, questions about    Negative: [1] Lives with someone known to have influenza (flu test positive) AND [2] flu-like symptoms (e.g., cough, runny nose, sore throat, SOB; with or without fever)    Negative: [1] Adult with possible COVID-19 symptoms AND [2] triager concerned about severity of symptoms or other causes    Negative: COVID-19 and breastfeeding, questions about    [facility] Specific Disposition - [facility] Specific Patient Instructions  COVID 19 Nurse Triage Plan/Patient Instructions    Please be aware that novel coronavirus (COVID-19) may be circulating in the community. If you develop symptoms such as fever, cough, or SOB or if you have concerns about the presence of another infection including coronavirus (COVID-19), please contact your health care provider or visit [email or web address]      Virtual Visit with provider recommended. Reference Visit Selection Guide.    Thank you for taking steps to prevent the spread of this virus.  Limit your contact with others.  Wear a simple mask to cover your cough.  Wash your hands well and often.    Resources  [facility]: About COVID-19: [email or web address]  CDC: What to Do If You're Sick: www.cdc.gov/coronavirus/2019-ncov/about/steps-when-sick.html  CDC: Ending Home Isolation: www.cdc.gov/coronavirus/2019-ncov/hcp/disposition-in-home-patients.html   CDC: Caring for Someone: www.cdc.gov/coronavirus/2019-ncov/if-you-are-sick/care-for-someone.html   [facility]: Interim Guidance for Hospital Discharge to Home: www.health.[facility]/diseases/coronavirus/hcp/hospdischarge.pdf  [facility] clinical trials (COVID-19 research studies):  "clinicalaffairs.[facility]-clinical-trials   Below are the COVID-19 hotlines at the [place] Department of Health ([facility]). Interpreters are available.   For health questions: Call 297-922-1809 or 1-289.265.9998 (7 a.m. to 7 p.m.)  For questions about schools and childcare: Call 836-012-9524 or 1-228.462.1204 (7 a.m. to 7 p.m.)    Protocols used: CORONAVIRUS (COVID-19) DIAGNOSED OR [id number] 8.25.2021    ___________________    COVID 19 Nurse Triage Plan/Patient Instructions    Please be aware that novel coronavirus (COVID-19) may be circulating in the community. If you develop symptoms such as fever, cough, or SOB or if you have concerns about the presence of another infection including coronavirus (COVID-19), please contact your health care provider or visit [email or web address].     Disposition/Instructions    Additional COVID19 information to add for patients.   How can I protect others?  If you have symptoms (fever, cough, body aches or trouble breathing): Stay home and away from others (self-isolate) until:    At least 10 days have passed since your symptoms started, And     You ve had no fever--and no medicine that reduces fever--for 1 full day (24 hours), And      Your other symptoms have resolved (gotten better).     If you don t have symptoms, but a test showed that you have COVID-19 (you tested positive):    Stay home and away from others (self-isolate). Follow the tips under \"How do I self-isolate?\" below for 10 days (20 days if you have a weak immune system).    You don't need to be retested for COVID-19 before going back to school or work. As long as you're fever-free and feeling better, you can go back to school, work and other activities after waiting the 10 or 20 days.     How do I self-isolate?    Stay in your own room, even for meals. Use your own bathroom if you can.     Stay away from others in your home. No hugging, kissing or shaking hands. No visitors.    Don t go to work, school " or anywhere else.     Clean  high touch  surfaces often (doorknobs, counters, handles, etc.). Use a household cleaning spray or wipes. You ll find a full list on the EPA website:  www.epa.gov/pesticide-registration/list-n-disinfectants-use-against-sars-cov-2.    Cover your mouth and nose with a mask, tissue or washcloth to avoid spreading germs.    Wash your hands and face often. Use soap and water.    Caregivers in these groups are at risk for severe illness due to COVID-19:  o People 65 years and older  o People who live in a nursing home or long-term care facility  o People with chronic disease (lung, heart, cancer, diabetes, kidney, liver, immunologic)  o People who have a weakened immune system, including those who:  - Are in cancer treatment  - Take medicine that weakens the immune system, such as corticosteroids  - Had a bone marrow or organ transplant  - Have an immune deficiency  - Have poorly controlled HIV or AIDS  - Are obese (body mass index of 40 or higher)  - Smoke regularly    Caregivers should wear gloves while washing dishes, handling laundry and cleaning bedrooms and bathrooms.    Use caution when washing and drying laundry: Don t shake dirty laundry, and use the warmest water setting that you can.    For more tips, go to www.cdc.gov/coronavirus/2019-ncov/downloads/10Things.pdf.    How can I take care of myself?  1. Get lots of rest. Drink extra fluids (unless a doctor has told you not to).     2. Take Tylenol (acetaminophen) for fever or pain. If you have liver or kidney problems, ask your family doctor if it s okay to take Tylenol.     Adults can take either:     650 mg (two 325 mg pills) every 4 to 6 hours, or     1,000 mg (two 500 mg pills) every 8 hours as needed.     Note: Don t take more than 3,000 mg in one day.   Acetaminophen is found in many medicines (both prescribed and over-the-counter medicines). Read all labels to be sure you don t take too much.     For children, check the Tylenol  bottle for the right dose. The dose is based on the child s age or weight.    3. If you have other health problems (like cancer, heart failure, an organ transplant or severe kidney disease): Call your specialty clinic if you don t feel better in the next 2 days.    4. Know when to call 911: Emergency warning signs include:    Trouble breathing or shortness of breath    Pain or pressure in the chest that doesn t go away    Feeling confused like you haven t felt before, or not being able to wake up    Bluish-colored lips or face    What are the symptoms of COVID-19?     The most common symptoms are cough, fever and trouble breathing.     Less common symptoms include body aches, chills, diarrhea (loose, watery poops), fatigue (feeling very tired), headache, runny nose, sore throat and loss of smell.    COVID-19 can cause severe coughing (bronchitis) and lung infection (pneumonia).    How does it spread?     The virus may spread when a person coughs or sneezes into the air. The virus can travel about 6 feet this way, and it can live on surfaces.      Common  (household disinfectants) will kill the virus.    Who is at risk?  Anyone can catch COVID-19 if they re around someone who has the virus.    How can others protect themselves?     Stay away from people who have COVID-19 (or symptoms of COVID-19).    Wash hands often with soap and water. Or, use hand  with at least 60% alcohol.    Avoid touching the eyes, nose or mouth.     Wear a face mask when you go out in public, when sick or when caring for a sick person.    Where can I get more information?     [facility]: About COVID-19: [email or web address]    CDC: What to Do If You re Sick: www.cdc.gov/coronavirus/2019-ncov/about/steps-when-sick.html    CDC: Ending Home Isolation: www.cdc.gov/coronavirus/2019-ncov/hcp/disposition-in-home-patients.html     CDC: Caring for Someone:  www.cdc.gov/coronavirus/2019-ncov/if-you-are-sick/care-for-someone.html     [facility]: Interim Guidance for Hospital Discharge to Home: www.[facility]/diseases/coronavirus/hcp/hospdischarge.pdf    [facility] clinical trials (COVID-19 research studies): clinicalaffairs.[facility]-clinical-trials     Below are the COVID-19 hotlines at the [place] Department of Health ([facility]). Interpreters are available.   o For health questions: Call 138-773-7051 or 1-650.582.3195 (7 a.m. to 7 p.m.)  o For questions about schools and childcare: Call 762-547-1749 or 1-162.971.4744 (7 a.m. to 7 p.m.)          Thank you for taking steps to prevent the spread of this virus.  o Limit your contact with others.  o Wear a simple mask to cover your cough.  o Wash your hands well and often.    Resources    [facility]: About COVID-19: [email or web address]    CDC: What to Do If You're Sick: www.cdc.gov/coronavirus/2019-ncov/about/steps-when-sick.html    CDC: Ending Home Isolation: www.cdc.gov/coronavirus/2019-ncov/hcp/disposition-in-home-patients.html     CDC: Caring for Someone: www.cdc.gov/coronavirus/2019-ncov/if-you-are-sick/care-for-someone.html     [facility]: Interim Guidance for Hospital Discharge to Home: www.[facility]./diseases/coronavirus/hcp/hospdischarge.pdf    [facility] clinical trials (COVID-19 research studies): clinicalaffairs.[facility]-clinical-trials     Below are the COVID-19 hotlines at the [place] Department of Health ([facility]). Interpreters are available.   o For health questions: Call 246-369-2215 or 1-688.453.7373 (7 a.m. to 7 p.m.)  o For questions about schools and childcare: Call 765-434-8364 or 1-647.848.1018 (7 a.m. to 7 p.m.)

## 2022-01-12 ENCOUNTER — ANTICOAGULATION THERAPY VISIT (OUTPATIENT)
Dept: FAMILY MEDICINE | Facility: CLINIC | Age: 67
End: 2022-01-12

## 2022-01-12 ENCOUNTER — DOCUMENTATION ONLY (OUTPATIENT)
Dept: FAMILY MEDICINE | Facility: CLINIC | Age: 67
End: 2022-01-12

## 2022-01-12 ENCOUNTER — TRANSFERRED RECORDS (OUTPATIENT)
Dept: HEALTH INFORMATION MANAGEMENT | Facility: CLINIC | Age: 67
End: 2022-01-12

## 2022-01-12 ENCOUNTER — APPOINTMENT (OUTPATIENT)
Dept: CT IMAGING | Facility: CLINIC | Age: 67
End: 2022-01-12
Attending: EMERGENCY MEDICINE
Payer: OTHER MISCELLANEOUS

## 2022-01-12 ENCOUNTER — ANCILLARY PROCEDURE (OUTPATIENT)
Dept: CARDIOLOGY | Facility: CLINIC | Age: 67
End: 2022-01-12
Attending: EMERGENCY MEDICINE
Payer: OTHER MISCELLANEOUS

## 2022-01-12 VITALS
HEIGHT: 64 IN | DIASTOLIC BLOOD PRESSURE: 70 MMHG | SYSTOLIC BLOOD PRESSURE: 100 MMHG | HEART RATE: 76 BPM | BODY MASS INDEX: 24.62 KG/M2 | WEIGHT: 144.2 LBS | RESPIRATION RATE: 12 BRPM

## 2022-01-12 VITALS
SYSTOLIC BLOOD PRESSURE: 97 MMHG | TEMPERATURE: 98.6 F | OXYGEN SATURATION: 96 % | RESPIRATION RATE: 18 BRPM | DIASTOLIC BLOOD PRESSURE: 61 MMHG | HEART RATE: 66 BPM

## 2022-01-12 DIAGNOSIS — Z95.2 H/O MECHANICAL AORTIC VALVE REPLACEMENT: Primary | ICD-10-CM

## 2022-01-12 PROBLEM — R07.9 CHEST PAIN, UNSPECIFIED TYPE: Status: ACTIVE | Noted: 2022-01-12

## 2022-01-12 LAB
ABO/RH(D): NORMAL
ALBUMIN SERPL-MCNC: 3.8 G/DL (ref 3.4–5)
ALP SERPL-CCNC: 61 U/L (ref 40–150)
ALT SERPL W P-5'-P-CCNC: 36 U/L (ref 0–50)
ANION GAP SERPL CALCULATED.3IONS-SCNC: 8 MMOL/L (ref 3–14)
APTT PPP: 43 SECONDS (ref 22–38)
AST SERPL W P-5'-P-CCNC: 30 U/L (ref 0–45)
ATRIAL RATE - MUSE: 72 BPM
ATRIAL RATE - MUSE: 75 BPM
BASOPHILS # BLD AUTO: 0 10E3/UL (ref 0–0.2)
BASOPHILS NFR BLD AUTO: 1 %
BILIRUB SERPL-MCNC: 0.6 MG/DL (ref 0.2–1.3)
BUN SERPL-MCNC: 11 MG/DL (ref 7–30)
CALCIUM SERPL-MCNC: 9.3 MG/DL (ref 8.5–10.1)
CHLORIDE BLD-SCNC: 107 MMOL/L (ref 94–109)
CO2 SERPL-SCNC: 26 MMOL/L (ref 20–32)
CREAT SERPL-MCNC: 0.79 MG/DL (ref 0.52–1.04)
CRP SERPL-MCNC: 6.8 MG/L (ref 0–8)
D DIMER PPP FEU-MCNC: <0.27 UG/ML FEU (ref 0–0.5)
DIASTOLIC BLOOD PRESSURE - MUSE: NORMAL MMHG
DIASTOLIC BLOOD PRESSURE - MUSE: NORMAL MMHG
EOSINOPHIL # BLD AUTO: 0.1 10E3/UL (ref 0–0.7)
EOSINOPHIL NFR BLD AUTO: 1 %
ERYTHROCYTE [DISTWIDTH] IN BLOOD BY AUTOMATED COUNT: 13.1 % (ref 10–15)
FIBRINOGEN PPP-MCNC: 383 MG/DL (ref 170–490)
FLUAV AG SPEC QL IA: NEGATIVE
FLUBV AG SPEC QL IA: NEGATIVE
GFR SERPL CREATININE-BSD FRML MDRD: 83 ML/MIN/1.73M2
GLUCOSE BLD-MCNC: 86 MG/DL (ref 70–99)
HCT VFR BLD AUTO: 40.6 % (ref 35–47)
HGB BLD-MCNC: 12.7 G/DL (ref 11.7–15.7)
IMM GRANULOCYTES # BLD: 0 10E3/UL
IMM GRANULOCYTES NFR BLD: 1 %
INR HOME MONITORING: 2.1 (ref 2–3.5)
INR PPP: 2.16 (ref 0.85–1.15)
INTERPRETATION ECG - MUSE: NORMAL
INTERPRETATION ECG - MUSE: NORMAL
LDH SERPL L TO P-CCNC: 282 U/L (ref 81–234)
LYMPHOCYTES # BLD AUTO: 1.4 10E3/UL (ref 0.8–5.3)
LYMPHOCYTES NFR BLD AUTO: 38 %
MCH RBC QN AUTO: 29.4 PG (ref 26.5–33)
MCHC RBC AUTO-ENTMCNC: 31.3 G/DL (ref 31.5–36.5)
MCV RBC AUTO: 94 FL (ref 78–100)
MONOCYTES # BLD AUTO: 0.3 10E3/UL (ref 0–1.3)
MONOCYTES NFR BLD AUTO: 9 %
NEUTROPHILS # BLD AUTO: 1.8 10E3/UL (ref 1.6–8.3)
NEUTROPHILS NFR BLD AUTO: 50 %
NRBC # BLD AUTO: 0 10E3/UL
NRBC BLD AUTO-RTO: 0 /100
P AXIS - MUSE: 79 DEGREES
P AXIS - MUSE: NORMAL DEGREES
PLATELET # BLD AUTO: 139 10E3/UL (ref 150–450)
POTASSIUM BLD-SCNC: 3.7 MMOL/L (ref 3.4–5.3)
PR INTERVAL - MUSE: 228 MS
PR INTERVAL - MUSE: 234 MS
PROCALCITONIN SERPL-MCNC: 0.14 NG/ML
PROT SERPL-MCNC: 7.5 G/DL (ref 6.8–8.8)
QRS DURATION - MUSE: 130 MS
QRS DURATION - MUSE: 132 MS
QT - MUSE: 440 MS
QT - MUSE: 442 MS
QTC - MUSE: 483 MS
QTC - MUSE: 491 MS
R AXIS - MUSE: -9 DEGREES
R AXIS - MUSE: 2 DEGREES
RBC # BLD AUTO: 4.32 10E6/UL (ref 3.8–5.2)
SODIUM SERPL-SCNC: 141 MMOL/L (ref 133–144)
SPECIMEN EXPIRATION DATE: NORMAL
SYSTOLIC BLOOD PRESSURE - MUSE: NORMAL MMHG
SYSTOLIC BLOOD PRESSURE - MUSE: NORMAL MMHG
T AXIS - MUSE: 37 DEGREES
T AXIS - MUSE: 52 DEGREES
TROPONIN I SERPL HS-MCNC: 26 NG/L
TROPONIN I SERPL HS-MCNC: 30 NG/L
TROPONIN I SERPL HS-MCNC: 32 NG/L
VENTRICULAR RATE- MUSE: 72 BPM
VENTRICULAR RATE- MUSE: 75 BPM
WBC # BLD AUTO: 3.6 10E3/UL (ref 4–11)

## 2022-01-12 PROCEDURE — 36415 COLL VENOUS BLD VENIPUNCTURE: CPT

## 2022-01-12 PROCEDURE — 83615 LACTATE (LD) (LDH) ENZYME: CPT

## 2022-01-12 PROCEDURE — 85025 COMPLETE CBC W/AUTO DIFF WBC: CPT

## 2022-01-12 PROCEDURE — 85384 FIBRINOGEN ACTIVITY: CPT

## 2022-01-12 PROCEDURE — 84484 ASSAY OF TROPONIN QUANT: CPT

## 2022-01-12 PROCEDURE — 85730 THROMBOPLASTIN TIME PARTIAL: CPT

## 2022-01-12 PROCEDURE — 86140 C-REACTIVE PROTEIN: CPT

## 2022-01-12 PROCEDURE — 99236 HOSP IP/OBS SAME DATE HI 85: CPT | Mod: GC | Performed by: STUDENT IN AN ORGANIZED HEALTH CARE EDUCATION/TRAINING PROGRAM

## 2022-01-12 PROCEDURE — 250N000013 HC RX MED GY IP 250 OP 250 PS 637: Performed by: STUDENT IN AN ORGANIZED HEALTH CARE EDUCATION/TRAINING PROGRAM

## 2022-01-12 PROCEDURE — 85610 PROTHROMBIN TIME: CPT

## 2022-01-12 PROCEDURE — 93280 PM DEVICE PROGR EVAL DUAL: CPT | Mod: 26 | Performed by: INTERNAL MEDICINE

## 2022-01-12 PROCEDURE — 84484 ASSAY OF TROPONIN QUANT: CPT | Performed by: EMERGENCY MEDICINE

## 2022-01-12 PROCEDURE — 93280 PM DEVICE PROGR EVAL DUAL: CPT

## 2022-01-12 PROCEDURE — 99207 PR CDG-CODE CATEGORY CHANGED: CPT | Performed by: STUDENT IN AN ORGANIZED HEALTH CARE EDUCATION/TRAINING PROGRAM

## 2022-01-12 PROCEDURE — 36415 COLL VENOUS BLD VENIPUNCTURE: CPT | Performed by: STUDENT IN AN ORGANIZED HEALTH CARE EDUCATION/TRAINING PROGRAM

## 2022-01-12 PROCEDURE — 84484 ASSAY OF TROPONIN QUANT: CPT | Performed by: STUDENT IN AN ORGANIZED HEALTH CARE EDUCATION/TRAINING PROGRAM

## 2022-01-12 PROCEDURE — 71275 CT ANGIOGRAPHY CHEST: CPT

## 2022-01-12 PROCEDURE — 84145 PROCALCITONIN (PCT): CPT

## 2022-01-12 PROCEDURE — 71275 CT ANGIOGRAPHY CHEST: CPT | Mod: 26 | Performed by: RADIOLOGY

## 2022-01-12 PROCEDURE — G0378 HOSPITAL OBSERVATION PER HR: HCPCS

## 2022-01-12 PROCEDURE — 250N000011 HC RX IP 250 OP 636: Performed by: EMERGENCY MEDICINE

## 2022-01-12 PROCEDURE — 85379 FIBRIN DEGRADATION QUANT: CPT

## 2022-01-12 PROCEDURE — 80053 COMPREHEN METABOLIC PANEL: CPT

## 2022-01-12 PROCEDURE — 250N000013 HC RX MED GY IP 250 OP 250 PS 637

## 2022-01-12 PROCEDURE — 250N000009 HC RX 250: Performed by: EMERGENCY MEDICINE

## 2022-01-12 PROCEDURE — 36415 COLL VENOUS BLD VENIPUNCTURE: CPT | Performed by: EMERGENCY MEDICINE

## 2022-01-12 PROCEDURE — 87804 INFLUENZA ASSAY W/OPTIC: CPT

## 2022-01-12 RX ORDER — ALBUTEROL SULFATE 90 UG/1
2 AEROSOL, METERED RESPIRATORY (INHALATION) EVERY 4 HOURS PRN
Status: DISCONTINUED | OUTPATIENT
Start: 2022-01-12 | End: 2022-01-12 | Stop reason: HOSPADM

## 2022-01-12 RX ORDER — LIDOCAINE 40 MG/G
CREAM TOPICAL
Status: DISCONTINUED | OUTPATIENT
Start: 2022-01-12 | End: 2022-01-12 | Stop reason: HOSPADM

## 2022-01-12 RX ORDER — AMOXICILLIN 250 MG
2 CAPSULE ORAL 2 TIMES DAILY PRN
Status: DISCONTINUED | OUTPATIENT
Start: 2022-01-12 | End: 2022-01-12 | Stop reason: HOSPADM

## 2022-01-12 RX ORDER — ACETAMINOPHEN 325 MG/1
975 TABLET ORAL EVERY 6 HOURS PRN
Qty: 120 TABLET | Refills: 0 | Status: SHIPPED | OUTPATIENT
Start: 2022-01-12 | End: 2022-07-08

## 2022-01-12 RX ORDER — ONDANSETRON 2 MG/ML
4 INJECTION INTRAMUSCULAR; INTRAVENOUS EVERY 6 HOURS PRN
Status: DISCONTINUED | OUTPATIENT
Start: 2022-01-12 | End: 2022-01-12 | Stop reason: HOSPADM

## 2022-01-12 RX ORDER — ALBUTEROL SULFATE 90 UG/1
2 AEROSOL, METERED RESPIRATORY (INHALATION) EVERY 4 HOURS PRN
Qty: 18 G | Refills: 1 | Status: SHIPPED | OUTPATIENT
Start: 2022-01-12 | End: 2022-06-24

## 2022-01-12 RX ORDER — CITALOPRAM HYDROBROMIDE 20 MG/1
20 TABLET ORAL DAILY
Status: DISCONTINUED | OUTPATIENT
Start: 2022-01-12 | End: 2022-01-12 | Stop reason: HOSPADM

## 2022-01-12 RX ORDER — ONDANSETRON 4 MG/1
4 TABLET, ORALLY DISINTEGRATING ORAL EVERY 6 HOURS PRN
Status: DISCONTINUED | OUTPATIENT
Start: 2022-01-12 | End: 2022-01-12 | Stop reason: HOSPADM

## 2022-01-12 RX ORDER — SIMVASTATIN 20 MG
20 TABLET ORAL AT BEDTIME
Status: DISCONTINUED | OUTPATIENT
Start: 2022-01-12 | End: 2022-01-12 | Stop reason: HOSPADM

## 2022-01-12 RX ORDER — LEVOTHYROXINE SODIUM 75 UG/1
75 TABLET ORAL DAILY
Status: DISCONTINUED | OUTPATIENT
Start: 2022-01-12 | End: 2022-01-12 | Stop reason: HOSPADM

## 2022-01-12 RX ORDER — ACETAMINOPHEN 325 MG/1
975 TABLET ORAL EVERY 6 HOURS PRN
Status: DISCONTINUED | OUTPATIENT
Start: 2022-01-12 | End: 2022-01-12 | Stop reason: HOSPADM

## 2022-01-12 RX ORDER — IOPAMIDOL 755 MG/ML
115 INJECTION, SOLUTION INTRAVASCULAR ONCE
Status: COMPLETED | OUTPATIENT
Start: 2022-01-12 | End: 2022-01-12

## 2022-01-12 RX ORDER — AMOXICILLIN 250 MG
1 CAPSULE ORAL 2 TIMES DAILY PRN
Status: DISCONTINUED | OUTPATIENT
Start: 2022-01-12 | End: 2022-01-12 | Stop reason: HOSPADM

## 2022-01-12 RX ORDER — WARFARIN SODIUM 7.5 MG/1
7.5 TABLET ORAL
Status: DISCONTINUED | OUTPATIENT
Start: 2022-01-12 | End: 2022-01-12 | Stop reason: HOSPADM

## 2022-01-12 RX ADMIN — ACETAMINOPHEN 975 MG: 325 TABLET, FILM COATED ORAL at 10:14

## 2022-01-12 RX ADMIN — IOPAMIDOL 115 ML: 755 INJECTION, SOLUTION INTRAVENOUS at 02:14

## 2022-01-12 RX ADMIN — SODIUM CHLORIDE, PRESERVATIVE FREE 90 ML: 5 INJECTION INTRAVENOUS at 02:14

## 2022-01-12 RX ADMIN — LEVOTHYROXINE SODIUM 75 MCG: 75 TABLET ORAL at 08:27

## 2022-01-12 NOTE — H&P
[facility]    History and Physical - [facility] Night Service        Date of Admission:  1/11/2022    Assessment & Plan      Tammy Law is a 66 year old female admitted on 1/11/2022. She has a history of aortic valve disease (s/p mechanical aortic valve replacement, 1994, chronically anticoagulated with warfarin), history of sick sinus syndrome (s/p dual-chamber pacemaker placement, acquired hypothyroidism, congenital nystagmus and is admitted for COVID-19 (positive on 1/9, symptoms beginning 1/7)    # Confirmed COVID-19 infection       Symptom Onset 1/7/2022   Date of 1st Positive Test 1/9/2022   Vaccination Status Fully Vaccinated       - COVID-19 special precautions, continuous pulse-ox  - Oxygen: titrate to keep SpO2 between 90-96%, currently on RA   - Labs: standard COVID admission labs ordered (CBC with diff, CMP, retic count, LDH, troponin, BNP, CK, INR/PT, PTT, D-dimer, fibrinogen, antithrombin, ferritin, CRP, IL-6)   - Imaging: chest CT with contrast ordered  - Breathing treatments: albuterol inhaler four times a day scheduled and PRN; avoid nebulizers in favor of MDIs   - IV fluids: not indicated at this time  - Antibiotics: not indicated   - COVID-Focused Medications: No COVID-specific therapies are appropriate at this time.  - DVT Prophylaxis: at high risk of thrombotic complications due to COVID-19 (DDimer = <0.27 ug/mL FEU (Ref range: 0.00 - 0.50 ug/mL FEU) ).          - Already on therapeutic anticoagulation with warfarin        - consider anticoag on discharge for 30 days & until return to normal mobility     Aortic valve disesase s/p mechanical valve replacement in 1994 rheumatic fever   Last echocardiogram 7./2021, notable for mildly increased pressure gradient of 21mmHg and mild 1+ aortic regurgitation. On chronic anticoagulation with warfarin. Last INR 2.11 1/11/2021.     - HOLD PTA furosemide 40mg PRN in setting of acute illness  - HOLD  "PTA hydrochlorothiazide in setting of acute illness   - Pharmacy warfarin consult, appreciate assistance     Coronary artery calcification noted on CT 9/2018  No visible history of coronary angiogram. BNP neg. Trop neg.     - Continue PTA simvastatin 20mg every day     Sick sinus syndrome s/p dual-chamber pacemaker insertion   Last pacemaker interrogation 12/21, with no detected arrhythmias and normal pacemaker function.     Acquired Hypothyroidism   Last TSH 1.59 1/2021. Denies new hair loss, or significant weight gain.    -Continue PTA levothyroxine 75mcg every day        Diet:   Low sodium   DVT Prophylaxis: Warfarin  Banks Catheter: Not present  Fluids: PO  Central Lines: None  Code Status:  Full Code       Disposition Plan   Expected Discharge:  <2 nights   Anticipated discharge location:  Awaiting care coordination huddle  Delays:           The patient's to be staffed in the AM     Tito Whitaker MD  [facility]  Securely message with the Vocera Web Console (learn more here)  Text page via [employer] Paging/Directory    Please see sign in/sign out for up to date coverage information  ______________________________________________________________________    Chief Complaint    \"I was sent to the ED by my doctor\"     History is obtained from the patient    History of Present Illness   Tammy Law is a 66 year old COVID vaccinated and boosted female who has a history of   aortic valve disease (s/p mechanical aortic valve replacement, 1994, chronically anticoagulated with warfarin), history of sick sinus syndrome (s/p dual-chamber pacemaker placement, acquired hypothyroidism, congenital nystagmus and is admitted for COVID-19       Pt was initially evaluated outpatient 1/9/2022 for a 3 day history of persistent, non-productive cough a/w subjective fever, nasal congestion, headache, fatigue, myalgias, generalized weakness, and chills. Tested " positive for COVID-19 at that time. 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  Had follow up with PCP 1/11/2021 via telemedicine visit, where provider was concerned that she was looking a bit short of breath. Given the virtual nature of the visit, provider recommended pt be seen in person in the ED given her cardiac history of aortic valve replacement and sick sinus syndrome. Overall feels symptoms have been relatively the same since diagnosis. Has been treating at home with APAP prn.  Denies any new orthopnea, LE edema, anginal chest pain/chest pain on exertion, or PND. She has not felt as though she has needed her furosemide PRN, but has been taking her hydrochlorothiazide daily. Enjoys a sodium reduced diet. PO intake has been poor d/t anorexia, denies any nausea or emesis. No decreased urinary output.     Pt is fully vaccinated against COVID (12/20, 1/21), and has received her booster dose.      In ED: CT Chest w/ contrast obtained, pending. CMP wnl, troponin neg, CBC wnl, D-dimer <0.27. VBG notable for PCO2 53, and pO2 of 16 w/ pH  7.36. BNP neg.       Review of Systems    The 10 point Review of Systems is negative other than noted in the HPI or here.     Past Medical History    I have reviewed this patient's medical history and updated it with pertinent information if needed.   Past Medical History:   Diagnosis Date     Anxiety      Chronic anticoagulation      Depression      Disease of thyroid gland     Hypothyroidism     H/O aortic valve replacement      High cholesterol      Hyperlipidemia      Hypertension      Hypothyroidism      Pacemaker     biotronik     SSS (sick sinus syndrome) (H) 2/4/2020        Past Surgical History   I have reviewed this patient's surgical history and updated it with pertinent information if needed.  Past Surgical History:   Procedure Laterality Date     AORTIC VALVE REPLACEMENT       APPENDECTOMY        BIOPSY BREAST Left 2015     BREAST CYST EXCISION       CARDIAC CATHETERIZATION       HC PART REMV BONE METATARSAL HEAD,EA Right 2017    Procedure: EXOSTECTOMY 2ND METATARSAL RIGHT FOOT;  Surgeon: Jessee Mccauley DPM;  Location: [facility];  Service: Podiatry     HYSTERECTOMY       IMPLANT PACEMAKER       IMPLANT PACEMAKER       OOPHORECTOMY       OTHER SURGICAL HISTORY      Hemmorhoidectomy     RELEASE CARPAL TUNNEL Bilateral      TOE SURGERY       TYMPANOSTOMY TUBE PLACEMENT       ZZC REPLACE AORT VALV,PROSTH VALV      Description: Aortic Valve Replacement;  Proc Date: 1995;        Social History   I have reviewed this patient's social history and updated it with pertinent information if needed. Tammy SEGOVIA Sedaryl  reports that she has never smoked. She has never used smokeless tobacco. She reports current alcohol use. She reports that she does not use drugs.    Family History   I have reviewed this patient's family history and updated it with pertinent information if needed.  Family History   Problem Relation Age of Onset     Cancer Paternal Grandfather         Stomach     Ovarian Cancer Cousin      Pacemaker Mother      Diabetes Mother      Coronary Artery Disease Father      Pacemaker Father      Diabetes Father      No Known Problems Sister      No Known Problems Daughter      No Known Problems Maternal Grandmother      No Known Problems Maternal Grandfather      No Known Problems Paternal Grandmother      No Known Problems Maternal Aunt      No Known Problems Paternal Aunt      Kidney Cancer Brother      Hereditary Breast and Ovarian Cancer Syndrome No family hx of      Breast Cancer No family hx of      Colon Cancer No family hx of      Endometrial Cancer No family hx of        Prior to Admission Medications   Prior to Admission Medications   Prescriptions Last Dose Informant Patient Reported? Taking?   LEVOTHYROXINE SODIUM 75 MCG OR TABS   Yes No   Si TABLET DAILY    SIMVASTATIN 20 MG OR TABS   Yes No   Si TABLET EVERY EVENING   cholecalciferol 25 MCG (1000 UT) TABS   Yes No   Sig: Take 2,000 Units by mouth daily   citalopram (CELEXA) 20 MG tablet   No No   Sig: Take 1 tablet (20 mg) by mouth daily   furosemide (LASIX) 40 MG tablet   Yes No   Sig: Take 40 mg by mouth daily   hydrochlorothiazide (MICROZIDE) 12.5 MG capsule   Yes No   Sig: Take 12.5 mg by mouth daily   order for DME   No No   Sig: Equipment being ordered: tall cam boot with crutches   warfarin ANTICOAGULANT (COUMADIN ANTICOAGULANT) 2.5 MG tablet   No No   Sig: Take 1-3 tablets (2.5-7.5 mg) by mouth daily Take two to three tablets by mouth once daily as directed. Adjust dosed based on INR results.      Facility-Administered Medications: None     Allergies   Allergies   Allergen Reactions     Demerol [Meperidine]      Hallucinations       Sulfa Drugs        Physical Exam   Vital Signs: Temp: 98.3  F (36.8  C) Temp src: Oral BP: (!) 141/81 Pulse: 71   Resp: 22 SpO2: 100 % O2 Device: None (Room air)    Weight: 0 lbs 0 oz    Physical Exam  Constitutional:       General: She is not in acute distress.     Appearance: Normal appearance. She is not diaphoretic.   HENT:      Head: Normocephalic.      Mouth/Throat:      Mouth: Mucous membranes are moist.   Eyes:      Extraocular Movements: Extraocular movements intact.      Conjunctiva/sclera: Conjunctivae normal.   Cardiovascular:      Rate and Rhythm: Normal rate and regular rhythm.      Pulses: Normal pulses.      Heart sounds: Murmur heard.    Systolic murmur is present with a grade of 3/6.      Pulmonary:      Effort: No respiratory distress.      Breath sounds: Normal breath sounds.      Comments: Course breath sounds in bilateral lung bases    Abdominal:      General: Abdomen is flat. There is no distension.      Palpations: Abdomen is soft. There is no mass.      Tenderness: There is no abdominal tenderness. There is no guarding.   Musculoskeletal:      Right  lower le+ Edema present.      Left lower le+ Edema present.   Skin:     General: Skin is warm and dry.      Capillary Refill: Capillary refill takes less than 2 seconds.   Neurological:      General: No focal deficit present.      Mental Status: She is alert and oriented to person, place, and time. Mental status is at baseline.         Data   Data reviewed today: I reviewed all medications, new labs and imaging results over the last 24 hours. I personally reviewed the chest CT image(s) showing pending.    Recent Labs   Lab 22  2214   WBC 4.3   HGB 14.1   MCV 89      INR 2.11*      POTASSIUM 4.3   CHLORIDE 106   CO2 29   BUN 11   CR 0.78   ANIONGAP 5   ALVAREZ 9.5   GLC 86   ALBUMIN 4.0   PROTTOTAL 8.3   BILITOTAL 0.8   ALKPHOS 66   ALT 40   AST 35   LIPASE 124

## 2022-01-12 NOTE — DISCHARGE INSTRUCTIONS
"After Your COVID-19 (Coronavirus) Test  You have been tested for COVID-19 (coronavirus).   If you'll have surgery in the next few days, we'll let you know ahead of time if you have the virus. Please call your surgeon's office with any questions.  For all other patients: Results are usually available in [employer] within 2 to 3 days.   If you do not have a [employer] account, you'll get a letter in the mail in about 7 to 10 days.   [employer] is often the fastest way to get test results. Please sign up if you do not already have a [employer] account. See the handout Getting COVID-19 Test Results in [employer] for help.  What if my test result is positive?  If your test is positive and you have not viewed your result in [employer], you'll get a phone call with your result. (A positive test means that you have the virus.)     Follow the tips under \"How do I self-isolate?\" below for 10 days (20 days if you have a weak immune system).    You don't need to be retested for COVID-19 before going back to school or work. As long as you're fever-free and feeling better, you can go back to school, work and other activities after waiting the 10 or 20 days.  What if I have questions after I get my results?  If you have questions about your results, please visit our testing website at [email or web address].   After 7 to 10 days, if you have not gotten your results:     Call [phone] ([phone]) and ask to speak with our COVID-19 results team.    If you're being treated at an infusion center: Call your infusion center directly.  What are the symptoms of COVID-19?  Cough, fever and trouble breathing are the most common signs of COVID-19.  Other symptoms can include new headaches, new muscle or body aches, new and unexplained fatigue (feeling very tired), chills, sore throat, congestion (stuffy or runny nose), diarrhea (loose poop), loss of taste or smell, belly pain, and nausea or vomiting (feeling sick to your " "stomach or throwing up).  You may already have symptoms of COVID-19, or they may show up later.  What should I do if I have symptoms?  If you're having surgery: Call your surgeon's office.  For all other patients: Stay home and away from others (self-isolate) until ...    You've had no fever--and no medicine that reduces fever--for 1 full day (24 hours), AND    Other symptoms have gotten better. For example, your cough or breathing has improved, AND    At least 10 days have passed since your symptoms first started.  How do I self-isolate?    Stay in your own room, even for meals. Use your own bathroom if you can.    Stay away from others in your home. No hugging, kissing or shaking hands. No visitors.    Don't go to work, school or anywhere else.    Clean \"high touch\" surfaces often (doorknobs, counters, handles). Use household cleaning spray or wipes. You'll find a full list of  on the EPA website: www.epa.gov/pesticide-registration/list-n-disinfectants-use-against-sars-cov-2.    Cover your mouth and nose with a mask or other face covering to avoid spreading germs.    Wash your hands and face often. Use soap and water.    Caregivers in these groups are at risk for severe illness due to COVID-19:  ? People 65 years and older  ? People who live in a nursing home or long-term care facility  ? People with chronic disease (lung, heart, cancer, diabetes, kidney, liver, immunologic)  ? People who have a weakened immune system, including those who:    Are in cancer treatment    Take medicine that weakens the immune system, such as corticosteroids    Had a bone marrow or organ transplant    Have an immune deficiency    Have poorly controlled HIV or AIDS    Are obese (body mass index of 40 or higher)    Smoke regularly    Caregivers should wear gloves while washing dishes, handling laundry and cleaning bedrooms and bathrooms.    Use caution when washing and drying laundry: Don't shake dirty laundry and use the " warmest water setting that you can.    For more tips on managing your health at home, go to www.cdc.gov/coronavirus/2019-ncov/downloads/10Things.pdf.  How can I take care of myself at home?  1. Get lots of rest. Drink extra fluids (unless a doctor has told you not to).  2. Take Tylenol (acetaminophen) for fever or pain. If you have liver or kidney problems, ask your family doctor if it's OK to take Tylenol.   Adults can take either:  ? 650 mg (two 325 mg pills) every 4 to 6 hours, or   ? 1,000 mg (two 500 mg pills) every 8 hours as needed.  ? Note: Don't take more than 3,000 mg in one day. Acetaminophen is found in many medicines (both prescribed and over-the-counter medicines). Read all labels to be sure you don't take too much.   For children, check the Tylenol bottle for the right dose. The dose is based on the child's age or weight.  3. If you have other health problems (like cancer, heart failure, an organ transplant or severe kidney disease): Call your specialty clinic if you don't feel better in the next 2 days.  4. Know when to call 911. Emergency warning signs include:  ? Trouble breathing or shortness of breath  ? Chest pain or pressure that doesn't go away  ? Feeling confused like you haven't felt before, or not being able to wake up  ? Bluish-colored lips or face  5. If your doctor prescribed a blood thinner medicine: Follow their instructions.  Where can I get more information?    [facility] - About COVID-19:   [email or web address]    CDC - If You're Sick: cdc.gov/coronavirus/2019-ncov/about/steps-when-sick.html    CDC - Ending Home Isolation: www.cdc.gov/coronavirus/2019-ncov/hcp/disposition-in-home-patients.html    CDC - Caring for Someone: www.cdc.gov/coronavirus/2019-ncov/if-you-are-sick/care-for-someone.html    [facility] - Interim Guidance for Hospital Discharge to Home: www.health.[facility].[place].us/diseases/coronavirus/hcp/hospdischarge.pdf    [facility] clinical trials  (COVID-19 research studies): [email or web address]-clinical-trials    Below are the COVID-19 hotlines at the [place] Department of Health ([facility]). Interpreters are available.  ? For health questions: Call 849-368-7785 or 1-125.467.9871 (7 a.m. to 7 p.m.)  ? For questions about schools and childcare: Call 391-073-6566 or 1-410.969.1982 (7 a.m. to 7 p.m.)    For informational purposes only. Not to replace the advice of your health care provider. Clinically reviewed by Infection Prevention and the [facility] COVID-19 Clinical Team. Copyright   2020 [facility]. All rights reserved. [employer] 854653 - Rev 11/11/20.

## 2022-01-12 NOTE — ED PROVIDER NOTES
ED Provider Note  [facility]      History     Chief Complaint   Patient presents with     Shortness of Breath     covid positive 1/9/22, having some rib pain from coughing, wants to get checked out r/t heart hx.      The history is provided by the patient and medical records.     Tammy Law is a 66 year old female with PMH notable for aortic valve disease (s/p mechanical aortic valve replacement, 1994, chronically anticoagulated with warfarin), history of sick sinus syndrome (s/p dual-chamber pacemaker placement) seemingly last seen by Cardiology December 2020, who was sent in from Primary Care with concern for chest discomfort/tightness and shortness of breath with recent COVID diagnosis (positive on 1/9, symptoms beginning 1/7), acquired hypothyroidism, congenital nystagmus, for further evaluation and management.    Patient began feeling somewhat unwell on 1/7/2022.  She reported having some headache, congestion, throat pressure, cough but no fever.  She began having more chest tightness and shortness of breath, prompting her to have a COVID test on 1/9/2022 which was found to be positive.  She had a video visit with the Primary Care (FM) clinic today, who thought she looked somewhat short of breath. In between looking a bit short of breath without being able to physically evaluate as well as her cardiac history recommended that she come to the ED for further evaluation/management.    Patient reports that her previous headache symptoms are improved.  Not having any particular severe sore throat.  No trouble swallowing or tolerating secretions.  No neck pain/stiffness or trouble with head/neck movements.  She is having central and bilateral low chest discomfort as well as tightness. Chest discomfort/tightness is there all the time, perhaps a little bit worse with deep breath/cough. Unclear if exertional (patient has been fairly fatigued and exertion has not been at normal  levels). Some shortness of breath.  Some cough but not terribly productive, no hemoptysis.  No back symptoms.  No particular abdominal pain, no nausea or vomiting.  No change to bowel or bladder.  No rashes or skin changes.  When her headache was more severe she felt like her whole head was on fire but that is improved.  No numbness, tingling, weakness or other such symptoms.  She does report feeling generally fatigued, poor appetite does not really want to eat or drink, etc.  No other new symptoms or complaints at this time.  Please see ROS for further details.    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  The unit when she was working was recently turned into a COVID unit and she is wondering if she got COVID there.  She is unaware of any other potential ill exposures.  No other medical comorbidities or issues that she is aware of.    In review of EMR I see on a CT scan from 2018 they did note some coronary artery calcification, with previous note being from 2015 saying there was no obvious CAD at the time of her preoperative angiogram, but continues to follow with Cardiology (last seen December 2020) for her known aortic valve disease with previous mechanical valve repair as well as continued evaluation and history of prior sick sinus syndrome now s/p cardiac pacemaker (Biotronik Eluna, dual-chamber).    UPDATE: Patient also reports some low central/left chest pain intermittently over the last month or 2 even before the COVID symptoms/diagnosis. No other new/associated symptoms. She denies any known history of CAD/ACS, but there are comments about coronary calcifications on CT scan of A/P from 2018.     This part of the medical record was transcribed by Kay Johnson Medical Scribe, from a dictation done by Sherri Bueno MD.     Past Medical History  Past Medical History:   Diagnosis Date     Anxiety      Chronic anticoagulation      Depression      Disease of thyroid  gland     Hypothyroidism     H/O aortic valve replacement      High cholesterol      Hyperlipidemia      Hypertension      Hypothyroidism      Pacemaker     biotronik     SSS (sick sinus syndrome) (H) 2/4/2020     Past Surgical History:   Procedure Laterality Date     AORTIC VALVE REPLACEMENT       APPENDECTOMY       BIOPSY BREAST Left 2015     BREAST CYST EXCISION       CARDIAC CATHETERIZATION       HC PART REMV BONE METATARSAL HEAD,EA Right 06/23/2017    Procedure: EXOSTECTOMY 2ND METATARSAL RIGHT FOOT;  Surgeon: Jessee Mccauley DPM;  Location: [facility];  Service: Podiatry     HYSTERECTOMY  2000     IMPLANT PACEMAKER       IMPLANT PACEMAKER       OOPHORECTOMY  2000     OTHER SURGICAL HISTORY      Hemmorhoidectomy     RELEASE CARPAL TUNNEL Bilateral      TOE SURGERY       TYMPANOSTOMY TUBE PLACEMENT       ZZC REPLACE AORT VALV,PROSTH VALV      Description: Aortic Valve Replacement;  Proc Date: 01/01/1995;     cholecalciferol 25 MCG (1000 UT) TABS  citalopram (CELEXA) 20 MG tablet  furosemide (LASIX) 40 MG tablet  hydrochlorothiazide (MICROZIDE) 12.5 MG capsule  LEVOTHYROXINE SODIUM 75 MCG OR TABS  order for DME  SIMVASTATIN 20 MG OR TABS  warfarin ANTICOAGULANT (COUMADIN ANTICOAGULANT) 2.5 MG tablet      Allergies   Allergen Reactions     Demerol [Meperidine]      Hallucinations       Sulfa Drugs      Family History  Family History   Problem Relation Age of Onset     Cancer Paternal Grandfather         Stomach     Ovarian Cancer Cousin      Pacemaker Mother      Diabetes Mother      Coronary Artery Disease Father      Pacemaker Father      Diabetes Father      No Known Problems Sister      No Known Problems Daughter      No Known Problems Maternal Grandmother      No Known Problems Maternal Grandfather      No Known Problems Paternal Grandmother      No Known Problems Maternal Aunt      No Known Problems Paternal Aunt      Kidney Cancer Brother      Hereditary Breast and Ovarian Cancer Syndrome No  family hx of      Breast Cancer No family hx of      Colon Cancer No family hx of      Endometrial Cancer No family hx of      Social History   Social History     Tobacco Use     Smoking status: Never Smoker     Smokeless tobacco: Never Used   Substance Use Topics     Alcohol use: Yes     Alcohol/week: 0.0 standard drinks     Comment: Alcoholic Drinks/day: twice per year     Drug use: No      Past medical history, past surgical history, medications, allergies, family history, and social history were reviewed with the patient. No additional pertinent items.       Review of Systems   Constitutional: Positive for appetite change and fatigue.   HENT: Positive for congestion. Negative for sore throat and trouble swallowing.    Eyes: Negative.    Respiratory: Positive for cough, chest tightness and shortness of breath.    Cardiovascular: Positive for chest pain.   Gastrointestinal: Negative for abdominal pain, nausea and vomiting.   Genitourinary: Negative.    Musculoskeletal: Negative for back pain, neck pain and neck stiffness.   Skin: Negative.  Negative for rash.   Allergic/Immunologic: Negative for immunocompromised state.   Neurological: Negative for weakness, numbness and headaches (resolved).   Psychiatric/Behavioral: Negative for suicidal ideas.   All other systems reviewed and are negative.    A complete review of systems was performed with pertinent positives and negatives noted in the HPI, and all other systems negative.    Physical Exam   BP: 131/75  Pulse: 62  Temp: 98.3  F (36.8  C)  Resp: 20  SpO2: 99 %  Physical Exam  CONSTITUTIONAL: Well-developed and well-nourished. Awake and alert. Non-toxic appearance. No acute distress.   HENT:   - Head: Normocephalic and atraumatic.   - Ears: Hearing and external ear grossly normal.   - Nose: Nose normal. No rhinorrhea. No epistaxis.   - Mouth/Throat: No trismus or drooling.  No tongue elevation. Normal voice. Uvula is midline and nonedematous. No lesions or  findings appreciated. No asymmetry. No obvious findings for PTA, RPA, epiglottitis or Victorino's Angina.   EYES: Conjunctivae and lids are normal. No scleral icterus. Patient is noted to have nystagmus (patient reports that this is congenital and that she was born with it and that it is not new for her.  She has no new vision symptoms reported.  Pupils are equal, reactive, etc.  No eye pain or issue/discomfort with extraocular movements.  NECK: Normal range of motion and phonation normal. Neck supple.  No tracheal deviation, no stridor. No edema or erythema noted. No stiffness/rigidity.  Moves head neck easily without issues.  No discomfort.  No fullness or fluctuance.    CARDIOVASCULAR: Normal rate, regular rhythm and no appreciable abnormal heart sounds. No murmurs or abnormal heart sounds appreciated (understood this in the setting of the patient's mechanical valve)  PULMONARY/CHEST: Normal work of breathing. No accessory muscle usage or stridor. No respiratory distress.  No appreciable abnormal breath sounds.  ABDOMEN: Soft, non-distended. No tenderness. No rigidity, rebound or guarding.   MUSCULOSKELETAL: Extremities warm and seemingly well perfused. No edema or calf tenderness.  NEUROLOGIC: Awake, alert. Not disoriented. She displays no atrophy and no tremor. Normal tone. No seizure activity. GCS 15  SKIN: Skin is warm and dry. No rash noted. No diaphoresis. No pallor.   PSYCHIATRIC: Normal mood and affect. Speech and behavior normal. Thought processes linear. Cognition and memory are normal.   ED Course     ED Course as of 01/12/22 0412   Wed Jan 12, 2022   0233 Spoke w/ Bill Cook w/ [employer] - No VT or high ventricular rates, no afib; implanted in 2016, atrial paced 91% of time, 0% v-paced, pace placement looks good.        Assessments & Plan (with Medical Decision Making)   IMPRESSION: 66 year old female with PMH notable for aortic valve disease (s/p mechanical aortic valve replacement, 1994, chronically  anticoagulated with warfarin), history of sick sinus syndrome (s/p dual-chamber pacemaker placement) seemingly last seen by Cardiology December 2020, who was sent in from Primary Care with concern for chest discomfort/tightness and shortness of breath with recent COVID diagnosis (positive on 1/9, symptoms beginning 1/7), acquired hypothyroidism, congenital nystagmus, for further evaluation and management.    Clinically, patient peers nontoxic, NAD.  Vitals show slight elevation of BP, but had normal vitals recorded (recently when had similar symptoms I think unlikely related to a hypertensive crisis).  Otherwise oxygen saturation WNL. Patient has a normal heart rate (is paced).  Has a known mechanical heart valve in place, otherwise no appreciable acute cardiopulmonary findings on exam, does not appear overly dyspneic.  Has the bilateral nystagmus that is reportedly congenital and not having any new symptoms per patient. No obvious findings for fluid overload/heart failure.    DDx includes, but not limited to, symptoms related to COVID itself, worsening of any cardiac issues, myocarditis, PE, pericarditis, pleurisy, pneumonia, amongst others.  Think unlikely PTX given presentation, consider Bailee-Clark/Boerhaave syndrome but did not have any notable vomiting.  No clear history of ACS though does have other cardiac disease and did have some cardiac calcifications on a prior CT scan and did have symptoms preceding the COVID diagnosis as well. Does not sound consistent with dissection, considered some sort of pericarditis/pericardial effusion, theoretically could have some inflammation process, but I am not finding other causes to make me think that she would have like a tamponade type presentation, also considered other abdominal cause given the low nature of her chest discomfort, think gastritis/PUD, pancreatitis, hepatobiliary cause, etc., though not having any other GI symptoms to correlate for such.    PLAN:  ECG, laboratory studies, chest imaging (awaiting initial labs to see if she would need a CT scan, thinking somewhat less likely given that she is already chronically anticoagulated with warfarin), disposition pending ED course  - Risks/benefits of pursuing imaging reviewed and accepted.     RESULTS:  - Labs: No acute findings  - Device Interrogation: No acute findings, see discussion w/ [employer] rep above in ED course  - Imaging: CTA Chest pending    INTERVENTIONS:   - Monitoring, not requiring pain meds    RE-EVALUATION:  - Pt otherwise continues to do well here in the ED, no acute issues or apparent concerning changes in vitals or clinical appearance.    DISCUSSIONS:  - w/ IM: Reviewed patient/presentation, current state of workup/any pending studies. They will admit for further evaluation/management, F/U pending studies as needed, coordinate w/ consulting services as needed. No additional requests/recommendations for workup/management for in the ED at this time.   - w/ Patient: I have reviewed the available findings, plan with the patient. They expressed understanding and agreement with this plan. All questions answered to the best of our ability at this time.     DISPOSITION/PLANNING:  - IMPRESSION: Chest pain (preceding recent COVID diagnosis), COVID-19 infection w/ multiple symptoms  - DISPOSITION: Admit to Med Obs for further evaluation/management  - PENDING: CT Chest  - RECOMMENDATIONS: Ongoing monitoring, Cards/Cheset pain R/O, further eval/management of symptoms      ______________________________________________________________________    This part of the medical record was transcribed by Kay Johnson Medical Scribe, from a dictation done by Sherri Bueno MD.       --  Sherri Bueno MD  [facility] EMERGENCY DEPARTMENT  1/11/2022     Sherri Bueno MD  01/13/22 0457

## 2022-01-12 NOTE — ED TRIAGE NOTES
Pt covid + 1/9/22, has cardiac hx so PCP wanted her to get checked out r/t pt having some bilateral rib pain/ SOB.

## 2022-01-12 NOTE — PHARMACY-ANTICOAGULATION SERVICE
Clinical Pharmacy - Warfarin Dosing Consult     Pharmacy has been consulted to manage this patient s warfarin therapy.  Indication: Mechanical Aortic Valve Replacement  Therapy Goal: INR 2-3  Warfarin Prior to Admission: Yes  Warfarin PTA Regimen: 7.5 mg every Wed; 5 mg all other days  Recent documented change in oral intake/nutrition: Unknown    INR   Date Value Ref Range Status   01/11/2022 2.11 (H) 0.85 - 1.15 Final     INR HOME MONITORING   Date Value Ref Range Status   12/29/2021 1.90 (L) 2.000 - 3.500 Final       Recommend warfarin 7.5 mg today.  Pharmacy will monitor Tammy Law daily and order warfarin doses to achieve specified goal.      Please contact pharmacy as soon as possible if the warfarin needs to be held for a procedure or if the warfarin goals change.      Consuelo Quijano, ClaryD

## 2022-01-12 NOTE — DISCHARGE SUMMARY
[facility]  Discharge Summary - Medicine & Pediatrics       Date of Admission:  1/11/2022  Date of Discharge:  1/12/2022  Discharging Provider: Dr. Macdonald   Discharge Service: Gold 12    Discharge Diagnoses   - COVID 19 infection     Follow-ups Needed After Discharge   Follow-up Appointments     Adult [facility] Follow-up and recommended labs and tests      Follow up with primary care provider, Christina Hernandez, within 7 days   for hospital follow- up.  No follow up labs or test are needed.      Appointments on [place] and/or [facility] (with [facility] or [facility]   provider or service). Call [phone] if you haven't heard regarding   these appointments within 7 days of discharge.             Discharge Disposition   Discharged to home  Condition at discharge: Stable      Hospital Course   Tammy Law is a 66 year old female admitted on 1/11/2022. She has a history of aortic valve disease (s/p mechanical aortic valve replacement, 1994, chronically anticoagulated with warfarin), history of sick sinus syndrome (s/p dual-chamber pacemaker placement, acquired hypothyroidism, congenital nystagmus and is admitted for COVID-19 (positive on 1/9, symptoms beginning 1/7)      # COVID-19 infection   Patient was sent to ED per PCP recs on 1/11 with concern for SOB and possible hypoxia (was virtual visit). Patient reports stable symptoms (cough, chest pain, SOB only on exertion, myalgias and congestion). ED course has been stable with O2 sats in upper 90s on RA. Work-up showed mild elevation in inflammatory markers and CTPE was negative for PE. Already on AC (warfarin for mAVR). Will discharge home given stable O2 requirements. Advised patient to have a pulse ox meter at home to monitor her O2 sats and to seek medical advice if Sats <92-90    - Monitor O2 sats QID  - Albuterol Inhaler q 4 PRN for SOB  - APAP for pain    Symptom Onset 1/7/2022   Date of 1st Positive Test  1/9/2022   Vaccination Status Fully Vaccinated       Aortic valve disesase s/p mechanical valve replacement in 1994 rheumatic fever   Last echocardiogram 7./2021, notable for mildly increased pressure gradient of 21mmHg and mild 1+ aortic regurgitation. On chronic anticoagulation with warfarin. Last INR 2.11 1/11/2021.      - Continue PTA furosemide 40mg PRN   - HOLD PTA hydrochlorothiazide (Pt reported not taking)  - Continue PTA warfarin and follow up with INR clinic      Coronary artery calcification noted on CT 9/2018  No visible history of coronary angiogram. BNP neg. Trop neg.   - Continue PTA simvastatin 20mg every day      Sick sinus syndrome s/p dual-chamber pacemaker insertion   Last pacemaker interrogation 12/21, with no detected arrhythmias and normal pacemaker function.      Acquired Hypothyroidism   Last TSH 1.59 1/2021. Denies new hair loss, or significant weight gain.  -Continue PTA levothyroxine 75mcg every day       Consultations This Hospital Stay   PHARMACY TO DOSE WARFARIN    Code Status   Full Code     The patient was discussed with Dr. Torres Pleitez MD  IM PGY3  [facility] EMERGENCY DEPARTMENT  [address] [address]  Phone: [phone]  ______________________________________________________________________    Physical Exam   Vital Signs: Temp: 98.6  F (37  C) Temp src: Oral BP: 97/61 Pulse: 66   Resp: 18 SpO2: 96 % O2 Device: Nasal cannula    Weight: 0 lbs 0 oz  Constitutional: awake, alert, cooperative, no apparent distress, and appears stated age  Respiratory: No increased work of breathing, good air exchange, clear to auscultation bilaterally, no crackles or wheezing  Cardiovascular: regular rate and rhythm, normal S1 and S2, no S3 or S4, and mechanical murmur noted  GI: No scars, normal bowel sounds, soft, non-distended, non-tender, no masses palpated, no hepatosplenomegally  Skin: no bruising or bleeding  Musculoskeletal: no lower extremity pitting  edema present      Primary Care Physician   Christina Hernandez    Discharge Orders      COVID-19 GetWell Loop Referral      Reason for your hospital stay    You have been hospitalized for shortness of breath and chest pain. You have been diagnosed with COVID-19 infection on 1/9/22. Your inflammatory blood markers were only mildly elevated and you have not require any oxygen supplements as your oxygen saturations has been in high 90s. CT scan of your chest did not show any blood clots in your lungs or significant infiltrates.    Please follow up with your INR clinic as usual     Contact provider    Contact your primary care provider if If increased trouble breathing, new arm/leg swelling, dizziness/passing out, falls, bleeding that doesn't stop, or uncontrolled pain.     Adult [facility] Follow-up and recommended labs and tests    Follow up with primary care provider, Christina Hernandez, within 7 days for hospital follow- up.  No follow up labs or test are needed.      Appointments on [place] and/or [facility] (with [facility] or [facility] provider or service). Call [phone] if you haven't heard regarding these appointments within 7 days of discharge.     Discharge - Quarantine/Isolation Instruction    Date of symptom onset: 1/7/2022   Date of first positive test: 1/9/2022  If you tested positive COVID-19 and show symptoms (fever, cough, body aches or trouble breathing):        Stay home and away from others (self-isolate) until:        At least 10 days have passed since your symptoms started. AND...        You've had no fever-and no medicine that reduces fever for 1 full day (24 hours). AND...         Your other symptoms have resolved (gotten better).  If you tested positive for COVID-19 but don't show symptoms:       Stay home and away from others (self-isolate) until at least 10 days have passed since the date of your first positive COVID-19 test.     Activity    Your activity upon discharge: activity as  tolerated     Full Code     Diet    Follow this diet upon discharge: Orders Placed This Encounter      Combination Diet Regular Diet Adult; 2 gm NA Diet       Significant Results and Procedures     Discharge Medications   Discharge Medication List as of 1/12/2022 12:55 PM      START taking these medications    Details   acetaminophen (TYLENOL) 325 MG tablet Take 3 tablets (975 mg) by mouth every 6 hours as needed for mild pain or fever, Disp-120 tablet, R-0, E-Prescribe      albuterol (PROAIR HFA/PROVENTIL HFA/VENTOLIN HFA) 108 (90 Base) MCG/ACT inhaler Inhale 2 puffs into the lungs every 4 hours as needed for other (Bronchospasm), Disp-18 g, R-1, E-PrescribePharmacy may dispense brand covered by insurance (Proair, or proventil or ventolin or generic albuterol inhaler)         CONTINUE these medications which have NOT CHANGED    Details   cholecalciferol 25 MCG (1000 UT) TABS Take 2,000 Units by mouth daily, Historical      citalopram (CELEXA) 20 MG tablet Take 1 tablet (20 mg) by mouth daily, Disp-90 tablet, R-3, E-Prescribe      furosemide (LASIX) 40 MG tablet Take 40 mg by mouth daily, Historical      LEVOTHYROXINE SODIUM 75 MCG OR TABS 1 TABLET DAILY, Historical      SIMVASTATIN 20 MG OR TABS 1 TABLET EVERY EVENING, Historical      warfarin ANTICOAGULANT (COUMADIN ANTICOAGULANT) 2.5 MG tablet Take 1-3 tablets (2.5-7.5 mg) by mouth daily Take two to three tablets by mouth once daily as directed. Adjust dosed based on INR results., Disp-190 tablet, R-3, E-Prescribe      order for DME Equipment being ordered: tall cam boot with crutchesDisp-2 Device, R-0, Local Print         STOP taking these medications       hydrochlorothiazide (MICROZIDE) 12.5 MG capsule Comments:   Reason for Stopping:             Allergies   Allergies   Allergen Reactions     Demerol [Meperidine]      Hallucinations       Sulfa Drugs

## 2022-01-12 NOTE — PROGRESS NOTES
ANTICOAGULATION  MANAGEMENT: Discharge Review    Tammy Law chart reviewed for anticoagulation continuity of care    Emergency room visit on 1/11-1/12/22 for Chest Pain.    Discharge disposition: Discharged home with spouse    Results:    Recent labs: (last 7 days)     01/11/22  2214 01/12/22  0744   INR 2.11* 2.16*     Anticoagulation inpatient management:     not applicable     Anticoagulation discharge instructions:     Warfarin dosing: home regimen continued   Bridging: No   INR goal change: No      Medication changes affecting anticoagulation: Yes: Started on 975 mg every 6 hours PRN  Additional factors affecting anticoagulation: Yes: Patient test positive for COVID on 1/9/22    Plan     Recommend to check INR on 1/12/22 with home meter as planned    Patient not contacted    Anticoagulation Calendar updated    Sarah Gaona RN

## 2022-01-12 NOTE — PROGRESS NOTES
ANTICOAGULATION MANAGEMENT     Tammy Law 66 year old female is on warfarin with therapeutic INR result. (Goal INR The patient does not have an INR goal.)    Recent labs: (last 7 days)     01/12/22  0744   INR 2.16*       ASSESSMENT     Source(s): Chart Review and Patient/Caregiver Call       Warfarin doses taken: Warfarin taken as instructed    Diet: Reports low appetite due to covid+    New illness, injury, or hospitalization: Yes: ED discharge today reports COVID positive symptoms of diarrhea, stuffy nose, fatigue    Medication/supplement changes: Tylenol 975 mg every 6 hours    Signs or symptoms of bleeding or clotting: No    Previous INR: Therapeutic last 2(+) visits    Additional findings: None     PLAN     Recommended plan for no diet, medication or health factor changes affecting INR     Dosing Instructions: Continue your current warfarin dose with next INR in 2 weeks       Summary  As of 1/12/2022    Full warfarin instructions:  7.5 mg every Wed; 5 mg all other days   Next INR check:  1/26/2022             Telephone call with Tammy who verbalizes understanding and agrees to plan    Patient to recheck with home meter    Education provided: Please call back if any changes to your diet, medications or how you've been taking warfarin    Plan made per [facility] anticoagulation protocol    Sarah Gaona RN  Anticoagulation Clinic  1/12/2022    _______________________________________________________________________     Anticoagulation Episode Summary     Current INR goal:     TTR:  --   Target end date:     Send INR reminders to:  MORENO SINGH    Indications    H/O mechanical aortic valve replacement [Z95.2]           Comments:  Target goal 2.0-3.5  [employer] home monitor- managed by exception         Anticoagulation Care Providers     Provider Role Specialty Phone number    Arnold Anderson MD Referring Internal Medicine [phone]

## 2022-01-13 ASSESSMENT — ENCOUNTER SYMPTOMS: EYES NEGATIVE: 1

## 2022-01-17 LAB
MDC_IDC_LEAD_IMPLANT_DT: NORMAL
MDC_IDC_LEAD_IMPLANT_DT: NORMAL
MDC_IDC_LEAD_LOCATION: NORMAL
MDC_IDC_LEAD_LOCATION: NORMAL
MDC_IDC_LEAD_LOCATION_DETAIL_1: NORMAL
MDC_IDC_LEAD_LOCATION_DETAIL_1: NORMAL
MDC_IDC_LEAD_MFG: NORMAL
MDC_IDC_LEAD_MFG: NORMAL
MDC_IDC_LEAD_MODEL: NORMAL
MDC_IDC_LEAD_MODEL: NORMAL
MDC_IDC_LEAD_POLARITY_TYPE: NORMAL
MDC_IDC_LEAD_POLARITY_TYPE: NORMAL
MDC_IDC_LEAD_SERIAL: NORMAL
MDC_IDC_LEAD_SERIAL: NORMAL
MDC_IDC_LEAD_SPECIAL_FUNCTION: NORMAL
MDC_IDC_LEAD_SPECIAL_FUNCTION: NORMAL
MDC_IDC_MSMT_BATTERY_DTM: NORMAL
MDC_IDC_MSMT_BATTERY_REMAINING_LONGEVITY: 79 MO
MDC_IDC_MSMT_BATTERY_STATUS: NORMAL
MDC_IDC_MSMT_LEADCHNL_RA_IMPEDANCE_VALUE: 604 OHM
MDC_IDC_MSMT_LEADCHNL_RA_PACING_THRESHOLD_AMPLITUDE: 0.8 V
MDC_IDC_MSMT_LEADCHNL_RA_PACING_THRESHOLD_PULSEWIDTH: 0.4 MS
MDC_IDC_MSMT_LEADCHNL_RA_SENSING_INTR_AMPL: 6.4 MV
MDC_IDC_MSMT_LEADCHNL_RV_IMPEDANCE_VALUE: 643 OHM
MDC_IDC_MSMT_LEADCHNL_RV_PACING_THRESHOLD_AMPLITUDE: 1.4 V
MDC_IDC_MSMT_LEADCHNL_RV_PACING_THRESHOLD_PULSEWIDTH: 0.4 MS
MDC_IDC_MSMT_LEADCHNL_RV_SENSING_INTR_AMPL: 10.1 MV
MDC_IDC_PG_IMPLANT_DTM: NORMAL
MDC_IDC_PG_MFG: NORMAL
MDC_IDC_PG_MODEL: NORMAL
MDC_IDC_PG_SERIAL: NORMAL
MDC_IDC_PG_TYPE: NORMAL
MDC_IDC_SESS_CLINIC_NAME: NORMAL
MDC_IDC_SESS_DTM: NORMAL
MDC_IDC_SESS_REPROGRAMMED: NORMAL
MDC_IDC_SESS_TYPE: NORMAL
MDC_IDC_SET_BRADY_AT_MODE_SWITCH_MODE: NORMAL
MDC_IDC_SET_BRADY_AT_MODE_SWITCH_RATE: 160 {BEATS}/MIN
MDC_IDC_SET_BRADY_HYSTRATE: 60 {BEATS}/MIN
MDC_IDC_SET_BRADY_LOWRATE: 60 {BEATS}/MIN
MDC_IDC_SET_BRADY_MAX_SENSOR_RATE: 120 {BEATS}/MIN
MDC_IDC_SET_BRADY_MAX_TRACKING_RATE: 130 {BEATS}/MIN
MDC_IDC_SET_BRADY_MODE: NORMAL
MDC_IDC_SET_BRADY_NIGHT_RATE: 60 {BEATS}/MIN
MDC_IDC_SET_BRADY_PAV_DELAY_HIGH: 200 MS
MDC_IDC_SET_BRADY_PAV_DELAY_LOW: 200 MS
MDC_IDC_SET_BRADY_SAV_DELAY_HIGH: 180 MS
MDC_IDC_SET_BRADY_SAV_DELAY_LOW: 180 MS
MDC_IDC_SET_CRT_PACED_CHAMBERS: NORMAL
MDC_IDC_SET_LEADCHNL_LV_PACING_CATHODE_ELECTRODE_1: NORMAL
MDC_IDC_SET_LEADCHNL_LV_PACING_CATHODE_LOCATION_1: NORMAL
MDC_IDC_SET_LEADCHNL_LV_PACING_POLARITY: NORMAL
MDC_IDC_SET_LEADCHNL_LV_SENSING_CATHODE_ELECTRODE_1: NORMAL
MDC_IDC_SET_LEADCHNL_LV_SENSING_CATHODE_LOCATION_1: NORMAL
MDC_IDC_SET_LEADCHNL_LV_SENSING_POLARITY: NORMAL
MDC_IDC_SET_LEADCHNL_RA_PACING_AMPLITUDE: 1.6 V
MDC_IDC_SET_LEADCHNL_RA_PACING_POLARITY: NORMAL
MDC_IDC_SET_LEADCHNL_RA_PACING_PULSEWIDTH: 0.4 MS
MDC_IDC_SET_LEADCHNL_RA_SENSING_ADAPTATION_MODE: NORMAL
MDC_IDC_SET_LEADCHNL_RA_SENSING_POLARITY: NORMAL
MDC_IDC_SET_LEADCHNL_RA_SENSING_SENSITIVITY: NORMAL
MDC_IDC_SET_LEADCHNL_RV_PACING_AMPLITUDE: 2.4 V
MDC_IDC_SET_LEADCHNL_RV_PACING_POLARITY: NORMAL
MDC_IDC_SET_LEADCHNL_RV_PACING_PULSEWIDTH: 0.4 MS
MDC_IDC_SET_LEADCHNL_RV_SENSING_ADAPTATION_MODE: NORMAL
MDC_IDC_SET_LEADCHNL_RV_SENSING_POLARITY: NORMAL
MDC_IDC_SET_LEADCHNL_RV_SENSING_SENSITIVITY: NORMAL
MDC_IDC_STAT_BRADY_DTM_END: NORMAL
MDC_IDC_STAT_BRADY_DTM_START: NORMAL
MDC_IDC_STAT_BRADY_RA_PERCENT_PACED: 91 %
MDC_IDC_STAT_BRADY_RV_PERCENT_PACED: 0 %

## 2022-01-18 VITALS
BODY MASS INDEX: 23 KG/M2 | SYSTOLIC BLOOD PRESSURE: 102 MMHG | HEART RATE: 66 BPM | WEIGHT: 134 LBS | DIASTOLIC BLOOD PRESSURE: 64 MMHG | RESPIRATION RATE: 16 BRPM | OXYGEN SATURATION: 98 %

## 2022-01-18 VITALS
WEIGHT: 136 LBS | BODY MASS INDEX: 23.22 KG/M2 | DIASTOLIC BLOOD PRESSURE: 64 MMHG | SYSTOLIC BLOOD PRESSURE: 102 MMHG | HEART RATE: 76 BPM | HEIGHT: 64 IN | OXYGEN SATURATION: 98 %

## 2022-01-18 VITALS
HEIGHT: 64 IN | SYSTOLIC BLOOD PRESSURE: 114 MMHG | TEMPERATURE: 98.7 F | HEART RATE: 76 BPM | DIASTOLIC BLOOD PRESSURE: 70 MMHG | WEIGHT: 143 LBS | OXYGEN SATURATION: 99 % | BODY MASS INDEX: 24.41 KG/M2

## 2022-01-18 VITALS
SYSTOLIC BLOOD PRESSURE: 90 MMHG | BODY MASS INDEX: 23.22 KG/M2 | DIASTOLIC BLOOD PRESSURE: 58 MMHG | HEIGHT: 64 IN | OXYGEN SATURATION: 98 % | TEMPERATURE: 97.4 F | HEART RATE: 72 BPM | WEIGHT: 136 LBS

## 2022-01-18 VITALS
DIASTOLIC BLOOD PRESSURE: 60 MMHG | SYSTOLIC BLOOD PRESSURE: 110 MMHG | OXYGEN SATURATION: 98 % | HEART RATE: 80 BPM | WEIGHT: 140.6 LBS | BODY MASS INDEX: 24.13 KG/M2 | TEMPERATURE: 98.1 F

## 2022-01-18 VITALS
WEIGHT: 138.31 LBS | DIASTOLIC BLOOD PRESSURE: 62 MMHG | SYSTOLIC BLOOD PRESSURE: 106 MMHG | HEART RATE: 79 BPM | BODY MASS INDEX: 23.74 KG/M2 | OXYGEN SATURATION: 97 %

## 2022-01-26 ENCOUNTER — DOCUMENTATION ONLY (OUTPATIENT)
Dept: ANTICOAGULATION | Facility: CLINIC | Age: 67
End: 2022-01-26
Payer: COMMERCIAL

## 2022-01-26 DIAGNOSIS — Z95.2 H/O MECHANICAL AORTIC VALVE REPLACEMENT: Primary | ICD-10-CM

## 2022-01-26 LAB — INR HOME MONITORING: 3 (ref 2–3.5)

## 2022-01-26 NOTE — PROGRESS NOTES
ANTICOAGULATION  MANAGEMENT-Home Monitor Managed by Exception    Tammy Law 66 year old female is on warfarin with therapeutic INR result. (Goal INR 2.0-3.5)    Recent labs: (last 7 days)     01/26/22  0000   INR 3.00         Previous INR was Therapeutic    Medication, diet, health changes since last INR:chart reviewed; none identified. Noted patient COVID + on 1/9, INR has remained stable.     Contacted within the last 12 weeks by phone on 1/12      ZAIN Munoz was NOT contacted regarding therapeutic result today per home monitoring policy manage by exception agreement.   Current warfarin dose is to be continued:     Summary  As of 1/26/2022    Full warfarin instructions:  7.5 mg every Wed; 5 mg all other days   Next INR check:  2/9/2022           ?   Lisa Mayer RN  Anticoagulation Clinic  1/26/2022    _______________________________________________________________________     Anticoagulation Episode Summary     Current INR goal:     TTR:  --   Target end date:     Send INR reminders to:  MORENO SINGH    Indications    H/O mechanical aortic valve replacement [Z95.2]           Comments:  Target goal 2.0-3.5  [employer] home monitor- managed by exception         Anticoagulation Care Providers     Provider Role Specialty Phone number    Arnold Anderson MD Referring Internal Medicine [phone]

## 2022-02-09 ENCOUNTER — ANTICOAGULATION THERAPY VISIT (OUTPATIENT)
Dept: ANTICOAGULATION | Facility: CLINIC | Age: 67
End: 2022-02-09
Payer: COMMERCIAL

## 2022-02-09 LAB — INR HOME MONITORING: 2.5 (ref 2–3.5)

## 2022-02-09 NOTE — PROGRESS NOTES
ANTICOAGULATION  MANAGEMENT-Home Monitor Managed by Exception    Tammy M miltonpriscilla 66 year old female is on warfarin with therapeutic INR result. (Goal 2.0-3.5)    Recent labs: (last 7 days)     02/09/22  0000   INR 2.50         Previous INR was Therapeutic    Medication, diet, health changes since last INR:chart reviewed; none identified    Contacted within the last 12 weeks by phone on 1/12/22  Pt was COVID positive on 1/9/22. INR has continued to remain stable    PLAN     Tammy was NOT contacted regarding therapeutic result today per home monitoring policy manage by exception agreement.   Current warfarin dose is to be continued:     Summary  As of 2/9/2022    Full warfarin instructions:  7.5 mg every Wed; 5 mg all other days   Next INR check:             ?   Suzanne Johnson RN  Anticoagulation Clinic  2/9/2022    _______________________________________________________________________     Anticoagulation Episode Summary     Current INR goal:     TTR:  --   Target end date:     Send INR reminders to:  MORENO SINGH    Indications    H/O mechanical aortic valve replacement [Z95.2]           Comments:  Target goal 2.0-3.5  [employer] home monitor- managed by exception         Anticoagulation Care Providers     Provider Role Specialty Phone number    Arnold Anderson MD Referring Internal Medicine [phone]

## 2022-02-23 ENCOUNTER — DOCUMENTATION ONLY (OUTPATIENT)
Dept: ANTICOAGULATION | Facility: CLINIC | Age: 67
End: 2022-02-23
Payer: COMMERCIAL

## 2022-02-23 DIAGNOSIS — Z95.2 H/O MECHANICAL AORTIC VALVE REPLACEMENT: Primary | ICD-10-CM

## 2022-02-23 LAB — INR HOME MONITORING: 2.2 (ref 2–3.5)

## 2022-02-23 NOTE — PROGRESS NOTES
"While reviewing today's INR, it was noted that the patient currently has an INR goal range of 2.0-3.5. Per chart review, pt is on warfarin for bileaflet mechanical AVR. If this is the only risk factor, we would manage the patient at a goal of 2-3 per [facility] protocol. Our protocol does call for a 2.5-3.5 goal range in mechanical AVR patients if there is reduced LV function. Per most recent echo showed \"moderate diastolic dysfunction\", while systolic function was normal.     Routing to PCP for review. CC'ing Cardiology in case they want to weigh in.  "

## 2022-02-23 NOTE — PROGRESS NOTES
ANTICOAGULATION  MANAGEMENT-Home Monitor Managed by Exception    Tammy LUCA imltonpriscilla 66 year old female is on warfarin with therapeutic INR result. (Goal INR The patient does not have an INR goal.)    Recent labs: (last 7 days)     02/23/22  0000   INR 2.20         Previous INR was Therapeutic    Medication, diet, health changes since last INR:chart reviewed; none identified    Contacted within the last 12 weeks by phone on 01/12/2022      ZAIN     Tammy was NOT contacted regarding therapeutic result today per home monitoring policy manage by exception agreement.   Current warfarin dose is to be continued:     Summary  As of 2/23/2022    Full warfarin instructions:  7.5 mg every Wed; 5 mg all other days   Next INR check:  3/9/2022           ?   Reynaldo Lewis RN  Anticoagulation Clinic  2/23/2022    _______________________________________________________________________     Anticoagulation Episode Summary     Current INR goal:     TTR:  --   Target end date:     Send INR reminders to:  MORENO SINGH    Indications    H/O mechanical aortic valve replacement [Z95.2]           Comments:  Target goal 2.0-3.5  [employer] home monitor- managed by exception         Anticoagulation Care Providers     Provider Role Specialty Phone number    Arnold Anderson MD Referring Internal Medicine [phone]

## 2022-03-09 ENCOUNTER — ANTICOAGULATION THERAPY VISIT (OUTPATIENT)
Dept: ANTICOAGULATION | Facility: CLINIC | Age: 67
End: 2022-03-09
Payer: COMMERCIAL

## 2022-03-09 DIAGNOSIS — Z95.2 H/O MECHANICAL AORTIC VALVE REPLACEMENT: Primary | ICD-10-CM

## 2022-03-09 LAB — INR HOME MONITORING: 2.3 (ref 2–3.5)

## 2022-03-09 NOTE — PROGRESS NOTES
ANTICOAGULATION MANAGEMENT     Tammy Law 66 year old female is on warfarin with therapeutic INR result. (Goal INR 2.0-3.0)    Recent labs: (last 7 days)     03/09/22  0000   INR 2.30       ASSESSMENT       Source(s): Chart Review and Patient/Caregiver Call       Warfarin doses taken: Warfarin taken as instructed    Diet: No new diet changes identified    New illness, injury, or hospitalization: No    Medication/supplement changes: None noted    Signs or symptoms of bleeding or clotting: No    Previous INR: Therapeutic last 2(+) visits    Additional findings: Called and notified patient her INR goal range was changed from 2.0-3.5 to 2.0-3.0.  See notes from 2/23/22     Return to manage by exception       PLAN     Recommended plan for no diet, medication or health factor changes affecting INR     Dosing Instructions: Continue your current warfarin dose with next INR in 2 weeks       Summary  As of 3/9/2022    Full warfarin instructions:  7.5 mg every Wed; 5 mg all other days   Next INR check:  3/23/2022             Telephone call with Tammy who verbalizes understanding and agrees to plan    Patient to recheck with home meter    Education provided: Please call back if any changes to your diet, medications or how you've been taking warfarin    Plan made per [facility] anticoagulation protocol    Natalie Khalil, RN  Anticoagulation Clinic  3/9/2022    _______________________________________________________________________     Anticoagulation Episode Summary     Current INR goal:  2.0-3.0   TTR:  --   Target end date:  Indefinite   Send INR reminders to:  MORENO SINGH    Indications    H/O mechanical aortic valve replacement [Z95.2]           Comments:  [employer] home monitor- managed by exception         Anticoagulation Care Providers     Provider Role Specialty Phone number    Arnold Anderson MD Referring Internal Medicine [phone]    Christina Hernandez DO Referring Family Medicine [phone]

## 2022-03-23 ENCOUNTER — ANTICOAGULATION THERAPY VISIT (OUTPATIENT)
Dept: ANTICOAGULATION | Facility: CLINIC | Age: 67
End: 2022-03-23
Payer: COMMERCIAL

## 2022-03-23 DIAGNOSIS — Z95.2 H/O MECHANICAL AORTIC VALVE REPLACEMENT: Primary | ICD-10-CM

## 2022-03-23 LAB — INR HOME MONITORING: 3.5 (ref 2–3.5)

## 2022-03-23 NOTE — PROGRESS NOTES
"ANTICOAGULATION MANAGEMENT     Tammy Law 66 year old female is on warfarin with supratherapeutic INR result. (Goal INR 2.0-3.0)    Recent labs: (last 7 days)     03/23/22  0000   INR 3.50       ASSESSMENT       Source(s): Chart Review and Patient/Caregiver Call       Warfarin doses taken: Warfarin taken as instructed    Diet: patient was traveling this week, add \"a few drinks\"    New illness, injury, or hospitalization: No    Medication/supplement changes: None noted    Signs or symptoms of bleeding or clotting: No    Previous INR: Therapeutic last 2(+) visits    Additional findings: None       PLAN     Recommended plan for temporary change(s) affecting INR     Dosing Instructions: Partial hold then continue your current warfarin dose with next INR in 2 weeks       Summary  As of 3/23/2022    Full warfarin instructions:  7.5 mg every Wed; 5 mg all other days   Next INR check:  4/6/2022             Telephone call with Tammy who verbalizes understanding and agrees to plan    Patient to recheck with home meter    Education provided: Please call back if any changes to your diet, medications or how you've been taking warfarin    Plan made per [facility] anticoagulation protocol    Sarah Gaona RN  Anticoagulation Clinic  3/23/2022    _______________________________________________________________________     Anticoagulation Episode Summary     Current INR goal:  2.0-3.0   TTR:  58.3 % (2 wk)   Target end date:  Indefinite   Send INR reminders to:  MORENO SINGH    Indications    H/O mechanical aortic valve replacement [Z95.2]           Comments:  [employer] home monitor- managed by exception         Anticoagulation Care Providers     Provider Role Specialty Phone number    Arnold Anderson MD Referring Internal Medicine [phone]    Christina Hernandez DO Referring Family Medicine [phone]            "

## 2022-03-28 ENCOUNTER — ANCILLARY PROCEDURE (OUTPATIENT)
Dept: CARDIOLOGY | Facility: CLINIC | Age: 67
End: 2022-03-28
Attending: INTERNAL MEDICINE
Payer: COMMERCIAL

## 2022-03-28 DIAGNOSIS — I49.5 SICK SINUS SYNDROME (H): ICD-10-CM

## 2022-03-28 DIAGNOSIS — Z95.0 CARDIAC PACEMAKER IN SITU: ICD-10-CM

## 2022-03-28 PROCEDURE — 93294 REM INTERROG EVL PM/LDLS PM: CPT | Performed by: INTERNAL MEDICINE

## 2022-03-28 PROCEDURE — 93296 REM INTERROG EVL PM/IDS: CPT | Performed by: INTERNAL MEDICINE

## 2022-04-06 ENCOUNTER — DOCUMENTATION ONLY (OUTPATIENT)
Dept: ANTICOAGULATION | Facility: CLINIC | Age: 67
End: 2022-04-06
Payer: COMMERCIAL

## 2022-04-06 ENCOUNTER — TELEPHONE (OUTPATIENT)
Dept: CARDIOLOGY | Facility: CLINIC | Age: 67
End: 2022-04-06
Payer: COMMERCIAL

## 2022-04-06 DIAGNOSIS — Z95.2 H/O MECHANICAL AORTIC VALVE REPLACEMENT: Primary | ICD-10-CM

## 2022-04-06 DIAGNOSIS — Z95.2 S/P AVR (AORTIC VALVE REPLACEMENT): ICD-10-CM

## 2022-04-06 DIAGNOSIS — Z95.0 CARDIAC PACEMAKER IN SITU: ICD-10-CM

## 2022-04-06 LAB — INR HOME MONITORING: 2.4 (ref 2–3.5)

## 2022-04-06 NOTE — PROGRESS NOTES
ANTICOAGULATION  MANAGEMENT-Home Monitor Managed by Exception    Tammyrober Law 66 year old female is on warfarin with therapeutic INR result. (Goal INR 2.0-3.0)    Recent labs: (last 7 days)     04/06/22  0000   INR 2.40         Previous INR was Therapeutic    Medication, diet, health changes since last INR:chart reviewed; none identified    Contacted within the last 12 weeks by phone on 3/23/22      ZAIN     Tammy was NOT contacted regarding therapeutic result today per home monitoring policy manage by exception agreement.   Current warfarin dose is to be continued:     Summary  As of 4/6/2022    Full warfarin instructions:  7.5 mg every Wed; 5 mg all other days   Next INR check:  4/20/2022           ?   Sarah Gaona RN  Anticoagulation Clinic  4/6/2022    _______________________________________________________________________     Anticoagulation Episode Summary     Current INR goal:  2.0-3.0   TTR:  56.4 % (4 wk)   Target end date:  Indefinite   Send INR reminders to:  MORENO SINGH    Indications    H/O mechanical aortic valve replacement [Z95.2]           Comments:  [employer] home monitor- managed by exception         Anticoagulation Care Providers     Provider Role Specialty Phone number    Arnold Anderson MD Referring Internal Medicine [phone]    Christina Hernandez DO Referring Family Medicine [phone]

## 2022-04-06 NOTE — TELEPHONE ENCOUNTER
Noted patient was last seen 12-16-20 - 1yr follow-up recommended which did not occur - per 7-28-21 echo results, no repeat echo recommended.    Please address whether you want echo prior to pending overdue yrly follow-up appt?  mg

## 2022-04-06 NOTE — TELEPHONE ENCOUNTER
Health Call Center    Phone Message    May a detailed message be left on voicemail: yes     Reason for Call: Other: Per pt got a recall msg to make appt with Dr. Vora dx is aortic valve replacement per protocol to send TE to clinic to assist pt with scheduling.      Pt would like Device check on the same day as MD appt -also unsure if Vielka wants ECHO this year request for clinic to check and call her back.    Action Taken: Message routed to:  Other: Cardiology    Travel Screening: Not Applicable

## 2022-04-07 LAB
MDC_IDC_EPISODE_DTM: NORMAL
MDC_IDC_EPISODE_ID: 22
MDC_IDC_EPISODE_TYPE: NORMAL
MDC_IDC_LEAD_IMPLANT_DT: NORMAL
MDC_IDC_LEAD_IMPLANT_DT: NORMAL
MDC_IDC_LEAD_LOCATION: NORMAL
MDC_IDC_LEAD_LOCATION: NORMAL
MDC_IDC_LEAD_LOCATION_DETAIL_1: NORMAL
MDC_IDC_LEAD_LOCATION_DETAIL_1: NORMAL
MDC_IDC_LEAD_MFG: NORMAL
MDC_IDC_LEAD_MFG: NORMAL
MDC_IDC_LEAD_MODEL: NORMAL
MDC_IDC_LEAD_MODEL: NORMAL
MDC_IDC_LEAD_POLARITY_TYPE: NORMAL
MDC_IDC_LEAD_POLARITY_TYPE: NORMAL
MDC_IDC_LEAD_SERIAL: NORMAL
MDC_IDC_LEAD_SERIAL: NORMAL
MDC_IDC_LEAD_SPECIAL_FUNCTION: NORMAL
MDC_IDC_LEAD_SPECIAL_FUNCTION: NORMAL
MDC_IDC_MSMT_BATTERY_REMAINING_PERCENTAGE: 60 %
MDC_IDC_MSMT_BATTERY_STATUS: NORMAL
MDC_IDC_MSMT_LEADCHNL_RA_IMPEDANCE_VALUE: 588 OHM
MDC_IDC_MSMT_LEADCHNL_RA_LEAD_CHANNEL_STATUS: NORMAL
MDC_IDC_MSMT_LEADCHNL_RV_IMPEDANCE_VALUE: 638 OHM
MDC_IDC_MSMT_LEADCHNL_RV_LEAD_CHANNEL_STATUS: NORMAL
MDC_IDC_PG_IMPLANT_DTM: NORMAL
MDC_IDC_PG_MFG: NORMAL
MDC_IDC_PG_MODEL: NORMAL
MDC_IDC_PG_SERIAL: NORMAL
MDC_IDC_PG_TYPE: NORMAL
MDC_IDC_SESS_CLINIC_NAME: NORMAL
MDC_IDC_SESS_DTM: NORMAL
MDC_IDC_SESS_REPROGRAMMED: NO
MDC_IDC_SESS_TYPE: NORMAL
MDC_IDC_SET_BRADY_AT_MODE_SWITCH_MODE: NORMAL
MDC_IDC_SET_BRADY_AT_MODE_SWITCH_RATE: 160 {BEATS}/MIN
MDC_IDC_SET_BRADY_LOWRATE: 60 {BEATS}/MIN
MDC_IDC_SET_BRADY_MAX_SENSOR_RATE: 120 {BEATS}/MIN
MDC_IDC_SET_BRADY_MAX_TRACKING_RATE: 130 {BEATS}/MIN
MDC_IDC_SET_BRADY_MODE: NORMAL
MDC_IDC_SET_BRADY_PAV_DELAY_LOW: 200 MS
MDC_IDC_SET_BRADY_SAV_DELAY_LOW: 180 MS
MDC_IDC_SET_LEADCHNL_RA_PACING_AMPLITUDE: 1.6 V
MDC_IDC_SET_LEADCHNL_RA_PACING_POLARITY: NORMAL
MDC_IDC_SET_LEADCHNL_RA_PACING_PULSEWIDTH: 0.4 MS
MDC_IDC_SET_LEADCHNL_RA_SENSING_ADAPTATION_MODE: NORMAL
MDC_IDC_SET_LEADCHNL_RA_SENSING_POLARITY: NORMAL
MDC_IDC_SET_LEADCHNL_RV_PACING_AMPLITUDE: 2.4 V
MDC_IDC_SET_LEADCHNL_RV_PACING_POLARITY: NORMAL
MDC_IDC_SET_LEADCHNL_RV_PACING_PULSEWIDTH: 0.4 MS
MDC_IDC_SET_LEADCHNL_RV_SENSING_ADAPTATION_MODE: NORMAL
MDC_IDC_SET_LEADCHNL_RV_SENSING_POLARITY: NORMAL
MDC_IDC_STAT_AT_BURDEN_PERCENT: 0 %
MDC_IDC_STAT_AT_DTM_END: NORMAL
MDC_IDC_STAT_AT_DTM_START: NORMAL
MDC_IDC_STAT_AT_MODE_SW_COUNT_PER_DAY: 0
MDC_IDC_STAT_AT_MODE_SW_PERCENT_TIME_PER_DAY: 0 %
MDC_IDC_STAT_BRADY_AP_VP_PERCENT: 0 %
MDC_IDC_STAT_BRADY_AP_VS_PERCENT: 92 %
MDC_IDC_STAT_BRADY_AS_VP_PERCENT: 0 %
MDC_IDC_STAT_BRADY_AS_VS_PERCENT: 7 %
MDC_IDC_STAT_BRADY_DTM_END: NORMAL
MDC_IDC_STAT_BRADY_DTM_START: NORMAL
MDC_IDC_STAT_BRADY_RA_PERCENT_PACED: 92 %
MDC_IDC_STAT_BRADY_RV_PERCENT_PACED: 0 %
MDC_IDC_STAT_CRT_DTM_END: NORMAL
MDC_IDC_STAT_CRT_DTM_START: NORMAL

## 2022-04-07 NOTE — TELEPHONE ENCOUNTER
Msg rec'd 4-6-22 @ 1857:  Kirsten Vora MD Gorshe, Maureen, RN  Should get a repeat echo before visit if possible.     RAYSHAWN

## 2022-04-07 NOTE — TELEPHONE ENCOUNTER
Order placed per protocol - msg sent to sched team w/instructions to sched echo 1wk prior to echo.  mg

## 2022-04-12 ENCOUNTER — TELEPHONE (OUTPATIENT)
Dept: FAMILY MEDICINE | Facility: CLINIC | Age: 67
End: 2022-04-12
Payer: COMMERCIAL

## 2022-04-12 NOTE — TELEPHONE ENCOUNTER
Reason for Call:  Medication or medication refill:    Do you use a [facility]?  Name of the pharmacy and phone number for the current request:  [facility]     Name of the medication requested: levothyroxine     Other request: pt has enough medication until Saturday, has physical scheduled for July, no sooner appointments     Can we leave a detailed message on this number? YES    Phone number patient can be reached at: Cell number on file:    Telephone Information:   Mobile [phone]       Best Time: anytime     Call taken on 4/12/2022 at 3:57 PM by Ashley Patel

## 2022-04-18 DIAGNOSIS — Z95.2 H/O MECHANICAL AORTIC VALVE REPLACEMENT: ICD-10-CM

## 2022-04-20 ENCOUNTER — DOCUMENTATION ONLY (OUTPATIENT)
Dept: ANTICOAGULATION | Facility: CLINIC | Age: 67
End: 2022-04-20
Payer: COMMERCIAL

## 2022-04-20 DIAGNOSIS — Z95.2 H/O MECHANICAL AORTIC VALVE REPLACEMENT: Primary | ICD-10-CM

## 2022-04-20 LAB — INR HOME MONITORING: 2 (ref 2–3.5)

## 2022-04-20 RX ORDER — WARFARIN SODIUM 2.5 MG/1
2.5-7.5 TABLET ORAL DAILY
Qty: 190 TABLET | Refills: 1 | Status: SHIPPED | OUTPATIENT
Start: 2022-04-20 | End: 2022-12-27

## 2022-04-20 NOTE — PROGRESS NOTES
ANTICOAGULATION  MANAGEMENT-Home Monitor Managed by Exception    Tammyrober Law 66 year old female is on warfarin with therapeutic INR result. (Goal INR 2.0-3.0)    Recent labs: (last 7 days)     04/20/22  0000   INR 2.00         Previous INR was Therapeutic    Medication, diet, health changes since last INR:chart reviewed; none identified    Contacted within the last 12 weeks by phone on 3/23/22      ZAIN Munoz was NOT contacted regarding therapeutic result today per home monitoring policy manage by exception agreement.   Current warfarin dose is to be continued:     Summary  As of 4/20/2022    Full warfarin instructions:  7.5 mg every Wed; 5 mg all other days   Next INR check:  5/4/2022           ?   Petrona Phan RN  Anticoagulation Clinic  4/20/2022    _______________________________________________________________________     Anticoagulation Episode Summary     Current INR goal:  2.0-3.0   TTR:  70.9 % (1.4 mo)   Target end date:  Indefinite   Send INR reminders to:  MORENO SINGH    Indications    H/O mechanical aortic valve replacement [Z95.2]           Comments:  [employer] home monitor- managed by exception         Anticoagulation Care Providers     Provider Role Specialty Phone number    Arnold Anderson MD Referring Internal Medicine [phone]    Christina Hernandez DO Referring Family Medicine [phone]

## 2022-04-20 NOTE — TELEPHONE ENCOUNTER
ANTICOAGULATION MANAGEMENT:  Medication Refill    Anticoagulation Summary  As of 4/6/2022    Warfarin maintenance plan:  7.5 mg (2.5 mg x 3) every Wed; 5 mg (2.5 mg x 2) all other days   Next INR check:  4/20/2022   Target end date:  Indefinite    Indications    H/O mechanical aortic valve replacement [Z95.2]             Anticoagulation Care Providers     Provider Role Specialty Phone number    Arnold Anderson MD Referring Internal Medicine [phone]    Christina Hernandez DO Referring Family Medicine [phone]          Visit with referring provider/group within last year: Yes    [facility] referral signed within last year: Yes    Tammy meets all criteria for refill (current [facility] referral, office visit with referring provider/group in last year, lab monitoring up to date or not exceeding 2 weeks overdue). Rx instructions and quantity match patient's current dosing plan. Warfarin 90 day supply with 1 refill granted per [facility] protocol     Sarah Gaona RN  Anticoagulation Clinic

## 2022-04-20 NOTE — TELEPHONE ENCOUNTER
Routed to the anticoagulation pool    Natalie Ortiz RN  [facility] Nurse Advisor  8:24 AM  4/20/2022

## 2022-04-29 ENCOUNTER — HOSPITAL ENCOUNTER (OUTPATIENT)
Dept: CARDIOLOGY | Facility: HOSPITAL | Age: 67
Discharge: HOME | End: 2022-04-29
Attending: INTERNAL MEDICINE | Admitting: INTERNAL MEDICINE
Payer: COMMERCIAL

## 2022-04-29 DIAGNOSIS — Z95.2 H/O MECHANICAL AORTIC VALVE REPLACEMENT: ICD-10-CM

## 2022-04-29 DIAGNOSIS — Z95.0 CARDIAC PACEMAKER IN SITU: ICD-10-CM

## 2022-04-29 DIAGNOSIS — Z95.2 S/P AVR (AORTIC VALVE REPLACEMENT): ICD-10-CM

## 2022-04-29 LAB — LVEF ECHO: NORMAL

## 2022-04-29 PROCEDURE — 93306 TTE W/DOPPLER COMPLETE: CPT

## 2022-04-29 PROCEDURE — 93306 TTE W/DOPPLER COMPLETE: CPT | Mod: 26 | Performed by: GENERAL ACUTE CARE HOSPITAL

## 2022-05-04 ENCOUNTER — DOCUMENTATION ONLY (OUTPATIENT)
Dept: ANTICOAGULATION | Facility: CLINIC | Age: 67
End: 2022-05-04
Payer: COMMERCIAL

## 2022-05-04 DIAGNOSIS — Z95.0 CARDIAC PACEMAKER IN SITU: ICD-10-CM

## 2022-05-04 DIAGNOSIS — R10.13 EPIGASTRIC PAIN: ICD-10-CM

## 2022-05-04 DIAGNOSIS — Z95.2 H/O MECHANICAL AORTIC VALVE REPLACEMENT: Primary | ICD-10-CM

## 2022-05-04 DIAGNOSIS — Z13.6 CARDIOVASCULAR SCREENING; LDL GOAL LESS THAN 130: Primary | ICD-10-CM

## 2022-05-04 LAB — INR HOME MONITORING: 2.2 (ref 2–3.5)

## 2022-05-04 NOTE — TELEPHONE ENCOUNTER
Refill Request  Medication name: Pending Prescriptions:                       Disp   Refills    simvastatin (ZOCOR) 20 MG tablet                              Sig: Take 1 tablet (20 mg) by mouth    Who prescribed the medication: patient reported  Last refill on medication:    Requested [facility]acy: [facility]  Last appointment with PCP: 1/11/22  Next appointment: Not due

## 2022-05-04 NOTE — PROGRESS NOTES
ANTICOAGULATION  MANAGEMENT-Home Monitor Managed by Exception    Tammyrober Law 66 year old female is on warfarin with therapeutic INR result. (Goal INR 2.0-3.0)    Recent labs: (last 7 days)     05/04/22  0000   INR 2.20         Previous INR was Therapeutic    Medication, diet, health changes since last INR:chart reviewed; none identified    Contacted within the last 12 weeks by phone on 3/23/22          ZAIN     Tammy was NOT contacted regarding therapeutic result today per home monitoring policy manage by exception agreement.   Current warfarin dose is to be continued:     Summary  As of 5/4/2022    Full warfarin instructions:  7.5 mg every Wed; 5 mg all other days   Next INR check:  5/18/2022           ?   Sarah Gaona RN  Anticoagulation Clinic  5/4/2022    _______________________________________________________________________     Anticoagulation Episode Summary     Current INR goal:  2.0-3.0   TTR:  78.2 % (1.9 mo)   Target end date:  Indefinite   Send INR reminders to:  MORENO SINGH    Indications    H/O mechanical aortic valve replacement [Z95.2]           Comments:  [employer] home monitor- managed by exception         Anticoagulation Care Providers     Provider Role Specialty Phone number    Arnold Anderson MD Referring Internal Medicine [phone]    Christina Hernandez DO Referring Family Medicine [phone]

## 2022-05-08 NOTE — TELEPHONE ENCOUNTER
"Outpatient Medication Detail     Disp Refills Start End RUBENS   simvastatin (ZOCOR) 20 MG tablet 90 tablet 3 5/7/2021  No   Sig - Route: Take 1 tablet (20 mg total) by mouth daily. - Oral   Sent to pharmacy as: simvastatin 20 mg tablet (ZOCOR)   E-Prescribing Status: Receipt confirmed by pharmacy (5/7/2021  9:34 AM CDT)       simvastatin (ZOCOR) 20 MG tablet [[id number]]    Electronically signed by: Christina Hernandez DO on 05/07/21 0934 Status: Active   Ordering user: Christina Hernandez DO 05/07/21 0934 Authorized by: Christina Hernandez DO   Frequency: DAILY 05/07/21 - Until Discontinued Released by: Christina Hernandez DO 05/07/21 0934   Diagnoses  Hyperlipemia [E78.5]     Routing refill request to provider for review/approval because:  Labs not current:  LDL  Patient needs to be seen because it has been more than 1 year since last office visit.  Please clarify associated diagnosis    Last office visit provider:  4/23/21     Requested Prescriptions   Pending Prescriptions Disp Refills     simvastatin (ZOCOR) 20 MG tablet       Sig: Take 1 tablet (20 mg) by mouth       Statins Protocol Failed - 5/8/2022 11:36 AM        Failed - LDL on file in past 12 months     Recent Labs   Lab Test 10/02/20  0850   LDL 82             Passed - No abnormal creatine kinase in past 12 months     Recent Labs   Lab Test 12/21/18  1610   CKT 94                Passed - Recent (12 mo) or future (30 days) visit within the authorizing provider's specialty     Patient has had an office visit with the authorizing provider or a provider within the authorizing providers department within the previous 12 mos or has a future within next 30 days. See \"Patient Info\" tab in inbasket, or \"Choose Columns\" in Meds & Orders section of the refill encounter.              Passed - Medication is active on med list        Passed - Patient is age 18 or older        Passed - No active pregnancy on record        Passed - No positive pregnancy " test in past 12 months             Nick Fofana RN 05/08/22 11:36 AM

## 2022-05-10 RX ORDER — SIMVASTATIN 20 MG
TABLET ORAL
Qty: 90 TABLET | Refills: 1 | Status: SHIPPED | OUTPATIENT
Start: 2022-05-10 | End: 2022-10-28

## 2022-05-18 ENCOUNTER — DOCUMENTATION ONLY (OUTPATIENT)
Dept: ANTICOAGULATION | Facility: CLINIC | Age: 67
End: 2022-05-18
Payer: COMMERCIAL

## 2022-05-18 DIAGNOSIS — Z95.2 H/O MECHANICAL AORTIC VALVE REPLACEMENT: Primary | ICD-10-CM

## 2022-05-18 LAB — INR HOME MONITORING: 2.2 (ref 2–3.5)

## 2022-05-18 NOTE — PROGRESS NOTES
ANTICOAGULATION  MANAGEMENT-Home Monitor Managed by Exception    Tammyrober Law 66 year old female is on warfarin with therapeutic INR result. (Goal INR 2.0-3.0)    Recent labs: (last 7 days)     05/18/22  0000   INR 2.20         Previous INR was Therapeutic    Medication, diet, health changes since last INR:chart reviewed; none identified    Contacted within the last 12 weeks by phone on 3/23/22          ZAIN     Tammy was NOT contacted regarding therapeutic result today per home monitoring policy manage by exception agreement.   Current warfarin dose is to be continued:     Summary  As of 5/18/2022    Full warfarin instructions:  7.5 mg every Wed; 5 mg all other days   Next INR check:  6/1/2022           ?   Sarah Gaona RN  Anticoagulation Clinic  5/18/2022    _______________________________________________________________________     Anticoagulation Episode Summary     Current INR goal:  2.0-3.0   TTR:  82.5 % (2.3 mo)   Target end date:  Indefinite   Send INR reminders to:  MORENO SINGH    Indications    H/O mechanical aortic valve replacement [Z95.2]           Comments:  [employer] home monitor- managed by exception         Anticoagulation Care Providers     Provider Role Specialty Phone number    Arnold Anderson MD Referring Internal Medicine [phone]    Christina Hernandez DO Referring Family Medicine [phone]

## 2022-06-01 ENCOUNTER — DOCUMENTATION ONLY (OUTPATIENT)
Dept: ANTICOAGULATION | Facility: CLINIC | Age: 67
End: 2022-06-01
Payer: COMMERCIAL

## 2022-06-01 DIAGNOSIS — Z95.2 H/O MECHANICAL AORTIC VALVE REPLACEMENT: Primary | ICD-10-CM

## 2022-06-01 LAB — INR HOME MONITORING: 2.1 (ref 2–3.5)

## 2022-06-01 NOTE — PROGRESS NOTES
ANTICOAGULATION  MANAGEMENT-Home Monitor Managed by Exception    Tammyrober Law 66 year old female is on warfarin with therapeutic INR result. (Goal INR 2.0-3.0)    Recent labs: (last 7 days)     06/01/22  0000   INR 2.10         Previous INR was Therapeutic    Medication, diet, health changes since last INR:chart reviewed; none identified    Contacted within the last 12 weeks by phone on 3/23/22          ZAIN     Tammy was NOT contacted regarding therapeutic result today per home monitoring policy manage by exception agreement.   Current warfarin dose is to be continued:     Summary  As of 6/1/2022    Full warfarin instructions:  7.5 mg every Wed; 5 mg all other days   Next INR check:  6/15/2022           ?   Sarah Gaona RN  Anticoagulation Clinic  6/1/2022    _______________________________________________________________________     Anticoagulation Episode Summary     Current INR goal:  2.0-3.0   TTR:  85.4 % (2.8 mo)   Target end date:  Indefinite   Send INR reminders to:  MORENO SINGH    Indications    H/O mechanical aortic valve replacement [Z95.2]           Comments:  [employer] home monitor- managed by exception         Anticoagulation Care Providers     Provider Role Specialty Phone number    Arnold Anderson MD Referring Internal Medicine [phone]    Christina Hernandez DO Referring Family Medicine [phone]

## 2022-06-10 DIAGNOSIS — N64.4 BREAST PAIN, LEFT: Primary | ICD-10-CM

## 2022-06-10 NOTE — PROGRESS NOTES
Radiology called requesting diagnostic bilateral mammogram and left breast ultrasound due to patient noting left breast pain.

## 2022-06-15 ENCOUNTER — DOCUMENTATION ONLY (OUTPATIENT)
Dept: ANTICOAGULATION | Facility: CLINIC | Age: 67
End: 2022-06-15
Payer: COMMERCIAL

## 2022-06-15 DIAGNOSIS — Z95.2 H/O MECHANICAL AORTIC VALVE REPLACEMENT: Primary | ICD-10-CM

## 2022-06-15 LAB — INR HOME MONITORING: 2.4 (ref 2–3.5)

## 2022-06-23 ENCOUNTER — ANCILLARY PROCEDURE (OUTPATIENT)
Dept: MAMMOGRAPHY | Facility: CLINIC | Age: 67
End: 2022-06-23
Attending: FAMILY MEDICINE
Payer: COMMERCIAL

## 2022-06-23 DIAGNOSIS — N64.4 BREAST PAIN, LEFT: ICD-10-CM

## 2022-06-23 PROCEDURE — 76642 ULTRASOUND BREAST LIMITED: CPT | Mod: LT

## 2022-06-23 PROCEDURE — 77066 DX MAMMO INCL CAD BI: CPT

## 2022-06-24 ENCOUNTER — OFFICE VISIT (OUTPATIENT)
Dept: CARDIOLOGY | Facility: CLINIC | Age: 67
End: 2022-06-24
Payer: COMMERCIAL

## 2022-06-24 ENCOUNTER — ANCILLARY PROCEDURE (OUTPATIENT)
Dept: CARDIOLOGY | Facility: CLINIC | Age: 67
End: 2022-06-24
Attending: INTERNAL MEDICINE
Payer: COMMERCIAL

## 2022-06-24 VITALS
HEIGHT: 65 IN | DIASTOLIC BLOOD PRESSURE: 62 MMHG | RESPIRATION RATE: 16 BRPM | WEIGHT: 145.2 LBS | SYSTOLIC BLOOD PRESSURE: 88 MMHG | HEART RATE: 68 BPM | BODY MASS INDEX: 24.19 KG/M2

## 2022-06-24 DIAGNOSIS — Z95.0 CARDIAC PACEMAKER IN SITU: ICD-10-CM

## 2022-06-24 DIAGNOSIS — I49.5 SICK SINUS SYNDROME (H): ICD-10-CM

## 2022-06-24 DIAGNOSIS — R53.83 FATIGUE, UNSPECIFIED TYPE: ICD-10-CM

## 2022-06-24 DIAGNOSIS — Z95.2 S/P AVR (AORTIC VALVE REPLACEMENT): Primary | ICD-10-CM

## 2022-06-24 DIAGNOSIS — R06.09 DOE (DYSPNEA ON EXERTION): ICD-10-CM

## 2022-06-24 LAB
ERYTHROCYTE [DISTWIDTH] IN BLOOD BY AUTOMATED COUNT: 13.3 % (ref 10–15)
HCT VFR BLD AUTO: 43.7 % (ref 35–47)
HGB BLD-MCNC: 14.3 G/DL (ref 11.7–15.7)
MCH RBC QN AUTO: 29 PG (ref 26.5–33)
MCHC RBC AUTO-ENTMCNC: 32.7 G/DL (ref 31.5–36.5)
MCV RBC AUTO: 89 FL (ref 78–100)
MDC_IDC_LEAD_IMPLANT_DT: NORMAL
MDC_IDC_LEAD_IMPLANT_DT: NORMAL
MDC_IDC_LEAD_LOCATION: NORMAL
MDC_IDC_LEAD_LOCATION: NORMAL
MDC_IDC_LEAD_LOCATION_DETAIL_1: NORMAL
MDC_IDC_LEAD_LOCATION_DETAIL_1: NORMAL
MDC_IDC_LEAD_MFG: NORMAL
MDC_IDC_LEAD_MFG: NORMAL
MDC_IDC_LEAD_MODEL: NORMAL
MDC_IDC_LEAD_MODEL: NORMAL
MDC_IDC_LEAD_POLARITY_TYPE: NORMAL
MDC_IDC_LEAD_POLARITY_TYPE: NORMAL
MDC_IDC_LEAD_SERIAL: NORMAL
MDC_IDC_LEAD_SERIAL: NORMAL
MDC_IDC_LEAD_SPECIAL_FUNCTION: NORMAL
MDC_IDC_LEAD_SPECIAL_FUNCTION: NORMAL
MDC_IDC_MSMT_BATTERY_DTM: NORMAL
MDC_IDC_MSMT_BATTERY_REMAINING_LONGEVITY: 74 MO
MDC_IDC_MSMT_BATTERY_STATUS: NORMAL
MDC_IDC_MSMT_LEADCHNL_RA_IMPEDANCE_VALUE: 663 OHM
MDC_IDC_MSMT_LEADCHNL_RA_PACING_THRESHOLD_AMPLITUDE: 0.8 V
MDC_IDC_MSMT_LEADCHNL_RA_PACING_THRESHOLD_PULSEWIDTH: 0.4 MS
MDC_IDC_MSMT_LEADCHNL_RA_SENSING_INTR_AMPL: 7.6 MV
MDC_IDC_MSMT_LEADCHNL_RV_IMPEDANCE_VALUE: 702 OHM
MDC_IDC_MSMT_LEADCHNL_RV_PACING_THRESHOLD_AMPLITUDE: 1.2 V
MDC_IDC_MSMT_LEADCHNL_RV_PACING_THRESHOLD_PULSEWIDTH: 0.4 MS
MDC_IDC_MSMT_LEADCHNL_RV_SENSING_INTR_AMPL: 12.5 MV
MDC_IDC_PG_IMPLANT_DTM: NORMAL
MDC_IDC_PG_MFG: NORMAL
MDC_IDC_PG_MODEL: NORMAL
MDC_IDC_PG_SERIAL: NORMAL
MDC_IDC_PG_TYPE: NORMAL
MDC_IDC_SESS_CLINIC_NAME: NORMAL
MDC_IDC_SESS_DTM: NORMAL
MDC_IDC_SESS_REPROGRAMMED: NORMAL
MDC_IDC_SESS_TYPE: NORMAL
MDC_IDC_SET_BRADY_AT_MODE_SWITCH_MODE: NORMAL
MDC_IDC_SET_BRADY_AT_MODE_SWITCH_RATE: 160 {BEATS}/MIN
MDC_IDC_SET_BRADY_HYSTRATE: 60 {BEATS}/MIN
MDC_IDC_SET_BRADY_LOWRATE: 60 {BEATS}/MIN
MDC_IDC_SET_BRADY_MAX_SENSOR_RATE: 120 {BEATS}/MIN
MDC_IDC_SET_BRADY_MAX_TRACKING_RATE: 130 {BEATS}/MIN
MDC_IDC_SET_BRADY_MODE: NORMAL
MDC_IDC_SET_BRADY_NIGHT_RATE: 60 {BEATS}/MIN
MDC_IDC_SET_BRADY_PAV_DELAY_HIGH: 200 MS
MDC_IDC_SET_BRADY_PAV_DELAY_LOW: 200 MS
MDC_IDC_SET_BRADY_SAV_DELAY_HIGH: 180 MS
MDC_IDC_SET_BRADY_SAV_DELAY_LOW: 180 MS
MDC_IDC_SET_CRT_PACED_CHAMBERS: NORMAL
MDC_IDC_SET_LEADCHNL_LV_PACING_CATHODE_ELECTRODE_1: NORMAL
MDC_IDC_SET_LEADCHNL_LV_PACING_CATHODE_LOCATION_1: NORMAL
MDC_IDC_SET_LEADCHNL_LV_PACING_POLARITY: NORMAL
MDC_IDC_SET_LEADCHNL_LV_SENSING_CATHODE_ELECTRODE_1: NORMAL
MDC_IDC_SET_LEADCHNL_LV_SENSING_CATHODE_LOCATION_1: NORMAL
MDC_IDC_SET_LEADCHNL_LV_SENSING_POLARITY: NORMAL
MDC_IDC_SET_LEADCHNL_RA_PACING_AMPLITUDE: 1.6 V
MDC_IDC_SET_LEADCHNL_RA_PACING_POLARITY: NORMAL
MDC_IDC_SET_LEADCHNL_RA_PACING_PULSEWIDTH: 0.4 MS
MDC_IDC_SET_LEADCHNL_RA_SENSING_ADAPTATION_MODE: NORMAL
MDC_IDC_SET_LEADCHNL_RA_SENSING_POLARITY: NORMAL
MDC_IDC_SET_LEADCHNL_RA_SENSING_SENSITIVITY: NORMAL
MDC_IDC_SET_LEADCHNL_RV_PACING_AMPLITUDE: 2.4 V
MDC_IDC_SET_LEADCHNL_RV_PACING_POLARITY: NORMAL
MDC_IDC_SET_LEADCHNL_RV_PACING_PULSEWIDTH: 0.4 MS
MDC_IDC_SET_LEADCHNL_RV_SENSING_ADAPTATION_MODE: NORMAL
MDC_IDC_SET_LEADCHNL_RV_SENSING_POLARITY: NORMAL
MDC_IDC_SET_LEADCHNL_RV_SENSING_SENSITIVITY: NORMAL
MDC_IDC_STAT_AT_MODE_SW_COUNT: 4
MDC_IDC_STAT_BRADY_RA_PERCENT_PACED: 93 %
MDC_IDC_STAT_BRADY_RV_PERCENT_PACED: 0 %
MDC_IDC_STAT_EPISODE_RECENT_COUNT: 0
MDC_IDC_STAT_EPISODE_TYPE: NORMAL
PLATELET # BLD AUTO: 231 10E3/UL (ref 150–450)
RBC # BLD AUTO: 4.93 10E6/UL (ref 3.8–5.2)
WBC # BLD AUTO: 6.7 10E3/UL (ref 4–11)

## 2022-06-24 PROCEDURE — 36415 COLL VENOUS BLD VENIPUNCTURE: CPT | Performed by: INTERNAL MEDICINE

## 2022-06-24 PROCEDURE — 85027 COMPLETE CBC AUTOMATED: CPT | Performed by: INTERNAL MEDICINE

## 2022-06-24 PROCEDURE — 99214 OFFICE O/P EST MOD 30 MIN: CPT | Performed by: INTERNAL MEDICINE

## 2022-06-24 PROCEDURE — 93280 PM DEVICE PROGR EVAL DUAL: CPT | Performed by: INTERNAL MEDICINE

## 2022-06-24 NOTE — LETTER
6/24/2022    Christina Hernandez, DO  [address] [facility] [address]    RE: Tammy Law       Dear Colleague,     I had the pleasure of seeing Tammy Law in the [facility].      Thank you, Dr. Christina Hernandez, for asking the [facility] team to see Ms. Tammy Law to follow-up on aortic valve replacement, fatigue.    Assessment/Recommendations   Assessment:    1.  Rheumatic aortic valvular disease, status post mechanical aortic valve replacement in 1994.  She has been on warfarin anticoagulation over these past 28 years.  Recent echocardiogram demonstrates normal mechanical aortic valve function with no insufficiency.  Continue warfarin anticoagulation.  2.  Sick sinus syndrome, status post dual-chamber pacemaker insertion.  Recent device check demonstrates no arrhythmias.  Continue remote device checks.  3.  Fatigue, etiology unclear.  She does report mild exertional dyspnea and fatigue.  Her last stress test in 2018 was negative for ischemia.  Recommended repeating a nuclear stress test to see if there has been interval change.  We will also check CBC to make sure she is not anemic.  She tells me her boyfriend says she snores loudly but does not mention apneic episodes.  If we do not find another cause, consider referral to sleep medicine.      Plan:  1.  Continue current medications  2.  Check CBC and arrange nuclear stress test to evaluate for fatigue and exertional dyspnea.  If these are unrevealing, would consider referral to sleep medicine       History of Present Illness    Ms. Tammy Law is a 66 year old female with history of rheumatic aortic valve disease, status post mechanical aortic valve replacement in 1994 with no coronary artery disease at the time, sick sinus syndrome status post dual-chamber pacemaker insertion who presents today for her yearly visit.  Reports no complaints of exertional chest discomfort but  has noted increased exertional fatigue as well as some mild dyspnea.  No orthopnea, PND or lower extremity edema.  Denies any obvious bleeding issues.  Does feel quite fatigued most days.  Is awakening at night for unclear reason.  Has been told by her boyfriend that she snores loudly but has never undergone any formal sleep evaluation.  He has not mentioned any apnea.    ECG (personally reviewed): No ECG today      Exam Date Exam Time Exam Date Exam Time Accession # Performing Department Results    6/24/22 10:17 AM 6/24/22 10:26 AM [id number] [facility]      Narrative & Impression    Biotronik Fawad (D) Pacemaker Device Check and follow-up visit with Dr. Vora  Patient seen in clinic for device evaluation and iterative programming.   AP: 93%  : 0%  Mode: DDD-CLS 60-130bpm  Presenting: ASVS 68bpm  Underlying Rhythm: SR 60's  Heart Rate: Stable, HR's per histogram range 60-120bpm and primarily 60-100bpm  Sensing: Stable  Pacing Threshold: Stable  Impedance: Stable  Battery Status: Estimating 6y2m remaining  Device Site: Well healed  Anticoagulant: Warfarin  Atrial Arrhythmia: 4 mode switches logged, no available EGM's, burden 0%.  Ventricular Arrhythmia: None  Setting Change: None       Cardiac Imaging Studies (personally reviewed):    Procedure 04/29/2022  Complete Echo Adult. Adequate quality two-dimensional was performed and  interpreted. Adequate quality color and spectral Doppler were performed and  interpreted. Compared to the prior study dated 7/28/2021, there have been no  changes.  ______________________________________________________________________________  Interpretation Summary     1. Left ventricular size is normal. Mild concentric increase in wall  thickness. Systolic function is normal. The estimated left ventricular  ejection fraction is 55-60%.  2. Right ventricular size and systolic function are normal.  3. Severe left atrial enlargement.  4. A mechanical  "prosthetic aortic valve (unknown size or manufactuer) is  present. No evidence of significant prosthetic stenosis with peak forward  velocity 2.7 m/s, mean gradient 17 mm Hg, effective orifice area 1.2 cm2,  acceleration time 90 ms, and dimensionless index 0.30. Trace-to-mild  prosthetic regurgitation.  5. Rheumatic-appearing mitral apparatus with severely thickened and calcified  leaflets and chordae tendinae. No significant stenosis with a mean gradient of  3 mm Hg at a heart rate of 80 bpm. There is mild, posteriorly-directed mitral  regurgitation due to restriction of the posterior mitral leaflet.  6. Compared to the prior study dated 7/28/2021, there has been no significant  change.       Physical Examination Review of Systems   BP (!) 88/62 (BP Location: Left arm, Patient Position: Sitting, Cuff Size: Adult Regular)   Pulse 68   Resp 16   Ht 1.638 m (5' 4.5\")   Wt 65.9 kg (145 lb 3.2 oz)   BMI 24.54 kg/m    Body mass index is 24.54 kg/m .  Wt Readings from Last 3 Encounters:   06/24/22 65.9 kg (145 lb 3.2 oz)   06/21/21 65.4 kg (144 lb 3.2 oz)   04/23/21 63.8 kg (140 lb 9.6 oz)     General Appearance:   Awake, Alert, No acute distress.   HEENT:  No scleral icterus; the mucous membranes were pink and moist.   Neck: No cervical bruits or jugular venous distention    Chest: The spine was straight. The chest was symmetric.   Lungs:   Respirations unlabored; the lungs are clear to auscultation. No wheezing   Cardiovascular:    Rhythm is regular.  S1 is normal, S2 crisp and mechanical.  No murmur or gallop.   Abdomen:  No organomegaly, masses, bruits, or tenderness. Bowels sounds are present   Extremities:  No peripheral edema bilaterally   Skin: No xanthelasma. Warm, Dry.   Musculoskeletal: No tenderness.   Neurologic: Mood and affect are appropriate.    Enc Vitals  BP: (!) 88/62  Pulse: 68  Resp: 16  Weight: 65.9 kg (145 lb 3.2 oz)  Height: 163.8 cm (5' 4.5\")                                         Medical " History  Surgical History Family History Social History   Past Medical History:   Diagnosis Date     Anxiety      Chronic anticoagulation      Depression      Disease of thyroid gland     Hypothyroidism     H/O aortic valve replacement      High cholesterol      Hyperlipidemia      Hypertension      Hypothyroidism      Pacemaker     biotronik     SSS (sick sinus syndrome) (H) 2/4/2020    Past Surgical History:   Procedure Laterality Date     AORTIC VALVE REPLACEMENT       APPENDECTOMY       BIOPSY BREAST Left 2015     BREAST CYST EXCISION       CARDIAC CATHETERIZATION       HC PART REMV BONE METATARSAL HEAD,EA Right 06/23/2017    Procedure: EXOSTECTOMY 2ND METATARSAL RIGHT FOOT;  Surgeon: Jessee Mccauley DPM;  Location: [facility];  Service: Podiatry     HYSTERECTOMY  2000     IMPLANT PACEMAKER       IMPLANT PACEMAKER       OOPHORECTOMY  2000     OTHER SURGICAL HISTORY      Hemmorhoidectomy     RELEASE CARPAL TUNNEL Bilateral      TOE SURGERY       TYMPANOSTOMY TUBE PLACEMENT       ZZC REPLACE AORT VALV,PROSTH VALV      Description: Aortic Valve Replacement;  Proc Date: 01/01/1995;    Family History   Problem Relation Age of Onset     Cancer Paternal Grandfather         Stomach     Ovarian Cancer Cousin      Pacemaker Mother      Diabetes Mother      Coronary Artery Disease Father      Pacemaker Father      Diabetes Father      No Known Problems Sister      No Known Problems Daughter      No Known Problems Maternal Grandmother      No Known Problems Maternal Grandfather      No Known Problems Paternal Grandmother      No Known Problems Maternal Aunt      No Known Problems Paternal Aunt      Kidney Cancer Brother      Hereditary Breast and Ovarian Cancer Syndrome No family hx of      Breast Cancer No family hx of      Colon Cancer No family hx of      Endometrial Cancer No family hx of     Social History     Socioeconomic History     Marital status: Single     Spouse name: Not on file     Number of  children: 0     Years of education: Not on file     Highest education level: Not on file   Occupational History     Not on file   Tobacco Use     Smoking status: Never Smoker     Smokeless tobacco: Never Used   Substance and Sexual Activity     Alcohol use: Yes     Alcohol/week: 0.0 standard drinks     Comment: Alcoholic Drinks/day: twice per year     Drug use: No     Sexual activity: Not Currently   Other Topics Concern     Parent/sibling w/ CABG, MI or angioplasty before 65F 55M? Not Asked   Social History Narrative     Not on file     Social Determinants of Health     Financial Resource Strain: Not on file   Food Insecurity: Not on file   Transportation Needs: Not on file   Physical Activity: Not on file   Stress: Not on file   Social Connections: Not on file   Intimate Partner Violence: Not on file   Housing Stability: Not on file          Medications  Allergies   Current Outpatient Medications   Medication Sig Dispense Refill     acetaminophen (TYLENOL) 325 MG tablet Take 3 tablets (975 mg) by mouth every 6 hours as needed for mild pain or fever 120 tablet 0     cholecalciferol 25 MCG (1000 UT) TABS Take 2,000 Units by mouth daily       citalopram (CELEXA) 20 MG tablet Take 1 tablet (20 mg) by mouth daily 90 tablet 3     furosemide (LASIX) 40 MG tablet Take 40 mg by mouth daily       levothyroxine (SYNTHROID/LEVOTHROID) 75 MCG tablet Take 1 tablet (75 mcg) by mouth daily 90 tablet 1     simvastatin (ZOCOR) 20 MG tablet 1 TABLET EVERY EVENING 90 tablet 1     warfarin ANTICOAGULANT (COUMADIN ANTICOAGULANT) 2.5 MG tablet Take 1-3 tablets (2.5-7.5 mg) by mouth daily Take 7.5 mg on Wednesdays and 5 mg all other days or as directed by INR clinic 190 tablet 1      Allergies   Allergen Reactions     Demerol [Meperidine]      Hallucinations       Sulfa Drugs          Lab Results    Chemistry/lipid CBC Cardiac Enzymes/BNP/TSH/INR   Recent Labs   Lab Test 01/12/22  0204 01/25/21  1712 10/02/20  0850   TRIG  --   --  70    LDL  --   --  82   BUN 11   < > 19      < > 140   CO2 26   < > 34*    < > = values in this interval not displayed.    Recent Labs   Lab Test 01/12/22  0744   WBC 3.6*   HGB 12.7   HCT 40.6   MCV 94   *    Recent Labs   Lab Test 06/15/22  0000 03/10/21  0826 01/25/21  1712 02/12/20  0846 01/06/20  1520   TROPONINI  --   --   --   --  <0.01   TSH  --   --  1.59   < >  --    INR 2.4   < > 2.40*   < > 2.30*    < > = values in this interval not displayed.        A total of 37 minutes was spent reviewing patient's medical records, obtaining history and performing examination, as well as discussing diagnoses/ recommendations with patient and answering all questions.                      Thank you for allowing me to participate in the care of your patient.      Sincerely,     Kirsten Vora MD     [facility]  cc:   No referring provider defined for this encounter.

## 2022-06-24 NOTE — PROGRESS NOTES
Thank you, Dr. Christina Hernandez, for asking the [facility] team to see Ms. Tammy Law to follow-up on aortic valve replacement, fatigue.    Assessment/Recommendations   Assessment:    1.  Rheumatic aortic valvular disease, status post mechanical aortic valve replacement in 1994.  She has been on warfarin anticoagulation over these past 28 years.  Recent echocardiogram demonstrates normal mechanical aortic valve function with no insufficiency.  Continue warfarin anticoagulation.  2.  Sick sinus syndrome, status post dual-chamber pacemaker insertion.  Recent device check demonstrates no arrhythmias.  Continue remote device checks.  3.  Fatigue, etiology unclear.  She does report mild exertional dyspnea and fatigue.  Her last stress test in 2018 was negative for ischemia.  Recommended repeating a nuclear stress test to see if there has been interval change.  We will also check CBC to make sure she is not anemic.  She tells me her boyfriend says she snores loudly but does not mention apneic episodes.  If we do not find another cause, consider referral to sleep medicine.      Plan:  1.  Continue current medications  2.  Check CBC and arrange nuclear stress test to evaluate for fatigue and exertional dyspnea.  If these are unrevealing, would consider referral to sleep medicine       History of Present Illness    Ms. Tammy Law is a 66 year old female with history of rheumatic aortic valve disease, status post mechanical aortic valve replacement in 1994 with no coronary artery disease at the time, sick sinus syndrome status post dual-chamber pacemaker insertion who presents today for her yearly visit.  Reports no complaints of exertional chest discomfort but has noted increased exertional fatigue as well as some mild dyspnea.  No orthopnea, PND or lower extremity edema.  Denies any obvious bleeding issues.  Does feel quite fatigued most days.  Is awakening at night for unclear  reason.  Has been told by her boyfriend that she snores loudly but has never undergone any formal sleep evaluation.  He has not mentioned any apnea.    ECG (personally reviewed): No ECG today      Exam Date Exam Time Exam Date Exam Time Accession # Performing Department Results    6/24/22 10:17 AM 6/24/22 10:26 AM [id number] [facility]    Biotronik Fawad (D) Pacemaker Device Check and follow-up visit with Dr. Vora  Patient seen in clinic for device evaluation and iterative programming.   AP: 93%  : 0%  Mode: DDD-CLS 60-130bpm  Presenting: ASVS 68bpm  Underlying Rhythm: SR 60's  Heart Rate: Stable, HR's per histogram range 60-120bpm and primarily 60-100bpm  Sensing: Stable  Pacing Threshold: Stable  Impedance: Stable  Battery Status: Estimating 6y2m remaining  Device Site: Well healed  Anticoagulant: Warfarin  Atrial Arrhythmia: 4 mode switches logged, no available EGM's, burden 0%.  Ventricular Arrhythmia: None  Setting Change: None       Cardiac Imaging Studies (personally reviewed):    Procedure 04/29/2022  Complete Echo Adult. Adequate quality two-dimensional was performed and  interpreted. Adequate quality color and spectral Doppler were performed and  interpreted. Compared to the prior study dated 7/28/2021, there have been no  changes.  ______________________________________________________________________________  Interpretation Summary     1. Left ventricular size is normal. Mild concentric increase in wall  thickness. Systolic function is normal. The estimated left ventricular  ejection fraction is 55-60%.  2. Right ventricular size and systolic function are normal.  3. Severe left atrial enlargement.  4. A mechanical prosthetic aortic valve (unknown size or manufactuer) is  present. No evidence of significant prosthetic stenosis with peak forward  velocity 2.7 m/s, mean gradient 17 mm Hg, effective orifice area 1.2 cm2,  acceleration  "time 90 ms, and dimensionless index 0.30. Trace-to-mild  prosthetic regurgitation.  5. Rheumatic-appearing mitral apparatus with severely thickened and calcified  leaflets and chordae tendinae. No significant stenosis with a mean gradient of  3 mm Hg at a heart rate of 80 bpm. There is mild, posteriorly-directed mitral  regurgitation due to restriction of the posterior mitral leaflet.  6. Compared to the prior study dated 7/28/2021, there has been no significant  change.       Physical Examination Review of Systems   BP (!) 88/62 (BP Location: Left arm, Patient Position: Sitting, Cuff Size: Adult Regular)   Pulse 68   Resp 16   Ht 1.638 m (5' 4.5\")   Wt 65.9 kg (145 lb 3.2 oz)   BMI 24.54 kg/m    Body mass index is 24.54 kg/m .  Wt Readings from Last 3 Encounters:   06/24/22 65.9 kg (145 lb 3.2 oz)   06/21/21 65.4 kg (144 lb 3.2 oz)   04/23/21 63.8 kg (140 lb 9.6 oz)     General Appearance:   Awake, Alert, No acute distress.   HEENT:  No scleral icterus; the mucous membranes were pink and moist.   Neck: No cervical bruits or jugular venous distention    Chest: The spine was straight. The chest was symmetric.   Lungs:   Respirations unlabored; the lungs are clear to auscultation. No wheezing   Cardiovascular:    Rhythm is regular.  S1 is normal, S2 crisp and mechanical.  No murmur or gallop.   Abdomen:  No organomegaly, masses, bruits, or tenderness. Bowels sounds are present   Extremities:  No peripheral edema bilaterally   Skin: No xanthelasma. Warm, Dry.   Musculoskeletal: No tenderness.   Neurologic: Mood and affect are appropriate.    Enc Vitals  BP: (!) 88/62  Pulse: 68  Resp: 16  Weight: 65.9 kg (145 lb 3.2 oz)  Height: 163.8 cm (5' 4.5\")                                         Medical History  Surgical History Family History Social History   Past Medical History:   Diagnosis Date     Anxiety      Chronic anticoagulation      Depression      Disease of thyroid gland     Hypothyroidism     H/O aortic valve " replacement      High cholesterol      Hyperlipidemia      Hypertension      Hypothyroidism      Pacemaker     biotronik     SSS (sick sinus syndrome) (H) 2/4/2020    Past Surgical History:   Procedure Laterality Date     AORTIC VALVE REPLACEMENT       APPENDECTOMY       BIOPSY BREAST Left 2015     BREAST CYST EXCISION       CARDIAC CATHETERIZATION       HC PART REMV BONE METATARSAL HEAD,EA Right 06/23/2017    Procedure: EXOSTECTOMY 2ND METATARSAL RIGHT FOOT;  Surgeon: Jessee Mccauley DPM;  Location[facility];  Service: Podiatry     HYSTERECTOMY  2000     IMPLANT PACEMAKER       IMPLANT PACEMAKER       OOPHORECTOMY  2000     OTHER SURGICAL HISTORY      Hemmorhoidectomy     RELEASE CARPAL TUNNEL Bilateral      TOE SURGERY       TYMPANOSTOMY TUBE PLACEMENT       ZZC REPLACE AORT VALV,PROSTH VALV      Description: Aortic Valve Replacement;  Proc Date: 01/01/1995;    Family History   Problem Relation Age of Onset     Cancer Paternal Grandfather         Stomach     Ovarian Cancer Cousin      Pacemaker Mother      Diabetes Mother      Coronary Artery Disease Father      Pacemaker Father      Diabetes Father      No Known Problems Sister      No Known Problems Daughter      No Known Problems Maternal Grandmother      No Known Problems Maternal Grandfather      No Known Problems Paternal Grandmother      No Known Problems Maternal Aunt      No Known Problems Paternal Aunt      Kidney Cancer Brother      Hereditary Breast and Ovarian Cancer Syndrome No family hx of      Breast Cancer No family hx of      Colon Cancer No family hx of      Endometrial Cancer No family hx of     Social History     Socioeconomic History     Marital status: Single     Spouse name: Not on file     Number of children: 0     Years of education: Not on file     Highest education level: Not on file   Occupational History     Not on file   Tobacco Use     Smoking status: Never Smoker     Smokeless tobacco: Never Used   Substance and  Sexual Activity     Alcohol use: Yes     Alcohol/week: 0.0 standard drinks     Comment: Alcoholic Drinks/day: twice per year     Drug use: No     Sexual activity: Not Currently   Other Topics Concern     Parent/sibling w/ CABG, MI or angioplasty before 65F 55M? Not Asked   Social History Narrative     Not on file     Social Determinants of Health     Financial Resource Strain: Not on file   Food Insecurity: Not on file   Transportation Needs: Not on file   Physical Activity: Not on file   Stress: Not on file   Social Connections: Not on file   Intimate Partner Violence: Not on file   Housing Stability: Not on file          Medications  Allergies   Current Outpatient Medications   Medication Sig Dispense Refill     acetaminophen (TYLENOL) 325 MG tablet Take 3 tablets (975 mg) by mouth every 6 hours as needed for mild pain or fever 120 tablet 0     cholecalciferol 25 MCG (1000 UT) TABS Take 2,000 Units by mouth daily       citalopram (CELEXA) 20 MG tablet Take 1 tablet (20 mg) by mouth daily 90 tablet 3     furosemide (LASIX) 40 MG tablet Take 40 mg by mouth daily       levothyroxine (SYNTHROID/LEVOTHROID) 75 MCG tablet Take 1 tablet (75 mcg) by mouth daily 90 tablet 1     simvastatin (ZOCOR) 20 MG tablet 1 TABLET EVERY EVENING 90 tablet 1     warfarin ANTICOAGULANT (COUMADIN ANTICOAGULANT) 2.5 MG tablet Take 1-3 tablets (2.5-7.5 mg) by mouth daily Take 7.5 mg on Wednesdays and 5 mg all other days or as directed by INR clinic 190 tablet 1      Allergies   Allergen Reactions     Demerol [Meperidine]      Hallucinations       Sulfa Drugs          Lab Results    Chemistry/lipid CBC Cardiac Enzymes/BNP/TSH/INR   Recent Labs   Lab Test 01/12/22  0204 01/25/21  1712 10/02/20  0850   TRIG  --   --  70   LDL  --   --  82   BUN 11   < > 19      < > 140   CO2 26   < > 34*    < > = values in this interval not displayed.    Recent Labs   Lab Test 01/12/22  0744   WBC 3.6*   HGB 12.7   HCT 40.6   MCV 94   *    Recent  Labs   Lab Test 06/15/22  0000 03/10/21  0826 01/25/21  1712 02/12/20  0846 01/06/20  1520   TROPONINI  --   --   --   --  <0.01   TSH  --   --  1.59   < >  --    INR 2.4   < > 2.40*   < > 2.30*    < > = values in this interval not displayed.        A total of 37 minutes was spent reviewing patient's medical records, obtaining history and performing examination, as well as discussing diagnoses/ recommendations with patient and answering all questions.

## 2022-06-29 DIAGNOSIS — Z95.2 H/O MECHANICAL AORTIC VALVE REPLACEMENT: Primary | ICD-10-CM

## 2022-06-30 ENCOUNTER — ANTICOAGULATION THERAPY VISIT (OUTPATIENT)
Dept: ANTICOAGULATION | Facility: CLINIC | Age: 67
End: 2022-06-30

## 2022-06-30 DIAGNOSIS — Z95.2 H/O MECHANICAL AORTIC VALVE REPLACEMENT: Primary | ICD-10-CM

## 2022-06-30 LAB — INR HOME MONITORING: 2.8 (ref 2–3.5)

## 2022-06-30 NOTE — PROGRESS NOTES
ANTICOAGULATION MANAGEMENT     Tammy Law 66 year old female is on warfarin with therapeutic INR result. (Goal INR 2.0-3.0)    Recent labs: (last 7 days)     06/30/22  0000   INR 2.8       ASSESSMENT       Source(s): Chart Review and Patient/Caregiver Call       Warfarin doses taken: Warfarin taken as instructed    Diet: No new diet changes identified    New illness, injury, or hospitalization: No    Medication/supplement changes: None noted    Signs or symptoms of bleeding or clotting: No    Previous INR: Therapeutic last 2(+) visits    Additional findings: None       PLAN     Recommended plan for no diet, medication or health factor changes affecting INR     Dosing Instructions: continue your current warfarin dose with next INR in 2 weeks       Summary  As of 6/30/2022    Full warfarin instructions:  7.5 mg every Wed; 5 mg all other days   Next INR check:  7/13/2022             Telephone call with Bisi who verbalizes understanding and agrees to plan    Patient to recheck with home meter    Education provided: Please call back if any changes to your diet, medications or how you've been taking warfarin, Goal range and significance of current result and Resume manage by exception with home monitor. Continue to submit INR results to home monitor company.You will only be called when your result is out of range. Please call and notify [facility] if new medication started, dose missed, signs or symptoms of bleeding or clotting, or a surgery/procedure is scheduled.    Plan made per [facility] anticoagulation protocol    Reynaldo Lewis RN  Anticoagulation Clinic  6/30/2022    _______________________________________________________________________     Anticoagulation Episode Summary     Current INR goal:  2.0-3.0   TTR:  89.2 % (3.8 mo)   Target end date:  Indefinite   Send INR reminders to:  MORENO SINGH    Indications    H/O mechanical aortic valve replacement [Z95.2]           Comments:  [employer] home monitor- managed by  exception         Anticoagulation Care Providers     Provider Role Specialty Phone number    Arnold Anderson MD Referring Internal Medicine [phone]    Christian Hernandez DO Referring Family Medicine [phone]

## 2022-07-01 ASSESSMENT — ENCOUNTER SYMPTOMS
CHILLS: 0
PARESTHESIAS: 0
SORE THROAT: 0
SHORTNESS OF BREATH: 1
PALPITATIONS: 0
COUGH: 0
CONSTIPATION: 0
NERVOUS/ANXIOUS: 0
HEADACHES: 0
DIARRHEA: 0
NAUSEA: 0
HEARTBURN: 0
ABDOMINAL PAIN: 0
BREAST MASS: 0
FEVER: 0
DYSURIA: 0
WEAKNESS: 1
ARTHRALGIAS: 0
MYALGIAS: 0
HEMATOCHEZIA: 0
FREQUENCY: 0
EYE PAIN: 0
JOINT SWELLING: 0
DIZZINESS: 0
HEMATURIA: 0

## 2022-07-01 ASSESSMENT — PATIENT HEALTH QUESTIONNAIRE - PHQ9
SUM OF ALL RESPONSES TO PHQ QUESTIONS 1-9: 5
10. IF YOU CHECKED OFF ANY PROBLEMS, HOW DIFFICULT HAVE THESE PROBLEMS MADE IT FOR YOU TO DO YOUR WORK, TAKE CARE OF THINGS AT HOME, OR GET ALONG WITH OTHER PEOPLE: SOMEWHAT DIFFICULT
SUM OF ALL RESPONSES TO PHQ QUESTIONS 1-9: 5

## 2022-07-01 ASSESSMENT — ACTIVITIES OF DAILY LIVING (ADL): CURRENT_FUNCTION: NO ASSISTANCE NEEDED

## 2022-07-08 ENCOUNTER — OFFICE VISIT (OUTPATIENT)
Dept: FAMILY MEDICINE | Facility: CLINIC | Age: 67
End: 2022-07-08
Payer: COMMERCIAL

## 2022-07-08 VITALS
DIASTOLIC BLOOD PRESSURE: 60 MMHG | BODY MASS INDEX: 24.5 KG/M2 | SYSTOLIC BLOOD PRESSURE: 100 MMHG | WEIGHT: 143.5 LBS | HEART RATE: 62 BPM | OXYGEN SATURATION: 98 % | HEIGHT: 64 IN

## 2022-07-08 DIAGNOSIS — E03.9 ACQUIRED HYPOTHYROIDISM: ICD-10-CM

## 2022-07-08 DIAGNOSIS — F33.42 RECURRENT MAJOR DEPRESSIVE DISORDER, IN FULL REMISSION (H): ICD-10-CM

## 2022-07-08 DIAGNOSIS — Z00.00 ENCOUNTER FOR MEDICARE ANNUAL WELLNESS EXAM: ICD-10-CM

## 2022-07-08 DIAGNOSIS — Z95.2 H/O MECHANICAL AORTIC VALVE REPLACEMENT: ICD-10-CM

## 2022-07-08 DIAGNOSIS — Z23 NEED FOR PNEUMOCOCCAL VACCINE: ICD-10-CM

## 2022-07-08 DIAGNOSIS — Z23 NEED FOR 23-POLYVALENT PNEUMOCOCCAL POLYSACCHARIDE VACCINE: ICD-10-CM

## 2022-07-08 DIAGNOSIS — N32.81 OAB (OVERACTIVE BLADDER): ICD-10-CM

## 2022-07-08 DIAGNOSIS — R06.09 DYSPNEA ON EXERTION: ICD-10-CM

## 2022-07-08 DIAGNOSIS — N94.10 PAIN IN FEMALE GENITALIA ON INTERCOURSE: ICD-10-CM

## 2022-07-08 DIAGNOSIS — Z23 HIGH PRIORITY FOR COVID-19 VACCINATION: Primary | ICD-10-CM

## 2022-07-08 LAB
ALBUMIN UR-MCNC: NEGATIVE MG/DL
AMORPH CRY #/AREA URNS HPF: ABNORMAL /HPF
APPEARANCE UR: CLEAR
BACTERIA #/AREA URNS HPF: ABNORMAL /HPF
BILIRUB UR QL STRIP: NEGATIVE
COLOR UR AUTO: YELLOW
GLUCOSE UR STRIP-MCNC: NEGATIVE MG/DL
HGB UR QL STRIP: ABNORMAL
KETONES UR STRIP-MCNC: NEGATIVE MG/DL
LEUKOCYTE ESTERASE UR QL STRIP: NEGATIVE
NITRATE UR QL: NEGATIVE
PH UR STRIP: 5 [PH] (ref 5–8)
RBC #/AREA URNS AUTO: ABNORMAL /HPF
SP GR UR STRIP: 1.01 (ref 1–1.03)
SQUAMOUS #/AREA URNS AUTO: ABNORMAL /LPF
T4 FREE SERPL-MCNC: 1.45 NG/DL (ref 0.9–1.7)
TSH SERPL DL<=0.005 MIU/L-ACNC: 8.67 UIU/ML (ref 0.3–4.2)
UROBILINOGEN UR STRIP-ACNC: 0.2 E.U./DL
WBC #/AREA URNS AUTO: ABNORMAL /HPF

## 2022-07-08 PROCEDURE — 90677 PCV20 VACCINE IM: CPT | Performed by: STUDENT IN AN ORGANIZED HEALTH CARE EDUCATION/TRAINING PROGRAM

## 2022-07-08 PROCEDURE — 81001 URINALYSIS AUTO W/SCOPE: CPT | Performed by: STUDENT IN AN ORGANIZED HEALTH CARE EDUCATION/TRAINING PROGRAM

## 2022-07-08 PROCEDURE — 90471 IMMUNIZATION ADMIN: CPT | Performed by: STUDENT IN AN ORGANIZED HEALTH CARE EDUCATION/TRAINING PROGRAM

## 2022-07-08 PROCEDURE — 99397 PER PM REEVAL EST PAT 65+ YR: CPT | Mod: 25 | Performed by: STUDENT IN AN ORGANIZED HEALTH CARE EDUCATION/TRAINING PROGRAM

## 2022-07-08 PROCEDURE — 36415 COLL VENOUS BLD VENIPUNCTURE: CPT | Performed by: STUDENT IN AN ORGANIZED HEALTH CARE EDUCATION/TRAINING PROGRAM

## 2022-07-08 PROCEDURE — 84443 ASSAY THYROID STIM HORMONE: CPT | Performed by: STUDENT IN AN ORGANIZED HEALTH CARE EDUCATION/TRAINING PROGRAM

## 2022-07-08 PROCEDURE — 84439 ASSAY OF FREE THYROXINE: CPT | Performed by: STUDENT IN AN ORGANIZED HEALTH CARE EDUCATION/TRAINING PROGRAM

## 2022-07-08 RX ORDER — ESTRADIOL 0.1 MG/G
2 CREAM VAGINAL
Qty: 42.5 G | Refills: 0 | Status: SHIPPED | OUTPATIENT
Start: 2022-07-11 | End: 2022-07-08

## 2022-07-08 RX ORDER — OXYBUTYNIN CHLORIDE 5 MG/1
5 TABLET, EXTENDED RELEASE ORAL DAILY
Qty: 90 TABLET | Refills: 0 | Status: SHIPPED | OUTPATIENT
Start: 2022-07-08 | End: 2022-08-10

## 2022-07-08 RX ORDER — FUROSEMIDE 40 MG
40 TABLET ORAL DAILY PRN
Qty: 90 TABLET | Refills: 3 | Status: SHIPPED | OUTPATIENT
Start: 2022-07-08 | End: 2023-03-23

## 2022-07-08 RX ORDER — CITALOPRAM HYDROBROMIDE 20 MG/1
20 TABLET ORAL DAILY
Qty: 90 TABLET | Refills: 3 | Status: SHIPPED | OUTPATIENT
Start: 2022-07-08 | End: 2023-03-23

## 2022-07-08 ASSESSMENT — ENCOUNTER SYMPTOMS
HEMATURIA: 0
HEMATOCHEZIA: 0
FEVER: 0
CHILLS: 0
ARTHRALGIAS: 0
FREQUENCY: 0
DIZZINESS: 0
DIARRHEA: 0
PALPITATIONS: 0
JOINT SWELLING: 0
CONSTIPATION: 0
ABDOMINAL PAIN: 0
HEADACHES: 0
COUGH: 0
PARESTHESIAS: 0
WEAKNESS: 1
SORE THROAT: 0
NERVOUS/ANXIOUS: 0
SHORTNESS OF BREATH: 1
EYE PAIN: 0
HEARTBURN: 0
BREAST MASS: 0
MYALGIAS: 0
NAUSEA: 0
DYSURIA: 0

## 2022-07-08 ASSESSMENT — ACTIVITIES OF DAILY LIVING (ADL): CURRENT_FUNCTION: NO ASSISTANCE NEEDED

## 2022-07-08 NOTE — PATIENT INSTRUCTIONS
Patient Education   Personalized Prevention Plan  You are due for the preventive services outlined below.  Your care team is available to assist you in scheduling these services.  If you have already completed any of these items, please share that information with your care team to update in your medical record.  Health Maintenance Due   Topic Date Due     ANNUAL REVIEW OF HM ORDERS  Never done     Discuss Advance Care Planning  Never done     Depression Action Plan  Never done     Pneumococcal Vaccine (2 - PCV) 10/25/2005     COVID-19 Vaccine (4 - Booster for Pfizer series) 03/04/2022       Exercise for a Healthier Heart  You may wonder how you can improve the health of your heart. If you re thinking about exercise, you re on the right track. You don t need to become an athlete. But you do need a certain amount of brisk exercise to help strengthen your heart. If you have been diagnosed with a heart condition, your healthcare provider may advise exercise to help stabilize your condition. To help make exercise a habit, choose safe, fun activities.      Exercise with a friend. When activity is fun, you're more likely to stick with it.   Before you start  Check with your healthcare provider before starting an exercise program. This is especially important if you have not been active for a while. It's also important if you have a long-term (chronic) health problem such as heart disease, diabetes, or obesity. Or if you are at high risk for having these problems.   Why exercise?  Exercising regularly offers many healthy rewards. It can help you do all of the following:     Improve your blood cholesterol level to help prevent further heart trouble    Lower your blood pressure to help prevent a stroke or heart attack    Control diabetes, or reduce your risk of getting this disease    Improve your heart and lung function    Reach and stay at a healthy weight    Make your muscles stronger so you can stay active    Prevent  falls and fractures by slowing the loss of bone mass (osteoporosis)    Manage stress better    Reduce your blood pressure    Improve your sense of self and your body image  Exercise tips      Ease into your routine. Set small goals. Then build on them. If you are not sure what your activity level should be, talk with your healthcare provider first before starting an exercise routine.    Exercise on most days. Aim for a total of 150 minutes (2 hours and 30 minutes) or more of moderate-intensity aerobic activity each week. Or 75 minutes (1 hour and 15 minutes) or more of vigorous-intensity aerobic activity each week. Or try for a combination of both. Moderate activity means that you breathe heavier and your heart rate increases but you can still talk. Think about doing 40 minutes of moderate exercise, 3 to 4 times a week. For best results, activity should last for about 40 minutes to lower blood pressure and cholesterol. It's OK to work up to the 40-minute period over time. Examples of moderate-intensity activity are walking 1 mile in 15 minutes. Or doing 30 to 45 minutes of yard work.    Step up your daily activity level.  Along with your exercise program, try being more active the whole day. Walk instead of drive. Or park further away so that you take more steps each day. Do more household tasks or yard work. You may not be able to meet the advised mount of physical activity. But doing some moderate- or vigorous-intensity aerobic activity can help reduce your risk for heart disease. Your healthcare provider can help you figure out what is best for you.    Choose 1 or more activities you enjoy.  Walking is one of the easiest things you can do. You can also try swimming, riding a bike, dancing, or taking an exercise class.    When to call your healthcare provider  Call your healthcare provider if you have any of these:     Chest pain or feel dizzy or lightheaded    Burning, tightness, pressure, or heaviness in your  chest, neck, shoulders, back, or arms    Abnormal shortness of breath    More joint or muscle pain    A very fast or irregular heartbeat (palpitations)  [employer] last reviewed this educational content on 7/1/2019 2000-2021 [employer]. All rights reserved. This information is not intended as a substitute for professional medical care. Always follow your healthcare professional's instructions.          Understanding USDA MyPlate  The USDA has guidelines to help you make healthy food choices. These are called MyPlate. MyPlate shows the food groups that make up healthy meals using the image of a place setting. Before you eat, think about the healthiest choices for what to put on your plate or in your cup or bowl. To learn more about building a healthy plate, visit www.choosemyplate.gov.    The food groups    Fruits. Any fruit or 100% fruit juice counts as part of the Fruit Group. Fruits may be fresh, canned, frozen, or dried, and may be whole, cut-up, or pureed. Make 1/2 of your plate fruits and vegetables.    Vegetables. Any vegetable or 100% vegetable juice counts as a member of the Vegetable Group. Vegetables may be fresh, frozen, canned, or dried. They can be served raw or cooked and may be whole, cut-up, or mashed. Make 1/2 of your plate fruits and vegetables.    Grains. All foods made from grains are part of the Grains Group. These include wheat, rice, oats, cornmeal, and barley. Grains are often used to make foods such as bread, pasta, oatmeal, cereal, tortillas, and grits. Grains should be no more than 1/4 of your plate. At least half of your grains should be whole grains.    Protein. This group includes meat, poultry, seafood, beans and peas, eggs, processed soy products (such as tofu), nuts (including nut butters), and seeds. Make protein choices no more than 1/4 of your plate. Meat and poultry choices should be lean or low fat.    Dairy. The Dairy Group includes all fluid milk products and  foods made from milk that contain calcium, such as yogurt and cheese. (Foods that have little calcium, such as cream, butter, and cream cheese, are not part of this group.) Most dairy choices should be low-fat or fat-free.    Oils. Oils aren't a food group, but they do contain essential nutrients. However it's important to watch your intake of oils. These are fats that are liquid at room temperature. They include canola, corn, olive, soybean, vegetable, and sunflower oil. Foods that are mainly oil include mayonnaise, certain salad dressings, and soft margarines. You likely already get your daily oil allowance from the foods you eat.  Things to limit  Eating healthy also means limiting these things in your diet:       Salt (sodium). Many processed foods have a lot of sodium. To keep sodium intake down, eat fresh vegetables, meats, poultry, and seafood when possible. Purchase low-sodium, reduced-sodium, or no-salt-added food products at the store. And don't add salt to your meals at home. Instead, season them with herbs and spices such as dill, oregano, cumin, and paprika. Or try adding flavor with lemon or lime zest and juice.    Saturated fat. Saturated fats are most often found in animal products such as beef, pork, and chicken. They are often solid at room temperature, such as butter. To reduce your saturated fat intake, choose leaner cuts of meat and poultry. And try healthier cooking methods such as grilling, broiling, roasting, or baking. For a simple lower-fat swap, use plain nonfat yogurt instead of mayonnaise when making potato salad or macaroni salad.    Added sugars. These are sugars added to foods. They are in foods such as ice cream, candy, soda, fruit drinks, sports drinks, energy drinks, cookies, pastries, jams, and syrups. Cut down on added sugars by sharing sweet treats with a family member or friend. You can also choose fruit for dessert, and drink water or other unsweetened beverages.     [employer]  last reviewed this educational content on 6/1/2020 2000-2021 [employer]. All rights reserved. This information is not intended as a substitute for professional medical care. Always follow your healthcare professional's instructions.          Urinary Incontinence, Female (Adult)   Urinary incontinence means loss of bladder control. This problem affects many women, especially as they get older. If you have incontinence, you may be embarrassed to ask for help. But know that this problem can be treated.   Types of Incontinence  There are different types of incontinence. Two of the main types are described here. You can have more than one type.     Stress incontinence. With this type, urine leaks when pressure (stress) is put on the bladder. This may happen when you cough, sneeze, or laugh. Stress incontinence most often occurs because the pelvic floor muscles that support the bladder and urethra are weak. This can happen after pregnancy and vaginal childbirth or a hysterectomy. It can also be due to excess body weight or hormone changes.    Urge incontinence (also called overactive bladder). With this type, a sudden urge to urinate is felt often. This may happen even though there may not be much urine in the bladder. The need to urinate often during the night is common. Urge incontinence most often occurs because of bladder spasms. This may be due to bladder irritation or infection. Damage to bladder nerves or pelvic muscles, constipation, and certain medicines can also lead to urge incontinence.  Treatment depends on the cause. Further evaluation is needed to find the type you have. This will likely include an exam and certain tests. Based on the results, you and your healthcare provider can then plan treatment. Until a diagnosis is made, the home care tips below can help ease symptoms.   Home care    Do pelvic floor muscle exercises, if they are prescribed. The pelvic floor muscles help support the  bladder and urethra. Many women find that their symptoms improve when doing special exercises that strengthen these muscles. To do the exercises, contract the muscles you would use to stop your stream of urine. But do this when you re not urinating. Hold for 10 seconds, then relax. Repeat 10 to 20 times in a row, at least 3 times a day. Your healthcare provider may give you other instructions for how to do the exercises and how often.    Keep a bladder diary. This helps track how often and how much you urinate over a set period of time. Bring this diary with you to your next visit with the provider. The information can help your provider learn more about your bladder problem.    Lose weight, if advised to by your provider. Extra weight puts pressure on the bladder. Your provider can help you create a weight-loss plan that s right for you. This may include exercising more and making certain diet changes.    Don't have foods and drinks that may irritate the bladder. These can include alcohol and caffeinated drinks.    Quit smoking. Smoking and other tobacco use can lead to a long-term (chronic) cough that strains the pelvic floor muscles. Smoking may also damage the bladder and urethra. Talk with your provider about treatments or methods you can use to quit smoking.    If drinking large amounts of fluid makes you have symptoms, you may be advised to limit your fluid intake. You may also be advised to drink most of your fluids during the day and to limit fluids at night.    If you re worried about urine leakage or accidents, you may wear absorbent pads to catch urine. Change the pads often. This helps reduce discomfort. It may also reduce the risk of skin or bladder infections.    Follow-up care  Follow up with your healthcare provider, or as directed. It may take some to find the right treatment for your problem. But healthy lifestyle changes can be made right away. These include such things as exercising on a regular  "basis, eating a healthy diet, losing weight (if needed), and quitting smoking. Your treatment plan may include special therapies or medicines. Certain procedures or surgery may also be options. Talk about any questions you have with your provider.   When to seek medical advice  Call the healthcare provider right away if any of these occur:    Fever of 100.4 F (38 C) or higher, or as directed by your provider    Bladder pain or fullness    Belly swelling    Nausea or vomiting    Back pain    Weakness, dizziness, or fainting  No last reviewed this educational content on 1/1/2020 2000-2021 [employer]. All rights reserved. This information is not intended as a substitute for professional medical care. Always follow your healthcare professional's instructions.          Depression and Suicide in Older Adults    Nearly 2 million older Americans have some type of depression. Some of them even take their own lives. Yet depression among older adults is often ignored. Learn the warning signs. You may help spare a loved one needless pain. You may also save a life.   What is depression?  Depression is a common and serious illness that affects the way you think and feel. It is not a normal part of aging, nor is it a sign of weakness, a character flaw, or something you can snap out of. Most people with depression need treatment to get better. The most common symptom is a feeling of deep sadness. People who are depressed also may seem tired and listless. And nothing seems to give them pleasure. It s normal to grieve or be sad sometimes. But sadness lessens or passes with time. Depression rarely goes away or improves on its own. A person with clinical depression can't \"snap out of it.\" Other symptoms of depression are:     Sleeping more or less than normal    Eating more or less than normal    Having headaches, stomachaches, or other pains that don t go away    Feeling nervous,  empty,  or worthless    Crying " a great deal    Thinking or talking about suicide or death    Loss of interest in activities previously enjoyed    Social isolation    Feeling confused or forgetful  What causes it?  The causes of depression aren t fully known. But it is thought to result from a complex blend of these factors:     Biochemistry. Certain chemicals in the brain play a role.    Genes. Depression does run in families.    Life stress. Life stresses can also trigger depression in some people. Older adults often face many stressors, such as death of friends or a spouse, health problems, and financial concerns.    Chronic conditions. This includes conditions such as diabetes, heart disease, or cancer. These can cause symptoms of depression. Medicine side effects can cause changes in thoughts and behaviors.  How you can help  Often, depressed people may not want to ask for help. When they do, they may be ignored. Or, they may receive the wrong treatment. You can help by showing parents and older friends love and support. If they seem depressed, don t lecture the person, ignore the symptoms, or discount the symptoms as a  normal  part of aging -which they are not. Get involved, listen, and show interest and support.   Help them understand that depression is a treatable illness. Tell them you can help them find the right treatment. Offer to go to their healthcare provider's appointment with them for support when the symptoms are discussed. With their approval, contact a local mental health center, social service agency, or hospital about services.   You can be an advocate for him or her at healthcare appointments. Many older adults have chronic illnesses that can cause symptoms of depression. Medicine side effects can change thoughts and behaviors. You can help make sure that the healthcare provider looks at all of these factors. He or she should refer your family member or friend to a mental healthcare provider when needed. in some cases,  untreated depression can lead to a misdiagnosis. A person may be diagnosed with a brain disorder such as dementia. If the healthcare provider does not take the issue of depression seriously, help your family member or friend to find another provider.   Don't be afraid to ask  If you think an older person you care about could be suicidal, ask,  Have you thought about suicide?  Most people will tell you the truth. If they say  yes,  they may already have a plan for how and when they will attempt it. Find out as much as you can. The more detailed the plan, and the easier it is to carry out, the more danger the person is in right now. Tell the person you are there for them and do not want them to harm him or herself. Don't wait to get help for the person. Call the person's healthcare provider, local hospital, or emergency services.   To learn more    National Suicide Prevention Lifeline (crisis hotline) 326-056-RSGR (135-584-8394)    National [place] of Mental Nwmwjz064-614-2070zla.[facility].nih.gov    National [place] on Mental Ejsfldw590-092-6018zal.chris.org    Mental Health Fqlastc653-341-2694mqo.[facility].org    National Suicide Ritssqo715-SQPJSXE (099-816-2850)    Call 911  Never leave the person alone. A person who is actively suicidal needs psychiatric care right away. They will need constant supervision. Never leave the person out of sight. Call 911 or the national 24-hour suicide crisis hotline at 205-962-FQVC (614-901-2599). You can also take the person to the closest emergency room.   [email or web address] last reviewed this educational content on 5/1/2020 2000-2021 [employer]. All rights reserved. This information is not intended as a substitute for professional medical care. Always follow your healthcare professional's instructions.

## 2022-07-08 NOTE — PROGRESS NOTES
"SUBJECTIVE:   Tammy Law is a 66 year old female who presents for Preventive Visit.    Patient has been advised of split billing requirements and indicates understanding: Yes  Are you in the first 12 months of your Medicare coverage?  No    Healthy Habits:     In general, how would you rate your overall health?  Good    Frequency of exercise:  None    Do you usually eat at least 4 servings of fruit and vegetables a day, include whole grains    & fiber and avoid regularly eating high fat or \"junk\" foods?  No    Taking medications regularly:  Yes    Barriers to taking medications:  None    Medication side effects:  Not applicable    Ability to successfully perform activities of daily living:  No assistance needed    Home Safety:  No safety concerns identified    Hearing Impairment:  No hearing concerns    In the past 6 months, have you been bothered by leaking of urine? Yes    In general, how would you rate your overall mental or emotional health?  Good      PHQ-2 Total Score: 0    Additional concerns today:  Yes    Patient having some dyspnea on exertion.  She notes this mostly while dancing.  Patient has discussed this with her cardiologist who is repeated her echo.  She is also going to get a nuc med stress test coming up.  Patient is due for a thyroid test which we will collect today.  Patient's hemoglobin has been normal.    Patient notes that she has been struggling with urinary urgency.  Patient notes 4-5 times a week she will have a bladder spasm that causes her to need to go to the bathroom now.  Patient has lost her urine a few times.  Discussed options of pelvic floor physical therapy versus medication therapy.  Patient would like to try medication.    Patient has recently started to be sexually active with her partner.  Patient took a year off of sexual activity.  Patient notes painful intercourse.  Discussed we will review on exam    Identified Health Risks:  Answers for HPI/ROS submitted by " the patient on 7/1/2022  If you checked off any problems, how difficult have these problems made it for you to do your work, take care of things at home, or get along with other people?: Somewhat difficult  PHQ9 TOTAL SCORE: 5  Do you feel safe in your environment? Yes    Have you ever done Advance Care Planning? (For example, a Health Directive, POLST, or a discussion with a medical provider or your loved ones about your wishes): No, advance care planning information given to patient to review.  Advanced care planning was discussed at today's visit.       Fall risk  Fallen 2 or more times in the past year?: No  Any fall with injury in the past year?: No    Cognitive Screening   1) Repeat 3 items (Leader, Season, Table)    2) Clock draw: NORMAL  3) 3 item recall: Recalls 3 objects  Results: 3 items recalled: COGNITIVE IMPAIRMENT LESS LIKELY    Mini-CogTM Copyright S Jose Alberto. Licensed by the author for use in [facility]; reprinted with permission (noah@[facility]). All rights reserved.      Do you have sleep apnea, excessive snoring or daytime drowsiness?: no    Reviewed and updated as needed this visit by clinical staff   Tobacco  Allergies  Meds  Problems  Med Hx  Surg Hx  Fam Hx            Reviewed and updated as needed this visit by Provider   Tobacco  Allergies  Meds  Problems  Med Hx  Surg Hx  Fam Hx           Social History     Tobacco Use     Smoking status: Never Smoker     Smokeless tobacco: Never Used   Substance Use Topics     Alcohol use: Yes     Comment: Alcoholic Drinks/day: twice per year     If you drink alcohol do you typically have >3 drinks per day or >7 drinks per week? No    No flowsheet data found.    Current providers sharing in care for this patient include:   Patient Care Team:  Christina Hernandez DO as PCP - General  Christina Hernandez DO as Assigned PCP  Kirsten Vora MD as Assigned Heart and Vascular Provider    The following health maintenance items are  reviewed in Epic and correct as of today:  Health Maintenance Due   Topic Date Due     DEPRESSION ACTION PLAN  Never done     COVID-19 Vaccine (4 - Booster for Pfizer series) 03/04/2022     Lab work is in process  Pneumonia Vaccine:For adults 65 years or older who do not have an immunocompromising condition, cerebrospinal fluid leak, or cochlear implant and want to receive PPSV23 ONLY: Administer 1 dose of PPSV23. Anyone who received any doses of PPSV23 before age 65 should receive 1 final dose of the vaccine at age 65 or older. Administer this last dose at least 5 years after the prior PPSV23 dose.    FHS-7:   Breast CA Risk Assessment (FHS-7) 6/23/2022 7/1/2022   Did any of your first-degree relatives have breast or ovarian cancer? No Unknown   Did any of your relatives have bilateral breast cancer? No No   Did any man in your family have breast cancer? No No   Did any woman in your family have breast and ovarian cancer? No -   Did any woman in your family have breast cancer before age 50 y? No No   Do you have 2 or more relatives with breast and/or ovarian cancer? No No   Do you have 2 or more relatives with breast and/or bowel cancer? No No     click delete button to remove this line now  Mammogram Screening: Recommended mammography every 1-2 years with patient discussion and risk factor consideration  Pertinent mammograms are reviewed under the imaging tab.    Review of Systems   Constitutional: Negative for chills and fever.   HENT: Negative for congestion, ear pain, hearing loss and sore throat.    Eyes: Negative for pain and visual disturbance.   Respiratory: Positive for shortness of breath. Negative for cough.    Cardiovascular: Negative for chest pain, palpitations and peripheral edema.   Gastrointestinal: Negative for abdominal pain, constipation, diarrhea, heartburn, hematochezia and nausea.   Breasts:  Negative for tenderness, breast mass and discharge.   Genitourinary: Positive for urgency. Negative  "for dysuria, frequency, genital sores, hematuria, pelvic pain, vaginal bleeding and vaginal discharge.   Musculoskeletal: Negative for arthralgias, joint swelling and myalgias.   Skin: Negative for rash.   Neurological: Positive for weakness. Negative for dizziness, headaches and paresthesias.   Psychiatric/Behavioral: Negative for mood changes. The patient is not nervous/anxious.      Constitutional, HEENT, cardiovascular, pulmonary, gi and gu systems are negative, except as otherwise noted.    OBJECTIVE:   /60 (BP Location: Left arm, Patient Position: Sitting, Cuff Size: Adult Regular)   Pulse 62   Ht 1.626 m (5' 4\")   Wt 65.1 kg (143 lb 8 oz)   SpO2 98%   BMI 24.63 kg/m   Estimated body mass index is 24.63 kg/m  as calculated from the following:    Height as of this encounter: 1.626 m (5' 4\").    Weight as of this encounter: 65.1 kg (143 lb 8 oz).  Physical Exam  GENERAL: healthy, alert and no distress  EYES: Eyes grossly normal to inspection, PERRL and conjunctivae and sclerae normal.  Nystagmus present  HENT: ear canals and TM's normal, nose and mouth without ulcers or lesions  NECK: no adenopathy, no asymmetry, masses, or scars and thyroid normal to palpation  RESP: lungs clear to auscultation - no rales, rhonchi or wheezes  CV: regular rate and rhythm, normal S1 S2 with auditory click, no S3 or S4, no murmur, or rub, no peripheral edema and peripheral pulses strong.  Click best heard over mitral valve  ABDOMEN: soft, nontender, no hepatosplenomegaly, no masses and bowel sounds normal   (female): normal female external genitalia, normal urethral meatus, vaginal mucosa pink, moist, well rugated, and normal cervix/adnexa/uterus without masses or discharge.  No prolapse on Valsalva.  Pain to palpation of pelvic floor  MS: no gross musculoskeletal defects noted, no edema  SKIN: no suspicious lesions or rashes  NEURO: Normal strength and tone, mentation intact and speech normal  PSYCH: mentation " appears normal, affect normal/bright    Diagnostic Test Results:  Labs reviewed in Epic    ASSESSMENT / PLAN:   Tammy was seen today for wellness visit.    Diagnoses and all orders for this visit:    High priority for COVID-19 vaccination: Discussed risks and benefits of fourth shot.  Patient declines today    Recurrent major depressive disorder, in full remission (H): Refill provided.  Patient notes she is doing well on this medication.  -     citalopram (CELEXA) 20 MG tablet; Take 1 tablet (20 mg) by mouth daily    Need for 23-polyvalent pneumococcal polysaccharide vaccine    Need for pneumococcal vaccine  -     PNEUMOCOCCAL 20 VALENT CONJUGATE (PREVNAR 20)    Encounter for Medicare annual wellness exam: Patient doing well with no    Dyspnea on exertion: Patient and work-up with cardiologist.  We will add a TSH  -     TSH with free T4 reflex; Future  -     TSH with free T4 reflex    OAB (overactive bladder): Patient with classic overactive bladder symptoms.  Discussed physical therapy versus medication.  Patient would like to try medication  -     UA with Microscopic reflex to Culture - lab collect; Future  -     oxybutynin ER (DITROPAN XL) 5 MG 24 hr tablet; Take 1 tablet (5 mg) by mouth daily  -     UA with Microscopic reflex to Culture - lab collect    Pain in female genitalia on intercourse: This appears to be associated with her pelvic floor.  Discussed trial of pelvic floor physical therapy to help learn how to relax.  Patient is hesitant to do this because of the time commitment.  Patient will work on [employer] at home to help her focus on relaxing.  She will let us know if she wants additional help.    H/O mechanical aortic valve replacement  -     furosemide (LASIX) 40 MG tablet; Take 1 tablet (40 mg) by mouth daily as needed    Other orders  -     REVIEW OF HEALTH MAINTENANCE PROTOCOL ORDERS  -     Discontinue: estradiol (ESTRACE) 0.1 MG/GM vaginal cream; Place 2 g vaginally twice a week      Patient  "has been advised of split billing requirements and indicates understanding: No    COUNSELING:  Reviewed preventive health counseling, as reflected in patient instructions       Regular exercise       Healthy diet/nutrition       Bladder control       Safe sex practices/STD prevention       (Josselin)menopause management       Advanced Planning     Estimated body mass index is 24.63 kg/m  as calculated from the following:    Height as of this encounter: 1.626 m (5' 4\").    Weight as of this encounter: 65.1 kg (143 lb 8 oz).        She reports that she has never smoked. She has never used smokeless tobacco.      Appropriate preventive services were discussed with this patient, including applicable screening as appropriate for cardiovascular disease, diabetes, osteopenia/osteoporosis, and glaucoma.  As appropriate for age/gender, discussed screening for colorectal cancer, prostate cancer, breast cancer, and cervical cancer. Checklist reviewing preventive services available has been given to the patient.    Reviewed patients plan of care and provided an AVS. The Basic Care Plan (routine screening as documented in Health Maintenance) for Tammy meets the Care Plan requirement. This Care Plan has been established and reviewed with the Patient.    Counseling Resources:  ATP IV Guidelines  Pooled Cohorts Equation Calculator  Breast Cancer Risk Calculator  Breast Cancer: Medication to Reduce Risk  FRAX Risk Assessment  ICSI Preventive Guidelines  Dietary Guidelines for Americans, 2010  USDA's MyPlate  ASA Prophylaxis  Lung CA Screening    Christina Hernandez, [facility]            She is at risk for lack of exercise and has been provided with information to increase physical activity for the benefit of her well-being.  The patient was counseled and encouraged to consider modifying their diet and eating habits. She was provided with information on recommended healthy diet options.  Information " on urinary incontinence and treatment options given to patient.  The patient's PHQ-9 score is consistent with mild depression. She was provided with information regarding depression

## 2022-07-11 RX ORDER — LEVOTHYROXINE SODIUM 88 UG/1
88 TABLET ORAL DAILY
Qty: 90 TABLET | Refills: 1 | Status: SHIPPED | OUTPATIENT
Start: 2022-07-11 | End: 2022-12-28

## 2022-07-13 ENCOUNTER — HOSPITAL ENCOUNTER (OUTPATIENT)
Dept: NUCLEAR MEDICINE | Facility: HOSPITAL | Age: 67
Discharge: HOME | End: 2022-07-13
Attending: INTERNAL MEDICINE
Payer: COMMERCIAL

## 2022-07-13 ENCOUNTER — HOSPITAL ENCOUNTER (OUTPATIENT)
Dept: CARDIOLOGY | Facility: HOSPITAL | Age: 67
Discharge: HOME | End: 2022-07-13
Attending: INTERNAL MEDICINE
Payer: COMMERCIAL

## 2022-07-13 ENCOUNTER — DOCUMENTATION ONLY (OUTPATIENT)
Dept: ANTICOAGULATION | Facility: CLINIC | Age: 67
End: 2022-07-13

## 2022-07-13 DIAGNOSIS — Z95.2 S/P AVR (AORTIC VALVE REPLACEMENT): ICD-10-CM

## 2022-07-13 DIAGNOSIS — Z95.2 H/O MECHANICAL AORTIC VALVE REPLACEMENT: Primary | ICD-10-CM

## 2022-07-13 DIAGNOSIS — R06.09 DOE (DYSPNEA ON EXERTION): ICD-10-CM

## 2022-07-13 DIAGNOSIS — R53.83 FATIGUE, UNSPECIFIED TYPE: ICD-10-CM

## 2022-07-13 LAB
CV BLOOD PRESSURE: 76 MMHG
CV STRESS CURRENT BP HE: NORMAL
CV STRESS CURRENT HR HE: 64
CV STRESS CURRENT HR HE: 65
CV STRESS CURRENT HR HE: 65
CV STRESS CURRENT HR HE: 67
CV STRESS CURRENT HR HE: 68
CV STRESS CURRENT HR HE: 68
CV STRESS CURRENT HR HE: 69
CV STRESS CURRENT HR HE: 70
CV STRESS CURRENT HR HE: 70
CV STRESS CURRENT HR HE: 71
CV STRESS CURRENT HR HE: 71
CV STRESS CURRENT HR HE: 77
CV STRESS CURRENT HR HE: 77
CV STRESS DEVIATION TIME HE: NORMAL
CV STRESS ECHO PERCENT HR HE: NORMAL
CV STRESS EXERCISE STAGE HE: NORMAL
CV STRESS FINAL RESTING BP HE: NORMAL
CV STRESS FINAL RESTING HR HE: 64
CV STRESS MAX HR HE: 77
CV STRESS MAX TREADMILL GRADE HE: 0
CV STRESS MAX TREADMILL SPEED HE: 0
CV STRESS PEAK DIA BP HE: NORMAL
CV STRESS PEAK SYS BP HE: NORMAL
CV STRESS PHASE HE: NORMAL
CV STRESS PROTOCOL HE: NORMAL
CV STRESS RESTING PT POSITION HE: NORMAL
CV STRESS ST DEVIATION AMOUNT HE: NORMAL
CV STRESS ST DEVIATION ELEVATION HE: NORMAL
CV STRESS ST EVELATION AMOUNT HE: NORMAL
CV STRESS TEST TYPE HE: NORMAL
CV STRESS TOTAL STAGE TIME MIN 1 HE: NORMAL
INR HOME MONITORING: 2.7 (ref 2–3.5)
RATE PRESSURE PRODUCT: 9009
STRESS ECHO BASELINE DIASTOLIC HE: 66
STRESS ECHO BASELINE HR: 67
STRESS ECHO BASELINE SYSTOLIC BP: 114
STRESS ECHO CALCULATED PERCENT HR: 50 %
STRESS ECHO LAST STRESS DIASTOLIC BP: 56
STRESS ECHO LAST STRESS HR: 69
STRESS ECHO LAST STRESS SYSTOLIC BP: 117
STRESS ECHO TARGET HR: 155

## 2022-07-13 PROCEDURE — 343N000001 HC RX 343: Performed by: INTERNAL MEDICINE

## 2022-07-13 PROCEDURE — A9500 TC99M SESTAMIBI: HCPCS | Performed by: INTERNAL MEDICINE

## 2022-07-13 PROCEDURE — 93016 CV STRESS TEST SUPVJ ONLY: CPT | Performed by: INTERNAL MEDICINE

## 2022-07-13 PROCEDURE — 250N000011 HC RX IP 250 OP 636: Performed by: INTERNAL MEDICINE

## 2022-07-13 PROCEDURE — 78452 HT MUSCLE IMAGE SPECT MULT: CPT

## 2022-07-13 PROCEDURE — 78452 HT MUSCLE IMAGE SPECT MULT: CPT | Mod: 26 | Performed by: INTERNAL MEDICINE

## 2022-07-13 PROCEDURE — 93017 CV STRESS TEST TRACING ONLY: CPT

## 2022-07-13 PROCEDURE — 93018 CV STRESS TEST I&R ONLY: CPT | Performed by: INTERNAL MEDICINE

## 2022-07-13 RX ORDER — AMINOPHYLLINE 25 MG/ML
50 INJECTION, SOLUTION INTRAVENOUS
Status: DISCONTINUED | OUTPATIENT
Start: 2022-07-13 | End: 2022-07-13 | Stop reason: HOSPADM

## 2022-07-13 RX ORDER — CAFFEINE 200 MG
200 TABLET ORAL
Status: DISCONTINUED | OUTPATIENT
Start: 2022-07-13 | End: 2022-07-13 | Stop reason: HOSPADM

## 2022-07-13 RX ORDER — ALBUTEROL SULFATE 0.83 MG/ML
2.5 SOLUTION RESPIRATORY (INHALATION)
Status: DISCONTINUED | OUTPATIENT
Start: 2022-07-13 | End: 2022-07-13 | Stop reason: HOSPADM

## 2022-07-13 RX ORDER — CAFFEINE CITRATE 20 MG/ML
60 SOLUTION INTRAVENOUS
Status: DISCONTINUED | OUTPATIENT
Start: 2022-07-13 | End: 2022-07-13 | Stop reason: HOSPADM

## 2022-07-13 RX ORDER — REGADENOSON 0.08 MG/ML
0.4 INJECTION, SOLUTION INTRAVENOUS ONCE
Status: COMPLETED | OUTPATIENT
Start: 2022-07-13 | End: 2022-07-13

## 2022-07-13 RX ADMIN — REGADENOSON 0.4 MG: 0.08 INJECTION, SOLUTION INTRAVENOUS at 10:18

## 2022-07-13 RX ADMIN — Medication 8.8 MCI.: at 09:28

## 2022-07-13 RX ADMIN — Medication 32.1 MILLICURIE: at 10:25

## 2022-07-13 NOTE — PROGRESS NOTES
ANTICOAGULATION  MANAGEMENT-Home Monitor Managed by Exception    Tammy LUCA Ani 66 year old female is on warfarin with therapeutic INR result. (Goal INR 2.0-3.0)    Recent labs: (last 7 days)     07/13/22  0000   INR 2.7         Previous INR was Therapeutic    Medication, diet, health changes since last INR:Levothyroxine increased from 7mcg to 88 mcg    Contacted within the last 12 weeks by phone on 6/30/222      ZAIN     Tammy was NOT contacted regarding therapeutic result today per home monitoring policy manage by exception agreement.   Current warfarin dose is to be continued:     Summary  As of 7/13/2022    Full warfarin instructions:  7.5 mg every Wed; 5 mg all other days   Next INR check:  7/27/2022           ?   Agnieszka Ruth RN  Anticoagulation Clinic  7/13/2022    _______________________________________________________________________     Anticoagulation Episode Summary     Current INR goal:  2.0-3.0   TTR:  90.3 % (4.2 mo)   Target end date:  Indefinite   Send INR reminders to:  MORENO SINGH    Indications    H/O mechanical aortic valve replacement [Z95.2]           Comments:  [employer] home monitor- managed by exception         Anticoagulation Care Providers     Provider Role Specialty Phone number    Arnold Anderson MD Referring Internal Medicine [phone]    Christina Hernandez DO Referring Family Medicine [phone]

## 2022-07-14 ENCOUNTER — LAB (OUTPATIENT)
Dept: LAB | Facility: CLINIC | Age: 67
End: 2022-07-14
Payer: COMMERCIAL

## 2022-07-14 DIAGNOSIS — Z20.822 ENCOUNTER FOR LABORATORY TESTING FOR COVID-19 VIRUS: ICD-10-CM

## 2022-07-14 PROCEDURE — U0003 INFECTIOUS AGENT DETECTION BY NUCLEIC ACID (DNA OR RNA); SEVERE ACUTE RESPIRATORY SYNDROME CORONAVIRUS 2 (SARS-COV-2) (CORONAVIRUS DISEASE [COVID-19]), AMPLIFIED PROBE TECHNIQUE, MAKING USE OF HIGH THROUGHPUT TECHNOLOGIES AS DESCRIBED BY CMS-2020-01-R: HCPCS

## 2022-07-14 PROCEDURE — U0005 INFEC AGEN DETEC AMPLI PROBE: HCPCS

## 2022-07-15 LAB — SARS-COV-2 RNA RESP QL NAA+PROBE: NEGATIVE

## 2022-07-19 ENCOUNTER — TRANSFERRED RECORDS (OUTPATIENT)
Dept: HEALTH INFORMATION MANAGEMENT | Facility: CLINIC | Age: 67
End: 2022-07-19

## 2022-07-27 ENCOUNTER — ANTICOAGULATION THERAPY VISIT (OUTPATIENT)
Dept: ANTICOAGULATION | Facility: CLINIC | Age: 67
End: 2022-07-27

## 2022-07-27 DIAGNOSIS — Z95.2 H/O MECHANICAL AORTIC VALVE REPLACEMENT: Primary | ICD-10-CM

## 2022-07-27 LAB — INR HOME MONITORING: 1.2 (ref 2–3.5)

## 2022-07-27 NOTE — PROGRESS NOTES
ANTICOAGULATION MANAGEMENT     Tammy Law 66 year old female is on warfarin with subtherapeutic INR result. (Goal INR 2.0-3.0)    Recent labs: (last 7 days)     07/27/22  0000   INR 1.2*       ASSESSMENT       Source(s): Chart Review and Patient/Caregiver Call       Warfarin doses taken: Per patient she started holding warfarin on 7/18 then she was off it while inpatient from 7/19-7/23 and restarted current dose on 7/24    Diet: No new diet changes identified    New illness, injury, or hospitalization: Yes: was on vacation in [place] and started having abdominal cramps then started vomiting blood and had blood in stool.Reported that she had EGD done and they found 2 tears that caused the bleeding - she also had blood transfusions- No recurrence of bleeding since she was back on 7/24.    Medication/supplement changes: Levothyroxine  dose increased on 7/8 from 75mcg to 88 mcg     Signs or symptoms of bleeding or clotting: Yes: see above    Previous INR: Therapeutic last 2(+) visits    Additional findings: None       PLAN     Recommended plan for temporary change(s) affecting INR     Dosing Instructions: booster dose then continue your current warfarin dose with next INR in 1 week       Summary  As of 7/27/2022    Full warfarin instructions:  7/27: 10 mg; Otherwise 7.5 mg every Wed; 5 mg all other days   Next INR check:  8/3/2022             Telephone call with Bisi who verbalizes understanding and agrees to plan and who agrees to plan and repeated back plan correctly    Patient to recheck with home meter    Education provided: Importance of therapeutic range and Contact [phone]  with any changes, questions or concerns.     Plan made per [facility] anticoagulation protocol    Carolynn Lucas RN  Anticoagulation Clinic  7/27/2022    _______________________________________________________________________     Anticoagulation Episode Summary     Current INR goal:  2.0-3.0   TTR:  85.9 % (4.7 mo)   Target end date:   Indefinite   Send INR reminders to:  MORENO SINGH    Indications    H/O mechanical aortic valve replacement [Z95.2]           Comments:  [employer] home monitor- managed by exception         Anticoagulation Care Providers     Provider Role Specialty Phone number    Arnold Anderson MD Referring Internal Medicine [phone]    Christina Hernandez DO Referring Family Medicine [phone]

## 2022-08-03 ENCOUNTER — ANTICOAGULATION THERAPY VISIT (OUTPATIENT)
Dept: ANTICOAGULATION | Facility: CLINIC | Age: 67
End: 2022-08-03

## 2022-08-03 DIAGNOSIS — Z95.2 H/O MECHANICAL AORTIC VALVE REPLACEMENT: Primary | ICD-10-CM

## 2022-08-03 LAB — INR HOME MONITORING: 2 (ref 2–3.5)

## 2022-08-03 NOTE — PROGRESS NOTES
ANTICOAGULATION MANAGEMENT     Tammy Law 66 year old female is on warfarin with therapeutic INR result. (Goal INR 2.0-3.0)    Recent labs: (last 7 days)     08/03/22  0000   INR 2.0       ASSESSMENT       Source(s): Chart Review and Patient/Caregiver Call       Warfarin doses taken: Warfarin taken as instructed, Per patient she started holding warfarin on 7/18 then she was off it while inpatient from 7/19-7/23 and restarted current dose on 7/24         Diet: No new diet changes identified    New illness, injury, or hospitalization:Yes: was on vacation in [place] and started having abdominal cramps then started vomiting blood and had blood in stool.Reported that she had EGD done and they found 2 tears that caused the bleeding - she also had blood transfusions- No recurrence of bleeding since she was back on 7/24.        Medication/supplement changes:Levothyroxine  dose increased on 7/8 from 75mcg to 88 mcg     Signs or symptoms of bleeding or clotting: No    Previous INR: Subtherapeutic    Additional findings: None       PLAN     Recommended plan for no diet, medication or health factor changes affecting INR     Dosing Instructions: Continue your current warfarin dose with next INR in 1 week       Summary  As of 8/3/2022    Full warfarin instructions:  7.5 mg every Wed; 5 mg all other days   Next INR check:  8/10/2022             Telephone call with Bisi who verbalizes understanding and agrees to plan    Patient to recheck with home meter    Education provided: Monitoring for bleeding signs and symptoms    Plan made per [facility] anticoagulation protocol    Jacqueline Juárez, RN  Anticoagulation Clinic  8/3/2022    _______________________________________________________________________     Anticoagulation Episode Summary     Current INR goal:  2.0-3.0   TTR:  81.9 % (4.9 mo)   Target end date:  Indefinite   Send INR reminders to:  MORENO SINGH    Indications    H/O mechanical aortic valve replacement  [Z95.2]           Comments:  [employer] home monitor- managed by exception         Anticoagulation Care Providers     Provider Role Specialty Phone number    Arnold Anderson MD Referring Internal Medicine [phone]    Christina Hernandez DO Referring Family Medicine [phone]

## 2022-08-05 NOTE — PROGRESS NOTES
ANTICOAGULATION  MANAGEMENT-Home Monitor Managed by Exception    Tammyrober Law 66 year old female is on warfarin with therapeutic INR result. (Goal INR 2.0-3.0)    Recent labs: (last 7 days)     06/15/22  0000   INR 2.4         Previous INR was Therapeutic    Medication, diet, health changes since last INR:chart reviewed; none identified    Contacted within the last 12 weeks by phone on 3/23/22          ZAIN Munoz was NOT contacted regarding therapeutic result today per home monitoring policy manage by exception agreement.   Current warfarin dose is to be continued:     Summary  As of 6/15/2022    Full warfarin instructions:  7.5 mg every Wed; 5 mg all other days   Next INR check:  6/29/2022           ?   Sarah Gaona RN  Anticoagulation Clinic  6/15/2022    _______________________________________________________________________     Anticoagulation Episode Summary     Current INR goal:  2.0-3.0   TTR:  87.5 % (3.3 mo)   Target end date:  Indefinite   Send INR reminders to:  MORENO SINGH    Indications    H/O mechanical aortic valve replacement [Z95.2]           Comments:  [employer] home monitor- managed by exception         Anticoagulation Care Providers     Provider Role Specialty Phone number    Arnold Anderson MD Referring Internal Medicine [phone]    Christina Hernandez DO Referring Family Medicine [phone]          
oral

## 2022-08-10 ENCOUNTER — TRANSFERRED RECORDS (OUTPATIENT)
Dept: INTERNAL MEDICINE | Facility: CLINIC | Age: 67
End: 2022-08-10

## 2022-08-10 ENCOUNTER — OFFICE VISIT (OUTPATIENT)
Dept: INTERNAL MEDICINE | Facility: CLINIC | Age: 67
End: 2022-08-10
Payer: COMMERCIAL

## 2022-08-10 ENCOUNTER — DOCUMENTATION ONLY (OUTPATIENT)
Dept: ANTICOAGULATION | Facility: CLINIC | Age: 67
End: 2022-08-10

## 2022-08-10 VITALS
HEIGHT: 64 IN | DIASTOLIC BLOOD PRESSURE: 60 MMHG | WEIGHT: 145 LBS | HEART RATE: 70 BPM | SYSTOLIC BLOOD PRESSURE: 110 MMHG | OXYGEN SATURATION: 99 % | BODY MASS INDEX: 24.75 KG/M2 | TEMPERATURE: 98.2 F

## 2022-08-10 DIAGNOSIS — D62 ANEMIA DUE TO BLOOD LOSS, ACUTE: ICD-10-CM

## 2022-08-10 DIAGNOSIS — Z95.2 H/O MECHANICAL AORTIC VALVE REPLACEMENT: ICD-10-CM

## 2022-08-10 DIAGNOSIS — Z09 HOSPITAL DISCHARGE FOLLOW-UP: Primary | ICD-10-CM

## 2022-08-10 DIAGNOSIS — K22.6 MALLORY-WEISS TEAR: ICD-10-CM

## 2022-08-10 DIAGNOSIS — Z95.2 H/O MECHANICAL AORTIC VALVE REPLACEMENT: Primary | ICD-10-CM

## 2022-08-10 DIAGNOSIS — K92.2 UPPER GI BLEED: ICD-10-CM

## 2022-08-10 LAB
ERYTHROCYTE [DISTWIDTH] IN BLOOD BY AUTOMATED COUNT: 15.9 % (ref 10–15)
FERRITIN SERPL-MCNC: 205 NG/ML (ref 11–328)
HCT VFR BLD AUTO: 38.2 % (ref 35–47)
HGB BLD-MCNC: 12.2 G/DL (ref 11.7–15.7)
INR HOME MONITORING: 2.1 (ref 2–3.5)
IRON BINDING CAPACITY (ROCHE): 356 UG/DL (ref 240–430)
IRON SATN MFR SERPL: 14 % (ref 15–46)
IRON SERPL-MCNC: 51 UG/DL (ref 37–145)
MCH RBC QN AUTO: 30.6 PG (ref 26.5–33)
MCHC RBC AUTO-ENTMCNC: 31.9 G/DL (ref 31.5–36.5)
MCV RBC AUTO: 96 FL (ref 78–100)
PLATELET # BLD AUTO: 235 10E3/UL (ref 150–450)
RBC # BLD AUTO: 3.99 10E6/UL (ref 3.8–5.2)
WBC # BLD AUTO: 5.4 10E3/UL (ref 4–11)

## 2022-08-10 PROCEDURE — 85027 COMPLETE CBC AUTOMATED: CPT | Performed by: INTERNAL MEDICINE

## 2022-08-10 PROCEDURE — 83550 IRON BINDING TEST: CPT | Performed by: INTERNAL MEDICINE

## 2022-08-10 PROCEDURE — 83540 ASSAY OF IRON: CPT | Performed by: INTERNAL MEDICINE

## 2022-08-10 PROCEDURE — 36415 COLL VENOUS BLD VENIPUNCTURE: CPT | Performed by: INTERNAL MEDICINE

## 2022-08-10 PROCEDURE — 82728 ASSAY OF FERRITIN: CPT | Performed by: INTERNAL MEDICINE

## 2022-08-10 PROCEDURE — 99215 OFFICE O/P EST HI 40 MIN: CPT | Performed by: INTERNAL MEDICINE

## 2022-08-10 NOTE — PROGRESS NOTES
Assessment & Plan     1. Hospital discharge follow-up  She seems to be doing okay at this time and stable.  She will follow-up with Dr. Springer in a couple months.    2. Upper GI bleed  I did discuss with Bisi that she had a life-threatening bleed.  She has chronic anticoagulation.  There was no really predisposing factor for her to have this tear.  The question is how to we prevent this from happening again.  We will have her meet with gastroenterology.  We may have to have him take another look down there to see if there is anything there that could predispose her to another bleed.  I am not going to put her back on PPI for now.  - CBC with platelets; Future  - Adult GI  Referral - Consult Only; Future  - CBC with platelets    3. Bailee-Clark tear  As above, unclear why she would have had a Bailee-Clark tear.  No wretching at [facility].  - Adult GI  Referral - Consult Only; Future    4. Anemia due to blood loss, acute  We will check her blood counts out again today to make sure that she is nearing normal.  I will also check her iron stores.  We did discuss a diet high in iron.  - CBC with platelets; Future  - Ferritin; Future  - Iron and iron binding capacity; Future  - CBC with platelets  - Ferritin  - Iron and iron binding capacity    5. H/O mechanical aortic valve replacement  It is unclear what type of valve she has.  I have asked our anticoagulation clinic to clarify her current therapeutic range for her warfarin.    Review of prior external note(s) from - Outside records from Hospital in [place]  Review of external notes as documented elsewhere in note  Ordering of each unique test  50+ minutes spent on the date of the encounter doing chart review, history and exam, documentation and further activities per the note           Return in about 2 months (around 10/10/2022) for Follow up, in person, with PCP only.    SONI RUGGIERO MD  [facility]    Subjective    Bisi is a 66 year old, presenting for the following health issues:  Hospital F/U (7/17-7/23 [facility] (GI Bleed); discuss possible labs recheck or possible )      History of Present Illness       Reason for visit:  Follow up from being in the hospital    She eats 0-1 servings of fruits and vegetables daily.She consumes 1 sweetened beverage(s) daily.She exercises with enough effort to increase her heart rate 9 or less minutes per day.  She exercises with enough effort to increase her heart rate 3 or less days per week.   She is taking medications regularly.       This is a nice patient who has worked for many years at [employer] in [email or web address].  She is followed by Dr. Springer currently.  She and her sister planned a trip to [place] and she arrived there and got on her [place] cruise and after 1 day she started developing abdominal pain and went to the bathroom and immediately had large amount of hematemesis.  She was taken off of the boat and sent to a hospital in [facility] it sounds like.  There she was stabilized hemodynamically.  She received packed red blood cells.  Hemoglobin was 6.6 on arrival.  Her INR was treated as it was supratherapeutic on arrival at 4.72.  She is on chronic anticoagulation for an aortic valve replacement on nearly 30 years ago.  She had endoscopy which showed a Bailee-Clark tear.  This was treated with clips.  Interestingly, she did not have any retching or coughing or any thing prior to her bleed that would suggest that she would develop a Bailee-Clark tear.  She remained in the hospital for several days both in the ICU and in stepdown care.  She was sent home on a few weeks of PPI.  She is here today for follow-up.  She tells me her fatigue is improving.  No further bleeding.  No black stools.  No abdominal discomfort.  She remains on her warfarin and her INR goal is 2-3 apparently but this is new she tells me over the last few months.  Prior to that had  "been 2.5-3.5.    Post Medication Reconciliation Status:      Review of Systems         Objective    /60 (BP Location: Left arm, Patient Position: Sitting, Cuff Size: Adult Regular)   Pulse 70   Temp 98.2  F (36.8  C)   Ht 1.626 m (5' 4\")   Wt 65.8 kg (145 lb)   SpO2 99%   BMI 24.89 kg/m    Body mass index is 24.89 kg/m .  Physical Exam   Delightful woman.  She does not appear pale.  Vitals noted.                    .  ..  "

## 2022-08-10 NOTE — LETTER
August 12, 2022      Bisi SEGOVIA Sedaryl  [address] [facility] [address]        Dear ,    We are writing to inform you of your test results.  Blood counts look good now and your iron stores/levels are fine.      Resulted Orders   CBC with platelets   Result Value Ref Range    WBC Count 5.4 4.0 - 11.0 10e3/uL    RBC Count 3.99 3.80 - 5.20 10e6/uL    Hemoglobin 12.2 11.7 - 15.7 g/dL    Hematocrit 38.2 35.0 - 47.0 %    MCV 96 78 - 100 fL    MCH 30.6 26.5 - 33.0 pg    MCHC 31.9 31.5 - 36.5 g/dL    RDW 15.9 (H) 10.0 - 15.0 %    Platelet Count 235 150 - 450 10e3/uL   Ferritin   Result Value Ref Range    Ferritin 205 11 - 328 ng/mL   Iron and iron binding capacity   Result Value Ref Range    Iron 51 37 - 145 ug/dL    Iron Sat Index 14 (L) 15 - 46 %    Iron Binding Capacity 356 240 - 430 ug/dL       If you have any questions or concerns, please call the clinic at the number listed above.       Sincerely,      Gatito Tavarez MD

## 2022-08-10 NOTE — PROGRESS NOTES
ANTICOAGULATION  MANAGEMENT-Home Monitor Managed by Exception    Tammyrober Law 66 year old female is on warfarin with therapeutic INR result. (Goal INR 2.0-3.0)    Recent labs: (last 7 days)     08/10/22  0000   INR 2.1         Previous INR was Therapeutic    Medication, diet, health changes since last INR:chart reviewed; none identified    Contacted within the last 12 weeks by phone on 8/3/22      ZAIN Munoz was NOT contacted regarding therapeutic result today per home monitoring policy manage by exception agreement.   Current warfarin dose is to be continued:     Summary  As of 8/10/2022    Full warfarin instructions:  7.5 mg every Wed; 5 mg all other days   Next INR check:  8/24/2022           ?   Sarah Gaona RN  Anticoagulation Clinic  8/10/2022    _______________________________________________________________________     Anticoagulation Episode Summary     Current INR goal:  2.0-3.0   TTR:  82.7 % (5.1 mo)   Target end date:  Indefinite   Send INR reminders to:  MORENO SINGH    Indications    H/O mechanical aortic valve replacement [Z95.2]           Comments:  [employer] home monitor- managed by exception         Anticoagulation Care Providers     Provider Role Specialty Phone number    Arnold Anderson MD Referring Internal Medicine [phone]    Christina Hernandez DO Referring Family Medicine [phone]

## 2022-08-17 ENCOUNTER — TELEPHONE (OUTPATIENT)
Dept: ANTICOAGULATION | Facility: CLINIC | Age: 67
End: 2022-08-17

## 2022-08-17 DIAGNOSIS — Z95.2 H/O MECHANICAL AORTIC VALVE REPLACEMENT: Primary | ICD-10-CM

## 2022-08-17 NOTE — TELEPHONE ENCOUNTER
Marla with [facility] left a VM stating that patient needs to have a high priority upper endoscopy. It was originally scheduled for 08/19 but pt needs to hold warfarin 4 days prior. [facility] would like to schedule for early next week. They are requesting hold/bridge orders. Routing to [facility] for bridge assessment. Please call in orders to [facility] when approval from provider is obtained.

## 2022-08-17 NOTE — TELEPHONE ENCOUNTER
"JOSSELIN-PROCEDURAL ANTICOAGULATION  MANAGEMENT    ASSESSMENT     Warfarin interruption plan for EGD on TBD.    Indication for Anticoagulation: Mechanical AVR      Josselin-Procedure Risk stratification for thromboembolism: low (2012 Chest guidelines)    AVR: 2020 AHA/ACC Management of Valvular Heart disease guidelines recommend no bridge in bileaflet mechanical AVR patients with NO other risk factors for thrombosis (Afib, previous thromboembolism, hypercoagulable condition, LV systolic dysfunction, older generation valves, or > 1 valve)     RECOMMENDATION       Pre-Procedure:  o Hold warfarin for 4 days, until after procedure   o No Bridge      Post-Procedure:  o Resume warfarin dose if okay with provider doing procedure on night of procedure: okay for ACN to instruct on booster dosing  o Recheck INR ~ 7 days after resuming warfarin       Plan routed to referring provider for approval  ?   Amelia Echevarria [facility]    SUBJECTIVE/OBJECTIVE     Tammy LUCA Law, a 66 year old female    Goal INR Range: 2.0-3.0     Patient bridged in past: No      Wt Readings from Last 3 Encounters:   08/10/22 65.8 kg (145 lb)   07/08/22 65.1 kg (143 lb 8 oz)   06/24/22 65.9 kg (145 lb 3.2 oz)      Ideal body weight: 54.7 kg (120 lb 9.5 oz)  Adjusted ideal body weight: 59.1 kg (130 lb 5.7 oz)     Estimated body mass index is 24.89 kg/m  as calculated from the following:    Height as of 8/10/22: 1.626 m (5' 4\").    Weight as of 8/10/22: 65.8 kg (145 lb).    Lab Results   Component Value Date    INR 2.1 08/10/2022    INR 2.0 08/03/2022    INR 1.2 (L) 07/27/2022     Lab Results   Component Value Date    HGB 12.2 08/10/2022    HCT 38.2 08/10/2022     08/10/2022     Lab Results   Component Value Date    CR 0.79 01/12/2022    CR 0.78 01/11/2022    CR 0.83 01/25/2021         "

## 2022-08-17 NOTE — TELEPHONE ENCOUNTER
LVM for [facility] giving orders for 4 day hold, no bridge. Called pt who states the procedure is going to be 08/24. Reviewed instructions as outlined below. Pt will take booster dose of warfarin 08/24, recheck INR 08/31/2022 with home meter.

## 2022-08-24 ENCOUNTER — TRANSFERRED RECORDS (OUTPATIENT)
Dept: HEALTH INFORMATION MANAGEMENT | Facility: CLINIC | Age: 67
End: 2022-08-24

## 2022-09-02 ENCOUNTER — ANTICOAGULATION THERAPY VISIT (OUTPATIENT)
Dept: ANTICOAGULATION | Facility: CLINIC | Age: 67
End: 2022-09-02

## 2022-09-02 DIAGNOSIS — Z95.2 H/O MECHANICAL AORTIC VALVE REPLACEMENT: Primary | ICD-10-CM

## 2022-09-02 LAB — INR HOME MONITORING: 1.8 (ref 2–3.5)

## 2022-09-02 NOTE — PROGRESS NOTES
ANTICOAGULATION MANAGEMENT     Tammy Law 66 year old female is on warfarin with subtherapeutic INR result. (Goal INR 2.0-3.0)    Recent labs: (last 7 days)     09/01/22  0000   INR 1.8*       ASSESSMENT       Source(s): Chart Review and Patient/Caregiver Call       Warfarin doses taken: Held for colonoscopy  recently which may be affecting INR    Diet: No new diet changes identified    New illness, injury, or hospitalization: No    Medication/supplement changes: None noted    Signs or symptoms of bleeding or clotting: No    Previous INR: Therapeutic last 2(+) visits    Additional findings: None       PLAN     Recommended plan for temporary change(s) affecting INR     Dosing Instructions: Continue your current warfarin dose with next INR in 1 week       Summary  As of 9/2/2022    Full warfarin instructions:  7.5 mg every Wed; 5 mg all other days   Next INR check:  9/7/2022             Telephone call with Bisi who verbalizes understanding and agrees to plan    Patient to recheck with home meter    Education provided: Goal range and significance of current result, Importance of therapeutic range, Importance of following up at instructed interval and Importance of taking warfarin as instructed    Plan made per [facility] anticoagulation protocol    Natalie Khalil, RN  Anticoagulation Clinic  9/2/2022    _______________________________________________________________________     Anticoagulation Episode Summary     Current INR goal:  2.0-3.0   TTR:  76.5 % (5.9 mo)   Target end date:  Indefinite   Send INR reminders to:  MORENO SINGH    Indications    H/O mechanical aortic valve replacement [Z95.2]           Comments:  [employer] home monitor- managed by exception         Anticoagulation Care Providers     Provider Role Specialty Phone number    Arnold Anderson MD Referring Internal Medicine [phone]    Christina Hernandez DO Referring Family Medicine [phone]

## 2022-09-07 ENCOUNTER — ANTICOAGULATION THERAPY VISIT (OUTPATIENT)
Dept: ANTICOAGULATION | Facility: CLINIC | Age: 67
End: 2022-09-07

## 2022-09-07 DIAGNOSIS — Z95.2 H/O MECHANICAL AORTIC VALVE REPLACEMENT: Primary | ICD-10-CM

## 2022-09-07 LAB — INR HOME MONITORING: 2.5 (ref 2–3.5)

## 2022-09-07 NOTE — PROGRESS NOTES
ANTICOAGULATION MANAGEMENT     Tammy Law 66 year old female is on warfarin with therapeutic INR result. (Goal INR 2.0-3.0)    Recent labs: (last 7 days)     09/07/22  0000   INR 2.5       ASSESSMENT       Source(s): Chart Review and Patient/Caregiver Call       Warfarin doses taken: Warfarin taken as instructed    Diet: No new diet changes identified    New illness, injury, or hospitalization: No    Medication/supplement changes: None noted    Signs or symptoms of bleeding or clotting: No    Previous INR: Subtherapeutic    Additional findings: None       PLAN     Recommended plan for no diet, medication or health factor changes affecting INR     Dosing Instructions: Continue your current warfarin dose with next INR in 2 weeks       Summary  As of 9/7/2022    Full warfarin instructions:  7.5 mg every Wed; 5 mg all other days   Next INR check:  9/21/2022             Telephone call with Bisi who verbalizes understanding and agrees to plan    Patient to recheck with home meter    Education provided: Please call back if any changes to your diet, medications or how you've been taking warfarin    Plan made per [facility] anticoagulation protocol    Sarah Gaona RN  Anticoagulation Clinic  9/7/2022    _______________________________________________________________________     Anticoagulation Episode Summary     Current INR goal:  2.0-3.0   TTR:  76.4 % (6.1 mo)   Target end date:  Indefinite   Send INR reminders to:  MORENO SINGH    Indications    H/O mechanical aortic valve replacement [Z95.2]           Comments:  [employer] home monitor- managed by exception         Anticoagulation Care Providers     Provider Role Specialty Phone number    Arnold Anderson MD Referring Internal Medicine [phone]    Christina Hernandez DO Referring Family Medicine [phone]

## 2022-09-22 ENCOUNTER — ANCILLARY PROCEDURE (OUTPATIENT)
Dept: CARDIOLOGY | Facility: CLINIC | Age: 67
End: 2022-09-22
Attending: INTERNAL MEDICINE
Payer: COMMERCIAL

## 2022-09-22 ENCOUNTER — DOCUMENTATION ONLY (OUTPATIENT)
Dept: ANTICOAGULATION | Facility: CLINIC | Age: 67
End: 2022-09-22

## 2022-09-22 DIAGNOSIS — I49.5 SICK SINUS SYNDROME (H): ICD-10-CM

## 2022-09-22 DIAGNOSIS — Z95.2 H/O MECHANICAL AORTIC VALVE REPLACEMENT: Primary | ICD-10-CM

## 2022-09-22 DIAGNOSIS — Z95.0 CARDIAC PACEMAKER IN SITU: ICD-10-CM

## 2022-09-22 LAB — INR HOME MONITORING: 2.8 (ref 2–3.5)

## 2022-10-05 ENCOUNTER — ANTICOAGULATION THERAPY VISIT (OUTPATIENT)
Dept: ANTICOAGULATION | Facility: CLINIC | Age: 67
End: 2022-10-05

## 2022-10-05 DIAGNOSIS — Z95.2 H/O MECHANICAL AORTIC VALVE REPLACEMENT: Primary | ICD-10-CM

## 2022-10-05 LAB
INR HOME MONITORING: 1.9 (ref 2–3.5)
MDC_IDC_LEAD_IMPLANT_DT: NORMAL
MDC_IDC_LEAD_IMPLANT_DT: NORMAL
MDC_IDC_LEAD_LOCATION: NORMAL
MDC_IDC_LEAD_LOCATION: NORMAL
MDC_IDC_LEAD_LOCATION_DETAIL_1: NORMAL
MDC_IDC_LEAD_LOCATION_DETAIL_1: NORMAL
MDC_IDC_LEAD_MFG: NORMAL
MDC_IDC_LEAD_MFG: NORMAL
MDC_IDC_LEAD_MODEL: NORMAL
MDC_IDC_LEAD_MODEL: NORMAL
MDC_IDC_LEAD_POLARITY_TYPE: NORMAL
MDC_IDC_LEAD_POLARITY_TYPE: NORMAL
MDC_IDC_LEAD_SERIAL: NORMAL
MDC_IDC_LEAD_SERIAL: NORMAL
MDC_IDC_LEAD_SPECIAL_FUNCTION: NORMAL
MDC_IDC_LEAD_SPECIAL_FUNCTION: NORMAL
MDC_IDC_MSMT_BATTERY_REMAINING_PERCENTAGE: 60 %
MDC_IDC_MSMT_BATTERY_STATUS: NORMAL
MDC_IDC_MSMT_LEADCHNL_RA_IMPEDANCE_VALUE: 593 OHM
MDC_IDC_MSMT_LEADCHNL_RA_LEAD_CHANNEL_STATUS: NORMAL
MDC_IDC_MSMT_LEADCHNL_RV_IMPEDANCE_VALUE: 618 OHM
MDC_IDC_MSMT_LEADCHNL_RV_LEAD_CHANNEL_STATUS: NORMAL
MDC_IDC_PG_IMPLANT_DTM: NORMAL
MDC_IDC_PG_MFG: NORMAL
MDC_IDC_PG_MODEL: NORMAL
MDC_IDC_PG_SERIAL: NORMAL
MDC_IDC_PG_TYPE: NORMAL
MDC_IDC_SESS_CLINIC_NAME: NORMAL
MDC_IDC_SESS_DTM: NORMAL
MDC_IDC_SESS_REPROGRAMMED: NO
MDC_IDC_SESS_TYPE: NORMAL
MDC_IDC_SET_BRADY_AT_MODE_SWITCH_MODE: NORMAL
MDC_IDC_SET_BRADY_AT_MODE_SWITCH_RATE: 160 {BEATS}/MIN
MDC_IDC_SET_BRADY_LOWRATE: 60 {BEATS}/MIN
MDC_IDC_SET_BRADY_MAX_SENSOR_RATE: 120 {BEATS}/MIN
MDC_IDC_SET_BRADY_MAX_TRACKING_RATE: 130 {BEATS}/MIN
MDC_IDC_SET_BRADY_MODE: NORMAL
MDC_IDC_SET_BRADY_PAV_DELAY_LOW: 200 MS
MDC_IDC_SET_BRADY_SAV_DELAY_LOW: 180 MS
MDC_IDC_SET_LEADCHNL_RA_PACING_AMPLITUDE: 1.6 V
MDC_IDC_SET_LEADCHNL_RA_PACING_POLARITY: NORMAL
MDC_IDC_SET_LEADCHNL_RA_PACING_PULSEWIDTH: 0.4 MS
MDC_IDC_SET_LEADCHNL_RA_SENSING_ADAPTATION_MODE: NORMAL
MDC_IDC_SET_LEADCHNL_RA_SENSING_POLARITY: NORMAL
MDC_IDC_SET_LEADCHNL_RV_PACING_AMPLITUDE: 2.4 V
MDC_IDC_SET_LEADCHNL_RV_PACING_POLARITY: NORMAL
MDC_IDC_SET_LEADCHNL_RV_PACING_PULSEWIDTH: 0.4 MS
MDC_IDC_SET_LEADCHNL_RV_SENSING_ADAPTATION_MODE: NORMAL
MDC_IDC_SET_LEADCHNL_RV_SENSING_POLARITY: NORMAL
MDC_IDC_STAT_AT_BURDEN_PERCENT: 0 %
MDC_IDC_STAT_AT_DTM_END: NORMAL
MDC_IDC_STAT_AT_DTM_START: NORMAL
MDC_IDC_STAT_AT_MODE_SW_COUNT_PER_DAY: 0
MDC_IDC_STAT_AT_MODE_SW_PERCENT_TIME_PER_DAY: 0 %
MDC_IDC_STAT_BRADY_AP_VP_PERCENT: 0 %
MDC_IDC_STAT_BRADY_AP_VS_PERCENT: 95 %
MDC_IDC_STAT_BRADY_AS_VP_PERCENT: 0 %
MDC_IDC_STAT_BRADY_AS_VS_PERCENT: 5 %
MDC_IDC_STAT_BRADY_DTM_END: NORMAL
MDC_IDC_STAT_BRADY_DTM_START: NORMAL
MDC_IDC_STAT_BRADY_RA_PERCENT_PACED: 93 %
MDC_IDC_STAT_BRADY_RV_PERCENT_PACED: 1 %
MDC_IDC_STAT_CRT_DTM_END: NORMAL
MDC_IDC_STAT_CRT_DTM_START: NORMAL

## 2022-10-05 PROCEDURE — 93296 REM INTERROG EVL PM/IDS: CPT | Performed by: INTERNAL MEDICINE

## 2022-10-05 PROCEDURE — 93294 REM INTERROG EVL PM/LDLS PM: CPT | Performed by: INTERNAL MEDICINE

## 2022-10-05 NOTE — PROGRESS NOTES
ANTICOAGULATION MANAGEMENT     Tammy Law 67 year old female is on warfarin with subtherapeutic INR result. (Goal INR 2.0-3.0)    Recent labs: (last 7 days)     10/05/22  0000   INR 1.9*       ASSESSMENT       Source(s): Chart Review and Patient/Caregiver Call       Warfarin doses taken: Warfarin taken as instructed    Diet: No new diet changes identified    New illness, injury, or hospitalization: No    Medication/supplement changes: None noted    Signs or symptoms of bleeding or clotting: No    Previous INR: Therapeutic last 2(+) visits     Additional findings: No overall changes in past 2 weeks including new supplements or vitamins.      PLAN     Recommended plan for no diet, medication or health factor changes affecting INR     Dosing Instructions: Continue your current warfarin dose with next INR in 2 weeks       Summary  As of 10/5/2022    Full warfarin instructions:  7.5 mg every Wed; 5 mg all other days   Next INR check:  10/19/2022             Telephone call with Bisi who agrees to plan and repeated back plan correctly    Patient to recheck with home meter    Education provided: Please call back if any changes to your diet, medications or how you've been taking warfarin, Monitoring for bleeding signs and symptoms and Monitoring for clotting signs and symptoms    Plan made per [facility] anticoagulation protocol    Petrona Phan, RN  Anticoagulation Clinic  10/5/2022    _______________________________________________________________________     Anticoagulation Episode Summary     Current INR goal:  2.0-3.0   TTR:  78.8 % (7 mo)   Target end date:  Indefinite   Send INR reminders to:  [facility]ITOR[facility]    Indications    H/O mechanical aortic valve replacement [Z95.2]           Comments:  [employer] home monitor- managed by exception         Anticoagulation Care Providers     Provider Role Specialty Phone number    Arnold Anderson MD Referring Internal Medicine [phone]    Christina Hernandez  DO RACHAEL Referring [facility] [phone]

## 2022-10-05 NOTE — PROGRESS NOTES
ANTICOAGULATION MANAGEMENT     Tammy Law 67 year old female is on warfarin with subtherapeutic INR result. (Goal INR 2.0-3.0)    Recent labs: (last 7 days)     10/05/22  0000   INR 1.9*       ASSESSMENT       Source(s): Chart Review    Previous INR was Therapeutic last 2(+) visits    Medication, diet, health changes since last INR chart reviewed; none identified           PLAN     Unable to reach Bisi today.    Please call back     Follow up required to confirm warfarin dose taken and assess for changes    Petrona Phan, RN  Anticoagulation Clinic  10/5/2022

## 2022-10-19 ENCOUNTER — ANTICOAGULATION THERAPY VISIT (OUTPATIENT)
Dept: ANTICOAGULATION | Facility: CLINIC | Age: 67
End: 2022-10-19

## 2022-10-19 DIAGNOSIS — Z95.2 H/O MECHANICAL AORTIC VALVE REPLACEMENT: Primary | ICD-10-CM

## 2022-10-19 LAB — INR HOME MONITORING: 2 (ref 2–3.5)

## 2022-10-19 NOTE — PROGRESS NOTES
ANTICOAGULATION MANAGEMENT     Tammy Law 67 year old female is on warfarin with therapeutic INR result. (Goal INR 2.0-3.0)    Recent labs: (last 7 days)     10/19/22  0000   INR 2.0       ASSESSMENT       Source(s): Chart Review    Previous INR was Subtherapeutic    Medication, diet, health changes since last INR chart reviewed; none identified           PLAN     Recommended plan for no diet, medication or health factor changes affecting INR     Dosing Instructions: Continue your current warfarin dose with next INR in 2 weeks       Summary  As of 10/19/2022    Full warfarin instructions:  7.5 mg every Wed; 5 mg all other days   Next INR check:  11/2/2022             Detailed voice message left for Bisi with dosing instructions and follow up date. Dosing also sent to patient via [employer]    Patient to recheck with home meter    Education provided: None required    Plan made per [facility] anticoagulation protocol    Jacqueline Juárez RN  Anticoagulation Clinic  10/19/2022    _______________________________________________________________________     Anticoagulation Episode Summary     Current INR goal:  2.0-3.0   TTR:  73.9 % (7.5 mo)   Target end date:  Indefinite   Send INR reminders to:  [facility]    Indications    H/O mechanical aortic valve replacement [Z95.2]           Comments:  [employer] home monitor- managed by exception         Anticoagulation Care Providers     Provider Role Specialty Phone number    Arnold Anderson MD Referring Internal Medicine [phone]    Christina Hernandez DO Referring Family Medicine [phone]

## 2022-10-26 DIAGNOSIS — Z13.6 CARDIOVASCULAR SCREENING; LDL GOAL LESS THAN 130: ICD-10-CM

## 2022-10-26 DIAGNOSIS — Z95.0 CARDIAC PACEMAKER IN SITU: ICD-10-CM

## 2022-10-27 NOTE — TELEPHONE ENCOUNTER
"Routing refill request to provider for review/approval because:  Labs not current:  Last LDL 10/02/20    Last Written Prescription Date:  5/10/22  Last Fill Quantity: 90,  # refills: 1   Last office visit provider:  8/10/22     Requested Prescriptions   Pending Prescriptions Disp Refills     simvastatin (ZOCOR) 20 MG tablet [Pharmacy Med Name: SIMVASTATIN 20MG TABS] 90 tablet 1     Sig: TAKE ONE TABLET BY MOUTH EVERY EVENING       Statins Protocol Failed - 10/27/2022  2:11 PM        Failed - LDL on file in past 12 months     Recent Labs   Lab Test 10/02/20  0850   LDL 82             Passed - No abnormal creatine kinase in past 12 months     Recent Labs   Lab Test 12/21/18  1610   CKT 94                Passed - Recent (12 mo) or future (30 days) visit within the authorizing provider's specialty     Patient has had an office visit with the authorizing provider or a provider within the authorizing providers department within the previous 12 mos or has a future within next 30 days. See \"Patient Info\" tab in inbasket, or \"Choose Columns\" in Meds & Orders section of the refill encounter.              Passed - Medication is active on med list        Passed - Patient is age 18 or older        Passed - No active pregnancy on record        Passed - No positive pregnancy test in past 12 months             NOA MORRIS RN 10/27/22 2:12 PM  "

## 2022-10-28 RX ORDER — SIMVASTATIN 20 MG
TABLET ORAL
Qty: 90 TABLET | Refills: 1 | Status: SHIPPED | OUTPATIENT
Start: 2022-10-28 | End: 2023-03-26 | Stop reason: ALTCHOICE

## 2022-10-29 ENCOUNTER — HOSPITAL ENCOUNTER (EMERGENCY)
Facility: CLINIC | Age: 67
Discharge: HOME | End: 2022-10-29
Attending: EMERGENCY MEDICINE | Admitting: EMERGENCY MEDICINE
Payer: COMMERCIAL

## 2022-10-29 ENCOUNTER — HEALTH MAINTENANCE LETTER (OUTPATIENT)
Age: 67
End: 2022-10-29

## 2022-10-29 ENCOUNTER — APPOINTMENT (OUTPATIENT)
Dept: GENERAL RADIOLOGY | Facility: CLINIC | Age: 67
End: 2022-10-29
Attending: EMERGENCY MEDICINE
Payer: COMMERCIAL

## 2022-10-29 ENCOUNTER — APPOINTMENT (OUTPATIENT)
Dept: CT IMAGING | Facility: CLINIC | Age: 67
End: 2022-10-29
Attending: EMERGENCY MEDICINE
Payer: COMMERCIAL

## 2022-10-29 VITALS
RESPIRATION RATE: 15 BRPM | HEIGHT: 64 IN | WEIGHT: 145 LBS | HEART RATE: 68 BPM | SYSTOLIC BLOOD PRESSURE: 148 MMHG | DIASTOLIC BLOOD PRESSURE: 80 MMHG | BODY MASS INDEX: 24.75 KG/M2 | TEMPERATURE: 98.1 F | OXYGEN SATURATION: 98 %

## 2022-10-29 DIAGNOSIS — M25.512 ACUTE PAIN OF LEFT SHOULDER: ICD-10-CM

## 2022-10-29 DIAGNOSIS — M54.2 NECK PAIN: ICD-10-CM

## 2022-10-29 LAB
ANION GAP SERPL CALCULATED.3IONS-SCNC: 3 MMOL/L (ref 3–14)
ATRIAL RATE - MUSE: 64 BPM
BASOPHILS # BLD AUTO: 0 10E3/UL (ref 0–0.2)
BASOPHILS NFR BLD AUTO: 1 %
BUN SERPL-MCNC: 17 MG/DL (ref 7–30)
CALCIUM SERPL-MCNC: 9 MG/DL (ref 8.5–10.1)
CHLORIDE BLD-SCNC: 107 MMOL/L (ref 94–109)
CO2 SERPL-SCNC: 27 MMOL/L (ref 20–32)
CREAT SERPL-MCNC: 0.79 MG/DL (ref 0.52–1.04)
DIASTOLIC BLOOD PRESSURE - MUSE: NORMAL MMHG
EOSINOPHIL # BLD AUTO: 0.1 10E3/UL (ref 0–0.7)
EOSINOPHIL NFR BLD AUTO: 2 %
ERYTHROCYTE [DISTWIDTH] IN BLOOD BY AUTOMATED COUNT: 12.6 % (ref 10–15)
GFR SERPL CREATININE-BSD FRML MDRD: 82 ML/MIN/1.73M2
GLUCOSE BLD-MCNC: 92 MG/DL (ref 70–99)
HCT VFR BLD AUTO: 37.3 % (ref 35–47)
HGB BLD-MCNC: 12.5 G/DL (ref 11.7–15.7)
IMM GRANULOCYTES # BLD: 0 10E3/UL
IMM GRANULOCYTES NFR BLD: 0 %
INR PPP: 2.56 (ref 0.85–1.15)
INTERPRETATION ECG - MUSE: NORMAL
LYMPHOCYTES # BLD AUTO: 1.5 10E3/UL (ref 0.8–5.3)
LYMPHOCYTES NFR BLD AUTO: 33 %
MCH RBC QN AUTO: 28.5 PG (ref 26.5–33)
MCHC RBC AUTO-ENTMCNC: 33.5 G/DL (ref 31.5–36.5)
MCV RBC AUTO: 85 FL (ref 78–100)
MONOCYTES # BLD AUTO: 0.4 10E3/UL (ref 0–1.3)
MONOCYTES NFR BLD AUTO: 8 %
NEUTROPHILS # BLD AUTO: 2.5 10E3/UL (ref 1.6–8.3)
NEUTROPHILS NFR BLD AUTO: 56 %
NRBC # BLD AUTO: 0 10E3/UL
NRBC BLD AUTO-RTO: 0 /100
NT-PROBNP SERPL-MCNC: 278 PG/ML (ref 0–900)
P AXIS - MUSE: 73 DEGREES
PLATELET # BLD AUTO: 180 10E3/UL (ref 150–450)
POTASSIUM BLD-SCNC: 3.9 MMOL/L (ref 3.4–5.3)
PR INTERVAL - MUSE: 244 MS
QRS DURATION - MUSE: 138 MS
QT - MUSE: 464 MS
QTC - MUSE: 478 MS
R AXIS - MUSE: -3 DEGREES
RBC # BLD AUTO: 4.38 10E6/UL (ref 3.8–5.2)
SODIUM SERPL-SCNC: 137 MMOL/L (ref 133–144)
SYSTOLIC BLOOD PRESSURE - MUSE: NORMAL MMHG
T AXIS - MUSE: 16 DEGREES
TROPONIN I SERPL HS-MCNC: 24 NG/L
VENTRICULAR RATE- MUSE: 64 BPM
WBC # BLD AUTO: 4.5 10E3/UL (ref 4–11)

## 2022-10-29 PROCEDURE — 36415 COLL VENOUS BLD VENIPUNCTURE: CPT | Performed by: EMERGENCY MEDICINE

## 2022-10-29 PROCEDURE — 82310 ASSAY OF CALCIUM: CPT | Performed by: EMERGENCY MEDICINE

## 2022-10-29 PROCEDURE — 85025 COMPLETE CBC W/AUTO DIFF WBC: CPT | Performed by: EMERGENCY MEDICINE

## 2022-10-29 PROCEDURE — 70498 CT ANGIOGRAPHY NECK: CPT

## 2022-10-29 PROCEDURE — 84484 ASSAY OF TROPONIN QUANT: CPT | Performed by: EMERGENCY MEDICINE

## 2022-10-29 PROCEDURE — 70450 CT HEAD/BRAIN W/O DYE: CPT

## 2022-10-29 PROCEDURE — 99285 EMERGENCY DEPT VISIT HI MDM: CPT | Mod: 25

## 2022-10-29 PROCEDURE — 83880 ASSAY OF NATRIURETIC PEPTIDE: CPT | Performed by: EMERGENCY MEDICINE

## 2022-10-29 PROCEDURE — 70496 CT ANGIOGRAPHY HEAD: CPT

## 2022-10-29 PROCEDURE — 85610 PROTHROMBIN TIME: CPT | Performed by: EMERGENCY MEDICINE

## 2022-10-29 PROCEDURE — 71046 X-RAY EXAM CHEST 2 VIEWS: CPT

## 2022-10-29 PROCEDURE — 250N000011 HC RX IP 250 OP 636: Performed by: EMERGENCY MEDICINE

## 2022-10-29 PROCEDURE — 93005 ELECTROCARDIOGRAM TRACING: CPT

## 2022-10-29 PROCEDURE — 250N000009 HC RX 250: Performed by: EMERGENCY MEDICINE

## 2022-10-29 RX ORDER — IOPAMIDOL 755 MG/ML
75 INJECTION, SOLUTION INTRAVASCULAR ONCE
Status: COMPLETED | OUTPATIENT
Start: 2022-10-29 | End: 2022-10-29

## 2022-10-29 RX ORDER — CYCLOBENZAPRINE HCL 10 MG
10 TABLET ORAL 3 TIMES DAILY PRN
Qty: 12 TABLET | Refills: 0 | Status: SHIPPED | OUTPATIENT
Start: 2022-10-29 | End: 2022-11-04

## 2022-10-29 RX ADMIN — IOPAMIDOL 75 ML: 755 INJECTION, SOLUTION INTRAVENOUS at 08:37

## 2022-10-29 RX ADMIN — SODIUM CHLORIDE 100 ML: 900 INJECTION INTRAVENOUS at 08:38

## 2022-10-29 ASSESSMENT — ACTIVITIES OF DAILY LIVING (ADL)
ADLS_ACUITY_SCORE: 33
ADLS_ACUITY_SCORE: 35

## 2022-10-29 ASSESSMENT — ENCOUNTER SYMPTOMS
VOMITING: 0
ARTHRALGIAS: 1
NECK PAIN: 1
NAUSEA: 0
WEAKNESS: 0

## 2022-10-29 NOTE — ED PROVIDER NOTES
History   Chief Complaint:  Neck Pain     HPI   Tammy Law is a right handed 67 year old female, on coumadin, with history of aortic valve replacement who presents with left neck pain that radiates into her shoulder, left face, and back of her head for the past 3-4 weeks. She describes the pain as a dull pain. She also has periodic left chest pains. She had also felt intermittent left face pain and numbness for 3-4 days. She has noticed that her thighs are bloated.  She has been prescribed a diuretic in the past that she takes periodically and has not had it recently. She denies trauma or exercises. She denies urine issues, nausea, vomiting, abdominal pain, or rash. She feels that her arms are tired but denies weakness. She denies history of DVT, DM, or high cholesterol.  No history of DVT or pulmonary embolism.  She is worried about a clogged artery in her neck. She had a stress test done a year ago and sees her cardiologist regularly.     Review of Systems   Cardiovascular: Positive for chest pain.   Gastrointestinal: Negative for nausea and vomiting.   Genitourinary: Negative.    Musculoskeletal: Positive for arthralgias and neck pain.   Skin: Negative for rash.   Neurological: Negative for weakness.   All other systems reviewed and are negative.    Allergies:  Demerol [Meperidine]  Misc. Sulfonamide Containing Compounds  Morphine  Sulfa Drugs    Medications:  Celexa  Lasix  Synthroid  Zocor  Coumadin    Past Medical History:     Aortic valve replacement  Hypothyroidism  Pacemaker  Congenital nystagmus    Past Surgical History:    Aortic valve replacement  Appendectomy  Biopsy breast  Breast cyst excision  Cardiac catherization  Hysterectomy  Implant pacemaker  Oophorectomy  Release carpal tunnel  Toe surgery  Tympanostomy tube placement    Family History:    Pacemaker  DM  CAD  Cancer  Depression  Thyroid disease    Social History:  The patient presents to the ED with a friend  PCP: Mary  " R     Physical Exam     Patient Vitals for the past 24 hrs:   BP Temp Temp src Pulse Resp SpO2 Height Weight   10/29/22 0700 (!) 148/80 -- -- 68 15 98 % -- --   10/29/22 0641 135/67 98.1  F (36.7  C) Temporal 67 16 97 % 1.626 m (5' 4\") 65.8 kg (145 lb)       Physical Exam  Physical Exam   General:  Sitting on bed with a friend at bedside.   HENT:  No obvious trauma to head  Right Ear:  External ear normal.   Left Ear:  External ear normal.   Nose:  Nose normal.   Eyes:  Conjunctivae and EOM are normal. Pupils are equal, round, and reactive.   Neck: Normal range of motion. Neck supple. No tracheal deviation present. No carotid bruit appreciated bilateral.  CV:  Normal heart sounds. No murmur heard.  Pulm/Chest: Effort normal and breath sounds normal.   Abd: Soft. No distension. There is no tenderness. There is no rigidity, no rebound and no guarding.   M/S: Normal range of motion. Mild tenderness to palpation of the trapezius and paraspinal musculature in the left cervical region.  Neuro: Alert. GCS 15.CN II-XII Grossly intact, no pronator drift, normal finger-nose-finger, visual fields intact by confrontation. Muscle strength is +5 proximal and distal in the bilateral upper and lower extremities. No dysarthria. Normal palm up, palm down.  The distribution of the AIN, PIN, radial, ulnar, and musculocutaneous nerve are intact to muscle and sensation bilaterally. Radial pulse is +2 and dp pulse are +2 bilateral  Skin: Skin is warm and dry. No rash noted. Not diaphoretic.   Psych: Normal mood and affect. Behavior is normal.     Emergency Department Course   ECG  ECG taken at 0815, ECG read at 0815  Atrial paced rhythm with prolonged AV conduction  Right bundle branch block  Cannot rule out inferior infarct, age undetermined  Rate 64 bpm. NJ interval 244 ms. QRS duration 138 ms. QT/QTc 464/478 ms. P-R-T axes 73 -3 16.     Imaging:  CTA Head Neck with Contrast   Final Result   IMPRESSION:    HEAD CT:   1.  No " acute intracranial process.   2.  Stable mild chronic small vessel ischemic disease since 01/06/2020.      HEAD CTA:    1.  Normal CTA [place] of [place].      NECK CTA:   1.  No significant change since 01/06/2020. Patent major cervical arteries. No large vessel occlusion or evidence of dissection.   2.  Mild calcified atherosclerotic plaque at the carotid bifurcations. No significant carotid stenosis.   3.  Widely patent vertebral arteries.         Head CT w/o contrast   Final Result   IMPRESSION:    HEAD CT:   1.  No acute intracranial process.   2.  Stable mild chronic small vessel ischemic disease since 01/06/2020.      HEAD CTA:    1.  Normal CTA [place] of [place].      NECK CTA:   1.  No significant change since 01/06/2020. Patent major cervical arteries. No large vessel occlusion or evidence of dissection.   2.  Mild calcified atherosclerotic plaque at the carotid bifurcations. No significant carotid stenosis.   3.  Widely patent vertebral arteries.         XR Chest 2 Views   Final Result   IMPRESSION: Dual-lead left chest cardiac device. Lungs are hyperinflated but otherwise clear. No effusions or pneumothorax. Heart size is normal.. Sternotomy wires. No acute osseous findings.        Report per radiology    Laboratory:  Labs Ordered and Resulted from Time of ED Arrival to Time of ED Departure   INR - Abnormal       Result Value    INR 2.56 (*)    BASIC METABOLIC PANEL - Normal    Sodium 137      Potassium 3.9      Chloride 107      Carbon Dioxide (CO2) 27      Anion Gap 3      Urea Nitrogen 17      Creatinine 0.79      Calcium 9.0      Glucose 92      GFR Estimate 82     TROPONIN I - Normal    Troponin I High Sensitivity 24     NT PROBNP INPATIENT - Normal    N terminal Pro BNP Inpatient 278     CBC WITH PLATELETS AND DIFFERENTIAL    WBC Count 4.5      RBC Count 4.38      Hemoglobin 12.5      Hematocrit 37.3      MCV 85      MCH 28.5      MCHC 33.5      RDW 12.6      Platelet Count 180      % Neutrophils 56       % Lymphocytes 33      % Monocytes 8      % Eosinophils 2      % Basophils 1      % Immature Granulocytes 0      NRBCs per 100 WBC 0      Absolute Neutrophils 2.5      Absolute Lymphocytes 1.5      Absolute Monocytes 0.4      Absolute Eosinophils 0.1      Absolute Basophils 0.0      Absolute Immature Granulocytes 0.0      Absolute NRBCs 0.0          Emergency Department Course:     Reviewed:  I reviewed nursing notes, vitals, past medical history and Care Everywhere    Assessments:  0736 I obtained history and examined the patient as noted above.     1040 I rechecked the patient and explained findings.     Disposition:  The patient was discharged to home.     Impression & Plan   Medical Decision Making:  Tammy Law is a very pleasant 67 year old year old patient who presents to the emergency department with concern of multiple concerns that have been ongoing for the past 3 weeks including left neck, facial and shoulder pain.  She has occasionally had chest pain as well.  She had a normal stress test a year ago, she reports.  She is on warfarin secondary to mechanical valve.  INR is therapeutic.  Screening EKG and troponin are negative.  This does not sound cardiac in etiology.  The patient went on [employer] and thought she may have a carotid artery occlusion.  I did review the risk and benefit of further work-up for this with CT imaging and given she is on warfarin and has a mild headache occasionally associated with this as well, she consented for the above advanced imaging.  Fortunately, this is unremarkable for acute disease, but does have some chronic incidental findings that I reviewed with her.  Particularly I was concerned about and there is found to be no evidence of carotid artery occlusion and no evidence of stroke, subarachnoid hemorrhage or other intracranial hemorrhage.  She is on a diuretic periodically, she reports, and a chest x-ray and BNP are negative so I doubt this represents acute CHF.   No evidence of significant radiculopathy on clinical exam, but discussed this is within the differential.  She has tenderness to palpation of the trapezius muscle and paraspinal muscles in the cervical region and this certainly is likely musculoskeletal.  I did review the risk and benefit of a trial of a muscle relaxer and she desired and consented for this.  I discussed the causes sedation and she should not drive or operate heavy machinery while taking it.  She agrees.  She agrees to follow-up with her primary care physician.  We discussed reasons to return.    The treatment plan was discussed with the patient and they expressed understanding of this plan and consented to the plan.  In addition, the patient will return to the emergency department if their symptoms persist, worsen, if new symptoms arise or if there is any concern as other pathology may be present that is not evident at this time. They also understand the importance of close follow up in the clinic and if unable to do so will return to the emergency department for a reevaluation. All questions were answered.    Diagnosis:    ICD-10-CM    1. Neck pain  M54.2       2. Acute pain of left shoulder  M25.512         Scribe Disclosure:  Lucio WELCH, am serving as a scribe at 7:04 AM on 10/29/2022 to document services personally performed by Haim Oneal DO based on my observations and the provider's statements to me.            Haim Oneal DO  10/29/22 1100

## 2022-10-29 NOTE — ED TRIAGE NOTES
Patient complaints of left neck pain for 3 weeks.  Pain into shoulder, left face, back of head.  Denies injury.  Left side of face feeling tingly for 3-4 days.      Triage Assessment     Row Name 10/29/22 0640       Triage Assessment (Adult)    Airway WDL WDL       Respiratory WDL    Respiratory WDL WDL       Cardiac WDL    Cardiac WDL WDL       Peripheral/Neurovascular WDL    Peripheral Neurovascular WDL WDL       Cognitive/Neuro/Behavioral WDL    Cognitive/Neuro/Behavioral WDL WDL              
Explanation of exam/test

## 2022-10-31 ENCOUNTER — DOCUMENTATION ONLY (OUTPATIENT)
Dept: ANTICOAGULATION | Facility: CLINIC | Age: 67
End: 2022-10-31

## 2022-10-31 NOTE — PROGRESS NOTES
ANTICOAGULATION  MANAGEMENT: Discharge Review    Tammy Law chart reviewed for anticoagulation continuity of care    Emergency room visit on 10/29/2022 for neck, facial, and shoulder pain. No acute intracranial process.    Discharge disposition: Home    Results:    Recent labs: (last 7 days)     10/29/22  0802   INR 2.56*     Anticoagulation inpatient management:     not applicable     Anticoagulation discharge instructions:     Warfarin dosing: home regimen continued   Bridging: No   INR goal change: No      Medication changes affecting anticoagulation: No    Additional factors affecting anticoagulation: No     PLAN     No adjustment to anticoagulation plan needed. INR in ED was therapeutic. Pt is scheduled to re-test INR 11/02/2022    Patient not contacted    No adjustment to Anticoagulation Calendar was required    Reynaldo Lewis RN

## 2022-11-02 ENCOUNTER — ANTICOAGULATION THERAPY VISIT (OUTPATIENT)
Dept: ANTICOAGULATION | Facility: CLINIC | Age: 67
End: 2022-11-02

## 2022-11-02 LAB — INR HOME MONITORING: 2.5 (ref 2–3.5)

## 2022-11-02 NOTE — PROGRESS NOTES
ANTICOAGULATION MANAGEMENT     Tammy Law 67 year old female is on warfarin with therapeutic INR result. (Goal INR 2.0-3.0)    Recent labs: (last 7 days)     11/02/22  0000   INR 2.5       ASSESSMENT       Source(s): Chart Review and Patient/Caregiver Call       Warfarin doses taken: Warfarin taken as instructed    Diet: No new diet changes identified    New illness, injury, or hospitalization: Yes: Pt was in the ER on 10/29/22 for head, neck and facial pain. Pt was concerned about a blood clot. Tests were negative. Pt reports that she still has intermittent neck pain if she moves wrong but otherwise she feels back to her baseline.    Medication/supplement changes: None noted    Signs or symptoms of bleeding or clotting: No    Previous INR: Therapeutic last 2(+) visits    Additional findings: None       PLAN       Dosing Instructions: Continue your current warfarin dose with next INR in 2 weeks       Summary  As of 11/2/2022    Full warfarin instructions:  7.5 mg every Wed; 5 mg all other days; Starting 11/2/2022   Next INR check:  11/16/2022             Telephone call with Bisi who agrees to plan and repeated back plan correctly    Patient to recheck with home meter    Education provided:     Contact [phone]  with any changes, questions or concerns.     Plan made per [facility] anticoagulation protocol    Suzanne Johnson RN  Anticoagulation Clinic  11/2/2022    _______________________________________________________________________     Anticoagulation Episode Summary     Current INR goal:  2.0-3.0   TTR:  75.5 % (7.9 mo)   Target end date:  Indefinite   Send INR reminders to:  [facility]    Indications    H/O mechanical aortic valve replacement [Z95.2]           Comments:  [employer] home monitor- managed by exception         Anticoagulation Care Providers     Provider Role Specialty Phone number    Arnold Anderson MD Referring Internal Medicine [phone]    Christina Hernandez DO  [facility] [phone]

## 2022-11-16 ENCOUNTER — DOCUMENTATION ONLY (OUTPATIENT)
Dept: ANTICOAGULATION | Facility: CLINIC | Age: 67
End: 2022-11-16

## 2022-11-16 DIAGNOSIS — Z95.2 H/O MECHANICAL AORTIC VALVE REPLACEMENT: Primary | ICD-10-CM

## 2022-11-16 LAB — INR HOME MONITORING: 2.8 (ref 2–3.5)

## 2022-11-16 NOTE — PROGRESS NOTES
ANTICOAGULATION  MANAGEMENT-Home Monitor Managed by Exception    Tammy Law 67 year old female is on warfarin with therapeutic INR result. (Goal INR 2.0-3.0)    Recent labs: (last 7 days)     11/16/22  0000   INR 2.8         Previous INR was Therapeutic    Medication, diet, health changes since last INR:chart reviewed; none identified    Contacted within the last 12 weeks by phone on 11/2/22      PLAN     Tammy was NOT contacted regarding therapeutic result today per home monitoring policy manage by exception agreement.   Current warfarin dose is to be continued:     Summary  As of 11/16/2022    Full warfarin instructions:  7.5 mg every Wed; 5 mg all other days; Starting 11/16/2022   Next INR check:  11/30/2022           ?   Sarah Gaona RN  Anticoagulation Clinic  11/16/2022    _______________________________________________________________________     Anticoagulation Episode Summary     Current INR goal:  2.0-3.0   TTR:  76.8 % (8.4 mo)   Target end date:  Indefinite   Send INR reminders to:  [facility]    Indications    H/O mechanical aortic valve replacement [Z95.2]           Comments:  [employer] home monitor- managed by exception         Anticoagulation Care Providers     Provider Role Specialty Phone number    Arnold Anderson MD Referring Internal Medicine [phone]    Christina Hernandez DO Referring Family Medicine [phone]

## 2022-11-30 ENCOUNTER — DOCUMENTATION ONLY (OUTPATIENT)
Dept: ANTICOAGULATION | Facility: CLINIC | Age: 67
End: 2022-11-30

## 2022-11-30 DIAGNOSIS — Z95.2 H/O MECHANICAL AORTIC VALVE REPLACEMENT: Primary | ICD-10-CM

## 2022-11-30 LAB — INR HOME MONITORING: 2.8 (ref 2–3.5)

## 2022-11-30 NOTE — PROGRESS NOTES
ANTICOAGULATION  MANAGEMENT-Home Monitor Managed by Exception    Tammy Law 67 year old female is on warfarin with therapeutic INR result. (Goal INR 2.0-3.0)    Recent labs: (last 7 days)     11/30/22  0000   INR 2.8         Previous INR was Therapeutic    Medication, diet, health changes since last INR:chart reviewed; none identified    Contacted within the last 12 weeks by phone on 11/2/22      ZAIN Munoz was NOT contacted regarding therapeutic result today per home monitoring policy manage by exception agreement.   Current warfarin dose is to be continued:     Summary  As of 11/30/2022    Full warfarin instructions:  7.5 mg every Wed; 5 mg all other days; Starting 11/30/2022   Next INR check:  12/14/2022           ?   Petrona Phan RN  Anticoagulation Clinic  11/30/2022    _______________________________________________________________________     Anticoagulation Episode Summary     Current INR goal:  2.0-3.0   TTR:  78.0 % (8.8 mo)   Target end date:  Indefinite   Send INR reminders to:  [facility]    Indications    H/O mechanical aortic valve replacement [Z95.2]           Comments:  [employer] home monitor- managed by exception         Anticoagulation Care Providers     Provider Role Specialty Phone number    Arnold Anderson MD Referring Internal Medicine [phone]    Christina Hernandez DO Referring Family Medicine [phone]

## 2022-12-14 ENCOUNTER — DOCUMENTATION ONLY (OUTPATIENT)
Dept: ANTICOAGULATION | Facility: CLINIC | Age: 67
End: 2022-12-14

## 2022-12-14 DIAGNOSIS — Z95.2 H/O MECHANICAL AORTIC VALVE REPLACEMENT: Primary | ICD-10-CM

## 2022-12-14 LAB — INR HOME MONITORING: 2.5 (ref 2–3.5)

## 2022-12-14 NOTE — PROGRESS NOTES
ANTICOAGULATION  MANAGEMENT-Home Monitor Managed by Exception    Tammy Law 67 year old female is on warfarin with therapeutic INR result. (Goal INR 2.0-3.0)    Recent labs: (last 7 days)     12/14/22  0000   INR 2.5         Previous INR was Therapeutic    Medication, diet, health changes since last INR:chart reviewed; none identified    Contacted within the last 12 weeks by phone on 11/2/22      PLAN     Tammy was NOT contacted regarding therapeutic result today per home monitoring policy manage by exception agreement.   Current warfarin dose is to be continued:     Summary  As of 12/14/2022    Full warfarin instructions:  7.5 mg every Wed; 5 mg all other days; Starting 12/14/2022   Next INR check:  12/28/2022           ?   Sarah Gaona RN  Anticoagulation Clinic  12/14/2022    _______________________________________________________________________     Anticoagulation Episode Summary     Current INR goal:  2.0-3.0   TTR:  79.2 % (9.3 mo)   Target end date:  Indefinite   Send INR reminders to:  [facility]    Indications    H/O mechanical aortic valve replacement [Z95.2]           Comments:  [employer] home monitor- managed by exception         Anticoagulation Care Providers     Provider Role Specialty Phone number    Arnold Anderson MD Referring Internal Medicine [phone]    Christina Hernandez DO Referring Family Medicine [phone]

## 2022-12-20 ENCOUNTER — ANCILLARY PROCEDURE (OUTPATIENT)
Dept: CARDIOLOGY | Facility: CLINIC | Age: 67
End: 2022-12-20
Attending: INTERNAL MEDICINE
Payer: COMMERCIAL

## 2022-12-20 DIAGNOSIS — Z95.0 CARDIAC PACEMAKER IN SITU: ICD-10-CM

## 2022-12-20 DIAGNOSIS — I49.5 SICK SINUS SYNDROME (H): ICD-10-CM

## 2022-12-26 DIAGNOSIS — E03.9 ACQUIRED HYPOTHYROIDISM: ICD-10-CM

## 2022-12-26 DIAGNOSIS — Z95.2 H/O MECHANICAL AORTIC VALVE REPLACEMENT: ICD-10-CM

## 2022-12-26 LAB
MDC_IDC_LEAD_IMPLANT_DT: NORMAL
MDC_IDC_LEAD_IMPLANT_DT: NORMAL
MDC_IDC_LEAD_LOCATION: NORMAL
MDC_IDC_LEAD_LOCATION: NORMAL
MDC_IDC_LEAD_LOCATION_DETAIL_1: NORMAL
MDC_IDC_LEAD_LOCATION_DETAIL_1: NORMAL
MDC_IDC_LEAD_MFG: NORMAL
MDC_IDC_LEAD_MFG: NORMAL
MDC_IDC_LEAD_MODEL: NORMAL
MDC_IDC_LEAD_MODEL: NORMAL
MDC_IDC_LEAD_POLARITY_TYPE: NORMAL
MDC_IDC_LEAD_POLARITY_TYPE: NORMAL
MDC_IDC_LEAD_SERIAL: NORMAL
MDC_IDC_LEAD_SERIAL: NORMAL
MDC_IDC_LEAD_SPECIAL_FUNCTION: NORMAL
MDC_IDC_LEAD_SPECIAL_FUNCTION: NORMAL
MDC_IDC_MSMT_BATTERY_REMAINING_PERCENTAGE: 55 %
MDC_IDC_MSMT_BATTERY_STATUS: NORMAL
MDC_IDC_MSMT_LEADCHNL_RA_IMPEDANCE_VALUE: 596 OHM
MDC_IDC_MSMT_LEADCHNL_RA_LEAD_CHANNEL_STATUS: NORMAL
MDC_IDC_MSMT_LEADCHNL_RV_IMPEDANCE_VALUE: 631 OHM
MDC_IDC_MSMT_LEADCHNL_RV_LEAD_CHANNEL_STATUS: NORMAL
MDC_IDC_PG_IMPLANT_DTM: NORMAL
MDC_IDC_PG_MFG: NORMAL
MDC_IDC_PG_MODEL: NORMAL
MDC_IDC_PG_SERIAL: NORMAL
MDC_IDC_PG_TYPE: NORMAL
MDC_IDC_SESS_CLINIC_NAME: NORMAL
MDC_IDC_SESS_DTM: NORMAL
MDC_IDC_SESS_REPROGRAMMED: NO
MDC_IDC_SESS_TYPE: NORMAL
MDC_IDC_SET_BRADY_AT_MODE_SWITCH_MODE: NORMAL
MDC_IDC_SET_BRADY_AT_MODE_SWITCH_RATE: 160 {BEATS}/MIN
MDC_IDC_SET_BRADY_LOWRATE: 60 {BEATS}/MIN
MDC_IDC_SET_BRADY_MAX_SENSOR_RATE: 120 {BEATS}/MIN
MDC_IDC_SET_BRADY_MAX_TRACKING_RATE: 130 {BEATS}/MIN
MDC_IDC_SET_BRADY_MODE: NORMAL
MDC_IDC_SET_BRADY_PAV_DELAY_LOW: 200 MS
MDC_IDC_SET_BRADY_SAV_DELAY_LOW: 180 MS
MDC_IDC_SET_LEADCHNL_RA_PACING_AMPLITUDE: 1.6 V
MDC_IDC_SET_LEADCHNL_RA_PACING_POLARITY: NORMAL
MDC_IDC_SET_LEADCHNL_RA_PACING_PULSEWIDTH: 0.4 MS
MDC_IDC_SET_LEADCHNL_RA_SENSING_ADAPTATION_MODE: NORMAL
MDC_IDC_SET_LEADCHNL_RA_SENSING_POLARITY: NORMAL
MDC_IDC_SET_LEADCHNL_RV_PACING_AMPLITUDE: 2.4 V
MDC_IDC_SET_LEADCHNL_RV_PACING_POLARITY: NORMAL
MDC_IDC_SET_LEADCHNL_RV_PACING_PULSEWIDTH: 0.4 MS
MDC_IDC_SET_LEADCHNL_RV_SENSING_ADAPTATION_MODE: NORMAL
MDC_IDC_SET_LEADCHNL_RV_SENSING_POLARITY: NORMAL
MDC_IDC_STAT_AT_BURDEN_PERCENT: 0 %
MDC_IDC_STAT_AT_DTM_END: NORMAL
MDC_IDC_STAT_AT_DTM_START: NORMAL
MDC_IDC_STAT_AT_MODE_SW_COUNT_PER_DAY: 0
MDC_IDC_STAT_AT_MODE_SW_PERCENT_TIME_PER_DAY: 0 %
MDC_IDC_STAT_BRADY_AP_VP_PERCENT: 0 %
MDC_IDC_STAT_BRADY_AP_VS_PERCENT: 90 %
MDC_IDC_STAT_BRADY_AS_VP_PERCENT: 0 %
MDC_IDC_STAT_BRADY_AS_VS_PERCENT: 10 %
MDC_IDC_STAT_BRADY_DTM_END: NORMAL
MDC_IDC_STAT_BRADY_DTM_START: NORMAL
MDC_IDC_STAT_BRADY_RA_PERCENT_PACED: 90 %
MDC_IDC_STAT_BRADY_RV_PERCENT_PACED: 0 %
MDC_IDC_STAT_CRT_DTM_END: NORMAL
MDC_IDC_STAT_CRT_DTM_START: NORMAL

## 2022-12-26 PROCEDURE — 99207 PR NO CHARGE LOS: CPT | Performed by: INTERNAL MEDICINE

## 2022-12-27 RX ORDER — WARFARIN SODIUM 2.5 MG/1
TABLET ORAL
Qty: 190 TABLET | Refills: 1 | Status: SHIPPED | OUTPATIENT
Start: 2022-12-27 | End: 2023-03-23

## 2022-12-27 NOTE — TELEPHONE ENCOUNTER
"Routing refill request to provider for review/approval because:  Labs out of range:  Tsh    [facility]    Last Written Prescription Date:  7/11/22  Last Fill Quantity: 90,  # refills: 1   Last office visit provider:  8/10/22     Requested Prescriptions   Pending Prescriptions Disp Refills     levothyroxine (SYNTHROID/LEVOTHROID) 88 MCG tablet [Pharmacy Med Name: LEVOTHYROXINE SODIUM 88MCG TABS] 90 tablet 1     Sig: TAKE 1 TABLET (88 MCG) BY MOUTH DAILY       Thyroid Protocol Failed - 12/26/2022 10:51 AM        Failed - Normal TSH on file in past 12 months     Recent Labs   Lab Test 07/08/22  0915   TSH 8.67*              Passed - Patient is 12 years or older        Passed - Recent (12 mo) or future (30 days) visit within the authorizing provider's specialty     Patient has had an office visit with the authorizing provider or a provider within the authorizing providers department within the previous 12 mos or has a future within next 30 days. See \"Patient Info\" tab in inbasket, or \"Choose Columns\" in Meds & Orders section of the refill encounter.              Passed - Medication is active on med list        Passed - No active pregnancy on record     If patient is pregnant or has had a positive pregnancy test, please check TSH.          Passed - No positive pregnancy test in past 12 months     If patient is pregnant or has had a positive pregnancy test, please check TSH.             JANTOVEN ANTICOAGULANT 2.5 MG tablet [Pharmacy Med Name: JANTOVEN 2.5MG TABS] 190 tablet 1     Sig: TAKE 1-3 TABLETS (2.5-7.5 MG) BY MOUTH DAILY AS DIRECTED. ADJUST DOSED BASED ON INR RESULTS. TAKE 3 TABLETS ON WEDNESDAYS AND 2 TABLETS ON ALL OTHER DAYS       Vitamin K Antagonists Failed - 12/26/2022 10:51 AM        Failed - INR is within goal in the past 6 weeks     Confirm INR is within goal in the past 6 weeks.     Recent Labs   Lab Test 12/14/22  0000   INR 2.5                       Passed - Recent (12 mo) or future (30 " "days) visit within the authorizing provider's specialty     Patient has had an office visit with the authorizing provider or a provider within the authorizing providers department within the previous 12 mos or has a future within next 30 days. See \"Patient Info\" tab in inbasket, or \"Choose Columns\" in Meds & Orders section of the refill encounter.              Passed - Medication is active on med list        Passed - Patient is 18 years of age or older        Passed - Patient is not pregnant        Passed - No positive pregnancy on file in past 12 months             Camryn Carrizales RN 12/27/22 1:03 PM  "

## 2022-12-27 NOTE — TELEPHONE ENCOUNTER
ANTICOAGULATION MANAGEMENT:  Medication Refill    Anticoagulation Summary  As of 12/14/2022    Warfarin maintenance plan:  7.5 mg (2.5 mg x 3) every Wed; 5 mg (2.5 mg x 2) all other days   Next INR check:  12/28/2022   Target end date:  Indefinite    Indications    H/O mechanical aortic valve replacement [Z95.2]             Anticoagulation Care Providers     Provider Role Specialty Phone number    Arnold Anderson MD Referring Internal Medicine [phone]    Christina Heranndez DO Referring Family Medicine [phone]          Visit with referring provider/group within last year: Yes    [facility] referral signed within last year: Yes    Tammy meets all criteria for refill (current [facility] referral, office visit with referring provider/group in last year, lab monitoring up to date or not exceeding 2 weeks overdue). Rx instructions and quantity supplied updated to match patient's current dosing plan. Warfarin 90 day supply with 1 refill granted per [facility] protocol     Petrona Phan RN  Anticoagulation Clinic

## 2022-12-28 ENCOUNTER — DOCUMENTATION ONLY (OUTPATIENT)
Dept: ANTICOAGULATION | Facility: CLINIC | Age: 67
End: 2022-12-28

## 2022-12-28 DIAGNOSIS — Z95.2 H/O MECHANICAL AORTIC VALVE REPLACEMENT: Primary | ICD-10-CM

## 2022-12-28 LAB — INR HOME MONITORING: 2.5 (ref 2–3.5)

## 2022-12-28 RX ORDER — LEVOTHYROXINE SODIUM 88 UG/1
88 TABLET ORAL DAILY
Qty: 30 TABLET | Refills: 0 | Status: SHIPPED | OUTPATIENT
Start: 2022-12-28 | End: 2023-01-23

## 2022-12-28 NOTE — TELEPHONE ENCOUNTER
Synthroid refilled for 30 days only.  TSH was abnormal in the summer. Please ask ot to have repeat thyroid labs done prior to next refill (order placed).  Dr CUMMINS

## 2022-12-28 NOTE — TELEPHONE ENCOUNTER
12/28 I called and talked to pt she will call for labs before refills   Consent (Temporal Branch)/Introductory Paragraph: The rationale for Mohs was explained to the patient and consent was obtained. The risks, benefits and alternatives to therapy were discussed in detail. Specifically, the risks of damage to the temporal branch of the facial nerve, infection, scarring, bleeding, prolonged wound healing, incomplete removal, allergy to anesthesia, and recurrence were addressed. Prior to the procedure, the treatment site was clearly identified and confirmed by the patient. All components of Universal Protocol/PAUSE Rule completed.

## 2022-12-28 NOTE — PROGRESS NOTES
ANTICOAGULATION  MANAGEMENT-Home Monitor Managed by Exception    Tammy LUCA miltonpriscilla 67 year old female is on warfarin with therapeutic INR result. (Goal INR 2.0-3.0)    Recent labs: (last 7 days)     12/28/22  0000   INR 2.5         Previous INR was Therapeutic    Medication, diet, health changes since last INR:chart reviewed; none identified    Contacted within the last 12 weeks by phone on 11/2      PLAN     Tammy was NOT contacted regarding therapeutic result today per home monitoring policy manage by exception agreement.   Current warfarin dose is to be continued:     Summary  As of 12/28/2022    Full warfarin instructions:  7.5 mg every Wed; 5 mg all other days   Next INR check:  1/11/2023           ?   Judith Delgadillo, RN  Anticoagulation Clinic  12/28/2022    _______________________________________________________________________     Anticoagulation Episode Summary     Current INR goal:  2.0-3.0   TTR:  80.1 % (9.8 mo)   Target end date:  Indefinite   Send INR reminders to:  [facility]    Indications    H/O mechanical aortic valve replacement [Z95.2]           Comments:  [employer] home monitor- managed by exception         Anticoagulation Care Providers     Provider Role Specialty Phone number    Arnold Anderson MD Referring Internal Medicine [phone]    Christina Hernandez DO Referring Family Medicine [phone]

## 2023-01-11 ENCOUNTER — DOCUMENTATION ONLY (OUTPATIENT)
Dept: ANTICOAGULATION | Facility: CLINIC | Age: 68
End: 2023-01-11

## 2023-01-11 ENCOUNTER — ANTICOAGULATION THERAPY VISIT (OUTPATIENT)
Dept: ANTICOAGULATION | Facility: CLINIC | Age: 68
End: 2023-01-11

## 2023-01-11 DIAGNOSIS — Z95.2 H/O MECHANICAL AORTIC VALVE REPLACEMENT: Primary | ICD-10-CM

## 2023-01-11 LAB
INR (EXTERNAL): 3.3 (ref 0.9–1.1)
INR HOME MONITORING: 3.3 (ref 2–3.5)

## 2023-01-11 NOTE — PROGRESS NOTES
ANTICOAGULATION MANAGEMENT     Tammy Law 67 year old female is on warfarin with supratherapeutic INR result. (Goal INR 2.0-3.0)    Recent labs: (last 7 days)     01/11/23  0950   INR 3.3*       ASSESSMENT     Source(s): Chart Review and Patient/Caregiver Call     Warfarin doses taken: Warfarin taken as instructed  Diet: No new diet changes identified  New illness, injury, or hospitalization: No  Medication/supplement changes: None noted  Signs or symptoms of bleeding or clotting: No  Previous INR: Therapeutic last 2(+) visits  Additional findings: None       PLAN     Recommended plan for no diet, medication or health factor changes affecting INR     Dosing Instructions: Continue your current warfarin dose with next INR in 2 weeks       Summary  As of 1/11/2023    Full warfarin instructions:  7.5 mg every Wed; 5 mg all other days   Next INR check:  1/25/2023             Telephone call with Bisi who verbalizes understanding and agrees to plan    Patient to recheck with home meter    Education provided:   Please call back if any changes to your diet, medications or how you've been taking warfarin    Plan made per [facility] anticoagulation protocol    Sarah Gaona RN  Anticoagulation Clinic  1/11/2023    _______________________________________________________________________     Anticoagulation Episode Summary     Current INR goal:  2.0-3.0   TTR:  79.3 % (10.2 mo)   Target end date:  Indefinite   Send INR reminders to:  [facility]    Indications    H/O mechanical aortic valve replacement [Z95.2]           Comments:  [employer] home monitor- managed by exception         Anticoagulation Care Providers     Provider Role Specialty Phone number    Arnold Anderson MD Referring Internal Medicine [phone]    Christina Hernandez DO Referring Family Medicine [phone]        Left Vm for patient to return call to [facility].    Sarah Gaona RN

## 2023-01-25 ENCOUNTER — ANTICOAGULATION THERAPY VISIT (OUTPATIENT)
Dept: ANTICOAGULATION | Facility: CLINIC | Age: 68
End: 2023-01-25
Payer: COMMERCIAL

## 2023-01-25 DIAGNOSIS — Z95.2 H/O MECHANICAL AORTIC VALVE REPLACEMENT: Primary | ICD-10-CM

## 2023-01-25 LAB — INR HOME MONITORING: 3.1 (ref 2–3.5)
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
## 2023-01-26 ENCOUNTER — OFFICE VISIT (OUTPATIENT)
Dept: FAMILY MEDICINE | Facility: CLINIC | Age: 68
End: 2023-01-26
Payer: COMMERCIAL

## 2023-01-26 VITALS
HEIGHT: 64 IN | SYSTOLIC BLOOD PRESSURE: 110 MMHG | HEART RATE: 73 BPM | BODY MASS INDEX: 25.33 KG/M2 | DIASTOLIC BLOOD PRESSURE: 68 MMHG | WEIGHT: 148.4 LBS | OXYGEN SATURATION: 98 %

## 2023-01-26 DIAGNOSIS — Z23 NEED FOR PROPHYLACTIC VACCINATION AGAINST STREPTOCOCCUS PNEUMONIAE (PNEUMOCOCCUS): ICD-10-CM

## 2023-01-26 DIAGNOSIS — M85.88 OSTEOPENIA OF LUMBAR SPINE: Primary | ICD-10-CM

## 2023-01-26 DIAGNOSIS — Z95.2 H/O AORTIC VALVE REPLACEMENT: ICD-10-CM

## 2023-01-26 DIAGNOSIS — H55.01 CONGENITAL NYSTAGMUS: ICD-10-CM

## 2023-01-26 DIAGNOSIS — Z79.01 CHRONIC ANTICOAGULATION: ICD-10-CM

## 2023-01-26 DIAGNOSIS — R63.5 WEIGHT GAIN: ICD-10-CM

## 2023-01-26 DIAGNOSIS — E03.9 ACQUIRED HYPOTHYROIDISM: ICD-10-CM

## 2023-01-26 PROCEDURE — 84443 ASSAY THYROID STIM HORMONE: CPT | Performed by: FAMILY MEDICINE

## 2023-01-26 PROCEDURE — G0009 ADMIN PNEUMOCOCCAL VACCINE: HCPCS | Performed by: FAMILY MEDICINE

## 2023-01-26 PROCEDURE — 84439 ASSAY OF FREE THYROXINE: CPT | Performed by: FAMILY MEDICINE

## 2023-01-26 PROCEDURE — 99214 OFFICE O/P EST MOD 30 MIN: CPT | Mod: 25 | Performed by: FAMILY MEDICINE

## 2023-01-26 PROCEDURE — 36415 COLL VENOUS BLD VENIPUNCTURE: CPT | Performed by: FAMILY MEDICINE

## 2023-01-26 PROCEDURE — 82306 VITAMIN D 25 HYDROXY: CPT | Performed by: FAMILY MEDICINE

## 2023-01-26 PROCEDURE — 0124A COVID-19 VACCINE BIVALENT BOOSTER 12+ (PFIZER): CPT | Performed by: FAMILY MEDICINE

## 2023-01-26 PROCEDURE — 90732 PPSV23 VACC 2 YRS+ SUBQ/IM: CPT | Performed by: FAMILY MEDICINE

## 2023-01-26 PROCEDURE — 91312 COVID-19 VACCINE BIVALENT BOOSTER 12+ (PFIZER): CPT | Performed by: FAMILY MEDICINE

## 2023-01-26 RX ORDER — AMOXICILLIN 500 MG/1
500 CAPSULE ORAL DAILY PRN
COMMUNITY
Start: 2022-10-17 | End: 2023-02-08

## 2023-01-26 ASSESSMENT — PATIENT HEALTH QUESTIONNAIRE - PHQ9: SUM OF ALL RESPONSES TO PHQ QUESTIONS 1-9: 4

## 2023-01-26 NOTE — PROGRESS NOTES
Assessment & Plan       Subjective     Answers for HPI/ROS submitted by the patient on 1/19/2023  What is the reason for your visit today? : new insurance  my doctor isn't there any more  How many servings of fruits and vegetables do you eat daily?: 0-1  On average, how many sweetened beverages do you drink each day (Examples: soda, juice, sweet tea, etc.  Do NOT count diet or artificially sweetened beverages)?: 1  How many minutes a day do you exercise enough to make your heart beat faster?: 9 or less  How many days a week do you exercise enough to make your heart beat faster?: 3 or less  How many days per week do you miss taking your medication?: 0
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
## 2023-01-27 LAB
T4 FREE SERPL-MCNC: 1.41 NG/DL (ref 0.9–1.7)
TSH SERPL DL<=0.005 MIU/L-ACNC: 7.07 UIU/ML (ref 0.3–4.2)

## 2023-01-30 LAB — DEPRECATED CALCIDIOL+CALCIFEROL SERPL-MC: 53 UG/L (ref 20–75)

## 2023-01-30 RX ORDER — LEVOTHYROXINE SODIUM 100 UG/1
100 TABLET ORAL DAILY
Qty: 90 TABLET | Refills: 0 | Status: SHIPPED | OUTPATIENT
Start: 2023-01-30 | End: 2023-03-22

## 2023-01-31 ENCOUNTER — TELEPHONE (OUTPATIENT)
Dept: FAMILY MEDICINE | Facility: CLINIC | Age: 68
End: 2023-01-31

## 2023-01-31 ENCOUNTER — NURSE TRIAGE (OUTPATIENT)
Dept: FAMILY MEDICINE | Facility: CLINIC | Age: 68
End: 2023-01-31

## 2023-01-31 ENCOUNTER — ANCILLARY PROCEDURE (OUTPATIENT)
Dept: BONE DENSITY | Facility: CLINIC | Age: 68
End: 2023-01-31
Payer: COMMERCIAL

## 2023-01-31 ENCOUNTER — ANTICOAGULATION THERAPY VISIT (OUTPATIENT)
Dept: ANTICOAGULATION | Facility: CLINIC | Age: 68
End: 2023-01-31

## 2023-01-31 ENCOUNTER — DOCUMENTATION ONLY (OUTPATIENT)
Dept: ANTICOAGULATION | Facility: CLINIC | Age: 68
End: 2023-01-31

## 2023-01-31 DIAGNOSIS — Z95.2 H/O AORTIC VALVE REPLACEMENT: Primary | ICD-10-CM

## 2023-01-31 DIAGNOSIS — M85.88 OSTEOPENIA OF LUMBAR SPINE: ICD-10-CM

## 2023-01-31 LAB — INR HOME MONITORING: 3.3 (ref 2–3.5)

## 2023-01-31 PROCEDURE — 77080 DXA BONE DENSITY AXIAL: CPT | Mod: TC | Performed by: RADIOLOGY

## 2023-01-31 NOTE — TELEPHONE ENCOUNTER
Spoke with Dr. Lentz in-clinic:     - pt does not need appt today, unlikely that the site will form a blood clot  - pt should reach out to anticoag nurse team, especially if her INR is high  - pt should continue to monitor site for improvement / bruise spreading         Provider Recommendation Follow Up:   Reached patient/caregiver. Informed of provider's recommendations. Patient verbalized understanding and agrees with the plan.     Pt will call her anticoagulation nurse team for follow-up.   Pt will update clinic with any further changes.

## 2023-01-31 NOTE — TELEPHONE ENCOUNTER
Patient Returning Call    Reason for call:  Question on INR     Information relayed to patient:  RN to return call    Patient has additional questions:  Yes      Could we send this information to you in [employer] or would you prefer to receive a phone call?:   Patient would prefer a phone call   Okay to leave a detailed message?: Yes at Cell number on file:    Telephone Information:   Mobile [phone]     Jaja Luu

## 2023-01-31 NOTE — TELEPHONE ENCOUNTER
Nurse Triage SBAR    Is this a 2nd Level Triage? NO    Situation: Patient walked into [facility] for bruising post-lab draw on 1/26/23.    Background: Pt with following diagnoses:   H/O aortic valve replacement   Chronic anticoagulation - warfarin   Cardiac pacemaker in situ    Assessment: 8 in by 4 in bruising to medial antecubital area extending from forearm to lower upper arm. Area is tended to touch and sore per patient report.     Protocol Recommended Disposition:   Go To Office Now    Recommendation: Does patient need to be seen/assessed?      Discussed in person with Dr. Lentz         Reason for Disposition    Raised bruise and size > 2 inches (5 cm) and getting bigger    Additional Information    Negative: Shock suspected (e.g., cold/pale/clammy skin, too weak to stand, low BP, rapid pulse)    Negative: Fever and purple or blood-colored spots or dots    Negative: Sounds like a life-threatening emergency to the triager    Negative: Bruise(s) of forehead or head    Negative: Bruise(s) of face or jaw    Negative: Patient has a concerning injury (e.g., chest, neck, leg)    Negative: Post-operative bruising    Negative: Dizziness or lightheadedness    Negative: Bruise on head, face, chest, or abdomen and taking Coumadin (warfarin) or other strong blood thinner, or known bleeding disorder (e.g., thrombocytopenia)    Negative: Unexplained bleeding from another site (e.g., gums, nose, urine) as well    Negative: Patient sounds very sick or weak to the triager    Negative: SEVERE pain and not improved 2 hours after pain medicine/ice packs    Negative: Purple or blood-colored spots or dots that are not from injury or friction (no fever and sounds well to triager)    Negative: Not caused by an injury and 5 or more bruises now    Protocols used: BRUISES-A-OH

## 2023-01-31 NOTE — PROGRESS NOTES
ANTICOAGULATION MANAGEMENT     Tamym Law 67 year old female is on warfarin with supratherapeutic INR result. (Goal INR 2.0-3.0)    Recent labs: (last 7 days)     01/31/23  0000   INR 3.3       ASSESSMENT     Source(s): Chart Review and Patient/Caregiver Call     Warfarin doses taken: Warfarin taken as instructed  Diet: No new diet changes identified  New illness, injury, or hospitalization: No  Medication/supplement changes: None noted  Signs or symptoms of bleeding or clotting: No  Previous INR: Supratherapeutic  Additional findings: None       PLAN     Recommended plan for no diet, medication or health factor changes affecting INR     Dosing Instructions: decrease your warfarin dose (14% change) with next INR in 1 week       Summary  As of 1/31/2023    Full warfarin instructions:  1/31: 2.5 mg; Otherwise 2.5 mg every Tue, Fri; 5 mg all other days   Next INR check:  2/7/2023             Telephone call with Bisi who agrees to plan and repeated back plan correctly    Patient to recheck with home meter    Education provided:   Please call back if any changes to your diet, medications or how you've been taking warfarin    Plan made per [facility] anticoagulation protocol    Petrona Phan, RN  Anticoagulation Clinic  1/31/2023    _______________________________________________________________________     Anticoagulation Episode Summary     Current INR goal:  2.0-3.0   TTR:  74.5 % (10.9 mo)   Target end date:  Indefinite   Send INR reminders to:  [facility]    Indications    H/O aortic valve replacement [Z95.2]           Comments:  [employer] home monitor- managed by exception         Anticoagulation Care Providers     Provider Role Specialty Phone number    Arnold Anderson MD Referring Internal Medicine [phone]    Christina Hernandez MD Referring Family Medicine [phone]

## 2023-01-31 NOTE — TELEPHONE ENCOUNTER
"Per chart review patient has an 8 inch x 4 inch bruise on her arm which was assessed by nurse in clinic today.  Dr. Lentz advised that there is no need to see provider today and follow up with INR clinic.     Called and spoke with Bisi.  She states the bruise has gotten progressively worse in the last week and that its \"black\".  She denies any lightheadedness or dizziness.  Advised that Bisi should test her INR now and send the results to us [employer] as she normally does.  [facility] to follow-up once results are received.    Advised that if bruise continues to spread, Bisi could consider going to urgent care or calling back INR clinic.  "

## 2023-01-31 NOTE — PROGRESS NOTES
Opened in error. See Anticoagulation encounter.  Petrona Phan, RN  Anticoagulation Nurse - [facility] [place]

## 2023-02-08 ENCOUNTER — OFFICE VISIT (OUTPATIENT)
Dept: FAMILY MEDICINE | Facility: CLINIC | Age: 68
End: 2023-02-08
Payer: COMMERCIAL

## 2023-02-08 ENCOUNTER — ANTICOAGULATION THERAPY VISIT (OUTPATIENT)
Dept: ANTICOAGULATION | Facility: CLINIC | Age: 68
End: 2023-02-08

## 2023-02-08 VITALS
HEART RATE: 71 BPM | TEMPERATURE: 98.4 F | SYSTOLIC BLOOD PRESSURE: 107 MMHG | BODY MASS INDEX: 25.44 KG/M2 | WEIGHT: 149 LBS | OXYGEN SATURATION: 98 % | DIASTOLIC BLOOD PRESSURE: 58 MMHG | RESPIRATION RATE: 13 BRPM | HEIGHT: 64 IN

## 2023-02-08 DIAGNOSIS — Z79.899 MEDICATION MANAGEMENT: ICD-10-CM

## 2023-02-08 DIAGNOSIS — Z79.01 CHRONIC ANTICOAGULATION: ICD-10-CM

## 2023-02-08 DIAGNOSIS — F33.42 RECURRENT MAJOR DEPRESSIVE DISORDER, IN FULL REMISSION (H): ICD-10-CM

## 2023-02-08 DIAGNOSIS — Z95.2 H/O MECHANICAL AORTIC VALVE REPLACEMENT: ICD-10-CM

## 2023-02-08 DIAGNOSIS — M81.0 AGE-RELATED OSTEOPOROSIS WITHOUT CURRENT PATHOLOGICAL FRACTURE: Primary | ICD-10-CM

## 2023-02-08 DIAGNOSIS — Z95.2 H/O MECHANICAL AORTIC VALVE REPLACEMENT: Primary | ICD-10-CM

## 2023-02-08 LAB — INR BLD: 3.6 (ref 0.9–1.1)

## 2023-02-08 PROCEDURE — 99213 OFFICE O/P EST LOW 20 MIN: CPT | Performed by: FAMILY MEDICINE

## 2023-02-08 PROCEDURE — 36416 COLLJ CAPILLARY BLOOD SPEC: CPT | Performed by: FAMILY MEDICINE

## 2023-02-08 PROCEDURE — 85610 PROTHROMBIN TIME: CPT | Performed by: FAMILY MEDICINE

## 2023-02-08 RX ORDER — AMOXICILLIN 500 MG/1
2000 CAPSULE ORAL DAILY PRN
Qty: 24 CAPSULE | Refills: 4 | Status: SHIPPED | OUTPATIENT
Start: 2023-02-08 | End: 2023-12-13

## 2023-02-08 RX ORDER — ALENDRONATE SODIUM 70 MG/1
70 TABLET ORAL
Qty: 13 TABLET | Refills: 3 | Status: SHIPPED | OUTPATIENT
Start: 2023-02-08 | End: 2023-03-23

## 2023-02-08 NOTE — PROGRESS NOTES
ANTICOAGULATION MANAGEMENT     Tammy Law 67 year old female is on warfarin with supratherapeutic INR result. (Goal INR 2.0-3.0)    Recent labs: (last 7 days)     02/08/23  1032   INR 3.6*       ASSESSMENT     Source(s): Chart Review and Patient/Caregiver Call     Warfarin doses taken: More warfarin taken than planned which may be affecting INR - per patient she misunderstood the last instructions.She is requesting to talk in tablets versus mg's  Diet: No new diet changes identified  New illness, injury, or hospitalization: No  Medication/supplement changes: Fosamax new rx today from [facility] No interaction anticipated  Signs or symptoms of bleeding or clotting: No  Previous INR: Supratherapeutic  Additional findings: None       PLAN     Recommended plan for temporary change(s) affecting INR     Dosing Instructions: hold dose then continue your current warfarin dose with next INR in 1 week       Summary  As of 2/8/2023    Full warfarin instructions:  2/8: Hold; Otherwise 2.5 mg every Tue, Fri; 5 mg all other days   Next INR check:  2/15/2023             Telephone call with Bisi who verbalizes understanding and agrees to plan and who agrees to plan and repeated back plan correctly ( patient was putting the doses in her pillbox with doses as instructed)    Patient to recheck with home meter    Education provided:   Taking warfarin: Importance of taking warfarin as instructed  Goal range and lab monitoring: goal range and significance of current result, Importance of therapeutic range and Importance of following up at instructed interval    Plan made per [facility] anticoagulation protocol    Carolynn Lucas RN  Anticoagulation Clinic  2/8/2023    _______________________________________________________________________     Anticoagulation Episode Summary     Current INR goal:  2.0-3.0   TTR:  72.6 % (11.2 mo)   Target end date:  Indefinite   Send INR reminders to:  [facility]    Indications    H/O mechanical  aortic valve replacement [Z95.2]           Comments:  [employer] home monitor- managed by exception         Anticoagulation Care Providers     Provider Role Specialty Phone number    Arnold Anderson MD Referring Internal Medicine [phone]    Christina Hernandez MD Referring Family Medicine [phone]

## 2023-02-08 NOTE — PROGRESS NOTES
Assessment & Plan     Age-related osteoporosis without current pathological fracture  - Weight bearing exercise is important. Walks, light weights and such. (Not swimming or biking because it doesn't put much weight on the bones.  - Getting enough vitamin D. I recommend at least 2,000 international unit(s) of vitamin D3. Your level was excellent, keep it up.  - Getting 750-1250 mg of calcium. She drinks 2 glasses of milk most days and will emphasize doing that plus her cheese.  - Medication: There are once a month, once a week pills She does not get significant reflux (less than once a month.   She understands she needs to not bend over or recline for 45 minutes after taking the pill and why. She chooses the once a week pill.  -She just had an implant. She sees her dentist regularly and does not think there are any other procedures expected, but she will confirm with her dentist.  - alendronate (FOSAMAX) 70 MG tablet  Dispense: 13 tablet; Refill: 3  - Creatinine clearance (Ambulatory)    H/O mechanical aortic valve replacement  - INR  - INR point of care    Chronic anticoagulation    - INR  - amoxicillin (AMOXIL) 500 MG capsule  Dispense: 24 capsule; Refill: 4    Medication management    - Creatinine clearance (Ambulatory)      Review of the result(s) of each unique test - DEXA, renal function  Ordering of each unique test  Prescription drug management  No LOS data to display   Time spent doing chart review, history and exam, documentation and further activities per the note       Return in about 3 months (around 5/8/2023). After lab for wellness.    CUCO ALEMAN MD  [facility]   Bisi is a 67 year old accompanied by her self, presenting for the following health issues:  Recheck Medication and Follow Up (Follow up on bone density test. Requests inr check.)      Your bone density showed that you osteoporosis. That means that you are at high risk of fractures. We treat  this with several things:  She is out of strips and it is time for an INR. She requests that I order it. The strips will be in within a week.  She has not been exercising. She states she has an exercise bike.  She is not wanting to take calcium pills. She gets two good servings of milk a day.    History of Present Illness       Reason for visit:  Follow up on my bone and density test    She eats 0-1 servings of fruits and vegetables daily.She consumes 1 sweetened beverage(s) daily.She exercises with enough effort to increase her heart rate 9 or less minutes per day.  She exercises with enough effort to increase her heart rate 3 or less days per week.   She is taking medications regularly.             Review of Systems         Objective    There were no vitals taken for this visit.  There is no height or weight on file to calculate BMI.  Physical Exam  Constitutional:       General: She is not in acute distress.     Appearance: Normal appearance.   Neurological:      Mental Status: She is alert. Mental status is at baseline.      Comments: Congenital nystagmus.   Psychiatric:         Mood and Affect: Mood normal.         Behavior: Behavior normal.          Orders Only on 2023   Component Date Value Ref Range Status     INR HOME MONITORING 2023 3.3  2.000 - 3.500 Final     XR Chest 2 Views    Result Date: 10/29/2022  EXAM: XR CHEST 2 VIEWS LOCATION: [facility] DATE/TIME: 10/29/2022 8:11 AM INDICATION: chest pain, concern for chf COMPARISON: 10/2/2020     IMPRESSION: Dual-lead left chest cardiac device. Lungs are hyperinflated but otherwise clear. No effusions or pneumothorax. Heart size is normal.. Sternotomy wires. No acute osseous findings.    Echocardiogram Complete    Result Date: 2022  [id number]^RAFAL^[place] [address]  Name: DONNA FIGUEROA MRN: [id number] : 1955 Study Date:  04/29/2022 12:38 PM Age: 66 yrs Gender: Female Patient Location: [facility] Reason For Study: H/O mechanical aortic valve replacement, S/P AVR (aortic valve r Ordering Physician: SANDY LYLES Referring Physician: SANDY LYLES Performed By: MB  BSA: 1.7 m2 Height: 64 in Weight: 144 lb HR: 85 BP: 116/69 mmHg ______________________________________________________________________________ Procedure Complete Echo Adult. Adequate quality two-dimensional was performed and interpreted. Adequate quality color and spectral Doppler were performed and interpreted. Compared to the prior study dated 7/28/2021, there have been no changes. ______________________________________________________________________________ Interpretation Summary  1. Left ventricular size is normal. Mild concentric increase in wall thickness. Systolic function is normal. The estimated left ventricular ejection fraction is 55-60%. 2. Right ventricular size and systolic function are normal. 3. Severe left atrial enlargement. 4. A mechanical prosthetic aortic valve (unknown size or manufactuer) is present. No evidence of significant prosthetic stenosis with peak forward velocity 2.7 m/s, mean gradient 17 mm Hg, effective orifice area 1.2 cm2, acceleration time 90 ms, and dimensionless index 0.30. Trace-to-mild prosthetic regurgitation. 5. Rheumatic-appearing mitral apparatus with severely thickened and calcified leaflets and chordae tendinae. No significant stenosis with a mean gradient of 3 mm Hg at a heart rate of 80 bpm. There is mild, posteriorly-directed mitral regurgitation due to restriction of the posterior mitral leaflet. 6. Compared to the prior study dated 7/28/2021, there has been no significant change. ______________________________________________________________________________ I      WMSI = 1.00     % Normal = 100  X - Cannot   0 -                      (2) - Mildly 2 -          Segments  Size Interpret    Hyperkinetic 1 - Normal  Hypokinetic   Hypokinetic  1-2     small                                                    7 -          3-5    moderate 3 - Akinetic 4 -          5 -         6 - Akinetic Dyskinetic   6-14    large              Dyskinetic   Aneurysmal  w/scar       w/scar       15-16   diffuse  Left Ventricle The left ventricle is normal in size. There is mild concentric left ventricular hypertrophy. Left ventricular systolic function is normal. The visual ejection fraction is 55-60%. Left ventricular diastolic function is indeterminate. No regional wall motion abnormalities noted.  Right Ventricle Normal right ventricle size and systolic function. There is a pacemaker lead in the right ventricle.  Atria The left atrium is severely dilated. Pacer wire in right atrium. Right atrial size is normal.  Mitral Valve The mitral valve leaflets are severely thickened. The mitral valve chordae are thickened and/or calcified. The mitral valve appears rheumatic. There is mild (1+) mitral regurgitation. The mitral regurgitant jet is eccentrically directed. Posteriorly-directed jet of regurgitation likely due to posterior leaflet restriction. There is no mitral valve stenosis. The mean mitral valve gradient is 3.6 mmHg.  Tricuspid Valve Tricuspid valve leaflets appear normal. There is trace tricuspid regurgitation. Right ventricle systolic pressure estimate normal. The right ventricular systolic pressure is approximated at 22.8 mmHg plus the right atrial pressure. There is no tricuspid stenosis.  Aortic Valve A mechanical prosthetic aortic valve (unknown size or manufactuer) is present. No evidence of significant prosthetic stenosis with peak forward velocity 2.7 m/s, mean gradient 17 mm Hg, effective orifice area 1.2 cm2, acceleration time 90 ms, and dimensionless index 0.30. Trace-to-mild prosthetic regurgitation.  Pulmonic Valve The pulmonic valve is not well visualized. There is trace pulmonic valvular regurgitation. There is no pulmonic valvular  stenosis.  Vessels The thoracic aorta is normal. IVC diameter <2.1 cm collapsing >50% with sniff suggests a normal RA pressure of 3 mmHg.  Pericardium There is no pericardial effusion.  Rhythm The rhythm was paced.  ______________________________________________________________________________ MMode/2D Measurements & Calculations RVDd: 2.6 cm IVSd: 1.2 cm LVIDd: 3.6 cm LVIDs: 3.0 cm LVPWd: 1.1 cm FS: 16.4 % LV mass(C)d: 136.6 grams LV mass(C)dI: 80.3 grams/m2  asc Aorta Diam: 3.7 cm LVOT diam: 2.0 cm LVOT area: 3.1 cm2 LA Volume Indexed (AL/bp): 51.2 ml/m2 RWT: 0.64  Time Measurements MM HR: 74.0 BPM  Doppler Measurements & Calculations MV E max luis m: 119.5 cm/sec MV A max luis m: 139.8 cm/sec MV E/A: 0.85  MV max P.4 mmHg MV mean PG: 3.6 mmHg MV V2 VTI: 46.0 cm MVA(VTI): 1.2 cm2 MV dec time: 0.17 sec Ao V2 max: 271.3 cm/sec Ao max P.0 mmHg Ao V2 mean: 198.9 cm/sec Ao mean P.3 mmHg Ao V2 VTI: 48.9 cm ODILON(I,D): 1.2 cm2 ODILON(V,D): 0.93 cm2 AI P1/2t: 589.8 msec Ao acc time: 0.09 sec LV V1 max P.7 mmHg LV V1 max: 82.7 cm/sec LV V1 VTI: 18.7 cm MR ERO: 0.08 cm2 MR volume: 13.3 ml SV(LVOT): 56.9 ml SI(LVOT): 33.5 ml/m2 PA acc time: 0.16 sec TR max luis m: 238.9 cm/sec TR max P.8 mmHg AV Luis M Ratio (DI): 0.30 ODILON Index (cm2/m2): 0.69 E/E': 23.6 E/E' av.5 Lateral E/e': 18.9 Medial E/e': 24.0 Peak E' Luis M: 5.1 cm/sec  ______________________________________________________________________________ Report approved by: Leeanna Singletary 2022 04:51 PM       Echocardiogram Complete    Result Date: 2021  [id number]^RAFAL^SANDY^[place] [address]  Name: DONNA FIGUEROA MRN: [id number] : 1955 Study Date: 2021 09:10 AM Age: 65 yrs Gender: Female Patient Location: [facility] Reason For Study: Aortic valve disorder Ordering Physician: SANDY LYLES Referring Physician: SANDY LYLES  BSA: 1.7 m2 Height: 64 in Weight: 144 lb HR: 81  BP: 120/65 mmHg ______________________________________________________________________________ Procedure Complete Echo Adult. ______________________________________________________________________________ Interpretation Summary  Left ventricular size, wall motion and function are normal. The ejection fraction is 60-65%. Mildly elevated left ventricular filling pressure. Normal right ventricle size and systolic function. There is a pacemaker lead in the right ventricle. The left atrium is moderately dilated. The prosthetic aortic valve is not well visualized. Mean gradient of mechanical aortic valve is 21 mmHg. Mild aortic valve regurgitation. The mitral valve appears rheumatic, thickening with calcification. Calcified mitral apparatus. There is mild (1+) mitral regurgitation. There is moderate mitral stenosis. The mean mitral valve gradient is 6mmHg at ventricular rate 85 BPM. The ascending aorta is Mildly dilated 43 mm. No previous study available for comparison.  ______________________________________________________________________________ I      WMSI = 1.00     % Normal = 100  X - Cannot   0 -                      (2) - Mildly 2 -          Segments  Size Interpret    Hyperkinetic 1 - Normal  Hypokinetic  Hypokinetic  1-2     small                                                    7 -          3-5    moderate 3 - Akinetic 4 -          5 -         6 - Akinetic Dyskinetic   6-14    large              Dyskinetic   Aneurysmal  w/scar       w/scar       15-16   diffuse  Left Ventricle Left ventricular size, wall motion and function are normal. The ejection fraction is 60-65%. There is normal left ventricular wall thickness. Left ventricular diastolic function is abnormal. Grade II or moderate diastolic dysfunction.  Right Ventricle Normal right ventricle size and systolic function. There is a pacemaker lead in the right ventricle.  Atria The left atrium is moderately dilated. Right atrial size is normal. Pacer wire  in right atrium. There is no atrial shunt seen.  Mitral Valve The mitral valve leaflets appear thickened, but open well. The mitral valve appears rheumatic. Calcified mitral apparatus. There is mild (1+) mitral regurgitation. There is moderate mitral stenosis. The mean mitral valve gradient is 6mmHg.  Tricuspid Valve The anterior tricuspid valve leaflet is thickened. There is mild (1+) tricuspid regurgitation. There is no tricuspid stenosis.  Aortic Valve There is mild (1+) aortic regurgitation. The mean AoV pressure gradient is 21mmHg. The prosthetic aortic valve is not well visualized.  Pulmonic Valve The pulmonic valve is not well seen, but is grossly normal.  Vessels The ascending aorta is Mildly dilated.  Pericardium There is no pericardial effusion.  ______________________________________________________________________________ MMode/2D Measurements & Calculations IVSd: 0.80 cm LVIDd: 4.3 cm LVIDs: 2.9 cm LVPWd: 1.0 cm FS: 32.6 % LV mass(C)d: 123.3 grams LV mass(C)dI: 72.5 grams/m2 Ao root diam: 2.4 cm LA dimension: 3.6 cm asc Aorta Diam: 4.3 cm  LA/Ao: 1.5 LA Volume Indexed (AL/bp): 44.1 ml/m2  Time Measurements MM HR: 86.0 BPM  Doppler Measurements & Calculations MV E max luis m: 101.0 cm/sec MV A max luis m: 125.0 cm/sec MV E/A: 0.81 MV max P.6 mmHg MV mean P.0 mmHg MV V2 VTI: 42.0 cm MV dec time: 0.10 sec  Ao V2 max: 303.0 cm/sec Ao max P.0 mmHg Ao V2 mean: 210.0 cm/sec Ao mean P.0 mmHg Ao V2 VTI: 59.8 cm AI P1/2t: 315.8 msec LV V1 max P.1 mmHg LV V1 max: 174.0 cm/sec LV V1 VTI: 31.1 cm MR ERO: 0.12 cm2 MR volume: 19.6 ml PA acc time: 0.13 sec TR max luis m: 229.0 cm/sec TR max P.0 mmHg E/E': 6.4 E/E' avg: 15.3 Lateral E/e': 14.8 Medial E/e': 15.7 Peak E' Luis M: 15.7 cm/sec  ______________________________________________________________________________ Report approved by: Leeanna Mosher 2021 10:36 AM       Cardiac Device Check - Inpatient    Result Date: 2022  Patient seen in  ER [facility], [facility] for evaluation and iterative programming of their pacemaker per MD orders. Patient admitted for chest pain. Device: Biotronik Eluna 8 DR-T Normal device function Mode: DDD-CLS  bpm AP: 91% : 0% Intrinsic Rhythm: SR @ 51 bpm Lead Trends Appear Stable: yes Estimated battery longevity to RRT = 7 years.  Atrial Arrhythmia: 0 AF [place] = 0% Anticoagulant: warfarin Ventricular Arrhythmia: 0 Setting Changes: RV pacing output increased to 2.4V@0.4ms to insure 1V safety margin. Plan: continue ER evaluation and treatment. Nick GOLDEN RN dual lead pacemaker I have reviewed and interpreted the device interrogation, settings, programming and nurse's summary. The device is functioning within normal device parameters. I agree with the current findings, assessment and plan.    MA Screening Digital Bilateral    Result Date: 3/29/2021  BILATERAL FULL FIELD DIGITAL SCREENING MAMMOGRAM Performed on: 3/29/21 Compared to: 02/12/2020 Mammo Screening Bilateral, 01/15/2019 Mammo Screening Bilateral, 01/03/2018 Mammo Screening Bilateral, 12/21/2016 Mammo Screening Bilateral, 06/18/2015 Mammo Diagnostic Left, 01/12/2015 MAMMO DIAGNOSTIC LEFT, 12/29/2014 Mammo Screening Bilateral, 01/02/2014 Mammo Diagnostic Right, and 12/26/2013 Mammo Screening Bilateral Findings: The breasts are heterogeneously dense, which may obscure small masses. There is no radiographic evidence of malignancy. This study was evaluated with the assistance of Computer-Aided Detection. Routine screening mammogram in one year is recommended. ACR BI-RADS Category 1: Negative    US Breast Left Limited 1-3 Quadrants    Result Date: 6/23/2022  EXAM: MA DIAGNOSTIC BILATERAL W/ ANTHONY, US BREAST LEFT LIMITED 1-3 QUADRANTS LOCATION: [facility] DATE/TIME: 6/23/2022 2:44 PM INDICATION:  Breast pain, left COMPARISON: 3/29/2021, 2/12/2020, 1/15/2019, 1/3/2018, 12/21/2016, 12/29/2014 MAMMOGRAPHIC FINDINGS: Bilateral full-field digital  diagnostic mammograms performed. The breasts are heterogeneously dense, which may obscure small masses. Images evaluated with the assistance of CAD. Breast tomosynthesis was used in interpretation. There are scattered benign-appearing round and chunky calcifications not significantly changed compared to the prior exams. Both breasts appear stable and benign. ULTRASOUND FINDINGS: Targeted left breast ultrasound was performed by both ultrasonographer and the radiologist in the location of pain as identified by the patient in the 4:00 to 5:00 position. The entire lower outer quadrant of the breast was examined.  No abnormality was identified.     IMPRESSION: 1.  No findings to suggest malignancy. No cause for the patient's pain is identified and follow-up should be based on clinical findings. ACR BI-RADS Category 2: Benign. Return to annual screening schedule is recommended. The findings and the recommendations were discussed with the patient at the time of exam.    US Abdomen Limited    Result Date: 2/10/2021  EXAM: US ABDOMEN LIMITED LOCATION: [facility] DATE/TIME: 2/10/2021 9:48 AM INDICATION: RUQ abdominal pain COMPARISON: CT 04/23/2020, ultrasound 09/25/2018 and 07/23/2014 TECHNIQUE: Limited abdominal ultrasound. FINDINGS: GALLBLADDER: No change in the dependent gallbladder polyp. No gallstones. BILE DUCTS: No biliary dilatation. The common duct measures 4 mm. LIVER: Normal parenchyma with smooth contour. Subcapsular heterogeneous but predominantly hyperechoic 1.4 cm lesion in the right lobe is unchanged. RIGHT KIDNEY: No hydronephrosis. PANCREAS: The visualized portions are normal. No ascites.     1.  Tiny gallbladder polyp is again noted. 2.  Stable small presumed subcapsular hemangioma in the liver.    Cardiac Device Check - In Clinic (Yearly in office)    Result Date: 6/24/2022  [email or web address] ([employer]) Pacemaker Device Check and follow-up visit with Dr. Vora Patient seen in clinic for  device evaluation and iterative programming. AP: 93% : 0% Mode: DDD-CLS 60-130bpm Presenting: ASVS 68bpm Underlying Rhythm: SR 60's Heart Rate: Stable, HR's per histogram range 60-120bpm and primarily 60-100bpm Sensing: Stable Pacing Threshold: Stable Impedance: Stable Battery Status: Estimating 6y2m remaining Device Site: Well healed Anticoagulant: Warfarin Atrial Arrhythmia: 4 mode switches logged, no available EGM's, burden 0%. Ventricular Arrhythmia: None Setting Change: None Care Plan: Normal device function. Remote device check scheduled for 9/22/22. Lisa Cannon RN I have reviewed and interpreted the device interrogation, settings, programming and nurse's summary. The device is functioning within normal device parameters. I agree with the current findings, assessment and plan.    Cardiac Device Check - In Clinic    Result Date: 6/21/2021  Type: Routine in-clinic IPG check. Presenting Rhythm: Ap- at 60 bpm, occasional PACs observed. Lead/Battery status: Stable lead and battery measurements. Arrhythmias: 27 Mode Switches, no EGMs for review. Patient recalls no palpitations or fast heart beats. No ventricular high rate detections. Anticoagulant: On warfarin s/p mechanical AVR. Comments: Normal device function; reprogrammed RV output from 1.6 to 1.8V to maintain x1.5 safety margin, Home Monitor reset for IEGM to occur around 09/29/2021. Heart rates 50s-130 bpm with excellent rate distribution. [facility]     Cardiac Device Check - Remote (Standing ORD 5 count)    Result Date: 12/26/2022  Type: routine remote pacemaker transmission. Device: Biotronik Eluna Pacing%/Programmed: AP 91%,  0% in DDD-CLS  ppm mode Lead(s): stable Battery longevity: estimated remaining 55%. Presenting: AP-VS rate 79 bpm. Atrial/ventricular arrhythmias: since 6/24/22, none detected. Device/Lead alerts: none Comments: normal pacemaker function. Plan: next remote transmission March 20, 2023.  DA Patterson, Device Specialist I have reviewed  and interpreted the device interrogation, settings, programming, and encounter summary. The device is functioning within normal device parameters. I agree with the current findings, assessment and plan.    Cardiac Device Check - Remote    Result Date: 10/5/2022  Type: routine remote pacemaker transmission. Device: Biotronik Eluna Pacing%/Programmed: AP 93%,  1%, in DDD-CLS  ppm mode. Lead(s): stable Battery longevity: 60% remaining Presenting: AP-VS rate 62 bpm. Atrial/Ventricular arrhythmias: since 6/24/22, none detected. Device/Lead alerts: none Comments: normal pacemaker function. Plan: next remote transmission on or near December 21, 2022.  DA Patterson Device Specialist I have reviewed and interpreted the device interrogation, settings, programming, and encounter summary. The device is functioning within normal device parameters. I agree with the current findings, assessment and plan.    Cardiac Device Check - Remote    Result Date: 4/7/2022  Type: routine remote pacemaker transmission. Presenting rhythm: AP-VS and normal sinus rate 65 bpm. Battery/lead status: stable Arrhythmias: since 1/12/22, none detected. Comments: normal pacemaker function.  DA Patterson Device Specialist I have reviewed and interpreted the device interrogation, settings, programming, and encounter summary. The device is functioning within normal device parameters. I agree with the current findings, assessment and plan.    Cardiac Device Check - Remote    Result Date: 12/27/2021  Type: routine remote pacemaker transmission. Presenting rhythm: AP-VS rate 65 bpm. Battery/lead status: stable Arrhythmias: since 9/28/21, none detected. Comments: normal pacemaker function.  DA Patterson Device Specialist I have reviewed and interpreted the device interrogation, settings, programming, and encounter summary. The device is functioning within normal device parameters. I agree with the current findings, assessment and plan.    Cardiac Device Check -  Remote    Result Date: 9/29/2021  Type: routine remote pacemaker transmission. Presenting rhythm: AP-VS rate 65 bpm. Battery/lead status: stable Arrhythmias: since 6/21/21, none detected. Comments: normal pacemaker function.  DA Patterson, Device Specialist I have reviewed and interpreted the device interrogation, settings, programming, and encounter summary. The device is functioning within normal device parameters. I agree with the current findings, assessment and plan.    Cardiac Device Check - Remote    Result Date: 4/29/2021  Type: routine remote pacemaker transmission. Presenting rhythm: AP-VS rate 66 bpm. Battery/lead status: stable Arrhythmias: since 11/1/20, none detected. Anticoagulant: warfarin Comments: normal magnet and pacemaker function. prd     NM MPI with Lexiscan    Result Date: 7/13/2022     A prior study was conducted on 9/24/2018.  This study has no change when compared with the prior study.    The nuclear stress test is negative for inducible myocardial ischemia or infarction.    The patient is at a low risk of future cardiac ischemic events.    The left ventricular ejection fraction at rest is 76%. Left ventricular function is hyperdynamic.     CTA Chest with Contrast    Result Date: 1/12/2022  EXAM: CTA CHEST WITH CONTRAST LOCATION: [facility] DATE/TIME: 1/12/2022 1:37 AM INDICATION: Chest pain. COVID. Evaluate lung involvement. PE, dissection, other. COMPARISON: 10/02/2020 radiograph. 09/24/2018 CTA CAP. TECHNIQUE: Multiphase helical acquisition through the chest was performed in arterial phase after the administration of IV contrast. 2D and 3D reconstructions were performed by the CT technologist. Dose reduction techniques were used. CONTRAST: 115 ML ISOVUE 370 FINDINGS: CT ANGIOGRAM CHEST: No pulmonary embolism. Nonaneurysmal aorta without dissection. Regions of mild to moderate aortic atherosclerosis, most pronounced in the mid to distal abdominal  aorta. 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 Patent aortic branch vessels. CHEST: LUNGS AND PLEURA: Mild motion artifact. Minimal opacities are present. No consolidation. No significant airway thickening. 3 mm left upper lobe nodule requiring no routine follow-up (series 9 image 110). Stable focal calcification along the left hemidiaphragm. No pleural effusion. No pneumothorax. MEDIASTINUM: No adenopathy. No pericardial effusion. Left subclavian approach dual-chamber pacer with lead tips towards the right atrial appendage and right ventricle. Prior sternotomy. Aortic valvular prosthetic. CORONARY ARTERY CALCIFICATION: Mild. ABDOMEN: Stable incidental subcapsular 10 mm liver cyst (series 9, image 273). Unremarkable pancreas, spleen, kidneys and adrenals. No bowel obstruction. No adenopathy. MUSCULOSKELETAL: Bony demineralization and degenerative changes. Regions of marginal vertebral body height loss, to include T4, T11 and L1.     IMPRESSION: 1.  No pulmonary embolism. 2.  Nonaneurysmal aorta without dissection. 3.  Mild motion artifact in the lungs and very minimal opacities. These opacities are suggestive of motion and atelectasis (it would be difficult to completely exclude early sequela of COVID).     CTA Head Neck with Contrast    Result Date: 10/29/2022  EXAM: CTA HEAD NECK WITH CONTRAST, CT HEAD WITHOUT CONTRAST LOCATION: [facility] DATE/TIME: 10/29/2022, 8:59 AM INDICATION: Headache, left facial, neck, and shoulder pain, on warfarin. COMPARISON: Head and neck CTA 01/06/2020. Chest CT 01/12/2022. CONTRAST: 75 mL Isovue 370. TECHNIQUE: Head and neck CT angiogram with IV contrast. Noncontrast head CT followed by axial helical CT images of the head and neck vessels obtained during the arterial phase of intravenous contrast administration. Axial 2D reconstructed images and multiplanar 3D MIP reconstructed images of the  head and neck vessels were performed by the technologist. Dose reduction techniques were used. All stenosis measurements made according to NASCET criteria unless otherwise specified. FINDINGS: NONCONTRAST HEAD CT: INTRACRANIAL CONTENTS: No intracranial hemorrhage, extra-axial collection, or mass effect.  No CT evidence of acute infarct. Mild presumed chronic small vessel ischemic changes. Normal ventricles and sulci. VISUALIZED ORBITS/SINUSES/MASTOIDS: Prior bilateral cataract surgery. Visualized portions of the orbits are otherwise unremarkable. No paranasal sinus mucosal disease. No middle ear or mastoid effusion. BONES/SOFT TISSUES: No acute abnormality. HEAD CTA: ANTERIOR CIRCULATION: No stenosis/occlusion, aneurysm, or high-flow vascular malformation. Standard [place] anatomy. POSTERIOR CIRCULATION: No stenosis/occlusion, aneurysm, or high-flow vascular malformation. Balanced vertebral arteries supply a normal basilar artery. DURAL VENOUS SINUSES: Expected enhancement of the major dural venous sinuses. NECK CTA: RIGHT CAROTID: Mild calcified atherosclerotic plaque at the bifurcation. No focal plaque ulceration. No measurable stenosis or dissection. LEFT CAROTID: Mild calcified atherosclerotic plaque at the bifurcation. No focal plaque ulceration. No measurable stenosis or dissection. VERTEBRAL ARTERIES: No focal stenosis or dissection. Balanced vertebral arteries. AORTIC ARCH: Classic aortic arch anatomy with no significant stenosis at the origin of the great vessels. NONVASCULAR STRUCTURES: Left chest wall cardiac pacer. Stable alignment of mild chronic T3 and T4 compression fractures.     IMPRESSION: HEAD CT: 1.  No acute intracranial process. 2.  Stable mild chronic small vessel ischemic disease since 01/06/2020. HEAD CTA: 1.  Normal CTA [place]. NECK CTA: 1.  No significant change since 01/06/2020. Patent major cervical arteries. No large vessel occlusion or evidence of dissection. 2.   Mild calcified atherosclerotic plaque at the carotid bifurcations. No significant carotid stenosis. 3.  Widely patent vertebral arteries.     Head CT w/o contrast    Result Date: 10/29/2022  EXAM: CTA HEAD NECK WITH CONTRAST, CT HEAD WITHOUT CONTRAST LOCATION: [facility] DATE/TIME: 10/29/2022, 8:59 AM INDICATION: Headache, left facial, neck, and shoulder pain, on warfarin. COMPARISON: Head and neck CTA 01/06/2020. Chest CT 01/12/2022. CONTRAST: 75 mL Isovue 370. TECHNIQUE: Head and neck CT angiogram with IV contrast. Noncontrast head CT followed by axial helical CT images of the head and neck vessels obtained during the arterial phase of intravenous contrast administration. Axial 2D reconstructed images and multiplanar 3D MIP reconstructed images of the head and neck vessels were performed by the technologist. Dose reduction techniques were used. All stenosis measurements made according to NASCET criteria unless otherwise specified. FINDINGS: NONCONTRAST HEAD CT: INTRACRANIAL CONTENTS: No intracranial hemorrhage, extra-axial collection, or mass effect.  No CT evidence of acute infarct. Mild presumed chronic small vessel ischemic changes. Normal ventricles and sulci. VISUALIZED ORBITS/SINUSES/MASTOIDS: Prior bilateral cataract surgery. Visualized portions of the orbits are otherwise unremarkable. No paranasal sinus mucosal disease. No middle ear or mastoid effusion. BONES/SOFT TISSUES: No acute abnormality. HEAD CTA: ANTERIOR CIRCULATION: No stenosis/occlusion, aneurysm, or high-flow vascular malformation. Standard [place] anatomy. POSTERIOR CIRCULATION: No stenosis/occlusion, aneurysm, or high-flow vascular malformation. Balanced vertebral arteries supply a normal basilar artery. DURAL VENOUS SINUSES: Expected enhancement of the major dural venous sinuses. NECK CTA: RIGHT CAROTID: Mild calcified atherosclerotic plaque at the bifurcation. No focal plaque ulceration. No measurable  stenosis or dissection. LEFT CAROTID: Mild calcified atherosclerotic plaque at the bifurcation. No focal plaque ulceration. No measurable stenosis or dissection. VERTEBRAL ARTERIES: No focal stenosis or dissection. Balanced vertebral arteries. AORTIC ARCH: Classic aortic arch anatomy with no significant stenosis at the origin of the great vessels. NONVASCULAR STRUCTURES: Left chest wall cardiac pacer. Stable alignment of mild chronic T3 and T4 compression fractures.     IMPRESSION: HEAD CT: 1.  No acute intracranial process. 2.  Stable mild chronic small vessel ischemic disease since 01/06/2020. HEAD CTA: 1.  Normal CTA [place]. NECK CTA: 1.  No significant change since 01/06/2020. Patent major cervical arteries. No large vessel occlusion or evidence of dissection. 2.  Mild calcified atherosclerotic plaque at the carotid bifurcations. No significant carotid stenosis. 3.  Widely patent vertebral arteries.     MA Diagnostic Bilateral w/Anthony    Result Date: 6/23/2022  EXAM: MA DIAGNOSTIC BILATERAL W/ ANTHONY, US BREAST LEFT LIMITED 1-3 QUADRANTS LOCATION: [facility] DATE/TIME: 6/23/2022 2:44 PM INDICATION:  Breast pain, left COMPARISON: 3/29/2021, 2/12/2020, 1/15/2019, 1/3/2018, 12/21/2016, 12/29/2014 MAMMOGRAPHIC FINDINGS: Bilateral full-field digital diagnostic mammograms performed. The breasts are heterogeneously dense, which may obscure small masses. Images evaluated with the assistance of CAD. Breast tomosynthesis was used in interpretation. There are scattered benign-appearing round and chunky calcifications not significantly changed compared to the prior exams. Both breasts appear stable and benign. ULTRASOUND FINDINGS: Targeted left breast ultrasound was performed by both ultrasonographer and the radiologist in the location of pain as identified by the patient in the 4:00 to 5:00 position. The entire lower outer quadrant of the breast was examined.  No abnormality was identified.      IMPRESSION: 1.  No findings to suggest malignancy. No cause for the patient's pain is identified and follow-up should be based on clinical findings. ACR BI-RADS Category 2: Benign. Return to annual screening schedule is recommended. The findings and the recommendations were discussed with the patient at the time of exam.    XR Hip Bilateral 1 View Each    Result Date: 4/8/2021  EXAM: XR PELVIS W 2 VW HIPS BILATERAL LOCATION: [facility] DATE/TIME: 4/8/2021 6:00 PM INDICATION: Right sided hip pain radiating to lateral thigh and mid abdomen. COMPARISON: None.     Normal joint spaces and alignment. No fracture. Symphyseal and SI joints appear normal.    DX Hip/Pelvis/Spine    Addendum Date: 2/3/2023    ADDENDUM: EXAM: DX HIP/PELVIS/SPINE LOCATION: [facility] DATE/TIME: 1/31/2023 11:46 AM INDICATION:  Osteopenia of lumbar spine. COMPARISON: ((10/10/2017)) TECHNIQUE: Dual-energy x-ray absorptiometry performed with routine technique. FINDINGS: Lumbar Spine: L1-L4: BMD: 0.923 g/cm2. T-score: -2.1. Z-score: -0.6 RIGHT Hip Total: BMD: 0.693 g/cm2. T-score: -2.5. Z-score: -1.3 RIGHT Hip Femoral neck: BMD: 0.681 g/cm2. T-score: -2.6. Z-score: -1.1 LEFT Hip Total: BMD: 0.748 g/cm2. T-score: -2.1. Z-score: -0.9 LEFT Hip Femoral neck: BMD: 0.701 g/cm2. T-score: -2.4. Z-score: -1.0 WHO Criteria: Normal: T score at or above -1 SD Osteopenia: T score between -1 and -2.5 SD Osteoporosis: T score at or below -2.5 SD COMPARISON: ((There has been a 4.6% decrease in L1-L4 BMD. There has been a 12.3% decrease in total hip BMD.)) FRAX Results: Not applicable due to osteoporosis. IMPRESSION: OSTEOPOROSIS. T score meets the World Health Organization (WHO) criteria for osteoporosis at one or more measured sites. The risk of osteoporotic fracture increased approximately two-fold for each SD decrease in T-score.     Result Date: 2/3/2023  EXAM: DX HIP/PELVIS/SPINE LOCATION: [facility] DATE/TIME: 1/31/2023 11:46 AM INDICATION:  Osteopenia of lumbar spine. COMPARISON: None. TECHNIQUE: Dual-energy x-ray absorptiometry performed with routine technique. FINDINGS: Lumbar Spine: L1-L4: BMD: 0.923 g/cm2. T-score: -2.1. Z-score: -0.6 RIGHT Hip Total: BMD: 0.693 g/cm2. T-score: -2.5. Z-score: -1.3 RIGHT Hip Femoral neck: BMD: 0.681 g/cm2. T-score: -2.6. Z-score: -1.1 LEFT Hip Total: BMD: 0.748 g/cm2. T-score: -2.1. Z-score: -0.9 LEFT Hip Femoral neck: BMD: 0.701 g/cm2. T-score: -2.4. Z-score: -1.0 WHO Criteria: Normal: T score at or above -1 SD Osteopenia: T score between -1 and -2.5 SD Osteoporosis: T score at or below -2.5 SD COMPARISON: None. FRAX Results: Not applicable due to osteoporosis.     IMPRESSION: OSTEOPOROSIS. T score meets the World Health Organization (WHO) criteria for osteoporosis at one or more measured sites. The risk of osteoporotic fracture increased approximately two-fold for each SD decrease in T-score.

## 2023-02-15 ENCOUNTER — ANTICOAGULATION THERAPY VISIT (OUTPATIENT)
Dept: ANTICOAGULATION | Facility: CLINIC | Age: 68
End: 2023-02-15
Payer: COMMERCIAL

## 2023-02-15 DIAGNOSIS — Z95.2 H/O MECHANICAL AORTIC VALVE REPLACEMENT: Primary | ICD-10-CM

## 2023-02-15 LAB — INR HOME MONITORING: 2.1 (ref 2–3.5)

## 2023-02-15 NOTE — PROGRESS NOTES
ANTICOAGULATION MANAGEMENT     Tammy Law 67 year old female is on warfarin with therapeutic INR result. (Goal INR 2.0-3.0)    Recent labs: (last 7 days)     02/15/23  0000   INR 2.1       ASSESSMENT     Source(s): Chart Review and Patient/Caregiver Call     Warfarin doses taken: Warfarin taken as instructed  Diet: No new diet changes identified  New illness, injury, or hospitalization: No  Medication/supplement changes: Patient has started alendronate once weekly which should not effect INR   clotting: No  Previous INR: Supratherapeutic  Additional findings: None       PLAN     Recommended plan for ongoing change(s) affecting INR     Dosing Instructions: Continue your current warfarin dose with next INR in 2 weeks       Summary  As of 2/15/2023    Full warfarin instructions:  2.5 mg every Tue, Fri; 5 mg all other days   Next INR check:  3/1/2023             Telephone call with Bisi who verbalizes understanding and agrees to plan    Patient to recheck with home meter    Education provided:   Please call back if any changes to your diet, medications or how you've been taking warfarin    Plan made per [facility] anticoagulation protocol    Sarah Gaona, RN  Anticoagulation Clinic  2/15/2023    _______________________________________________________________________     Anticoagulation Episode Summary     Current INR goal:  2.0-3.0   TTR:  72.4 % (11.4 mo)   Target end date:  Indefinite   Send INR reminders to:  [facility]    Indications    H/O mechanical aortic valve replacement [Z95.2]           Comments:  [employer] home monitor- managed by exception         Anticoagulation Care Providers     Provider Role Specialty Phone number    Arnold Anderson MD Referring Internal Medicine [phone]    Christina Hernandez MD Referring Family Medicine [phone]

## 2023-02-24 ENCOUNTER — DOCUMENTATION ONLY (OUTPATIENT)
Dept: ANTICOAGULATION | Facility: CLINIC | Age: 68
End: 2023-02-24
Payer: COMMERCIAL

## 2023-02-24 DIAGNOSIS — Z95.2 H/O MECHANICAL AORTIC VALVE REPLACEMENT: Primary | ICD-10-CM

## 2023-02-24 NOTE — PROGRESS NOTES
ANTICOAGULATION CLINIC REFERRAL RENEWAL REQUEST       An annual renewal order is required for all patients referred to [facility].?  Please review and sign the pended referral order for Tammy Law.       ANTICOAGULATION SUMMARY      Warfarin indication(s)   Mechanical AVR    Mechanical heart valve present?  Mechanical  AVR       Current goal range   INR: 2.0-3.0     Goal appropriate for indication? Goal INR 2-3, standard for indication(s) above     Time in Therapeutic Range (TTR)  (Goal > 60%) 72.4%       Office visit with referring provider's group within last year yes on 2/8/23       Sarah Gaona RN  [facility]

## 2023-03-01 ENCOUNTER — ANTICOAGULATION THERAPY VISIT (OUTPATIENT)
Dept: ANTICOAGULATION | Facility: CLINIC | Age: 68
End: 2023-03-01
Payer: COMMERCIAL

## 2023-03-01 DIAGNOSIS — Z95.2 H/O MECHANICAL AORTIC VALVE REPLACEMENT: Primary | ICD-10-CM

## 2023-03-01 LAB — INR HOME MONITORING: 2.4 (ref 2–3.5)

## 2023-03-01 NOTE — PROGRESS NOTES
ANTICOAGULATION MANAGEMENT     Tammy Law 67 year old female is on warfarin with therapeutic INR result. (Goal INR 2.0-3.0)    Recent labs: (last 7 days)     03/01/23  0000   INR 2.4       ASSESSMENT     Source(s): Chart Review and Patient/Caregiver Call     Warfarin doses taken: Warfarin taken as instructed  Diet: No new diet changes identified  New illness, injury, or hospitalization: No  Medication/supplement changes: None noted  Signs or symptoms of bleeding or clotting: No  Previous INR: Therapeutic last visit; previously outside of goal range  Additional findings: None         PLAN     Recommended plan for no diet, medication or health factor changes affecting INR     Dosing Instructions: Continue your current warfarin dose with next INR in 2 weeks       Summary  As of 3/1/2023    Full warfarin instructions:  2.5 mg every Tue, Fri; 5 mg all other days   Next INR check:  3/15/2023             Telephone call with Bisi who verbalizes understanding and agrees to plan    Patient to recheck with home meter    Education provided:   Goal range and lab monitoring: goal range and significance of current result, Importance of therapeutic range and Importance of following up at instructed interval  Resume manage by exception with home monitor. Continue to submit INR results to home monitor company.You will only be called when your result is out of range. Please call and notify [facility] if new medication started, dose missed, signs or symptoms of bleeding or clotting, or a surgery/procedure is scheduled.  Contact [phone]  with any changes, questions or concerns.     Plan made per [facility] anticoagulation protocol    Carolynn Lucas RN  Anticoagulation Clinic  3/1/2023    _______________________________________________________________________     Anticoagulation Episode Summary     Current INR goal:  2.0-3.0   TTR:  73.5 % (11.9 mo)   Target end date:  Indefinite   Send INR reminders to:  [facility]     Indications    H/O mechanical aortic valve replacement [Z95.2]           Comments:  [employer] home monitor- managed by exception         Anticoagulation Care Providers     Provider Role Specialty Phone number    Arnold Anderson MD Referring Internal Medicine [phone]    Christina Hernandez MD Referring Family Medicine [phone]    Karina Cifuentes MD Referring Family Medicine [phone]

## 2023-03-14 ENCOUNTER — ANTICOAGULATION THERAPY VISIT (OUTPATIENT)
Dept: ANTICOAGULATION | Facility: CLINIC | Age: 68
End: 2023-03-14
Payer: COMMERCIAL

## 2023-03-14 DIAGNOSIS — Z95.2 H/O MECHANICAL AORTIC VALVE REPLACEMENT: Primary | ICD-10-CM

## 2023-03-14 LAB — INR HOME MONITORING: 1.8 (ref 2–3.5)

## 2023-03-14 NOTE — PROGRESS NOTES
ANTICOAGULATION MANAGEMENT     Tammy Law 67 year old female is on warfarin with subtherapeutic INR result. (Goal INR 2.0-3.0)    Recent labs: (last 7 days)     03/14/23  0000   INR 1.8*       ASSESSMENT     Source(s): Chart Review and Patient/Caregiver Call     Warfarin doses taken: Warfarin taken as instructed  Diet: No new diet changes identified  New illness, injury, or hospitalization: No  Medication/supplement changes: None noted  Signs or symptoms of bleeding or clotting: No  Previous INR: Therapeutic last 2(+) visits  Additional findings: None         PLAN     Recommended plan for no diet, medication or health factor changes affecting INR     Dosing Instructions: Continue your current warfarin dose with next INR in 1 week       Summary  As of 3/14/2023    Full warfarin instructions:  2.5 mg every Tue, Fri; 5 mg all other days   Next INR check:  3/22/2023             Telephone call with Bisi who agrees to plan and repeated back plan correctly    Patient to recheck with home meter    Education provided:   None required    Plan made per [facility] anticoagulation protocol    Jacqueline Juárez RN  Anticoagulation Clinic  3/14/2023    _______________________________________________________________________     Anticoagulation Episode Summary     Current INR goal:  2.0-3.0   TTR:  72.8 % (1 y)   Target end date:  Indefinite   Send INR reminders to:  [facility]    Indications    H/O mechanical aortic valve replacement [Z95.2]           Comments:  [employer] home monitor- managed by exception         Anticoagulation Care Providers     Provider Role Specialty Phone number    Arnold Anderson MD Referring Internal Medicine [phone]    Christina Hernandez MD Referring Family Medicine [phone]    Karina Cifuentes MD Referring Family Medicine [phone]

## 2023-03-20 ENCOUNTER — ANCILLARY PROCEDURE (OUTPATIENT)
Dept: CARDIOLOGY | Facility: CLINIC | Age: 68
End: 2023-03-20
Attending: INTERNAL MEDICINE
Payer: COMMERCIAL

## 2023-03-20 DIAGNOSIS — I49.5 SICK SINUS SYNDROME (H): ICD-10-CM

## 2023-03-20 DIAGNOSIS — Z95.0 CARDIAC PACEMAKER IN SITU: Primary | ICD-10-CM

## 2023-03-20 DIAGNOSIS — Z95.0 CARDIAC PACEMAKER IN SITU: ICD-10-CM

## 2023-03-20 LAB
MDC_IDC_LEAD_IMPLANT_DT: NORMAL
MDC_IDC_LEAD_IMPLANT_DT: NORMAL
MDC_IDC_LEAD_LOCATION: NORMAL
MDC_IDC_LEAD_LOCATION: NORMAL
MDC_IDC_LEAD_LOCATION_DETAIL_1: NORMAL
MDC_IDC_LEAD_LOCATION_DETAIL_1: NORMAL
MDC_IDC_LEAD_MFG: NORMAL
MDC_IDC_LEAD_MFG: NORMAL
MDC_IDC_LEAD_MODEL: NORMAL
MDC_IDC_LEAD_MODEL: NORMAL
MDC_IDC_LEAD_POLARITY_TYPE: NORMAL
MDC_IDC_LEAD_POLARITY_TYPE: NORMAL
MDC_IDC_LEAD_SERIAL: NORMAL
MDC_IDC_LEAD_SERIAL: NORMAL
MDC_IDC_LEAD_SPECIAL_FUNCTION: NORMAL
MDC_IDC_LEAD_SPECIAL_FUNCTION: NORMAL
MDC_IDC_MSMT_BATTERY_DTM: NORMAL
MDC_IDC_MSMT_BATTERY_STATUS: NORMAL
MDC_IDC_MSMT_CAP_CHARGE_TYPE: NORMAL
MDC_IDC_MSMT_LEADCHNL_RA_IMPEDANCE_VALUE: 644 OHM
MDC_IDC_MSMT_LEADCHNL_RA_SENSING_INTR_AMPL: 3.2 MV
MDC_IDC_MSMT_LEADCHNL_RV_IMPEDANCE_VALUE: 605 OHM
MDC_IDC_MSMT_LEADCHNL_RV_SENSING_INTR_AMPL: 9.1 MV
MDC_IDC_PG_IMPLANT_DTM: NORMAL
MDC_IDC_PG_MFG: NORMAL
MDC_IDC_PG_MODEL: NORMAL
MDC_IDC_PG_SERIAL: NORMAL
MDC_IDC_PG_TYPE: NORMAL
MDC_IDC_SESS_CLINIC_NAME: NORMAL
MDC_IDC_SESS_DTM: NORMAL
MDC_IDC_SESS_TYPE: NORMAL
MDC_IDC_SET_BRADY_AT_MODE_SWITCH_MODE: NORMAL
MDC_IDC_SET_BRADY_AT_MODE_SWITCH_RATE: 160 {BEATS}/MIN
MDC_IDC_SET_BRADY_LOWRATE: 60 {BEATS}/MIN
MDC_IDC_SET_BRADY_MAX_SENSOR_RATE: 120 {BEATS}/MIN
MDC_IDC_SET_BRADY_MAX_TRACKING_RATE: 130 {BEATS}/MIN
MDC_IDC_SET_BRADY_MODE: NORMAL
MDC_IDC_SET_BRADY_PAV_DELAY_LOW: 200 MS
MDC_IDC_SET_BRADY_SAV_DELAY_LOW: 180 MS
MDC_IDC_SET_LEADCHNL_RA_PACING_AMPLITUDE: 1.6 V
MDC_IDC_SET_LEADCHNL_RA_PACING_POLARITY: NORMAL
MDC_IDC_SET_LEADCHNL_RA_PACING_PULSEWIDTH: 0.4 MS
MDC_IDC_SET_LEADCHNL_RA_SENSING_ADAPTATION_MODE: NORMAL
MDC_IDC_SET_LEADCHNL_RA_SENSING_POLARITY: NORMAL
MDC_IDC_SET_LEADCHNL_RV_PACING_AMPLITUDE: 2.4 V
MDC_IDC_SET_LEADCHNL_RV_PACING_POLARITY: NORMAL
MDC_IDC_SET_LEADCHNL_RV_PACING_PULSEWIDTH: 0.4 MS
MDC_IDC_SET_LEADCHNL_RV_SENSING_ADAPTATION_MODE: NORMAL
MDC_IDC_SET_LEADCHNL_RV_SENSING_POLARITY: NORMAL
MDC_IDC_STAT_BRADY_RA_PERCENT_PACED: 91 %
MDC_IDC_STAT_BRADY_RV_PERCENT_PACED: 0 %

## 2023-03-20 PROCEDURE — 93296 REM INTERROG EVL PM/IDS: CPT | Performed by: INTERNAL MEDICINE

## 2023-03-20 PROCEDURE — 93294 REM INTERROG EVL PM/LDLS PM: CPT | Performed by: INTERNAL MEDICINE

## 2023-03-22 ENCOUNTER — ANTICOAGULATION THERAPY VISIT (OUTPATIENT)
Dept: ANTICOAGULATION | Facility: CLINIC | Age: 68
End: 2023-03-22
Payer: COMMERCIAL

## 2023-03-22 LAB — INR HOME MONITORING: 1.9 (ref 2–3.5)

## 2023-03-22 NOTE — PROGRESS NOTES
ANTICOAGULATION MANAGEMENT     Tammy Law 67 year old female is on warfarin with subtherapeutic INR result. (Goal INR 2.0-3.0)    Recent labs: (last 7 days)     03/22/23  0000   INR 1.9*       ASSESSMENT     Source(s): Chart Review and Patient/Caregiver Call     Warfarin doses taken: Warfarin taken as instructed  Diet: No new diet changes identified  New illness, injury, or hospitalization: No  Medication/supplement changes: None noted  Signs or symptoms of bleeding or clotting: No  Previous INR: Subtherapeutic  Additional findings: Pt traveling to [place] [facility] 4/2/23 to 4/6/23. Pt will recheck INR with meter on 4/7/23.         PLAN     Recommended plan for ongoing change(s) affecting INR     Dosing Instructions: Increase your warfarin dose (8.3% change) with next INR in 2 weeks       Summary  As of 3/22/2023    Full warfarin instructions:  2.5 mg every Tue; 5 mg all other days   Next INR check:  4/7/2023             Telephone call with Bisi who agrees to plan and repeated back plan correctly    Patient to recheck with home meter    Education provided:   Please call back if any changes to your diet, medications or how you've been taking warfarin  Contact [phone]  with any changes, questions or concerns.     Plan made per [facility] anticoagulation protocol    Suzanne Johnson RN  Anticoagulation Clinic  3/22/2023    _______________________________________________________________________     Anticoagulation Episode Summary     Current INR goal:  2.0-3.0   TTR:  72.0 % (1 y)   Target end date:  Indefinite   Send INR reminders to:  [facility]    Indications    H/O mechanical aortic valve replacement [Z95.2]           Comments:  [employer] home monitor- managed by exception         Anticoagulation Care Providers     Provider Role Specialty Phone number    Arnold Anderson MD Referring Internal Medicine [phone]    Christina Hernandez MD Referring Family Medicine [phone]    Karina Cifuentes MD  [facility] Family Medicine [phone]

## 2023-03-23 DIAGNOSIS — E78.00 HYPERCHOLESTEROLEMIA: Primary | ICD-10-CM

## 2023-03-23 RX ORDER — ATORVASTATIN CALCIUM 40 MG/1
40 TABLET, FILM COATED ORAL DAILY
Qty: 90 TABLET | Refills: 3 | Status: SHIPPED | OUTPATIENT
Start: 2023-03-23 | End: 2024-02-15

## 2023-03-24 ENCOUNTER — MYC MEDICAL ADVICE (OUTPATIENT)
Dept: FAMILY MEDICINE | Facility: CLINIC | Age: 68
End: 2023-03-24
Payer: COMMERCIAL

## 2023-03-24 DIAGNOSIS — M54.81 OCCIPITAL NEURALGIA OF RIGHT SIDE: Primary | ICD-10-CM

## 2023-04-07 ENCOUNTER — ANTICOAGULATION THERAPY VISIT (OUTPATIENT)
Dept: ANTICOAGULATION | Facility: CLINIC | Age: 68
End: 2023-04-07
Payer: COMMERCIAL

## 2023-04-07 DIAGNOSIS — Z95.2 H/O MECHANICAL AORTIC VALVE REPLACEMENT: Primary | ICD-10-CM

## 2023-04-07 LAB — INR HOME MONITORING: 2.5 (ref 2–3.5)

## 2023-04-07 NOTE — PROGRESS NOTES
ANTICOAGULATION MANAGEMENT     Tammy Law 67 year old female is on warfarin with therapeutic INR result. (Goal INR 2.0-3.0)    Recent labs: (last 7 days)     04/07/23  0000   INR 2.5       ASSESSMENT     Source(s): Chart Review  Previous INR was Subtherapeutic  Medication, diet, health changes since last INR chart reviewed; none identified   Returned from [employer] trip yesterday             PLAN     Recommended plan for no diet, medication or health factor changes affecting INR     Dosing Instructions: Continue your current warfarin dose with next INR in 2 weeks       Summary  As of 4/7/2023    Full warfarin instructions:  2.5 mg every Tue; 5 mg all other days   Next INR check:  4/21/2023             Detailed voice message left for Bisi with dosing instructions and follow up date.   Sent [employer] message with dosing and follow up instructions    Patient to recheck with home meter    Education provided:   Please call back if any changes to your diet, medications or how you've been taking warfarin  Resume manage by exception with home monitor. Continue to submit INR results to home monitor company.You will only be called when your result is out of range. Please call and notify [facility] if new medication started, dose missed, signs or symptoms of bleeding or clotting, or a surgery/procedure is scheduled.    Plan made per [facility] anticoagulation protocol    Mary Sheriff RN  Anticoagulation Clinic  4/7/2023    _______________________________________________________________________     Anticoagulation Episode Summary     Current INR goal:  2.0-3.0   TTR:  73.3 % (1 y)   Target end date:  Indefinite   Send INR reminders to:  [facility]    Indications    H/O mechanical aortic valve replacement [Z95.2]           Comments:  [employer] home monitor- managed by exception         Anticoagulation Care Providers     Provider Role Specialty Phone number    Arnold Anderson MD Referring Internal Medicine  [phone]    Christina Hernandez MD Referring Family Medicine [phone]    Karina Cifuentes MD Referring Family Medicine [phone]

## 2023-04-21 ENCOUNTER — DOCUMENTATION ONLY (OUTPATIENT)
Dept: ANTICOAGULATION | Facility: CLINIC | Age: 68
End: 2023-04-21
Payer: COMMERCIAL

## 2023-04-21 DIAGNOSIS — Z95.2 H/O MECHANICAL AORTIC VALVE REPLACEMENT: Primary | ICD-10-CM

## 2023-04-21 LAB — INR HOME MONITORING: 2.3 (ref 2–3.5)

## 2023-04-21 NOTE — PROGRESS NOTES
ANTICOAGULATION  MANAGEMENT-Home Monitor Managed by Exception    Tammyrober Law 67 year old female is on warfarin with therapeutic INR result. (Goal INR 2.0-3.0)    Recent labs: (last 7 days)     04/21/23  0000   INR 2.3         Previous INR was Therapeutic    Medication, diet, health changes since last INR:chart reviewed; none identified    Contacted within the last 12 weeks by phone on 4/7/23      ZAIN Munoz was NOT contacted regarding therapeutic result today per home monitoring policy manage by exception agreement.   Current warfarin dose is to be continued:     Summary  As of 4/21/2023    Full warfarin instructions:  2.5 mg every Tue; 5 mg all other days   Next INR check:  5/5/2023           ?   Judith Delgadillo, RN  Anticoagulation Clinic  4/21/2023    _______________________________________________________________________     Anticoagulation Episode Summary     Current INR goal:  2.0-3.0   TTR:  73.2 % (1 y)   Target end date:  Indefinite   Send INR reminders to:  [facility]    Indications    H/O mechanical aortic valve replacement [Z95.2]           Comments:  [employer] home monitor- managed by exception         Anticoagulation Care Providers     Provider Role Specialty Phone number    Arnold Anderson MD Referring Internal Medicine [phone]    Christina Hernandez MD Referring Family Medicine [phone]    Karina Cifuentes MD Referring Family Medicine [phone]

## 2023-04-24 ENCOUNTER — MYC MEDICAL ADVICE (OUTPATIENT)
Dept: FAMILY MEDICINE | Facility: CLINIC | Age: 68
End: 2023-04-24
Payer: COMMERCIAL

## 2023-04-27 ENCOUNTER — OFFICE VISIT (OUTPATIENT)
Dept: NEUROLOGY | Facility: CLINIC | Age: 68
End: 2023-04-27
Payer: COMMERCIAL

## 2023-04-27 ENCOUNTER — LAB (OUTPATIENT)
Dept: LAB | Facility: CLINIC | Age: 68
End: 2023-04-27
Payer: COMMERCIAL

## 2023-04-27 VITALS
OXYGEN SATURATION: 99 % | SYSTOLIC BLOOD PRESSURE: 111 MMHG | DIASTOLIC BLOOD PRESSURE: 71 MMHG | HEIGHT: 64 IN | BODY MASS INDEX: 25.44 KG/M2 | HEART RATE: 66 BPM | WEIGHT: 149 LBS

## 2023-04-27 DIAGNOSIS — M54.81 CERVICO-OCCIPITAL NEURALGIA OF LEFT SIDE: ICD-10-CM

## 2023-04-27 DIAGNOSIS — M54.12 CERVICAL RADICULAR PAIN: Primary | ICD-10-CM

## 2023-04-27 DIAGNOSIS — E03.9 ACQUIRED HYPOTHYROIDISM: ICD-10-CM

## 2023-04-27 PROCEDURE — 99205 OFFICE O/P NEW HI 60 MIN: CPT | Performed by: PSYCHIATRY & NEUROLOGY

## 2023-04-27 PROCEDURE — 36415 COLL VENOUS BLD VENIPUNCTURE: CPT

## 2023-04-27 PROCEDURE — 84443 ASSAY THYROID STIM HORMONE: CPT

## 2023-04-27 RX ORDER — CHOLECALCIFEROL (VITAMIN D3) 50 MCG
1 TABLET ORAL DAILY
COMMUNITY

## 2023-04-27 NOTE — PROGRESS NOTES
INITIAL NEUROLOGY CONSULTATION    DATE OF VISIT: 4/27/2023  CLINIC LOCATION: [facility]  MRN: [id number]  PATIENT NAME: Tammy Law  YOB: 1955    REASON FOR VISIT:   Chief Complaint   Patient presents with     New Patient     Occipital neurologia      HISTORY OF PRESENT ILLNESS:                                                    Ms. Tammy Law is 67 year old right handed female patient with past medical history of sick sinus syndrome, status post pacemaker insertion, mechanical aortic valve replacement (on warfarin), depression, osteoporosis, anxiety, hypothyroidism, who was seen today for left occipital pain radiating down her shoulder and up her scalp.    Per patient's report, symptoms started in October 2022 after overnight sleep.  She has constant 5/10 pain in the back of her head that spreads down to her left cervical area/shoulder and behind her ear.  At times, it increases to 7-8/10.  She also experiences left frontal headaches that she believes are associated.  Moving her left shoulder might aggravate her symptoms.  No additional aggravating or alleviating factors.  Tried heat, ice, and Tylenol.  Heat and Tylenol are not helpful.  Ice increases pain.  Denies any focal neurological symptoms.  No prior history of head injuries, seizures, strokes, or CNS infections.    Family history is negative for neurological conditions.    TSH was elevated at 7.07 with normal free T4 of 1.41 in January 2023.  Vitamin D level was normal (53) at that time.  Latest INR was 2.3 in April 2023.  In October 2022 her BMP, BNP, troponin, and CBC were normal.    Head CT from 10/29/2022 was negative for acute intracranial process.  Stable mild chronic small vessel ischemic disease since January 2020 was noted.  Head and neck CTA from the same day was normal.  All images were personally reviewed and independently interpreted.    No additional useful information is available in  "Care Everywhere, which was reviewed.  PAST MEDICAL/SURGICAL HISTORY:                                                    I personally reviewed patient's past medical and surgical history with the patient at today's visit.  MEDICATIONS:                                                    I personally reviewed patient's medications and allergies with the patient at today's visit.  ALLERGIES:                                                      Allergies   Allergen Reactions     Demerol [Meperidine]      Hallucinations       Misc. Sulfonamide Containing Compounds Hives     Morphine      Sulfa Antibiotics      EXAM:                                                    VITAL SIGNS:   /71 (BP Location: Left arm, Patient Position: Sitting, Cuff Size: Adult Regular)   Pulse 66   Ht 1.626 m (5' 4\")   Wt 67.6 kg (149 lb)   SpO2 99%   BMI 25.58 kg/m    Mini-Cog Assessment:  Mini Cog Assessment  Clock Draw Score: 2 Normal  3 Item Recall: 2 objects recalled  Mini Cog Total Score: 4    General: pt is in NAD, cooperative.  Skin: normal turgor, moist mucous membranes, no lesions/rashes noticed.  HEENT: ATNC, EOMI, PERRL, white sclera, normal conjunctiva, no ptosis. No carotid bruits bilaterally.  Respiratory: lung sounds clear to auscultation bilaterally, no crackles, wheezes, rhonchi. Symmetric lung excursion, no accessory respiratory muscle use.  Cardiovascular: normal S1/S2, no murmurs/rubs/gallops.   Abdomen: Not distended.  : deferred.    Neurological:  Mental: alert, follows commands, Mini Cog Total Score: 4/5 with 2/3 on memory recall, no aphasia or dysarthria. Fund of knowledge is appropriate for age.  Cranial Nerves:  CN II: visual acuity - able to accurately count fingers with each eye. Visual fields intact, fundi: discs sharp, no papilledema and normal vessels bilaterally.  CN III, IV, VI: Has constant horizontal nystagmus, EOM intact, pupils equal and reactive  CN V: facial sensation nl  CN VII: face symmetric, " no facial droop  CN VIII: hearing normal  CN IX: palate elevation symmetric, uvula at midline  CN XI SCM normal, shoulder shrug nl  CN XII: tongue midline  Motor: Strength: 5/5 in all major groups of all extremities. Normal tone. No abnormal movements. No pronator drift b/l.  Reflexes: Triceps, biceps, brachioradialis, patellar, and achilles reflexes normal and symmetric. No clonus noted. Toes are down-going b/l.   Sensory: light touch, pinprick, and vibration intact. Romberg: negative.  Coordination: FNF and heel-shin tests intact b/l.   Gait:  Normal, able to tandem walk without difficulty.  There is tenderness to palpation over the left lesser occipital nerve.  DATA:   LABS/EEG/IMAGING/OTHER STUDIES: I reviewed pertinent medical records, as detailed in the history of present illness.  ASSESSMENT AND PLAN:      ASSESSMENT: Tammy Law is a 67 year old female patient with listed above past medical history, who presents with left-sided neck pain that radiates down to her left shoulder and up her scalp to the left frontal area.    We had a detailed discussion with the patient regarding her presenting complaints.  The neurological exam today is notable for tenderness to palpation over the left lesser occipital nerve and congenital nystagmus (unrelated).  I discussed with the patient that her clinical presentation might be consistent with left cervical occipital neuralgia, but additional differential includes C4-C5 left cervical radiculopathy.  I would suggest obtaining cervical spine MRI if her pacemaker is compatible with MRI.  Otherwise, would recommend cervical spine CT.  Once the imaging is completed, she will return back to discuss treatment options.    DIAGNOSES:    ICD-10-CM    1. Cervical radicular pain  M54.12 Adult Neurology  Referral      2. Cervico-occipital neuralgia of left side  M54.81         PLAN: At today's visit we thoroughly discussed various diagnostic possibilities for  patient's symptoms, necessary evaluation, and the plan, which includes:  Orders Placed This Encounter   Procedures     MR Cervical Spine w/o Contrast     No new medications.     Additional recommendations after the work-up.    Next follow-up appointment is in the next 4 weeks or earlier if needed.    Total Time: 61 minutes spent on the date of the encounter doing chart review, history and exam, documentation and further activities per the note.    Houston Sandoval MD  [facility] Neurology  (Chart documentation was completed in part with Dragon voice-recognition software. Even though reviewed, some grammatical, spelling, and word errors may remain.)

## 2023-04-27 NOTE — PATIENT INSTRUCTIONS
AFTER VISIT SUMMARY (AVS):    At today's visit we thoroughly discussed various diagnostic possibilities for your symptoms, necessary evaluation, and the plan, which includes:  Orders Placed This Encounter   Procedures    MR Cervical Spine w/o Contrast     No new medications.     Additional recommendations after the work-up.    Next follow-up appointment is in the next 4 weeks or earlier if needed.    Please do not hesitate to call me with any questions or concerns.    Thanks.

## 2023-04-27 NOTE — LETTER
4/27/2023         RE: Tammy Law  [address] [address]        Dear Colleague,    Thank you for referring your patient, Tammy Law, to the [facility] NEUROLOGY CLINICS [facility]. Please see a copy of my visit note below.    INITIAL NEUROLOGY CONSULTATION    DATE OF VISIT: 4/27/2023  CLINIC LOCATION: [facility]  MRN: [id number]  PATIENT NAME: Tammy Law  YOB: 1955    REASON FOR VISIT:   Chief Complaint   Patient presents with     New Patient     Occipital neurologia      HISTORY OF PRESENT ILLNESS:                                                    Ms. Tammy Law is 67 year old right handed female patient with past medical history of sick sinus syndrome, status post pacemaker insertion, mechanical aortic valve replacement (on warfarin), depression, osteoporosis, anxiety, hypothyroidism, who was seen today for left occipital pain radiating down her shoulder and up her scalp.    Per patient's report, symptoms started in October 2022 after overnight sleep.  She has constant 5/10 pain in the back of her head that spreads down to her left cervical area/shoulder and behind her ear.  At times, it increases to 7-8/10.  She also experiences left frontal headaches that she believes are associated.  Moving her left shoulder might aggravate her symptoms.  No additional aggravating or alleviating factors.  Tried heat, ice, and Tylenol.  Heat and Tylenol are not helpful.  Ice increases pain.  Denies any focal neurological symptoms.  No prior history of head injuries, seizures, strokes, or CNS infections.    Family history is negative for neurological conditions.    TSH was elevated at 7.07 with normal free T4 of 1.41 in January 2023.  Vitamin D level was normal (53) at that time.  Latest INR was 2.3 in April 2023.  In October 2022 her BMP, BNP, troponin, and CBC were normal.    Head CT from 10/29/2022 was negative for  "acute intracranial process.  Stable mild chronic small vessel ischemic disease since January 2020 was noted.  Head and neck CTA from the same day was normal.  All images were personally reviewed and independently interpreted.    No additional useful information is available in Care Everywhere, which was reviewed.  PAST MEDICAL/SURGICAL HISTORY:                                                    I personally reviewed patient's past medical and surgical history with the patient at today's visit.  MEDICATIONS:                                                    I personally reviewed patient's medications and allergies with the patient at today's visit.  ALLERGIES:                                                      Allergies   Allergen Reactions     Demerol [Meperidine]      Hallucinations       Misc. Sulfonamide Containing Compounds Hives     Morphine      Sulfa Antibiotics      EXAM:                                                    VITAL SIGNS:   /71 (BP Location: Left arm, Patient Position: Sitting, Cuff Size: Adult Regular)   Pulse 66   Ht 1.626 m (5' 4\")   Wt 67.6 kg (149 lb)   SpO2 99%   BMI 25.58 kg/m    Mini-Cog Assessment:  Mini Cog Assessment  Clock Draw Score: 2 Normal  3 Item Recall: 2 objects recalled  Mini Cog Total Score: 4    General: pt is in NAD, cooperative.  Skin: normal turgor, moist mucous membranes, no lesions/rashes noticed.  HEENT: ATNC, EOMI, PERRL, white sclera, normal conjunctiva, no ptosis. No carotid bruits bilaterally.  Respiratory: lung sounds clear to auscultation bilaterally, no crackles, wheezes, rhonchi. Symmetric lung excursion, no accessory respiratory muscle use.  Cardiovascular: normal S1/S2, no murmurs/rubs/gallops.   Abdomen: Not distended.  : deferred.    Neurological:  Mental: alert, follows commands, Mini Cog Total Score: 4/5 with 2/3 on memory recall, no aphasia or dysarthria. Fund of knowledge is appropriate for age.  Cranial Nerves:  CN II: visual acuity - " able to accurately count fingers with each eye. Visual fields intact, fundi: discs sharp, no papilledema and normal vessels bilaterally.  CN III, IV, VI: Has constant horizontal nystagmus, EOM intact, pupils equal and reactive  CN V: facial sensation nl  CN VII: face symmetric, no facial droop  CN VIII: hearing normal  CN IX: palate elevation symmetric, uvula at midline  CN XI SCM normal, shoulder shrug nl  CN XII: tongue midline  Motor: Strength: 5/5 in all major groups of all extremities. Normal tone. No abnormal movements. No pronator drift b/l.  Reflexes: Triceps, biceps, brachioradialis, patellar, and achilles reflexes normal and symmetric. No clonus noted. Toes are down-going b/l.   Sensory: light touch, pinprick, and vibration intact. Romberg: negative.  Coordination: FNF and heel-shin tests intact b/l.   Gait:  Normal, able to tandem walk without difficulty.  There is tenderness to palpation over the left lesser occipital nerve.  DATA:   LABS/EEG/IMAGING/OTHER STUDIES: I reviewed pertinent medical records, as detailed in the history of present illness.  ASSESSMENT AND PLAN:      ASSESSMENT: Tammy Law is a 67 year old female patient with listed above past medical history, who presents with left-sided neck pain that radiates down to her left shoulder and up her scalp to the left frontal area.    We had a detailed discussion with the patient regarding her presenting complaints.  The neurological exam today is notable for tenderness to palpation over the left lesser occipital nerve and congenital nystagmus (unrelated).  I discussed with the patient that her clinical presentation might be consistent with left cervical occipital neuralgia, but additional differential includes C4-C5 left cervical radiculopathy.  I would suggest obtaining cervical spine MRI if her pacemaker is compatible with MRI.  Otherwise, would recommend cervical spine CT.  Once the imaging is completed, she will return back to  discuss treatment options.    DIAGNOSES:    ICD-10-CM    1. Cervical radicular pain  M54.12 Adult Neurology  Referral      2. Cervico-occipital neuralgia of left side  M54.81         PLAN: At today's visit we thoroughly discussed various diagnostic possibilities for patient's symptoms, necessary evaluation, and the plan, which includes:  Orders Placed This Encounter   Procedures     MR Cervical Spine w/o Contrast     No new medications.     Additional recommendations after the work-up.    Next follow-up appointment is in the next 4 weeks or earlier if needed.    Total Time: 61 minutes spent on the date of the encounter doing chart review, history and exam, documentation and further activities per the note.    Houston Sandoval MD  [facility]  (Chart documentation was completed in part with Dragon voice-recognition software. Even though reviewed, some grammatical, spelling, and word errors may remain.)              Again, thank you for allowing me to participate in the care of your patient.        Sincerely,        Houston Sandoval MD

## 2023-04-28 ENCOUNTER — DOCUMENTATION ONLY (OUTPATIENT)
Dept: CARDIOLOGY | Facility: CLINIC | Age: 68
End: 2023-04-28
Payer: COMMERCIAL

## 2023-04-28 DIAGNOSIS — E03.9 ACQUIRED HYPOTHYROIDISM: ICD-10-CM

## 2023-04-28 LAB — TSH SERPL DL<=0.005 MIU/L-ACNC: 0.34 UIU/ML (ref 0.3–4.2)

## 2023-04-28 RX ORDER — LEVOTHYROXINE SODIUM 100 UG/1
100 TABLET ORAL DAILY
Qty: 90 TABLET | Refills: 3 | Status: SHIPPED | OUTPATIENT
Start: 2023-04-28 | End: 2023-08-11

## 2023-04-28 NOTE — PROGRESS NOTES
Is the implanted device safe for MRI Exam?  No  Is this device 3T compatible? N/A  Device Type: Pacemaker

## 2023-05-01 ENCOUNTER — TELEPHONE (OUTPATIENT)
Dept: NEUROLOGY | Facility: CLINIC | Age: 68
End: 2023-05-01
Payer: COMMERCIAL

## 2023-05-01 NOTE — TELEPHONE ENCOUNTER
Received voice mail from Friday 4/28 at 4:26pm from patient stating she received call and is not able to do MRI due to her pacemaker so would need to have a CT scan. Advised Dr Sandoval and will contact patient after order is placed to assist with scheduling.      Had patient file open to call and they called back to ask if I had received their message. Advised I had and was just checking to see if order was in and was going to call. Patient will call and schedule and I will reach out with possible f/u dates      Outbound call s/w Estela and advised since her CT is scheduled for 5/9 I would call her if anything opens up 5/10 or 5/11 otherwise I have her on the scheduled for 6/12 at 12 pm (was 1 pm but she moved to canceled spot while we spoke) and I would contact her if I can get her in earlier

## 2023-05-02 ENCOUNTER — LAB (OUTPATIENT)
Dept: LAB | Facility: CLINIC | Age: 68
End: 2023-05-02
Payer: COMMERCIAL

## 2023-05-02 DIAGNOSIS — M81.0 AGE-RELATED OSTEOPOROSIS WITHOUT CURRENT PATHOLOGICAL FRACTURE: ICD-10-CM

## 2023-05-02 DIAGNOSIS — Z79.899 MEDICATION MANAGEMENT: ICD-10-CM

## 2023-05-02 LAB
COLLECT DURATION TIME UR: 24 H
CREAT 24H UR-MRATE: 0.99 G/(24.H) (ref 0.72–1.51)
CREAT CL 24H UR+SERPL-VRATE: 69 ML/MIN
CREAT CL/1.73 SQ M 24H UR+SERPL-ARVRAT: 67 ML/MIN/1.7M2
CREAT SERPL-MCNC: 0.99 MG/DL (ref 0.51–0.95)
CREAT SERPL-MCNC: 0.99 MG/DL (ref 0.51–0.95)
CREAT UR-MCNC: 65.9 MG/DL
SPECIMEN VOL UR: 1500 ML

## 2023-05-02 PROCEDURE — 81050 URINALYSIS VOLUME MEASURE: CPT

## 2023-05-02 PROCEDURE — 36415 COLL VENOUS BLD VENIPUNCTURE: CPT

## 2023-05-02 PROCEDURE — 82575 CREATININE CLEARANCE TEST: CPT

## 2023-05-02 PROCEDURE — 82565 ASSAY OF CREATININE: CPT | Mod: 59

## 2023-05-05 ENCOUNTER — DOCUMENTATION ONLY (OUTPATIENT)
Dept: ANTICOAGULATION | Facility: CLINIC | Age: 68
End: 2023-05-05
Payer: COMMERCIAL

## 2023-05-05 DIAGNOSIS — Z95.2 H/O MECHANICAL AORTIC VALVE REPLACEMENT: Primary | ICD-10-CM

## 2023-05-05 LAB — INR HOME MONITORING: 2 (ref 2–3.5)

## 2023-05-05 NOTE — PROGRESS NOTES
ANTICOAGULATION  MANAGEMENT-Home Monitor Managed by Exception    Tammyrober Law 67 year old female is on warfarin with therapeutic INR result. (Goal INR 2.0-3.0)    Recent labs: (last 7 days)     05/05/23  0000   INR 2.0         Previous INR was Therapeutic    Medication, diet, health changes since last INR:chart reviewed; none identified    Contacted within the last 12 weeks by phone on 4/7/23      PLAN     Tammy was NOT contacted regarding therapeutic result today per home monitoring policy manage by exception agreement.   Current warfarin dose is to be continued:     Summary  As of 5/5/2023    Full warfarin instructions:  2.5 mg every Tue; 5 mg all other days   Next INR check:  5/19/2023           ?   Natalie Khalil RN  Anticoagulation Clinic  5/5/2023    _______________________________________________________________________     Anticoagulation Episode Summary     Current INR goal:  2.0-3.0   TTR:  73.2 % (1 y)   Target end date:  Indefinite   Send INR reminders to:  [facility]    Indications    H/O mechanical aortic valve replacement [Z95.2]           Comments:  [employer] home monitor- managed by exception         Anticoagulation Care Providers     Provider Role Specialty Phone number    Arnold Anderson MD Referring Internal Medicine [phone]    Christina Hernandez MD Referring Family Medicine [phone]    Karina Cifuentes MD Referring Family Medicine [phone]

## 2023-05-09 ENCOUNTER — HOSPITAL ENCOUNTER (OUTPATIENT)
Dept: CT IMAGING | Facility: HOSPITAL | Age: 68
Discharge: HOME | End: 2023-05-09
Attending: PSYCHIATRY & NEUROLOGY | Admitting: PSYCHIATRY & NEUROLOGY
Payer: COMMERCIAL

## 2023-05-09 DIAGNOSIS — M54.81 CERVICO-OCCIPITAL NEURALGIA OF LEFT SIDE: ICD-10-CM

## 2023-05-09 DIAGNOSIS — M54.12 CERVICAL RADICULAR PAIN: ICD-10-CM

## 2023-05-09 PROCEDURE — 72125 CT NECK SPINE W/O DYE: CPT

## 2023-05-19 ENCOUNTER — DOCUMENTATION ONLY (OUTPATIENT)
Dept: ANTICOAGULATION | Facility: CLINIC | Age: 68
End: 2023-05-19
Payer: COMMERCIAL

## 2023-05-19 DIAGNOSIS — Z95.2 H/O MECHANICAL AORTIC VALVE REPLACEMENT: Primary | ICD-10-CM

## 2023-05-19 LAB — INR HOME MONITORING: 2.4 (ref 2–3.5)

## 2023-05-19 NOTE — PROGRESS NOTES
ANTICOAGULATION  MANAGEMENT-Home Monitor Managed by Exception    Tammyrober Law 67 year old female is on warfarin with therapeutic INR result. (Goal INR 2.0-3.0)    Recent labs: (last 7 days)     05/19/23  0000   INR 2.4       Previous INR was Therapeutic  Medication, diet, health changes since last INR:chart reviewed; none identified  Contacted within the last 12 weeks by phone on 4/7/23      ZAIN Munoz was NOT contacted regarding therapeutic result today per home monitoring policy manage by exception agreement.   Current warfarin dose is to be continued:     Summary  As of 5/19/2023    Full warfarin instructions:  2.5 mg every Tue; 5 mg all other days   Next INR check:  6/2/2023               Sarah Gaona RN  Anticoagulation Clinic  5/19/2023    _______________________________________________________________________     Anticoagulation Episode Summary     Current INR goal:  2.0-3.0   TTR:  73.2 % (1 y)   Target end date:  Indefinite   Send INR reminders to:  [facility]    Indications    H/O mechanical aortic valve replacement [Z95.2]           Comments:  [employer] home monitor- managed by exception         Anticoagulation Care Providers     Provider Role Specialty Phone number    Arnold Anderson MD Referring Internal Medicine [phone]    Christina Hernandez MD Referring Family Medicine [phone]    Karina Cifuentes MD Referring Family Medicine [phone]

## 2023-05-30 ENCOUNTER — OFFICE VISIT (OUTPATIENT)
Dept: NEUROLOGY | Facility: CLINIC | Age: 68
End: 2023-05-30
Payer: COMMERCIAL

## 2023-05-30 VITALS — SYSTOLIC BLOOD PRESSURE: 109 MMHG | HEART RATE: 77 BPM | DIASTOLIC BLOOD PRESSURE: 64 MMHG | OXYGEN SATURATION: 97 %

## 2023-05-30 DIAGNOSIS — M54.81 CERVICO-OCCIPITAL NEURALGIA OF LEFT SIDE: Primary | ICD-10-CM

## 2023-05-30 PROCEDURE — 99213 OFFICE O/P EST LOW 20 MIN: CPT | Performed by: PSYCHIATRY & NEUROLOGY

## 2023-05-30 RX ORDER — IBUPROFEN 200 MG
1 CAPSULE ORAL DAILY
COMMUNITY

## 2023-05-30 NOTE — LETTER
5/30/2023         RE: Tammy Law  [address] [address]        Dear Colleague,    Thank you for referring your patient, Tammy Law, to the [facility] NEUROLOGY CLINICS [facility]. Please see a copy of my visit note below.    ESTABLISHED PATIENT NEUROLOGY NOTE    DATE OF VISIT: 5/30/2023  CLINIC LOCATION: [facility]  MRN: [id number]  PATIENT NAME: Tammy Law  YOB: 1955    REASON FOR VISIT:   Chief Complaint   Patient presents with     Follow Up     Review Imaging- neck pain/occipital neurologia       SUBJECTIVE:                                                      HISTORY OF PRESENT ILLNESS: Patient is here to follow up regarding left-sided neck pain that radiates down to her left shoulder and up her scalp in the left frontal area. Please refer to my initial note from 4/27/2023 for further information.    Since the last visit, the patient reports that her symptoms are unchanged.  She denies interval development of new neurological symptoms.    CT of the cervical spine from 5/9/2023 demonstrated multilevel degenerative changes, most pronounced at C6-7 where there is mild spinal canal stenosis.  Images were personally reviewed and independently interpreted.  EXAM:                                                    Physical Exam:   Vitals: /64 (BP Location: Right arm, Patient Position: Sitting, Cuff Size: Adult Regular)   Pulse 77   SpO2 97%     General: pt is in NAD, cooperative.  Skin: normal turgor, moist mucous membranes, no lesions/rashes noticed.  HEENT: ATNC, white sclera, normal conjunctiva.  Respiratory: Symmetric lung excursion, no accessory respiratory muscle use.  Abdomen: Non distended.  Neurological: awake, cooperative, follows commands, no exam changes compared to the initial visit.    ASSESSMENT AND PLAN:                                                    Assessment: 67 year old female patient  presents for follow-up of left-sided neck pain that radiates down to her left shoulder and up her scalp in the left frontal area after the completion of recommended work-up.  We reviewed CT images together.  Found C6-7 changes would not explain her symptoms.  I discussed with the patient that most likely her clinical presentation is consistent with left occipital neuralgia.  We reviewed available treatment options and the plan, as summarized above.    Diagnoses:    ICD-10-CM    1. Cervico-occipital neuralgia of left side  M54.81         Plan: At today's visit we thoroughly discussed various curent symptoms, cervical spine CT results, suspected diagnosis, available treatment options, and the plan, which includes:  Orders Placed This Encounter   Procedures     Pain Management  Referral     We reviewed the importance of ergonomic positioning of her head/neck at night and during the day to prevent recurrence of her symptoms.  We also discussed several additional treatment options (gabapentin, Lyrica, Effexor, Cymbalta, and physical therapy) if occipital nerve block does not help.    Next follow-up appointment is in the next 3 months or earlier if needed.    Total Time: 21 minutes spent on the date of the encounter doing chart review, history and exam, documentation and further activities per the note.    Houston Sandoval MD  [facility]  (Chart documentation was completed in part with Dragon voice-recognition software. Even though reviewed, some grammatical, spelling, and word errors may remain.)        Again, thank you for allowing me to participate in the care of your patient.        Sincerely,        Houston Sandoval MD

## 2023-05-30 NOTE — PATIENT INSTRUCTIONS
AFTER VISIT SUMMARY (AVS):    At today's visit we thoroughly discussed various curent symptoms, cervical spine CT results, suspected diagnosis, available treatment options, and the plan, which includes:  Orders Placed This Encounter   Procedures    Pain Management  Referral     We reviewed the importance of ergonomic positioning of your head/neck at night and during the day to prevent recurrence of your symptoms.  We also discussed several additional treatment options if occipital nerve block does not help.    Next follow-up appointment is in the next 3 months or earlier if needed.    Please do not hesitate to call me with any questions or concerns.    Thanks.

## 2023-05-30 NOTE — PROGRESS NOTES
ESTABLISHED PATIENT NEUROLOGY NOTE    DATE OF VISIT: 5/30/2023  CLINIC LOCATION: [facility]  MRN: [id number]  PATIENT NAME: Tammy Law  YOB: 1955    REASON FOR VISIT:   Chief Complaint   Patient presents with     Follow Up     Review Imaging- neck pain/occipital neurologia       SUBJECTIVE:                                                      HISTORY OF PRESENT ILLNESS: Patient is here to follow up regarding left-sided neck pain that radiates down to her left shoulder and up her scalp in the left frontal area. Please refer to my initial note from 4/27/2023 for further information.    Since the last visit, the patient reports that her symptoms are unchanged.  She denies interval development of new neurological symptoms.    CT of the cervical spine from 5/9/2023 demonstrated multilevel degenerative changes, most pronounced at C6-7 where there is mild spinal canal stenosis.  Images were personally reviewed and independently interpreted.  EXAM:                                                    Physical Exam:   Vitals: /64 (BP Location: Right arm, Patient Position: Sitting, Cuff Size: Adult Regular)   Pulse 77   SpO2 97%     General: pt is in NAD, cooperative.  Skin: normal turgor, moist mucous membranes, no lesions/rashes noticed.  HEENT: ATNC, white sclera, normal conjunctiva.  Respiratory: Symmetric lung excursion, no accessory respiratory muscle use.  Abdomen: Non distended.  Neurological: awake, cooperative, follows commands, no exam changes compared to the initial visit.    ASSESSMENT AND PLAN:                                                    Assessment: 67 year old female patient presents for follow-up of left-sided neck pain that radiates down to her left shoulder and up her scalp in the left frontal area after the completion of recommended work-up.  We reviewed CT images together.  Found C6-7 changes would not explain her symptoms.  I discussed with the  patient that most likely her clinical presentation is consistent with left occipital neuralgia.  We reviewed available treatment options and the plan, as summarized above.    Diagnoses:    ICD-10-CM    1. Cervico-occipital neuralgia of left side  M54.81         Plan: At today's visit we thoroughly discussed various curent symptoms, cervical spine CT results, suspected diagnosis, available treatment options, and the plan, which includes:  Orders Placed This Encounter   Procedures     Pain Management  Referral     We reviewed the importance of ergonomic positioning of her head/neck at night and during the day to prevent recurrence of her symptoms.  We also discussed several additional treatment options (gabapentin, Lyrica, Effexor, Cymbalta, and physical therapy) if occipital nerve block does not help.    Next follow-up appointment is in the next 3 months or earlier if needed.    Total Time: 21 minutes spent on the date of the encounter doing chart review, history and exam, documentation and further activities per the note.    Houston Sandoval MD  [facility]  (Chart documentation was completed in part with Dragon voice-recognition software. Even though reviewed, some grammatical, spelling, and word errors may remain.)

## 2023-06-02 ENCOUNTER — DOCUMENTATION ONLY (OUTPATIENT)
Dept: ANTICOAGULATION | Facility: CLINIC | Age: 68
End: 2023-06-02
Payer: COMMERCIAL

## 2023-06-02 DIAGNOSIS — Z95.2 H/O MECHANICAL AORTIC VALVE REPLACEMENT: Primary | ICD-10-CM

## 2023-06-02 LAB — INR HOME MONITORING: 2.4 (ref 2–3.5)

## 2023-06-02 ASSESSMENT — PAIN SCALES - PAIN ENJOYMENT GENERAL ACTIVITY SCALE (PEG)
INTERFERED_ENJOYMENT_LIFE: 2
PEG_TOTALSCORE: 3
INTERFERED_GENERAL_ACTIVITY: 2
AVG_PAIN_PASTWEEK: 5

## 2023-06-02 NOTE — PROGRESS NOTES
ANTICOAGULATION  MANAGEMENT-Home Monitor Managed by Exception    Tammyrober Law 67 year old female is on warfarin with therapeutic INR result. (Goal INR 2.0-3.0)    Recent labs: (last 7 days)     06/02/23  0000   INR 2.4       Previous INR was Therapeutic  Medication, diet, health changes since last INR:chart reviewed; none identified  Contacted within the last 12 weeks by phone on 4/7/23      ZAIN Munoz was NOT contacted regarding therapeutic result today per home monitoring policy manage by exception agreement.   Current warfarin dose is to be continued:     Summary  As of 6/2/2023    Full warfarin instructions:  2.5 mg every Tue; 5 mg all other days   Next INR check:  6/16/2023               Sarah Gaona RN  Anticoagulation Clinic  6/2/2023    _______________________________________________________________________     Anticoagulation Episode Summary     Current INR goal:  2.0-3.0   TTR:  73.2 % (1 y)   Target end date:  Indefinite   Send INR reminders to:  [facility]    Indications    H/O mechanical aortic valve replacement [Z95.2]           Comments:  [employer] home monitor- managed by exception         Anticoagulation Care Providers     Provider Role Specialty Phone number    Arnold Anderson MD Referring Internal Medicine [phone]    Christina Hernandez MD Referring Family Medicine [phone]    Karina Cifuentes MD Referring Family Medicine [phone]

## 2023-06-05 DIAGNOSIS — Z95.2 H/O MECHANICAL AORTIC VALVE REPLACEMENT: ICD-10-CM

## 2023-06-05 RX ORDER — WARFARIN SODIUM 2.5 MG/1
TABLET ORAL
Qty: 217 TABLET | Refills: 2 | OUTPATIENT
Start: 2023-06-05

## 2023-06-07 ENCOUNTER — ANCILLARY PROCEDURE (OUTPATIENT)
Dept: CARDIOLOGY | Facility: CLINIC | Age: 68
End: 2023-06-07
Attending: INTERNAL MEDICINE
Payer: COMMERCIAL

## 2023-06-07 VITALS
RESPIRATION RATE: 16 BRPM | SYSTOLIC BLOOD PRESSURE: 82 MMHG | BODY MASS INDEX: 24.92 KG/M2 | WEIGHT: 146 LBS | HEIGHT: 64 IN | DIASTOLIC BLOOD PRESSURE: 50 MMHG | HEART RATE: 76 BPM

## 2023-06-07 DIAGNOSIS — I49.5 SICK SINUS SYNDROME (H): ICD-10-CM

## 2023-06-07 DIAGNOSIS — R53.83 FATIGUE, UNSPECIFIED TYPE: ICD-10-CM

## 2023-06-07 DIAGNOSIS — I05.1 RHEUMATIC MITRAL REGURGITATION: ICD-10-CM

## 2023-06-07 DIAGNOSIS — Z95.0 CARDIAC PACEMAKER IN SITU: ICD-10-CM

## 2023-06-07 DIAGNOSIS — Z95.2 S/P AVR (AORTIC VALVE REPLACEMENT): Primary | ICD-10-CM

## 2023-06-07 PROCEDURE — 93280 PM DEVICE PROGR EVAL DUAL: CPT | Performed by: INTERNAL MEDICINE

## 2023-06-07 PROCEDURE — 99214 OFFICE O/P EST MOD 30 MIN: CPT | Performed by: INTERNAL MEDICINE

## 2023-06-07 NOTE — LETTER
6/7/2023    CUCO CIFUENTES MD  [address] [facility] [address]    RE: Tammy Law       Dear Colleague,     I had the pleasure of seeing Tammy Law in the [facility].      Thank you, Dr. Cuco Cifuentes, for asking the [facility] team to see Ms. Tammy Law to follow-up on aortic and mitral valve disease, sick sinus syndrome status post permanent pacemaker insertion.    Assessment/Recommendations   Assessment:    1.  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  Has been on warfarin anticoagulation over the past 29 years without any bleeding complications.  Echocardiogram last year demonstrated normal prosthetic valve function.  Will be getting an echo in the upcoming weeks to follow-up on her mitral valve disease.  2.  Rheumatic mitral valve stenosis/regurgitation, mild by echocardiogram 1 year ago.  She continues to report symptoms of fatigue.  We will check an echocardiogram to see if there has been interval progression.  3.  Sick sinus syndrome status post dual-chamber pacemaker insertion.  Device check today demonstrates normal device operation.  No atrial or ventricular arrhythmias.  4.  Fatigue, possibly multifactorial.  She does have a history of snoring but states she had a sleep apnea test many years ago which was negative.  Did bring up the option of referring her to sleep medicine for reassessment.    Plan:  1.  Continue current medications  2.  Echocardiogram to reassess mitral valve disease.  3.  Tentative follow-up in 1 year if echo unrevealing.       History of Present Illness    Ms. Tammy Law is a 67 year old female with history of rheumatic aortic and mitral valve disease, status post mechanical aortic valve replacement in 1994 with Saint Daron prosthesis, sick sinus syndrome status post permanent pacemaker insertion who presents to the office today  "for follow-up visit.  States she has been feeling fairly well over the last year.  Did retire at the end of December 2022 and has been retired for the last 5 months.  Has been active in her yard and around her home without any concerning symptoms.  Continues to note fatigue and did undergo a nuclear stress test in July 2022 which was negative.  Echocardiogram was also unrevealing as to a cardiac cause.  She has been told by her boyfriend that she snores loudly and I did recommend consideration of a sleep study last year although nothing has been arranged.  Her blood pressure is low today and she believes it is due to a water pill she took last night for puffiness after eating high sodium containing foods.    ECG (personally reviewed): No ECG today.  Device check was performed and conjunction with her visit demonstrating normal device operation with no evidence of atrial or ventricular arrhythmias.  Lead and battery status stable.    Cardiac Imaging Studies (personally reviewed): No recent cardiac imaging     Physical Examination Review of Systems   BP (!) 82/50 (BP Location: Right arm, Patient Position: Sitting, Cuff Size: Adult Regular)   Pulse 76   Resp 16   Ht 1.626 m (5' 4\")   Wt 66.2 kg (146 lb)   BMI 25.06 kg/m    Body mass index is 25.06 kg/m .  Wt Readings from Last 3 Encounters:   06/07/23 66.2 kg (146 lb)   04/27/23 67.6 kg (149 lb)   02/08/23 67.6 kg (149 lb)     General Appearance:   Awake, Alert, No acute distress.   HEENT:  No scleral icterus; the mucous membranes were pink and moist.   Neck: No cervical bruits or jugular venous distention    Chest: The spine was straight. The chest was symmetric.   Lungs:   Respirations unlabored; the lungs are clear to auscultation. No wheezing   Cardiovascular:    Regular rate and rhythm.  S1 normal, S2 crisp and mechanical.  1/6 systolic murmur heard across the left upper sternal border.  No diastolic murmur.   Abdomen:  No organomegaly, masses, bruits, or " "tenderness. Bowels sounds are present   Extremities:  No peripheral edema bilaterally   Skin: No xanthelasma. Warm, Dry.   Musculoskeletal: No tenderness.   Neurologic: Mood and affect are appropriate.    Enc Vitals  BP: (!) 82/50  Pulse: 76  Resp: 16  Weight: 66.2 kg (146 lb)  Height: 162.6 cm (5' 4\")                                         Medical History  Surgical History Family History Social History   Past Medical History:   Diagnosis Date    Anxiety     Chronic anticoagulation     Depression     Disease of thyroid gland     Hypothyroidism    High cholesterol     Hyperlipidemia     Hypertension     Hypothyroidism     Pacemaker     biotronik    SSS (sick sinus syndrome) (H) 2/4/2020    Past Surgical History:   Procedure Laterality Date    AORTIC VALVE REPLACEMENT      APPENDECTOMY      BIOPSY BREAST Left 2015    BREAST CYST EXCISION      CARDIAC CATHETERIZATION      HC PART REMV BONE METATARSAL HEAD,EA Right 06/23/2017    Procedure: EXOSTECTOMY 2ND METATARSAL RIGHT FOOT;  Surgeon: Jessee Mccauley DPM;  Location: [facility];  Service: Podiatry    HYSTERECTOMY  2000    IMPLANT PACEMAKER      IMPLANT PACEMAKER      OOPHORECTOMY  2000    OTHER SURGICAL HISTORY      Hemmorhoidectomy    RELEASE CARPAL TUNNEL Bilateral     TOE SURGERY      TYMPANOSTOMY TUBE PLACEMENT      ZZC REPLACE AORT VALV,PROSTH VALV      Description: Aortic Valve Replacement;  Proc Date: 01/01/1995;    Family History   Problem Relation Age of Onset    Cancer Paternal Grandfather         Stomach    Ovarian Cancer Cousin     Pacemaker Mother     Diabetes Mother     Coronary Artery Disease Father     Pacemaker Father     Diabetes Father     Prostate Cancer Father     Depression Sister     Thyroid Disease Sister     No Known Problems Daughter     No Known Problems Maternal Grandmother     Colon Cancer Maternal Grandfather     No Known Problems Paternal Grandmother     No Known Problems Maternal Aunt     No Known Problems Paternal Aunt  "    Kidney Cancer Brother     Coronary Artery Disease Brother     Prostate Cancer Brother     Depression Sister     Thyroid Disease Sister     Thyroid Disease Brother     Hereditary Breast and Ovarian Cancer Syndrome No family hx of     Breast Cancer No family hx of     Endometrial Cancer No family hx of     Social History     Socioeconomic History    Marital status: Single     Spouse name: Not on file    Number of children: 0    Years of education: Not on file    Highest education level: Not on file   Occupational History    Not on file   Tobacco Use    Smoking status: Never    Smokeless tobacco: Never   Vaping Use    Vaping status: Never Used   Substance and Sexual Activity    Alcohol use: Yes     Comment: Alcoholic Drinks/day: twice per year    Drug use: No    Sexual activity: Not Currently     Birth control/protection: None   Other Topics Concern    Parent/sibling w/ CABG, MI or angioplasty before 65F 55M? No   Social History Narrative    Not on file     Social Determinants of Health     Financial Resource Strain: Not on file   Food Insecurity: Not on file   Transportation Needs: Not on file   Physical Activity: Not on file   Stress: Not on file   Social Connections: Not on file   Intimate Partner Violence: Not on file   Housing Stability: Not on file          Medications  Allergies   Current Outpatient Medications   Medication Sig Dispense Refill    alendronate (FOSAMAX) 70 MG tablet Take 1 tablet (70 mg) by mouth every 7 days 13 tablet 3    amoxicillin (AMOXIL) 500 MG capsule Take 4 capsules (2,000 mg) by mouth daily as needed (dental work due to aortic valve) For dental appts 24 capsule 4    atorvastatin (LIPITOR) 40 MG tablet Take 1 tablet (40 mg) by mouth daily 90 tablet 3    calcium carbonate 500 mg, elemental, (OSCAL 500) 1250 (500 Ca) MG TABS tablet Take 1 tablet by mouth daily Take 1 tab by mouth every am      citalopram (CELEXA) 20 MG tablet Take 1 tablet (20 mg) by mouth daily 90 tablet 3    furosemide  (LASIX) 40 MG tablet Take 1 tablet (40 mg) by mouth daily as needed 90 tablet 1    levothyroxine (SYNTHROID/LEVOTHROID) 100 MCG tablet Take 1 tablet (100 mcg) by mouth daily 90 tablet 3    vitamin D3 (CHOLECALCIFEROL) 50 mcg (2000 units) tablet Take 1 tablet by mouth daily      warfarin ANTICOAGULANT (COUMADIN) 2.5 MG tablet TAKE 3 TABLETS BY MOUTH EVERY  WEDNESDAY AND TAKE 2 TABLETS BY  MOUTH ALL OTHER DAYS OF THE WEEK OR AS DIRECTED BY ACC TEAM 217 tablet 2      Allergies   Allergen Reactions    Demerol [Meperidine]      Hallucinations      Misc. Sulfonamide Containing Compounds Hives    Morphine     Sulfa Antibiotics          Lab Results    Chemistry/lipid CBC Cardiac Enzymes/BNP/TSH/INR   Recent Labs   Lab Test 10/29/22  0802 01/25/21  1712 10/02/20  0850   TRIG  --   --  70   LDL  --   --  82   BUN 17   < > 19      < > 140   CO2 27   < > 34*    < > = values in this interval not displayed.    Recent Labs   Lab Test 10/29/22  0802   WBC 4.5   HGB 12.5   HCT 37.3   MCV 85       Recent Labs   Lab Test 06/02/23  0000 05/05/23  0000 04/27/23  1125 02/12/20  0846 01/06/20  1520   TROPONINI  --   --   --   --  <0.01   TSH  --   --  0.34   < >  --    INR 2.4   < >  --    < > 2.30*    < > = values in this interval not displayed.        A total of 32 minutes was spent reviewing patient's medical records, obtaining history and performing examination, as well as discussing diagnoses/ recommendations with patient and answering all questions.                          Thank you for allowing me to participate in the care of your patient.      Sincerely,     Kirsten Vora MD     [facility]  cc:   Kt Murray MD  [address]  [place] [phone]

## 2023-06-07 NOTE — PROGRESS NOTES
Thank you, Dr. Karina Cifuentes, for asking the [facility] Heart Care team to see Ms. Tammy Law to follow-up on aortic and mitral valve disease, sick sinus syndrome status post permanent pacemaker insertion.    Assessment/Recommendations   Assessment:    1.  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  Has been on warfarin anticoagulation over the past 29 years without any bleeding complications.  Echocardiogram last year demonstrated normal prosthetic valve function.  Will be getting an echo in the upcoming weeks to follow-up on her mitral valve disease.  2.  Rheumatic mitral valve stenosis/regurgitation, mild by echocardiogram 1 year ago.  She continues to report symptoms of fatigue.  We will check an echocardiogram to see if there has been interval progression.  3.  Sick sinus syndrome status post dual-chamber pacemaker insertion.  Device check today demonstrates normal device operation.  No atrial or ventricular arrhythmias.  4.  Fatigue, possibly multifactorial.  She does have a history of snoring but states she had a sleep apnea test many years ago which was negative.  Did bring up the option of referring her to sleep medicine for reassessment.    Plan:  1.  Continue current medications  2.  Echocardiogram to reassess mitral valve disease.  3.  Tentative follow-up in 1 year if echo unrevealing.       History of Present Illness    Ms. Tammy Law is a 67 year old female with history of rheumatic aortic and mitral valve disease, status post mechanical aortic valve replacement in 1994 with Saint Daron prosthesis, sick sinus syndrome status post permanent pacemaker insertion who presents to the office today for follow-up visit.  States she has been feeling fairly well over the last year.  Did retire at the end of December 2022 and has been retired for the last 5 months.  Has been active in her yard and around her  "home without any concerning symptoms.  Continues to note fatigue and did undergo a nuclear stress test in July 2022 which was negative.  Echocardiogram was also unrevealing as to a cardiac cause.  She has been told by her boyfriend that she snores loudly and I did recommend consideration of a sleep study last year although nothing has been arranged.  Her blood pressure is low today and she believes it is due to a water pill she took last night for puffiness after eating high sodium containing foods.    ECG (personally reviewed): No ECG today.  Device check was performed and conjunction with her visit demonstrating normal device operation with no evidence of atrial or ventricular arrhythmias.  Lead and battery status stable.    Cardiac Imaging Studies (personally reviewed): No recent cardiac imaging     Physical Examination Review of Systems   BP (!) 82/50 (BP Location: Right arm, Patient Position: Sitting, Cuff Size: Adult Regular)   Pulse 76   Resp 16   Ht 1.626 m (5' 4\")   Wt 66.2 kg (146 lb)   BMI 25.06 kg/m    Body mass index is 25.06 kg/m .  Wt Readings from Last 3 Encounters:   06/07/23 66.2 kg (146 lb)   04/27/23 67.6 kg (149 lb)   02/08/23 67.6 kg (149 lb)     General Appearance:   Awake, Alert, No acute distress.   HEENT:  No scleral icterus; the mucous membranes were pink and moist.   Neck: No cervical bruits or jugular venous distention    Chest: The spine was straight. The chest was symmetric.   Lungs:   Respirations unlabored; the lungs are clear to auscultation. No wheezing   Cardiovascular:    Regular rate and rhythm.  S1 normal, S2 crisp and mechanical.  1/6 systolic murmur heard across the left upper sternal border.  No diastolic murmur.   Abdomen:  No organomegaly, masses, bruits, or tenderness. Bowels sounds are present   Extremities:  No peripheral edema bilaterally   Skin: No xanthelasma. Warm, Dry.   Musculoskeletal: No tenderness.   Neurologic: Mood and affect are appropriate.    Enc " "Vitals  BP: (!) 82/50  Pulse: 76  Resp: 16  Weight: 66.2 kg (146 lb)  Height: 162.6 cm (5' 4\")                                         Medical History  Surgical History Family History Social History   Past Medical History:   Diagnosis Date     Anxiety      Chronic anticoagulation      Depression      Disease of thyroid gland     Hypothyroidism     High cholesterol      Hyperlipidemia      Hypertension      Hypothyroidism      Pacemaker     biotronik     SSS (sick sinus syndrome) (H) 2/4/2020    Past Surgical History:   Procedure Laterality Date     AORTIC VALVE REPLACEMENT       APPENDECTOMY       BIOPSY BREAST Left 2015     BREAST CYST EXCISION       CARDIAC CATHETERIZATION       HC PART REMV BONE METATARSAL HEAD,EA Right 06/23/2017    Procedure: EXOSTECTOMY 2ND METATARSAL RIGHT FOOT;  Surgeon: Jessee Mccauley DPM;  Location: [facility];  Service: Podiatry     HYSTERECTOMY  2000     IMPLANT PACEMAKER       IMPLANT PACEMAKER       OOPHORECTOMY  2000     OTHER SURGICAL HISTORY      Hemmorhoidectomy     RELEASE CARPAL TUNNEL Bilateral      TOE SURGERY       TYMPANOSTOMY TUBE PLACEMENT       ZZC REPLACE AORT VALV,PROSTH VALV      Description: Aortic Valve Replacement;  Proc Date: 01/01/1995;    Family History   Problem Relation Age of Onset     Cancer Paternal Grandfather         Stomach     Ovarian Cancer Cousin      Pacemaker Mother      Diabetes Mother      Coronary Artery Disease Father      Pacemaker Father      Diabetes Father      Prostate Cancer Father      Depression Sister      Thyroid Disease Sister      No Known Problems Daughter      No Known Problems Maternal Grandmother      Colon Cancer Maternal Grandfather      No Known Problems Paternal Grandmother      No Known Problems Maternal Aunt      No Known Problems Paternal Aunt      Kidney Cancer Brother      Coronary Artery Disease Brother      Prostate Cancer Brother      Depression Sister      Thyroid Disease Sister      Thyroid Disease " Brother      Hereditary Breast and Ovarian Cancer Syndrome No family hx of      Breast Cancer No family hx of      Endometrial Cancer No family hx of     Social History     Socioeconomic History     Marital status: Single     Spouse name: Not on file     Number of children: 0     Years of education: Not on file     Highest education level: Not on file   Occupational History     Not on file   Tobacco Use     Smoking status: Never     Smokeless tobacco: Never   Vaping Use     Vaping status: Never Used   Substance and Sexual Activity     Alcohol use: Yes     Comment: Alcoholic Drinks/day: twice per year     Drug use: No     Sexual activity: Not Currently     Birth control/protection: None   Other Topics Concern     Parent/sibling w/ CABG, MI or angioplasty before 65F 55M? No   Social History Narrative     Not on file     Social Determinants of Health     Financial Resource Strain: Not on file   Food Insecurity: Not on file   Transportation Needs: Not on file   Physical Activity: Not on file   Stress: Not on file   Social Connections: Not on file   Intimate Partner Violence: Not on file   Housing Stability: Not on file          Medications  Allergies   Current Outpatient Medications   Medication Sig Dispense Refill     alendronate (FOSAMAX) 70 MG tablet Take 1 tablet (70 mg) by mouth every 7 days 13 tablet 3     amoxicillin (AMOXIL) 500 MG capsule Take 4 capsules (2,000 mg) by mouth daily as needed (dental work due to aortic valve) For dental appts 24 capsule 4     atorvastatin (LIPITOR) 40 MG tablet Take 1 tablet (40 mg) by mouth daily 90 tablet 3     calcium carbonate 500 mg, elemental, (OSCAL 500) 1250 (500 Ca) MG TABS tablet Take 1 tablet by mouth daily Take 1 tab by mouth every am       citalopram (CELEXA) 20 MG tablet Take 1 tablet (20 mg) by mouth daily 90 tablet 3     furosemide (LASIX) 40 MG tablet Take 1 tablet (40 mg) by mouth daily as needed 90 tablet 1     levothyroxine (SYNTHROID/LEVOTHROID) 100 MCG tablet  Take 1 tablet (100 mcg) by mouth daily 90 tablet 3     vitamin D3 (CHOLECALCIFEROL) 50 mcg (2000 units) tablet Take 1 tablet by mouth daily       warfarin ANTICOAGULANT (COUMADIN) 2.5 MG tablet TAKE 3 TABLETS BY MOUTH EVERY  WEDNESDAY AND TAKE 2 TABLETS BY  MOUTH ALL OTHER DAYS OF THE WEEK OR AS DIRECTED BY ACC TEAM 217 tablet 2      Allergies   Allergen Reactions     Demerol [Meperidine]      Hallucinations       Misc. Sulfonamide Containing Compounds Hives     Morphine      Sulfa Antibiotics          Lab Results    Chemistry/lipid CBC Cardiac Enzymes/BNP/TSH/INR   Recent Labs   Lab Test 10/29/22  0802 01/25/21  1712 10/02/20  0850   TRIG  --   --  70   LDL  --   --  82   BUN 17   < > 19      < > 140   CO2 27   < > 34*    < > = values in this interval not displayed.    Recent Labs   Lab Test 10/29/22  0802   WBC 4.5   HGB 12.5   HCT 37.3   MCV 85       Recent Labs   Lab Test 06/02/23  0000 05/05/23  0000 04/27/23  1125 02/12/20  0846 01/06/20  1520   TROPONINI  --   --   --   --  <0.01   TSH  --   --  0.34   < >  --    INR 2.4   < >  --    < > 2.30*    < > = values in this interval not displayed.        A total of 32 minutes was spent reviewing patient's medical records, obtaining history and performing examination, as well as discussing diagnoses/ recommendations with patient and answering all questions.

## 2023-06-08 ENCOUNTER — PATIENT OUTREACH (OUTPATIENT)
Dept: CARE COORDINATION | Facility: CLINIC | Age: 68
End: 2023-06-08
Payer: COMMERCIAL

## 2023-06-08 NOTE — PROGRESS NOTES
[facility] - Procedure Note    Date of Visit: 6/9/2023    Pre procedure Diagnosis: occipital neuralgia   Post procedure Diagnosis: Same  Procedure performed: LEFT occipital nerve block  Anesthesia: none  Complications: none  Operators: Jaky Griffin MD     Indications:   Tammy Law is a 67 year old female, referred by Dr. Sandoval with a history of left sided headaches.  Exam shows TTP over the left greater occipital nerve and they have tried conservative treatment including medications.    Options/alternatives, benefits and risks were discussed with the patient including bleeding, infection, hematoma, nerve damage, stroke, and spinal cord injury.  Questions were answered to her satisfaction and she agrees to proceed. Voluntary informed consent was obtained and signed.     Vitals were reviewed: Yes  Allergies were reviewed:  Yes   Medications were reviewed:  Yes     Procedure:  After getting informed consent, a Pause for the Cause was performed.    The occipital ridge, occipital protuberance, and mastoid process were palpated on the LEFT side.  The location of maximal tenderness which was consistent with the location of the occipital nerve was palpated.  The area was cleaned.    Palpation for a pulse was completed, with no pulse noted at the site of the injection.  A 25G, 1.5 inch needle was introduced, aimed cephalad, in the superficial tissues at this area of tenderness.  The injection was completed at this location, fanning in 3 different directions.  Aspiration for heme was negative before all injections.    In total, 2 ml of 0.5% bupivacaine, and 1 ml of dexamethasone was injected.     The patient tolerated the procedure well, hemostasis was achieved.      Follow-up includes:   -f/u with the referring provider      JAKY GRIFFIN MD   Pain Management

## 2023-06-09 ENCOUNTER — OFFICE VISIT (OUTPATIENT)
Dept: PALLIATIVE MEDICINE | Facility: CLINIC | Age: 68
End: 2023-06-09
Payer: COMMERCIAL

## 2023-06-09 VITALS — OXYGEN SATURATION: 98 % | DIASTOLIC BLOOD PRESSURE: 67 MMHG | HEART RATE: 67 BPM | SYSTOLIC BLOOD PRESSURE: 118 MMHG

## 2023-06-09 DIAGNOSIS — M54.81 CERVICO-OCCIPITAL NEURALGIA OF LEFT SIDE: ICD-10-CM

## 2023-06-09 PROCEDURE — 64405 NJX AA&/STRD GR OCPL NRV: CPT | Mod: LT | Performed by: ANESTHESIOLOGY

## 2023-06-09 RX ORDER — DEXAMETHASONE SODIUM PHOSPHATE 10 MG/ML
10 INJECTION, SOLUTION INTRAMUSCULAR; INTRAVENOUS ONCE
Status: COMPLETED | OUTPATIENT
Start: 2023-06-09 | End: 2023-06-09

## 2023-06-09 RX ORDER — BUPIVACAINE HYDROCHLORIDE 5 MG/ML
10 INJECTION, SOLUTION EPIDURAL; INTRACAUDAL ONCE
Status: COMPLETED | OUTPATIENT
Start: 2023-06-09 | End: 2023-06-09

## 2023-06-09 RX ADMIN — DEXAMETHASONE SODIUM PHOSPHATE 10 MG: 10 INJECTION, SOLUTION INTRAMUSCULAR; INTRAVENOUS at 15:41

## 2023-06-09 RX ADMIN — BUPIVACAINE HYDROCHLORIDE 50 MG: 5 INJECTION, SOLUTION EPIDURAL; INTRACAUDAL at 15:41

## 2023-06-09 ASSESSMENT — PAIN SCALES - GENERAL: PAINLEVEL: MODERATE PAIN (4)

## 2023-06-09 NOTE — PATIENT INSTRUCTIONS
[facility]  Post Procedure Instructions    Today you had:  occipital nerve block       Medications used:    bupivacaine  dexamethasone          Monitor the injection sites for signs and symptoms of infection-fever, chills, redness, swelling, warmth, or drainage to areas.  You may have soreness at injection sites for up to 24 hours.  It may take up to 14 days for the steroid medication to start working although you may feel the effect as early as a few days after the procedure.     You may apply ice to the painful areas to help minimize the discomfort of the needle pokes.  Do not apply heat to sites for at least 12 hours.  You may use anti-inflammatory medications or Tylenol for pain control if necessary    Pain Clinic phone number during work hours (Monday through Friday 8 am-4:30 pm) at [phone] or the Provider Line after hours at [phone]:

## 2023-06-10 LAB
MDC_IDC_LEAD_IMPLANT_DT: NORMAL
MDC_IDC_LEAD_IMPLANT_DT: NORMAL
MDC_IDC_LEAD_LOCATION: NORMAL
MDC_IDC_LEAD_LOCATION: NORMAL
MDC_IDC_LEAD_LOCATION_DETAIL_1: NORMAL
MDC_IDC_LEAD_LOCATION_DETAIL_1: NORMAL
MDC_IDC_LEAD_MFG: NORMAL
MDC_IDC_LEAD_MFG: NORMAL
MDC_IDC_LEAD_MODEL: NORMAL
MDC_IDC_LEAD_MODEL: NORMAL
MDC_IDC_LEAD_POLARITY_TYPE: NORMAL
MDC_IDC_LEAD_POLARITY_TYPE: NORMAL
MDC_IDC_LEAD_SERIAL: NORMAL
MDC_IDC_LEAD_SERIAL: NORMAL
MDC_IDC_LEAD_SPECIAL_FUNCTION: NORMAL
MDC_IDC_LEAD_SPECIAL_FUNCTION: NORMAL
MDC_IDC_MSMT_BATTERY_DTM: NORMAL
MDC_IDC_MSMT_BATTERY_REMAINING_LONGEVITY: 64 MO
MDC_IDC_MSMT_BATTERY_REMAINING_PERCENTAGE: 55 %
MDC_IDC_MSMT_BATTERY_STATUS: NORMAL
MDC_IDC_MSMT_CAP_CHARGE_TYPE: NORMAL
MDC_IDC_MSMT_LEADCHNL_RA_IMPEDANCE_VALUE: 643 OHM
MDC_IDC_MSMT_LEADCHNL_RA_PACING_THRESHOLD_AMPLITUDE: 0.8 V
MDC_IDC_MSMT_LEADCHNL_RA_PACING_THRESHOLD_PULSEWIDTH: 0.4 MS
MDC_IDC_MSMT_LEADCHNL_RA_SENSING_INTR_AMPL: 8.2 MV
MDC_IDC_MSMT_LEADCHNL_RV_IMPEDANCE_VALUE: 604 OHM
MDC_IDC_MSMT_LEADCHNL_RV_PACING_THRESHOLD_AMPLITUDE: 1 V
MDC_IDC_MSMT_LEADCHNL_RV_PACING_THRESHOLD_PULSEWIDTH: 0.4 MS
MDC_IDC_MSMT_LEADCHNL_RV_SENSING_INTR_AMPL: 12 MV
MDC_IDC_PG_IMPLANT_DTM: NORMAL
MDC_IDC_PG_MFG: NORMAL
MDC_IDC_PG_MODEL: NORMAL
MDC_IDC_PG_SERIAL: NORMAL
MDC_IDC_PG_TYPE: NORMAL
MDC_IDC_SESS_CLINIC_NAME: NORMAL
MDC_IDC_SESS_DTM: NORMAL
MDC_IDC_SESS_TYPE: NORMAL
MDC_IDC_SET_BRADY_AT_MODE_SWITCH_MODE: NORMAL
MDC_IDC_SET_BRADY_AT_MODE_SWITCH_RATE: 160 {BEATS}/MIN
MDC_IDC_SET_BRADY_LOWRATE: 60 {BEATS}/MIN
MDC_IDC_SET_BRADY_MAX_SENSOR_RATE: 120 {BEATS}/MIN
MDC_IDC_SET_BRADY_MAX_TRACKING_RATE: 130 {BEATS}/MIN
MDC_IDC_SET_BRADY_MODE: NORMAL
MDC_IDC_SET_BRADY_PAV_DELAY_LOW: 200 MS
MDC_IDC_SET_BRADY_SAV_DELAY_LOW: 180 MS
MDC_IDC_SET_LEADCHNL_RA_PACING_AMPLITUDE: 1.6 V
MDC_IDC_SET_LEADCHNL_RA_PACING_POLARITY: NORMAL
MDC_IDC_SET_LEADCHNL_RA_PACING_PULSEWIDTH: 0.4 MS
MDC_IDC_SET_LEADCHNL_RA_SENSING_ADAPTATION_MODE: NORMAL
MDC_IDC_SET_LEADCHNL_RA_SENSING_POLARITY: NORMAL
MDC_IDC_SET_LEADCHNL_RV_PACING_AMPLITUDE: 2.4 V
MDC_IDC_SET_LEADCHNL_RV_PACING_POLARITY: NORMAL
MDC_IDC_SET_LEADCHNL_RV_PACING_PULSEWIDTH: 0.4 MS
MDC_IDC_SET_LEADCHNL_RV_SENSING_ADAPTATION_MODE: NORMAL
MDC_IDC_SET_LEADCHNL_RV_SENSING_POLARITY: NORMAL
MDC_IDC_STAT_AT_MODE_SW_COUNT: 0
MDC_IDC_STAT_EPISODE_RECENT_COUNT: 0
MDC_IDC_STAT_EPISODE_TYPE: NORMAL

## 2023-06-16 ENCOUNTER — DOCUMENTATION ONLY (OUTPATIENT)
Dept: ANTICOAGULATION | Facility: CLINIC | Age: 68
End: 2023-06-16
Payer: COMMERCIAL

## 2023-06-16 DIAGNOSIS — Z95.2 H/O MECHANICAL AORTIC VALVE REPLACEMENT: Primary | ICD-10-CM

## 2023-06-16 LAB — INR HOME MONITORING: 2.7 (ref 2–3.5)

## 2023-06-16 NOTE — PROGRESS NOTES
ANTICOAGULATION  MANAGEMENT-Home Monitor Managed by Exception    Tammyrober Law 67 year old female is on warfarin with therapeutic INR result. (Goal INR 2.0-3.0)    Recent labs: (last 7 days)     06/16/23  0000   INR 2.7         Previous INR was Therapeutic    Medication, diet, health changes since last INR:chart reviewed; none identified    Contacted within the last 12 weeks by phone on 04/07/2023      ZAIN Munoz was NOT contacted regarding therapeutic result today per home monitoring policy manage by exception agreement.   Current warfarin dose is to be continued:     Summary  As of 6/16/2023    Full warfarin instructions:  2.5 mg every Tue; 5 mg all other days   Next INR check:  6/30/2023           ?   Jacqueline Juárez, RN  Anticoagulation Clinic  6/16/2023    _______________________________________________________________________     Anticoagulation Episode Summary     Current INR goal:  2.0-3.0   TTR:  73.2 % (1 y)   Target end date:  Indefinite   Send INR reminders to:  [facility]    Indications    H/O mechanical aortic valve replacement [Z95.2]           Comments:  [employer] home monitor- managed by exception         Anticoagulation Care Providers     Provider Role Specialty Phone number    Arnold Anderson MD Referring Internal Medicine [phone]    Christina Hernandez MD Referring Family Medicine [phone]    Karina Cifuentes MD Referring Family Medicine [phone]

## 2023-06-22 ENCOUNTER — PATIENT OUTREACH (OUTPATIENT)
Dept: CARE COORDINATION | Facility: CLINIC | Age: 68
End: 2023-06-22
Payer: COMMERCIAL

## 2023-06-30 ENCOUNTER — NURSE TRIAGE (OUTPATIENT)
Dept: NURSING | Facility: CLINIC | Age: 68
End: 2023-06-30
Payer: COMMERCIAL

## 2023-06-30 LAB — INR HOME MONITORING: 2.5 (ref 2–3.5)

## 2023-07-01 NOTE — TELEPHONE ENCOUNTER
"Nurse Triage SBAR    Situation: INR system    Background: Patient calling. Pt has a home monitor for her INR - [employer] xs system.    Assessment: Patient states it says \"2nd\" and does not give a correct readying. She was able to borrow a friends INR machine to get her readings.      Recommendation: The patients spouse/friend was able to fix the machine. Pt was informed to update her anticoagulation team when they are open. Pt stated understanding    Danna Crockett RN Nursing Advisor 6/30/2023 9:24 PM     Reason for Disposition    General information question, no triage required and triager able to answer question    Additional Information    Negative: [1] Caller is not with the adult (patient) AND [2] reporting urgent symptoms    Negative: Lab result questions    Negative: Medication questions    Negative: Caller can't be reached by phone    Negative: Caller has already spoken to PCP or another triager    Negative: RN needs further essential information from caller in order to complete triage    Negative: Requesting regular office appointment    Negative: [1] Caller requesting NON-URGENT health information AND [2] PCP's office is the best resource    Negative: Health Information question, no triage required and triager able to answer question    Protocols used: INFORMATION ONLY CALL - NO TRIAGE-A-      "

## 2023-07-03 ENCOUNTER — DOCUMENTATION ONLY (OUTPATIENT)
Dept: ANTICOAGULATION | Facility: CLINIC | Age: 68
End: 2023-07-03
Payer: COMMERCIAL

## 2023-07-03 DIAGNOSIS — Z95.2 H/O MECHANICAL AORTIC VALVE REPLACEMENT: Primary | ICD-10-CM

## 2023-07-03 NOTE — PROGRESS NOTES
ANTICOAGULATION  MANAGEMENT-Home Monitor Managed by Exception    Tammyrober Law 67 year old female is on warfarin with therapeutic INR result. (Goal INR 2.0-3.0)    Recent labs: (last 7 days)     06/30/23  0000   INR 2.5         Previous INR was Therapeutic    Medication, diet, health changes since last INR:chart reviewed; none identified    Contacted within the last 12 weeks by phone on 4/7/23      ZAIN Munoz was NOT contacted regarding therapeutic result today per home monitoring policy manage by exception agreement.   Current warfarin dose is to be continued:     Summary  As of 7/3/2023    Full warfarin instructions:  2.5 mg every Tue; 5 mg all other days   Next INR check:  7/14/2023           ?   Petrona Phan RN  Anticoagulation Clinic  7/3/2023    _______________________________________________________________________     Anticoagulation Episode Summary     Current INR goal:  2.0-3.0   TTR:  73.0 % (1 y)   Target end date:  Indefinite   Send INR reminders to:  [facility]    Indications    H/O mechanical aortic valve replacement [Z95.2]           Comments:  [employer] home monitor- managed by exception         Anticoagulation Care Providers     Provider Role Specialty Phone number    Arnold Anderson MD Referring Internal Medicine [phone]    Christina Hernandez MD Referring Family Medicine [phone]    Karina Cifuentes MD Referring Family Medicine [phone]

## 2023-07-14 ENCOUNTER — ANTICOAGULATION THERAPY VISIT (OUTPATIENT)
Dept: ANTICOAGULATION | Facility: CLINIC | Age: 68
End: 2023-07-14
Payer: COMMERCIAL

## 2023-07-14 DIAGNOSIS — Z95.2 H/O MECHANICAL AORTIC VALVE REPLACEMENT: Primary | ICD-10-CM

## 2023-07-14 LAB — INR HOME MONITORING: 2.9 (ref 2–3.5)

## 2023-07-14 NOTE — PROGRESS NOTES
ANTICOAGULATION MANAGEMENT     Tammy Law 67 year old female is on warfarin with therapeutic INR result. (Goal INR 2.0-3.0)    Recent labs: (last 7 days)     07/14/23  0000   INR 2.9       ASSESSMENT     Source(s): Chart Review and Patient/Caregiver Call     Warfarin doses taken: Warfarin taken as instructed  Diet: No new diet changes identified  Medication/supplement changes: None noted  New illness, injury, or hospitalization: No  Signs or symptoms of bleeding or clotting: No  Previous result: Therapeutic last 2(+) visits  Additional findings: None    [facility]BE pt. Today was the 12 week check-in. ~12 weeks : 10/6/23       PLAN     Recommended plan for no diet, medication or health factor changes affecting INR     Dosing Instructions: Continue your current warfarin dose with next INR in 2 weeks       Summary  As of 7/14/2023    Full warfarin instructions:  2.5 mg every Tue; 5 mg all other days   Next INR check:  7/28/2023             Telephone call with Bisi who verbalizes understanding and agrees to plan    Patient to recheck with home meter    Education provided:   Please call back if any changes to your diet, medications or how you've been taking warfarin    Plan made per [facility] anticoagulation protocol    Imani Moody RN  Anticoagulation Clinic  7/14/2023    _______________________________________________________________________     Anticoagulation Episode Summary     Current INR goal:  2.0-3.0   TTR:  73.3 % (1 y)   Target end date:  Indefinite   Send INR reminders to:  [facility]    Indications    H/O mechanical aortic valve replacement [Z95.2]           Comments:  [employer] home monitor- managed by exception         Anticoagulation Care Providers     Provider Role Specialty Phone number    Arnold Anderson MD Referring Internal Medicine [phone]    Christina Hernandez MD Referring Family Medicine [phone]    Karina Cifuentes MD Referring Family Medicine [phone]

## 2023-07-26 ENCOUNTER — HOSPITAL ENCOUNTER (OUTPATIENT)
Dept: CARDIOLOGY | Facility: HOSPITAL | Age: 68
Discharge: HOME | End: 2023-07-26
Attending: INTERNAL MEDICINE | Admitting: INTERNAL MEDICINE
Payer: COMMERCIAL

## 2023-07-26 DIAGNOSIS — Z95.2 S/P AVR (AORTIC VALVE REPLACEMENT): ICD-10-CM

## 2023-07-26 DIAGNOSIS — I49.5 SICK SINUS SYNDROME (H): ICD-10-CM

## 2023-07-26 DIAGNOSIS — Z95.0 CARDIAC PACEMAKER IN SITU: ICD-10-CM

## 2023-07-26 LAB — LVEF ECHO: NORMAL

## 2023-07-26 PROCEDURE — 93306 TTE W/DOPPLER COMPLETE: CPT | Mod: 26 | Performed by: INTERNAL MEDICINE

## 2023-07-26 PROCEDURE — 93306 TTE W/DOPPLER COMPLETE: CPT

## 2023-07-27 ENCOUNTER — TELEPHONE (OUTPATIENT)
Dept: FAMILY MEDICINE | Facility: CLINIC | Age: 68
End: 2023-07-27
Payer: COMMERCIAL

## 2023-07-27 NOTE — TELEPHONE ENCOUNTER
July 27, 2023    [facility] INR Prescription was received via fax for Dr. Cifuentes to sign.  Patient label was attached to paperwork and placed in provider's inbox to be signed.    Taylor Talbot

## 2023-07-28 ENCOUNTER — DOCUMENTATION ONLY (OUTPATIENT)
Dept: ANTICOAGULATION | Facility: CLINIC | Age: 68
End: 2023-07-28
Payer: COMMERCIAL

## 2023-07-28 DIAGNOSIS — Z95.2 S/P AVR (AORTIC VALVE REPLACEMENT): ICD-10-CM

## 2023-07-28 DIAGNOSIS — Z95.0 CARDIAC PACEMAKER IN SITU: Primary | ICD-10-CM

## 2023-07-28 DIAGNOSIS — Z95.2 H/O MECHANICAL AORTIC VALVE REPLACEMENT: Primary | ICD-10-CM

## 2023-07-28 LAB — INR HOME MONITORING: 2.4 (ref 2–3.5)

## 2023-07-28 NOTE — PROGRESS NOTES
ANTICOAGULATION  MANAGEMENT-Home Monitor Managed by Exception    Tammyrober Law 67 year old female is on warfarin with therapeutic INR result. (Goal INR 2.0-3.0)    Recent labs: (last 7 days)     07/28/23  0000   INR 2.4       Previous INR was Therapeutic  Medication, diet, health changes since last INR:chart reviewed; none identified  Contacted within the last 12 weeks by phone on 7/14/23      ZAIN     Tammy was NOT contacted regarding therapeutic result today per home monitoring policy manage by exception agreement.   Current warfarin dose is to be continued:     Summary  As of 7/28/2023      Full warfarin instructions:  2.5 mg every Tue; 5 mg all other days   Next INR check:  8/11/2023             ?   Neli Simons RN  Anticoagulation Clinic  7/28/2023    _______________________________________________________________________     Anticoagulation Episode Summary       Current INR goal:  2.0-3.0   TTR:  75.6 % (1 y)   Target end date:  Indefinite   Send INR reminders to:  [facility]    Indications    H/O mechanical aortic valve replacement [Z95.2]             Comments:  [employer] home monitor- managed by exception             Anticoagulation Care Providers       Provider Role Specialty Phone number    Arnold Anderson MD Referring Internal Medicine [phone]    Christina Hernandez MD Referring Family Medicine [phone]    Karina Cifuentes MD Referring Family Medicine [phone]

## 2023-07-31 NOTE — TELEPHONE ENCOUNTER
July 31, 2023    [facility] INR Prescription was picked up from outbox of Dr. Cifuentes.  Paperwork has been reviewed and is complete.  Per initial initial request, this was sent via fax to [phone].     Taylor Talbot

## 2023-08-07 ASSESSMENT — ENCOUNTER SYMPTOMS
MYALGIAS: 1
CHILLS: 0
PARESTHESIAS: 0
PALPITATIONS: 0
DYSURIA: 0
HEMATOCHEZIA: 0
SORE THROAT: 0
FREQUENCY: 0
COUGH: 0
NERVOUS/ANXIOUS: 0
SHORTNESS OF BREATH: 0
HEMATURIA: 0
EYE PAIN: 0
HEARTBURN: 0
DIARRHEA: 0
WEAKNESS: 0
CONSTIPATION: 0
DIZZINESS: 0
HEADACHES: 0
JOINT SWELLING: 0
ARTHRALGIAS: 1
NAUSEA: 0
ABDOMINAL PAIN: 1
BREAST MASS: 0
FEVER: 0

## 2023-08-07 ASSESSMENT — ACTIVITIES OF DAILY LIVING (ADL): CURRENT_FUNCTION: NO ASSISTANCE NEEDED

## 2023-08-11 ENCOUNTER — ANTICOAGULATION THERAPY VISIT (OUTPATIENT)
Dept: ANTICOAGULATION | Facility: CLINIC | Age: 68
End: 2023-08-11

## 2023-08-11 ENCOUNTER — OFFICE VISIT (OUTPATIENT)
Dept: FAMILY MEDICINE | Facility: CLINIC | Age: 68
End: 2023-08-11
Payer: COMMERCIAL

## 2023-08-11 ENCOUNTER — ANCILLARY PROCEDURE (OUTPATIENT)
Dept: GENERAL RADIOLOGY | Facility: CLINIC | Age: 68
End: 2023-08-11
Payer: COMMERCIAL

## 2023-08-11 VITALS
TEMPERATURE: 97.6 F | DIASTOLIC BLOOD PRESSURE: 67 MMHG | BODY MASS INDEX: 24.98 KG/M2 | WEIGHT: 146.3 LBS | HEIGHT: 64 IN | HEART RATE: 64 BPM | RESPIRATION RATE: 18 BRPM | OXYGEN SATURATION: 97 % | SYSTOLIC BLOOD PRESSURE: 100 MMHG

## 2023-08-11 DIAGNOSIS — Z95.2 H/O MECHANICAL AORTIC VALVE REPLACEMENT: ICD-10-CM

## 2023-08-11 DIAGNOSIS — M25.619 LIMITED RANGE OF MOTION (ROM) OF SHOULDER: ICD-10-CM

## 2023-08-11 DIAGNOSIS — Z95.2 H/O MECHANICAL AORTIC VALVE REPLACEMENT: Primary | ICD-10-CM

## 2023-08-11 DIAGNOSIS — R10.11 RUQ ABDOMINAL PAIN: Primary | ICD-10-CM

## 2023-08-11 DIAGNOSIS — E03.9 ACQUIRED HYPOTHYROIDISM: ICD-10-CM

## 2023-08-11 DIAGNOSIS — M25.512 ACUTE PAIN OF LEFT SHOULDER: ICD-10-CM

## 2023-08-11 DIAGNOSIS — Z86.0100 HISTORY OF COLONIC POLYPS: ICD-10-CM

## 2023-08-11 DIAGNOSIS — Z12.11 SPECIAL SCREENING FOR MALIGNANT NEOPLASMS, COLON: ICD-10-CM

## 2023-08-11 DIAGNOSIS — M81.0 AGE-RELATED OSTEOPOROSIS WITHOUT CURRENT PATHOLOGICAL FRACTURE: ICD-10-CM

## 2023-08-11 LAB
ALBUMIN SERPL BCG-MCNC: 4.7 G/DL (ref 3.5–5.2)
ALP SERPL-CCNC: 66 U/L (ref 35–104)
ALT SERPL W P-5'-P-CCNC: 29 U/L (ref 0–50)
ANION GAP SERPL CALCULATED.3IONS-SCNC: 10 MMOL/L (ref 7–15)
AST SERPL W P-5'-P-CCNC: 36 U/L (ref 0–45)
BILIRUB SERPL-MCNC: 0.6 MG/DL
BUN SERPL-MCNC: 17.4 MG/DL (ref 8–23)
CALCIUM SERPL-MCNC: 10.5 MG/DL (ref 8.8–10.2)
CHLORIDE SERPL-SCNC: 100 MMOL/L (ref 98–107)
CREAT SERPL-MCNC: 0.94 MG/DL (ref 0.51–0.95)
DEPRECATED HCO3 PLAS-SCNC: 32 MMOL/L (ref 22–29)
GFR SERPL CREATININE-BSD FRML MDRD: 67 ML/MIN/1.73M2
GLUCOSE SERPL-MCNC: 96 MG/DL (ref 70–99)
INR BLD: 2 (ref 0.9–1.1)
POTASSIUM SERPL-SCNC: 4.7 MMOL/L (ref 3.4–5.3)
PROT SERPL-MCNC: 8 G/DL (ref 6.4–8.3)
SODIUM SERPL-SCNC: 142 MMOL/L (ref 136–145)
T4 FREE SERPL-MCNC: 1.75 NG/DL (ref 0.9–1.7)
TSH SERPL DL<=0.005 MIU/L-ACNC: 0.49 UIU/ML (ref 0.3–4.2)

## 2023-08-11 PROCEDURE — 85610 PROTHROMBIN TIME: CPT | Performed by: FAMILY MEDICINE

## 2023-08-11 PROCEDURE — 99214 OFFICE O/P EST MOD 30 MIN: CPT | Mod: 25 | Performed by: FAMILY MEDICINE

## 2023-08-11 PROCEDURE — 80053 COMPREHEN METABOLIC PANEL: CPT | Performed by: FAMILY MEDICINE

## 2023-08-11 PROCEDURE — 84439 ASSAY OF FREE THYROXINE: CPT | Performed by: FAMILY MEDICINE

## 2023-08-11 PROCEDURE — G0438 PPPS, INITIAL VISIT: HCPCS | Performed by: FAMILY MEDICINE

## 2023-08-11 PROCEDURE — 73030 X-RAY EXAM OF SHOULDER: CPT | Mod: TC | Performed by: RADIOLOGY

## 2023-08-11 PROCEDURE — 84443 ASSAY THYROID STIM HORMONE: CPT | Performed by: FAMILY MEDICINE

## 2023-08-11 PROCEDURE — 36415 COLL VENOUS BLD VENIPUNCTURE: CPT | Performed by: FAMILY MEDICINE

## 2023-08-11 RX ORDER — RISEDRONATE SODIUM 35 MG/1
35 TABLET, FILM COATED ORAL
Qty: 12 TABLET | Refills: 3 | Status: SHIPPED | OUTPATIENT
Start: 2023-08-11 | End: 2023-09-25

## 2023-08-11 RX ORDER — LEVOTHYROXINE SODIUM 100 UG/1
100 TABLET ORAL DAILY
Qty: 90 TABLET | Refills: 3 | Status: SHIPPED | OUTPATIENT
Start: 2023-08-11 | End: 2024-02-15

## 2023-08-11 ASSESSMENT — ENCOUNTER SYMPTOMS
PALPITATIONS: 0
COUGH: 0
ABDOMINAL PAIN: 1
HEMATURIA: 0
FEVER: 0
WEAKNESS: 0
CHILLS: 0
MYALGIAS: 1
SHORTNESS OF BREATH: 0
HEMATOCHEZIA: 0
HEADACHES: 0
PARESTHESIAS: 0
BREAST MASS: 0
DIARRHEA: 0
ARTHRALGIAS: 1
NAUSEA: 0
JOINT SWELLING: 0
HEARTBURN: 0
SORE THROAT: 0
DIZZINESS: 0
NERVOUS/ANXIOUS: 0
DYSURIA: 0
EYE PAIN: 0
CONSTIPATION: 0
FREQUENCY: 0

## 2023-08-11 ASSESSMENT — ACTIVITIES OF DAILY LIVING (ADL): CURRENT_FUNCTION: NO ASSISTANCE NEEDED

## 2023-08-11 ASSESSMENT — PATIENT HEALTH QUESTIONNAIRE - PHQ9
10. IF YOU CHECKED OFF ANY PROBLEMS, HOW DIFFICULT HAVE THESE PROBLEMS MADE IT FOR YOU TO DO YOUR WORK, TAKE CARE OF THINGS AT HOME, OR GET ALONG WITH OTHER PEOPLE: NOT DIFFICULT AT ALL
SUM OF ALL RESPONSES TO PHQ QUESTIONS 1-9: 3
SUM OF ALL RESPONSES TO PHQ QUESTIONS 1-9: 3

## 2023-08-11 ASSESSMENT — PAIN SCALES - GENERAL: PAINLEVEL: EXTREME PAIN (9)

## 2023-08-11 NOTE — PROGRESS NOTES
"  SUBJECTIVE:   Bisi is a 67 year old who presents for Preventive Visit.      8/11/2023     8:58 AM   Additional Questions   Roomed by Arleth SEGOVIA   Are you in the first 12 months of your Medicare coverage?  No  She reports she doesn't eat much fruit or vegetables. Likes berries and bananas and apples and she likes uncook carrots but rarely has them.  Pain at the hips and shoulders started a month after starting alendronate. It prevents her from being active. She used to take walks for exercise.  Gets pain just below the ribs on the right. It is positional, occurring when she bends forward. No change in bowels with the pain, and no nausea or vomiting. It doesn't last long, but recurs frequently    Healthy Habits:     In general, how would you rate your overall health?  Fair    Frequency of exercise:  1 day/week    Duration of exercise:  Less than 15 minutes    Do you usually eat at least 4 servings of fruit and vegetables a day, include whole grains    & fiber and avoid regularly eating high fat or \"junk\" foods?  No    Taking medications regularly:  Yes    Medication side effects:  Muscle aches    Ability to successfully perform activities of daily living:  No assistance needed    Home Safety:  No safety concerns identified    Hearing Impairment:  No hearing concerns    In the past 6 months, have you been bothered by leaking of urine?  No    In general, how would you rate your overall mental or emotional health?  Good    Additional concerns today:  Yes  Have you ever done Advance Care Planning? (For example, a Health Directive, POLST, or a discussion with a medical provider or your loved ones about your wishes): No, advance care planning information given to patient to review.  Advanced care planning was discussed at today's visit.  Fall risk  Fallen 2 or more times in the past year?: No  Any fall with injury in the past year?: No  Cognitive Screening   1) Repeat 3 items (Leader, Season, Table)    2) Clock draw: " NORMAL  3) 3 item recall: Recalls 3 objects  Results: 3 items recalled: COGNITIVE IMPAIRMENT LESS LIKELY  Mini-CogTM Copyright JOSELIN Abrams. Licensed by the author for use in [facility]; reprinted with permission (noah@[facility]). All rights reserved.    Do you have sleep apnea, excessive snoring or daytime drowsiness? : yes  Reviewed and updated as needed this visit by clinical staff  Reviewed and updated as needed this visit by Provider                 Social History     Tobacco Use     Smoking status: Never     Smokeless tobacco: Never   Substance Use Topics     Alcohol use: Yes     Comment: Alcoholic Drinks/day: twice per year         8/7/2023     6:12 AM   Alcohol Use   Prescreen: >3 drinks/day or >7 drinks/week? No   Do you have a current opioid prescription? No  Do you use any other controlled substances or medications that are not prescribed by a provider?   Current providers sharing in care for this patient include:   Patient Care Team:  Karina Cifuentes MD as PCP - General (Family Medicine)  Kirsten Vora MD as Assigned Heart and Vascular Provider  Karina Cifuentes MD as Assigned PCP  Houston Sandoval MD as MD (Neurology)  Houston Sandoval MD as Assigned Neuroscience Provider    The following health maintenance items are reviewed in Epic and correct as of today:  Health Maintenance   Topic Date Due     DEPRESSION ACTION PLAN  Never done     COVID-19 Vaccine (5 - Pfizer series) 05/26/2023     MEDICARE ANNUAL WELLNESS VISIT  07/08/2023     ANNUAL REVIEW OF HM ORDERS  07/08/2023     INFLUENZA VACCINE (1) 09/01/2023     COLORECTAL CANCER SCREENING  11/05/2023     PHQ-9  02/11/2024     TSH W/FREE T4 REFLEX  04/27/2024     MAMMO SCREENING  06/23/2024     FALL RISK ASSESSMENT  08/11/2024     LIPID  10/02/2025     ADVANCE CARE PLANNING  07/08/2027     DTAP/TDAP/TD IMMUNIZATION (3 - Td or Tdap) 04/23/2031     DEXA  01/31/2038     HEPATITIS C SCREENING  Completed     Pneumococcal  Vaccine: 65+ Years  Completed     ZOSTER IMMUNIZATION  Completed     IPV IMMUNIZATION  Aged Out     MENINGITIS IMMUNIZATION  Aged Out     BP Readings from Last 3 Encounters:   08/11/23 100/67   06/09/23 118/67   06/07/23 (!) 82/50    Wt Readings from Last 3 Encounters:   08/11/23 66.4 kg (146 lb 4.8 oz)   06/07/23 66.2 kg (146 lb)   04/27/23 67.6 kg (149 lb)          Patient Active Problem List   Diagnosis     CARDIOVASCULAR SCREENING; LDL GOAL LESS THAN 130     H/O mechanical aortic valve replacement     Acquired hypothyroidism     Cardiac pacemaker in situ     Congenital nystagmus     Epigastric pain     Chest pain, unspecified type     Chronic anticoagulation     Age-related osteoporosis without current pathological fracture     Medication management     Recurrent major depressive disorder, in full remission (H)     Past Surgical History:   Procedure Laterality Date     AORTIC VALVE REPLACEMENT       APPENDECTOMY       BIOPSY BREAST Left 2015     BREAST CYST EXCISION       CARDIAC CATHETERIZATION       HC PART REMV BONE METATARSAL HEAD,EA Right 06/23/2017    Procedure: EXOSTECTOMY 2ND METATARSAL RIGHT FOOT;  Surgeon: Jessee Mccauley DPM;  Location: [facility];  Service: Podiatry     HYSTERECTOMY  2000     IMPLANT PACEMAKER       IMPLANT PACEMAKER       OOPHORECTOMY  2000     OTHER SURGICAL HISTORY      Hemmorhoidectomy     RELEASE CARPAL TUNNEL Bilateral      TOE SURGERY       TYMPANOSTOMY TUBE PLACEMENT       ZZC REPLACE AORT VALV,PROSTH VALV      Description: Aortic Valve Replacement;  Proc Date: 01/01/1995;       Social History     Tobacco Use     Smoking status: Never     Smokeless tobacco: Never   Substance Use Topics     Alcohol use: Yes     Comment: Alcoholic Drinks/day: twice per year     Family History   Problem Relation Age of Onset     Cancer Paternal Grandfather         Stomach     Ovarian Cancer Cousin      Pacemaker Mother      Diabetes Mother      Coronary Artery Disease Father       Pacemaker Father      Diabetes Father      Prostate Cancer Father      Depression Sister      Thyroid Disease Sister      No Known Problems Daughter      No Known Problems Maternal Grandmother      Colon Cancer Maternal Grandfather      No Known Problems Paternal Grandmother      No Known Problems Maternal Aunt      No Known Problems Paternal Aunt      Kidney Cancer Brother      Coronary Artery Disease Brother      Prostate Cancer Brother      Depression Sister      Thyroid Disease Sister      Thyroid Disease Brother      Hereditary Breast and Ovarian Cancer Syndrome No family hx of      Breast Cancer No family hx of      Endometrial Cancer No family hx of          Current Outpatient Medications   Medication Sig Dispense Refill     alendronate (FOSAMAX) 70 MG tablet Take 1 tablet (70 mg) by mouth every 7 days 13 tablet 3     amoxicillin (AMOXIL) 500 MG capsule Take 4 capsules (2,000 mg) by mouth daily as needed (dental work due to aortic valve) For dental appts 24 capsule 4     atorvastatin (LIPITOR) 40 MG tablet Take 1 tablet (40 mg) by mouth daily 90 tablet 3     calcium carbonate 500 mg, elemental, (OSCAL 500) 1250 (500 Ca) MG TABS tablet Take 1 tablet by mouth daily Take 1 tab by mouth every am       citalopram (CELEXA) 20 MG tablet Take 1 tablet (20 mg) by mouth daily 90 tablet 3     furosemide (LASIX) 40 MG tablet Take 1 tablet (40 mg) by mouth daily as needed 90 tablet 1     RISEdronate (ACTONEL) 35 MG tablet Take 1 tablet (35 mg) by mouth every 7 days 12 tablet 3     vitamin D3 (CHOLECALCIFEROL) 50 mcg (2000 units) tablet Take 1 tablet by mouth daily       warfarin ANTICOAGULANT (COUMADIN) 2.5 MG tablet TAKE 3 TABLETS BY MOUTH EVERY  WEDNESDAY AND TAKE 2 TABLETS BY  MOUTH ALL OTHER DAYS OF THE WEEK OR AS DIRECTED BY [facility] TEAM 217 tablet 2     levothyroxine (SYNTHROID/LEVOTHROID) 100 MCG tablet Take 1 tablet (100 mcg) by mouth daily 90 tablet 3     Allergies   Allergen Reactions     Demerol [Meperidine]       Hallucinations       Misc. Sulfonamide Containing Compounds Hives     Morphine      Sulfa Antibiotics      Recent Labs   Lab Test 08/11/23  1032 05/02/23  1236 05/02/23  1130 04/27/23  1125 01/26/23  1716 10/29/22  0802 07/08/22  0915 01/12/22  0204 01/11/22  2214 01/25/21  1712 01/25/21  1712 10/02/20  0850   LDL  --   --   --   --   --   --   --   --   --   --   --  82   HDL  --   --   --   --   --   --   --   --   --   --   --  80   TRIG  --   --   --   --   --   --   --   --   --   --   --  70   ALT 29  --   --   --   --   --   --  36 40  --  21 24   CR 0.94 0.99*   < >  --   --  0.79  --  0.79 0.78  --  0.83 0.91   GFRESTIMATED 67  --   --   --   --  82  --  83 84   < > >60 >60   GFRESTBLACK  --   --   --   --   --   --   --   --   --   --  >60 >60   POTASSIUM 4.7  --   --   --   --  3.9  --  3.7 4.3  --  3.9 3.9   TSH 0.49  --   --  0.34   < >  --    < >  --   --   --  1.59 1.28    < > = values in this interval not displayed.      History of abnormal Pap smear: NO - age 66 - see link Cervical Cytology Screening Guidelines    FHS-7:       6/23/2022     2:47 PM 7/1/2022    11:12 AM 8/7/2023     6:16 AM   Breast CA Risk Assessment (FHS-7)   Did any of your first-degree relatives have breast or ovarian cancer? No Unknown No   Did any of your relatives have bilateral breast cancer? No No No   Did any man in your family have breast cancer? No No No   Did any woman in your family have breast and ovarian cancer? No  No   Did any woman in your family have breast cancer before age 50 y? No No No   Do you have 2 or more relatives with breast and/or ovarian cancer? No No No   Do you have 2 or more relatives with breast and/or bowel cancer? No No No   Mammogram Screening - Mammography discussed and declined  Pertinent mammograms are reviewed under the imaging tab.    Review of Systems   Constitutional:  Negative for chills and fever.   HENT:  Negative for congestion, ear pain, hearing loss and sore throat.    Eyes:   "Negative for pain and visual disturbance.   Respiratory:  Negative for cough and shortness of breath.    Cardiovascular:  Negative for chest pain, palpitations and peripheral edema.   Gastrointestinal:  Positive for abdominal pain. Negative for constipation, diarrhea, heartburn, hematochezia and nausea.   Breasts:  Negative for tenderness, breast mass and discharge.   Genitourinary:  Negative for dysuria, frequency, genital sores, hematuria, pelvic pain, urgency, vaginal bleeding and vaginal discharge.   Musculoskeletal:  Positive for arthralgias and myalgias. Negative for joint swelling.   Skin:  Negative for rash.   Neurological:  Negative for dizziness, weakness, headaches and paresthesias.   Psychiatric/Behavioral:  Negative for mood changes. The patient is not nervous/anxious.      OBJECTIVE:   /67   Pulse 64   Temp 97.6  F (36.4  C) (Tympanic)   Resp 18   Ht 1.626 m (5' 4\")   Wt 66.4 kg (146 lb 4.8 oz)   LMP  (LMP Unknown)   SpO2 97%   BMI 25.11 kg/m   Estimated body mass index is 25.11 kg/m  as calculated from the following:    Height as of this encounter: 1.626 m (5' 4\").    Weight as of this encounter: 66.4 kg (146 lb 4.8 oz).  Physical Exam  GENERAL: healthy, alert and no distress  EYES: Eyes grossly normal to inspection, PERRL and conjunctivae and sclerae normal  HENT: ear canals and TM's normal, nose and mouth without ulcers or lesions  NECK: no adenopathy, no asymmetry, masses, or scars and thyroid normal to palpation  RESP: lungs clear to auscultation - no rales, rhonchi or wheezes  BREAST: normal without masses, tenderness or nipple discharge and no palpable axillary masses or adenopathy  CV: regular rate and rhythm, normal S1 S2, no S3 or S4, no murmur, click or rub, no peripheral edema and peripheral pulses strong  ABDOMEN: soft, nontender, no hepatosplenomegaly, no masses and bowel sounds normal  MS: Tender at the anterior hip joints bilaterally, the left shoulder has decreased range " of motion to about half of expected in all directions. She reports pain over the posterior shoulder at the scapula. No edema  SKIN: no suspicious lesions or rashes  NEURO: Normal strength and tone, mentation intact and speech normal  PSYCH: mentation appears normal, affect normal/bright    Diagnostic Test Results:  Labs reviewed in Epic  Results for orders placed or performed in visit on 08/11/23   XR Shoulder Left G/E 3 Views     Status: None    Narrative    EXAM: XR SHOULDER LEFT G/E 3 VIEWS  LOCATION: [facility]  DATE: 8/11/2023    INDICATION:  Acute pain of left shoulder, Limited range of motion (ROM) of shoulder  COMPARISON: No direct comparison available.      Impression    IMPRESSION: Normal joint spaces and alignment. No fracture.  Dual chamber cardiac pacemaker. Prior median sternotomy. Lower cervical spondylosis.   Results for orders placed or performed in visit on 08/11/23   TSH     Status: Normal   Result Value Ref Range    TSH 0.49 0.30 - 4.20 uIU/mL   T4, free     Status: Abnormal   Result Value Ref Range    Free T4 1.75 (H) 0.90 - 1.70 ng/dL   Comprehensive metabolic panel (BMP + Alb, Alk Phos, ALT, AST, Total. Bili, TP)     Status: Abnormal   Result Value Ref Range    Sodium 142 136 - 145 mmol/L    Potassium 4.7 3.4 - 5.3 mmol/L    Chloride 100 98 - 107 mmol/L    Carbon Dioxide (CO2) 32 (H) 22 - 29 mmol/L    Anion Gap 10 7 - 15 mmol/L    Urea Nitrogen 17.4 8.0 - 23.0 mg/dL    Creatinine 0.94 0.51 - 0.95 mg/dL    Calcium 10.5 (H) 8.8 - 10.2 mg/dL    Glucose 96 70 - 99 mg/dL    Alkaline Phosphatase 66 35 - 104 U/L    AST 36 0 - 45 U/L    ALT 29 0 - 50 U/L    Protein Total 8.0 6.4 - 8.3 g/dL    Albumin 4.7 3.5 - 5.2 g/dL    Bilirubin Total 0.6 <=1.2 mg/dL    GFR Estimate 67 >60 mL/min/1.73m2   INR point of care     Status: Abnormal   Result Value Ref Range    INR 2.0 (H) 0.9 - 1.1    Narrative    This test is intended for monitoring Coumadin therapy. Results are not accurate in  "patients with prolonged INR due to factor deficiency.       ASSESSMENT / PLAN:   Tammy was seen today for annual visit.    Diagnoses and all orders for this visit:    RUQ abdominal pain  -     Comprehensive metabolic panel (BMP + Alb, Alk Phos, ALT, AST, Total. Bili, TP); Future  -     Comprehensive metabolic panel (BMP + Alb, Alk Phos, ALT, AST, Total. Bili, TP)    Age-related osteoporosis without current pathological fracture  -     RISEdronate (ACTONEL) 35 MG tablet; Take 1 tablet (35 mg) by mouth every 7 days    Acquired hypothyroidism  -     TSH; Future  -     T4, free; Future  -     TSH  -     T4, free    History of colonic polyps  -     Colonoscopy Screening  Referral; Future    Special screening for malignant neoplasms, colon  -     Colonoscopy Screening  Referral; Future    Acute pain of left shoulder  -     XR Shoulder Left G/E 3 Views; Future  -     Orthopedic  Referral; Future    Limited range of motion (ROM) of shoulder  -     XR Shoulder Left G/E 3 Views; Future  -     Orthopedic  Referral; Future    H/O mechanical aortic valve replacement  -     INR point of care    Other orders  -     REVIEW OF HEALTH MAINTENANCE PROTOCOL ORDERS        Patient has been advised of split billing requirements and indicates understanding: Yes  COUNSELING:  Reviewed preventive health counseling, as reflected in patient instructions       Regular exercise       Healthy diet/nutrition       Vision screening  BMI:   Estimated body mass index is 25.11 kg/m  as calculated from the following:    Height as of this encounter: 1.626 m (5' 4\").    Weight as of this encounter: 66.4 kg (146 lb 4.8 oz).   Weight management plan: Discussed healthy diet and exercise guidelines  She reports that she has never smoked. She has never used smokeless tobacco.  Appropriate preventive services were discussed with this patient, including applicable screening as appropriate for cardiovascular disease, " diabetes, osteopenia/osteoporosis, and glaucoma.  As appropriate for age/gender, discussed screening for colorectal cancer, prostate cancer, breast cancer, and cervical cancer. Checklist reviewing preventive services available has been given to the patient.  Reviewed patients plan of care and provided an AVS. The Basic Care Plan (routine screening as documented in Health Maintenance) for Tammy meets the Care Plan requirement. This Care Plan has been established and reviewed with the Patient.      CUCO ALEMAN MD  [facility]  Identified Health Risks:  I have reviewed Opioid Use Disorder and Substance Use Disorder risk factors and made any needed referrals. Answers submitted by the patient for this visit:  Patient Health Questionnaire (Submitted on 8/11/2023)  If you checked off any problems, how difficult have these problems made it for you to do your work, take care of things at home, or get along with other people?: Not difficult at all  PHQ9 TOTAL SCORE: 3

## 2023-08-11 NOTE — PROGRESS NOTES
ANTICOAGULATION MANAGEMENT     Tammy Law 67 year old female is on warfarin with therapeutic INR result. (Goal INR 2.0-3.0)    Recent labs: (last 7 days)     08/11/23  1041   INR 2.0*       ASSESSMENT     Source(s): Chart Review  Previous INR was Therapeutic last 2(+) visits  Medication, diet, health changes since last INR chart reviewed; none identified         PLAN     Recommended plan for no diet, medication or health factor changes affecting INR     Dosing Instructions: Continue your current warfarin dose with next INR in 2 weeks       Summary  As of 8/11/2023      Full warfarin instructions:  2.5 mg every Tue; 5 mg all other days   Next INR check:  8/25/2023                   Patient to recheck with home meter    Education provided:   None required    Plan made per [facility] anticoagulation protocol    Shea Meadows RN  Anticoagulation Clinic  8/11/2023    _______________________________________________________________________     Anticoagulation Episode Summary       Current INR goal:  2.0-3.0   TTR:  77.3 % (1 y)   Target end date:  Indefinite   Send INR reminders to:  [facility]    Indications    H/O mechanical aortic valve replacement [Z95.2]             Comments:  [employer] home monitor- managed by exception             Anticoagulation Care Providers       Provider Role Specialty Phone number    Arnold Anderson MD Referring Internal Medicine [phone]    Christina Hernandez MD Referring Family Medicine [phone]    Karina Cifuentes MD Referring Family Medicine [phone]

## 2023-08-13 PROBLEM — Z86.0100 HISTORY OF COLONIC POLYPS: Status: ACTIVE | Noted: 2023-08-13

## 2023-08-26 ENCOUNTER — TRANSFERRED RECORDS (OUTPATIENT)
Dept: HEALTH INFORMATION MANAGEMENT | Facility: CLINIC | Age: 68
End: 2023-08-26
Payer: COMMERCIAL

## 2023-08-26 LAB — INR (EXTERNAL): 2.8 (ref 0.9–1.1)

## 2023-08-29 ENCOUNTER — DOCUMENTATION ONLY (OUTPATIENT)
Dept: ANTICOAGULATION | Facility: CLINIC | Age: 68
End: 2023-08-29
Payer: COMMERCIAL

## 2023-08-29 ENCOUNTER — TELEPHONE (OUTPATIENT)
Dept: ANTICOAGULATION | Facility: CLINIC | Age: 68
End: 2023-08-29
Payer: COMMERCIAL

## 2023-08-29 DIAGNOSIS — Z95.2 H/O MECHANICAL AORTIC VALVE REPLACEMENT: Primary | ICD-10-CM

## 2023-08-29 NOTE — TELEPHONE ENCOUNTER
Anticoagulation Management    Updated home monitor orders needed due to Provider Change    Current home monitor company: [employer]    [facility] referring provider: Karina Cifuentes    Referring provider's clinic fax number (except [employer]): N/A    Request made by: Home monitor company (We are not receiving Bisi's result electronically. They are going to PCP fax machine, and then forwarded to [place] fax machine).    Reynaldo Lewis RN

## 2023-08-29 NOTE — PROGRESS NOTES
ANTICOAGULATION  MANAGEMENT-Home Monitor Managed by Exception    Tammy LUCA Ani 67 year old female is on warfarin with therapeutic INR result. (Goal INR 2.0-3.0)    Recent labs: (last 7 days)     08/26/23  0800   INR 2.8*       Previous INR was Therapeutic  Medication, diet, health changes since last INR: Pt is currently admitted to [facility] for rib fractures and hemothorax.  Contacted within the last 12 weeks by phone on 07/14/2023      ZAIN     Tammy was NOT contacted regarding therapeutic result today per home monitoring policy manage by exception agreement.   Current warfarin dose is to be continued:     Summary  As of 8/29/2023      Full warfarin instructions:  2.5 mg every Tue; 5 mg all other days   Next INR check:  8/31/2023             ?   Reynaldo Lewis RN  Anticoagulation Clinic  8/29/2023    _______________________________________________________________________     Anticoagulation Episode Summary       Current INR goal:  2.0-3.0   TTR:  80.3 % (1 y)   Target end date:  Indefinite   Send INR reminders to:  [facility]    Indications    H/O mechanical aortic valve replacement [Z95.2]             Comments:  [employer] home monitor- managed by exception             Anticoagulation Care Providers       Provider Role Specialty Phone number    Arnold Anderson MD Referring Internal Medicine [phone]    Christina Hernandez MD Referring Family Medicine [phone]    Karina Cifuentes MD Referring Family Medicine [phone]

## 2023-09-09 LAB — INR (EXTERNAL): 1.2 (ref 0.9–1.1)

## 2023-09-11 LAB
INR (EXTERNAL): 1.5 (ref 0.9–1.1)
INR (EXTERNAL): 1.8 (ref 0.9–1.1)

## 2023-09-12 LAB — INR (EXTERNAL): 2.2 (ref 0.9–1.1)

## 2023-09-13 LAB — INR (EXTERNAL): 2.4 (ref 0.9–1.1)

## 2023-09-14 LAB — INR (EXTERNAL): 2.6 (ref 0.9–1.1)

## 2023-09-15 ENCOUNTER — ANTICOAGULATION THERAPY VISIT (OUTPATIENT)
Dept: ANTICOAGULATION | Facility: CLINIC | Age: 68
End: 2023-09-15
Payer: COMMERCIAL

## 2023-09-15 DIAGNOSIS — Z95.2 H/O MECHANICAL AORTIC VALVE REPLACEMENT: Primary | ICD-10-CM

## 2023-09-15 LAB — INR (EXTERNAL): 2.7 (ref 0.9–1.1)

## 2023-09-15 NOTE — PROGRESS NOTES
Fax received from [facility] notifying [facility] of patients hospital discharge From [facility] 9/15/23. NEW Goal range: Target INR range 2.5-3.5 (new goal due to new afib in the hospital). Complete document in Care every where.

## 2023-09-15 NOTE — PROGRESS NOTES
ANTICOAGULATION MANAGEMENT     Tammy Law 67 year old female is on warfarin with therapeutic INR result. (Goal INR 2.5-3.5)**New goal range      Recent labs: (last 7 days)     09/15/23  0000   INR 2.7*       ASSESSMENT     Source(s): Chart Review, Patient/Caregiver Call, and Hospital discharge document      Warfarin doses taken: Less warfarin taken than planned which may be affecting INR  Diet: No new diet changes identified  Medication/supplement changes: New: Tylenol, Robaxin, Oxycodone PRN, IV antibiotics-Cefazolin ( x 37 days)None noted  New illness, injury, or hospitalization: Yes, hemothorax, traumatic pneumothorax, fall, arm contusion, closed fracture of multiple ribs, left side, Thorascopic surgery with evacuation of traumatic hemothorax, open reduction internal fixation of left ribs x4 and left chest tube(since removed).  Signs or symptoms of bleeding or clotting: No, not at this time.  Previous result: Therapeutic last 2(+) visits  Additional findings:  Picc line dressing change and labs on Mondays at [facility] infusion [place] . No home care available in patient areas that accepts [facility]       PLAN     Recommended plan for temporary change(s) affecting INR     Dosing Instructions: Continue your current warfarin dose with next INR in 4 days follow dosing and recheck per hospital discharge orders.    Summary  As of 9/15/2023      Full warfarin instructions:  2.5 mg every Tue; 5 mg all other days   Next INR check:  9/19/2023               Telephone call with Bisi who verbalizes understanding and agrees to plan    Patient to recheck with home meter    Education provided:   Please call back if any changes to your diet, medications or how you've been taking warfarin  Goal range and lab monitoring: goal range and significance of current result    Plan made per [facility] anticoagulation protocol    Kassy Borges RN  Anticoagulation  Clinic  9/15/2023    _______________________________________________________________________     Anticoagulation Episode Summary       Current INR goal:  2.5-3.5   TTR:  79.2 % (1 y)   Target end date:  Indefinite   Send INR reminders to:  [facility]    Indications    H/O mechanical aortic valve replacement [Z95.2]             Comments:  [employer] home monitor- managed by exception             Anticoagulation Care Providers       Provider Role Specialty Phone number    Arnold Anderson MD Referring Internal Medicine [phone]    Christina Hernandez MD Referring Family Medicine [phone]    Karina Cifuentes MD Referring Family Medicine [phone]

## 2023-09-18 ENCOUNTER — LAB REQUISITION (OUTPATIENT)
Dept: LAB | Facility: CLINIC | Age: 68
End: 2023-09-18
Payer: COMMERCIAL

## 2023-09-18 ENCOUNTER — ANCILLARY PROCEDURE (OUTPATIENT)
Dept: CARDIOLOGY | Facility: CLINIC | Age: 68
End: 2023-09-18
Attending: INTERNAL MEDICINE
Payer: COMMERCIAL

## 2023-09-18 DIAGNOSIS — Z95.0 PACEMAKER: ICD-10-CM

## 2023-09-18 DIAGNOSIS — I49.5 SICK SINUS SYNDROME (H): ICD-10-CM

## 2023-09-18 LAB
ALBUMIN SERPL BCG-MCNC: 2.7 G/DL (ref 3.5–5.2)
ALP SERPL-CCNC: 92 U/L (ref 35–104)
ALT SERPL W P-5'-P-CCNC: 30 U/L (ref 0–50)
ANION GAP SERPL CALCULATED.3IONS-SCNC: 8 MMOL/L (ref 7–15)
AST SERPL W P-5'-P-CCNC: 28 U/L (ref 0–45)
BASOPHILS # BLD AUTO: 0 10E3/UL (ref 0–0.2)
BASOPHILS NFR BLD AUTO: 0 %
BILIRUB SERPL-MCNC: 0.4 MG/DL
BUN SERPL-MCNC: 6.3 MG/DL (ref 8–23)
CALCIUM SERPL-MCNC: 8.3 MG/DL (ref 8.8–10.2)
CHLORIDE SERPL-SCNC: 104 MMOL/L (ref 98–107)
CREAT SERPL-MCNC: 0.51 MG/DL (ref 0.51–0.95)
DEPRECATED HCO3 PLAS-SCNC: 25 MMOL/L (ref 22–29)
EGFRCR SERPLBLD CKD-EPI 2021: >90 ML/MIN/1.73M2
EOSINOPHIL # BLD AUTO: 0.1 10E3/UL (ref 0–0.7)
EOSINOPHIL NFR BLD AUTO: 1 %
ERYTHROCYTE [DISTWIDTH] IN BLOOD BY AUTOMATED COUNT: 15.7 % (ref 10–15)
GLUCOSE SERPL-MCNC: 84 MG/DL (ref 70–99)
HCT VFR BLD AUTO: 26.7 % (ref 35–47)
HGB BLD-MCNC: 8.6 G/DL (ref 11.7–15.7)
IMM GRANULOCYTES # BLD: 0.1 10E3/UL
IMM GRANULOCYTES NFR BLD: 1 %
LYMPHOCYTES # BLD AUTO: 1.2 10E3/UL (ref 0.8–5.3)
LYMPHOCYTES NFR BLD AUTO: 13 %
MCH RBC QN AUTO: 28.4 PG (ref 26.5–33)
MCHC RBC AUTO-ENTMCNC: 32.2 G/DL (ref 31.5–36.5)
MCV RBC AUTO: 88 FL (ref 78–100)
MDC_IDC_LEAD_IMPLANT_DT: NORMAL
MDC_IDC_LEAD_IMPLANT_DT: NORMAL
MDC_IDC_LEAD_LOCATION: NORMAL
MDC_IDC_LEAD_LOCATION: NORMAL
MDC_IDC_LEAD_LOCATION_DETAIL_1: NORMAL
MDC_IDC_LEAD_LOCATION_DETAIL_1: NORMAL
MDC_IDC_LEAD_MFG: NORMAL
MDC_IDC_LEAD_MFG: NORMAL
MDC_IDC_LEAD_MODEL: NORMAL
MDC_IDC_LEAD_MODEL: NORMAL
MDC_IDC_LEAD_POLARITY_TYPE: NORMAL
MDC_IDC_LEAD_POLARITY_TYPE: NORMAL
MDC_IDC_LEAD_SERIAL: NORMAL
MDC_IDC_LEAD_SERIAL: NORMAL
MDC_IDC_LEAD_SPECIAL_FUNCTION: NORMAL
MDC_IDC_LEAD_SPECIAL_FUNCTION: NORMAL
MDC_IDC_MSMT_BATTERY_DTM: NORMAL
MDC_IDC_MSMT_BATTERY_REMAINING_PERCENTAGE: 50 %
MDC_IDC_MSMT_BATTERY_STATUS: NORMAL
MDC_IDC_MSMT_CAP_CHARGE_TYPE: NORMAL
MDC_IDC_MSMT_LEADCHNL_RA_IMPEDANCE_VALUE: 585 OHM
MDC_IDC_MSMT_LEADCHNL_RA_SENSING_INTR_AMPL: 1.6 MV
MDC_IDC_MSMT_LEADCHNL_RV_IMPEDANCE_VALUE: 468 OHM
MDC_IDC_MSMT_LEADCHNL_RV_SENSING_INTR_AMPL: 8.3 MV
MDC_IDC_PG_IMPLANT_DTM: NORMAL
MDC_IDC_PG_MFG: NORMAL
MDC_IDC_PG_MODEL: NORMAL
MDC_IDC_PG_SERIAL: NORMAL
MDC_IDC_PG_TYPE: NORMAL
MDC_IDC_SESS_CLINIC_NAME: NORMAL
MDC_IDC_SESS_DTM: NORMAL
MDC_IDC_SESS_TYPE: NORMAL
MDC_IDC_SET_BRADY_AT_MODE_SWITCH_MODE: NORMAL
MDC_IDC_SET_BRADY_AT_MODE_SWITCH_RATE: 160 {BEATS}/MIN
MDC_IDC_SET_BRADY_LOWRATE: 60 {BEATS}/MIN
MDC_IDC_SET_BRADY_MAX_SENSOR_RATE: 120 {BEATS}/MIN
MDC_IDC_SET_BRADY_MAX_TRACKING_RATE: 130 {BEATS}/MIN
MDC_IDC_SET_BRADY_MODE: NORMAL
MDC_IDC_SET_BRADY_PAV_DELAY_LOW: 200 MS
MDC_IDC_SET_BRADY_SAV_DELAY_LOW: 180 MS
MDC_IDC_SET_LEADCHNL_RA_PACING_AMPLITUDE: 1.6 V
MDC_IDC_SET_LEADCHNL_RA_PACING_POLARITY: NORMAL
MDC_IDC_SET_LEADCHNL_RA_PACING_PULSEWIDTH: 0.4 MS
MDC_IDC_SET_LEADCHNL_RA_SENSING_ADAPTATION_MODE: NORMAL
MDC_IDC_SET_LEADCHNL_RA_SENSING_POLARITY: NORMAL
MDC_IDC_SET_LEADCHNL_RV_PACING_AMPLITUDE: 2.4 V
MDC_IDC_SET_LEADCHNL_RV_PACING_POLARITY: NORMAL
MDC_IDC_SET_LEADCHNL_RV_PACING_PULSEWIDTH: 0.4 MS
MDC_IDC_SET_LEADCHNL_RV_SENSING_ADAPTATION_MODE: NORMAL
MDC_IDC_SET_LEADCHNL_RV_SENSING_POLARITY: NORMAL
MDC_IDC_STAT_BRADY_RA_PERCENT_PACED: 76 %
MDC_IDC_STAT_BRADY_RV_PERCENT_PACED: 0 %
MONOCYTES # BLD AUTO: 0.7 10E3/UL (ref 0–1.3)
MONOCYTES NFR BLD AUTO: 8 %
NEUTROPHILS # BLD AUTO: 7.1 10E3/UL (ref 1.6–8.3)
NEUTROPHILS NFR BLD AUTO: 77 %
NRBC # BLD AUTO: 0 10E3/UL
NRBC BLD AUTO-RTO: 0 /100
PLATELET # BLD AUTO: 409 10E3/UL (ref 150–450)
POTASSIUM SERPL-SCNC: 3.8 MMOL/L (ref 3.4–5.3)
PROT SERPL-MCNC: 5.9 G/DL (ref 6.4–8.3)
RBC # BLD AUTO: 3.03 10E6/UL (ref 3.8–5.2)
SODIUM SERPL-SCNC: 137 MMOL/L (ref 136–145)
WBC # BLD AUTO: 9.1 10E3/UL (ref 4–11)

## 2023-09-18 PROCEDURE — 36415 COLL VENOUS BLD VENIPUNCTURE: CPT | Mod: ORL | Performed by: INTERNAL MEDICINE

## 2023-09-18 PROCEDURE — 80053 COMPREHEN METABOLIC PANEL: CPT | Mod: ORL | Performed by: INTERNAL MEDICINE

## 2023-09-18 PROCEDURE — 93296 REM INTERROG EVL PM/IDS: CPT | Performed by: INTERNAL MEDICINE

## 2023-09-18 PROCEDURE — 93294 REM INTERROG EVL PM/LDLS PM: CPT | Performed by: INTERNAL MEDICINE

## 2023-09-18 PROCEDURE — 85025 COMPLETE CBC W/AUTO DIFF WBC: CPT | Mod: ORL | Performed by: INTERNAL MEDICINE

## 2023-09-19 ENCOUNTER — ANTICOAGULATION THERAPY VISIT (OUTPATIENT)
Dept: ANTICOAGULATION | Facility: CLINIC | Age: 68
End: 2023-09-19
Payer: COMMERCIAL

## 2023-09-19 LAB — INR HOME MONITORING: 2.7 (ref 2–3)

## 2023-09-19 NOTE — PROGRESS NOTES
ANTICOAGULATION MANAGEMENT     Tammy Law 67 year old female is on warfarin with therapeutic INR result. (Goal INR 2.5-3.5)    Recent labs: (last 7 days)     09/19/23  0000   INR 2.7       ASSESSMENT     Source(s): Chart Review and Patient/Caregiver Call     Warfarin doses taken: Warfarin taken as instructed  Diet: No new diet changes identified  Medication/supplement changes:  Continues on Cefazolin through 10/18/23.   New illness, injury, or hospitalization: No  Signs or symptoms of bleeding or clotting: No  Previous result: Therapeutic last 2(+) visits  Additional findings: None       PLAN     Recommended plan for ongoing change(s) affecting INR, antibiotic therapy    Dosing Instructions: Continue your current warfarin dose with next INR in 1 week       Summary  As of 9/19/2023      Full warfarin instructions:  2.5 mg every Tue; 5 mg all other days   Next INR check:  9/26/2023               Telephone call with Bisi who agrees to plan and repeated back plan correctly    Patient to recheck with home meter    Education provided:   Please call back if any changes to your diet, medications or how you've been taking warfarin  Goal range and lab monitoring: goal range and significance of current result    Plan made per [facility] anticoagulation protocol    Kassy Borges RN  Anticoagulation Clinic  9/19/2023    _______________________________________________________________________     Anticoagulation Episode Summary       Current INR goal:  2.5-3.5   TTR:  79.2 % (1 y)   Target end date:  Indefinite   Send INR reminders to:  ANTICOAG HOME MONITORING    Indications    H/O mechanical aortic valve replacement [Z95.2]             Comments:  [employer] home monitor- managed by exception  Goal range changed during 8/28-9/15/23  hospitalization             Anticoagulation Care Providers       Provider Role Specialty Phone number    Arnold Anderson MD Referring Internal Medicine [phone]    Christina Hernandez MD  Referring Family Medicine [phone]    Karina Cifuentes MD Referring Family Medicine [phone]

## 2023-09-22 ENCOUNTER — ANCILLARY PROCEDURE (OUTPATIENT)
Dept: GENERAL RADIOLOGY | Facility: CLINIC | Age: 68
End: 2023-09-22
Payer: COMMERCIAL

## 2023-09-22 ENCOUNTER — OFFICE VISIT (OUTPATIENT)
Dept: FAMILY MEDICINE | Facility: CLINIC | Age: 68
End: 2023-09-22
Payer: COMMERCIAL

## 2023-09-22 VITALS
SYSTOLIC BLOOD PRESSURE: 112 MMHG | DIASTOLIC BLOOD PRESSURE: 58 MMHG | WEIGHT: 142 LBS | HEART RATE: 66 BPM | RESPIRATION RATE: 14 BRPM | TEMPERATURE: 98.5 F | HEIGHT: 64 IN | BODY MASS INDEX: 24.24 KG/M2 | OXYGEN SATURATION: 98 %

## 2023-09-22 DIAGNOSIS — Z95.2 H/O MECHANICAL AORTIC VALVE REPLACEMENT: ICD-10-CM

## 2023-09-22 DIAGNOSIS — J90 PLEURAL EFFUSION DUE TO BACTERIAL INFECTION: Primary | ICD-10-CM

## 2023-09-22 DIAGNOSIS — B96.89 PLEURAL EFFUSION DUE TO BACTERIAL INFECTION: Primary | ICD-10-CM

## 2023-09-22 DIAGNOSIS — S27.0XXD TRAUMATIC PNEUMOTHORAX, SUBSEQUENT ENCOUNTER: ICD-10-CM

## 2023-09-22 DIAGNOSIS — B96.89 PLEURAL EFFUSION DUE TO BACTERIAL INFECTION: ICD-10-CM

## 2023-09-22 DIAGNOSIS — J90 PLEURAL EFFUSION DUE TO BACTERIAL INFECTION: ICD-10-CM

## 2023-09-22 PROCEDURE — 99214 OFFICE O/P EST MOD 30 MIN: CPT | Mod: 25 | Performed by: FAMILY MEDICINE

## 2023-09-22 PROCEDURE — 90662 IIV NO PRSV INCREASED AG IM: CPT | Performed by: FAMILY MEDICINE

## 2023-09-22 PROCEDURE — G0008 ADMIN INFLUENZA VIRUS VAC: HCPCS | Performed by: FAMILY MEDICINE

## 2023-09-22 PROCEDURE — 71046 X-RAY EXAM CHEST 2 VIEWS: CPT | Mod: TC | Performed by: RADIOLOGY

## 2023-09-22 ASSESSMENT — PAIN SCALES - GENERAL: PAINLEVEL: MODERATE PAIN (4)

## 2023-09-22 NOTE — PROGRESS NOTES
"Assessment & Plan   Pleural effusion due to bacterial infection  Will need to follow.  - XR Chest 2 Views  - Adult Infectious Disease  Referral    H/O mechanical aortic valve replacement  - Adult Infectious Disease  Referral    Traumatic pneumothorax, subsequent encounter  Unfortunately, she must be anticoagulated with complicates the fall and pneumothorax.  MED REC REQUIRED Done, continue current medications.   CUCO ALEMAN MD  [facility]  Lauri Mathews is a 67 year old, presenting for the following health issues:  Recheck Medication and Hospital F/U ([facility]/8/28-9/15/Hemothorax on left /Traumatic pneumothorax, initial encounter /Fall, initial encounter /Arm contusion, left /Closed fracture of multiple ribs of left side)      9/22/2023     2:23 PM   Additional Questions   Roomed by yaneth fisher   Accompanied by sister       Breathing is okay.  Using a walker for \"mental support.\"   Had 4 rib plates.  On IV antiibiotics until 10/18/2023.  Took only 1 oxycodone.  Needs to restart the vitamin D3 and calcium yet.     Hospital Follow-up Visit:  Hospital/Nursing Home/IP Rehab Facility:  [facility]  Date of Admission: 8/28  Date of Discharge: 9/15  Reason(s) for Admission: Hemothorax on left   Traumatic pneumothorax  Fall  Arm contusion, left  Closed fracture of multiple ribs of left side  Was your hospitalization related to COVID-19? No   Problems taking medications regularly:  None  Medication changes since discharge: None  Problems adhering to non-medication therapy:  None  Summary of hospitalization:  CareEverywhere information obtained and reviewed  Diagnostic Tests/Treatments reviewed.  Follow up needed: CXR  Other Healthcare Providers Involved in Patient s Care:         Update since discharge: improved.     Plan of care communicated with patient             Objective    /58 (BP Location: Left arm, Patient Position: " "Sitting, Cuff Size: Adult Large)   Pulse 66   Temp 98.5  F (36.9  C)   Resp 14   Ht 1.626 m (5' 4\")   Wt 64.4 kg (142 lb)   LMP  (LMP Unknown)   SpO2 98%   BMI 24.37 kg/m    Body mass index is 24.37 kg/m .  Physical Exam   GENERAL: healthy, alert and no distress  EYES: Eyes grossly normal to inspection, PERRL and conjunctivae and sclerae normal  HENT: ear canals and TM's normal, nose and mouth without ulcers or lesions  NECK: no adenopathy, no asymmetry, masses, or scars and thyroid normal to palpation  RESP: lungs clear to auscultation - decreased at the left base  CV: regular rate and rhythm, normal S1 S2, no S3 or S4, no murmur, click or rub, no peripheral edema and peripheral pulses strong  ABDOMEN: soft, nontender, no hepatosplenomegaly, no masses and bowel sounds normal  MS: no gross musculoskeletal defects noted, no edema  SKIN: no suspicious lesions or rashes  NEURO: Normal strength and tone, mentation intact and speech normal  PSYCH: mentation appears normal, affect normal/bright    Results for orders placed or performed in visit on 09/22/23   XR Chest 2 Views     Status: None    Narrative    EXAM: XR CHEST 2 VIEWS  LOCATION: [facility]  DATE: 9/22/2023    INDICATION:  Pleural effusion due to bacterial infection,     COMPARISON: 09/11/2023      Impression    IMPRESSION: Slight interval decrease in left pleural effusion and left basilar atelectasis, mild persists. No evidence for CHF or pneumonia. Postsurgical change noted within multiple left lower ribs posterolaterally. Stable skin staples along the left   lower hemithorax laterally.     "

## 2023-09-25 ENCOUNTER — OFFICE VISIT (OUTPATIENT)
Dept: INFECTIOUS DISEASES | Facility: CLINIC | Age: 68
End: 2023-09-25
Payer: COMMERCIAL

## 2023-09-25 ENCOUNTER — LAB REQUISITION (OUTPATIENT)
Dept: LAB | Facility: CLINIC | Age: 68
End: 2023-09-25
Payer: COMMERCIAL

## 2023-09-25 ENCOUNTER — LAB (OUTPATIENT)
Dept: LAB | Facility: CLINIC | Age: 68
End: 2023-09-25
Payer: COMMERCIAL

## 2023-09-25 VITALS
WEIGHT: 142.8 LBS | SYSTOLIC BLOOD PRESSURE: 118 MMHG | DIASTOLIC BLOOD PRESSURE: 58 MMHG | OXYGEN SATURATION: 97 % | BODY MASS INDEX: 24.51 KG/M2 | HEART RATE: 68 BPM | TEMPERATURE: 98.8 F

## 2023-09-25 DIAGNOSIS — J90 PLEURAL EFFUSION DUE TO BACTERIAL INFECTION: ICD-10-CM

## 2023-09-25 DIAGNOSIS — B96.89 PLEURAL EFFUSION DUE TO BACTERIAL INFECTION: ICD-10-CM

## 2023-09-25 DIAGNOSIS — A49.01 METHICILLIN SUSCEPTIBLE STAPHYLOCOCCUS AUREUS INFECTION, UNSPECIFIED SITE: ICD-10-CM

## 2023-09-25 DIAGNOSIS — B96.89 PLEURAL EFFUSION DUE TO BACTERIAL INFECTION: Primary | ICD-10-CM

## 2023-09-25 DIAGNOSIS — J90 PLEURAL EFFUSION DUE TO BACTERIAL INFECTION: Primary | ICD-10-CM

## 2023-09-25 DIAGNOSIS — Z95.2 H/O MECHANICAL AORTIC VALVE REPLACEMENT: ICD-10-CM

## 2023-09-25 LAB
ALBUMIN SERPL BCG-MCNC: 3.2 G/DL (ref 3.5–5.2)
ALP SERPL-CCNC: 93 U/L (ref 35–104)
ALT SERPL W P-5'-P-CCNC: 11 U/L (ref 0–50)
ANION GAP SERPL CALCULATED.3IONS-SCNC: 8 MMOL/L (ref 7–15)
AST SERPL W P-5'-P-CCNC: 25 U/L (ref 0–45)
BASOPHILS # BLD AUTO: 0 10E3/UL (ref 0–0.2)
BASOPHILS NFR BLD AUTO: 1 %
BILIRUB SERPL-MCNC: 0.3 MG/DL
BUN SERPL-MCNC: 5.4 MG/DL (ref 8–23)
CALCIUM SERPL-MCNC: 8.7 MG/DL (ref 8.8–10.2)
CHLORIDE SERPL-SCNC: 104 MMOL/L (ref 98–107)
CREAT SERPL-MCNC: 0.51 MG/DL (ref 0.51–0.95)
CRP SERPL-MCNC: 11.9 MG/L
DEPRECATED HCO3 PLAS-SCNC: 26 MMOL/L (ref 22–29)
EGFRCR SERPLBLD CKD-EPI 2021: >90 ML/MIN/1.73M2
EOSINOPHIL # BLD AUTO: 0.1 10E3/UL (ref 0–0.7)
EOSINOPHIL NFR BLD AUTO: 1 %
ERYTHROCYTE [DISTWIDTH] IN BLOOD BY AUTOMATED COUNT: 14.9 % (ref 10–15)
ERYTHROCYTE [SEDIMENTATION RATE] IN BLOOD BY WESTERGREN METHOD: 53 MM/HR (ref 0–30)
GLUCOSE SERPL-MCNC: 98 MG/DL (ref 70–99)
HCT VFR BLD AUTO: 30.1 % (ref 35–47)
HGB BLD-MCNC: 9.5 G/DL (ref 11.7–15.7)
HOLD SPECIMEN: NORMAL
IMM GRANULOCYTES # BLD: 0 10E3/UL
IMM GRANULOCYTES NFR BLD: 1 %
LYMPHOCYTES # BLD AUTO: 0.9 10E3/UL (ref 0.8–5.3)
LYMPHOCYTES NFR BLD AUTO: 15 %
MCH RBC QN AUTO: 27.6 PG (ref 26.5–33)
MCHC RBC AUTO-ENTMCNC: 31.6 G/DL (ref 31.5–36.5)
MCV RBC AUTO: 88 FL (ref 78–100)
MONOCYTES # BLD AUTO: 0.5 10E3/UL (ref 0–1.3)
MONOCYTES NFR BLD AUTO: 8 %
NEUTROPHILS # BLD AUTO: 4.5 10E3/UL (ref 1.6–8.3)
NEUTROPHILS NFR BLD AUTO: 74 %
NRBC # BLD AUTO: 0 10E3/UL
NRBC BLD AUTO-RTO: 0 /100
PLATELET # BLD AUTO: 336 10E3/UL (ref 150–450)
POTASSIUM SERPL-SCNC: 3.6 MMOL/L (ref 3.4–5.3)
PROT SERPL-MCNC: 6.2 G/DL (ref 6.4–8.3)
RBC # BLD AUTO: 3.44 10E6/UL (ref 3.8–5.2)
SODIUM SERPL-SCNC: 138 MMOL/L (ref 136–145)
WBC # BLD AUTO: 6 10E3/UL (ref 4–11)

## 2023-09-25 PROCEDURE — 36415 COLL VENOUS BLD VENIPUNCTURE: CPT

## 2023-09-25 PROCEDURE — 85652 RBC SED RATE AUTOMATED: CPT

## 2023-09-25 PROCEDURE — 85025 COMPLETE CBC W/AUTO DIFF WBC: CPT | Mod: ORL | Performed by: INTERNAL MEDICINE

## 2023-09-25 PROCEDURE — 99204 OFFICE O/P NEW MOD 45 MIN: CPT | Performed by: INTERNAL MEDICINE

## 2023-09-25 PROCEDURE — 86140 C-REACTIVE PROTEIN: CPT

## 2023-09-25 PROCEDURE — 80053 COMPREHEN METABOLIC PANEL: CPT | Mod: ORL | Performed by: INTERNAL MEDICINE

## 2023-09-25 RX ORDER — CEFAZOLIN SODIUM 1 G/3ML
INJECTION, POWDER, FOR SOLUTION INTRAMUSCULAR; INTRAVENOUS
COMMUNITY
Start: 2023-09-21 | End: 2023-12-13

## 2023-09-25 NOTE — PROGRESS NOTES
Infectious Disease Consultation   [place] [facility]    Date of Consult (When I saw the patient): 09/25/23    Assessment & Plan   Tammy Law is a 67 year old female with recent hospitalization in [facility], for hematoma, fall, rib fracture  Status post hematoma evacuation, cultures with MSSA.  Patient was discharged on IV cefazolin.  Patient is transferring care to [place] due to in network/insurance.      Impression:    MSSA infection, hematoma  Status post rib fracture, open reduction internal fixation with plates, see images below  History of aortic valve replacement in 1995  ICD pacemaker  Patient was seen by infectious disease at [facility]: Simba Seay,   PICC line, tolerating IV cefazolin with tentative end date of 10/18/2023, 6 weeks from positive cultures in September 2023    Recommendations    IV cefazolin, for at least 6 weeks ending 10/18/2023, followed by oral antibiotics  Monitor CBC CMP sed rate and CRP  Referral made to [facility] to see if that is in network as compared to option care-patient request.  At this time continue with option care as scheduled  Discussed with patient, records under care everywhere reviewed and questions answered    Elisa Sneed MD  [facility]  Answering Service: [phone]  On-Call ID provider: [phone], option: 9      Images reviewed: Rib fractures, hardware in place, ICD, sternal wires        Reason for Consult   Reason for consult: I was asked to evaluate this patient for MSSA infection on IV cefazolin.    Primary Care Physician   CUCO ALEMAN    Chief Complaint   Continuing IV antibiotics    History is obtained from the patient and medical records    History of Present Illness   Tammy Law is a 67 year old female who presents with transfer of care, was seen at [facility], and outpatient antibiotic therapy plan as outlined below per ID patient at [facility]    Patient  currently has pain at chest wall site incision with staples  Per records under care everywhere:  =========================================  OPAT PLAN  Dx: MSSA hemothorax infection with hardware  Organism: MSSA  IV antibiotic: IV cefazolin 2gm q8hr  Duration of IV ABX: six wks  First day of IV therapy: 9/7  Last day of IV therapy: 10/18/23  IV access: single lumen picc line  REMOVE IV ACCESS: After last dose of IV antibiotic on 10/18/23  Labs: Weekly CBC w/ diff, CMP weekly; please fax results to [phone]  ID clinic follow-up: 2-3 wks post dc, virtual         IMPRESSION:  68 yo F with a PMH of AVR, presence of CIED, hypothyroidism, depression, HLD presented to [place] for left-sided rib fractures as well as a left-sided hemothorax after stumbling backward and struck her left chest against a counter in the camper that was being pulled. She was found to have a moderate left-sided hemothorax as well as multiple left-sided rib fractures, and was transferred to [facility]. She underwent a chest tube placement in the ER with immediate evacuation of 300 cc of dark blood. Eventually she was taken for VATS with evacuation of her hemothorax and an ORIF of her left ribs numbers 6, 7, 8, and 9 with placement of 2 chest tubes. Intraoperatively there was no purulence present but the intraoperative Gram stain did have GPC's on the tissue culture that was obtained of the clot. There also was a tissue sample obtained from the pleural space, both cultures with MSSA.     #Suspected hemothorax infection with MSSA  #Recent trauma to L chest wall, leading to hemothorax  #Recent ORIF of L ribs 9/6/23 with plates placed  #Leukocytosis  #Presence of CIED, AVR  #Antimicrobial intolerances: Sulfa-->hives     PLAN:  -Pt developed a hemothorax, this certainly could have predisposed to infection in this area, cultures with MSSA thus far.  -Given growth of MSSA in the setting of an AVR and CIED, obtained BCx, NGTD.  -Continue cefazolin  2gm q8hr IV. And her hemoglobin is still dropping in the setting of warfarin I am hesitant to add rifampin just yet, will consider as her course progresses.  -single lumen picc line today.  -Currently anticipate 6 wks of IV abx from surgery, followed by oral abx thereafter in the setting of hardware placement to increase chances for success and decrease complications.    Will peripherally follow along.    Simba Seay DO  Division of Infectious Diseases 9/11/2023  Past Medical History   I have reviewed this patient's medical history and updated it with pertinent information if needed.   Past Medical History:   Diagnosis Date    Anxiety     Chronic anticoagulation     Depression     Disease of thyroid gland     Hypothyroidism    High cholesterol     Hyperlipidemia     Hypertension     Hypothyroidism     Pacemaker     biotronik    SSS (sick sinus syndrome) (H) 2/4/2020       Past Surgical History   I have reviewed this patient's surgical history and updated it with pertinent information if needed.  Past Surgical History:   Procedure Laterality Date    AORTIC VALVE REPLACEMENT      APPENDECTOMY      BIOPSY BREAST Left 2015    BREAST CYST EXCISION      CARDIAC CATHETERIZATION      HC PART REMV BONE METATARSAL HEAD,EA Right 06/23/2017    Procedure: EXOSTECTOMY 2ND METATARSAL RIGHT FOOT;  Surgeon: Jessee Mccauley DPM;  Location: [facility];  Service: Podiatry    HYSTERECTOMY  2000    IMPLANT PACEMAKER      IMPLANT PACEMAKER      OOPHORECTOMY  2000    OTHER SURGICAL HISTORY      Hemmorhoidectomy    RELEASE CARPAL TUNNEL Bilateral     TOE SURGERY      TYMPANOSTOMY TUBE PLACEMENT      ZZC REPLACE AORT VALV,PROSTH VALV      Description: Aortic Valve Replacement;  Proc Date: 01/01/1995;       Prior to Admission Medications   Cannot display prior to admission medications because the patient has not been admitted in this contact.     Allergies   Allergies   Allergen Reactions    Demerol [Meperidine]       Hallucinations      Misc. Sulfonamide Containing Compounds Hives    Morphine     Sulfa Antibiotics        Immunization History   Immunization History   Administered Date(s) Administered    COVID-19 Bivalent 12+ (Pfizer) 01/26/2023    COVID-19 MONOVALENT 12+ (Pfizer) 12/21/2020, 01/07/2021, 11/04/2021    FLU 6-35 months 09/26/2013, 09/26/2016    Flu, Unspecified 10/09/2019, 10/01/2020    Influenza (H1N1) 11/09/2009    Influenza (High Dose) 3 valent vaccine 10/03/2022    Influenza (IIV3) PF 10/25/2004, 10/13/2010, 10/06/2011, 10/02/2012, 10/05/2015    Influenza Vaccine 18-64 (Flublok) 09/27/2021    Influenza Vaccine 65+ (Fluzone HD) 09/22/2023    Influenza Vaccine >6 months (Alfuria,Fluzone) 09/25/2017, 10/28/2019    Influenza,INJ,MDCK,PF,Quad >6mo(Flucelvax) 09/25/2017    Pneumococcal 20 valent Conjugate (Prevnar 20) 07/08/2022    Pneumococcal 23 valent 10/25/2004, 01/26/2023    TD,PF 7+ (Tenivac) 04/23/2021    TDAP (Adacel,Boostrix) 03/11/2008    Zoster recombinant adjuvanted (SHINGRIX) 08/16/2019, 10/25/2019    Zoster vaccine, live 11/25/2016       Social History   I have reviewed this patient's social history and updated it with pertinent information if needed. Tammy Law  reports that she has never smoked. She has never used smokeless tobacco. She reports current alcohol use. She reports that she does not use drugs.    Family History   I have reviewed this patient's family history and updated it with pertinent information if needed.   Family History   Problem Relation Age of Onset    Cancer Paternal Grandfather         Stomach    Ovarian Cancer Cousin     Pacemaker Mother     Diabetes Mother     Coronary Artery Disease Father     Pacemaker Father     Diabetes Father     Prostate Cancer Father     Depression Sister     Thyroid Disease Sister     No Known Problems Daughter     No Known Problems Maternal Grandmother     Colon Cancer Maternal Grandfather     No Known Problems Paternal Grandmother     No  Known Problems Maternal Aunt     No Known Problems Paternal Aunt     Kidney Cancer Brother     Coronary Artery Disease Brother     Prostate Cancer Brother     Depression Sister     Thyroid Disease Sister     Thyroid Disease Brother     Hereditary Breast and Ovarian Cancer Syndrome No family hx of     Breast Cancer No family hx of     Endometrial Cancer No family hx of        Review of Systems   The 10 point Review of Systems is negative other than noted in the HPI or here.     Physical Exam   Temp: 98.8  F (37.1  C)   BP: 118/58 Pulse: 68     SpO2: 97 %      Vital Signs with Ranges  Temp:  [98.8  F (37.1  C)] 98.8  F (37.1  C)  Pulse:  [68] 68  BP: (118)/(58) 118/58  SpO2:  [97 %] 97 %  142 lbs 12.8 oz  Body mass index is 24.51 kg/m .    GENERAL APPEARANCE: Appears fatigued, sitting in chair, kyphosis  EYES: Eyes grossly normal to inspection, conjunctivae and sclerae normal  HENT: mouth without ulcers or lesions  NECK: supple  RESP: Pleuritic pain with deep inspiration, incision along lateral chest wall with staples  CV: regular rates and rhythm, S1 S2, PICC, loud click history of AVR  LYMPHATICS: no swelling  ABDOMEN: soft, nontender, nondistended  MS: extremities normal- no gross deformities noted  SKIN: Warm, dressing from previous chest tube site removed, not completely healed yet  NEURO: coherent      LABORATORY DATA:  Reviewed  WBC normal    CBC RESULTS:   Recent Labs   Lab Test 09/25/23  1130   WBC 6.0   RBC 3.44*   HGB 9.5*   HCT 30.1*   MCV 88   MCH 27.6   MCHC 31.6   RDW 14.9           Last Comprehensive Metabolic Panel:  Sodium   Date Value Ref Range Status   09/25/2023 138 136 - 145 mmol/L Final     Potassium   Date Value Ref Range Status   09/25/2023 3.6 3.4 - 5.3 mmol/L Final   10/29/2022 3.9 3.4 - 5.3 mmol/L Final     Chloride   Date Value Ref Range Status   09/25/2023 104 98 - 107 mmol/L Final   10/29/2022 107 94 - 109 mmol/L Final     Carbon Dioxide (CO2)   Date Value Ref Range Status    09/25/2023 26 22 - 29 mmol/L Final   10/29/2022 27 20 - 32 mmol/L Final     Anion Gap   Date Value Ref Range Status   09/25/2023 8 7 - 15 mmol/L Final   10/29/2022 3 3 - 14 mmol/L Final     Glucose   Date Value Ref Range Status   09/25/2023 98 70 - 99 mg/dL Final   10/29/2022 92 70 - 99 mg/dL Final     Urea Nitrogen   Date Value Ref Range Status   09/25/2023 5.4 (L) 8.0 - 23.0 mg/dL Final   10/29/2022 17 7 - 30 mg/dL Final     Creatinine   Date Value Ref Range Status   09/25/2023 0.51 0.51 - 0.95 mg/dL Final     GFR Estimate   Date Value Ref Range Status   09/25/2023 >90 >60 mL/min/1.73m2 Final   01/25/2021 >60 >60 mL/min/1.73m2 Final     Calcium   Date Value Ref Range Status   09/25/2023 8.7 (L) 8.8 - 10.2 mg/dL Final     Bilirubin Total   Date Value Ref Range Status   09/25/2023 0.3 <=1.2 mg/dL Final     Alkaline Phosphatase   Date Value Ref Range Status   09/25/2023 93 35 - 104 U/L Final     ALT   Date Value Ref Range Status   09/25/2023 11 0 - 50 U/L Final     Comment:     Reference intervals for this test were updated on 6/12/2023 to more accurately reflect our healthy population. There may be differences in the flagging of prior results with similar values performed with this method. Interpretation of those prior results can be made in the context of the updated reference intervals.       AST   Date Value Ref Range Status   09/25/2023 25 0 - 45 U/L Final     Comment:     Reference intervals for this test were updated on 6/12/2023 to more accurately reflect our healthy population. There may be differences in the flagging of prior results with similar values performed with this method. Interpretation of those prior results can be made in the context of the updated reference intervals.             CRP Inflammation   Date Value Ref Range Status   01/12/2022 6.8 0.0 - 8.0 mg/L Final     CRP   Date Value Ref Range Status   09/25/2018 0.1 0.0 - 0.8 mg/dL Final            MICROBIOLOGY:    Reviewed    Blood  cultures  Other Micro: Reviewed under care everywhere  CULTURE, TISSUE WITH GRAM SMEAR (AEROBIC AND ANAEROBIC)  Specimen: Tissue - Thoracic structure (body structure)  Component 2 wk ago   Tissue Culture Moderate Staphylococcus aureus Abnormal    Gram Stain Rare Squamous epithelial cells Abnormal    Gram Stain Many PMNs Abnormal    Gram Stain Rare Gram positive cocci Abnormal    Resulting Agency [facility]   Susceptibility    Organism Antibiotic Susceptibility   Staphylococcus aureus Clindamycin ($) <=0.5 ug/mL: Sensitive   Staphylococcus aureus Daptomycin ($$$$$) <=0.5 ug/mL: Sensitive   Staphylococcus aureus Gentamicin ($) <=1 ug/mL: Sensitive    Comment: For Staphylococci that test suspectible, aminoglycosides are used only in combination with other active agents that test susceptible.   Staphylococcus aureus Linezolid ($$$)(PO) / ($$)(IV) <=1 ug/mL: Sensitive   Staphylococcus aureus Oxacillin ($) <=0.25 ug/mL: Sensitive   Staphylococcus aureus Rifampin ($)(PO) / ($$$)(IV) <=0.25 ug/mL: Sensitive    Comment: Rifampin should not be used alone for antimicrobial therapy.   Staphylococcus aureus Tetracycline ($) <=0.5 ug/mL: Sensitive   Staphylococcus aureus Trimethoprim/Sulfa ($) <=0.5/9.5 ug/mL: Sensitive   Staphylococcus aureus Vancomycin ($$)(PO) / ($)(IV) 1 ug/mL: Sensitive   Narrative  Performed by [facility]  No anaerobic growth  Specimen Collected: 09/06/23 10:24 AM    Performed by: [facility] Last Resulted: 09/10/23 12:09 PM   Received From: [facility]  Result Received: 09/18/23  3:39 PM        RADIOLOGY:    XR Chest 2 Views    Result Date: 9/22/2023  EXAM: XR CHEST 2 VIEWS LOCATION: [facility] DATE: 9/22/2023 INDICATION:  Pleural effusion due to bacterial infection, COMPARISON: 09/11/2023     IMPRESSION: Slight interval decrease in left pleural effusion and left basilar atelectasis, mild persists. No  evidence for CHF or pneumonia. Postsurgical change noted within multiple left lower ribs posterolaterally. Stable skin staples along the left lower hemithorax laterally.    Cardiac Device Check - Remote (Standing ORD 5 count)    Result Date: 9/18/2023  Encounter Type: routine remote pacemaker transmission. Device: Biotronik Eluna. Pacing % /Programmed: AP 76%,  0% at DDD-CLS 60/130. Lead(s): Stable. Battery longevity: Middle of service. 50% remaining estimated. Presenting: AP-VS and sinus 60-70 bpm. Atrial high rates: since 6/7/23; atrial burden 0% per day. Anticoagulant: Coumadin. Ventricular High rates: since 6/7/23; None detected. Comments: Normal device function. Plan: Next remote check scheduled for 12/19/23. GABRIELA Cisneros, Device Specialist  Device follow up for the entirety of having the Pacemaker, based on best practices determined by [employer] and in Compliance with Medicare guidelines. Continue remote device monitoring per patient plan. I have reviewed and interpreted the device interrogation, settings, programming, and encounter summary. The device is functioning within normal device parameters. I agree with the current findings, assessment and plan.    XR CHEST 1 VIEW    Result Date: 9/14/2023  This document is currently in Final Status Exam Accession# [id number] XR CHEST 1 VIEW HISTORY: f/u rib plating, effusions; COMPARISON: 9/11/2023 IMPRESSION: Small bilateral pleural effusions with associated passive compressive atelectasis. No pneumothorax. Normal cardiac size and position. Unchanged right PICC, left chest wall dual-lead pacemaker, median sternotomy, and multilevel left rib ORIF. No evidence of hardware complication. Electronically Signed: Clay Johnson 9/14/2023 9:46 AM    XR CHEST 1 VIEW    Result Date: 9/11/2023  This document is currently in Final Status Exam Accession# [id number] XR CHEST 1 VIEW HISTORY: for picc line placement; COMPARISON: Previous view same date. IMPRESSION: The  right-sided PICC line extends to the atriocaval junction. Postsurgical changes are again noted along the left ribs. No pneumothorax is seen. Small bilateral pleural effusions are present as well as bibasilar atelectasis. The heart size is within normal limits. A left-sided pacemaker is again seen. Electronically Signed: Yevgeniy Pete 9/11/2023 4:46 PM    XR CHEST 1 VIEW    Result Date: 9/11/2023  EXAM: XR CHEST 1 VIEW LOCATION: [facility] DATE: 9/11/2023 INDICATION: Status post rib plating. Follow-up COMPARISON: 09/10/2023 IMPRESSION: No interval change. Stabilizing plates again seen in multiple left lower ribs with overlying cutaneous staples in the left lateral chest wall. Left chest wall pacemaker with atrioventricular leads and sternotomy wires redemonstrated. Ill-defined bandlike opacities in the left lung base are stable and most compatible with atelectasis. A small right pleural effusion is unchanged. No new confluent airspace opacities or pneumothorax. Heart size and pulmonary vascularity are normal. Electronically signed by: Arnold Ochoa MD 9/11/2023 6:37 AM CDT    XR CHEST 1 VIEW    Result Date: 9/10/2023  This document is currently in Final Status Exam Accession# [id number] XR CHEST 1 VIEW HISTORY: Follow PTX after chest tube removal s/p rib plating; COMPARISON: Previous view same date. IMPRESSION: The left chest tube is been removed. No pneumothorax is seen. Left rib plates are again seen. There is a persistent left pleural effusion and basilar atelectasis. There is also a small right pleural effusion. The heart size is within normal limits. A left-sided pacemaker is again seen. Electronically Signed: Yevgeniy Pete 9/10/2023 7:07 PM    XR CHEST 1 VIEW    Result Date: 9/10/2023  EXAM: XR CHEST 1 VIEW LOCATION: [facility] DATE: 9/10/2023 INDICATION: s/p rib plating COMPARISON: 09/09/2023 IMPRESSION: No interval change from comparison study. Stabilizing plates and multiple inferior  left ribs, 2 left chest tubes, left chest wall dual-lead pacemaker and sternotomy wires redemonstrated. Small hazy opacity in the left lung base and small right pleural effusion are stable. No new consolidation or pneumothorax. Stable cardiomediastinal contour. Pulmonary vascularity is within normal limits. Electronically signed by: Arnold Ochoa MD 9/10/2023 6:27 AM CDT    XR CHEST 1 VIEW    Result Date: 9/9/2023  EXAM: XR CHEST 1 VIEW LOCATION: [facility] DATE: 9/9/2023 INDICATION: Status post rib plating COMPARISON: None. IMPRESSION: Stabilizing plates are seen in multiple inferior left ribs. 2 left chest tubes are present. No appreciable pneumothorax in the right or left. A small right pleural effusion is present. A small hazy opacity in the right lung base may represent atelectasis versus developing infiltrate. Hazy left basilar opacities likely represents atelectasis. Sternotomy wires and left chest wall pacemaker are present. Electronically signed by: Arnold Ochoa MD 9/9/2023 6:41 AM CDT    XR CHEST 1 VIEW    Result Date: 9/8/2023  EXAM: XR CHEST 1 VIEW LOCATION: [facility] DATE: 9/8/2023 INDICATION: s/p rib plating COMPARISON: Chest radiograph 09/07/2023 IMPRESSION: Status post malleable plate fixation of multiple left-sided ribs. Left apical and basilar chest tubes in place. Left chest wall skin staples and small subcutaneous emphysema, improved from prior. No significant pneumothorax. Left basilar atelectasis. Stable position of the left chest pacemaker. Prior median sternotomy. Stable heart size. No pulmonary vascular congestion. Mild right basilar atelectasis. Electronically signed by: Nick Lozano MD 9/8/2023 6:37 AM CDT    XR CHEST 1 VIEW    Result Date: 9/7/2023  EXAM: XR CHEST 1 VIEW LOCATION: [facility] DATE: 9/7/2023 INDICATION: s/p rib plating COMPARISON: Portable chest radiograph 09/04/2023 IMPRESSION: Status post interval plate and of multiple  left-sided ribs. Left apical and basilar chest tubes in place. Left chest wall skin staples and small subcutaneous emphysema. No significant pneumothorax. Left basilar atelectasis. Stable position of the left chest pacemaker. Prior median sternotomy. Stable heart size. No pulmonary vascular congestion. Electronically signed by: Nick Lozano MD 9/7/2023 6:28 AM CDT    XR CHEST 1 VIEW    Result Date: 9/6/2023  This document is currently in Final Status Exam Accession# [id number] XR CHEST 1 VIEW CLINICAL HISTORY: s/p rib plating and VATS; Region of Interest and Additional Information->In PACU COMPARISON: 9/4/2023 and 9/3/2023. FINDINGS: Median sternotomy wires are noted. There has been interval postoperative changes consistent with rib plating involving the posterolateral aspects of the left sixth through ninth ribs. Possible trace left apical pneumothorax. Patchy bibasilar opacifications with small bilateral pleural effusions. Cardiac silhouette and pulmonary vascularity are within normal limits. There are 2 left-sided thoracostomy tubes noted. IMPRESSION: 1. Postoperative changes consistent with recent rib plating involving the posterior lateral left sixth through ninth ribs. 2. There are 2 left-sided thoracostomy tubes with a potential trace left apical pneumothorax. 3. Patchy bibasilar opacifications with small bilateral pleural effusions. Electronically Signed: Feliberto Christiansen MD 9/6/2023 3:00 PM    XR CHEST 1 VIEW    Result Date: 9/4/2023  EXAM: XR CHEST 1 VIEW LOCATION: [facility] DATE: 9/4/2023 INDICATION: chest tube - loculated effusions COMPARISON: 09/03/2023 IMPRESSION: Obscuring lung apices. Left apical chest tube in place. No definite pneumothorax visualized. Small left pleural effusion. Retrocardiac left basilar atelectasis and/or consolidation. Right basilar atelectasis and/or infiltrate. Left chest cardiac pacer leads in place. Electronically signed by: Lionel Oquendo MD 9/4/2023 6:20 AM  CDT    CT CHEST W IV CONTRAST    Result Date: 9/3/2023  This document is currently in Final Status Exam Accession# [id number] CT CHEST W IV CONTRAST HISTORY: Abnormal x-ray - pleural effusion; evaluate retained hemothorax. COMPARISON: Chest x-ray 09/03/2023. FINDINGS: Left thoracostomy tube tip in the apex. Layering small-to-moderate left pleural fluid freely communicates with the pleural drainage catheter. Partial atelectasis of the left lower lobe and lingula due to the pleural fluid. Trace right pleural fluid with right basilar volume loss and possible superimposed consolidation. Heart size is within normal limits. No pericardial effusion. No pathologically enlarged mediastinal or hilar lymph nodes. Sternotomy changes are noted. Displaced left 6th through 9th rib fractures with slight impaction and one full shaft width medial displacement of the distal fracture component. Nondisplaced left 10th rib fracture. IMPRESSION: 1. Layering small-to-moderate left pleural fluid. Left thoracostomy tube communicates with the effusion. 2. Mild-to-moderate right basilar consolidation and suspected superimposed consolidation. 3. Multiple left rib fractures noted. Dictated By: Kun Trujillo MD 9/3/2023 9:01 AM Edited By: KATI 9/3/2023 9:12 AM Electronically Signed: Kun Trujillo MD 9/3/2023 9:26 AM    XR CHEST 1 VIEW    Result Date: 9/3/2023  EXAM: XR CHEST 1 VIEW LOC[facility] DATE: 9/3/2023 INDICATION: chest tube - loculated effusions COMPARISON: 09/02/2023 IMPRESSION: Stable chest tube tip at the apex. Left pleural effusion and left basilar airspace opacity, stable. No definite pneumothorax. Mild right basilar airspace opacity, also stable. Left chest implanted cardiac device. Sternotomy wires. Electronically signed by: Wellington Valencia MD 9/3/2023 6:33 AM CDT    XR CHEST 1 VIEW    Result Date: 9/2/2023  This document is currently in Final Status Exam Accession# [id number] XR CHEST 1 VIEW HISTORY: Chest tube -  loculated effusions. COMPARISON: 09/01/2023. IMPRESSION: Left apical thoracostomy tube unchanged. There may be a very tiny left apical pneumothorax which is unchanged. Multiple left lateral rib fractures noted and similar. Sternotomy and left chest wall pacemaker similar. No appreciable change in the left basilar volume loss and suspected pleural fluid. Right basilar volume loss stable. Overall, appearances are similar. Dictated By: Kun Trujillo MD 9/2/2023 10:28 AM Edited By: KATI 9/2/2023 11:03 AM Electronically Signed: Kun Trujillo MD 9/2/2023 11:06 AM    XR CHEST 1 VIEW    Result Date: 9/1/2023  EXAM: XR CHEST 1 VIEW LOCATION: [facility] DATE: 9/1/2023 INDICATION: chest tube and sudden chest pain COMPARISON: 09/01/2023 IMPRESSION: Stable left apically positioned chest tube. Left pleural effusion and left basilar airspace opacity similar to prior. Questionable tiny left apical pneumothorax, about 5 mm. Bibasilar atelectasis. Stable cardiac mediastinal silhouette. Sternotomy wires. Left chest implanted cardiac device. Left rib fractures. Electronically signed by: Wellington Valencia MD 9/1/2023 11:25 PM CDT    XR CHEST 1 VIEW    Result Date: 9/1/2023  EXAM: XR CHEST 1 VIEW LOCATION: [facility] DATE: 9/1/2023 INDICATION: follow up hemothorax COMPARISON: 08/31/2023 IMPRESSION: Stable postoperative cardiomediastinal contours. Left-sided dual-chamber cardiac pacer. Left basilar consolidation and pleural effusion without change. Right basilar atelectasis or infiltrate also stable. Left apically oriented chest tube in place. No visible residual pneumothorax. Electronically signed by: Joshua Lilly M.D. 9/1/2023 6:29 AM CDT    XR CHEST 1 VIEW    Result Date: 8/31/2023  EXAM: XR CHEST 1 VIEW LOCATION: [facility] DATE: 8/31/2023 INDICATION: follow up hemothorax COMPARISON: 08/30/2023 IMPRESSION: The heart is enlarged, similar prior exam. Postsurgical changes from prior  cardiac surgery are again noted, unchanged. The dual-lead pacing device is unchanged in position. The left-sided chest tube is unchanged from prior exam. There is a moderate left-sided pleural effusion which is slightly enlarged from prior exam with left basilar atelectasis, no pneumothorax is seen on the current study. Displaced left posterior rib fractures are again noted. There is increased opacification seen in the right medial lung base concerning for developing airspace disease and/or atelectasis Electronically signed by: Usman Benavidez MD 8/31/2023 6:39 AM CDT    XR CHEST 1 VIEW    Result Date: 8/30/2023  EXAM: XR CHEST 1 VIEW LOCATION: [facility] DATE: 8/30/2023 INDICATION: follow up hemothorax COMPARISON: 08/29/2023 IMPRESSION: Stable cardiomediastinal contours status post sternotomy and left-sided pacer placement. Left basilar atelectasis or consolidation and adjacent pleural effusion consistent with known hemothorax, stable to minimally improved. Left apically oriented chest tube in place without visible pneumothorax. Right basilar atelectasis. Multiple displaced left posterior rib fractures. Electronically signed by: Joshua Lilly M.D. 8/30/2023 6:50 AM CDT    XR CHEST 1 VIEW    Result Date: 8/29/2023  This document is currently in Final Status Exam Accession# [id number] XR CHEST 1 VIEW HISTORY: Chest tube placement;   FINDINGS: Interval placement left-sided chest tube. Left lung base effusion and consolidation is unchanged. Rib fractures are present. Right lung base atelectasis is seen. No definite pneumothorax. Dictated By: Hola Forrester MD 8/28/2023 4:39 PM Edited By: MATT 8/28/2023 4:41 PM Electronically Signed: Hola Forrester MD 8/29/2023 2:20 PM    XR CHEST 1 VIEW    Result Date: 8/29/2023  This document is currently in Final Status Exam Accession# [id number] EXAM: XR CHEST 1 VIEW HISTORY: Hemothorax. COMPARISON: 08/28/2023. IMPRESSION: Left apical chest tube in place. Left basilar  consolidation and effusion, similar to slightly improved. Left-sided rib fractures. Right basilar atelectasis. No definite pneumothorax identified. Left sided cardiac conduction device. Dictated By: Arnold Ulrich MD 8/29/2023 7:12 AM Edited By: REGGIE 8/29/2023 7:17 AM Electronically Signed: Arnold Ulrich MD 8/29/2023 7:47 AM    XR CHEST 1 VIEW    Result Date: 8/28/2023  This document is currently in Final Status Exam Accession# [id number] XR CHEST 1 VIEW HISTORY: hemothorax follow up;   IMPRESSION: Post sternotomy change. Moderate-sized left pleural effusion. Cardiomegaly. Some atelectasis in the right lung base is seen. Pneumothorax is not appreciated. Dictated By: Hola Forrester MD 8/28/2023 2:44 PM Edited By: DARIEL 8/28/2023 2:45 PM Electronically Signed: Hola Forrester MD 8/28/2023 2:46 PM     Imaging:  -9/3/23 CT chest:  1. Layering small-to-moderate left pleural fluid. Left thoracostomy tube communicates with the effusion.  2. Mild-to-moderate right basilar consolidation and suspected superimposed consolidation.  3. Multiple left rib fractures noted.

## 2023-09-25 NOTE — NURSING NOTE
Pt is on Cefazolin through [facility] at [phone].   Pt is on cefazolin 2 grams ever 8 hours- [facility] with an end date of 10/18/23.

## 2023-09-26 ENCOUNTER — HOME INFUSION (PRE-WILLOW HOME INFUSION) (OUTPATIENT)
Dept: PHARMACY | Facility: CLINIC | Age: 68
End: 2023-09-26
Payer: COMMERCIAL

## 2023-09-26 ENCOUNTER — ANTICOAGULATION THERAPY VISIT (OUTPATIENT)
Dept: ANTICOAGULATION | Facility: CLINIC | Age: 68
End: 2023-09-26
Payer: COMMERCIAL

## 2023-09-26 DIAGNOSIS — Z95.2 H/O MECHANICAL AORTIC VALVE REPLACEMENT: Primary | ICD-10-CM

## 2023-09-26 LAB — INR HOME MONITORING: 3 (ref 2–3)

## 2023-09-26 NOTE — PROGRESS NOTES
Therapy: IV ABX (Cefazolin)  Insurance: Self-Pay    The patient does not have IV ABX  coverage in the home with their [facility] Medicare.   [facility] is out of network with [employer] Medicare, and the medication will be billed to the part D and supplies will be self-pay.   The self-pay quote is a rough estimate for daily medication and supplies, and the estimate is $32.73.     For nursing, [facility] is not contracted with [employer] Medicare, and an outside nursing agency would be utilized instead.     The patient should have coverage in a TCU or an infusion center. [facility] ([phone] opt 1) or [employer] ([phone]) should be in network.      Please contact Intake with any questions, [phone] or In Basket pool,  Home Infusion ([phone]).

## 2023-09-26 NOTE — PROGRESS NOTES
ANTICOAGULATION MANAGEMENT     Tammy Law 67 year old female is on warfarin with therapeutic INR result. (Goal INR 2.5-3.5)    Recent labs: (last 7 days)     09/26/23  0000   INR 3.0       ASSESSMENT     Source(s): Chart Review and Patient/Caregiver Call     Warfarin doses taken: Warfarin taken as instructed  Diet: No new diet changes identified  Medication/supplement changes: IV cefazolin with tentative end date of 10/18/2023   New illness, injury, or hospitalization: No  Signs or symptoms of bleeding or clotting: No  Previous result: Therapeutic last 2(+) visits  Additional findings: None       PLAN     Recommended plan for no diet, medication or health factor changes affecting INR     Dosing Instructions: Continue your current warfarin dose with next INR in 1 week       Summary  As of 9/26/2023      Full warfarin instructions:  2.5 mg every Tue; 5 mg all other days   Next INR check:  10/3/2023               Telephone call with Bisi who verbalizes understanding and agrees to plan    Patient to recheck with home meter    Education provided:   None required    Plan made per [facility] anticoagulation protocol    Jacqueline Juárez RN  Anticoagulation Clinic  9/26/2023    _______________________________________________________________________     Anticoagulation Episode Summary       Current INR goal:  2.5-3.5   TTR:  79.2 % (1 y)   Target end date:  Indefinite   Send INR reminders to:  [facility] HOME MONITORING    Indications    H/O mechanical aortic valve replacement [Z95.2]             Comments:  [employer] home monitor- managed by exception  Goal range changed during 8/28-9/15/23  hospitalization             Anticoagulation Care Providers       Provider Role Specialty Phone number    Arnold Anderson MD Referring Internal Medicine [phone]    Christina Hernandez MD Referring Family Medicine [phone]    Karina Cifuentes MD Referring Family Medicine [phone]

## 2023-10-02 ENCOUNTER — LAB REQUISITION (OUTPATIENT)
Dept: LAB | Facility: CLINIC | Age: 68
End: 2023-10-02
Payer: COMMERCIAL

## 2023-10-02 DIAGNOSIS — T84.7XXA INFECTION AND INFLAMMATORY REACTION DUE TO OTHER INTERNAL ORTHOPEDIC PROSTHETIC DEVICES, IMPLANTS AND GRAFTS, INITIAL ENCOUNTER (H): ICD-10-CM

## 2023-10-02 DIAGNOSIS — A49.01 METHICILLIN SUSCEPTIBLE STAPHYLOCOCCUS AUREUS INFECTION, UNSPECIFIED SITE: ICD-10-CM

## 2023-10-02 LAB
BASO+EOS+MONOS # BLD AUTO: ABNORMAL 10*3/UL
BASO+EOS+MONOS NFR BLD AUTO: ABNORMAL %
BASOPHILS # BLD AUTO: 0 10E3/UL (ref 0–0.2)
BASOPHILS NFR BLD AUTO: 1 %
EOSINOPHIL # BLD AUTO: 0.1 10E3/UL (ref 0–0.7)
EOSINOPHIL NFR BLD AUTO: 1 %
ERYTHROCYTE [DISTWIDTH] IN BLOOD BY AUTOMATED COUNT: 14.6 % (ref 10–15)
HCT VFR BLD AUTO: 29.5 % (ref 35–47)
HGB BLD-MCNC: 9.1 G/DL (ref 11.7–15.7)
HOLD SPECIMEN: NORMAL
IMM GRANULOCYTES # BLD: 0 10E3/UL
IMM GRANULOCYTES NFR BLD: 0 %
LYMPHOCYTES # BLD AUTO: 1 10E3/UL (ref 0.8–5.3)
LYMPHOCYTES NFR BLD AUTO: 18 %
MCH RBC QN AUTO: 26.9 PG (ref 26.5–33)
MCHC RBC AUTO-ENTMCNC: 30.8 G/DL (ref 31.5–36.5)
MCV RBC AUTO: 87 FL (ref 78–100)
MONOCYTES # BLD AUTO: 0.6 10E3/UL (ref 0–1.3)
MONOCYTES NFR BLD AUTO: 11 %
NEUTROPHILS # BLD AUTO: 3.6 10E3/UL (ref 1.6–8.3)
NEUTROPHILS NFR BLD AUTO: 69 %
NRBC # BLD AUTO: 0 10E3/UL
NRBC BLD AUTO-RTO: 0 /100
PLATELET # BLD AUTO: 328 10E3/UL (ref 150–450)
RBC # BLD AUTO: 3.38 10E6/UL (ref 3.8–5.2)
WBC # BLD AUTO: 5.3 10E3/UL (ref 4–11)

## 2023-10-02 PROCEDURE — 85025 COMPLETE CBC W/AUTO DIFF WBC: CPT | Mod: ORL | Performed by: INTERNAL MEDICINE

## 2023-10-02 PROCEDURE — 80053 COMPREHEN METABOLIC PANEL: CPT | Mod: ORL | Performed by: INTERNAL MEDICINE

## 2023-10-03 ENCOUNTER — ANTICOAGULATION THERAPY VISIT (OUTPATIENT)
Dept: ANTICOAGULATION | Facility: CLINIC | Age: 68
End: 2023-10-03
Payer: COMMERCIAL

## 2023-10-03 DIAGNOSIS — Z95.2 H/O MECHANICAL AORTIC VALVE REPLACEMENT: Primary | ICD-10-CM

## 2023-10-03 LAB
ALBUMIN SERPL BCG-MCNC: 3.4 G/DL (ref 3.5–5.2)
ALP SERPL-CCNC: 85 U/L (ref 35–104)
ALT SERPL W P-5'-P-CCNC: 14 U/L (ref 0–50)
ANION GAP SERPL CALCULATED.3IONS-SCNC: 11 MMOL/L (ref 7–15)
AST SERPL W P-5'-P-CCNC: 31 U/L (ref 0–45)
BILIRUB SERPL-MCNC: 0.4 MG/DL
BUN SERPL-MCNC: 6.8 MG/DL (ref 8–23)
CALCIUM SERPL-MCNC: 9.2 MG/DL (ref 8.8–10.2)
CHLORIDE SERPL-SCNC: 104 MMOL/L (ref 98–107)
CREAT SERPL-MCNC: 0.58 MG/DL (ref 0.51–0.95)
DEPRECATED HCO3 PLAS-SCNC: 26 MMOL/L (ref 22–29)
EGFRCR SERPLBLD CKD-EPI 2021: >90 ML/MIN/1.73M2
GLUCOSE SERPL-MCNC: 85 MG/DL (ref 70–99)
INR HOME MONITORING: 3.9 (ref 2–3)
POTASSIUM SERPL-SCNC: 3.6 MMOL/L (ref 3.4–5.3)
PROT SERPL-MCNC: 6.3 G/DL (ref 6.4–8.3)
SODIUM SERPL-SCNC: 141 MMOL/L (ref 135–145)

## 2023-10-03 NOTE — PROGRESS NOTES
ANTICOAGULATION MANAGEMENT     Tammy Law 68 year old female is on warfarin with supratherapeutic INR result. (Goal INR 2.5-3.5)    Recent labs: (last 7 days)     10/03/23  0000   INR 3.9*       ASSESSMENT     Source(s): Chart Review and Patient/Caregiver Call     Warfarin doses taken: Warfarin taken as instructed  Diet: No new diet changes identified  Medication/supplement changes:  IV Cefazolin with tentative end date of 10/18/23  New illness, injury, or hospitalization: No  Signs or symptoms of bleeding or clotting: No  Previous result: Therapeutic last 2(+) visits  Additional findings: None       PLAN     Recommended plan for no diet, medication or health factor changes affecting INR     Dosing Instructions: partial hold then continue your current warfarin dose with next INR in 1 week       Summary  As of 10/3/2023      Full warfarin instructions:  10/3: 1.25 mg; Otherwise 2.5 mg every Tue; 5 mg all other days   Next INR check:  10/10/2023               Telephone call with Bisi who verbalizes understanding and agrees to plan and who agrees to plan and repeated back plan correctly    Patient to recheck with home meter    Education provided:   Goal range and lab monitoring: Importance of following up at instructed interval    Plan made per [facility] anticoagulation protocol    Carolynn Lucas RN  Anticoagulation Clinic  10/3/2023    _______________________________________________________________________     Anticoagulation Episode Summary       Current INR goal:  2.5-3.5   TTR:  78.3 % (1 y)   Target end date:  Indefinite   Send INR reminders to:  [facility] HOME MONITORING    Indications    H/O mechanical aortic valve replacement [Z95.2]             Comments:  [employer] home monitor- managed by exception  Goal range changed during 8/28-9/15/23  hospitalization             Anticoagulation Care Providers       Provider Role Specialty Phone number    Arnold Anderson MD Referring Internal Medicine [phone]     Christina Hernandez MD Referring Family Medicine [phone]    Karina Cifuentes MD Referring Family Medicine [phone]

## 2023-10-05 DIAGNOSIS — Z95.2 H/O MECHANICAL AORTIC VALVE REPLACEMENT: Primary | ICD-10-CM

## 2023-10-06 ENCOUNTER — DOCUMENTATION ONLY (OUTPATIENT)
Dept: ANTICOAGULATION | Facility: CLINIC | Age: 68
End: 2023-10-06
Payer: COMMERCIAL

## 2023-10-09 ENCOUNTER — LAB REQUISITION (OUTPATIENT)
Dept: LAB | Facility: CLINIC | Age: 68
End: 2023-10-09
Payer: COMMERCIAL

## 2023-10-09 DIAGNOSIS — T84.7XXA INFECTION AND INFLAMMATORY REACTION DUE TO OTHER INTERNAL ORTHOPEDIC PROSTHETIC DEVICES, IMPLANTS AND GRAFTS, INITIAL ENCOUNTER (H): ICD-10-CM

## 2023-10-09 DIAGNOSIS — A49.01 METHICILLIN SUSCEPTIBLE STAPHYLOCOCCUS AUREUS INFECTION, UNSPECIFIED SITE: ICD-10-CM

## 2023-10-09 PROCEDURE — 85025 COMPLETE CBC W/AUTO DIFF WBC: CPT | Mod: ORL | Performed by: INTERNAL MEDICINE

## 2023-10-09 PROCEDURE — 80053 COMPREHEN METABOLIC PANEL: CPT | Mod: ORL | Performed by: INTERNAL MEDICINE

## 2023-10-10 ENCOUNTER — ANTICOAGULATION THERAPY VISIT (OUTPATIENT)
Dept: ANTICOAGULATION | Facility: CLINIC | Age: 68
End: 2023-10-10
Payer: COMMERCIAL

## 2023-10-10 DIAGNOSIS — Z95.2 H/O MECHANICAL AORTIC VALVE REPLACEMENT: Primary | ICD-10-CM

## 2023-10-10 LAB
ALBUMIN SERPL BCG-MCNC: 3.6 G/DL (ref 3.5–5.2)
ALP SERPL-CCNC: 83 U/L (ref 35–104)
ALT SERPL W P-5'-P-CCNC: 21 U/L (ref 0–50)
ANION GAP SERPL CALCULATED.3IONS-SCNC: 11 MMOL/L (ref 7–15)
AST SERPL W P-5'-P-CCNC: 25 U/L (ref 0–45)
BASO+EOS+MONOS # BLD AUTO: ABNORMAL 10*3/UL
BASO+EOS+MONOS NFR BLD AUTO: ABNORMAL %
BASOPHILS # BLD AUTO: 0 10E3/UL (ref 0–0.2)
BASOPHILS NFR BLD AUTO: 0 %
BILIRUB SERPL-MCNC: 0.3 MG/DL
BUN SERPL-MCNC: 8.3 MG/DL (ref 8–23)
CALCIUM SERPL-MCNC: 9.3 MG/DL (ref 8.8–10.2)
CHLORIDE SERPL-SCNC: 102 MMOL/L (ref 98–107)
CREAT SERPL-MCNC: 0.55 MG/DL (ref 0.51–0.95)
DEPRECATED HCO3 PLAS-SCNC: 25 MMOL/L (ref 22–29)
EGFRCR SERPLBLD CKD-EPI 2021: >90 ML/MIN/1.73M2
EOSINOPHIL # BLD AUTO: 0.1 10E3/UL (ref 0–0.7)
EOSINOPHIL NFR BLD AUTO: 1 %
ERYTHROCYTE [DISTWIDTH] IN BLOOD BY AUTOMATED COUNT: 14.4 % (ref 10–15)
GLUCOSE SERPL-MCNC: 86 MG/DL (ref 70–99)
HCT VFR BLD AUTO: 30.4 % (ref 35–47)
HGB BLD-MCNC: 9.5 G/DL (ref 11.7–15.7)
IMM GRANULOCYTES # BLD: 0 10E3/UL
IMM GRANULOCYTES NFR BLD: 0 %
INR HOME MONITORING: 3.6 (ref 2–3)
LYMPHOCYTES # BLD AUTO: 0.9 10E3/UL (ref 0.8–5.3)
LYMPHOCYTES NFR BLD AUTO: 17 %
MCH RBC QN AUTO: 26.6 PG (ref 26.5–33)
MCHC RBC AUTO-ENTMCNC: 31.3 G/DL (ref 31.5–36.5)
MCV RBC AUTO: 85 FL (ref 78–100)
MONOCYTES # BLD AUTO: 0.6 10E3/UL (ref 0–1.3)
MONOCYTES NFR BLD AUTO: 11 %
NEUTROPHILS # BLD AUTO: 4 10E3/UL (ref 1.6–8.3)
NEUTROPHILS NFR BLD AUTO: 71 %
NRBC # BLD AUTO: 0 10E3/UL
NRBC BLD AUTO-RTO: 0 /100
PLATELET # BLD AUTO: 274 10E3/UL (ref 150–450)
POTASSIUM SERPL-SCNC: 3.8 MMOL/L (ref 3.4–5.3)
PROT SERPL-MCNC: 6.4 G/DL (ref 6.4–8.3)
RBC # BLD AUTO: 3.57 10E6/UL (ref 3.8–5.2)
SODIUM SERPL-SCNC: 138 MMOL/L (ref 135–145)
WBC # BLD AUTO: 5.6 10E3/UL (ref 4–11)

## 2023-10-10 NOTE — PROGRESS NOTES
ANTICOAGULATION MANAGEMENT     Tammy Law 68 year old female is on warfarin with supratherapeutic INR result. (Goal INR 2.5-3.5)    Recent labs: (last 7 days)     10/10/23  0000   INR 3.6*       ASSESSMENT     Source(s): Chart Review and Patient/Caregiver Call     Warfarin doses taken: Warfarin taken as instructed  Diet: No new diet changes identified  Medication/supplement changes:  IV Cefazolin Tentative end date 10/18/23  New illness, injury, or hospitalization: No  Signs or symptoms of bleeding or clotting: No  Previous result: Supratherapeutic  Additional findings: Upcoming procedure 11/6/23  Colonoscopy (see doc encounter dated 10/6/23)       PLAN     Recommended plan for temporary change(s) affecting INR     Dosing Instructions: partial hold then continue your current warfarin dose with next INR in 1 week       Summary  As of 10/10/2023      Full warfarin instructions:  10/10: 1.25 mg; Otherwise 2.5 mg every Tue; 5 mg all other days   Next INR check:  10/17/2023               Telephone call with Bisi who agrees to plan and repeated back plan correctly    Patient to recheck with home meter    Education provided:   Please call back if any changes to your diet, medications or how you've been taking warfarin  Resume manage by exception with home monitor. Continue to submit INR results to home monitor company.You will only be called when your result is out of range. Please call and notify [facility] if new medication started, dose missed, signs or symptoms of bleeding or clotting, or a surgery/procedure is scheduled.    Plan made per [facility] anticoagulation protocol    Petrona Phan RN  Anticoagulation Clinic  10/10/2023    _______________________________________________________________________     Anticoagulation Episode Summary       Current INR goal:  2.5-3.5   TTR:  78.1 % (1 y)   Target end date:  Indefinite   Send INR reminders to:  [facility] HOME MONITORING    Indications    H/O mechanical aortic valve  replacement [Z95.2]             Comments:  [employer] home monitor- managed by exception  Goal range changed during 8/28-9/15/23  hospitalization             Anticoagulation Care Providers       Provider Role Specialty Phone number    Arnold Anderson MD Referring Internal Medicine [phone]    Christina Hernandez MD Referring Family Medicine [phone]    Karina Cifuentes MD Referring Family Medicine [phone]

## 2023-10-12 ENCOUNTER — PATIENT OUTREACH (OUTPATIENT)
Dept: GASTROENTEROLOGY | Facility: CLINIC | Age: 68
End: 2023-10-12
Payer: COMMERCIAL

## 2023-10-12 ENCOUNTER — HOSPITAL ENCOUNTER (EMERGENCY)
Facility: CLINIC | Age: 68
Discharge: HOME | End: 2023-10-12
Attending: PHYSICIAN ASSISTANT | Admitting: PHYSICIAN ASSISTANT
Payer: COMMERCIAL

## 2023-10-12 ENCOUNTER — APPOINTMENT (OUTPATIENT)
Dept: ULTRASOUND IMAGING | Facility: CLINIC | Age: 68
End: 2023-10-12
Attending: PHYSICIAN ASSISTANT
Payer: COMMERCIAL

## 2023-10-12 ENCOUNTER — TELEPHONE (OUTPATIENT)
Dept: INFECTIOUS DISEASES | Facility: CLINIC | Age: 68
End: 2023-10-12
Payer: COMMERCIAL

## 2023-10-12 VITALS
HEART RATE: 62 BPM | WEIGHT: 142 LBS | OXYGEN SATURATION: 98 % | RESPIRATION RATE: 18 BRPM | BODY MASS INDEX: 24.24 KG/M2 | TEMPERATURE: 98.2 F | HEIGHT: 64 IN

## 2023-10-12 DIAGNOSIS — Z78.9 PROBLEM WITH VASCULAR ACCESS: ICD-10-CM

## 2023-10-12 LAB — INR PPP: 2.93 (ref 0.85–1.15)

## 2023-10-12 PROCEDURE — 93971 EXTREMITY STUDY: CPT | Mod: RT

## 2023-10-12 PROCEDURE — 99284 EMERGENCY DEPT VISIT MOD MDM: CPT | Mod: 25

## 2023-10-12 PROCEDURE — 85610 PROTHROMBIN TIME: CPT | Performed by: PHYSICIAN ASSISTANT

## 2023-10-12 PROCEDURE — 36415 COLL VENOUS BLD VENIPUNCTURE: CPT | Performed by: PHYSICIAN ASSISTANT

## 2023-10-12 ASSESSMENT — ACTIVITIES OF DAILY LIVING (ADL)
ADLS_ACUITY_SCORE: 35
ADLS_ACUITY_SCORE: 35

## 2023-10-12 NOTE — ED PROVIDER NOTES
History     Chief Complaint:  Vascular Access Problem       The history is provided by the patient and medical records.      Tammy Law is a 68 year old female with a history of AVR on coumadin who presents with a vascular access problem. The patient was admitted into the hospital on 8/28/23 due to multiple left rib fractures (6-10) and a traumatic left hemothorax. The patient was also given long-term IV antibiotics given her history of mechanical aortic valve. Today the patient reports 4 days ago she was getting her PICC line on her right arm cleaned when she noticed it was tender to touch and it burned as the nurse was cleaning it. She reports the burning sensation has continued until today specifically when cleaning the area. She reports she has pain whenever she moves her right arm as well which started 4 days ago. She denies any skin changes. Patient notes she gets antibiotics through her PICC line 3x a day as well. Patient reports she was seen at [facility] today for concerns of her PICC line being infected and to get her antibiotics. She reports she was recommended by the nurse to visit the ED to get an ultrasound to evaluate for thrombus. She denies chest pain, shortness of breath or any arm swelling.Patient notes she takes her Coumadin all the time but has not taken her dose for today yet.     Independent Historian:   None - Patient Only    Review of External Notes:    ID consult today:  PICC line site irritated, swelling, not functioning  Concern about thrombus, infection  Patient on IV Cefazolin until 10/18/2023 for MSSA osteomyelitis, rib fracture site. Please see previous ID notes     Assessment:  Concern about PICC line associated thrombus, phlebitis, cellulitis     Recommend:     Patient to go to Emergency Room- patient prefers [facility] ED  Ultrasound UE to evaluate for thrombus  Remove PICC  Needs midline on contralateral arm  Discussed with patient and  home care nurse. Patient expressed understanding        ID visit on 9/25/23:  Assessment & Plan   Tammy Law is a 67 year old female with recent hospitalization in [facility], for hematoma, fall, rib fracture  Status post hematoma evacuation, cultures with MSSA.  Patient was discharged on IV cefazolin.  Patient is transferring care to [place] due to in network/insurance.     Impression:     MSSA infection, hematoma  Status post rib fracture, open reduction internal fixation with plates, see images below  History of aortic valve replacement in 1995  ICD pacemaker  Patient was seen by infectious disease at [facility]: Simba Seay, DO  PICC line, tolerating IV cefazolin with tentative end date of 10/18/2023, 6 weeks from positive cultures in September 2023     Per note from ID visit on 9/25/23  [facility] PLAN  Dx: MSSA hemothorax infection with hardware  Organism: MSSA  IV antibiotic: IV cefazolin 2gm q8hr  Duration of IV ABX: six wks  First day of IV therapy: 9/7  Last day of IV therapy: 10/18/23  IV access: single lumen picc line  REMOVE IV ACCESS: After last dose of IV antibiotic on 10/18/23  Labs: Weekly CBC w/ diff, CMP weekly; please fax results to [phone]  ID clinic follow-up: 2-3 wks post dc, virtual       IMPRESSION:  68 yo F with a PMH of AVR, presence of CIED, hypothyroidism, depression, HLD presented to [place] for left-sided rib fractures as well as a left-sided hemothorax after stumbling backward and struck her left chest against a counter in the camper that was being pulled. She was found to have a moderate left-sided hemothorax as well as multiple left-sided rib fractures, and was transferred to [facility]. She underwent a chest tube placement in the ER with immediate evacuation of 300 cc of dark blood. Eventually she was taken for VATS with evacuation of her hemothorax and an ORIF of her left ribs numbers 6, 7, 8, and 9 with placement of 2 chest tubes. Intraoperatively  "there was no purulence present but the intraoperative Gram stain did have GPC's on the tissue culture that was obtained of the clot. There also was a tissue sample obtained from the pleural space, both cultures with MSSA.     #Suspected hemothorax infection with MSSA  #Recent trauma to L chest wall, leading to hemothorax  #Recent ORIF of L ribs 9/6/23 with plates placed  #Leukocytosis  #Presence of CIED, AVR  #Antimicrobial intolerances: Sulfa-->hives     PLAN:  -Pt developed a hemothorax, this certainly could have predisposed to infection in this area, cultures with MSSA thus far.  -Given growth of MSSA in the setting of an AVR and CIED, obtained BCx, NGTD.  -Continue cefazolin 2gm q8hr IV. And her hemoglobin is still dropping in the setting of warfarin I am hesitant to add rifampin just yet, will consider as her course progresses.  -single lumen picc line today.  -Currently anticipate 6 wks of IV abx from surgery, followed by oral abx thereafter in the setting of hardware placement to increase chances for success and decrease complications.    Medications:    Fosamax  Lipitor   Celexa  Lasix   Synthroid   Cholecalciferol   Coumadin   Zocor     Past Medical History:    Anxiety   Hyperlipidemia  Hypertension   Hypothyroidism   Sick sinus syndrome   Depression  Chronic anticoagulation     Past Surgical History:    Aortic valve replacement   Appendectomy   Breast biopsy   Hysterectomy   Implant pacemaker   Oophorectomy   Hemorrhoidectomy   Tympanostomy tube placement       Physical Exam   Patient Vitals for the past 24 hrs:   Temp Pulse Resp SpO2 Height Weight   10/12/23 1653 98.2  F (36.8  C) 62 18 98 % 1.626 m (5' 4\") 64.4 kg (142 lb)        Physical Exam  Constitutional: Pleasant. Cooperative.   Eyes: Pupils equally round and reactive  HENT: Head is normal in appearance. Oropharynx is normal with moist mucus membranes.  Cardiovascular: Regular rate and rhythm and without murmurs.  Respiratory: Normal respiratory " effort, lungs are clear bilaterally.  Musculoskeletal/Skin: Mild TTP to entry point of PICC line present in right upper extremity. No surrounding erythema or warmth.  Full ROM of bilateral upper extremities. 2+ radial pulses.  No asymmetry of the lower extremities.  Neurologic: Cranial nerves grossly intact, normal cognition, no focal deficits. Alert and oriented x 3. Sensation intact to bilateral upper extremities.  Psychiatric: Normal affect.  Nursing notes and vital signs reviewed.      Emergency Department Course     Imaging:  US Upper Extremity Venous Duplex Right   Final Result   IMPRESSION:   Negative for right upper extremity DVT.             Laboratory:  Labs Ordered and Resulted from Time of ED Arrival to Time of ED Departure   INR - Abnormal       Result Value    INR 2.93 (*)             Emergency Department Course & Assessments:             Interventions:  Medications - No data to display     Assessments:  1654 I obtained history and examined the patient as noted above.   2001 I rechecked and updated the patient. At this point I feel that the patient is safe for discharge, and the patient agrees.'    Independent Interpretation (X-rays, CTs, rhythm strip):  None    Consultations/Discussion of Management or Tests:  1942 I spoke with Infectious disease at [facility], regarding the patient's history and presentation in the emergency department today.     Social Determinants of Health affecting care:   None    Disposition:  The patient was discharged to home.     Impression & Plan      Medical Decision Making:  Tammy Law is a 68 year old female who presents to the ED with concern for vascular access complication. Patient has had pain to PICC line site for the past few days. She was advised via ID to come to the ED for further evaluation of pain and for removal of PICC line and placement of midline on other arm. See HPI as above for additional details. Vitals and physical exam as above. On  evaluation of site, no evidence for erythema, warmth, purulence to suggest for infection at this time. Patient has had no measured fever. US of upper extremity is negative for clot, and patient is anticoagulated with therapeutic INR. Unfortunately, upon trying to reach vascular access here at the hospital, it was determined that there was no one on call this evening and thus midline would not be able to be placed. As no evidence for infection or clot at this time and the PICC flushed normally, upon discussion with referring ID MD, plan to keep PICC in place to continue IV abx and ID to arrange outpatient follow up to have midline placed and PICC removed. Patient in agreement with this plan. [place] patient was safe for discharge to home. Discussed reasons to return. All questions answered. Patient discharged to home in stable condition.    Diagnosis:    ICD-10-CM    1. Problem with vascular access  Z78.9     pain           Discharge Medications:  New Prescriptions    No medications on file        Scribe Disclosure:  JENISE WELCH PRINCESS, am serving as a scribe at 4:48 PM on 10/12/2023 to document services personally performed by Joshua Mcgrath PA-C based on my observations and the provider's statements to me.     10/12/2023   Joshua Mcgrath PA-C     This record was created at least in part using electronic voice recognition software, so please excuse any typographical errors.       Joshua Mcgrath PA-C  10/12/23 5692

## 2023-10-12 NOTE — ED TRIAGE NOTES
PICC line site tender, on antibx for osteomyelitis to rib fracture site.       Triage Assessment (Adult)       Row Name 10/12/23 6418          Triage Assessment    Airway WDL WDL        Respiratory WDL    Respiratory WDL WDL        Skin Circulation/Temperature WDL    Skin Circulation/Temperature WDL WDL        Cardiac WDL    Cardiac WDL WDL        Peripheral/Neurovascular WDL    Peripheral Neurovascular WDL WDL        Cognitive/Neuro/Behavioral WDL    Cognitive/Neuro/Behavioral WDL WDL

## 2023-10-12 NOTE — TELEPHONE ENCOUNTER
Telephone Note  10/12/2023 3:17 PM    Paged by [facility] RN Joanie  [phone]      PICC line site irritated, swelling, not functioning  Concern about thrombus, infection  Patient on IV Cefazolin until 10/18/2023 for MSSA osteomyelitis, rib fracture site. Please see previous ID notes    Assessment:  Concern about PICC line associated thrombus, phlebitis, cellulitis    Recommend:    Patient to go to Emergency Room- patient prefers [facility] ED  Ultrasound UE to evaluate for thrombus  Remove PICC  Needs midline on contralateral arm  Discussed with patient and home care nurse. Patient expressed understanding    Elisa Sneed MD  [facility]  Answering Service: [phone]  On-Call ID provider: [phone], option: 9

## 2023-10-13 ENCOUNTER — ANCILLARY PROCEDURE (OUTPATIENT)
Dept: OTHER | Facility: HOSPITAL | Age: 68
End: 2023-10-13
Attending: INTERNAL MEDICINE
Payer: COMMERCIAL

## 2023-10-13 ENCOUNTER — DOCUMENTATION ONLY (OUTPATIENT)
Dept: ANTICOAGULATION | Facility: CLINIC | Age: 68
End: 2023-10-13
Payer: COMMERCIAL

## 2023-10-13 ENCOUNTER — TELEPHONE (OUTPATIENT)
Dept: INFECTIOUS DISEASES | Facility: CLINIC | Age: 68
End: 2023-10-13
Payer: COMMERCIAL

## 2023-10-13 DIAGNOSIS — A49.01 MSSA (METHICILLIN SUSCEPTIBLE STAPHYLOCOCCUS AUREUS) INFECTION: Primary | ICD-10-CM

## 2023-10-13 PROCEDURE — 272N000020 HC KIT OPEN ENDED SINGLE LUMEN

## 2023-10-13 PROCEDURE — 36569 INSJ PICC 5 YR+ W/O IMAGING: CPT

## 2023-10-13 PROCEDURE — 272N000450 HC KIT 4FR POWER PICC SINGLE LUMEN

## 2023-10-13 NOTE — DISCHARGE INSTRUCTIONS
There is no evidence for blood clot at this time. Your site does not appear infected on my exam. The infectious disease doctor that instructed you to come today was informed and will help you to get the peripheral access issue addressed as an outpatient. It sounds like they will contact you tomorrow.

## 2023-10-13 NOTE — PROGRESS NOTES
ANTICOAGULATION  MANAGEMENT: Discharge Review    Tammy Law chart reviewed for anticoagulation continuity of care    Emergency room visit on 10/12/23 for Vascular access problem.    Discharge disposition: Home    Results:    Recent labs: (last 7 days)     10/10/23  0000 10/12/23  1925   INR 3.6* 2.93*     Anticoagulation inpatient management:     not applicable     Anticoagulation discharge instructions:     Warfarin dosing: home regimen continued   Bridging: No   INR goal change: No      Medication changes affecting anticoagulation: Yes: cefazolin 2 gm every 8 hours via IV continued, this will be a 6 week dosing    Additional factors affecting anticoagulation: No, INR in range at ED, does not change plan given on 10/10/23 to recheck in one week     PLAN     Recommend to check INR on 10/17/23 as planned with home meter    Patient not contacted    No adjustment to Anticoagulation Calendar was required    Sarah Gaona RN

## 2023-10-13 NOTE — TELEPHONE ENCOUNTER
PICC line was not able to be replaced at the ER. I scheduled the pt for a PICC insertion today. Pt informed.

## 2023-10-13 NOTE — PROCEDURES
Midline Insertion Procedure Note    Pt. Name:   Tammy Law  MRN:          [id number]    Procedure: Insertion of a  SINGLE Lumen  4 fr  BARD (non-valved) Power MIDLINE catheter.  Lot number [id number].     Indications: Antibiotic (non-vesicant)    Contraindication(s): RUE - pain/tender - PICC      Procedure Details:    Patient identified with 2 identifiers.      Central line insertion bundle followed: Hand hygiene performed prior to procedure, site cleansed with Cholraprep (CHG), hat, mask, sterile gloves,sterile gown worn, patient draped with maximum barrier head to toe drape, sterile field maintained.    The left upper arm BASILIC vein was assessed by ultrasound and found to be anechoic, compressible, patent, and of adequate size.     Lidocaine 1% 2.5 ml administered SQ to the insertion site.     Modified Seldinger Technique (MST) used for insertion,  ONE attempt(s) required to access vein. Imaging during access shows the needle within the vein.    Midline catheter was advance through peel-away sheath.       Catheter threaded without difficulty. Tip placement approximately in the axillary region. Good blood return noted.    Catheter was flushed with 20 cc normal saline.     Catheter secured with Statlock, tissue adhesive (on insertion site only),Biopatch (CHG) and Tegaderm dressing applied.    The sharps that are included in the insertion kit were accounted for and disposed of in the sharps container prior to breakdown of the sterile field.     Patient  tolerated procedure well.    Patient's primary RN notified catheter is a MIDLINE, and is ready for use.    Findings:    Total catheter length  20 cm, with 0 cm exposed. Mid upper arm circumference is 31 cm.     Comments:      A midline catheter is a form of peripheral venous access. Not recommended for the infusion of vesicants (Vancomycin, Vasopressors, TPN, etc.)    RUE PICC - discontinued - intact (45cm) - no bleeding - pressure dressing using COBAN  applied.      Arnold Wan, MSN, RN, [facility]   Vascular Access - [facility]

## 2023-10-16 ENCOUNTER — VIRTUAL VISIT (OUTPATIENT)
Dept: INFECTIOUS DISEASES | Facility: CLINIC | Age: 68
End: 2023-10-16
Payer: COMMERCIAL

## 2023-10-16 DIAGNOSIS — A49.01 MSSA (METHICILLIN SUSCEPTIBLE STAPHYLOCOCCUS AUREUS) INFECTION: Primary | ICD-10-CM

## 2023-10-16 PROCEDURE — 99213 OFFICE O/P EST LOW 20 MIN: CPT | Mod: VID | Performed by: INTERNAL MEDICINE

## 2023-10-16 RX ORDER — CEPHALEXIN 500 MG/1
500 CAPSULE ORAL 3 TIMES DAILY
Qty: 96 CAPSULE | Refills: 0 | Status: SHIPPED | OUTPATIENT
Start: 2023-10-19 | End: 2023-10-17

## 2023-10-16 NOTE — PATIENT INSTRUCTIONS
Raffy Mathews,  Thank you for taking the time to see us today. We will wait for the lab results and if improved/stable, remove midline on 10/19/2023.  Start PO Cephalexin on 10/20/2023- and re-assess in 2-3 weeks   Virtual visit haim Sneed MD  [facility]  Answering Service: [phone]  On-Call ID provider: [phone], option: 9

## 2023-10-17 ENCOUNTER — PATIENT OUTREACH (OUTPATIENT)
Dept: CARE COORDINATION | Facility: CLINIC | Age: 68
End: 2023-10-17
Payer: COMMERCIAL

## 2023-10-17 ENCOUNTER — ANTICOAGULATION THERAPY VISIT (OUTPATIENT)
Dept: ANTICOAGULATION | Facility: CLINIC | Age: 68
End: 2023-10-17
Payer: COMMERCIAL

## 2023-10-17 DIAGNOSIS — Z95.2 H/O MECHANICAL AORTIC VALVE REPLACEMENT: Primary | ICD-10-CM

## 2023-10-17 PROBLEM — Z95.0 HISTORY OF PACEMAKER: Status: ACTIVE | Noted: 2023-08-28

## 2023-10-17 PROBLEM — D62 ACUTE BLOOD LOSS ANEMIA: Status: ACTIVE | Noted: 2023-08-29

## 2023-10-17 PROBLEM — K92.0 HEMATEMESIS: Status: ACTIVE | Noted: 2023-10-17

## 2023-10-17 PROBLEM — K92.1 MELENA: Status: ACTIVE | Noted: 2023-10-17

## 2023-10-17 LAB — INR HOME MONITORING: 3.7 (ref 2–3)

## 2023-10-17 NOTE — PROGRESS NOTES
Clinic Care Coordination Contact  Community Health Worker Initial Outreach    CHW Initial Information Gathering:  Referral Source: ED Follow-Up  Preferred Hospital: [facility], [place]  [phone]  Preferred Urgent Care: [facility] [place], [phone]  No PCP office visit in Past Year: No       Patient accepts CC: No, due to the patient stating that at this time there are no concern for CCC to assist with. Patient will be sent Care Coordination introduction letter for future reference.     HOUSTON Medrano  [phone]  Connected Care Resource Center  [facility]

## 2023-10-17 NOTE — LETTER
[facility] CARE COORDINATION  [address] [address]    October 17, 2023    Tammy Law  [address] [address]      Dear Tammy,        I am a  clinic community health worker who works with CUCO ALEMAN MD with the [facility]. I wanted to thank you for spending the time to talk with me.  Below is a description of clinic care coordination and how we can further assist you.       The clinic care coordination team is made up of a registered nurse, , financial resource worker and community health worker who understand the health care system. The goal of clinic care coordination is to help you manage your health and improve access to the health care system. Our team works alongside your provider to assist you in determining your health and social needs. We can help you obtain health care and community resources, providing you with necessary information and education. We can work with you through any barriers and develop a care plan that helps coordinate and strengthen the communication between you and your care team.  Our services are voluntary and are offered without charge to you personally.    If you wish to connect with the Clinic Care Coordination Team, please let your [facility] Primary Care Provider or Clinic Care Team know and they can place a referral. The Clinic Care Coordination team will then reach out by phone to further support you.    We are focused on providing you with the highest-quality healthcare experience possible.    Sincerely,   Your care team with [facility]

## 2023-10-17 NOTE — PROGRESS NOTES
ANTICOAGULATION MANAGEMENT     Tammy Law 68 year old female is on warfarin with supratherapeutic INR result. (Goal INR 2.5-3.5)    Recent labs: (last 7 days)     10/17/23  0000   INR 3.7*       ASSESSMENT     Source(s): Chart Review and Patient/Caregiver Call     Warfarin doses taken: Warfarin taken as instructed  Diet: No new diet changes identified  Medication/supplement changes:  later this week will be switching from IV cefazolin to PO cephalexin. She will be on the PO cephalexin for ~4 weeks or per ID recommendations  New illness, injury, or hospitalization: No  Signs or symptoms of bleeding or clotting: No  Previous result: Supratherapeutic  Additional findings: None       PLAN     Recommended plan for ongoing change(s) affecting INR     Dosing Instructions: decrease your warfarin dose (7% change) with next INR in 1 week       Summary  As of 10/17/2023      Full warfarin instructions:  2.5 mg every Tue, Fri; 5 mg all other days   Next INR check:  10/24/2023               Telephone call with Bisi who agrees to plan and repeated back plan correctly    Patient to recheck with home meter    Education provided:   Contact [phone]  with any changes, questions or concerns.     Plan made per [facility] anticoagulation protocol    Lisa Mayer RN  Anticoagulation Clinic  10/17/2023    _______________________________________________________________________     Anticoagulation Episode Summary       Current INR goal:  2.5-3.5   TTR:  79.7% (1 y)   Target end date:  Indefinite   Send INR reminders to:  [facility]    Indications    H/O mechanical aortic valve replacement [Z95.2]             Comments:  [employer] home monitor- managed by exception  Goal range changed during 8/28-9/15/23  hospitalization             Anticoagulation Care Providers       Provider Role Specialty Phone number    Arnold Anderson MD Referring Internal Medicine [phone]    Christina Hernandez MD Referring Family  Medicine [phone]    Karina Cifuentes MD Referring [facility] Medicine [phone]

## 2023-10-21 ENCOUNTER — MYC MEDICAL ADVICE (OUTPATIENT)
Dept: FAMILY MEDICINE | Facility: CLINIC | Age: 68
End: 2023-10-21
Payer: COMMERCIAL

## 2023-10-24 ENCOUNTER — ANTICOAGULATION THERAPY VISIT (OUTPATIENT)
Dept: ANTICOAGULATION | Facility: CLINIC | Age: 68
End: 2023-10-24
Payer: COMMERCIAL

## 2023-10-24 DIAGNOSIS — Z95.2 H/O MECHANICAL AORTIC VALVE REPLACEMENT: Primary | ICD-10-CM

## 2023-10-24 LAB — INR HOME MONITORING: 2.5 (ref 2–3)

## 2023-10-24 NOTE — PROGRESS NOTES
ANTICOAGULATION MANAGEMENT     Tammy Law 68 year old female is on warfarin with therapeutic INR result. (Goal INR 2.5-3.5)    Recent labs: (last 7 days)     10/24/23  0000   INR 2.5       ASSESSMENT     Source(s): Chart Review and Patient/Caregiver Call     Warfarin doses taken: Less warfarin taken than planned which may be affecting INR  Diet: No new diet changes identified  Medication/supplement changes:  IV antibiotic complete. Now on Oral Keflex  New illness, injury, or hospitalization: No  Signs or symptoms of bleeding or clotting: No  Previous result: Supratherapeutic  Additional findings:  Patient missed 2.5 mg yesterday. Was planning to increase MD per [facility] Consult but will wait until next week as patient missed 2.5 mg yesterday.  She will make up dose today.  If still 2.5 or so next week; may need to increase maint dose to 3.75 mg Tue/Fri; and 5 mg all other days as recommended by [facility]       PLAN     Recommended plan for temporary change(s) affecting INR     Dosing Instructions: Continue your current warfarin dose with next INR in 1 week       Summary  As of 10/24/2023      Full warfarin instructions:  10/24: 5 mg; Otherwise 2.5 mg every Tue, Fri; 5 mg all other days   Next INR check:  10/31/2023               Telephone call with Bisi who agrees to plan and repeated back plan correctly    Patient to recheck with home meter    Education provided:   Please call back if any changes to your diet, medications or how you've been taking warfarin    Plan made per [facility] anticoagulation protocol    Petrona Phan, RN  Anticoagulation Clinic  10/24/2023    _______________________________________________________________________     Anticoagulation Episode Summary       Current INR goal:  2.5-3.5   TTR:  79.9% (1 y)   Target end date:  Indefinite   Send INR reminders to:  [facility]    Indications    H/O mechanical aortic valve replacement [Z95.2]             Comments:  [employer] home monitor-  managed by exception  Goal range changed during 8/28-9/15/23  hospitalization             Anticoagulation Care Providers       Provider Role Specialty Phone number    Arnold Anderson MD Referring Internal Medicine [phone]    Christina Hernandez MD Referring Family Medicine [phone]    Karina Cifuentes MD Referring Family Medicine [phone]

## 2023-10-30 ENCOUNTER — TELEPHONE (OUTPATIENT)
Dept: ANTICOAGULATION | Facility: CLINIC | Age: 68
End: 2023-10-30
Payer: COMMERCIAL

## 2023-10-30 NOTE — TELEPHONE ENCOUNTER
Called and spoke with Bisi and she stated that she is currently taking Keflex 3 times a day for another 3 weeks.  She had a dental procedure done today , took Amoxil prophylaxis dose and was prescribed Methylprednisolone 4 mg 3 times a day for inflammation made aware of potential interaction with warfarin.    She is scheduled to check her INR tomorrow and made aware that she will get a call from [facility] and discuss dosing based on INR result.    She has no other concerns at this time.    Carolynn Lucas RN [facility]tico[facility]    [phone]

## 2023-10-30 NOTE — TELEPHONE ENCOUNTER
Patient Returning Call    Reason for call:  Patient has questions regarding a medication. Patient has not heard of it and would like a nurse from [facility]. Patient is afraid with blood thinners. Cethalesin 500 mg and dental medicaiton    Information relayed to patient:  n/a    Patient has additional questions:  Yes    What are your questions/concerns:  Patient has questions regarding a medication. Patient has not heard of it and would like a nurse from [facility]. Patient is afraid with blood thinners.    Who does the patient want to speak with:  RN    Is an  needed?:  No      Could we send this information to you in [facility] or would you prefer to receive a phone call?:   Patient would prefer a phone call   Okay to leave a detailed message?: Yes at Home number on file [phone] (home)

## 2023-10-31 ENCOUNTER — ANTICOAGULATION THERAPY VISIT (OUTPATIENT)
Dept: ANTICOAGULATION | Facility: CLINIC | Age: 68
End: 2023-10-31
Payer: COMMERCIAL

## 2023-10-31 DIAGNOSIS — Z95.2 H/O MECHANICAL AORTIC VALVE REPLACEMENT: Primary | ICD-10-CM

## 2023-10-31 LAB — INR HOME MONITORING: 3.2 (ref 2–3)

## 2023-10-31 NOTE — PROGRESS NOTES
ANTICOAGULATION MANAGEMENT     Tammy Law 68 year old female is on warfarin with therapeutic INR result. (Goal INR 2.5-3.5)    Recent labs: (last 7 days)     10/31/23  0000   INR 3.2*       ASSESSMENT     Source(s): Chart Review and Patient/Caregiver Call     Warfarin doses taken: Warfarin taken as instructed  Diet: No new diet changes identified  Medication/supplement changes: Prednisolone pack(started on: 10/30) which may be increasing INR today  Still on Keflex 3 times a day for 3  more weeks may be increasing INR today  Also took Amoxil 2000 mg prior to dental visit on 10/30/23.  New illness, injury, or hospitalization: Yes: oral inflammation.  Signs or symptoms of bleeding or clotting: No  Previous result: Therapeutic last visit; previously outside of goal range  Additional findings: None       PLAN     Recommended plan for temporary change(s) affecting INR     Dosing Instructions: partial hold then continue your current warfarin dose with next INR in 3 days       Summary  As of 10/31/2023      Full warfarin instructions:  11/1: 2.5 mg; Otherwise 2.5 mg every Tue, Fri; 5 mg all other days   Next INR check:  11/3/2023               Telephone call with Bisi who verbalizes understanding and agrees to plan and who agrees to plan and repeated back plan correctly    Patient to recheck with home meter    Education provided:   Interaction IS anticipated between warfarin and prednisolone  Contact [phone]  with any changes, questions or concerns.     Plan made per [facility] anticoagulation protocol    Carolynn Lucas RN  Anticoagulation Clinic  10/31/2023    _______________________________________________________________________     Anticoagulation Episode Summary       Current INR goal:  2.5-3.5   TTR:  79.9% (1 y)   Target end date:  Indefinite   Send INR reminders to:  [facility]    Indications    H/O mechanical aortic valve replacement [Z95.2]             Comments:  [employer] home monitor- managed  by exception  Goal range changed during 8/28-9/15/23  hospitalization             Anticoagulation Care Providers       Provider Role Specialty Phone number    Arnold Anderson MD Referring Internal Medicine [phone]    Christina Hernandez MD Referring Family Medicine [phone]    Karina Cifuentes MD Referring Family Medicine [phone]

## 2023-11-03 ENCOUNTER — ANTICOAGULATION THERAPY VISIT (OUTPATIENT)
Dept: ANTICOAGULATION | Facility: CLINIC | Age: 68
End: 2023-11-03
Payer: COMMERCIAL

## 2023-11-03 DIAGNOSIS — Z95.2 H/O MECHANICAL AORTIC VALVE REPLACEMENT: Primary | ICD-10-CM

## 2023-11-03 DIAGNOSIS — M81.0 AGE-RELATED OSTEOPOROSIS WITHOUT CURRENT PATHOLOGICAL FRACTURE: ICD-10-CM

## 2023-11-03 LAB — INR HOME MONITORING: 4.1 (ref 2–3)

## 2023-11-03 RX ORDER — ALENDRONATE SODIUM 70 MG/1
70 TABLET ORAL
Qty: 13 TABLET | Refills: 3 | Status: SHIPPED | OUTPATIENT
Start: 2023-11-03 | End: 2024-03-04

## 2023-11-03 NOTE — PROGRESS NOTES
ANTICOAGULATION MANAGEMENT     Tammy Law 68 year old female is on warfarin with supratherapeutic INR result. (Goal INR 2.5-3.5)    Recent labs: (last 7 days)     11/03/23  0000   INR 4.1*       ASSESSMENT     Source(s): Chart Review and Patient/Caregiver Call     Warfarin doses taken: Warfarin taken as instructed  Diet: No new diet changes identified  Medication/supplement changes: prednisolone pack started on 10/30 which may be increasing INR today will take last dose tomorrow AM  Still on keflex - has a follow up with Infectious Disease  on 11/6 and will know if will she will continue keflex or not.  New illness, injury, or hospitalization: No  Signs or symptoms of bleeding or clotting: No  Previous result: Therapeutic last 2(+) visits  Additional findings: None       PLAN     Recommended plan for temporary change(s) affecting INR     Dosing Instructions: hold dose then continue your current warfarin dose with next INR in 4 days       Summary  As of 11/3/2023      Full warfarin instructions:  11/3: Hold; Otherwise 2.5 mg every Tue, Fri; 5 mg all other days   Next INR check:  11/7/2023               Telephone call with Bisi who verbalizes understanding and agrees to plan and who agrees to plan and repeated back plan correctly    Patient to recheck with home meter    Education provided:   Contact [phone]  with any changes, questions or concerns.     Plan made per [facility] anticoagulation protocol    Carolynn Lucas RN  Anticoagulation Clinic  11/3/2023    _______________________________________________________________________     Anticoagulation Episode Summary       Current INR goal:  2.5-3.5   TTR:  79.3% (1 y)   Target end date:  Indefinite   Send INR reminders to:  [facility]    Indications    H/O mechanical aortic valve replacement [Z95.2]             Comments:  [employer] home monitor- managed by exception  Goal range changed during 8/28-9/15/23  hospitalization             Anticoagulation  Care Providers       Provider Role Specialty Phone number    Arnold Anderson MD Referring Internal Medicine [phone]    Christina Hernandez MD Referring Family Medicine [phone]    Karina Cifuentes MD Referring Family Medicine [phone]

## 2023-11-06 ENCOUNTER — LAB (OUTPATIENT)
Dept: LAB | Facility: CLINIC | Age: 68
End: 2023-11-06
Payer: COMMERCIAL

## 2023-11-06 ENCOUNTER — HOSPITAL ENCOUNTER (OUTPATIENT)
Dept: GENERAL RADIOLOGY | Facility: HOSPITAL | Age: 68
Discharge: HOME | End: 2023-11-06
Attending: INTERNAL MEDICINE | Admitting: INTERNAL MEDICINE
Payer: COMMERCIAL

## 2023-11-06 ENCOUNTER — OFFICE VISIT (OUTPATIENT)
Dept: INFECTIOUS DISEASES | Facility: CLINIC | Age: 68
End: 2023-11-06
Attending: INTERNAL MEDICINE
Payer: COMMERCIAL

## 2023-11-06 VITALS
WEIGHT: 139.2 LBS | TEMPERATURE: 98.3 F | SYSTOLIC BLOOD PRESSURE: 114 MMHG | DIASTOLIC BLOOD PRESSURE: 58 MMHG | HEART RATE: 72 BPM | BODY MASS INDEX: 23.89 KG/M2

## 2023-11-06 DIAGNOSIS — A49.01 MSSA (METHICILLIN SUSCEPTIBLE STAPHYLOCOCCUS AUREUS) INFECTION: Primary | ICD-10-CM

## 2023-11-06 DIAGNOSIS — A49.01 MSSA (METHICILLIN SUSCEPTIBLE STAPHYLOCOCCUS AUREUS) INFECTION: ICD-10-CM

## 2023-11-06 LAB
BASOPHILS # BLD AUTO: 0.1 10E3/UL (ref 0–0.2)
BASOPHILS NFR BLD AUTO: 1 %
EOSINOPHIL # BLD AUTO: 0.2 10E3/UL (ref 0–0.7)
EOSINOPHIL NFR BLD AUTO: 2 %
ERYTHROCYTE [DISTWIDTH] IN BLOOD BY AUTOMATED COUNT: 13.7 % (ref 10–15)
HCT VFR BLD AUTO: 35.2 % (ref 35–47)
HGB BLD-MCNC: 11.3 G/DL (ref 11.7–15.7)
IMM GRANULOCYTES # BLD: 0 10E3/UL
IMM GRANULOCYTES NFR BLD: 0 %
LYMPHOCYTES # BLD AUTO: 1.8 10E3/UL (ref 0.8–5.3)
LYMPHOCYTES NFR BLD AUTO: 24 %
MCH RBC QN AUTO: 26.5 PG (ref 26.5–33)
MCHC RBC AUTO-ENTMCNC: 32.1 G/DL (ref 31.5–36.5)
MCV RBC AUTO: 83 FL (ref 78–100)
MONOCYTES # BLD AUTO: 0.7 10E3/UL (ref 0–1.3)
MONOCYTES NFR BLD AUTO: 9 %
NEUTROPHILS # BLD AUTO: 4.9 10E3/UL (ref 1.6–8.3)
NEUTROPHILS NFR BLD AUTO: 64 %
PLATELET # BLD AUTO: 310 10E3/UL (ref 150–450)
RBC # BLD AUTO: 4.26 10E6/UL (ref 3.8–5.2)
WBC # BLD AUTO: 7.6 10E3/UL (ref 4–11)

## 2023-11-06 PROCEDURE — 99213 OFFICE O/P EST LOW 20 MIN: CPT | Performed by: INTERNAL MEDICINE

## 2023-11-06 PROCEDURE — 80053 COMPREHEN METABOLIC PANEL: CPT

## 2023-11-06 PROCEDURE — 36415 COLL VENOUS BLD VENIPUNCTURE: CPT

## 2023-11-06 PROCEDURE — 86140 C-REACTIVE PROTEIN: CPT

## 2023-11-06 PROCEDURE — 71046 X-RAY EXAM CHEST 2 VIEWS: CPT

## 2023-11-06 PROCEDURE — 85025 COMPLETE CBC W/AUTO DIFF WBC: CPT

## 2023-11-06 NOTE — PROGRESS NOTES
Infectious Disease Progress Note    Date of Service : 11/06/2023     Assessment & Plan   Tammy Law is a 68 year old female     ASSESSMENT:  67 year old female with recent hospitalization in [facility], for hematoma, fall, rib fracture  Status post hematoma evacuation, cultures with MSSA.  Patient was discharged on IV cefazolin.  Patient transferred care to [place] due to in network/insurance.      Impression:     MSSA infection, hematoma  Status post rib fracture, open reduction internal fixation with plates, see images below  History of aortic valve replacement in 1995  ICD pacemaker  Patient was seen by infectious disease at [facility] care: Simba Seay,   PICC line, tolerating IV cefazolin with tentative end date of 10/18/2023, 6 weeks from positive cultures in September 2023    RECOMMENDATIONS:    Antibiotics-IV cefazolin until 10/18/2023, followed by oral antibiotics, cephalexin   Monitor CBC, CMP  Discussed with patient   ID will follow  CXR      Elisa Sneed MD  [facility]  [phone]      Interval History   Overall better  Intermittent pain in lower ribs    Physical Exam   Temp: 98.3  F (36.8  C) Temp src: Oral BP: 114/58 Pulse: 72            Vitals:    11/06/23 1520   Weight: 63.1 kg (139 lb 3.2 oz)     Vital Signs with Ranges  Temp:  [98.3  F (36.8  C)] 98.3  F (36.8  C)  Pulse:  [72] 72  BP: (114)/(58) 114/58  ED visit    Constitutional: Awake, no apparent distress  Lungs: normal breathing pattern- slight decrease in breath sounds at bases  Neuro Aox3  CVS S2 S2  Skin: no new rash- incision healed    Medications   Cefazolin completed  Cephalexin      Data   All microbiology laboratory data reviewed.  Recent Labs   Lab Test 10/09/23  1750 10/02/23  1420 09/25/23  1130   WBC 5.6 5.3 6.0   HGB 9.5* 9.1* 9.5*   HCT 30.4* 29.5* 30.1*   MCV 85 87 88    328 336     Recent Labs   Lab Test 10/09/23  1750 10/02/23  1420 09/25/23  1130   CR 0.55 0.58 0.51  "    Recent Labs   Lab Test 09/25/23  1632   SED 53*     No lab results found.    Invalid input(s): \"UC\"    MICROBIOLOGY:    Reviewed    7-Day Micro Results       No results found for the last 168 hours.             RADIOLOGY:    Reviewed  US Upper Extremity Venous Duplex Right    Result Date: 10/12/2023  EXAM: US UPPER EXTREMITY VENOUS DUPLEX RIGHT LOCATION: [facility] DATE: 10/12/2023 INDICATION: PICC line present, pain to site, rule out clot, patient anticoagulated COMPARISON: None. TECHNIQUE: Venous Duplex ultrasound of the right upper extremity with (when possible) and without compression, augmentation, and duplex. Color flow and spectral Doppler with waveform analysis performed. FINDINGS: Ultrasound includes evaluation of the internal jugular vein, innominate vein, subclavian vein, axillary vein, and brachial vein. The superficial cephalic and basilic veins were also evaluated where seen. RIGHT: No deep venous thrombosis. Right mid humeral basilic vein PICC in place. No superficial thrombophlebitis.     IMPRESSION: Negative for right upper extremity DVT.    XR CHEST 2 VIEWS    Result Date: 9/29/2023  This document is currently in Final Status Exam Accession# [id number] CHEST X-RAY 2 VIEWS HISTORY: F/u post rib plating. COMPARISON: 09/14/2023. FINDINGS: Patient took a deeper breath. There are skin staples in place. There are postop changes in the left lower ribs. Pacemaker is in good position. There continues to be small left greater than right pleural effusions with no evidence of pneumothorax. IMPRESSION: Slightly improving lung aeration. No pneumothorax. Dictated By: Joesph Herrera MD 9/28/2023 1:45 PM Edited By: JAGUAR 9/28/2023 2:17 PM Electronically Signed: Joesph Herrera MD 9/29/2023 7:20 AM    XR Chest 2 Views    Result Date: 9/22/2023  EXAM: XR CHEST 2 VIEWS LOCATION: [facility] DATE: 9/22/2023 INDICATION:  Pleural effusion due to bacterial infection, " COMPARISON: 09/11/2023     IMPRESSION: Slight interval decrease in left pleural effusion and left basilar atelectasis, mild persists. No evidence for CHF or pneumonia. Postsurgical change noted within multiple left lower ribs posterolaterally. Stable skin staples along the left lower hemithorax laterally.    Cardiac Device Check - Remote (Standing ORD 5 count)    Result Date: 9/18/2023  Encounter Type: routine remote pacemaker transmission. Device: Biotronik Eluna. Pacing % /Programmed: AP 76%,  0% at DDD-CLS 60/130. Lead(s): Stable. Battery longevity: Middle of service. 50% remaining estimated. Presenting: AP-VS and sinus 60-70 bpm. Atrial high rates: since 6/7/23; atrial burden 0% per day. Anticoagulant: Coumadin. Ventricular High rates: since 6/7/23; None detected. Comments: Normal device function. Plan: Next remote check scheduled for 12/19/23. E. Pivec, Device Specialist  Device follow up for the entirety of having the Pacemaker, based on best practices determined by [employer] and in Compliance with Medicare guidelines. Continue remote device monitoring per patient plan. I have reviewed and interpreted the device interrogation, settings, programming, and encounter summary. The device is functioning within normal device parameters. I agree with the current findings, assessment and plan.  Total Time Spent 35 minutes with >50% of the time spent in chart review, evaluation, management, counseling, education and care coordination.

## 2023-11-06 NOTE — PATIENT INSTRUCTIONS
Raffy Mathews,    Thank you for taking the time to see us today. We recommend repeat labs    Orders Placed This Encounter   Procedures    XR Chest 2 Views    CRP inflammation    Comprehensive metabolic panel   CBC with diff  CXR for follow up of Thoracoscopy with evacuation of retained hemothorax, open reduction internal fixation of left ribs #6, 7, 8 and 9 on 9/6/2023 as residual rib discomfort/tightness    Decreasing Cephalexin to 500 mg every 12 hours from 500 mg every 8 hours.     Follow up a virtual visit to discuss next steps based on results  Results will be released via Daryl Sneed MD  [facility]  Answering Service: [phone]  On-Call ID provider: [phone], option: 9

## 2023-11-07 ENCOUNTER — ANTICOAGULATION THERAPY VISIT (OUTPATIENT)
Dept: ANTICOAGULATION | Facility: CLINIC | Age: 68
End: 2023-11-07
Payer: COMMERCIAL

## 2023-11-07 LAB
ALBUMIN SERPL BCG-MCNC: 4 G/DL (ref 3.5–5.2)
ALP SERPL-CCNC: 87 U/L (ref 35–104)
ALT SERPL W P-5'-P-CCNC: 41 U/L (ref 0–50)
ANION GAP SERPL CALCULATED.3IONS-SCNC: 8 MMOL/L (ref 7–15)
AST SERPL W P-5'-P-CCNC: 24 U/L (ref 0–45)
BILIRUB SERPL-MCNC: 0.3 MG/DL
BUN SERPL-MCNC: 12.5 MG/DL (ref 8–23)
CALCIUM SERPL-MCNC: 9.6 MG/DL (ref 8.8–10.2)
CHLORIDE SERPL-SCNC: 102 MMOL/L (ref 98–107)
CREAT SERPL-MCNC: 0.79 MG/DL (ref 0.51–0.95)
CRP SERPL-MCNC: <3 MG/L
DEPRECATED HCO3 PLAS-SCNC: 28 MMOL/L (ref 22–29)
EGFRCR SERPLBLD CKD-EPI 2021: 82 ML/MIN/1.73M2
GLUCOSE SERPL-MCNC: 86 MG/DL (ref 70–99)
INR HOME MONITORING: 2.6 (ref 2–3)
POTASSIUM SERPL-SCNC: 4.3 MMOL/L (ref 3.4–5.3)
PROT SERPL-MCNC: 6.7 G/DL (ref 6.4–8.3)
SODIUM SERPL-SCNC: 138 MMOL/L (ref 135–145)

## 2023-11-07 NOTE — PROGRESS NOTES
ANTICOAGULATION MANAGEMENT     Tammy Law 68 year old female is on warfarin with therapeutic INR result. (Goal INR 2.5-3.5)    Recent labs: (last 7 days)     11/07/23  0000   INR 2.6       ASSESSMENT     Source(s): Chart Review and Patient/Caregiver Call     Warfarin doses taken: Warfarin taken as instructed  Diet: No new diet changes identified  Medication/supplement changes:  Cephazolin completed, continues on decreased dose of Cephalexin 500mgs  BID, with 2 weeks remaining.  New illness, injury, or hospitalization: No  Signs or symptoms of bleeding or clotting: No  Previous result: Supratherapeutic  Additional findings:  Infectious disease consult 11/6/23       PLAN     Recommended plan for ongoing change(s) affecting INR     Dosing Instructions: Continue your current warfarin dose with next INR in 1 week       Summary  As of 11/7/2023      Full warfarin instructions:  2.5 mg every Tue, Fri; 5 mg all other days   Next INR check:  11/14/2023               Telephone call with Bisi who agrees to plan and repeated back plan correctly    Patient to recheck with home meter    Education provided:   Please call back if any changes to your diet, medications or how you've been taking warfarin  Goal range and lab monitoring: goal range and significance of current result    Plan made per [facility] anticoagulation protocol    Kassy Borges RN  Anticoagulation Clinic  11/7/2023    _______________________________________________________________________     Anticoagulation Episode Summary       Current INR goal:  2.5-3.5   TTR:  78.9% (1 y)   Target end date:  Indefinite   Send INR reminders to:  [facility]    Indications    H/O mechanical aortic valve replacement [Z95.2]             Comments:  [employer] home monitor- managed by exception  Goal range changed during 8/28-9/15/23  hospitalization             Anticoagulation Care Providers       Provider Role Specialty Phone number    Arnold Anderson MD  Referring Internal Medicine [phone]    Christina Hernandez MD Referring Family Medicine [phone]    Karina Cifuentes MD Referring Family Medicine [phone]

## 2023-11-14 ENCOUNTER — DOCUMENTATION ONLY (OUTPATIENT)
Dept: ANTICOAGULATION | Facility: CLINIC | Age: 68
End: 2023-11-14
Payer: COMMERCIAL

## 2023-11-14 DIAGNOSIS — Z95.2 H/O MECHANICAL AORTIC VALVE REPLACEMENT: Primary | ICD-10-CM

## 2023-11-14 LAB — INR HOME MONITORING: 2.8 (ref 2–3)

## 2023-11-14 NOTE — PROGRESS NOTES
ANTICOAGULATION  MANAGEMENT-Home Monitor Managed by Exception    Tammy Law 68 year old female is on warfarin with therapeutic INR result. (Goal INR 2.5-3.5)    Recent labs: (last 7 days)     11/14/23  0000   INR 2.8       Previous INR was Therapeutic  Medication, diet, health changes since last INR:chart reviewed; none identified  Contacted within the last 12 weeks by phone on 11/7/23      ZAIN Munoz was NOT contacted regarding therapeutic result today per home monitoring policy manage by exception agreement.   Current warfarin dose is to be continued:     Summary  As of 11/14/2023      Full warfarin instructions:  2.5 mg every Tue, Fri; 5 mg all other days   Next INR check:  11/21/2023             ?   Petrona Phan RN  Anticoagulation Clinic  11/14/2023    _______________________________________________________________________     Anticoagulation Episode Summary       Current INR goal:  2.5-3.5   TTR:  78.9% (1 y)   Target end date:  Indefinite   Send INR reminders to:  [facility]    Indications    H/O mechanical aortic valve replacement [Z95.2]             Comments:  [employer] home monitor- managed by exception  Goal range changed during 8/28-9/15/23  hospitalization             Anticoagulation Care Providers       Provider Role Specialty Phone number    Arnold Anderson MD Referring Internal Medicine [phone]    Christina Hernandez MD Referring Family Medicine [phone]    Karina Cifuentes MD Referring Family Medicine [phone]

## 2023-11-23 DIAGNOSIS — E78.00 HYPERCHOLESTEROLEMIA: ICD-10-CM

## 2023-11-24 RX ORDER — ATORVASTATIN CALCIUM 40 MG/1
40 TABLET, FILM COATED ORAL DAILY
Qty: 100 TABLET | Refills: 2 | OUTPATIENT
Start: 2023-11-24

## 2023-11-28 ENCOUNTER — DOCUMENTATION ONLY (OUTPATIENT)
Dept: ANTICOAGULATION | Facility: CLINIC | Age: 68
End: 2023-11-28
Payer: COMMERCIAL

## 2023-11-28 DIAGNOSIS — Z95.2 H/O MECHANICAL AORTIC VALVE REPLACEMENT: Primary | ICD-10-CM

## 2023-11-28 LAB — INR HOME MONITORING: 2.5 (ref 2–3)

## 2023-11-28 NOTE — PROGRESS NOTES
ANTICOAGULATION  MANAGEMENT-Home Monitor Managed by Exception    Tammyrober Law 68 year old female is on warfarin with therapeutic INR result. (Goal INR 2.5-3.5)    Recent labs: (last 7 days)     11/28/23  0000   INR 2.5       Previous INR was Therapeutic  Medication, diet, health changes since last INR:chart reviewed; none identified  Contacted within the last 12 weeks by phone on 11/7/23  Last [facility] referral date: 02/24/2023      ZAIN     Tammy was NOT contacted regarding therapeutic result today per home monitoring policy manage by exception agreement.   Current warfarin dose is to be continued:     Summary  As of 11/28/2023      Full warfarin instructions:  2.5 mg every Tue, Fri; 5 mg all other days   Next INR check:  12/12/2023             ?   Carolynn Morley RN  Anticoagulation Clinic  11/28/2023    _______________________________________________________________________     Anticoagulation Episode Summary       Current INR goal:  2.5-3.5   TTR:  78.9% (1 y)   Target end date:  Indefinite   Send INR reminders to:  [facility]    Indications    H/O mechanical aortic valve replacement [Z95.2]             Comments:  [employer] home monitor- managed by exception  Goal range changed during 8/28-9/15/23  hospitalization             Anticoagulation Care Providers       Provider Role Specialty Phone number    Arnold Anderson MD Referring Internal Medicine [phone]    Christina Hernandez MD Referring Family Medicine [phone]    Karina Cifuentes MD Referring Family Medicine [phone]

## 2023-12-12 ENCOUNTER — DOCUMENTATION ONLY (OUTPATIENT)
Dept: ANTICOAGULATION | Facility: CLINIC | Age: 68
End: 2023-12-12
Payer: COMMERCIAL

## 2023-12-12 DIAGNOSIS — Z95.2 H/O MECHANICAL AORTIC VALVE REPLACEMENT: Primary | ICD-10-CM

## 2023-12-12 LAB — INR HOME MONITORING: 2.4 (ref 2–3)

## 2023-12-12 NOTE — PROGRESS NOTES
ANTICOAGULATION  MANAGEMENT-Home Monitor Managed by Exception    Tammy Law 68 year old female is on warfarin with therapeutic INR result. (Goal INR 2.5-3.5)    Recent labs: (last 7 days)     12/12/23  0000   INR 2.4       Previous INR was Therapeutic  Medication, diet, health changes since last INR:chart reviewed; none identified  Contacted within the last 12 weeks by phone on 11/7/23  Last [facility] referral date: 02/24/2023      ZAIN     Tammy was NOT contacted regarding therapeutic result today per home monitoring policy manage by exception agreement.   Current warfarin dose is to be continued:     Summary  As of 12/12/2023      Full warfarin instructions:  2.5 mg every Tue, Fri; 5 mg all other days   Next INR check:  12/26/2023             ?   Kassy Borges RN  Anticoagulation Clinic  12/12/2023    _______________________________________________________________________     Anticoagulation Episode Summary       Current INR goal:  2.5-3.5   TTR:  75.1% (1 y)   Target end date:  Indefinite   Send INR reminders to:  [facility]    Indications    H/O mechanical aortic valve replacement [Z95.2]             Comments:  [employer] home monitor- managed by exception  Goal range changed during 8/28-9/15/23  hospitalization             Anticoagulation Care Providers       Provider Role Specialty Phone number    Arnold Anderson MD Referring Internal Medicine [phone]    Christina Hernandez MD Referring Family Medicine [phone]    Karina Cifuentes MD Referring Family Medicine [phone]

## 2023-12-13 ENCOUNTER — ANCILLARY PROCEDURE (OUTPATIENT)
Dept: GENERAL RADIOLOGY | Facility: CLINIC | Age: 68
End: 2023-12-13
Payer: COMMERCIAL

## 2023-12-13 ENCOUNTER — OFFICE VISIT (OUTPATIENT)
Dept: FAMILY MEDICINE | Facility: CLINIC | Age: 68
End: 2023-12-13
Payer: COMMERCIAL

## 2023-12-13 VITALS
HEIGHT: 64 IN | HEART RATE: 68 BPM | RESPIRATION RATE: 14 BRPM | SYSTOLIC BLOOD PRESSURE: 98 MMHG | BODY MASS INDEX: 22.88 KG/M2 | WEIGHT: 134 LBS | OXYGEN SATURATION: 97 % | DIASTOLIC BLOOD PRESSURE: 54 MMHG | TEMPERATURE: 97.5 F

## 2023-12-13 DIAGNOSIS — R07.81 RIB PAIN ON LEFT SIDE: ICD-10-CM
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
## 2023-12-13 DIAGNOSIS — M79.642 PAIN OF LEFT HAND: ICD-10-CM

## 2023-12-13 DIAGNOSIS — Z29.11 NEED FOR VACCINATION AGAINST RESPIRATORY SYNCYTIAL VIRUS: ICD-10-CM

## 2023-12-13 DIAGNOSIS — Z79.01 CHRONIC ANTICOAGULATION: ICD-10-CM
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
## 2023-12-13 DIAGNOSIS — D62 ANEMIA DUE TO BLOOD LOSS, ACUTE: ICD-10-CM

## 2023-12-13 DIAGNOSIS — Z12.11 SCREEN FOR COLON CANCER: ICD-10-CM

## 2023-12-13 DIAGNOSIS — R26.89 BALANCE PROBLEMS: ICD-10-CM

## 2023-12-13 LAB
BASOPHILS # BLD AUTO: 0 10E3/UL (ref 0–0.2)
BASOPHILS NFR BLD AUTO: 1 %
EOSINOPHIL # BLD AUTO: 0.2 10E3/UL (ref 0–0.7)
EOSINOPHIL NFR BLD AUTO: 3 %
ERYTHROCYTE [DISTWIDTH] IN BLOOD BY AUTOMATED COUNT: 14.8 % (ref 10–15)
HCT VFR BLD AUTO: 37.4 % (ref 35–47)
HGB BLD-MCNC: 11.4 G/DL (ref 11.7–15.7)
IMM GRANULOCYTES # BLD: 0 10E3/UL
IMM GRANULOCYTES NFR BLD: 0 %
LYMPHOCYTES # BLD AUTO: 1.1 10E3/UL (ref 0.8–5.3)
LYMPHOCYTES NFR BLD AUTO: 19 %
MCH RBC QN AUTO: 24.7 PG (ref 26.5–33)
MCHC RBC AUTO-ENTMCNC: 30.5 G/DL (ref 31.5–36.5)
MCV RBC AUTO: 81 FL (ref 78–100)
MONOCYTES # BLD AUTO: 0.6 10E3/UL (ref 0–1.3)
MONOCYTES NFR BLD AUTO: 11 %
NEUTROPHILS # BLD AUTO: 3.8 10E3/UL (ref 1.6–8.3)
NEUTROPHILS NFR BLD AUTO: 67 %
PLATELET # BLD AUTO: 245 10E3/UL (ref 150–450)
RBC # BLD AUTO: 4.61 10E6/UL (ref 3.8–5.2)
WBC # BLD AUTO: 5.6 10E3/UL (ref 4–11)

## 2023-12-13 PROCEDURE — 85025 COMPLETE CBC W/AUTO DIFF WBC: CPT | Performed by: FAMILY MEDICINE

## 2023-12-13 PROCEDURE — 99214 OFFICE O/P EST MOD 30 MIN: CPT | Mod: 25 | Performed by: FAMILY MEDICINE

## 2023-12-13 PROCEDURE — 90480 ADMN SARSCOV2 VAC 1/ONLY CMP: CPT | Performed by: FAMILY MEDICINE

## 2023-12-13 PROCEDURE — 73130 X-RAY EXAM OF HAND: CPT | Mod: TC | Performed by: RADIOLOGY

## 2023-12-13 PROCEDURE — 36415 COLL VENOUS BLD VENIPUNCTURE: CPT | Performed by: FAMILY MEDICINE

## 2023-12-13 PROCEDURE — 91320 SARSCV2 VAC 30MCG TRS-SUC IM: CPT | Performed by: FAMILY MEDICINE

## 2023-12-13 PROCEDURE — 71101 X-RAY EXAM UNILAT RIBS/CHEST: CPT | Mod: TC | Performed by: RADIOLOGY

## 2023-12-13 RX ORDER — RESPIRATORY SYNCYTIAL VIRUS VACCINE 120MCG/0.5
0.5 KIT INTRAMUSCULAR ONCE
Qty: 1 EACH | Refills: 0 | Status: SHIPPED | OUTPATIENT
Start: 2023-12-13 | End: 2023-12-13

## 2023-12-13 NOTE — PROGRESS NOTES
Assessment & Plan     Fall, initial encounter  - XR Ribs & Chest Left G/E 3 Views  - XR Hand Left G/E 3 Views    Rib pain on left side  - XR Ribs & Chest Left G/E 3 Views    Pain of left hand  - XR Hand Left G/E 3 Views    Anemia due to blood loss, acute  - CBC with platelets and differential  Since the anemia has not improved, adding iron, B12 and folate labs.    Balance problems  No recent scans of the brain. No other changes noted. She cancelled her appointment with the neurologist due to her hospitalization and it has not be rescheduled.  Balance issues addressed during PT after the hospitalization.  She cannot have an MRI, so will order a CT of the brain.    Chronic anticoagulation  AVR    Need for vaccination against respiratory syncytial virus  - respiratory syncytial virus vaccine, bivalent (ABRYSVO) injection  Dispense: 1 each; Refill: 0  CUCO ALEMAN MD  [facility]   Bisi is a 68 year old, presenting for the following health issues:  Fall (Fall with injuries, 2nd fall in a few months. Left pinky swollen and sore.  Ribs sore. Broke a tooth and nose scunned up and sore. Concerned about possible blood infection still lingering.)      12/13/2023     9:52 AM   Additional Questions   Roomed by Regina   Accompanied by N/A         12/13/2023     9:52 AM   Patient Reported Additional Medications   Patient reports taking the following new medications Tylenol   About two weeks ago, she was walking with her sister, she went to grab the grocery cart her sister was pushing, missed and fell on her open hand (not necessarily outstretched.)  She has been losing her balance when she moves recently. This is the first fall. She does not get dizziness.  No shortness of breath.  She has lost as much as 9# in the last year. Less appetite.  She has to reschedule her colonoscopy due to her admission in September.    History of Present Illness       Reason for visit:  Follow up from my  "accident in August plus  lightheadedness    She eats 0-1 servings of fruits and vegetables daily.She consumes 1 sweetened beverage(s) daily.She exercises with enough effort to increase her heart rate 9 or less minutes per day.  She exercises with enough effort to increase her heart rate 3 or less days per week.   She is taking medications regularly.        Objective    BP 98/54 (BP Location: Left arm, Patient Position: Sitting, Cuff Size: Adult Regular)   Pulse 68   Temp 97.5  F (36.4  C) (Tympanic)   Resp 14   Ht 1.626 m (5' 4\")   Wt 60.8 kg (134 lb)   LMP 12/13/2000 (Approximate)   SpO2 97%   Breastfeeding No   BMI 23.00 kg/m    Body mass index is 23 kg/m .  Physical Exam   GENERAL: healthy, alert and no distress  EYES: grossly normal to inspection, and conjunctivae and sclerae normal  RESP: breathing unlabored, no cough or throat clearing. Lungs were clear except some rales in the left lower lobe..She indicates the pain is along a lower rib, just below the breast.  ABDOMEN: soft, nontender, no hepatosplenomegaly, no masses and bowel sounds normal  MS: The left hand's fifth metacarpal is curved consistent with a previous boxer's fracture. She is tender at the fifth distal metatarsal in the palm.  SKIN: no suspicious lesions or rashes visible  NEURO: Normal strength and tone, mentation intact and speech normal  PSYCH: mentation appears normal, affect normal/bright  Results for orders placed or performed in visit on 12/13/23   XR Hand Left G/E 3 Views     Status: None    Narrative    EXAM: XR HAND LEFT G/E 3 VIEWS  LOCATION: [facility]  DATE: 12/13/2023    INDICATION:  Fall, initial encounter, Pain of left hand  COMPARISON: None.      Impression    IMPRESSION: Degenerative change at the STT joint and first CMC joint. Degenerative change at the first MCP joint. Degenerative change at multiple IP joints of the left hand. No evidence for fracture or dislocation.   Results for orders placed " or performed in visit on 12/13/23   CBC with platelets and differential     Status: Abnormal   Result Value Ref Range    WBC Count 5.6 4.0 - 11.0 10e3/uL    RBC Count 4.61 3.80 - 5.20 10e6/uL    Hemoglobin 11.4 (L) 11.7 - 15.7 g/dL    Hematocrit 37.4 35.0 - 47.0 %    MCV 81 78 - 100 fL    MCH 24.7 (L) 26.5 - 33.0 pg    MCHC 30.5 (L) 31.5 - 36.5 g/dL    RDW 14.8 10.0 - 15.0 %    Platelet Count 245 150 - 450 10e3/uL    % Neutrophils 67 %    % Lymphocytes 19 %    % Monocytes 11 %    % Eosinophils 3 %    % Basophils 1 %    % Immature Granulocytes 0 %    Absolute Neutrophils 3.8 1.6 - 8.3 10e3/uL    Absolute Lymphocytes 1.1 0.8 - 5.3 10e3/uL    Absolute Monocytes 0.6 0.0 - 1.3 10e3/uL    Absolute Eosinophils 0.2 0.0 - 0.7 10e3/uL    Absolute Basophils 0.0 0.0 - 0.2 10e3/uL    Absolute Immature Granulocytes 0.0 <=0.4 10e3/uL   CBC with platelets and differential     Status: Abnormal    Narrative    The following orders were created for panel order CBC with platelets and differential.  Procedure                               Abnormality         Status                     ---------                               -----------         ------                     CBC with platelets and d...[[id number]]  Abnormal            Final result                 Please view results for these tests on the individual orders.

## 2023-12-15 ENCOUNTER — LAB (OUTPATIENT)
Dept: LAB | Facility: CLINIC | Age: 68
End: 2023-12-15
Payer: COMMERCIAL

## 2023-12-15 ENCOUNTER — ANCILLARY PROCEDURE (OUTPATIENT)
Dept: CARDIOLOGY | Facility: CLINIC | Age: 68
End: 2023-12-15
Attending: INTERNAL MEDICINE
Payer: COMMERCIAL

## 2023-12-15 DIAGNOSIS — D62 ANEMIA DUE TO BLOOD LOSS, ACUTE: ICD-10-CM

## 2023-12-15 DIAGNOSIS — Z95.0 PACEMAKER: ICD-10-CM

## 2023-12-15 DIAGNOSIS — I49.5 SICK SINUS SYNDROME (H): ICD-10-CM

## 2023-12-15 LAB
FERRITIN SERPL-MCNC: 38 NG/ML (ref 11–328)
FOLATE SERPL-MCNC: 10.9 NG/ML (ref 4.6–34.8)
IRON BINDING CAPACITY (ROCHE): 321 UG/DL (ref 240–430)
IRON SATN MFR SERPL: 8 % (ref 15–46)
IRON SERPL-MCNC: 25 UG/DL (ref 37–145)
VIT B12 SERPL-MCNC: 499 PG/ML (ref 232–1245)

## 2023-12-15 PROCEDURE — 82746 ASSAY OF FOLIC ACID SERUM: CPT

## 2023-12-15 PROCEDURE — 36415 COLL VENOUS BLD VENIPUNCTURE: CPT

## 2023-12-15 PROCEDURE — 82607 VITAMIN B-12: CPT

## 2023-12-15 PROCEDURE — 83540 ASSAY OF IRON: CPT

## 2023-12-15 PROCEDURE — 82728 ASSAY OF FERRITIN: CPT

## 2023-12-15 PROCEDURE — 83550 IRON BINDING TEST: CPT

## 2023-12-18 ENCOUNTER — VIRTUAL VISIT (OUTPATIENT)
Dept: INFECTIOUS DISEASES | Facility: CLINIC | Age: 68
End: 2023-12-18
Payer: COMMERCIAL

## 2023-12-18 DIAGNOSIS — A49.01 MSSA (METHICILLIN SUSCEPTIBLE STAPHYLOCOCCUS AUREUS) INFECTION: Primary | ICD-10-CM

## 2023-12-18 PROCEDURE — 99212 OFFICE O/P EST SF 10 MIN: CPT | Mod: VID | Performed by: INTERNAL MEDICINE

## 2023-12-18 NOTE — PROGRESS NOTES
Virtual Visit Details    Type of service:  Video Visit   Video Start Time:   Joined the call at 12/18/2023, 4:16:50 pm.    Video End Time: Left the call at 12/18/2023, 4:21:01 pm.  You were on the call for 4 minutes 11 seconds .    Originating Location (pt. Location): Home    Distant Location (provider location):  On-site  Platform used for Video Visit: [employer]    This document was created using a software with less than 100% fidelity, at times resulting in unintended, even erroneous syntax and grammar.  The reader is advised to keep this under consideration while reviewing, interpreting this note.           ASSESSMENT AND PLAN:    68 year old female with recent hospitalization in [facility], for hematoma, fall, rib fracture  Status post hematoma evacuation, cultures with MSSA.  Patient was discharged on IV cefazolin.  Patient transferred care to [place] due to in network/insurance.    MSSA infection, hematoma  Status post rib fracture, open reduction internal fixation with plates, see images below  History of aortic valve replacement in 1995  ICD pacemaker  Patient was seen by infectious disease at [facility] care: Simba Seay,   PICC line, tolerating IV cefazolin with tentative end date of 10/18/2023, 6 weeks from positive cultures in September 2023    Patient completed IV cefazolin until 10/18/2023, followed by oral cephalexin  Has been off antibiotics and doing well  Recent fall, injury    Recommendations   Continue to monitor off antibiotics  Primary care follow-up, dental follow-up regarding fall and injuries  Needs primary care follow-up regarding intervention to prevent recurrent falls and fractures  Follow-up with ID as needed    Elisa Sneed MD  [facility]  Answering Service: [phone]  On-Call ID provider: [phone], option: 9        HISTORY OF PRESENTING ILLNESS:  Tammy Law 68 year old is evaluated here via video/audio link.      Recent fall, had  some dental injury  Chest wall incision site has healed completely, no change in swelling no new pain  Has completed oral antibiotics  Recent labs stable     ROS enquiry held for fever, ocular symptoms, rash, headache,  GI issues.  Today we also discussed the issues related to the current pandemic, the pros and cons of the current treatment plan, the CDC guidelines such as social distancing washing the hands covering the cough.    Fall, blood work with iron level low    ALLERGIES:Demerol [meperidine], Misc. sulfonamide containing compounds, Morphine, and Sulfa antibiotics    PAST MEDICAL/ACTIVE PROBLEMS/MEDICATION/SOCIAL DATA  Past Medical History:   Diagnosis Date    Anxiety     Chronic anticoagulation     Depression     Disease of thyroid gland     Hypothyroidism    High cholesterol     Hyperlipidemia     Hypertension     Hypothyroidism     Pacemaker     biotronik    SSS (sick sinus syndrome) (H) 2/4/2020     History   Smoking Status    Never   Smokeless Tobacco    Never     Patient Active Problem List   Diagnosis    CARDIOVASCULAR SCREENING; LDL GOAL LESS THAN 130    H/O mechanical aortic valve replacement    Acquired hypothyroidism    Cardiac pacemaker in situ    Congenital nystagmus    Epigastric pain    Chest pain, unspecified type    Chronic anticoagulation    Age-related osteoporosis without current pathological fracture    Medication management    Recurrent major depressive disorder, in full remission (H24)    History of colonic polyps    Acute blood loss anemia    Hematemesis    History of pacemaker    Melena     Current Outpatient Medications   Medication Sig Dispense Refill    alendronate (FOSAMAX) 70 MG tablet Take 1 tablet (70 mg) by mouth every 7 days 13 tablet 3    atorvastatin (LIPITOR) 40 MG tablet Take 1 tablet (40 mg) by mouth daily 90 tablet 3    calcium carbonate 500 mg, elemental, (OSCAL 500) 1250 (500 Ca) MG TABS tablet Take 1 tablet by mouth daily Take 1 tab by mouth every am      citalopram  (CELEXA) 20 MG tablet Take 1 tablet (20 mg) by mouth daily 90 tablet 3    furosemide (LASIX) 40 MG tablet Take 1 tablet (40 mg) by mouth daily as needed 90 tablet 1    levothyroxine (SYNTHROID/LEVOTHROID) 100 MCG tablet Take 1 tablet (100 mcg) by mouth daily 90 tablet 3    vitamin D3 (CHOLECALCIFEROL) 50 mcg (2000 units) tablet Take 1 tablet by mouth daily      warfarin ANTICOAGULANT (COUMADIN) 2.5 MG tablet TAKE 3 TABLETS BY MOUTH EVERY  WEDNESDAY AND TAKE 2 TABLETS BY  MOUTH ALL OTHER DAYS OF THE WEEK OR AS DIRECTED BY [facility] TEAM 217 tablet 2         EXAMINATION:    Using the audio and video link as best as possible the constitutional, neck, neurologic, psych, skin, both upper extremities areas/organ system were evaluated during this assessment.  Some of the important findings: Alert, oriented, speech fluent.    HEENT: Slight oozing from teeth has dental guard, recent fall, followed by dental      LAB / IMAGING DATA:  ALT   Date Value Ref Range Status   11/06/2023 41 0 - 50 U/L Final     Comment:     Reference intervals for this test were updated on 6/12/2023 to more accurately reflect our healthy population. There may be differences in the flagging of prior results with similar values performed with this method. Interpretation of those prior results can be made in the context of the updated reference intervals.     10/09/2023 21 0 - 50 U/L Final     Comment:     Reference intervals for this test were updated on 6/12/2023 to more accurately reflect our healthy population. There may be differences in the flagging of prior results with similar values performed with this method. Interpretation of those prior results can be made in the context of the updated reference intervals.     10/02/2023 14 0 - 50 U/L Final     Comment:     Reference intervals for this test were updated on 6/12/2023 to more accurately reflect our healthy population. There may be differences in the flagging of prior results with similar values  performed with this method. Interpretation of those prior results can be made in the context of the updated reference intervals.       Albumin   Date Value Ref Range Status   11/06/2023 4.0 3.5 - 5.2 g/dL Final   10/09/2023 3.6 3.5 - 5.2 g/dL Final   10/02/2023 3.4 (L) 3.5 - 5.2 g/dL Final   01/12/2022 3.8 3.4 - 5.0 g/dL Final   01/11/2022 4.0 3.4 - 5.0 g/dL Final   01/25/2021 4.1 3.5 - 5.0 g/dL Final       WBC Count   Date Value Ref Range Status   12/13/2023 5.6 4.0 - 11.0 10e3/uL Final   11/06/2023 7.6 4.0 - 11.0 10e3/uL Final     Hemoglobin   Date Value Ref Range Status   12/13/2023 11.4 (L) 11.7 - 15.7 g/dL Final   11/06/2023 11.3 (L) 11.7 - 15.7 g/dL Final   10/09/2023 9.5 (L) 11.7 - 15.7 g/dL Final     Platelet Count   Date Value Ref Range Status   12/13/2023 245 150 - 450 10e3/uL Final   11/06/2023 310 150 - 450 10e3/uL Final   10/09/2023 274 150 - 450 10e3/uL Final       Cardiac Device Check - Remote (Standing ORD 5 count)    Result Date: 12/15/2023  Encounter Type: Routine remote pacemaker transmission. Device: Biotronik Eluna. Pacing % /Programmed: AP 88%,  17% at DDDR 60/130 ppm. Lead(s): Stable. Battery longevity: Middle of service. 50% remaining. Presenting: AP-VS 65 bpm. Atrial high rates: since 9/16/23; None detected. Anticoagulant: Coumadin. Ventricular High rates: since 9/16/23; None detected. Comments: Normal device function. Plan: Next remote check scheduled for 3/15/24. ETania Pivec, Device Specialist  Device follow up for the entirety of having the Pacemaker, based on best practices determined by [employer] and in Compliance with Medicare guidelines. Continue remote device monitoring per patient plan. I have reviewed and interpreted the device interrogation, settings, programming, and encounter summary. The device is functioning within normal device parameters. I agree with the current findings, assessment and plan.    XR Ribs & Chest Left G/E 3 Views    Result Date: 12/14/2023  EXAM: XR  RIBS AND CHEST LEFT 3 VIEWS LOCATION: [facility] DATE: 12/13/2023 INDICATION:  Fall, initial encounter, Rib pain on left side. COMPARISON: 11/06/2023.     IMPRESSION: Plate and screw fixation of left sixth through ninth ribs. Additional fracture of the 10th rib is age-indeterminate. Stable scarring and pleural calcification left base. Lungs are otherwise clear. No pneumothorax. Cardiac pacemaker and sternotomy wires are stable. No rib fractures.        XR Hand Left G/E 3 Views    Result Date: 12/13/2023  EXAM: XR HAND LEFT G/E 3 VIEWS LOCATION: [facility] DATE: 12/13/2023 INDICATION:  Fall, initial encounter, Pain of left hand COMPARISON: None.     IMPRESSION: Degenerative change at the STT joint and first CMC joint. Degenerative change at the first MCP joint. Degenerative change at multiple IP joints of the left hand. No evidence for fracture or dislocation.

## 2023-12-19 ENCOUNTER — HOSPITAL ENCOUNTER (OUTPATIENT)
Dept: CT IMAGING | Facility: HOSPITAL | Age: 68
Discharge: HOME | End: 2023-12-19
Admitting: FAMILY MEDICINE
Payer: COMMERCIAL

## 2023-12-19 DIAGNOSIS — Z79.01 CHRONIC ANTICOAGULATION: ICD-10-CM

## 2023-12-19 DIAGNOSIS — R26.89 BALANCE PROBLEMS: ICD-10-CM

## 2023-12-19 LAB
MDC_IDC_EPISODE_DTM: NORMAL
MDC_IDC_EPISODE_DURATION: 2 S
MDC_IDC_EPISODE_ID: 28
MDC_IDC_EPISODE_TYPE: NORMAL
MDC_IDC_EPISODE_TYPE_INDUCED: NO
MDC_IDC_LEAD_CONNECTION_STATUS: NORMAL
MDC_IDC_LEAD_CONNECTION_STATUS: NORMAL
MDC_IDC_LEAD_IMPLANT_DT: NORMAL
MDC_IDC_LEAD_IMPLANT_DT: NORMAL
MDC_IDC_LEAD_LOCATION: NORMAL
MDC_IDC_LEAD_LOCATION: NORMAL
MDC_IDC_LEAD_LOCATION_DETAIL_1: NORMAL
MDC_IDC_LEAD_LOCATION_DETAIL_1: NORMAL
MDC_IDC_LEAD_MFG: NORMAL
MDC_IDC_LEAD_MFG: NORMAL
MDC_IDC_LEAD_MODEL: NORMAL
MDC_IDC_LEAD_MODEL: NORMAL
MDC_IDC_LEAD_POLARITY_TYPE: NORMAL
MDC_IDC_LEAD_POLARITY_TYPE: NORMAL
MDC_IDC_LEAD_SERIAL: NORMAL
MDC_IDC_LEAD_SERIAL: NORMAL
MDC_IDC_LEAD_SPECIAL_FUNCTION: NORMAL
MDC_IDC_LEAD_SPECIAL_FUNCTION: NORMAL
MDC_IDC_MSMT_BATTERY_DTM: NORMAL
MDC_IDC_MSMT_BATTERY_REMAINING_PERCENTAGE: 50 %
MDC_IDC_MSMT_BATTERY_STATUS: NORMAL
MDC_IDC_MSMT_LEADCHNL_RA_IMPEDANCE_VALUE: 624 OHM
MDC_IDC_MSMT_LEADCHNL_RA_LEAD_CHANNEL_STATUS: NORMAL
MDC_IDC_MSMT_LEADCHNL_RV_IMPEDANCE_VALUE: 527 OHM
MDC_IDC_MSMT_LEADCHNL_RV_LEAD_CHANNEL_STATUS: NORMAL
MDC_IDC_PG_IMPLANT_DTM: NORMAL
MDC_IDC_PG_MFG: NORMAL
MDC_IDC_PG_MODEL: NORMAL
MDC_IDC_PG_SERIAL: NORMAL
MDC_IDC_PG_TYPE: NORMAL
MDC_IDC_SESS_CLINIC_NAME: NORMAL
MDC_IDC_SESS_DTM: NORMAL
MDC_IDC_SESS_REPROGRAMMED: NO
MDC_IDC_SESS_TYPE: NORMAL
MDC_IDC_SET_BRADY_AT_MODE_SWITCH_MODE: NORMAL
MDC_IDC_SET_BRADY_AT_MODE_SWITCH_RATE: 160 {BEATS}/MIN
MDC_IDC_SET_BRADY_LOWRATE: 60 {BEATS}/MIN
MDC_IDC_SET_BRADY_MAX_SENSOR_RATE: 120 {BEATS}/MIN
MDC_IDC_SET_BRADY_MAX_TRACKING_RATE: 130 {BEATS}/MIN
MDC_IDC_SET_BRADY_MODE: NORMAL
MDC_IDC_SET_BRADY_PAV_DELAY_LOW: 200 MS
MDC_IDC_SET_BRADY_SAV_DELAY_LOW: 180 MS
MDC_IDC_SET_LEADCHNL_RA_PACING_AMPLITUDE: 1.6 V
MDC_IDC_SET_LEADCHNL_RA_PACING_POLARITY: NORMAL
MDC_IDC_SET_LEADCHNL_RA_PACING_PULSEWIDTH: 0.4 MS
MDC_IDC_SET_LEADCHNL_RA_SENSING_ADAPTATION_MODE: NORMAL
MDC_IDC_SET_LEADCHNL_RA_SENSING_POLARITY: NORMAL
MDC_IDC_SET_LEADCHNL_RV_PACING_AMPLITUDE: 2.4 V
MDC_IDC_SET_LEADCHNL_RV_PACING_POLARITY: NORMAL
MDC_IDC_SET_LEADCHNL_RV_PACING_PULSEWIDTH: 0.4 MS
MDC_IDC_SET_LEADCHNL_RV_SENSING_ADAPTATION_MODE: NORMAL
MDC_IDC_SET_LEADCHNL_RV_SENSING_POLARITY: NORMAL
MDC_IDC_STAT_AT_BURDEN_PERCENT: 0 %
MDC_IDC_STAT_AT_DTM_END: NORMAL
MDC_IDC_STAT_AT_DTM_START: NORMAL
MDC_IDC_STAT_AT_MODE_SW_COUNT_PER_DAY: 0
MDC_IDC_STAT_AT_MODE_SW_PERCENT_TIME_PER_DAY: 0 %
MDC_IDC_STAT_BRADY_AP_VP_PERCENT: 17 %
MDC_IDC_STAT_BRADY_AP_VS_PERCENT: 71 %
MDC_IDC_STAT_BRADY_AS_VP_PERCENT: 0 %
MDC_IDC_STAT_BRADY_AS_VS_PERCENT: 12 %
MDC_IDC_STAT_BRADY_DTM_END: NORMAL
MDC_IDC_STAT_BRADY_DTM_START: NORMAL
MDC_IDC_STAT_BRADY_RA_PERCENT_PACED: 88 %
MDC_IDC_STAT_BRADY_RV_PERCENT_PACED: 17 %
MDC_IDC_STAT_HEART_RATE_ATRIAL_MEAN: 70 {BEATS}/MIN
MDC_IDC_STAT_HEART_RATE_DTM_END: NORMAL
MDC_IDC_STAT_HEART_RATE_DTM_START: NORMAL
MDC_IDC_STAT_HEART_RATE_VENTRICULAR_MEAN: 70 {BEATS}/MIN

## 2023-12-19 PROCEDURE — 70450 CT HEAD/BRAIN W/O DYE: CPT

## 2023-12-19 PROCEDURE — 93294 REM INTERROG EVL PM/LDLS PM: CPT | Performed by: INTERNAL MEDICINE

## 2023-12-19 PROCEDURE — 93296 REM INTERROG EVL PM/IDS: CPT | Performed by: INTERNAL MEDICINE

## 2023-12-19 NOTE — PATIENT INSTRUCTIONS
Raffy Mathews, thank you for taking the time to see yesterday via virtual visit  Continue to monitor off antibiotics, please call with questions  Follow-up with primary regarding fall precautions, recurrent falls

## 2023-12-26 ENCOUNTER — ANTICOAGULATION THERAPY VISIT (OUTPATIENT)
Dept: ANTICOAGULATION | Facility: CLINIC | Age: 68
End: 2023-12-26
Payer: COMMERCIAL

## 2023-12-26 DIAGNOSIS — Z95.2 H/O MECHANICAL AORTIC VALVE REPLACEMENT: Primary | ICD-10-CM

## 2023-12-26 LAB — INR HOME MONITORING: 2.1 (ref 2–3)

## 2023-12-26 NOTE — PROGRESS NOTES
ANTICOAGULATION MANAGEMENT     Tammy Law 68 year old female is on warfarin with subtherapeutic INR result. (Goal INR 2.5-3.5)    Recent labs: (last 7 days)     12/26/23  0000   INR 2.1       ASSESSMENT     Source(s): Chart Review and Patient/Caregiver Call     Warfarin doses taken: Warfarin taken as instructed. She did possibly miss one dose but it was >7 days ago.   Diet: No new diet changes identified  Medication/supplement changes: None noted  New illness, injury, or hospitalization: No  Signs or symptoms of bleeding or clotting: No  Previous result: Subtherapeutic  Additional findings: None       PLAN     Recommended plan for no diet, medication or health factor changes affecting INR     Dosing Instructions: Increase your warfarin dose (8% change) with next INR in 2 weeks       Summary  As of 12/26/2023      Full warfarin instructions:  2.5 mg every Fri; 5 mg all other days   Next INR check:  1/9/2024               Telephone call with Bisi who verbalizes understanding and agrees to plan    Patient to recheck with home meter    Education provided:   Goal range and lab monitoring: goal range and significance of current result  Contact [phone]  with any changes, questions or concerns.     Plan made per [facility] anticoagulation protocol    Lisa Mayer RN  Anticoagulation Clinic  12/26/2023    _______________________________________________________________________     Anticoagulation Episode Summary       Current INR goal:  2.5-3.5   TTR:  71.2% (1 y)   Target end date:  Indefinite   Send INR reminders to:  [facility]IT[facility]    Indications    H/O mechanical aortic valve replacement [Z95.2]             Comments:  [employer] home monitor- managed by exception  Goal range changed during 8/28-9/15/23  hospitalization             Anticoagulation Care Providers       Provider Role Specialty Phone number    Arnold Anderson MD Referring Internal Medicine [phone]    Christina Hernandez MD  Referring Family Medicine [phone]    Karina Cifuentes MD Referring Family Medicine [phone]

## 2024-01-08 LAB — INR (EXTERNAL): 2.3 (ref 0.9–1.1)

## 2024-01-09 LAB — INR HOME MONITORING: 2.3 (ref 2–3)

## 2024-01-10 ENCOUNTER — ANTICOAGULATION THERAPY VISIT (OUTPATIENT)
Dept: ANTICOAGULATION | Facility: CLINIC | Age: 69
End: 2024-01-10
Payer: COMMERCIAL

## 2024-01-10 DIAGNOSIS — Z95.2 H/O MECHANICAL AORTIC VALVE REPLACEMENT: Primary | ICD-10-CM

## 2024-01-10 NOTE — PROGRESS NOTES
ANTICOAGULATION MANAGEMENT     Tammy Law 68 year old female is on warfarin with subtherapeutic INR result. (Goal INR 2.5-3.5)    Recent labs: (last 7 days)     01/08/24  0800   INR 2.3*       ASSESSMENT     Source(s): Chart Review and Patient/Caregiver Call     Warfarin doses taken: Warfarin taken as instructed  Diet: No new diet changes identified  Medication/supplement changes: None noted  New illness, injury, or hospitalization: No  Signs or symptoms of bleeding or clotting: No  Previous result: Subtherapeutic  Additional findings:  on vacation in [place] until 1/16 - will retest on 1/17 with home meter       PLAN     Recommended plan for no diet, medication or health factor changes affecting INR     Dosing Instructions: Increase your warfarin dose (7.7% change) with next INR in 1 week       Summary  As of 1/10/2024      Full warfarin instructions:  5 mg every day   Next INR check:  1/16/2024               Telephone call with Bisi who verbalizes understanding and agrees to plan and who agrees to plan and repeated back plan correctly    Patient to recheck with home meter    Education provided:   Contact [phone]  with any changes, questions or concerns.   Interaction with alcohol/vitamin K intake while on vacation    Plan made per [facility] anticoagulation protocol    Shea Meadows RN  Anticoagulation Clinic  1/10/2024    _______________________________________________________________________     Anticoagulation Episode Summary       Current INR goal:  2.5-3.5   TTR:  68.6% (1 y)   Target end date:  Indefinite   Send INR reminders to:  ANTICOAG HOME MONITORING    Indications    H/O mechanical aortic valve replacement [Z95.2]             Comments:  [employer] home monitor- managed by exception  Goal range changed during 8/28-9/15/23  hospitalization             Anticoagulation Care Providers       Provider Role Specialty Phone number    Arnold Anderson MD Referring Internal Medicine [phone]    Mary  Christina CANO MD Referring Family Medicine [phone]    Karina Cifuentes MD Referring Family Medicine [phone]

## 2024-01-17 ENCOUNTER — ANTICOAGULATION THERAPY VISIT (OUTPATIENT)
Dept: ANTICOAGULATION | Facility: CLINIC | Age: 69
End: 2024-01-17
Payer: COMMERCIAL

## 2024-01-17 ENCOUNTER — TELEPHONE (OUTPATIENT)
Dept: ANTICOAGULATION | Facility: CLINIC | Age: 69
End: 2024-01-17
Payer: COMMERCIAL

## 2024-01-17 DIAGNOSIS — Z95.2 H/O MECHANICAL AORTIC VALVE REPLACEMENT: Primary | ICD-10-CM

## 2024-01-17 DIAGNOSIS — Z95.2 H/O AORTIC VALVE REPLACEMENT: ICD-10-CM

## 2024-01-17 LAB — INR HOME MONITORING: 2.7 (ref 2–3)

## 2024-01-17 NOTE — TELEPHONE ENCOUNTER
Upcoming procedure: colonoscopy scheduled at [facility] on 2/29/24.    Routing to [facility] for review and recommendations. Vernell Hoang, STACEYN, RN

## 2024-01-17 NOTE — PROGRESS NOTES
ANTICOAGULATION MANAGEMENT     Tammy Law 68 year old female is on warfarin with therapeutic INR result. (Goal INR 2.5-3.5)    Recent labs: (last 7 days)     01/17/24  0000   INR 2.7       ASSESSMENT     Source(s): Chart Review and Patient/Caregiver Call     Warfarin doses taken: Warfarin taken as instructed + MD previously increased by 7.7%  Diet: No new diet changes identified  Medication/supplement changes: None noted  New illness, injury, or hospitalization: No  Signs or symptoms of bleeding or clotting: No  Previous result: Subtherapeutic  Additional findings: Upcoming surgery/procedure Colonoscopy scheduled for 2/29/24 - TE sent to [facility] for review       PLAN     Recommended plan for no diet, medication or health factor changes affecting INR     Dosing Instructions: Continue your current warfarin dose with next INR in 2 weeks       Summary  As of 1/17/2024      Full warfarin instructions:  5 mg every day   Next INR check:  1/31/2024               Telephone call with Bisi who verbalizes understanding and agrees to plan    Lab visit scheduled    Education provided:   Please call back if any changes to your diet, medications or how you've been taking warfarin  Symptom monitoring: monitoring for bleeding signs and symptoms and monitoring for clotting signs and symptoms    Plan made per [facility] anticoagulation protocol    Vernell Hoang RN  Anticoagulation Clinic  1/17/2024    _______________________________________________________________________     Anticoagulation Episode Summary       Current INR goal:  2.5-3.5   TTR:  69.5% (1 y)   Target end date:  Indefinite   Send INR reminders to:  ANTICOAG HOME MONITORING    Indications    H/O mechanical aortic valve replacement [Z95.2]             Comments:  [employer] home monitor- managed by exception  Goal range changed during 8/28-9/15/23  hospitalization             Anticoagulation Care Providers       Provider Role Specialty Phone number    Arnold Anderson  MD AJ Referring Internal Medicine [phone]    Christina Hernandez MD Referring Family Medicine [phone]    Karina Cifuentes MD Referring Family Medicine [phone]

## 2024-01-19 DIAGNOSIS — E78.00 HYPERCHOLESTEROLEMIA: ICD-10-CM

## 2024-01-22 RX ORDER — ATORVASTATIN CALCIUM 40 MG/1
40 TABLET, FILM COATED ORAL DAILY
Qty: 60 TABLET | Refills: 5 | OUTPATIENT
Start: 2024-01-22

## 2024-01-27 DIAGNOSIS — F33.42 RECURRENT MAJOR DEPRESSIVE DISORDER, IN FULL REMISSION (H): ICD-10-CM

## 2024-01-29 RX ORDER — CITALOPRAM HYDROBROMIDE 20 MG/1
20 TABLET ORAL DAILY
Qty: 90 TABLET | Refills: 3 | OUTPATIENT
Start: 2024-01-29

## 2024-01-29 NOTE — TELEPHONE ENCOUNTER
Incoming fax from [facility] requesting 4-day hold for upcoming colonoscopy on 2/29/24.     Александр White RN

## 2024-01-30 ENCOUNTER — ANTICOAGULATION THERAPY VISIT (OUTPATIENT)
Dept: ANTICOAGULATION | Facility: CLINIC | Age: 69
End: 2024-01-30
Payer: COMMERCIAL

## 2024-01-30 ENCOUNTER — DOCUMENTATION ONLY (OUTPATIENT)
Dept: ANTICOAGULATION | Facility: CLINIC | Age: 69
End: 2024-01-30
Payer: COMMERCIAL

## 2024-01-30 DIAGNOSIS — Z95.2 H/O MECHANICAL AORTIC VALVE REPLACEMENT: Primary | ICD-10-CM

## 2024-01-30 LAB — INR HOME MONITORING: 2.4 (ref 2–3)

## 2024-01-30 NOTE — PROGRESS NOTES
ANTICOAGULATION MANAGEMENT     Tammy Law 68 year old female is on warfarin with subtherapeutic INR result. (Goal INR 2.5-3.5)    Recent labs: (last 7 days)     01/30/24  0000   INR 2.4       ASSESSMENT     Source(s): Chart Review and Patient/Caregiver Call     Warfarin doses taken: Warfarin taken as instructed  Diet: No new diet changes identified  Medication/supplement changes: None noted  New illness, injury, or hospitalization: No  Signs or symptoms of bleeding or clotting: No  Previous result: Therapeutic last visit; previously outside of goal range  Additional findings: Upcoming surgery/procedure Colonoscopy 2/29/24. Plan request sent to [facility] on 1/17/24       PLAN     Recommended plan for no diet, medication or health factor changes affecting INR     Dosing Instructions: Continue your current warfarin dose with next INR in 2 weeks       Summary  As of 1/30/2024      Full warfarin instructions:  5 mg every day   Next INR check:  2/13/2024               Telephone call with Bisi who agrees to plan and repeated back plan correctly    Patient to recheck with home meter    Education provided:   Please call back if any changes to your diet, medications or how you've been taking warfarin    Plan made per [facility] anticoagulation protocol    Petrona Phan, RN  Anticoagulation Clinic  1/30/2024    _______________________________________________________________________     Anticoagulation Episode Summary       Current INR goal:  2.5-3.5   TTR:  71.8% (1 y)   Target end date:  Indefinite   Send INR reminders to:  ANTICOAG HOME MONITORING    Indications    H/O mechanical aortic valve replacement [Z95.2]             Comments:  [employer] home monitor- managed by exception  Goal range changed during 8/28-9/15/23  hospitalization             Anticoagulation Care Providers       Provider Role Specialty Phone number    Arnold Anderson MD Referring Internal Medicine [phone]    Christina Hernandez MD Referring  Family Medicine [phone]    Karina Cifuentes MD Referring Family Medicine [phone]

## 2024-02-07 NOTE — TELEPHONE ENCOUNTER
JOSSELIN-PROCEDURAL ANTICOAGULATION  MANAGEMENT    ASSESSMENT     Warfarin interruption plan for colonoscopy on 2024.    Indication for Anticoagulation: Mechanical AVR    23mm St Daron valve placed 1995  Tolerated hold with no bridge for 2022 colonoscopy   During -09/15/2023 admission [facility] noted new onset Afib and increased INR goal range to 2.5-3.5    Josselin-Procedure Risk stratification for thromboembolism: low ( Chest guidelines)    AVR:  AHA/ACC Management of Valvular Heart disease guidelines recommend no bridge in bileaflet mechanical AVR patients with NO other risk factors for thrombosis (Afib, previous thromboembolism, hypercoagulable condition, LV systolic dysfunction, older generation valves, or > 1 valve)  AVR/MVR:  Chest Perioperative Management guideline suggests no bridging for mechanical heart valves except select patients at high thromboembolic risk such as with an older-generation mechanical heart valve, MVR with one more more risk factors for thromboembolism, a recent (within 3 months) thromboembolic event, or with prior perioperative stroke    RECOMMENDATION     Pre-Procedure:  Hold warfarin for 4 days, until after procedure startin2024   No Bridge    Post-Procedure:  Resume warfarin dose if okay with provider doing procedure on night of procedure, 2024 PM: 10mg  Recheck INR ~ 7 days after resuming warfarin     Plan routed to referring provider for approval  ?   Karlene Sheriff [facility]    SUBJECTIVE/OBJECTIVE     Tammy Law, a 68 year old female    Goal INR Range: 2.5-3.5     Patient bridged in past: Yes: last 2023 with rib surgery      Wt Readings from Last 3 Encounters:   23 60.8 kg (134 lb)   23 63.1 kg (139 lb 3.2 oz)   10/12/23 64.4 kg (142 lb)      Ideal body weight: 54.7 kg (120 lb 9.5 oz)  Adjusted ideal body weight: 57.1 kg (125 lb 15.3 oz)     Estimated body mass index is 23 kg/m  as calculated from the following:     "Height as of 12/13/23: 1.626 m (5' 4\").    Weight as of 12/13/23: 60.8 kg (134 lb).    Lab Results   Component Value Date    INR 2.4 01/30/2024    INR 2.7 01/17/2024    INR 2.3 (A) 01/08/2024     Lab Results   Component Value Date    HGB 11.4 (L) 12/13/2023    HCT 37.4 12/13/2023     12/13/2023     Lab Results   Component Value Date    CR 0.79 11/06/2023    CR 0.55 10/09/2023    CR 0.58 10/02/2023     Estimated Creatinine Clearance: 66.3 mL/min (based on SCr of 0.79 mg/dL).    "

## 2024-02-08 DIAGNOSIS — E78.00 HYPERCHOLESTEROLEMIA: ICD-10-CM

## 2024-02-08 DIAGNOSIS — F33.42 RECURRENT MAJOR DEPRESSIVE DISORDER, IN FULL REMISSION (H): ICD-10-CM

## 2024-02-09 RX ORDER — ATORVASTATIN CALCIUM 40 MG/1
40 TABLET, FILM COATED ORAL DAILY
Qty: 60 TABLET | Refills: 5 | OUTPATIENT
Start: 2024-02-09

## 2024-02-09 RX ORDER — CITALOPRAM HYDROBROMIDE 20 MG/1
20 TABLET ORAL DAILY
Qty: 90 TABLET | Refills: 3 | OUTPATIENT
Start: 2024-02-09

## 2024-02-12 ENCOUNTER — TRANSFERRED RECORDS (OUTPATIENT)
Dept: HEALTH INFORMATION MANAGEMENT | Facility: CLINIC | Age: 69
End: 2024-02-12

## 2024-02-12 LAB — PHQ9 SCORE: 9

## 2024-02-13 ENCOUNTER — TELEPHONE (OUTPATIENT)
Dept: ANTICOAGULATION | Facility: CLINIC | Age: 69
End: 2024-02-13
Payer: COMMERCIAL

## 2024-02-13 ENCOUNTER — ANTICOAGULATION THERAPY VISIT (OUTPATIENT)
Dept: ANTICOAGULATION | Facility: CLINIC | Age: 69
End: 2024-02-13
Payer: COMMERCIAL

## 2024-02-13 DIAGNOSIS — Z95.2 H/O MECHANICAL AORTIC VALVE REPLACEMENT: Primary | ICD-10-CM

## 2024-02-13 LAB — INR HOME MONITORING: 2.4 (ref 2–3)

## 2024-02-13 NOTE — TELEPHONE ENCOUNTER
Providers to review if the patient is to stay at the 2.0-3.0 goal range. Patient was changed to a 2.5-3.5 range as follows: **9/15/23** New goal range 2.5-3.5 due to A-FIB during hospitalization.    There is no documentation of verification of this change through an [facility] provider.     Teed up a referral with a 2.5-3.5 goal range below. Only to be signed if the change is wanted.    Sending to PCP and cardiology to review.    Jacqueline Juárez RN    [facility] Anticoagulation Clinic

## 2024-02-13 NOTE — TELEPHONE ENCOUNTER
Calendar updated to the information above for range.    Jacqueline Juárez RN    [facility] Anticoagulation [facility]

## 2024-02-13 NOTE — PROGRESS NOTES
ANTICOAGULATION MANAGEMENT     Tammy aLw 68 year old female is on warfarin with subtherapeutic INR result. (Goal INR 2.5-3.5)    Recent labs: (last 7 days)     02/13/24  0000   INR 2.4       ASSESSMENT     Source(s): Chart Review and Patient/Caregiver Call     Warfarin doses taken: Warfarin taken as instructed  Diet: No new diet changes identified  Medication/supplement changes: None noted  New illness, injury, or hospitalization: No  Signs or symptoms of bleeding or clotting: No  Previous result: Subtherapeutic  Additional findings:  Colonoscopy scheduled for 2/29/24 - reviewed with patient and understanding.     PLAN     Recommended plan for no diet, medication or health factor changes affecting INR     Dosing Instructions: Increase your warfarin dose (7.1% change) with next INR in 1 week       Summary  As of 2/13/2024      Full warfarin instructions:  2/25: Hold; 2/26: Hold; 2/27: Hold; 2/28: Hold; 2/29: 10 mg; Otherwise 7.5 mg every Tue; 5 mg all other days   Next INR check:  2/20/2024               Telephone call with Bisi who verbalizes understanding and agrees to plan    Patient to recheck with home meter    Education provided:   None required  Reviewed upcoming procedure plan      Plan made with [facility] Pharmacist Karlene Juárez, RN  Anticoagulation Clinic  2/13/2024    _______________________________________________________________________     Anticoagulation Episode Summary       Current INR goal:  2.5-3.5   TTR:  71.3% (1 y)   Target end date:  Indefinite   Send INR reminders to:  ANTICOAG HOME MONITORING    Indications    H/O mechanical aortic valve replacement [Z95.2]             Comments:  [employer] home monitor- managed by exception  Goal range changed during 8/28-9/15/23  hospitalization             Anticoagulation Care Providers       Provider Role Specialty Phone number    Arnold Anderson MD Referring Internal Medicine [phone]    Christina Hernandez MD  Referring Family Medicine [phone]    Karina Cifuentes MD Referring Family Medicine [phone]

## 2024-02-13 NOTE — PROGRESS NOTES
Pre-Procedure:  Hold warfarin for 4 days, until after procedure startin2024   No Bridge     Post-Procedure:  Resume warfarin dose if okay with provider doing procedure on night of procedure, 2024 PM: 10mg  Recheck INR ~ 7 days after resuming warfarin

## 2024-02-14 ENCOUNTER — DOCUMENTATION ONLY (OUTPATIENT)
Dept: ANTICOAGULATION | Facility: CLINIC | Age: 69
End: 2024-02-14

## 2024-02-14 ENCOUNTER — OFFICE VISIT (OUTPATIENT)
Dept: FAMILY MEDICINE | Facility: CLINIC | Age: 69
End: 2024-02-14
Payer: COMMERCIAL

## 2024-02-14 ENCOUNTER — ANCILLARY PROCEDURE (OUTPATIENT)
Dept: GENERAL RADIOLOGY | Facility: CLINIC | Age: 69
End: 2024-02-14
Payer: COMMERCIAL

## 2024-02-14 VITALS
SYSTOLIC BLOOD PRESSURE: 109 MMHG | RESPIRATION RATE: 18 BRPM | OXYGEN SATURATION: 98 % | HEART RATE: 79 BPM | HEIGHT: 64 IN | WEIGHT: 134.7 LBS | BODY MASS INDEX: 23 KG/M2 | DIASTOLIC BLOOD PRESSURE: 70 MMHG | TEMPERATURE: 97.4 F

## 2024-02-14 DIAGNOSIS — R73.01 IMPAIRED FASTING GLUCOSE: ICD-10-CM

## 2024-02-14 DIAGNOSIS — Z29.11 NEED FOR VACCINATION AGAINST RESPIRATORY SYNCYTIAL VIRUS: ICD-10-CM

## 2024-02-14 DIAGNOSIS — S27.0XXD TRAUMATIC PNEUMOTHORAX, SUBSEQUENT ENCOUNTER: ICD-10-CM

## 2024-02-14 DIAGNOSIS — D62 ACUTE BLOOD LOSS ANEMIA: ICD-10-CM

## 2024-02-14 DIAGNOSIS — I49.5 SICK SINUS SYNDROME (H): ICD-10-CM

## 2024-02-14 DIAGNOSIS — E78.00 HYPERCHOLESTEROLEMIA: ICD-10-CM

## 2024-02-14 DIAGNOSIS — E03.9 ACQUIRED HYPOTHYROIDISM: ICD-10-CM

## 2024-02-14 DIAGNOSIS — R73.03 PREDIABETES: ICD-10-CM

## 2024-02-14 DIAGNOSIS — R07.89 OTHER CHEST PAIN: Primary | ICD-10-CM

## 2024-02-14 DIAGNOSIS — Z95.2 H/O AORTIC VALVE REPLACEMENT: ICD-10-CM

## 2024-02-14 DIAGNOSIS — Z79.01 CHRONIC ANTICOAGULATION: ICD-10-CM

## 2024-02-14 DIAGNOSIS — R94.6 THYROID FUNCTION TEST ABNORMAL: ICD-10-CM

## 2024-02-14 DIAGNOSIS — Z95.2 H/O MECHANICAL AORTIC VALVE REPLACEMENT: Primary | ICD-10-CM

## 2024-02-14 DIAGNOSIS — Z12.11 SCREEN FOR COLON CANCER: ICD-10-CM

## 2024-02-14 DIAGNOSIS — Z13.1 SCREENING FOR DIABETES MELLITUS: ICD-10-CM

## 2024-02-14 DIAGNOSIS — F33.42 RECURRENT MAJOR DEPRESSIVE DISORDER, IN FULL REMISSION (H): ICD-10-CM

## 2024-02-14 DIAGNOSIS — R07.89 OTHER CHEST PAIN: ICD-10-CM

## 2024-02-14 LAB
ALBUMIN SERPL BCG-MCNC: 4.2 G/DL (ref 3.5–5.2)
ALP SERPL-CCNC: 112 U/L (ref 40–150)
ALT SERPL W P-5'-P-CCNC: 29 U/L (ref 0–50)
ANION GAP SERPL CALCULATED.3IONS-SCNC: 9 MMOL/L (ref 7–15)
AST SERPL W P-5'-P-CCNC: 35 U/L (ref 0–45)
BILIRUB SERPL-MCNC: 0.5 MG/DL
BUN SERPL-MCNC: 15 MG/DL (ref 8–23)
CALCIUM SERPL-MCNC: 9.8 MG/DL (ref 8.8–10.2)
CHLORIDE SERPL-SCNC: 100 MMOL/L (ref 98–107)
CHOLEST SERPL-MCNC: 152 MG/DL
CREAT SERPL-MCNC: 0.8 MG/DL (ref 0.51–0.95)
DEPRECATED HCO3 PLAS-SCNC: 29 MMOL/L (ref 22–29)
EGFRCR SERPLBLD CKD-EPI 2021: 80 ML/MIN/1.73M2
ERYTHROCYTE [DISTWIDTH] IN BLOOD BY AUTOMATED COUNT: 15.8 % (ref 10–15)
FASTING STATUS PATIENT QL REPORTED: NORMAL
GLUCOSE SERPL-MCNC: 102 MG/DL (ref 70–99)
HCT VFR BLD AUTO: 41.6 % (ref 35–47)
HDLC SERPL-MCNC: 67 MG/DL
HGB BLD-MCNC: 13.3 G/DL (ref 11.7–15.7)
IRON BINDING CAPACITY (ROCHE): 320 UG/DL (ref 240–430)
IRON SATN MFR SERPL: 13 % (ref 15–46)
IRON SERPL-MCNC: 40 UG/DL (ref 37–145)
LDLC SERPL CALC-MCNC: 70 MG/DL
MCH RBC QN AUTO: 26.1 PG (ref 26.5–33)
MCHC RBC AUTO-ENTMCNC: 32 G/DL (ref 31.5–36.5)
MCV RBC AUTO: 82 FL (ref 78–100)
NONHDLC SERPL-MCNC: 85 MG/DL
PLATELET # BLD AUTO: 268 10E3/UL (ref 150–450)
POTASSIUM SERPL-SCNC: 4.4 MMOL/L (ref 3.4–5.3)
PROT SERPL-MCNC: 7.4 G/DL (ref 6.4–8.3)
RBC # BLD AUTO: 5.09 10E6/UL (ref 3.8–5.2)
SODIUM SERPL-SCNC: 138 MMOL/L (ref 135–145)
T4 FREE SERPL-MCNC: 1.74 NG/DL (ref 0.9–1.7)
TRIGL SERPL-MCNC: 76 MG/DL
TSH SERPL DL<=0.005 MIU/L-ACNC: 0.19 UIU/ML (ref 0.3–4.2)
WBC # BLD AUTO: 6.5 10E3/UL (ref 4–11)

## 2024-02-14 PROCEDURE — 85027 COMPLETE CBC AUTOMATED: CPT | Performed by: FAMILY MEDICINE

## 2024-02-14 PROCEDURE — 99214 OFFICE O/P EST MOD 30 MIN: CPT | Performed by: FAMILY MEDICINE

## 2024-02-14 PROCEDURE — 84443 ASSAY THYROID STIM HORMONE: CPT | Performed by: FAMILY MEDICINE

## 2024-02-14 PROCEDURE — 80061 LIPID PANEL: CPT | Performed by: FAMILY MEDICINE

## 2024-02-14 PROCEDURE — 83540 ASSAY OF IRON: CPT | Performed by: FAMILY MEDICINE

## 2024-02-14 PROCEDURE — 83036 HEMOGLOBIN GLYCOSYLATED A1C: CPT | Performed by: FAMILY MEDICINE

## 2024-02-14 PROCEDURE — 36415 COLL VENOUS BLD VENIPUNCTURE: CPT | Performed by: FAMILY MEDICINE

## 2024-02-14 PROCEDURE — 83550 IRON BINDING TEST: CPT | Performed by: FAMILY MEDICINE

## 2024-02-14 PROCEDURE — 71101 X-RAY EXAM UNILAT RIBS/CHEST: CPT | Mod: TC | Performed by: RADIOLOGY

## 2024-02-14 PROCEDURE — 84439 ASSAY OF FREE THYROXINE: CPT | Performed by: FAMILY MEDICINE

## 2024-02-14 PROCEDURE — 80053 COMPREHEN METABOLIC PANEL: CPT | Performed by: FAMILY MEDICINE

## 2024-02-14 RX ORDER — RESPIRATORY SYNCYTIAL VIRUS VACCINE 120MCG/0.5
0.5 KIT INTRAMUSCULAR ONCE
Qty: 1 EACH | Refills: 0 | Status: SHIPPED | OUTPATIENT
Start: 2024-02-14 | End: 2024-02-14

## 2024-02-14 RX ORDER — BUPROPION HYDROCHLORIDE 150 MG/1
150 TABLET ORAL EVERY MORNING
Qty: 90 TABLET | Refills: 3 | Status: SHIPPED | OUTPATIENT
Start: 2024-02-14 | End: 2024-04-15

## 2024-02-14 NOTE — PROGRESS NOTES
ANTICOAGULATION CLINIC REFERRAL RENEWAL REQUEST       An annual renewal order is required for all patients referred to [facility] Anticoagulation Clinic.?  Please review and sign the pended referral order for Tammy Law.       ANTICOAGULATION SUMMARY      Warfarin indication(s)   Mechanical AVR    Mechanical heart valve present?    Aortic valve has been replaced by St Daron mechanical prosthesis        Current goal range   INR: 2.0-3.0     Goal appropriate for indication? Goal INR 2-3, standard for indication(s) above     Time in Therapeutic Range (TTR)  (Goal > 60%) 71.3%       Office visit with referring provider's group within last year yes on 02/14/2024       Jacqueline Juárez, RN  [facility] Anticoagulation [facility]

## 2024-02-14 NOTE — PROGRESS NOTES
Assessment & Plan   Other chest pain  Bisi has had a persistent pressure/pain at the lower ribs that seems to go through and arround. She had a flail chest and six ribs on the left were repaired with plates and screws. She is concerned that it may be a sign of persistent fluid from the injury or non-healing issues.  - XR Ribs & Chest Left G/E 3 Views  The x-rays were reassuring regarding healing and no fluid or other abnormality. I suspect this is scar tissue.    Acquired hypothyroidism  Her level is a bit high, will reduce the dose from 100 to 88mcg and recheck in the future.  - TSH  - T4, free  - TSH with free T4 reflex    Chronic anticoagulation  Coumadin Clinic and recurring INR orders signed.  - Comprehensive metabolic panel (BMP + Alb, Alk Phos, ALT, AST, Total. Bili, TP)    Traumatic pneumothorax, subsequent encounter  - XR Ribs & Chest Left G/E 3 Views    Recurrent major depressive disorder, in full remission (H24)  She wants to continue the bupropion for the foreseeable future.  - buPROPion (WELLBUTRIN XL) 150 MG 24 hr tablet  Dispense: 90 tablet; Refill: 3    Sick sinus syndrome (H)    Screen for colon cancer  It is scheduled for this month.    Hypercholesterolemia  Continue atorvastatin 40 mg. Excellent lipid profile.  - Lipid panel reflex to direct LDL Fasting  - Comprehensive metabolic panel (BMP + Alb, Alk Phos, ALT, AST, Total. Bili, TP)    Acute blood loss anemia  History, still slightly anemic. Scheduled for a colonoscopy.  - Iron and iron binding capacity  - CBC with platelets  - Comprehensive metabolic panel (BMP + Alb, Alk Phos, ALT, AST, Total. Bili, TP)    Need for vaccination against respiratory syncytial virus  - respiratory syncytial virus vaccine, bivalent (ABRYSVO) injection  Dispense: 1 each; Refill: 0    Screening for diabetes mellitus  - Hemoglobin A1c    Impaired fasting glucose  Mild pre-diabetes. She has had two fasting glucoses in two years that were 102 & 103.  Will offer diabetes  education vs [email or web address]. Emphasize diet and exercise for prevention.  - Hemoglobin A1c 5.7    Thyroid function test abnormal  Med adjusted. Repeat in >4 weeks.  - TSH with free T4 reflex    Subjective   Bisi is a 68 year old, presenting for the following health issues:  Follow Up (6 month follow up appointment )      2/14/2024    10:13 AM   Additional Questions   Roomed by Jolene CUMMINS CMA     She is concerned about a constant pressure/discomfort in her left lower chest where her trauma and surgeries were. No nausea, vomiting, cough, or shortness of breath. The discomfort does not extend into the abdomen.  She is grateful for the bupropion. She does not want to wean off of it. She is doing well and doesn't want to challenge that.           Objective    LMP 12/13/2000 (Approximate)   There is no height or weight on file to calculate BMI.  Physical Exam   GENERAL: alert and no distress  EYES: Eyes grossly normal to inspection, PERRL and conjunctivae and sclerae normal  HENT: ear canals and TM's normal, nose and mouth without ulcers or lesions  NECK: no adenopathy, no asymmetry, masses, or scars  RESP: lungs clear to auscultation - no rales, rhonchi or wheezes  CV: regular rate and rhythm, normal S1 S2, no S3 or S4, no murmur, click or rub, no peripheral edema  ABDOMEN: soft, nontender, no hepatosplenomegaly, no masses and bowel sounds normal  MS: no gross musculoskeletal defects noted, no edema  SKIN: no suspicious lesions or rashes  NEURO: Normal strength and tone, mentation intact and speech normal  PSYCH: mentation appears normal, affect normal/bright    Results for orders placed or performed in visit on 02/14/24   XR Ribs & Chest Left G/E 3 Views     Status: None    Narrative    EXAM: XR RIBS AND CHEST LEFT 3 VIEWS  LOCATION: [facility]  DATE: 2/14/2024    INDICATION:  Traumatic pneumothorax, subsequent encounter, Other chest pain  COMPARISON: 09/10/2023.      Impression    IMPRESSION: No  pneumothorax. Significant improved aeration in the left lung with a small amount of residual left basilar pleural thickening or fluid. The lungs otherwise appear clear. The heart is normal in size. Left chest wall 2-lead pacemaker device   is unchanged. Sternotomy wires are intact. Plate and screw fixation of the left rib fractures are all intact and unchanged without signs of loosening or failure. No new acute or displaced rib fractures identified.   Results for orders placed or performed in visit on 02/14/24   TSH     Status: Abnormal   Result Value Ref Range    TSH 0.19 (L) 0.30 - 4.20 uIU/mL   T4, free     Status: Abnormal   Result Value Ref Range    Free T4 1.74 (H) 0.90 - 1.70 ng/dL   Iron and iron binding capacity     Status: Abnormal   Result Value Ref Range    Iron 40 37 - 145 ug/dL    Iron Binding Capacity 320 240 - 430 ug/dL    Iron Sat Index 13 (L) 15 - 46 %   CBC with platelets     Status: Abnormal   Result Value Ref Range    WBC Count 6.5 4.0 - 11.0 10e3/uL    RBC Count 5.09 3.80 - 5.20 10e6/uL    Hemoglobin 13.3 11.7 - 15.7 g/dL    Hematocrit 41.6 35.0 - 47.0 %    MCV 82 78 - 100 fL    MCH 26.1 (L) 26.5 - 33.0 pg    MCHC 32.0 31.5 - 36.5 g/dL    RDW 15.8 (H) 10.0 - 15.0 %    Platelet Count 268 150 - 450 10e3/uL   Lipid panel reflex to direct LDL Fasting     Status: None   Result Value Ref Range    Cholesterol 152 <200 mg/dL    Triglycerides 76 <150 mg/dL    Direct Measure HDL 67 >=50 mg/dL    LDL Cholesterol Calculated 70 <=100 mg/dL    Non HDL Cholesterol 85 <130 mg/dL    Patient Fasting > 8hrs? Unknown     Narrative    Cholesterol  Desirable:  <200 mg/dL    Triglycerides  Normal:  Less than 150 mg/dL  Borderline High:  150-199 mg/dL  High:  200-499 mg/dL  Very High:  Greater than or equal to 500 mg/dL    Direct Measure HDL  Female:  Greater than or equal to 50 mg/dL   Male:  Greater than or equal to 40 mg/dL    LDL Cholesterol  Desirable:  <100mg/dL  Above Desirable:  100-129 mg/dL   Borderline  High:  130-159 mg/dL   High:  160-189 mg/dL   Very High:  >= 190 mg/dL    Non HDL Cholesterol  Desirable:  130 mg/dL  Above Desirable:  130-159 mg/dL  Borderline High:  160-189 mg/dL  High:  190-219 mg/dL  Very High:  Greater than or equal to 220 mg/dL   Comprehensive metabolic panel (BMP + Alb, Alk Phos, ALT, AST, Total. Bili, TP)     Status: Abnormal   Result Value Ref Range    Sodium 138 135 - 145 mmol/L    Potassium 4.4 3.4 - 5.3 mmol/L    Carbon Dioxide (CO2) 29 22 - 29 mmol/L    Anion Gap 9 7 - 15 mmol/L    Urea Nitrogen 15.0 8.0 - 23.0 mg/dL    Creatinine 0.80 0.51 - 0.95 mg/dL    GFR Estimate 80 >60 mL/min/1.73m2    Calcium 9.8 8.8 - 10.2 mg/dL    Chloride 100 98 - 107 mmol/L    Glucose 102 (H) 70 - 99 mg/dL    Alkaline Phosphatase 112 40 - 150 U/L    AST 35 0 - 45 U/L    ALT 29 0 - 50 U/L    Protein Total 7.4 6.4 - 8.3 g/dL    Albumin 4.2 3.5 - 5.2 g/dL    Bilirubin Total 0.5 <=1.2 mg/dL   Hemoglobin A1c     Status: Abnormal   Result Value Ref Range    Hemoglobin A1C 5.7 (H) 0.0 - 5.6 %        Signed Electronically by: CUCO ALEMAN MD

## 2024-02-15 PROBLEM — Z95.2 H/O AORTIC VALVE REPLACEMENT: Status: ACTIVE | Noted: 2024-02-15

## 2024-02-15 LAB — HBA1C MFR BLD: 5.7 % (ref 0–5.6)

## 2024-02-15 RX ORDER — ATORVASTATIN CALCIUM 40 MG/1
40 TABLET, FILM COATED ORAL DAILY
Qty: 90 TABLET | Refills: 3 | Status: SHIPPED | OUTPATIENT
Start: 2024-02-15 | End: 2024-09-30

## 2024-02-15 RX ORDER — LEVOTHYROXINE SODIUM 88 UG/1
88 TABLET ORAL DAILY
Qty: 90 TABLET | Refills: 3 | Status: SHIPPED | OUTPATIENT
Start: 2024-02-15 | End: 2024-09-17

## 2024-02-15 NOTE — TELEPHONE ENCOUNTER
[facility] ([phone]) and pt informed that 4 day hold, without bridging, is needed for procedure. Pt reported understanding      Calendar updated    Imani Moody RN

## 2024-02-20 ENCOUNTER — ANTICOAGULATION THERAPY VISIT (OUTPATIENT)
Dept: ANTICOAGULATION | Facility: CLINIC | Age: 69
End: 2024-02-20
Payer: COMMERCIAL

## 2024-02-20 DIAGNOSIS — Z95.2 H/O MECHANICAL AORTIC VALVE REPLACEMENT: Primary | ICD-10-CM

## 2024-02-20 DIAGNOSIS — Z95.2 H/O AORTIC VALVE REPLACEMENT: ICD-10-CM

## 2024-02-20 LAB — INR HOME MONITORING: 2.5 (ref 2–3)

## 2024-02-20 NOTE — PROGRESS NOTES
ANTICOAGULATION MANAGEMENT     Tammy Law 68 year old female is on warfarin with therapeutic INR result. (Goal INR 2.0-3.0)    Recent labs: (last 7 days)     02/20/24  0000   INR 2.5       ASSESSMENT     Source(s): Chart Review and Patient/Caregiver Call     Warfarin doses taken: Warfarin taken as instructed  Diet: No new diet changes identified  Medication/supplement changes:  levothyroxine decreased to 88 mcg daily , bupropion 150 mg daily will start tomorrow.  New illness, injury, or hospitalization: No  Signs or symptoms of bleeding or clotting: No  Previous result: Therapeutic last 2(+) visits  Additional findings: Upcoming surgery/procedure colonoscopy on 2/29/2024. Patient had no questions today on the hold plan.       PLAN     Recommended plan for ongoing change(s) affecting INR     Dosing Instructions: Continue your current warfarin dose with next INR in 16 days       Summary  As of 2/20/2024      Full warfarin instructions:  2/25: Hold; 2/26: Hold; 2/27: Hold; 2/28: Hold; 2/29: 10 mg; Otherwise 7.5 mg every Tue; 5 mg all other days   Next INR check:  3/7/2024               Telephone call with Bisi who verbalizes understanding and agrees to plan    Patient to recheck with home meter    Education provided:   Reviewed with the patient that the 7.5 mg on Tuesday is an on going change.    Plan made per [facility] anticoagulation protocol    Jacqueline Juárez RN  Anticoagulation Clinic  2/20/2024    _______________________________________________________________________     Anticoagulation Episode Summary       Current INR goal:  2.0-3.0   TTR:  72.5% (1 y)   Target end date:  Indefinite   Send INR reminders to:  ANTICOAG HOME MONITORING    Indications    H/O mechanical aortic valve replacement [Z95.2]  H/O aortic valve replacement [Z95.2]             Comments:  [employer] home monitor- managed by exception  Goal range changed during 8/28-9/15/23  hospitalization             Anticoagulation Care Providers        Provider Role Specialty Phone number    Arnold Anderson MD Referring Internal Medicine [phone]    Christina Hernandez MD Referring Family Medicine [phone]    Karina Cifuentes MD Referring Family Medicine [phone]

## 2024-02-26 DIAGNOSIS — Z95.2 H/O MECHANICAL AORTIC VALVE REPLACEMENT: ICD-10-CM

## 2024-02-27 ENCOUNTER — TELEPHONE (OUTPATIENT)
Dept: FAMILY MEDICINE | Facility: CLINIC | Age: 69
End: 2024-02-27
Payer: COMMERCIAL

## 2024-02-27 DIAGNOSIS — Z95.2 H/O MECHANICAL AORTIC VALVE REPLACEMENT: Primary | ICD-10-CM

## 2024-02-27 DIAGNOSIS — Z95.2 H/O AORTIC VALVE REPLACEMENT: ICD-10-CM

## 2024-02-27 RX ORDER — WARFARIN SODIUM 2.5 MG/1
TABLET ORAL
Qty: 217 TABLET | Refills: 1 | Status: SHIPPED | OUTPATIENT
Start: 2024-02-27 | End: 2024-05-09

## 2024-02-27 NOTE — TELEPHONE ENCOUNTER
Talked with Tammy whose colonoscopy was canceled. She will resume warfarin tonight with inr next week using her home monitor.

## 2024-02-27 NOTE — TELEPHONE ENCOUNTER
ANTICOAGULATION MANAGEMENT:  Medication Refill    Anticoagulation Summary  As of 2/20/2024      Warfarin maintenance plan:  7.5 mg (2.5 mg x 3) every Tue; 5 mg (2.5 mg x 2) all other days   Next INR check:  3/7/2024   Target end date:  Indefinite    Indications    H/O mechanical aortic valve replacement [Z95.2]  H/O aortic valve replacement [Z95.2]                 Anticoagulation Care Providers       Provider Role Specialty Phone number    Arnold Anderson MD Referring Internal Medicine [phone]    Christina Hernandez MD Referring Family Medicine [phone]    Karina Cifuentes MD Referring Family Medicine [phone]            Refill Criteria    Visit with referring provider/group: Meets criteria: office visit within referring provider group in the last 1 year on 2/14/24    [facility] referral signed last signed: 02/15/2024; within last year: Yes    Lab monitoring not exceeding 2 weeks overdue: Yes    Tammy meets all criteria for refill. Rx instructions and quantity supplied updated to match patient's current dosing plan. Warfarin 90 day supply with 1 refill granted per [facility] protocol     Pamella Sy RN  Anticoagulation Clinic

## 2024-03-04 ENCOUNTER — MYC MEDICAL ADVICE (OUTPATIENT)
Dept: FAMILY MEDICINE | Facility: CLINIC | Age: 69
End: 2024-03-04
Payer: COMMERCIAL

## 2024-03-04 DIAGNOSIS — M81.0 AGE-RELATED OSTEOPOROSIS WITHOUT CURRENT PATHOLOGICAL FRACTURE: ICD-10-CM

## 2024-03-05 LAB — INR HOME MONITORING: 2 (ref 2–3)

## 2024-03-05 RX ORDER — ALENDRONATE SODIUM 70 MG/1
70 TABLET ORAL
Qty: 13 TABLET | Refills: 3 | Status: SHIPPED | OUTPATIENT
Start: 2024-03-05

## 2024-03-06 ENCOUNTER — ANTICOAGULATION THERAPY VISIT (OUTPATIENT)
Dept: ANTICOAGULATION | Facility: CLINIC | Age: 69
End: 2024-03-06
Payer: COMMERCIAL

## 2024-03-06 DIAGNOSIS — Z95.2 H/O MECHANICAL AORTIC VALVE REPLACEMENT: Primary | ICD-10-CM

## 2024-03-06 DIAGNOSIS — Z95.2 H/O AORTIC VALVE REPLACEMENT: ICD-10-CM

## 2024-03-06 NOTE — PROGRESS NOTES
ANTICOAGULATION MANAGEMENT     Tammy Law 68 year old female is on warfarin with therapeutic INR result. (Goal INR 2.0-3.0)    Recent labs: (last 7 days)     03/05/24  0000   INR 2.0       ASSESSMENT     Source(s): Chart Review  Previous INR was Therapeutic last 2(+) visits  Medication, diet, health changes since last INR chart reviewed; none identified         PLAN     Recommended plan for no diet, medication or health factor changes affecting INR     Dosing Instructions: Continue your current warfarin dose with next INR in 1 week       Summary  As of 3/6/2024      Full warfarin instructions:  7.5 mg every Tue; 5 mg all other days   Next INR check:  3/12/2024               Detailed voice message left for Bisi with dosing instructions and follow up date.   Sent [employer] message with dosing and follow up instructions    Patient to recheck with home meter    Education provided:   Please call back if any changes to your diet, medications or how you've been taking warfarin  Resume manage by exception with home monitor. Continue to submit INR results to home monitor company.You will only be called when your result is out of range. Please call and notify [facility] if new medication started, dose missed, signs or symptoms of bleeding or clotting, or a surgery/procedure is scheduled.  Contact [phone]  with any changes, questions or concerns.     Plan made per [facility] anticoagulation protocol    Jacqueline Juárez, RN  Anticoagulation Clinic  3/6/2024    _______________________________________________________________________     Anticoagulation Episode Summary       Current INR goal:  2.0-3.0   TTR:  72.4% (1 y)   Target end date:  Indefinite   Send INR reminders to:  [facility]    Indications    H/O mechanical aortic valve replacement [Z95.2]  H/O aortic valve replacement [Z95.2]             Comments:  [employer] home monitor- managed by exception  Goal range changed during 8/28-9/15/23  hospitalization              Anticoagulation Care Providers       Provider Role Specialty Phone number    Arnold Anderson MD Referring Internal Medicine [phone]    Christina Hernandez MD Referring Family Medicine [phone]    Karina Cifuentes MD Referring Family Medicine [phone]

## 2024-03-06 NOTE — PROGRESS NOTES
Patient called back and her colonoscopy was rescheduled for 3/13/2024.    Patient informed of the new the new hold dates and restart based off of the 1/17/2024 procedure plan for original procedure date of 2/29/2024.    Jacqueline Juárez RN    [facility]

## 2024-03-13 ENCOUNTER — TRANSFERRED RECORDS (OUTPATIENT)
Dept: HEALTH INFORMATION MANAGEMENT | Facility: CLINIC | Age: 69
End: 2024-03-13
Payer: COMMERCIAL

## 2024-03-14 ENCOUNTER — ANCILLARY PROCEDURE (OUTPATIENT)
Dept: CARDIOLOGY | Facility: CLINIC | Age: 69
End: 2024-03-14
Attending: INTERNAL MEDICINE
Payer: COMMERCIAL

## 2024-03-14 DIAGNOSIS — Z95.0 PACEMAKER: ICD-10-CM

## 2024-03-14 DIAGNOSIS — I49.5 SICK SINUS SYNDROME (H): ICD-10-CM

## 2024-03-15 LAB
MDC_IDC_EPISODE_DTM: NORMAL
MDC_IDC_EPISODE_DTM: NORMAL
MDC_IDC_EPISODE_ID: 29
MDC_IDC_EPISODE_ID: 30
MDC_IDC_EPISODE_TYPE: NORMAL
MDC_IDC_EPISODE_TYPE: NORMAL
MDC_IDC_EPISODE_TYPE_INDUCED: NO
MDC_IDC_EPISODE_TYPE_INDUCED: NO
MDC_IDC_EPISODE_VENDOR_TYPE: NORMAL
MDC_IDC_EPISODE_VENDOR_TYPE: NORMAL
MDC_IDC_LEAD_CONNECTION_STATUS: NORMAL
MDC_IDC_LEAD_CONNECTION_STATUS: NORMAL
MDC_IDC_LEAD_IMPLANT_DT: NORMAL
MDC_IDC_LEAD_IMPLANT_DT: NORMAL
MDC_IDC_LEAD_LOCATION: NORMAL
MDC_IDC_LEAD_LOCATION: NORMAL
MDC_IDC_LEAD_LOCATION_DETAIL_1: NORMAL
MDC_IDC_LEAD_LOCATION_DETAIL_1: NORMAL
MDC_IDC_LEAD_MFG: NORMAL
MDC_IDC_LEAD_MFG: NORMAL
MDC_IDC_LEAD_MODEL: NORMAL
MDC_IDC_LEAD_MODEL: NORMAL
MDC_IDC_LEAD_POLARITY_TYPE: NORMAL
MDC_IDC_LEAD_POLARITY_TYPE: NORMAL
MDC_IDC_LEAD_SERIAL: NORMAL
MDC_IDC_LEAD_SERIAL: NORMAL
MDC_IDC_LEAD_SPECIAL_FUNCTION: NORMAL
MDC_IDC_LEAD_SPECIAL_FUNCTION: NORMAL
MDC_IDC_MSMT_BATTERY_DTM: NORMAL
MDC_IDC_MSMT_BATTERY_REMAINING_PERCENTAGE: 50 %
MDC_IDC_MSMT_BATTERY_STATUS: NORMAL
MDC_IDC_MSMT_LEADCHNL_RA_IMPEDANCE_VALUE: 644 OHM
MDC_IDC_MSMT_LEADCHNL_RA_LEAD_CHANNEL_STATUS: NORMAL
MDC_IDC_MSMT_LEADCHNL_RV_IMPEDANCE_VALUE: 546 OHM
MDC_IDC_MSMT_LEADCHNL_RV_LEAD_CHANNEL_STATUS: NORMAL
MDC_IDC_PG_IMPLANT_DTM: NORMAL
MDC_IDC_PG_MFG: NORMAL
MDC_IDC_PG_MODEL: NORMAL
MDC_IDC_PG_SERIAL: NORMAL
MDC_IDC_PG_TYPE: NORMAL
MDC_IDC_SESS_CLINIC_NAME: NORMAL
MDC_IDC_SESS_DTM: NORMAL
MDC_IDC_SESS_REPROGRAMMED: NO
MDC_IDC_SESS_TYPE: NORMAL
MDC_IDC_SET_BRADY_AT_MODE_SWITCH_MODE: NORMAL
MDC_IDC_SET_BRADY_AT_MODE_SWITCH_RATE: 160 {BEATS}/MIN
MDC_IDC_SET_BRADY_LOWRATE: 60 {BEATS}/MIN
MDC_IDC_SET_BRADY_MAX_SENSOR_RATE: 120 {BEATS}/MIN
MDC_IDC_SET_BRADY_MAX_TRACKING_RATE: 130 {BEATS}/MIN
MDC_IDC_SET_BRADY_MODE: NORMAL
MDC_IDC_SET_BRADY_PAV_DELAY_LOW: 200 MS
MDC_IDC_SET_BRADY_SAV_DELAY_LOW: 180 MS
MDC_IDC_SET_LEADCHNL_RA_PACING_AMPLITUDE: 1.6 V
MDC_IDC_SET_LEADCHNL_RA_PACING_POLARITY: NORMAL
MDC_IDC_SET_LEADCHNL_RA_PACING_PULSEWIDTH: 0.4 MS
MDC_IDC_SET_LEADCHNL_RA_SENSING_ADAPTATION_MODE: NORMAL
MDC_IDC_SET_LEADCHNL_RA_SENSING_POLARITY: NORMAL
MDC_IDC_SET_LEADCHNL_RV_PACING_AMPLITUDE: 2.4 V
MDC_IDC_SET_LEADCHNL_RV_PACING_POLARITY: NORMAL
MDC_IDC_SET_LEADCHNL_RV_PACING_PULSEWIDTH: 0.4 MS
MDC_IDC_SET_LEADCHNL_RV_SENSING_ADAPTATION_MODE: NORMAL
MDC_IDC_SET_LEADCHNL_RV_SENSING_POLARITY: NORMAL
MDC_IDC_SET_ZONE_TYPE: NORMAL
MDC_IDC_STAT_AT_BURDEN_PERCENT: 0 %
MDC_IDC_STAT_AT_DTM_END: NORMAL
MDC_IDC_STAT_AT_DTM_START: NORMAL
MDC_IDC_STAT_AT_MODE_SW_COUNT_PER_DAY: 0
MDC_IDC_STAT_AT_MODE_SW_PERCENT_TIME_PER_DAY: 0 %
MDC_IDC_STAT_BRADY_AP_VP_PERCENT: 24 %
MDC_IDC_STAT_BRADY_AP_VS_PERCENT: 66 %
MDC_IDC_STAT_BRADY_AS_VP_PERCENT: 0 %
MDC_IDC_STAT_BRADY_AS_VS_PERCENT: 10 %
MDC_IDC_STAT_BRADY_DTM_END: NORMAL
MDC_IDC_STAT_BRADY_DTM_START: NORMAL
MDC_IDC_STAT_BRADY_RA_PERCENT_PACED: 90 %
MDC_IDC_STAT_BRADY_RV_PERCENT_PACED: 24 %
MDC_IDC_STAT_HEART_RATE_ATRIAL_MEAN: 69 {BEATS}/MIN
MDC_IDC_STAT_HEART_RATE_DTM_END: NORMAL
MDC_IDC_STAT_HEART_RATE_DTM_START: NORMAL
MDC_IDC_STAT_HEART_RATE_VENTRICULAR_MEAN: 70 {BEATS}/MIN

## 2024-03-15 PROCEDURE — 99207 CARDIAC DEVICE CHECK - REMOTE: CPT | Performed by: INTERNAL MEDICINE

## 2024-03-19 ENCOUNTER — ANTICOAGULATION THERAPY VISIT (OUTPATIENT)
Dept: ANTICOAGULATION | Facility: CLINIC | Age: 69
End: 2024-03-19
Payer: COMMERCIAL

## 2024-03-19 DIAGNOSIS — Z95.2 H/O MECHANICAL AORTIC VALVE REPLACEMENT: Primary | ICD-10-CM

## 2024-03-19 DIAGNOSIS — Z95.2 H/O AORTIC VALVE REPLACEMENT: ICD-10-CM

## 2024-03-19 LAB — INR HOME MONITORING: 1.7 (ref 2–3)

## 2024-03-19 NOTE — PROGRESS NOTES
ANTICOAGULATION MANAGEMENT     Tammy Law 68 year old female is on warfarin with subtherapeutic INR result. (Goal INR 2.0-3.0)    Recent labs: (last 7 days)     03/19/24  0000   INR 1.7*       ASSESSMENT     Source(s): Chart Review and Patient/Caregiver Call     Warfarin doses taken: Warfarin taken as instructed  Diet: No new diet changes identified  Medication/supplement changes: None noted  New illness, injury, or hospitalization: No  Signs or symptoms of bleeding or clotting: No  Previous result: Therapeutic last 2(+) visits  Additional findings:  recently held warfarin for procedure        PLAN     Recommended plan for temporary change(s) affecting INR     Dosing Instructions: booster dose then continue your current warfarin dose with next INR in 1 week       Summary  As of 3/19/2024      Full warfarin instructions:  3/19: 10 mg; Otherwise 7.5 mg every Tue; 5 mg all other days   Next INR check:  3/26/2024               Telephone call with Bisi who verbalizes understanding and agrees to plan and who agrees to plan and repeated back plan correctly    Patient to recheck with home meter    Education provided:   Contact [phone]  with any changes, questions or concerns.     Plan made per [facility] anticoagulation protocol    Shea Meadows RN  Anticoagulation Clinic  3/19/2024    _______________________________________________________________________     Anticoagulation Episode Summary       Current INR goal:  2.0-3.0   TTR:  71.5% (1 y)   Target end date:  Indefinite   Send INR reminders to:  [facility]    Indications    H/O mechanical aortic valve replacement [Z95.2]  H/O aortic valve replacement [Z95.2]             Comments:  [employer] home monitor- managed by exception  Goal range changed during 8/28-9/15/23  hospitalization             Anticoagulation Care Providers       Provider Role Specialty Phone number    Arnold Anderson MD Referring Internal Medicine [phone]    Mary  Christina CANO MD Referring Family Medicine [phone]    Karina Cifuentes MD Referring Family Medicine [phone]

## 2024-03-26 ENCOUNTER — ANTICOAGULATION THERAPY VISIT (OUTPATIENT)
Dept: ANTICOAGULATION | Facility: CLINIC | Age: 69
End: 2024-03-26
Payer: COMMERCIAL

## 2024-03-26 DIAGNOSIS — Z95.2 H/O MECHANICAL AORTIC VALVE REPLACEMENT: Primary | ICD-10-CM

## 2024-03-26 DIAGNOSIS — Z95.2 H/O AORTIC VALVE REPLACEMENT: ICD-10-CM

## 2024-03-26 LAB — INR HOME MONITORING: 2.8 (ref 2–3)

## 2024-03-26 NOTE — PROGRESS NOTES
ANTICOAGULATION MANAGEMENT     Tammy Law 68 year old female is on warfarin with therapeutic INR result. (Goal INR 2.0-3.0)    Recent labs: (last 7 days)     03/26/24  0000   INR 2.8       ASSESSMENT     Source(s): Chart Review and Patient/Caregiver Call     Warfarin doses taken: Warfarin taken as instructed patient confirmed that she took the 10 mg booster dose last week as instructed.  Diet: No new diet changes identified  Medication/supplement changes: None noted  New illness, injury, or hospitalization: No  Signs or symptoms of bleeding or clotting: No  Previous result: Subtherapeutic  Additional findings: None       PLAN     Recommended plan for temporary change(s) affecting INR     Dosing Instructions: Continue your current warfarin dose with next INR in 1 week   then can go back to every 2 week if next inr is still within range.    Summary  As of 3/26/2024      Full warfarin instructions:  7.5 mg every Tue; 5 mg all other days   Next INR check:  4/2/2024               Telephone call with Bisi who verbalizes understanding and agrees to plan    Patient to recheck with home meter    Education provided:   Goal range and lab monitoring: Importance of following up at instructed interval  Resume manage by exception with home monitor. Continue to submit INR results to home monitor company.You will only be called when your result is out of range. Please call and notify [facility] if new medication started, dose missed, signs or symptoms of bleeding or clotting, or a surgery/procedure is scheduled. Due for next call no later than: 6/24/24.  Contact [phone]  with any changes, questions or concerns.     Plan made per [facility] anticoagulation protocol    Carolynn Lucas RN  Anticoagulation Clinic  3/26/2024    _______________________________________________________________________     Anticoagulation Episode Summary       Current INR goal:  2.0-3.0   TTR:  72.2% (1 y)   Target end date:  Indefinite   Send INR reminders  to:  [facility]    Indications    H/O mechanical aortic valve replacement [Z95.2]  H/O aortic valve replacement [Z95.2]             Comments:  [employer] home monitor- managed by exception  Goal range changed during 8/28-9/15/23  hospitalization             Anticoagulation Care Providers       Provider Role Specialty Phone number    Arnold Anderson MD Referring Internal Medicine [phone]    Christina Hernandez MD Referring Family Medicine [phone]    Karina Cifuentes MD Referring Family Medicine [phone]

## 2024-04-02 ENCOUNTER — DOCUMENTATION ONLY (OUTPATIENT)
Dept: ANTICOAGULATION | Facility: CLINIC | Age: 69
End: 2024-04-02
Payer: COMMERCIAL

## 2024-04-02 DIAGNOSIS — Z95.2 H/O AORTIC VALVE REPLACEMENT: ICD-10-CM

## 2024-04-02 DIAGNOSIS — Z95.2 H/O MECHANICAL AORTIC VALVE REPLACEMENT: Primary | ICD-10-CM

## 2024-04-02 LAB — INR HOME MONITORING: 2.7 (ref 2–3)

## 2024-04-02 NOTE — PROGRESS NOTES
ANTICOAGULATION  MANAGEMENT-Home Monitor Managed by Exception    Tammyrober Law 68 year old female is on warfarin with therapeutic INR result. (Goal INR 2.0-3.0)    Recent labs: (last 7 days)     04/02/24  0000   INR 2.7       Previous INR was Therapeutic  Medication, diet, health changes since last INR:chart reviewed; none identified  Contacted within the last 12 weeks by phone on 3/26/24  Last [facility] referral date: 02/15/2024      ZAIN     Tammy was NOT contacted regarding therapeutic result today per home monitoring policy manage by exception agreement.   Current warfarin dose is to be continued:     Summary  As of 4/2/2024      Full warfarin instructions:  7.5 mg every Tue; 5 mg all other days   Next INR check:  4/16/2024             ?   Kassy Borges RN  Anticoagulation Clinic  4/2/2024    _______________________________________________________________________     Anticoagulation Episode Summary       Current INR goal:  2.0-3.0   TTR:  72.2% (1 y)   Target end date:  Indefinite   Send INR reminders to:  [facility]    Indications    H/O mechanical aortic valve replacement [Z95.2]  H/O aortic valve replacement [Z95.2]             Comments:  [employer] home monitor- managed by exception  Goal range changed during 8/28-9/15/23  hospitalization             Anticoagulation Care Providers       Provider Role Specialty Phone number    Arnold Anderson MD Referring Internal Medicine [phone]    Christina Hernandez MD Referring Family Medicine [phone]    Karina Cifuentes MD Referring Family Medicine [phone]

## 2024-04-03 NOTE — PROGRESS NOTES
ESTABLISHED PATIENT NEUROLOGY NOTE    DATE OF VISIT: 4/4/2024  CLINIC LOCATION: [facility]  MRN: [id number]  PATIENT NAME: Tammy Law  YOB: 1955    REASON FOR VISIT:   Chief Complaint   Patient presents with    Follow Up     Occipital Neuralgia- pain injection not effective      SUBJECTIVE:                                                      HISTORY OF PRESENT ILLNESS: Patient is here to follow up regarding left occipital neuralgia.  The last visit was on 5/30/2023.  At the time, occipital nerve block was ordered.  Please refer to my initial/other prior notes for further information.    Since the last visit, the patient reports that her symptoms continue, and pain is worse.  She had occipital nerve block with steroid on 6/9/2023.  After that her pain did not improve dramatically.  Currently, it affects her ability to move.  She denies interval development of new neurological symptoms.  EXAM:                                                    Physical Exam:   Vitals: /69 (BP Location: Right arm, Patient Position: Sitting, Cuff Size: Adult Regular)   Pulse 72   LMP 12/13/2000 (Approximate)   SpO2 99%     General: pt is in NAD, cooperative.  Skin: normal turgor, moist mucous membranes, no lesions/rashes noticed.  HEENT: ATNC, white sclera, normal conjunctiva.  Respiratory: Symmetric lung excursion, no accessory respiratory muscle use.  Abdomen: Non distended.  Neurological: awake, cooperative, follows commands, no aphasia or dysarthria noted, has constant horizontal nystagmus, equally moves all extremities, light touch/pinprick sensation is intact bilaterally, no dysmetria bilaterally.    ASSESSMENT AND PLAN:                                                    Assessment: 68 year old female patient presents for follow-up of left occipital neuralgia.  She reports no significant improvement after occipital nerve block.  Also reports persistent neck pain radiating to  the left arm.     We discussed that occipital nerve block could be repeated if necessary.  Additional treatment options include gabapentin, Lyrica, Effexor, Cymbalta, and physical therapy.  We discussed that her symptoms are likely a combination of cervico-occipital neuralgia and radicular pain from cervical nerve roots (cervical radiculopathy).      After reviewing all treatment options, we decided to pursue physical therapy and gabapentin.  We reviewed typical side effects.  The patient will reach out if she develops any notable side effects.    Diagnoses:    ICD-10-CM    1. Cervico-occipital neuralgia of left side  M54.81       2. Cervical radicular pain  M54.12         Plan: At today's visit we thoroughly discussed current symptoms, available treatment options, and the plan, which includes:  Orders Placed This Encounter   Procedures    Physical Therapy  Referral     We decided to try gabapentin.  Advised the patient to take 100 mg at bedtime for the first week.  If tolerated, but pain is not better, she should increase the dose to 100 mg twice daily (morning and evening) for the next week.  After that I advised her to take 100 mg 3 times per day if pain is not better, but she tolerates the medication well.  She will let me know if pain continues on this dose.    Next follow-up appointment is in the next 3 months or earlier if needed.    Total Time: 17 minutes spent on the date of the encounter doing chart review, history and exam, documentation and further activities per the note.    Houston Sandoval MD  [facility] Neurology  (Chart documentation was completed in part with Dragon voice-recognition software. Even though reviewed, some grammatical, spelling, and word errors may remain.)

## 2024-04-04 ENCOUNTER — OFFICE VISIT (OUTPATIENT)
Dept: NEUROLOGY | Facility: CLINIC | Age: 69
End: 2024-04-04
Payer: COMMERCIAL

## 2024-04-04 VITALS — DIASTOLIC BLOOD PRESSURE: 69 MMHG | HEART RATE: 72 BPM | OXYGEN SATURATION: 99 % | SYSTOLIC BLOOD PRESSURE: 118 MMHG

## 2024-04-04 DIAGNOSIS — M54.81 CERVICO-OCCIPITAL NEURALGIA OF LEFT SIDE: Primary | ICD-10-CM

## 2024-04-04 DIAGNOSIS — M54.12 CERVICAL RADICULAR PAIN: ICD-10-CM

## 2024-04-04 PROCEDURE — 99214 OFFICE O/P EST MOD 30 MIN: CPT | Performed by: PSYCHIATRY & NEUROLOGY

## 2024-04-04 PROCEDURE — G2211 COMPLEX E/M VISIT ADD ON: HCPCS | Performed by: PSYCHIATRY & NEUROLOGY

## 2024-04-04 RX ORDER — GABAPENTIN 100 MG/1
100 CAPSULE ORAL 3 TIMES DAILY
Qty: 90 CAPSULE | Refills: 3 | Status: SHIPPED | OUTPATIENT
Start: 2024-04-04 | End: 2024-07-29

## 2024-04-04 NOTE — LETTER
4/4/2024         RE: Tammy Law  [address] [address]        Dear Colleague,    Thank you for referring your patient, Tammy Law, to the [facility]. Please see a copy of my visit note below.    ESTABLISHED PATIENT NEUROLOGY NOTE    DATE OF VISIT: 4/4/2024  CLINIC LOCATION: [facility]  MRN: [id number]  PATIENT NAME: Tammy Law  YOB: 1955    REASON FOR VISIT:   Chief Complaint   Patient presents with     Follow Up     Occipital Neuralgia- pain injection not effective      SUBJECTIVE:                                                      HISTORY OF PRESENT ILLNESS: Patient is here to follow up regarding left occipital neuralgia.  The last visit was on 5/30/2023.  At the time, occipital nerve block was ordered.  Please refer to my initial/other prior notes for further information.    Since the last visit, the patient reports that her symptoms continue, and pain is worse.  She had occipital nerve block with steroid on 6/9/2023.  After that her pain did not improve dramatically.  Currently, it affects her ability to move.  She denies interval development of new neurological symptoms.  EXAM:                                                    Physical Exam:   Vitals: /69 (BP Location: Right arm, Patient Position: Sitting, Cuff Size: Adult Regular)   Pulse 72   LMP 12/13/2000 (Approximate)   SpO2 99%     General: pt is in NAD, cooperative.  Skin: normal turgor, moist mucous membranes, no lesions/rashes noticed.  HEENT: ATNC, white sclera, normal conjunctiva.  Respiratory: Symmetric lung excursion, no accessory respiratory muscle use.  Abdomen: Non distended.  Neurological: awake, cooperative, follows commands, no aphasia or dysarthria noted, has constant horizontal nystagmus, equally moves all extremities, light touch/pinprick sensation is intact bilaterally, no dysmetria bilaterally.     ASSESSMENT AND PLAN:                                                    Assessment: 68 year old female patient presents for follow-up of left occipital neuralgia.  She reports no significant improvement after occipital nerve block.  Also reports persistent neck pain radiating to the left arm.     We discussed that occipital nerve block could be repeated if necessary.  Additional treatment options include gabapentin, Lyrica, Effexor, Cymbalta, and physical therapy.  We discussed that her symptoms are likely a combination of cervico-occipital neuralgia and radicular pain from cervical nerve roots (cervical radiculopathy).      After reviewing all treatment options, we decided to pursue physical therapy and gabapentin.  We reviewed typical side effects.  The patient will reach out if she develops any notable side effects.    Diagnoses:    ICD-10-CM    1. Cervico-occipital neuralgia of left side  M54.81       2. Cervical radicular pain  M54.12         Plan: At today's visit we thoroughly discussed current symptoms, available treatment options, and the plan, which includes:  Orders Placed This Encounter   Procedures     Physical Therapy  Referral     We decided to try gabapentin.  Advised the patient to take 100 mg at bedtime for the first week.  If tolerated, but pain is not better, she should increase the dose to 100 mg twice daily (morning and evening) for the next week.  After that I advised her to take 100 mg 3 times per day if pain is not better, but she tolerates the medication well.  She will let me know if pain continues on this dose.    Next follow-up appointment is in the next 3 months or earlier if needed.    Total Time: 17 minutes spent on the date of the encounter doing chart review, history and exam, documentation and further activities per the note.    Houston Sandoval MD  [facility] Neurology  (Chart documentation was completed in part with Dragon voice-recognition software. Even  "though reviewed, some grammatical, spelling, and word errors may remain.)    Tammy Law is a 68 year old female who presents for:  Chief Complaint   Patient presents with     Follow Up     Occipital Neuralgia- pain injection not effective         Initial Vitals:  /69 (BP Location: Right arm, Patient Position: Sitting, Cuff Size: Adult Regular)   Pulse 72   LMP 12/13/2000 (Approximate)   SpO2 99%  Estimated body mass index is 23.12 kg/m  as calculated from the following:    Height as of 2/14/24: 1.626 m (5' 4\").    Weight as of 2/14/24: 61.1 kg (134 lb 11.2 oz).. There is no height or weight on file to calculate BSA. BP completed using cuff size: regular    Adrienne House       Again, thank you for allowing me to participate in the care of your patient.        Sincerely,        Houston Sandoval MD  "

## 2024-04-04 NOTE — PROGRESS NOTES
"Tammy Law is a 68 year old female who presents for:  Chief Complaint   Patient presents with    Follow Up     Occipital Neuralgia- pain injection not effective         Initial Vitals:  /69 (BP Location: Right arm, Patient Position: Sitting, Cuff Size: Adult Regular)   Pulse 72   LMP 12/13/2000 (Approximate)   SpO2 99%  Estimated body mass index is 23.12 kg/m  as calculated from the following:    Height as of 2/14/24: 1.626 m (5' 4\").    Weight as of 2/14/24: 61.1 kg (134 lb 11.2 oz).. There is no height or weight on file to calculate BSA. BP completed using cuff size: pritesh House   "

## 2024-04-04 NOTE — PATIENT INSTRUCTIONS
AFTER VISIT SUMMARY (AVS):    At today's visit we thoroughly discussed current symptoms, available treatment options, and the plan, which includes:  Orders Placed This Encounter   Procedures    Physical Therapy  Referral     We decided to try gabapentin.  Please take 100 mg at bedtime for the first week.  If tolerated, but pain is not better, please increase the dose to 100 mg twice daily (morning and evening) for the next week.  After that take 100 mg 3 times per day if pain is not better, but you tolerate the medication well.  Please let me know if pain continues on this dose.    Next follow-up appointment is in the next 3 months or earlier if needed.    Please do not hesitate to call me with any questions or concerns.    Thanks.

## 2024-04-10 ENCOUNTER — THERAPY VISIT (OUTPATIENT)
Dept: PHYSICAL THERAPY | Facility: REHABILITATION | Age: 69
End: 2024-04-10
Attending: PSYCHIATRY & NEUROLOGY
Payer: COMMERCIAL

## 2024-04-10 DIAGNOSIS — M54.12 CERVICAL RADICULAR PAIN: ICD-10-CM

## 2024-04-10 DIAGNOSIS — M54.81 CERVICO-OCCIPITAL NEURALGIA OF LEFT SIDE: ICD-10-CM

## 2024-04-10 PROCEDURE — 97161 PT EVAL LOW COMPLEX 20 MIN: CPT | Mod: GP | Performed by: PHYSICAL THERAPIST

## 2024-04-10 PROCEDURE — 97110 THERAPEUTIC EXERCISES: CPT | Mod: GP | Performed by: PHYSICAL THERAPIST

## 2024-04-10 NOTE — PROGRESS NOTES
PHYSICAL THERAPY EVALUATION  Type of Visit: Evaluation    See electronic medical record for Abuse and Falls Screening details.    Subjective   Patient has had shoulder pain 1 year ago, progressed to neck and mid back last couple months. Tried cold and hot pack with not relief.       Presenting condition or subjective complaint: I think it is a frozen shoulder.  Date of onset: 04/04/24    Relevant medical history: Depression; Dizziness; Heart problems; Implanted device; Pacemaker; Thyroid problems; Vision problems   Patient Active Problem List   Diagnosis    CARDIOVASCULAR SCREENING; LDL GOAL LESS THAN 130    H/O mechanical aortic valve replacement    Acquired hypothyroidism    Cardiac pacemaker in situ    Congenital nystagmus    Epigastric pain    Chest pain, unspecified type    Chronic anticoagulation    Age-related osteoporosis without current pathological fracture    Medication management    Recurrent major depressive disorder, in full remission (H24)    History of colonic polyps    Acute blood loss anemia    Hematemesis    History of pacemaker    Melena    Sick sinus syndrome (H)    H/O aortic valve replacement      Dates & types of surgery:    Past Surgical History:   Procedure Laterality Date    AORTIC VALVE REPLACEMENT      APPENDECTOMY      BIOPSY BREAST Left 2015    BREAST CYST EXCISION      CARDIAC CATHETERIZATION      HC PART REMV BONE METATARSAL HEAD,EA Right 06/23/2017    Procedure: EXOSTECTOMY 2ND METATARSAL RIGHT FOOT;  Surgeon: Jessee Mccauley DPM;  Location: [facility], [place];  Service: Podiatry    HYSTERECTOMY  2000    IMPLANT PACEMAKER      IMPLANT PACEMAKER      MIDLINE SINGLE LUMEN PLACEMENT  10/13/2023    OOPHORECTOMY  2000    OTHER SURGICAL HISTORY      Hemmorhoidectomy    RELEASE CARPAL TUNNEL Bilateral     TOE SURGERY      TYMPANOSTOMY TUBE PLACEMENT      ZZC REPLACE AORT VALV,PROSTH VALV      Description: Aortic Valve Replacement;  Proc Date: 01/01/1995;      Prior diagnostic  imaging/testing results:       Prior therapy history for the same diagnosis, illness or injury: No      Prior Level of Function  Transfers: Independent  Ambulation: Independent  ADL: Independent  IADL: Finances, Housekeeping, Laundry, Meal preparation, Medication management    Living Environment  Social support: With a significant other or spouse   Type of home: House   Stairs to enter the home: Yes 3 Is there a railing: Yes   Ramp: No   Stairs inside the home: No       Help at home: None  Equipment owned:       Employment: No    Hobbies/Interests: traveling [employer]    Patient goals for therapy: put my shirts on over my head.    Pain assessment: See objective evaluation for additional pain details     Objective   SHOULDER EVALUATION  PAIN: Pain Level at Rest: 3/10  Pain Level with Use: 7/10  Pain Location: cervical spine, thoracic spine, and shoulder  Pain Quality: Aching, Burning, and Sharp  Pain Frequency: constant  Pain is Worst: worse in AM  Pain is Exacerbated By: reaching/moving  Pain is Relieved By: rest  Pain Progression: Improved  INTEGUMENTARY (edema, incisions): WFL  POSTURE: Standing Posture: Rounded shoulders, Forward head  Sitting Posture: Rounded shoulders, Forward head  ROM:   (Degrees) Left AROM Left PROM Right AROM  Right PROM   Shoulder Flexion 105 pain neck shoulder elbow  125 pulling    Shoulder Extension 54  70    Shoulder Abduction 78 sharp, ache into elbow  130    Shoulder Adduction WFL  WFL    Shoulder Internal Rotation 45 pain  50    Shoulder External Rotation 70 pain  85    Shoulder Horizontal Abduction WFL Pulling at back of arm  WFL hurts in neck    Shoulder Horizontal Adduction WFL      Shoulder Flexion ER       Shoulder Flexion IR       Elbow Extension       Elbow Flexion       R side able to reach to T1-2 behind head, to T9 IR behind back, L side back of head and L illiac crest  Pain: R side shoulder motion some pulling up into neck  End feel:     STRENGTH:  flexion and abd  4/5, ext 5/5 IR R 5/5 L 4/5, ER R 5/5, 4/5L  FLEXIBILITY:  impaired upper trap & levator scap, impaired pectorals  SPECIAL TESTS:  future as indicated  PALPATION:  future  JOINT MOBILITY:  future  CERVICAL SCREEN:   (Degrees) Left AROM Right AROM   Cervical Flexion 35-50 increase with repetition pain with initial motion   Cervical Extension 35 stiff   Side bend 30 30 upper trap tightness B greater with L SB   Rotation 40 pulling R 60   Thoracic Flexion    Thoracic Extension    Thoracic Rotation     Thoracic Outlet Screen (Bear, Adson)        Left Right   Alar Ligament    Cervical Flexion-Rotation     Cervical Rot/Lateral Flex     Compression     Distraction     Spurling s     Thoracic Outlet Screen (Bear, Adson)     Transverse Ligament     Vertebral Artery         Assessment & Plan   CLINICAL IMPRESSIONS  Medical Diagnosis: M54.81 (ICD-10-CM) - Cervico-occipital neuralgia of left side  M54.12 (ICD-10-CM) - Cervical radicular pain    Treatment Diagnosis: neck pain and L shoulder pain   Impression/Assessment: Patient is a 68 year old female with neck pain, mid back pain and L shoulder pain.  The following significant findings have been identified: Pain, Decreased ROM/flexibility, Decreased joint mobility, Decreased strength, Impaired muscle performance, Decreased activity tolerance, and Impaired posture. These impairments interfere with their ability to perform self care tasks, recreational activities, household chores, household mobility, and community mobility as compared to previous level of function.     Clinical Decision Making (Complexity):  Clinical Presentation: Stable/Uncomplicated  Clinical Presentation Rationale: based on medical and personal factors listed in PT evaluation  Clinical Decision Making (Complexity): Low complexity    PLAN OF CARE  Treatment Interventions:  Interventions: Gait Training, Manual Therapy, Neuromuscular Re-education, Therapeutic Activity, Therapeutic Exercise, Self-Care/Home  Management    Long Term Goals     PT Goal 1  Goal Identifier: reaching up/out/behind back  Goal Description: Patient will be able to improve reaching 3/10 or less.  Rationale: to maximize safety and independence with performance of ADLs and functional tasks;to maximize safety and independence within the home;to maximize safety and independence within the community  Goal Progress: initiated  Target Date: 07/03/24  PT Goal 2  Goal Identifier: showering/dressing don or doff shirts  Goal Description: Patient will be able to shower and dress complete all self cares without sx greater than 3/10.  Rationale: to maximize safety and independence with performance of ADLs and functional tasks;to maximize safety and independence within the home;to maximize safety and independence within the community  Goal Progress: initiated  Target Date: 07/03/24  PT Goal 3  Goal Identifier: fishing/hobbies/gardening  Goal Description: Patient will be able to complete leisure activities without mobility restriction or pain greater than 3/10.  Rationale: to maximize safety and independence within the community;to maximize safety and independence within the home  Goal Progress: initiated  Target Date: 07/03/24  PT Goal 4  Goal Identifier: sleep, wakes due to pain  Goal Description: Patient will be able to sleep 6-8 hours without waking due to pain.  Rationale: to maximize safety and independence with performance of ADLs and functional tasks;to maximize safety and independence within the home;to maximize safety and independence within the community  Goal Progress: initiated  Target Date: 07/03/24      Frequency of Treatment: 1 x weekly  Duration of Treatment: 12 weeks    Recommended Referrals to Other Professionals:  none at this time  Education Assessment:   Learner/Method: Patient;Listening;Demonstration;Pictures/Video    Risks and benefits of evaluation/treatment have been explained.   Patient/Family/caregiver agrees with Plan of Care.      Evaluation Time:     PT Eval, Low Complexity Minutes (80648): 25       Signing Clinician: ANITRA Delaney [facility]                                                                                   OUTPATIENT PHYSICAL THERAPY      PLAN OF TREATMENT FOR OUTPATIENT REHABILITATION   Patient's Last Name, First Name, Tammy Chavez YOB: 1955   Provider's Name   [facility]   Medical Record No.  [id number]     Onset Date: 04/04/24  Start of Care Date: 04/10/24     Medical Diagnosis:  M54.81 (ICD-10-CM) - Cervico-occipital neuralgia of left side  M54.12 (ICD-10-CM) - Cervical radicular pain      PT Treatment Diagnosis:  neck pain and L shoulder pain Plan of Treatment  Frequency/Duration: 1 x weekly/ 12 weeks    Certification date from 04/10/24 to 07/03/24         See note for plan of treatment details and functional goals     Maggi Crandall, PT                         I CERTIFY THE NEED FOR THESE SERVICES FURNISHED UNDER        THIS PLAN OF TREATMENT AND WHILE UNDER MY CARE     (Physician attestation of this document indicates review and certification of the therapy plan).              Referring Provider:  Houston Sandoval    Initial Assessment  See Epic Evaluation- Start of Care Date: 04/10/24

## 2024-04-12 ASSESSMENT — ANXIETY QUESTIONNAIRES
GAD7 TOTAL SCORE: 3
6. BECOMING EASILY ANNOYED OR IRRITABLE: SEVERAL DAYS
1. FEELING NERVOUS, ANXIOUS, OR ON EDGE: SEVERAL DAYS
7. FEELING AFRAID AS IF SOMETHING AWFUL MIGHT HAPPEN: NOT AT ALL
7. FEELING AFRAID AS IF SOMETHING AWFUL MIGHT HAPPEN: NOT AT ALL
2. NOT BEING ABLE TO STOP OR CONTROL WORRYING: NOT AT ALL
4. TROUBLE RELAXING: NOT AT ALL
5. BEING SO RESTLESS THAT IT IS HARD TO SIT STILL: NOT AT ALL
3. WORRYING TOO MUCH ABOUT DIFFERENT THINGS: SEVERAL DAYS
8. IF YOU CHECKED OFF ANY PROBLEMS, HOW DIFFICULT HAVE THESE MADE IT FOR YOU TO DO YOUR WORK, TAKE CARE OF THINGS AT HOME, OR GET ALONG WITH OTHER PEOPLE?: SOMEWHAT DIFFICULT
IF YOU CHECKED OFF ANY PROBLEMS ON THIS QUESTIONNAIRE, HOW DIFFICULT HAVE THESE PROBLEMS MADE IT FOR YOU TO DO YOUR WORK, TAKE CARE OF THINGS AT HOME, OR GET ALONG WITH OTHER PEOPLE: SOMEWHAT DIFFICULT
GAD7 TOTAL SCORE: 3

## 2024-04-15 ENCOUNTER — VIRTUAL VISIT (OUTPATIENT)
Dept: FAMILY MEDICINE | Facility: CLINIC | Age: 69
End: 2024-04-15
Payer: COMMERCIAL

## 2024-04-15 DIAGNOSIS — I49.5 SICK SINUS SYNDROME (H): ICD-10-CM

## 2024-04-15 DIAGNOSIS — F33.1 MODERATE RECURRENT MAJOR DEPRESSION (H): ICD-10-CM

## 2024-04-15 DIAGNOSIS — Z79.01 CHRONIC ANTICOAGULATION: ICD-10-CM

## 2024-04-15 DIAGNOSIS — F51.04 PSYCHOPHYSIOLOGICAL INSOMNIA: ICD-10-CM

## 2024-04-15 PROCEDURE — 99441 PR PHYSICIAN TELEPHONE EVALUATION 5-10 MIN: CPT | Mod: 93 | Performed by: FAMILY MEDICINE

## 2024-04-15 RX ORDER — BUPROPION HYDROCHLORIDE 150 MG/1
300 TABLET ORAL EVERY MORNING
Qty: 90 TABLET | Refills: 3 | Status: SHIPPED | OUTPATIENT
Start: 2024-04-15 | End: 2024-08-30

## 2024-04-15 RX ORDER — TRAZODONE HYDROCHLORIDE 50 MG/1
50 TABLET, FILM COATED ORAL AT BEDTIME
Qty: 90 TABLET | Refills: 3 | Status: SHIPPED | OUTPATIENT
Start: 2024-04-15 | End: 2024-04-17

## 2024-04-15 NOTE — PROGRESS NOTES
"Bisi is a 68 year old who is being evaluated via a billable telephone visit.      Originating Location (pt. Location): Home    Distant Location (provider location):  On-site    Assessment & Plan   Psychophysiological insomnia  - traZODone (DESYREL) 50 MG tablet  Dispense: 90 tablet; Refill: 3  May take 2 if one is not effective.    Moderate recurrent major depression (H)  In February and March, she reported the bupropion was working very well.  Talk with her  more thoroughly.  - buPROPion (WELLBUTRIN XL) 150 MG 24 hr tablet  Dispense: 90 tablet; Refill: 3  - traZODone (DESYREL) 50 MG tablet  Dispense: 90 tablet; Refill: 3    Chronic anticoagulation  Sick sinus syndrome (H)  Subjective   Bisi is a 68 year old, presenting for the following health issues:  Recheck Medication (Patient states new medication is not working)      4/15/2024    11:32 AM   Additional Questions   Roomed by Regina   Accompanied by N/A         4/15/2024    11:32 AM   Patient Reported Additional Medications   Patient reports taking the following new medications N/A     The pettiest things bother me. She tells herself to \"shut up and take a deep breath.\"   Example, on the motorcycle and it is time to go home, they are heading home and he takes a side trip. It made her mad, but she didn't say anything. They have been together 40 years and so she has given up. They were  for a year and he was better and just slowing going back to his ways.  Trying to thread a needle, is very frustrating now. Had cataract surgery and has bifocals.  Falls asleep, but wakes up two hours at a time.      History of Present Illness       Mental Health Follow-up:  Patient presents to follow-up on Depression & Anxiety.Patient's depression since last visit has been:  Worse  The patient is not having other symptoms associated with depression.  Patient's anxiety since last visit has been:  Worse  The patient is not having other symptoms associated with " anxiety.  Any significant life events: health concerns  Patient is not feeling anxious or having panic attacks.  Patient has no concerns about alcohol or drug use.    She eats 0-1 servings of fruits and vegetables daily.She consumes 1 sweetened beverage(s) daily.She exercises with enough effort to increase her heart rate 9 or less minutes per day.  She exercises with enough effort to increase her heart rate 3 or less days per week.   She is taking medications regularly.       Objective       Vitals:  No vitals were obtained today due to virtual visit.  Physical Exam   General: Alert and no distress //Respiratory: No audible wheeze, cough, or shortness of breath // Psychiatric:  Appropriate affect, tone, and pace of words      Phone call duration: 9:55 minutes  Signed Electronically by: CUCO ALEMAN MD

## 2024-04-16 LAB — INR HOME MONITORING: 3.7 (ref 2–3)

## 2024-04-17 ENCOUNTER — TELEPHONE (OUTPATIENT)
Dept: FAMILY MEDICINE | Facility: CLINIC | Age: 69
End: 2024-04-17
Payer: COMMERCIAL

## 2024-04-17 ENCOUNTER — ANTICOAGULATION THERAPY VISIT (OUTPATIENT)
Dept: ANTICOAGULATION | Facility: CLINIC | Age: 69
End: 2024-04-17
Payer: COMMERCIAL

## 2024-04-17 DIAGNOSIS — F51.04 PSYCHOPHYSIOLOGICAL INSOMNIA: ICD-10-CM

## 2024-04-17 DIAGNOSIS — Z95.2 H/O MECHANICAL AORTIC VALVE REPLACEMENT: Primary | ICD-10-CM

## 2024-04-17 DIAGNOSIS — F33.1 MODERATE RECURRENT MAJOR DEPRESSION (H): ICD-10-CM

## 2024-04-17 DIAGNOSIS — Z95.2 H/O AORTIC VALVE REPLACEMENT: ICD-10-CM

## 2024-04-17 RX ORDER — TRAZODONE HYDROCHLORIDE 50 MG/1
TABLET, FILM COATED ORAL AT BEDTIME
Qty: 90 TABLET | Refills: 3 | Status: CANCELLED | OUTPATIENT
Start: 2024-04-17

## 2024-04-17 RX ORDER — TRAZODONE HYDROCHLORIDE 50 MG/1
TABLET, FILM COATED ORAL
Qty: 90 TABLET | Refills: 3 | Status: SHIPPED | OUTPATIENT
Start: 2024-04-17 | End: 2024-07-29

## 2024-04-17 NOTE — PROGRESS NOTES
ANTICOAGULATION MANAGEMENT     Tammy Law 68 year old female is on warfarin with supratherapeutic INR result. (Goal INR 2.0-3.0)    Recent labs: (last 7 days)     04/16/24  0000   INR 3.7*       ASSESSMENT     Source(s): Chart Review and Patient/Caregiver Call     Warfarin doses taken: Warfarin taken as instructed  Diet: No new diet changes identified  Medication/supplement changes:  gabapentin  100 mg daily taper up on how it helps, bupropion increase dose, no interaction noted. Trazadone, she will get next week from mail order pharmacy. It will be taken PRN for sleep.  New illness, injury, or hospitalization: No  Signs or symptoms of bleeding or clotting: No  Previous result: Therapeutic last 2(+) visits  Additional findings: None  Patient is out of town Sunday through the 4/26/2024       PLAN     Recommended plan for no diet, medication or health factor changes affecting INR     Dosing Instructions: partial hold then decrease your warfarin dose (6.7% change) with next INR in 2 weeks       Summary  As of 4/17/2024      Full warfarin instructions:  4/17: 2.5 mg; Otherwise 5 mg every day   Next INR check:  4/30/2024               Telephone call with Bisi who agrees to plan and repeated back plan correctly    Patient to recheck with home meter    Education provided:   None required    Plan made per [facility] anticoagulation protocol    Jacqueline Juárez RN  Anticoagulation Clinic  4/17/2024    _______________________________________________________________________     Anticoagulation Episode Summary       Current INR goal:  2.0-3.0   TTR:  69.5% (1 y)   Target end date:  Indefinite   Send INR reminders to:  [facility]    Indications    H/O mechanical aortic valve replacement [Z95.2]  H/O aortic valve replacement [Z95.2]             Comments:  [employer] home monitor- managed by exception  Goal range changed during 8/28-9/15/23  hospitalization             Anticoagulation Care Providers        Provider Role Specialty Phone number    Arnold Anderson MD Referring Internal Medicine [phone]    Christina Hernandez MD Referring Family Medicine [phone]    Karina Cifuentes MD Referring Family Medicine [phone]

## 2024-04-17 NOTE — TELEPHONE ENCOUNTER
Please send in better directions on script per pharmacy.     Disp Refills Start End RUBENS   traZODone (DESYREL) 50 MG tablet 90 tablet 3 4/15/2024 -- No   Sig - Route: Take 1 tablet (50 mg) by mouth at bedtime May 2 if needed to sleep. - Oral   Sent to pharmacy as: traZODone HCl 50 MG Oral Tablet (DESYREL)   Class: E-Prescribe   Order: [id number]   E-Prescribing Status: Receipt confirmed by pharmacy (4/15/2024 12:24 PM CDT)

## 2024-04-30 ENCOUNTER — ANTICOAGULATION THERAPY VISIT (OUTPATIENT)
Dept: ANTICOAGULATION | Facility: CLINIC | Age: 69
End: 2024-04-30
Payer: COMMERCIAL

## 2024-04-30 DIAGNOSIS — Z95.2 H/O MECHANICAL AORTIC VALVE REPLACEMENT: Primary | ICD-10-CM

## 2024-04-30 DIAGNOSIS — Z95.2 H/O AORTIC VALVE REPLACEMENT: ICD-10-CM

## 2024-04-30 LAB — INR HOME MONITORING: 2.2 (ref 2–3)

## 2024-04-30 NOTE — PROGRESS NOTES
ANTICOAGULATION MANAGEMENT     Tammy Law 68 year old female is on warfarin with therapeutic INR result. (Goal INR 2.0-3.0)    Recent labs: (last 7 days)     04/30/24  0000   INR 2.2       ASSESSMENT     Source(s): Chart Review and Patient/Caregiver Call     Warfarin doses taken: Warfarin taken as instructed  Diet: Change in alcohol intake may be affecting INR. Was drinking alcohol daily while in [place] from 4/21 and just got back in [place] on 4/26th.   Medication/supplement changes: None noted  New illness, injury, or hospitalization: No  Signs or symptoms of bleeding or clotting: No  Previous result: Supratherapeutic  Additional findings:  Patient is okay to go back to [facility] is next INR is still within range.       PLAN     Recommended plan for temporary change(s) affecting INR     Dosing Instructions: Continue your current warfarin dose with next INR in 2 weeks       Summary  As of 4/30/2024      Full warfarin instructions:  5 mg every day   Next INR check:  5/14/2024               Telephone call with Bisi who verbalizes understanding and agrees to plan and who agrees to plan and repeated back plan correctly    Patient to recheck with home meter    Education provided:   Resume manage by exception with home monitor. Continue to submit INR results to home monitor company.You will only be called when your result is out of range. Please call and notify [facility] if new medication started, dose missed, signs or symptoms of bleeding or clotting, or a surgery/procedure is scheduled. Due for next call no later than: 7/29/24.  Contact [phone]  with any changes, questions or concerns.     Plan made per [facility] anticoagulation protocol    Carolynn Lucas RN  Anticoagulation Clinic  4/30/2024    _______________________________________________________________________     Anticoagulation Episode Summary       Current INR goal:  2.0-3.0   TTR:  67.8% (1 y)   Target end date:  Indefinite   Send INR reminders to:  [facility]    Indications    H/O mechanical aortic valve replacement [Z95.2]  H/O aortic valve replacement [Z95.2]             Comments:  [employer] home monitor- managed by exception  Goal range changed during 8/28-9/15/23  hospitalization             Anticoagulation Care Providers       Provider Role Specialty Phone number    Arnold Anderson MD Referring Internal Medicine [phone]    Christina Hernandez MD Referring Family Medicine [phone]    Karina Cifuentes MD Referring Family Medicine [phone]

## 2024-05-03 ENCOUNTER — THERAPY VISIT (OUTPATIENT)
Dept: PHYSICAL THERAPY | Facility: REHABILITATION | Age: 69
End: 2024-05-03
Payer: COMMERCIAL

## 2024-05-03 DIAGNOSIS — M54.81 CERVICO-OCCIPITAL NEURALGIA OF LEFT SIDE: Primary | ICD-10-CM

## 2024-05-03 DIAGNOSIS — M54.12 CERVICAL RADICULAR PAIN: ICD-10-CM

## 2024-05-03 PROCEDURE — 97110 THERAPEUTIC EXERCISES: CPT | Mod: GP | Performed by: PHYSICAL THERAPIST

## 2024-05-03 PROCEDURE — 97140 MANUAL THERAPY 1/> REGIONS: CPT | Mod: GP | Performed by: PHYSICAL THERAPIST

## 2024-05-08 ENCOUNTER — THERAPY VISIT (OUTPATIENT)
Dept: PHYSICAL THERAPY | Facility: REHABILITATION | Age: 69
End: 2024-05-08
Payer: COMMERCIAL

## 2024-05-08 DIAGNOSIS — Z95.2 H/O MECHANICAL AORTIC VALVE REPLACEMENT: ICD-10-CM

## 2024-05-08 DIAGNOSIS — M54.81 CERVICO-OCCIPITAL NEURALGIA OF LEFT SIDE: Primary | ICD-10-CM

## 2024-05-08 DIAGNOSIS — M54.12 CERVICAL RADICULAR PAIN: ICD-10-CM

## 2024-05-08 PROCEDURE — 97110 THERAPEUTIC EXERCISES: CPT | Mod: GP | Performed by: PHYSICAL THERAPIST

## 2024-05-08 PROCEDURE — 97140 MANUAL THERAPY 1/> REGIONS: CPT | Mod: GP | Performed by: PHYSICAL THERAPIST

## 2024-05-09 RX ORDER — WARFARIN SODIUM 2.5 MG/1
TABLET ORAL
Qty: 200 TABLET | Refills: 1 | Status: SHIPPED | OUTPATIENT
Start: 2024-05-09

## 2024-05-09 NOTE — TELEPHONE ENCOUNTER
ANTICOAGULATION MANAGEMENT:  Medication Refill    Anticoagulation Summary  As of 4/30/2024      Warfarin maintenance plan:  5 mg (2.5 mg x 2) every day   Next INR check:  5/14/2024   Target end date:  Indefinite    Indications    H/O mechanical aortic valve replacement [Z95.2]  H/O aortic valve replacement [Z95.2]                 Anticoagulation Care Providers       Provider Role Specialty Phone number    Arnold Anderson MD Referring Internal Medicine [phone]    Christina Hernandez MD Referring Family Medicine [phone]    Karina Cifuentes MD Referring Family Medicine [phone]            Refill Criteria    Visit with referring provider/group: Meets criteria: office visit within referring provider group in the last 1 year on 4/15/24    [facility] referral last signed: 02/15/2024; within last year: Yes    Lab monitoring not exceeding 2 weeks overdue: Yes    Tammy meets all criteria for refill. Rx instructions and quantity supplied updated to match patient's current dosing plan. Warfarin 90 day supply with 1 refill granted per [facility] protocol     Petrona Phan RN  Anticoagulation Clinic

## 2024-05-14 ENCOUNTER — DOCUMENTATION ONLY (OUTPATIENT)
Dept: ANTICOAGULATION | Facility: CLINIC | Age: 69
End: 2024-05-14
Payer: COMMERCIAL

## 2024-05-14 DIAGNOSIS — Z95.2 H/O MECHANICAL AORTIC VALVE REPLACEMENT: Primary | ICD-10-CM

## 2024-05-14 DIAGNOSIS — Z95.2 H/O AORTIC VALVE REPLACEMENT: ICD-10-CM

## 2024-05-14 LAB — INR HOME MONITORING: 2.5 (ref 2–3)

## 2024-05-14 NOTE — PROGRESS NOTES
ANTICOAGULATION  MANAGEMENT-Home Monitor Managed by Exception    Tammy M daryl 68 year old female is on warfarin with therapeutic INR result. (Goal INR 2.0-3.0)    Recent labs: (last 7 days)     05/14/24  0000   INR 2.5       Previous INR was Therapeutic  Medication, diet, health changes since last INR:chart reviewed; none identified  Contacted within the last 12 weeks by phone on 4/30/24  Last [facility] referral date: 02/15/2024      [facility]     Tammy was NOT contacted regarding therapeutic result today per home monitoring policy manage by exception agreement.   Current warfarin dose is to be continued:     Summary  As of 5/14/2024      Full warfarin instructions:  5 mg every day   Next INR check:  5/28/2024             ?   Александр White RN  Anticoagulation Clinic  5/14/2024    _______________________________________________________________________     Anticoagulation Episode Summary       Current INR goal:  2.0-3.0   TTR:  67.8% (1 y)   Target end date:  Indefinite   Send INR reminders to:  [facility]    Indications    H/O mechanical aortic valve replacement [Z95.2]  H/O aortic valve replacement [Z95.2]             Comments:  [employer] home monitor- managed by exception  Goal range changed during 8/28-9/15/23  hospitalization             Anticoagulation Care Providers       Provider Role Specialty Phone number    Arnold Anderson MD Referring Internal Medicine [phone]    Christina Hernandez MD Referring Family Medicine [phone]    Karina Cifuentes MD Referring Family Medicine [phone]

## 2024-05-24 ENCOUNTER — PATIENT OUTREACH (OUTPATIENT)
Dept: CARE COORDINATION | Facility: CLINIC | Age: 69
End: 2024-05-24
Payer: COMMERCIAL

## 2024-05-28 ENCOUNTER — DOCUMENTATION ONLY (OUTPATIENT)
Dept: ANTICOAGULATION | Facility: CLINIC | Age: 69
End: 2024-05-28
Payer: COMMERCIAL

## 2024-05-28 DIAGNOSIS — Z95.2 H/O AORTIC VALVE REPLACEMENT: ICD-10-CM

## 2024-05-28 DIAGNOSIS — Z95.2 H/O MECHANICAL AORTIC VALVE REPLACEMENT: Primary | ICD-10-CM

## 2024-05-28 LAB — INR HOME MONITORING: 2.3 (ref 2–3)

## 2024-05-28 NOTE — PROGRESS NOTES
ANTICOAGULATION  MANAGEMENT-Home Monitor Managed by Exception    Tammy Law 68 year old female is on warfarin with therapeutic INR result. (Goal INR 2.0-3.0)    Recent labs: (last 7 days)     05/28/24  0000   INR 2.3       Previous INR was Therapeutic  Medication, diet, health changes since last INR:chart reviewed; none identified  Contacted within the last 12 weeks by phone on 4/30/24  Last [facility] referral date: 02/15/2024      ZAIN     Tammy was NOT contacted regarding therapeutic result today per home monitoring policy manage by exception agreement.   Current warfarin dose is to be continued:     Summary  As of 5/28/2024      Full warfarin instructions:  5 mg every day   Next INR check:  6/11/2024             ?   Zulema Gregg RN  Anticoagulation Clinic  5/28/2024    _______________________________________________________________________     Anticoagulation Episode Summary       Current INR goal:  2.0-3.0   TTR:  67.8% (1 y)   Target end date:  Indefinite   Send INR reminders to:  [facility]    Indications    H/O mechanical aortic valve replacement [Z95.2]  H/O aortic valve replacement [Z95.2]             Comments:  [employer] home monitor- managed by exception  Goal range changed during 8/28-9/15/23  hospitalization             Anticoagulation Care Providers       Provider Role Specialty Phone number    Arnold Anderson MD Referring Internal Medicine [phone]    Christina Hernandez MD Referring Family Medicine [phone]    Karina Cifuentes MD Referring Family Medicine [phone]

## 2024-05-29 ENCOUNTER — THERAPY VISIT (OUTPATIENT)
Dept: PHYSICAL THERAPY | Facility: REHABILITATION | Age: 69
End: 2024-05-29
Payer: COMMERCIAL

## 2024-05-29 DIAGNOSIS — M54.81 CERVICO-OCCIPITAL NEURALGIA OF LEFT SIDE: Primary | ICD-10-CM

## 2024-05-29 DIAGNOSIS — M54.12 CERVICAL RADICULAR PAIN: ICD-10-CM

## 2024-05-29 PROCEDURE — 97140 MANUAL THERAPY 1/> REGIONS: CPT | Mod: GP | Performed by: PHYSICAL THERAPIST

## 2024-05-29 PROCEDURE — 97110 THERAPEUTIC EXERCISES: CPT | Mod: GP | Performed by: PHYSICAL THERAPIST

## 2024-06-11 ENCOUNTER — DOCUMENTATION ONLY (OUTPATIENT)
Dept: ANTICOAGULATION | Facility: CLINIC | Age: 69
End: 2024-06-11
Payer: COMMERCIAL

## 2024-06-11 DIAGNOSIS — Z95.2 H/O MECHANICAL AORTIC VALVE REPLACEMENT: Primary | ICD-10-CM

## 2024-06-11 DIAGNOSIS — Z95.2 H/O AORTIC VALVE REPLACEMENT: ICD-10-CM

## 2024-06-11 LAB — INR HOME MONITORING: 2.3 (ref 2–3)

## 2024-06-11 NOTE — PROGRESS NOTES
ANTICOAGULATION  MANAGEMENT-Home Monitor Managed by Exception    Tammy M daryl 68 year old female is on warfarin with therapeutic INR result. (Goal INR 2.0-3.0)    Recent labs: (last 7 days)     06/11/24  0000   INR 2.3       Previous INR was Therapeutic  Medication, diet, health changes since last INR:chart reviewed; none identified  Contacted within the last 12 weeks by phone on 4/30/24  Last [facility] referral date: 02/15/2024      ZAIN     Tammy was NOT contacted regarding therapeutic result today per home monitoring policy manage by exception agreement.   Current warfarin dose is to be continued:     Summary  As of 6/11/2024      Full warfarin instructions:  5 mg every day   Next INR check:  6/25/2024             ?   Александр White RN  Anticoagulation Clinic  6/11/2024    _______________________________________________________________________     Anticoagulation Episode Summary       Current INR goal:  2.0-3.0   TTR:  67.8% (1 y)   Target end date:  Indefinite   Send INR reminders to:  [facility]    Indications    H/O mechanical aortic valve replacement [Z95.2]  H/O aortic valve replacement [Z95.2]             Comments:  [employer] home monitor- managed by exception  Goal range changed during 8/28-9/15/23  hospitalization             Anticoagulation Care Providers       Provider Role Specialty Phone number    Arnold Anderson MD Referring Internal Medicine [phone]    Christina Hernandez MD Referring Family Medicine [phone]    Karina Cifuentes MD Referring Family Medicine [phone]

## 2024-06-12 ENCOUNTER — ANCILLARY PROCEDURE (OUTPATIENT)
Dept: CARDIOLOGY | Facility: CLINIC | Age: 69
End: 2024-06-12
Attending: INTERNAL MEDICINE
Payer: COMMERCIAL

## 2024-06-12 DIAGNOSIS — I49.5 SICK SINUS SYNDROME (H): ICD-10-CM

## 2024-06-12 DIAGNOSIS — Z95.0 CARDIAC PACEMAKER IN SITU: ICD-10-CM

## 2024-06-12 LAB
MDC_IDC_LEAD_CONNECTION_STATUS: NORMAL
MDC_IDC_LEAD_CONNECTION_STATUS: NORMAL
MDC_IDC_LEAD_IMPLANT_DT: NORMAL
MDC_IDC_LEAD_IMPLANT_DT: NORMAL
MDC_IDC_LEAD_LOCATION: NORMAL
MDC_IDC_LEAD_LOCATION: NORMAL
MDC_IDC_LEAD_LOCATION_DETAIL_1: NORMAL
MDC_IDC_LEAD_LOCATION_DETAIL_1: NORMAL
MDC_IDC_LEAD_MFG: NORMAL
MDC_IDC_LEAD_MFG: NORMAL
MDC_IDC_LEAD_MODEL: NORMAL
MDC_IDC_LEAD_MODEL: NORMAL
MDC_IDC_LEAD_POLARITY_TYPE: NORMAL
MDC_IDC_LEAD_POLARITY_TYPE: NORMAL
MDC_IDC_LEAD_SERIAL: NORMAL
MDC_IDC_LEAD_SERIAL: NORMAL
MDC_IDC_LEAD_SPECIAL_FUNCTION: NORMAL
MDC_IDC_LEAD_SPECIAL_FUNCTION: NORMAL
MDC_IDC_MSMT_BATTERY_DTM: NORMAL
MDC_IDC_MSMT_BATTERY_REMAINING_PERCENTAGE: 45 %
MDC_IDC_MSMT_BATTERY_STATUS: NORMAL
MDC_IDC_MSMT_LEADCHNL_RA_IMPEDANCE_VALUE: 644 OHM
MDC_IDC_MSMT_LEADCHNL_RA_LEAD_CHANNEL_STATUS: NORMAL
MDC_IDC_MSMT_LEADCHNL_RV_IMPEDANCE_VALUE: 546 OHM
MDC_IDC_MSMT_LEADCHNL_RV_LEAD_CHANNEL_STATUS: NORMAL
MDC_IDC_PG_IMPLANT_DTM: NORMAL
MDC_IDC_PG_MFG: NORMAL
MDC_IDC_PG_MODEL: NORMAL
MDC_IDC_PG_SERIAL: NORMAL
MDC_IDC_PG_TYPE: NORMAL
MDC_IDC_SESS_CLINIC_NAME: NORMAL
MDC_IDC_SESS_DTM: NORMAL
MDC_IDC_SESS_REPROGRAMMED: NO
MDC_IDC_SESS_TYPE: NORMAL
MDC_IDC_SET_BRADY_AT_MODE_SWITCH_MODE: NORMAL
MDC_IDC_SET_BRADY_AT_MODE_SWITCH_RATE: 160 {BEATS}/MIN
MDC_IDC_SET_BRADY_LOWRATE: 60 {BEATS}/MIN
MDC_IDC_SET_BRADY_MAX_SENSOR_RATE: 120 {BEATS}/MIN
MDC_IDC_SET_BRADY_MAX_TRACKING_RATE: 130 {BEATS}/MIN
MDC_IDC_SET_BRADY_MODE: NORMAL
MDC_IDC_SET_BRADY_PAV_DELAY_LOW: 200 MS
MDC_IDC_SET_BRADY_SAV_DELAY_LOW: 180 MS
MDC_IDC_SET_LEADCHNL_RA_PACING_AMPLITUDE: 1.6 V
MDC_IDC_SET_LEADCHNL_RA_PACING_POLARITY: NORMAL
MDC_IDC_SET_LEADCHNL_RA_PACING_PULSEWIDTH: 0.4 MS
MDC_IDC_SET_LEADCHNL_RA_SENSING_ADAPTATION_MODE: NORMAL
MDC_IDC_SET_LEADCHNL_RA_SENSING_POLARITY: NORMAL
MDC_IDC_SET_LEADCHNL_RV_PACING_AMPLITUDE: 2.4 V
MDC_IDC_SET_LEADCHNL_RV_PACING_POLARITY: NORMAL
MDC_IDC_SET_LEADCHNL_RV_PACING_PULSEWIDTH: 0.4 MS
MDC_IDC_SET_LEADCHNL_RV_SENSING_ADAPTATION_MODE: NORMAL
MDC_IDC_SET_LEADCHNL_RV_SENSING_POLARITY: NORMAL
MDC_IDC_STAT_AT_BURDEN_PERCENT: 0 %
MDC_IDC_STAT_AT_DTM_END: NORMAL
MDC_IDC_STAT_AT_DTM_START: NORMAL
MDC_IDC_STAT_AT_MODE_SW_COUNT_PER_DAY: 0
MDC_IDC_STAT_AT_MODE_SW_PERCENT_TIME_PER_DAY: 0 %
MDC_IDC_STAT_BRADY_AP_VP_PERCENT: 27 %
MDC_IDC_STAT_BRADY_AP_VS_PERCENT: 63 %
MDC_IDC_STAT_BRADY_AS_VP_PERCENT: 0 %
MDC_IDC_STAT_BRADY_AS_VS_PERCENT: 10 %
MDC_IDC_STAT_BRADY_DTM_END: NORMAL
MDC_IDC_STAT_BRADY_DTM_START: NORMAL
MDC_IDC_STAT_BRADY_RA_PERCENT_PACED: 90 %
MDC_IDC_STAT_BRADY_RV_PERCENT_PACED: 27 %
MDC_IDC_STAT_HEART_RATE_ATRIAL_MEAN: 70 {BEATS}/MIN
MDC_IDC_STAT_HEART_RATE_DTM_END: NORMAL
MDC_IDC_STAT_HEART_RATE_DTM_START: NORMAL
MDC_IDC_STAT_HEART_RATE_VENTRICULAR_MEAN: 70 {BEATS}/MIN

## 2024-06-12 PROCEDURE — 93296 REM INTERROG EVL PM/IDS: CPT | Performed by: INTERNAL MEDICINE

## 2024-06-12 PROCEDURE — 93294 REM INTERROG EVL PM/LDLS PM: CPT | Performed by: INTERNAL MEDICINE

## 2024-06-18 ENCOUNTER — THERAPY VISIT (OUTPATIENT)
Dept: PHYSICAL THERAPY | Facility: REHABILITATION | Age: 69
End: 2024-06-18
Payer: COMMERCIAL

## 2024-06-18 DIAGNOSIS — M54.12 CERVICAL RADICULAR PAIN: ICD-10-CM

## 2024-06-18 DIAGNOSIS — M54.81 CERVICO-OCCIPITAL NEURALGIA OF LEFT SIDE: Primary | ICD-10-CM

## 2024-06-18 PROCEDURE — 97110 THERAPEUTIC EXERCISES: CPT | Mod: GP | Performed by: PHYSICAL THERAPIST

## 2024-06-18 PROCEDURE — 97140 MANUAL THERAPY 1/> REGIONS: CPT | Mod: GP | Performed by: PHYSICAL THERAPIST

## 2024-06-21 ENCOUNTER — PATIENT OUTREACH (OUTPATIENT)
Dept: CARE COORDINATION | Facility: CLINIC | Age: 69
End: 2024-06-21
Payer: COMMERCIAL

## 2024-06-24 ENCOUNTER — DOCUMENTATION ONLY (OUTPATIENT)
Dept: ANTICOAGULATION | Facility: CLINIC | Age: 69
End: 2024-06-24
Payer: COMMERCIAL

## 2024-06-24 DIAGNOSIS — Z95.2 H/O MECHANICAL AORTIC VALVE REPLACEMENT: Primary | ICD-10-CM

## 2024-06-24 DIAGNOSIS — Z95.2 H/O AORTIC VALVE REPLACEMENT: ICD-10-CM

## 2024-06-24 LAB — INR HOME MONITORING: 2.5 (ref 2–3)

## 2024-06-24 NOTE — PROGRESS NOTES
ANTICOAGULATION  MANAGEMENT-Home Monitor Managed by Exception    Tammyrober Law 68 year old female is on warfarin with therapeutic INR result. (Goal INR 2.0-3.0)    Recent labs: (last 7 days)     06/24/24  0000   INR 2.5       Previous INR was Therapeutic  Medication, diet, health changes since last INR:chart reviewed; none identified  Contacted within the last 12 weeks by phone on 4/30/24  Last [facility] referral date: 02/15/2024      ZAIN     Tammy was NOT contacted regarding therapeutic result today per home monitoring policy manage by exception agreement.   Current warfarin dose is to be continued:     Summary  As of 6/24/2024      Full warfarin instructions:  5 mg every day   Next INR check:  7/8/2024             ?   Petrona Phan RN  Anticoagulation Clinic  6/24/2024    _______________________________________________________________________     Anticoagulation Episode Summary       Current INR goal:  2.0-3.0   TTR:  67.8% (1 y)   Target end date:  Indefinite   Send INR reminders to:  [facility]    Indications    H/O mechanical aortic valve replacement [Z95.2]  H/O aortic valve replacement [Z95.2]             Comments:  [employer] home monitor- managed by exception  Goal range changed during 8/28-9/15/23  hospitalization             Anticoagulation Care Providers       Provider Role Specialty Phone number    Arnold Anderson MD Referring Internal Medicine [phone]    Christina Hernandez MD Referring Family Medicine [phone]    Karina Cifuentes MD Referring Family Medicine [phone]

## 2024-06-25 ENCOUNTER — THERAPY VISIT (OUTPATIENT)
Dept: PHYSICAL THERAPY | Facility: REHABILITATION | Age: 69
End: 2024-06-25
Payer: COMMERCIAL

## 2024-06-25 ENCOUNTER — ANCILLARY PROCEDURE (OUTPATIENT)
Dept: MAMMOGRAPHY | Facility: CLINIC | Age: 69
End: 2024-06-25
Payer: COMMERCIAL

## 2024-06-25 DIAGNOSIS — M54.12 CERVICAL RADICULAR PAIN: ICD-10-CM

## 2024-06-25 DIAGNOSIS — Z12.31 VISIT FOR SCREENING MAMMOGRAM: ICD-10-CM

## 2024-06-25 DIAGNOSIS — M54.81 CERVICO-OCCIPITAL NEURALGIA OF LEFT SIDE: Primary | ICD-10-CM

## 2024-06-25 PROCEDURE — 77067 SCR MAMMO BI INCL CAD: CPT | Mod: TC | Performed by: RADIOLOGY

## 2024-06-25 PROCEDURE — 97140 MANUAL THERAPY 1/> REGIONS: CPT | Mod: GP | Performed by: PHYSICAL THERAPIST

## 2024-06-25 PROCEDURE — 77063 BREAST TOMOSYNTHESIS BI: CPT | Mod: TC | Performed by: RADIOLOGY

## 2024-06-25 NOTE — PROGRESS NOTES
06/25/24 0500   Appointment Info   Signing clinician's name / credentials Elizabeth Crandall DPT, PT   Total/Authorized Visits 8   Visits Used 6   Medical Diagnosis M54.81 (ICD-10-CM) - Cervico-occipital neuralgia of left side  M54.12 (ICD-10-CM) - Cervical radicular pain   PT Tx Diagnosis neck pain and L shoulder pain   Progress Note/Certification   Start of Care Date 04/10/24   Onset of illness/injury or Date of Surgery 04/04/24   Therapy Frequency 1 x weekly   Predicted Duration 12 weeks   Certification date from 04/10/24   Certification date to 07/03/24   Progress Note Due Date 07/03/24   PT Goal 1   Goal Identifier reaching up/out/behind back   Goal Description Patient will be able to improve reaching 3/10 or less.   Rationale to maximize safety and independence with performance of ADLs and functional tasks;to maximize safety and independence within the home;to maximize safety and independence within the community   Goal Progress in progress   Target Date 07/03/24   Date Met 06/25/24   PT Goal 2   Goal Identifier showering/dressing don or doff shirts   Goal Description Patient will be able to shower and dress complete all self cares without sx greater than 3/10.   Rationale to maximize safety and independence with performance of ADLs and functional tasks;to maximize safety and independence within the home;to maximize safety and independence within the community   Goal Progress in progress   Target Date 07/03/24   Date Met 06/25/24   PT Goal 3   Goal Identifier fishing/hobbies/gardening   Goal Description Patient will be able to complete leisure activities without mobility restriction or pain greater than 3/10.   Rationale to maximize safety and independence within the community;to maximize safety and independence within the home   Goal Progress in progress   Target Date 07/03/24   Date Met 06/25/24   PT Goal 4   Goal Identifier sleep, wakes due to pain   Goal Description Patient will be able to sleep 6-8 hours  without waking due to pain.   Rationale to maximize safety and independence with performance of ADLs and functional tasks;to maximize safety and independence within the home;to maximize safety and independence within the community   Goal Progress in progress   Target Date 07/03/24   Date Met 06/25/24   Subjective Report   Subjective Report Continues to feel pretty good. Not a fan of the new band exercises from last time. Minimal stiffness in neck upon waking.  Feels about 90% better since starting PT.   Therapeutic Procedure/Exercise   Therapeutic Procedures: strength, endurance, ROM, flexibility minutes (90663) 6   Ther Proc 1 UBE   Ther Proc 1 - Details 3'fwd/3 bkwds total 6 minutes, for improved axial rotation and subjective measures taken   PTRx Ther Proc 1 Shoulder Rolls   PTRx Ther Proc 1 - Details HEP   PTRx Ther Proc 2 Cervical Retraction With Patient Overpressure   PTRx Ther Proc 2 - Details HEP   PTRx Ther Proc 3 Scapular Retraction/Depression   PTRx Ther Proc 3 - Details HEP   PTRx Ther Proc 4 Wall Climb   PTRx Ther Proc 4 - Details HEP   PTRx Ther Proc 5 Upper Trapezius Stretch   PTRx Ther Proc 5 - Details verbal review   PTRx Ther Proc 6 Levator Scapulae Stretch   PTRx Ther Proc 6 - Details verbal review   PTRx Ther Proc 7 Shoulder Theraband Rows   PTRx Ther Proc 7 - Details verbal review, patient reports she will complete occasionally   PTRx Ther Proc 8 Shoulder Theraband Low Row/Pulldown   PTRx Ther Proc 8 - Details verbal review, patient reports she will complete occasionally   Skilled Intervention improved mobility, strength and function   Patient Response/Progress difficulty with postural correction   Manual Therapy   Manual Therapy: Mobilization, MFR, MLD, friction massage minutes (63674) 38   Manual Therapy 1 STW   Manual Therapy 1 - Details STM to shiv UT R>L, levator, suboccipitals genle cervical traction   Manual Therapy 2 gentle manual traction   Manual Therapy 2 - Details 2 rounds of 6 x 30  seconds   Skilled Intervention improved mobility, decreased pain   Patient Response/Progress improved cervical AROM following   Education   Learner/Method Patient;Listening;Demonstration;Pictures/Video   Plan   Home program PTRx   Plan for next session discharge to [facility], goals MET, patient is not interested in additional exercises; cat/cow, serratus press, potential discharge next visit   Total Session Time   Timed Code Treatment Minutes 44   Total Treatment Time (sum of timed and untimed services) 44         DISCHARGE  Reason for Discharge: Patient has met all goals.    Equipment Issued: thera band    Discharge Plan: Patient to continue home program.    Referring Provider:  Houston Lucio*

## 2024-07-08 LAB — INR HOME MONITORING: 2.7 (ref 2–3)

## 2024-07-09 ENCOUNTER — DOCUMENTATION ONLY (OUTPATIENT)
Dept: ANTICOAGULATION | Facility: CLINIC | Age: 69
End: 2024-07-09
Payer: COMMERCIAL

## 2024-07-09 DIAGNOSIS — Z95.2 H/O AORTIC VALVE REPLACEMENT: ICD-10-CM

## 2024-07-09 DIAGNOSIS — Z95.2 H/O MECHANICAL AORTIC VALVE REPLACEMENT: Primary | ICD-10-CM

## 2024-07-09 NOTE — PROGRESS NOTES
ANTICOAGULATION  MANAGEMENT-Home Monitor Managed by Exception    Tammyrober Law 68 year old female is on warfarin with therapeutic INR result. (Goal INR 2.0-3.0)    Recent labs: (last 7 days)     07/08/24  0000   INR 2.7       Previous INR was Therapeutic  Medication, diet, health changes since last INR:chart reviewed; none identified  Contacted within the last 12 weeks by phone on 4/30/24   Last [facility] referral date: 02/15/2024      ZAIN     Tammy was NOT contacted regarding therapeutic result today per home monitoring policy manage by exception agreement.   Current warfarin dose is to be continued:     Summary  As of 7/9/2024      Full warfarin instructions:  5 mg every day   Next INR check:  7/23/2024             ?   Sarah Gaona RN  Anticoagulation Clinic  7/9/2024    _______________________________________________________________________     Anticoagulation Episode Summary       Current INR goal:  2.0-3.0   TTR:  67.7% (1 y)   Target end date:  Indefinite   Send INR reminders to:  [facility]    Indications    H/O mechanical aortic valve replacement [Z95.2]  H/O aortic valve replacement [Z95.2]             Comments:  [employer] home monitor- managed by exception  Goal range changed during 8/28-9/15/23  hospitalization             Anticoagulation Care Providers       Provider Role Specialty Phone number    Arnold Anderson MD Referring Internal Medicine [phone]    Christina Hernandez MD Referring Family Medicine [phone]    Karina Cifuentes MD Referring Family Medicine [phone]

## 2024-07-12 ENCOUNTER — PATIENT OUTREACH (OUTPATIENT)
Dept: CARE COORDINATION | Facility: CLINIC | Age: 69
End: 2024-07-12
Payer: COMMERCIAL

## 2024-07-23 ENCOUNTER — DOCUMENTATION ONLY (OUTPATIENT)
Dept: ANTICOAGULATION | Facility: CLINIC | Age: 69
End: 2024-07-23
Payer: COMMERCIAL

## 2024-07-23 DIAGNOSIS — Z95.2 H/O MECHANICAL AORTIC VALVE REPLACEMENT: Primary | ICD-10-CM

## 2024-07-23 DIAGNOSIS — Z95.2 H/O AORTIC VALVE REPLACEMENT: ICD-10-CM

## 2024-07-23 LAB — INR HOME MONITORING: 2.6 (ref 2–3)

## 2024-07-23 NOTE — PROGRESS NOTES
ANTICOAGULATION  MANAGEMENT-Home Monitor Managed by Exception    Tammyrober Law 68 year old female is on warfarin with therapeutic INR result. (Goal INR 2.0-3.0)    Recent labs: (last 7 days)     07/23/24  0000   INR 2.6       Previous INR was Therapeutic  Medication, diet, health changes since last INR:chart reviewed; none identified  Contacted within the last 12 weeks by phone on        4/30/24   Due for next call no later than: 7/29/24.   Last [facility] referral date: 02/15/2024      ZAIN Munoz was NOT contacted regarding therapeutic result today per home monitoring policy manage by exception agreement.   Current warfarin dose is to be continued:     Summary  As of 7/23/2024      Full warfarin instructions:  5 mg every day   Next INR check:  8/6/2024             ?   Sarah Gaona RN  Anticoagulation Clinic  7/23/2024    _______________________________________________________________________     Anticoagulation Episode Summary       Current INR goal:  2.0-3.0   TTR:  67.8% (1 y)   Target end date:  Indefinite   Send INR reminders to:  [facility]    Indications    H/O mechanical aortic valve replacement [Z95.2]  H/O aortic valve replacement [Z95.2]             Comments:  [employer] home monitor- managed by exception  Goal range changed during 8/28-9/15/23  hospitalization             Anticoagulation Care Providers       Provider Role Specialty Phone number    Arnold Anderson MD Referring Internal Medicine [phone]    Christina Hernandez MD Referring Family Medicine [phone]    Karina Cifuentes MD Referring Family Medicine [phone]

## 2024-07-26 ENCOUNTER — PATIENT OUTREACH (OUTPATIENT)
Dept: CARE COORDINATION | Facility: CLINIC | Age: 69
End: 2024-07-26
Payer: COMMERCIAL

## 2024-07-29 ENCOUNTER — OFFICE VISIT (OUTPATIENT)
Dept: CARDIOLOGY | Facility: CLINIC | Age: 69
End: 2024-07-29
Attending: INTERNAL MEDICINE
Payer: COMMERCIAL

## 2024-07-29 VITALS
SYSTOLIC BLOOD PRESSURE: 118 MMHG | DIASTOLIC BLOOD PRESSURE: 62 MMHG | RESPIRATION RATE: 15 BRPM | BODY MASS INDEX: 22.88 KG/M2 | WEIGHT: 134 LBS | HEART RATE: 69 BPM | HEIGHT: 64 IN

## 2024-07-29 DIAGNOSIS — I49.5 SICK SINUS SYNDROME (H): ICD-10-CM

## 2024-07-29 DIAGNOSIS — Z95.0 CARDIAC PACEMAKER IN SITU: ICD-10-CM

## 2024-07-29 DIAGNOSIS — R53.83 FATIGUE, UNSPECIFIED TYPE: ICD-10-CM

## 2024-07-29 DIAGNOSIS — I05.1 RHEUMATIC MITRAL REGURGITATION: ICD-10-CM

## 2024-07-29 DIAGNOSIS — Z95.2 S/P AVR (AORTIC VALVE REPLACEMENT): Primary | ICD-10-CM

## 2024-07-29 PROCEDURE — 99214 OFFICE O/P EST MOD 30 MIN: CPT | Performed by: INTERNAL MEDICINE

## 2024-07-29 NOTE — PATIENT INSTRUCTIONS
Continue current medications  Set up for echocardiogram to re-look at heart valve. Further recommendations to follow.

## 2024-07-29 NOTE — PROGRESS NOTES
Thank you, Dr. Karina Cifuentes, for asking the [facility] team to see Ms. Tammy Law to follow-up on mechanical aortic valve replacement, permanent pacemaker implant for sinus node dysfunction.    Assessment/Recommendations   Assessment:    1.  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  Her last echocardiogram demonstrated mild aortic insufficiency but no evidence of stenosis.  This could suggest beginning of prosthetic valve malfunction.  Recommended follow-up echocardiogram to reassess.  Patient reports stable INR's over the past year with some tendency for high levels but no subtherapeutic levels.  2.  Rheumatic mitral valve stenosis/regurgitation, mild by echocardiogram last year.  Again will reassess her mitral valve with repeat echocardiogram.  3.  Sick sinus syndrome, status post dual-chamber pacemaker insertion.  Device check today demonstrates normal device operation with no evidence of atrial or ventricular arrhythmias.  4.  Fatigue, likely multifactorial.  She reports no symptoms of exertional chest discomfort or dyspnea.  Her last stress study 2 years ago was negative for ischemia.    Plan:  1.  Continue current medications  2.  Schedule echocardiogram with further recommendations to follow       History of Present Illness    Ms. Tammy Law is a 68 year old female with history of rheumatic mitral and aortic valve disease status post mechanical mitral valve replacement in 1994 with [employer] prosthesis, sick sinus syndrome status post dual-chamber pacemaker insertion in 2016 who presents to the office today for follow-up visit.  At the time of her visit last year, she did report symptoms of fatigue and subsequently had an echocardiogram done demonstrating normal prosthetic valve function although there was evidence of mild aortic insufficiency by color flow.  Her mitral stenosis remain mild.  No  "clear cardiac etiology for her symptoms.  She continues to report some fatigue as well as some weight loss.  Admits she does not eat a lot during the day due to lack of appetite.  Does remain active doing yard work with no report of any chest discomfort or significant shortness of breath.  Denies orthopnea, PND or lower extremity edema.    ECG (personally reviewed): No ECG today.  Pacemaker check was performed in conjunction with her visit and is reported separately.  In summary, no evidence of atrial or ventricular arrhythmias.  Lead and battery status stable.  No programming changes made.    Cardiac Imaging Studies (personally reviewed): No recent echo     Physical Examination Review of Systems   /62 (BP Location: Right arm, Patient Position: Sitting, Cuff Size: Adult Regular)   Pulse 69   Resp 15   Ht 1.626 m (5' 4\")   Wt 60.8 kg (134 lb)   LMP 12/13/2000 (Approximate)   BMI 23.00 kg/m    Body mass index is 23 kg/m .  Wt Readings from Last 3 Encounters:   07/29/24 60.8 kg (134 lb)   02/14/24 61.1 kg (134 lb 11.2 oz)   12/13/23 60.8 kg (134 lb)     General Appearance:   Awake, Alert, No acute distress.   HEENT:  No scleral icterus; the mucous membranes were pink and moist.   Neck: No cervical bruits or jugular venous distention    Chest: The spine was straight. The chest was symmetric.   Lungs:   Respirations unlabored; the lungs are clear to auscultation. No wheezing   Cardiovascular:   Regular rate and rhythm.  S1 normal, S2 crisp and mechanical.  1/6 to 2/6 systolic flow murmur.  No diastolic murmur heard.   Abdomen:  No organomegaly, masses, bruits, or tenderness. Bowels sounds are present   Extremities: No peripheral edema bilaterally   Skin: No xanthelasma. Warm, Dry.   Musculoskeletal: No tenderness.   Neurologic: Mood and affect are appropriate.    Enc Vitals  BP: 118/62  Pulse: 69  Resp: 15  Weight: 60.8 kg (134 lb) (With shoes.)  Height: 162.6 cm (5' 4\")                                     "     Medical History  Surgical History Family History Social History   Past Medical History:   Diagnosis Date    Anxiety     Chronic anticoagulation     Depression     Disease of thyroid gland     Hypothyroidism    High cholesterol     Hyperlipidemia     Hypertension     Hypothyroidism     Pacemaker     biotronik    SSS (sick sinus syndrome) (H) 2/4/2020    Past Surgical History:   Procedure Laterality Date    AORTIC VALVE REPLACEMENT      APPENDECTOMY      BIOPSY BREAST Left 2015    BREAST CYST EXCISION      CARDIAC CATHETERIZATION      HC PART REMV BONE METATARSAL HEAD,EA Right 06/23/2017    Procedure: EXOSTECTOMY 2ND METATARSAL RIGHT FOOT;  Surgeon: Jessee Mccauley DPM;  [facility];  Service: Podiatry    HYSTERECTOMY  2000    IMPLANT PACEMAKER      IMPLANT PACEMAKER      MIDLINE SINGLE LUMEN PLACEMENT  10/13/2023    OOPHORECTOMY  2000    OTHER SURGICAL HISTORY      Hemmorhoidectomy    RELEASE CARPAL TUNNEL Bilateral     TOE SURGERY      TYMPANOSTOMY TUBE PLACEMENT      ZZC REPLACE AORT VALV,PROSTH VALV      Description: Aortic Valve Replacement;  Proc Date: 01/01/1995;    Family History   Problem Relation Age of Onset    Cancer Paternal Grandfather         Stomach    Ovarian Cancer Cousin     Pacemaker Mother     Diabetes Mother     Coronary Artery Disease Father     Pacemaker Father     Diabetes Father     Prostate Cancer Father     Depression Sister     Thyroid Disease Sister     No Known Problems Daughter     No Known Problems Maternal Grandmother     Colon Cancer Maternal Grandfather     No Known Problems Paternal Grandmother     No Known Problems Maternal Aunt     No Known Problems Paternal Aunt     Kidney Cancer Brother     Coronary Artery Disease Brother     Prostate Cancer Brother     Depression Sister     Thyroid Disease Sister     Thyroid Disease Brother     Hereditary Breast and Ovarian Cancer Syndrome No family hx of     Breast Cancer No family hx of     Endometrial Cancer No  family hx of     Social History     Socioeconomic History    Marital status: Single     Spouse name: Not on file    Number of children: 0    Years of education: Not on file    Highest education level: Not on file   Occupational History    Not on file   Tobacco Use    Smoking status: Never    Smokeless tobacco: Never   Vaping Use    Vaping status: Never Used   Substance and Sexual Activity    Alcohol use: Yes     Comment: Alcoholic Drinks/day: twice per year    Drug use: No    Sexual activity: Not Currently     Birth control/protection: None   Other Topics Concern    Parent/sibling w/ CABG, MI or angioplasty before 65F 55M? No   Social History Narrative    Not on file     Social Determinants of Health     Financial Resource Strain: Low Risk  (12/6/2023)    Financial Resource Strain     Within the past 12 months, have you or your family members you live with been unable to get utilities (heat, electricity) when it was really needed?: No   Food Insecurity: Low Risk  (12/6/2023)    Food Insecurity     Within the past 12 months, did you worry that your food would run out before you got money to buy more?: No     Within the past 12 months, did the food you bought just not last and you didn t have money to get more?: No   Transportation Needs: Low Risk  (12/6/2023)    Transportation Needs     Within the past 12 months, has lack of transportation kept you from medical appointments, getting your medicines, non-medical meetings or appointments, work, or from getting things that you need?: No   Physical Activity: Not on file   Stress: Not on file   Social Connections: Not on file   Interpersonal Safety: Low Risk  (2/14/2024)    Interpersonal Safety     Do you feel physically and emotionally safe where you currently live?: Yes     Within the past 12 months, have you been hit, slapped, kicked or otherwise physically hurt by someone?: No     Within the past 12 months, have you been humiliated or emotionally abused in other ways  by your partner or ex-partner?: No   Housing Stability: Low Risk  (12/6/2023)    Housing Stability     Do you have housing? : Yes     Are you worried about losing your housing?: No          Medications  Allergies   Current Outpatient Medications   Medication Sig Dispense Refill    alendronate (FOSAMAX) 70 MG tablet Take 1 tablet (70 mg) by mouth every 7 days 13 tablet 3    atorvastatin (LIPITOR) 40 MG tablet Take 1 tablet (40 mg) by mouth daily 90 tablet 3    buPROPion (WELLBUTRIN XL) 150 MG 24 hr tablet Take 2 tablets (300 mg) by mouth every morning 90 tablet 3    calcium carbonate 500 mg, elemental, (OSCAL 500) 1250 (500 Ca) MG TABS tablet Take 1 tablet by mouth daily Take 1 tab by mouth every am      furosemide (LASIX) 40 MG tablet Take 1 tablet (40 mg) by mouth daily as needed 90 tablet 1    levothyroxine (SYNTHROID/LEVOTHROID) 88 MCG tablet Take 1 tablet (88 mcg) by mouth daily 90 tablet 3    vitamin D3 (CHOLECALCIFEROL) 50 mcg (2000 units) tablet Take 1 tablet by mouth daily      warfarin ANTICOAGULANT (COUMADIN) 2.5 MG tablet Take 2 tablets (5 mg) by mouth every Day or as directed by your [facility] team based on INR results. 200 tablet 1      Allergies   Allergen Reactions    Demerol [Meperidine]      Hallucinations      Misc. Sulfonamide Containing Compounds Hives    Morphine     Sulfa Antibiotics          Lab Results    Chemistry/lipid CBC Cardiac Enzymes/BNP/TSH/INR   Recent Labs   Lab Test 02/14/24  1126   TRIG 76   LDL 70   BUN 15.0      CO2 29    Recent Labs   Lab Test 02/14/24  1126   WBC 6.5   HGB 13.3   HCT 41.6   MCV 82       Recent Labs   Lab Test 07/23/24  0000 02/20/24  0000 02/14/24  1126 02/12/20  0846 01/06/20  1520   TROPONINI  --   --   --   --  <0.01   TSH  --   --  0.19*   < >  --    INR 2.6   < >  --    < > 2.30*    < > = values in this interval not displayed.        A total of 32 minutes was spent reviewing patient's medical records, obtaining history and performing examination,  as well as discussing diagnoses/ recommendations with patient and answering all questions.

## 2024-07-29 NOTE — LETTER
7/29/2024    CUCO CIFUENTES MD  [address] [facility] [address]    RE: Tammy Law       Dear Colleague,     I had the pleasure of seeing Tammy Lwa in the [facility].      Thank you, Dr. Cuco Cifuentes, for asking the [facility] team to see Ms. Tammy Law to follow-up on mechanical aortic valve replacement, permanent pacemaker implant for sinus node dysfunction.    Assessment/Recommendations   Assessment:    1.  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  Her last echocardiogram demonstrated mild aortic insufficiency but no evidence of stenosis.  This could suggest beginning of prosthetic valve malfunction.  Recommended follow-up echocardiogram to reassess.  Patient reports stable INR's over the past year with some tendency for high levels but no subtherapeutic levels.  2.  Rheumatic mitral valve stenosis/regurgitation, mild by echocardiogram last year.  Again will reassess her mitral valve with repeat echocardiogram.  3.  Sick sinus syndrome, status post dual-chamber pacemaker insertion.  Device check today demonstrates normal device operation with no evidence of atrial or ventricular arrhythmias.  4.  Fatigue, likely multifactorial.  She reports no symptoms of exertional chest discomfort or dyspnea.  Her last stress study 2 years ago was negative for ischemia.    Plan:  1.  Continue current medications  2.  Schedule echocardiogram with further recommendations to follow       History of Present Illness    Ms. Tammy Law is a 68 year old female with history of rheumatic mitral and aortic valve disease status post mechanical mitral valve replacement in 1994 with St Daron prosthesis, sick sinus syndrome status post dual-chamber pacemaker insertion in 2016 who presents to the office today for follow-up visit.  At the time of her visit last year, she did report symptoms  "of fatigue and subsequently had an echocardiogram done demonstrating normal prosthetic valve function although there was evidence of mild aortic insufficiency by color flow.  Her mitral stenosis remain mild.  No clear cardiac etiology for her symptoms.  She continues to report some fatigue as well as some weight loss.  Admits she does not eat a lot during the day due to lack of appetite.  Does remain active doing yard work with no report of any chest discomfort or significant shortness of breath.  Denies orthopnea, PND or lower extremity edema.    ECG (personally reviewed): No ECG today.  Pacemaker check was performed in conjunction with her visit and is reported separately.  In summary, no evidence of atrial or ventricular arrhythmias.  Lead and battery status stable.  No programming changes made.    Cardiac Imaging Studies (personally reviewed): No recent echo     Physical Examination Review of Systems   /62 (BP Location: Right arm, Patient Position: Sitting, Cuff Size: Adult Regular)   Pulse 69   Resp 15   Ht 1.626 m (5' 4\")   Wt 60.8 kg (134 lb)   LMP 12/13/2000 (Approximate)   BMI 23.00 kg/m    Body mass index is 23 kg/m .  Wt Readings from Last 3 Encounters:   07/29/24 60.8 kg (134 lb)   02/14/24 61.1 kg (134 lb 11.2 oz)   12/13/23 60.8 kg (134 lb)     General Appearance:   Awake, Alert, No acute distress.   HEENT:  No scleral icterus; the mucous membranes were pink and moist.   Neck: No cervical bruits or jugular venous distention    Chest: The spine was straight. The chest was symmetric.   Lungs:   Respirations unlabored; the lungs are clear to auscultation. No wheezing   Cardiovascular:   Regular rate and rhythm.  S1 normal, S2 crisp and mechanical.  1/6 to 2/6 systolic flow murmur.  No diastolic murmur heard.   Abdomen:  No organomegaly, masses, bruits, or tenderness. Bowels sounds are present   Extremities: No peripheral edema bilaterally   Skin: No xanthelasma. Warm, Dry.   Musculoskeletal: " "No tenderness.   Neurologic: Mood and affect are appropriate.    Enc Vitals  BP: 118/62  Pulse: 69  Resp: 15  Weight: 60.8 kg (134 lb) (With shoes.)  Height: 162.6 cm (5' 4\")                                         Medical History  Surgical History Family History Social History   Past Medical History:   Diagnosis Date    Anxiety     Chronic anticoagulation     Depression     Disease of thyroid gland     Hypothyroidism    High cholesterol     Hyperlipidemia     Hypertension     Hypothyroidism     Pacemaker     biotronik    SSS (sick sinus syndrome) (H) 2/4/2020    Past Surgical History:   Procedure Laterality Date    AORTIC VALVE REPLACEMENT      APPENDECTOMY      BIOPSY BREAST Left 2015    BREAST CYST EXCISION      CARDIAC CATHETERIZATION      HC PART REMV BONE METATARSAL HEAD,EA Right 06/23/2017    Procedure: EXOSTECTOMY 2ND METATARSAL RIGHT FOOT;  Surgeon: Jessee Mccauley DPM;  Location: [facility];  Service: Podiatry    HYSTERECTOMY  2000    IMPLANT PACEMAKER      IMPLANT PACEMAKER      MIDLINE SINGLE LUMEN PLACEMENT  10/13/2023    OOPHORECTOMY  2000    OTHER SURGICAL HISTORY      Hemmorhoidectomy    RELEASE CARPAL TUNNEL Bilateral     TOE SURGERY      TYMPANOSTOMY TUBE PLACEMENT      ZZC REPLACE AORT VALV,PROSTH VALV      Description: Aortic Valve Replacement;  Proc Date: 01/01/1995;    Family History   Problem Relation Age of Onset    Cancer Paternal Grandfather         Stomach    Ovarian Cancer Cousin     Pacemaker Mother     Diabetes Mother     Coronary Artery Disease Father     Pacemaker Father     Diabetes Father     Prostate Cancer Father     Depression Sister     Thyroid Disease Sister     No Known Problems Daughter     No Known Problems Maternal Grandmother     Colon Cancer Maternal Grandfather     No Known Problems Paternal Grandmother     No Known Problems Maternal Aunt     No Known Problems Paternal Aunt     Kidney Cancer Brother     Coronary Artery Disease Brother     Prostate Cancer " Brother     Depression Sister     Thyroid Disease Sister     Thyroid Disease Brother     Hereditary Breast and Ovarian Cancer Syndrome No family hx of     Breast Cancer No family hx of     Endometrial Cancer No family hx of     Social History     Socioeconomic History    Marital status: Single     Spouse name: Not on file    Number of children: 0    Years of education: Not on file    Highest education level: Not on file   Occupational History    Not on file   Tobacco Use    Smoking status: Never    Smokeless tobacco: Never   Vaping Use    Vaping status: Never Used   Substance and Sexual Activity    Alcohol use: Yes     Comment: Alcoholic Drinks/day: twice per year    Drug use: No    Sexual activity: Not Currently     Birth control/protection: None   Other Topics Concern    Parent/sibling w/ CABG, MI or angioplasty before 65F 55M? No   Social History Narrative    Not on file     Social Determinants of Health     Financial Resource Strain: Low Risk  (12/6/2023)    Financial Resource Strain     Within the past 12 months, have you or your family members you live with been unable to get utilities (heat, electricity) when it was really needed?: No   Food Insecurity: Low Risk  (12/6/2023)    Food Insecurity     Within the past 12 months, did you worry that your food would run out before you got money to buy more?: No     Within the past 12 months, did the food you bought just not last and you didn t have money to get more?: No   Transportation Needs: Low Risk  (12/6/2023)    Transportation Needs     Within the past 12 months, has lack of transportation kept you from medical appointments, getting your medicines, non-medical meetings or appointments, work, or from getting things that you need?: No   Physical Activity: Not on file   Stress: Not on file   Social Connections: Not on file   Interpersonal Safety: Low Risk  (2/14/2024)    Interpersonal Safety     Do you feel physically and emotionally safe where you currently  live?: Yes     Within the past 12 months, have you been hit, slapped, kicked or otherwise physically hurt by someone?: No     Within the past 12 months, have you been humiliated or emotionally abused in other ways by your partner or ex-partner?: No   Housing Stability: Low Risk  (12/6/2023)    Housing Stability     Do you have housing? : Yes     Are you worried about losing your housing?: No          Medications  Allergies   Current Outpatient Medications   Medication Sig Dispense Refill    alendronate (FOSAMAX) 70 MG tablet Take 1 tablet (70 mg) by mouth every 7 days 13 tablet 3    atorvastatin (LIPITOR) 40 MG tablet Take 1 tablet (40 mg) by mouth daily 90 tablet 3    buPROPion (WELLBUTRIN XL) 150 MG 24 hr tablet Take 2 tablets (300 mg) by mouth every morning 90 tablet 3    calcium carbonate 500 mg, elemental, (OSCAL 500) 1250 (500 Ca) MG TABS tablet Take 1 tablet by mouth daily Take 1 tab by mouth every am      furosemide (LASIX) 40 MG tablet Take 1 tablet (40 mg) by mouth daily as needed 90 tablet 1    levothyroxine (SYNTHROID/LEVOTHROID) 88 MCG tablet Take 1 tablet (88 mcg) by mouth daily 90 tablet 3    vitamin D3 (CHOLECALCIFEROL) 50 mcg (2000 units) tablet Take 1 tablet by mouth daily      warfarin ANTICOAGULANT (COUMADIN) 2.5 MG tablet Take 2 tablets (5 mg) by mouth every Day or as directed by your ACC team based on INR results. 200 tablet 1      Allergies   Allergen Reactions    Demerol [Meperidine]      Hallucinations      Misc. Sulfonamide Containing Compounds Hives    Morphine     Sulfa Antibiotics          Lab Results    Chemistry/lipid CBC Cardiac Enzymes/BNP/TSH/INR   Recent Labs   Lab Test 02/14/24  1126   TRIG 76   LDL 70   BUN 15.0      CO2 29    Recent Labs   Lab Test 02/14/24  1126   WBC 6.5   HGB 13.3   HCT 41.6   MCV 82       Recent Labs   Lab Test 07/23/24  0000 02/20/24  0000 02/14/24  1126 02/12/20  0846 01/06/20  1520   TROPONINI  --   --   --   --  <0.01   TSH  --   --  0.19*    < >  --    INR 2.6   < >  --    < > 2.30*    < > = values in this interval not displayed.        A total of 32 minutes was spent reviewing patient's medical records, obtaining history and performing examination, as well as discussing diagnoses/ recommendations with patient and answering all questions.                        Thank you for allowing me to participate in the care of your patient.      Sincerely,     Kirsten Vora MD     [facility]  cc:   Kt Murray MD  [address] [address]

## 2024-07-30 LAB
MDC_IDC_EPISODE_DTM: NORMAL
MDC_IDC_EPISODE_ID: 31
MDC_IDC_EPISODE_TYPE: NORMAL
MDC_IDC_EPISODE_TYPE_INDUCED: NO
MDC_IDC_EPISODE_VENDOR_TYPE: NORMAL
MDC_IDC_LEAD_CONNECTION_STATUS: NORMAL
MDC_IDC_LEAD_CONNECTION_STATUS: NORMAL
MDC_IDC_LEAD_IMPLANT_DT: NORMAL
MDC_IDC_LEAD_IMPLANT_DT: NORMAL
MDC_IDC_LEAD_LOCATION: NORMAL
MDC_IDC_LEAD_LOCATION: NORMAL
MDC_IDC_LEAD_LOCATION_DETAIL_1: NORMAL
MDC_IDC_LEAD_LOCATION_DETAIL_1: NORMAL
MDC_IDC_LEAD_MFG: NORMAL
MDC_IDC_LEAD_MFG: NORMAL
MDC_IDC_LEAD_MODEL: NORMAL
MDC_IDC_LEAD_MODEL: NORMAL
MDC_IDC_LEAD_POLARITY_TYPE: NORMAL
MDC_IDC_LEAD_POLARITY_TYPE: NORMAL
MDC_IDC_LEAD_SERIAL: NORMAL
MDC_IDC_LEAD_SERIAL: NORMAL
MDC_IDC_LEAD_SPECIAL_FUNCTION: NORMAL
MDC_IDC_LEAD_SPECIAL_FUNCTION: NORMAL
MDC_IDC_MSMT_BATTERY_DTM: NORMAL
MDC_IDC_MSMT_BATTERY_REMAINING_PERCENTAGE: 45 %
MDC_IDC_MSMT_BATTERY_STATUS: NORMAL
MDC_IDC_MSMT_LEADCHNL_RA_IMPEDANCE_VALUE: 663 OHM
MDC_IDC_MSMT_LEADCHNL_RA_IMPEDANCE_VALUE: 663 OHM
MDC_IDC_MSMT_LEADCHNL_RA_LEAD_CHANNEL_STATUS: NORMAL
MDC_IDC_MSMT_LEADCHNL_RA_PACING_THRESHOLD_AMPLITUDE: 0.9 V
MDC_IDC_MSMT_LEADCHNL_RA_PACING_THRESHOLD_AMPLITUDE: 1 V
MDC_IDC_MSMT_LEADCHNL_RA_PACING_THRESHOLD_PULSEWIDTH: 0.4 MS
MDC_IDC_MSMT_LEADCHNL_RA_PACING_THRESHOLD_PULSEWIDTH: 0.4 MS
MDC_IDC_MSMT_LEADCHNL_RA_SENSING_INTR_AMPL: 6.3 MV
MDC_IDC_MSMT_LEADCHNL_RV_IMPEDANCE_VALUE: 565 OHM
MDC_IDC_MSMT_LEADCHNL_RV_IMPEDANCE_VALUE: 565 OHM
MDC_IDC_MSMT_LEADCHNL_RV_LEAD_CHANNEL_STATUS: NORMAL
MDC_IDC_MSMT_LEADCHNL_RV_PACING_THRESHOLD_AMPLITUDE: 1 V
MDC_IDC_MSMT_LEADCHNL_RV_PACING_THRESHOLD_PULSEWIDTH: 0.4 MS
MDC_IDC_MSMT_LEADCHNL_RV_SENSING_INTR_AMPL: 10 MV
MDC_IDC_PG_IMPLANT_DTM: NORMAL
MDC_IDC_PG_MFG: NORMAL
MDC_IDC_PG_MODEL: NORMAL
MDC_IDC_PG_SERIAL: NORMAL
MDC_IDC_PG_TYPE: NORMAL
MDC_IDC_SESS_CLINIC_NAME: NORMAL
MDC_IDC_SESS_DTM: NORMAL
MDC_IDC_SESS_TYPE: NORMAL
MDC_IDC_SET_BRADY_AT_MODE_SWITCH_MODE: NORMAL
MDC_IDC_SET_BRADY_AT_MODE_SWITCH_RATE: 160 {BEATS}/MIN
MDC_IDC_SET_BRADY_LOWRATE: 60 {BEATS}/MIN
MDC_IDC_SET_BRADY_MAX_SENSOR_RATE: 120 {BEATS}/MIN
MDC_IDC_SET_BRADY_MAX_TRACKING_RATE: 130 {BEATS}/MIN
MDC_IDC_SET_BRADY_MODE: NORMAL
MDC_IDC_SET_BRADY_PAV_DELAY_HIGH: 200 MS
MDC_IDC_SET_BRADY_PAV_DELAY_LOW: 200 MS
MDC_IDC_SET_BRADY_SAV_DELAY_HIGH: 180 MS
MDC_IDC_SET_BRADY_SAV_DELAY_LOW: 180 MS
MDC_IDC_SET_LEADCHNL_RA_PACING_AMPLITUDE: 1.6 V
MDC_IDC_SET_LEADCHNL_RA_PACING_CAPTURE_MODE: NORMAL
MDC_IDC_SET_LEADCHNL_RA_PACING_POLARITY: NORMAL
MDC_IDC_SET_LEADCHNL_RA_PACING_PULSEWIDTH: 0.4 MS
MDC_IDC_SET_LEADCHNL_RA_SENSING_ADAPTATION_MODE: NORMAL
MDC_IDC_SET_LEADCHNL_RA_SENSING_POLARITY: NORMAL
MDC_IDC_SET_LEADCHNL_RV_PACING_AMPLITUDE: 2.4 V
MDC_IDC_SET_LEADCHNL_RV_PACING_CAPTURE_MODE: NORMAL
MDC_IDC_SET_LEADCHNL_RV_PACING_POLARITY: NORMAL
MDC_IDC_SET_LEADCHNL_RV_PACING_PULSEWIDTH: 0.4 MS
MDC_IDC_SET_LEADCHNL_RV_SENSING_ADAPTATION_MODE: NORMAL
MDC_IDC_SET_LEADCHNL_RV_SENSING_POLARITY: NORMAL
MDC_IDC_STAT_AT_BURDEN_PERCENT: 0 %
MDC_IDC_STAT_AT_DTM_END: NORMAL
MDC_IDC_STAT_AT_DTM_START: NORMAL
MDC_IDC_STAT_AT_MODE_SW_COUNT: 107
MDC_IDC_STAT_BRADY_DTM_END: NORMAL
MDC_IDC_STAT_BRADY_DTM_START: NORMAL
MDC_IDC_STAT_BRADY_RA_PERCENT_PACED: 89 %
MDC_IDC_STAT_BRADY_RV_PERCENT_PACED: 28 %
MDC_IDC_STAT_EPISODE_RECENT_COUNT: 0
MDC_IDC_STAT_EPISODE_RECENT_COUNT_DTM_END: NORMAL
MDC_IDC_STAT_EPISODE_RECENT_COUNT_DTM_START: NORMAL
MDC_IDC_STAT_EPISODE_TYPE: NORMAL
MDC_IDC_STAT_EPISODE_VENDOR_TYPE: NORMAL

## 2024-07-30 PROCEDURE — 93280 PM DEVICE PROGR EVAL DUAL: CPT | Performed by: INTERNAL MEDICINE

## 2024-08-06 ENCOUNTER — DOCUMENTATION ONLY (OUTPATIENT)
Dept: ANTICOAGULATION | Facility: CLINIC | Age: 69
End: 2024-08-06
Payer: COMMERCIAL

## 2024-08-06 ENCOUNTER — ANTICOAGULATION THERAPY VISIT (OUTPATIENT)
Dept: ANTICOAGULATION | Facility: CLINIC | Age: 69
End: 2024-08-06
Payer: COMMERCIAL

## 2024-08-06 DIAGNOSIS — Z95.2 H/O AORTIC VALVE REPLACEMENT: ICD-10-CM

## 2024-08-06 DIAGNOSIS — Z95.2 H/O MECHANICAL AORTIC VALVE REPLACEMENT: Primary | ICD-10-CM

## 2024-08-06 LAB — INR HOME MONITORING: 2.3 (ref 2–3)

## 2024-08-06 NOTE — PROGRESS NOTES
ANTICOAGULATION MANAGEMENT     Tammy Law 68 year old female is on warfarin with therapeutic INR result. (Goal INR 2.0-3.0)    Recent labs: (last 7 days)     08/06/24  0000   INR 2.3       ASSESSMENT     Source(s): Chart Review and Patient/Caregiver Call     Warfarin doses taken: Warfarin taken as instructed  Diet: No new diet changes identified  Medication/supplement changes: None noted  New illness, injury, or hospitalization: No  Signs or symptoms of bleeding or clotting: No  Previous result: Therapeutic last 2(+) visits  Additional findings:  Will test in 3 weeks as she will be on vacation on a Cruise to [place]. MARCO 90 day follow up call       PLAN     Recommended plan for no diet, medication or health factor changes affecting INR     Dosing Instructions: Continue your current warfarin dose with next INR in 3 weeks       Summary  As of 8/6/2024      Full warfarin instructions:  5 mg every day   Next INR check:  8/26/2024               Telephone call with Bisi who agrees to plan and repeated back plan correctly    Patient to recheck with home meter    Education provided: Please call back if any changes to your diet, medications or how you've been taking warfarin  Resume manage by exception with home monitor. Continue to submit INR results to home monitor company.You will only be called when your result is out of range. Please call and notify [facility] if new medication started, dose missed, signs or symptoms of bleeding or clotting, or a surgery/procedure is scheduled. Due for next call no later than: 11/4/24.    Plan made per [facility] anticoagulation protocol    Petrona Phan, RN  Anticoagulation Clinic  8/6/2024    _______________________________________________________________________     Anticoagulation Episode Summary       Current INR goal:  2.0-3.0   TTR:  67.8% (1 y)   Target end date:  Indefinite   Send INR reminders to:  [facility]    Indications    H/O mechanical aortic valve  replacement [Z95.2]  H/O aortic valve replacement [Z95.2]             Comments:  [employer] home monitor- managed by exception  Goal range changed during 8/28-9/15/23  hospitalization             Anticoagulation Care Providers       Provider Role Specialty Phone number    Arnold Anderson MD Referring Internal Medicine [phone]    Christina Hernandez MD Referring Family Medicine [phone]    Karina Cifuentes MD Referring Family Medicine [phone]

## 2024-08-08 ENCOUNTER — HOSPITAL ENCOUNTER (OUTPATIENT)
Dept: CARDIOLOGY | Facility: CLINIC | Age: 69
Discharge: HOME | End: 2024-08-08
Attending: INTERNAL MEDICINE | Admitting: INTERNAL MEDICINE
Payer: COMMERCIAL

## 2024-08-08 DIAGNOSIS — Z95.2 S/P AVR (AORTIC VALVE REPLACEMENT): ICD-10-CM

## 2024-08-08 LAB — LVEF ECHO: NORMAL

## 2024-08-08 PROCEDURE — 93306 TTE W/DOPPLER COMPLETE: CPT

## 2024-08-08 PROCEDURE — 93306 TTE W/DOPPLER COMPLETE: CPT | Mod: 26 | Performed by: INTERNAL MEDICINE

## 2024-08-12 DIAGNOSIS — Z95.2 S/P AVR (AORTIC VALVE REPLACEMENT): Primary | ICD-10-CM

## 2024-08-12 DIAGNOSIS — I05.1 RHEUMATIC MITRAL REGURGITATION: ICD-10-CM

## 2024-08-24 DIAGNOSIS — F33.1 MODERATE RECURRENT MAJOR DEPRESSION (H): ICD-10-CM

## 2024-08-25 LAB — INR HOME MONITORING: 2.6 (ref 2–3)

## 2024-08-26 ENCOUNTER — DOCUMENTATION ONLY (OUTPATIENT)
Dept: ANTICOAGULATION | Facility: CLINIC | Age: 69
End: 2024-08-26
Payer: COMMERCIAL

## 2024-08-26 DIAGNOSIS — Z95.2 H/O MECHANICAL AORTIC VALVE REPLACEMENT: Primary | ICD-10-CM

## 2024-08-26 DIAGNOSIS — Z95.2 H/O AORTIC VALVE REPLACEMENT: ICD-10-CM

## 2024-08-26 RX ORDER — BUPROPION HYDROCHLORIDE 150 MG/1
300 TABLET ORAL EVERY MORNING
Qty: 90 TABLET | Refills: 7 | OUTPATIENT
Start: 2024-08-26

## 2024-08-26 NOTE — PROGRESS NOTES
ANTICOAGULATION  MANAGEMENT-Home Monitor Managed by Exception    Tammyrober Law 68 year old female is on warfarin with therapeutic INR result. (Goal INR 2.0-3.0)    Recent labs: (last 7 days)     08/25/24  0000   INR 2.6       Previous INR was Therapeutic  Medication, diet, health changes since last INR:chart reviewed; none identified  Contacted within the last 12 weeks by phone on 8/6/24  Last ACC referral date: 02/15/2024      ZAIN     Tammy was NOT contacted regarding therapeutic result today per home monitoring policy manage by exception agreement.   Current warfarin dose is to be continued:     Summary  As of 8/26/2024      Full warfarin instructions:  5 mg every day   Next INR check:  9/9/2024             ?   Carolynn Morley RN  Anticoagulation Clinic  8/26/2024    _______________________________________________________________________     Anticoagulation Episode Summary       Current INR goal:  2.0-3.0   TTR:  67.7% (1 y)   Target end date:  Indefinite   Send INR reminders to:  [facility]    Indications    H/O mechanical aortic valve replacement [Z95.2]  H/O aortic valve replacement [Z95.2]             Comments:  [employer] home monitor- managed by exception  Goal range changed during 8/28-9/15/23  hospitalization             Anticoagulation Care Providers       Provider Role Specialty Phone number    Arnold Anderson MD Referring Internal Medicine [phone]    Christina Hernandez MD Referring Family Medicine [phone]    Karina Cifuentes MD Referring Family Medicine [phone]

## 2024-08-30 DIAGNOSIS — F33.1 MODERATE RECURRENT MAJOR DEPRESSION (H): ICD-10-CM

## 2024-08-30 RX ORDER — BUPROPION HYDROCHLORIDE 150 MG/1
300 TABLET ORAL EVERY MORNING
Qty: 180 TABLET | Refills: 0 | Status: SHIPPED | OUTPATIENT
Start: 2024-08-30

## 2024-09-10 ENCOUNTER — DOCUMENTATION ONLY (OUTPATIENT)
Dept: ANTICOAGULATION | Facility: CLINIC | Age: 69
End: 2024-09-10

## 2024-09-10 ENCOUNTER — TELEPHONE (OUTPATIENT)
Dept: ANTICOAGULATION | Facility: CLINIC | Age: 69
End: 2024-09-10

## 2024-09-10 ENCOUNTER — OFFICE VISIT (OUTPATIENT)
Dept: FAMILY MEDICINE | Facility: CLINIC | Age: 69
End: 2024-09-10
Payer: COMMERCIAL

## 2024-09-10 VITALS
TEMPERATURE: 98.2 F | DIASTOLIC BLOOD PRESSURE: 67 MMHG | HEIGHT: 64 IN | OXYGEN SATURATION: 99 % | HEART RATE: 66 BPM | RESPIRATION RATE: 14 BRPM | WEIGHT: 132.8 LBS | SYSTOLIC BLOOD PRESSURE: 114 MMHG | BODY MASS INDEX: 22.67 KG/M2

## 2024-09-10 DIAGNOSIS — E03.9 ACQUIRED HYPOTHYROIDISM: ICD-10-CM

## 2024-09-10 DIAGNOSIS — Z95.2 H/O MECHANICAL AORTIC VALVE REPLACEMENT: Primary | ICD-10-CM

## 2024-09-10 DIAGNOSIS — Z95.2 H/O AORTIC VALVE REPLACEMENT: ICD-10-CM

## 2024-09-10 DIAGNOSIS — Z95.0 CARDIAC PACEMAKER IN SITU: ICD-10-CM

## 2024-09-10 DIAGNOSIS — Z01.818 PREOP GENERAL PHYSICAL EXAM: Primary | ICD-10-CM

## 2024-09-10 DIAGNOSIS — Z79.01 CHRONIC ANTICOAGULATION: ICD-10-CM

## 2024-09-10 LAB
ATRIAL RATE - MUSE: 66 BPM
DIASTOLIC BLOOD PRESSURE - MUSE: NORMAL MMHG
HGB BLD-MCNC: 13.9 G/DL (ref 11.7–15.7)
INR HOME MONITORING: 2.3 (ref 2–3)
INTERPRETATION ECG - MUSE: NORMAL
P AXIS - MUSE: 28 DEGREES
PR INTERVAL - MUSE: 234 MS
QRS DURATION - MUSE: 146 MS
QT - MUSE: 450 MS
QTC - MUSE: 471 MS
R AXIS - MUSE: 51 DEGREES
SYSTOLIC BLOOD PRESSURE - MUSE: NORMAL MMHG
T AXIS - MUSE: 71 DEGREES
T4 FREE SERPL-MCNC: 1.09 NG/DL (ref 0.9–1.7)
TSH SERPL DL<=0.005 MIU/L-ACNC: 29.7 UIU/ML (ref 0.3–4.2)
VENTRICULAR RATE- MUSE: 66 BPM

## 2024-09-10 PROCEDURE — 84443 ASSAY THYROID STIM HORMONE: CPT | Performed by: NURSE PRACTITIONER

## 2024-09-10 PROCEDURE — 36415 COLL VENOUS BLD VENIPUNCTURE: CPT | Performed by: NURSE PRACTITIONER

## 2024-09-10 PROCEDURE — 99213 OFFICE O/P EST LOW 20 MIN: CPT | Performed by: NURSE PRACTITIONER

## 2024-09-10 PROCEDURE — 93010 ELECTROCARDIOGRAM REPORT: CPT | Performed by: GENERAL ACUTE CARE HOSPITAL

## 2024-09-10 PROCEDURE — 93005 ELECTROCARDIOGRAM TRACING: CPT | Performed by: NURSE PRACTITIONER

## 2024-09-10 PROCEDURE — 85018 HEMOGLOBIN: CPT | Performed by: NURSE PRACTITIONER

## 2024-09-10 PROCEDURE — 84439 ASSAY OF FREE THYROXINE: CPT | Performed by: NURSE PRACTITIONER

## 2024-09-10 ASSESSMENT — PAIN SCALES - GENERAL: PAINLEVEL: MILD PAIN (3)

## 2024-09-10 NOTE — TELEPHONE ENCOUNTER
JOSSELIN-PROCEDURAL ANTICOAGULATION  MANAGEMENT    ASSESSMENT     Warfarin interruption plan for melanoma excision & sentinel lymph node biopsy on 09/18/2024.    Indication for Anticoagulation: Mechanical AVR     23mm St Daron valve placed 04/1995  Tolerated hold with no bridge for 08/2022 colonoscopy   During 08/28/-09/15/2023 admission Karla noted new onset Afib and increased INR goal range to 2.5-3.5  Hold, no bridge for 02/2024 colonoscopy  Mild aortic valve insufficiency, ECHO completed pending follow up with cardiology     Josselin-Procedure Risk stratification for thromboembolism: low/moderate (2022 Chest guidelines)    AVR: 2020 AHA/ACC Management of Valvular Heart disease guidelines recommend no bridge in bileaflet mechanical AVR patients with NO other risk factors for thrombosis (Afib, previous thromboembolism, hypercoagulable condition, LV systolic dysfunction, older generation valves, or > 1 valve)  DERMATOLOGIC: 2022 CHEST Perioperative Management guidelines suggest patients who require minor dermatologic procedures to continue warfarin around the time of the procedure (excision of basal and squamous cell skin cancers, actinic keratoses, and premalignant or cancerous skin nevi)    This is a low bleed risk procedure  RECOMMENDATION      Surgical provider has requested no warfarin hold advise INR recheck 09/16, and ACC to adjust warfarin dose if needed to maintain target INR close to 2.0 at time of procedure      Plan routed to referring provider for approval  ?   Karlene Sheriff [facility]    SUBJECTIVE/OBJECTIVE     Tammy Law, a 68 year old female    Goal INR Range: 2.0-3.0     Patient bridged in past: Yes: last 09/2023 with rib surgery    Wt Readings from Last 3 Encounters:   09/10/24 60.2 kg (132 lb 12.8 oz)   07/29/24 60.8 kg (134 lb)   02/14/24 61.1 kg (134 lb 11.2 oz)      Ideal body weight: 54.7 kg (120 lb 10.8 oz)  Adjusted ideal body weight: 56.9 kg (125 lb 8.4 oz)     Estimated body mass  "index is 22.78 kg/m  as calculated from the following:    Height as of an earlier encounter on 9/10/24: 1.626 m (5' 4.02\").    Weight as of an earlier encounter on 9/10/24: 60.2 kg (132 lb 12.8 oz).    Lab Results   Component Value Date    INR 2.3 09/10/2024    INR 2.6 08/25/2024    INR 2.3 08/06/2024     Lab Results   Component Value Date    HGB 13.9 09/10/2024    HCT 41.6 02/14/2024     02/14/2024     Lab Results   Component Value Date    CR 0.80 02/14/2024    CR 0.79 11/06/2023    CR 0.55 10/09/2023     Estimated Creatinine Clearance: 64.9 mL/min (based on SCr of 0.8 mg/dL).  "

## 2024-09-10 NOTE — PROGRESS NOTES
Preoperative Evaluation  [facility]  [address] [place] [address]  Phone: [phone]  Fax: [phone]  Primary Provider: CUCO ALEMAN MD  Pre-op Performing Provider: DAVONTE Piña CNP  Sep 10, 2024             9/6/2024   Surgical Information   What procedure is being done? Excision right upper back melanoma with sentinel lymph node biopsy   Facility or Hospital where procedure/surgery will be performed: [facility]   Who is doing the procedure / surgery? Dr. Mitchell Luna   Date of surgery / procedure: 9/18/24   Time of surgery / procedure: Noon   Where do you plan to recover after surgery? at home with family        Fax number for surgical facility: [phone]    Assessment & Plan     The proposed surgical procedure is considered LOW risk.    Preop general physical exam  Tammy Law is a 69 yo female who presents to the clinic for pre-operative evaluation for Excision right upper back melanoma with sentinel lymph node biopsy. The patient has a significant history of age related osteoporosis, anemia, acquired hypothyroidism, rheumatic mitral regurgitation, mild in ECHO; sinus sick syndrome s/p dual chamber pacemaker insertion in 2016, ECG today shows paced rhythm with no evidence of atrial or ventricular arrhythmias; s/p mechanical aortic valve replacement and is on chronic anticoagulation with warfarin. This is a low risk bleed procedure. Per patient, surgical provider has recommended no warfarin hold and I am agreeable, advise INR recheck 9/16, and ACC to adjust warfarin dose if needed to maintain target INR close to 2.0 at time of procedure.   There are no cardiopulmonary contraindications requiring further testing prior to the recommended surgery.    - EKG 12-lead, tracing only  - Hemoglobin    Acquired hypothyroidism  On levotryroxie  - T4, free  - TSH    Cardiac pacemaker in situ  Chronic anticoagulation  H/O Mechanical aortic valve  replacement       Implanted Device   - Type of device: cardiac pacemaker Patient advised to bring device information on day of surgery.      Risks and Recommendations  The patient has the following additional risks and recommendations for perioperative complications:   - No identified additional risk factors other than previously addressed    Antiplatelet or Anticoagulation Medication Instructions   - warfarin: Bleeding risk is minimal for this procedure and warfarin should be continued before procedure.     Additional Medication Instructions  Take all scheduled medications on the day of surgery -except Lasix and Fosamax   - Herbal medications and vitamins: DO NOT TAKE 14 days prior to surgery.    Recommendation  Approval given to proceed with proposed procedure, without further diagnostic evaluation.    Lauri Mathews is a 68 year old, presenting for the following:  Pre-Op Exam (What procedure is being done? Removal of skin cancer / upper back right side/Hospital where procedure will be performed: [facility]/Who is doing the procedure : Dr. Mitchell Luna/Fax number: [phone]/Date of procedure: 9/18/24/Time of procedure: Noon/)          9/10/2024     1:41 PM   Additional Questions   Roomed by Mariana   Accompanied by Self     HPI related to upcoming procedure:         9/6/2024   Pre-Op Questionnaire   Have you ever had a heart attack or stroke? No   Have you ever had surgery on your heart or blood vessels, such as a stent placement, a coronary artery bypass, or surgery on an artery in your head, neck, heart, or legs? No   Do you have chest pain with activity? No   Do you have a history of heart failure? No   Do you currently have a cold, bronchitis or symptoms of other infection? No   Do you have a cough, shortness of breath, or wheezing? No   Do you or anyone in your family have previous history of blood clots? No   Do you or does anyone in your family have a serious bleeding problem such as  prolonged bleeding following surgeries or cuts? No   Have you ever had problems with anemia or been told to take iron pills? (!) YES - on iron pills for approx 1 year    Have you had any abnormal blood loss such as black, tarry or bloody stools, or abnormal vaginal bleeding? No   Have you ever had a blood transfusion? (!) YES   Have you ever had a transfusion reaction? No   Are you willing to have a blood transfusion if it is medically needed before, during, or after your surgery? Yes   Have you or any of your relatives ever had problems with anesthesia? No   Do you have sleep apnea, excessive snoring or daytime drowsiness? No   Do you have any artifical heart valves or other implanted medical devices like a pacemaker, defibrillator, or continuous glucose monitor? (!) YES, aortic valve and pacemaker   What type of device do you have? Peacemaker  and valve replacement   Name of the clinic that manages your device [facility]   Do you have artificial joints? No   Are you allergic to latex? No        Health Care Directive  Patient does not have a Health Care Directive or Living Will: Discussed advance care planning with patient; information given to patient to review.    Preoperative Review of    reviewed - no record of controlled substances prescribed.      Status of Chronic Conditions:  See problem list for active medical problems.  Problems all longstanding and stable, except as noted/documented.  See ROS for pertinent symptoms related to these conditions.    Patient Active Problem List    Diagnosis Date Noted    Cervico-occipital neuralgia of left side 04/10/2024     Priority: Medium    Cervical radicular pain 04/10/2024     Priority: Medium    H/O aortic valve replacement 02/15/2024     Priority: Medium    Sick sinus syndrome (H) 02/14/2024     Priority: Medium    Hematemesis 10/17/2023     Priority: Medium    Melena 10/17/2023     Priority: Medium    Acute blood loss anemia 08/29/2023     Priority:  Medium    History of pacemaker 08/28/2023     Priority: Medium     Formatting of this note might be different from the original. [facility] DX: SSS 10/7/16 [id number] Fawad 8 KEENAN #[id number] 10/7/16 RA-Donaldo S [id number] 10/7/16 RV-Solia S SN#[id number] 10/7/16 8/6/23 Intrinsic Sinus 76 bpm- Non-dependent Pacemaker Above Umbilicus/> 5 seconds cautery. No Magnet needed.  (If magnet was used would be DOO 90 bpm)           Pacemaker pocket = left upper chest      History of colonic polyps 08/13/2023     Priority: Medium    Age-related osteoporosis without current pathological fracture 02/08/2023     Priority: Medium    Medication management 02/08/2023     Priority: Medium    Recurrent major depressive disorder, in full remission (H24) 02/08/2023     Priority: Medium    Chronic anticoagulation      Priority: Medium    Chest pain, unspecified type 01/12/2022     Priority: Medium    Acquired hypothyroidism 07/21/2021     Priority: Medium    Congenital nystagmus 07/21/2021     Priority: Medium     Formatting of this note might be different from the original.  Created by Conversion      Epigastric pain 07/21/2021     Priority: Medium    H/O mechanical aortic valve replacement 07/11/2021     Priority: Medium    Cardiac pacemaker in situ 10/21/2016     Priority: Medium     Formatting of this note might be different from the original.  Biotronicoleen Celestin, dual chamber pacemaker, RA Lead: Biotronik 377 [id number] Solia S 45, RV Lead: Biotronik 377 177 Solia S 53  DOI: 10/7/2016 by Dr. Dustin Lott.      CARDIOVASCULAR SCREENING; LDL GOAL LESS THAN 130 10/31/2010     Priority: Medium      Past Medical History:   Diagnosis Date    Anxiety     Chronic anticoagulation     Depression     Disease of thyroid gland     Hypothyroidism    High cholesterol     Hyperlipidemia     Hypertension     Hypothyroidism     Pacemaker     biotronik    SSS (sick sinus syndrome) (H) 2/4/2020     Past Surgical History:   Procedure Laterality  Date    AORTIC VALVE REPLACEMENT      APPENDECTOMY      BIOPSY BREAST Left 2015    BREAST CYST EXCISION      CARDIAC CATHETERIZATION      HC PART REMV BONE METATARSAL HEAD,EA Right 06/23/2017    Procedure: EXOSTECTOMY 2ND METATARSAL RIGHT FOOT;  Surgeon: Jessee Mccauley DPM;  Location: [facility];  Service: Podiatry    HYSTERECTOMY  2000    IMPLANT PACEMAKER      IMPLANT PACEMAKER      MIDLINE SINGLE LUMEN PLACEMENT  10/13/2023    OOPHORECTOMY  2000    OTHER SURGICAL HISTORY      Hemmorhoidectomy    RELEASE CARPAL TUNNEL Bilateral     TOE SURGERY      TYMPANOSTOMY TUBE PLACEMENT      ZZC REPLACE AORT VALV,PROSTH VALV      Description: Aortic Valve Replacement;  Proc Date: 01/01/1995;     Current Outpatient Medications   Medication Sig Dispense Refill    alendronate (FOSAMAX) 70 MG tablet Take 1 tablet (70 mg) by mouth every 7 days 13 tablet 3    atorvastatin (LIPITOR) 40 MG tablet Take 1 tablet (40 mg) by mouth daily 90 tablet 3    buPROPion (WELLBUTRIN XL) 150 MG 24 hr tablet TAKE 2 TABLETS BY MOUTH EVERY  MORNING 180 tablet 0    calcium carbonate 500 mg, elemental, (OSCAL 500) 1250 (500 Ca) MG TABS tablet Take 1 tablet by mouth daily Take 1 tab by mouth every am      levothyroxine (SYNTHROID/LEVOTHROID) 88 MCG tablet Take 1 tablet (88 mcg) by mouth daily 90 tablet 3    vitamin D3 (CHOLECALCIFEROL) 50 mcg (2000 units) tablet Take 1 tablet by mouth daily      warfarin ANTICOAGULANT (COUMADIN) 2.5 MG tablet Take 2 tablets (5 mg) by mouth every Day or as directed by your ACC team based on INR results. 200 tablet 1    furosemide (LASIX) 40 MG tablet Take 1 tablet (40 mg) by mouth daily as needed (Patient not taking: Reported on 9/10/2024) 90 tablet 1       Allergies   Allergen Reactions    Demerol [Meperidine]      Hallucinations      Misc. Sulfonamide Containing Compounds Hives    Morphine     Sulfa Antibiotics         Social History     Tobacco Use    Smoking status: Never    Smokeless tobacco: Never  "  Substance Use Topics    Alcohol use: Yes     Comment: Alcoholic Drinks/day: twice per year       History   Drug Use No               Objective    /67 (BP Location: Left arm, Patient Position: Sitting, Cuff Size: Adult Regular)   Pulse 66   Temp 98.2  F (36.8  C) (Tympanic)   Resp 14   Ht 1.626 m (5' 4.02\")   Wt 60.2 kg (132 lb 12.8 oz)   LMP 12/13/2000 (Approximate)   SpO2 99%   BMI 22.78 kg/m     Estimated body mass index is 22.78 kg/m  as calculated from the following:    Height as of this encounter: 1.626 m (5' 4.02\").    Weight as of this encounter: 60.2 kg (132 lb 12.8 oz).  Physical Exam  GENERAL: alert and no distress  EYES: Eyes grossly normal to inspection, PERRL and conjunctivae and sclerae normal  HENT: ear canals and TM's normal, nose and mouth without ulcers or lesions  NECK: no adenopathy, no asymmetry, masses, or scars  RESP: lungs clear to auscultation - no rales, rhonchi or wheezes  CV: regular rate and rhythm, normal S1 S2, no S3 or S4, murmur present, click or rub, no peripheral edema  ABDOMEN: soft, nontender, no hepatosplenomegaly, no masses and bowel sounds normal  MS: no gross musculoskeletal defects noted, no edema  SKIN: no suspicious lesions or rashes  NEURO: Normal strength and tone, mentation intact and speech normal  PSYCH: mentation appears normal, affect normal/bright    Recent Labs   Lab Test 09/10/24  0000 08/25/24  0000 02/20/24  0000 02/14/24  1126 12/26/23  0000 12/13/23  1026 11/07/23  0000 11/06/23  1703   HGB  --   --   --  13.3  --  11.4*  --  11.3*   PLT  --   --   --  268  --  245  --  310   INR 2.3 2.6   < >  --    < >  --    < >  --    NA  --   --   --  138  --   --   --  138   POTASSIUM  --   --   --  4.4  --   --   --  4.3   CR  --   --   --  0.80  --   --   --  0.79   A1C  --   --   --  5.7*  --   --   --   --     < > = values in this interval not displayed.        Diagnostics  Labs pending at this time.  Results will be reviewed when available.   EKG: " unchanged from previous tracings  Atrial-paced rhythm with prolonged AV conduction  Right bundle branch block  Abnormal ECG  When compared with ECG of 29-Oct-2022 08:15,  No significant change was found     Revised Cardiac Risk Index (RCRI)  The patient has the following serious cardiovascular risks for perioperative complications:   - Coronary Artery Disease (MI, positive stress test, angina, Qs on EKG) = 1 point     RCRI Interpretation: 1 point: Class II (low risk - 0.9% complication rate)       Signed Electronically by: DAVONTE Piña CNP  A copy of this evaluation report is provided to the requesting physician.

## 2024-09-10 NOTE — PROGRESS NOTES
ANTICOAGULATION  MANAGEMENT-Home Monitor Managed by Exception    Tammyrober Law 68 year old female is on warfarin with therapeutic INR result. (Goal INR 2.0-3.0)    Recent labs: (last 7 days)     09/10/24  0000   INR 2.3       Previous INR was Therapeutic  Medication, diet, health changes since last INR:chart reviewed; none identified  Contacted within the last 12 weeks by phone on 8/6/24  Due for next call no later than: 11/4/24.   Last ACC referral date: 02/15/2024      ZAIN Munoz was NOT contacted regarding therapeutic result today per home monitoring policy manage by exception agreement.   Current warfarin dose is to be continued:     Summary  As of 9/10/2024      Full warfarin instructions:  5 mg every day   Next INR check:  9/24/2024             ?   Petrona Phan RN  Anticoagulation Clinic  9/10/2024    _______________________________________________________________________     Anticoagulation Episode Summary       Current INR goal:  2.0-3.0   TTR:  70.2% (1 y)   Target end date:  Indefinite   Send INR reminders to:  [facility]    Indications    H/O mechanical aortic valve replacement [Z95.2]  H/O aortic valve replacement [Z95.2]             Comments:  [employer] home monitor- managed by exception  Goal range changed during 8/28-9/15/23  hospitalization             Anticoagulation Care Providers       Provider Role Specialty Phone number    Arnold Anderson MD Referring Internal Medicine [phone]    Christina Hernandez MD Referring Family Medicine [phone]    Karina Cifuentes MD Referring Family Medicine [phone]

## 2024-09-10 NOTE — TELEPHONE ENCOUNTER
Spoke with patient and educated on need to check INR on 9/16 and [facility] consult for 9/18 procedure.  Patient verbalized understanding and will check INR on 9/16.    All questions answered at this time    Shea Day, RN, BSN, PHN  Anticoagulation Nurse  [phone]

## 2024-09-10 NOTE — PATIENT INSTRUCTIONS
How to Take Your Medication Before Surgery  Preoperative Medication Instructions   Antiplatelet or Anticoagulation Medication Instructions   - warfarin: Bleeding risk is minimal for this procedure (e.g. cataract, 1 to 2 dental extractions) and warfarin should be continued before procedure.     Additional Medication Instructions  Take all scheduled medications on the day of surgery EXCEPT for modifications listed below:   - Herbal medications and vitamins: DO NOT TAKE 14 days prior to surgery.   - Laxis, and fosamax    Patient Education   Preparing for Your Surgery  Getting started  A nurse will call you to review your health history and instructions. They will give you an arrival time based on your scheduled surgery time. Please be ready to share:  Your doctor's clinic name and phone number  Your medical, surgical, and anesthesia history  A list of allergies and sensitivities  A list of medicines, including herbal treatments and over-the-counter drugs  Whether the patient has a legal guardian (ask how to send us the papers in advance)  Please tell us if you're pregnant--or if there's any chance you might be pregnant. Some surgeries may injure a fetus (unborn baby), so they require a pregnancy test. Surgeries that are safe for a fetus don't always need a test, and you can choose whether to have one.   If you have a child who's having surgery, please ask for a copy of Preparing for Your Child's Surgery.    Preparing for surgery  Within 10 to 30 days of surgery: Have a pre-op exam (sometimes called an H&P, or History and Physical). This can be done at a clinic or pre-operative center.  If you're having a , you may not need this exam. Talk to your care team.  At your pre-op exam, talk to your care team about all medicines you take. If you need to stop any medicines before surgery, ask when to start taking them again.  We do this for your safety. Many medicines can make you bleed too much during surgery. Some  change how well surgery (anesthesia) drugs work.  Call your insurance company to let them know you're having surgery. (If you don't have insurance, call [phone].)  Call your clinic if there's any change in your health. This includes signs of a cold or flu (sore throat, runny nose, cough, rash, fever). It also includes a scrape or scratch near the surgery site.  If you have questions on the day of surgery, call your hospital or surgery center.  Eating and drinking guidelines  For your safety: Unless your surgeon tells you otherwise, follow the guidelines below.  Eat and drink as usual until 8 hours before you arrive for surgery. After that, no food or milk.  Drink clear liquids until 2 hours before you arrive. These are liquids you can see through, like water, Gatorade, and Propel Water. They also include plain black coffee and tea (no cream or milk), candy, and breath mints. You can spit out gum when you arrive.  If you drink alcohol: Stop drinking it the night before surgery.  If your care team tells you to take medicine on the morning of surgery, it's okay to take it with a sip of water.  Preventing infection  Shower or bathe the night before and morning of your surgery. Follow the instructions your clinic gave you. (If no instructions, use regular soap.)  Don't shave or clip hair near your surgery site. We'll remove the hair if needed.  Don't smoke or vape the morning of surgery. You may chew nicotine gum up to 2 hours before surgery. A nicotine patch is okay.  Note: Some surgeries require you to completely quit smoking and nicotine. Check with your surgeon.  Your care team will make every effort to keep you safe from infection. We will:  Clean our hands often with soap and water (or an alcohol-based hand rub).  Clean the skin at your surgery site with a special soap that kills germs.  Give you a special gown to keep you warm. (Cold raises the risk of infection.)  Wear special hair covers, masks, gowns and  gloves during surgery.  Give antibiotic medicine, if prescribed. Not all surgeries need antibiotics.  What to bring on the day of surgery  Photo ID and insurance card  Copy of your health care directive, if you have one  Glasses and hearing aids (bring cases)  You can't wear contacts during surgery  Inhaler and eye drops, if you use them (tell us about these when you arrive)  CPAP machine or breathing device, if you use them  A few personal items, if spending the night  If you have . . .  A pacemaker, ICD (cardiac defibrillator) or other implant: Bring the ID card.  An implanted stimulator: Bring the remote control.  A legal guardian: Bring a copy of the certified (court-stamped) guardianship papers.  Please remove any jewelry, including body piercings. Leave jewelry and other valuables at home.  If you're going home the day of surgery  You must have a responsible adult drive you home. They should stay with you overnight as well.  If you don't have someone to stay with you, and you aren't safe to go home alone, we may keep you overnight. Insurance often won't pay for this.  After surgery  If it's hard to control your pain or you need more pain medicine, please call your surgeon's office.  Questions?   If you have any questions for your care team, list them here: _________________________________________________________________________________________________________________________________________________________________________ ____________________________________ ____________________________________ ____________________________________  For informational purposes only. Not to replace the advice of your health care provider. Copyright   2003, 2019 [facility]. All rights reserved. Clinically reviewed by Loyda Marie MD. [employer] [id number] - REV 12/22.

## 2024-09-16 ENCOUNTER — TELEPHONE (OUTPATIENT)
Dept: FAMILY MEDICINE | Facility: CLINIC | Age: 69
End: 2024-09-16

## 2024-09-16 ENCOUNTER — ANTICOAGULATION THERAPY VISIT (OUTPATIENT)
Dept: ANTICOAGULATION | Facility: CLINIC | Age: 69
End: 2024-09-16
Payer: COMMERCIAL

## 2024-09-16 DIAGNOSIS — Z95.2 H/O MECHANICAL AORTIC VALVE REPLACEMENT: Primary | ICD-10-CM

## 2024-09-16 DIAGNOSIS — Z95.2 H/O AORTIC VALVE REPLACEMENT: ICD-10-CM

## 2024-09-16 LAB — INR HOME MONITORING: 2.8 (ref 2–3)

## 2024-09-16 NOTE — PROGRESS NOTES
ANTICOAGULATION MANAGEMENT     Tammy Law 68 year old female is on warfarin with therapeutic INR result. (Goal INR 2.0-3.0)    Recent labs: (last 7 days)     09/16/24  0000   INR 2.8       ASSESSMENT     Source(s): Chart Review and Patient/Caregiver Call     Warfarin doses taken: Warfarin taken as instructed  Diet: No new diet changes identified  Medication/supplement changes: None noted  New illness, injury, or hospitalization: No  Signs or symptoms of bleeding or clotting: No  Previous result: Therapeutic last 2(+) visits  Additional findings: Upcoming surgery/procedure 9/18/24 melanoma excision and sentinel lymph node biopsy (no hold required but target INR goal close to 2.0 at time of procedure)       PLAN     Recommended plan for temporary change(s) affecting INR     Dosing Instructions: hold 2 doses prior to procedure; take a booster dose the day of the procedure, then continue your current warfarin dose with next INR in 10 days       Summary  As of 9/16/2024      Full warfarin instructions:  9/16: Hold; 9/17: Hold; 9/18: 10 mg; Otherwise 5 mg every day   Next INR check:  9/25/2024               Telephone call with Bisi who verbalizes understanding and agrees to plan    Patient to recheck with home meter    Education provided: Contact [phone] with any changes, questions or concerns.     Plan made with [facility] Pharmacist Karlene Zhu RN  9/16/2024  Anticoagulation Clinic  [facility] for routing messages: p ANTICOAG HOME [facility]ITOR[facility]  [facility] patient phone line: [phone]        _______________________________________________________________________     Anticoagulation Episode Summary       Current INR goal:  2.0-3.0   TTR:  70.3% (1 y)   Target end date:  Indefinite   Send INR reminders to:  [facility]    Indications    H/O mechanical aortic valve replacement [Z95.2]  H/O aortic valve replacement [Z95.2]             Comments:  [employer] home monitor- managed by  exception  Goal range changed during 8/28-9/15/23  hospitalization             Anticoagulation Care Providers       Provider Role Specialty Phone number    Arnold Anderson MD Referring Internal Medicine [phone]    Christina Hernandez MD Referring Family Medicine [phone]    Karina Cifuentes MD Referring Family Medicine [phone]

## 2024-09-16 NOTE — TELEPHONE ENCOUNTER
General Call      Reason for Call:  Pt is having surgery with [facility] on 9/18    What are your questions or concerns:  Please complete note that it can be reviewed prior to surgery on Tuesday    Date of last appointment with provider: 9/10/24-this note doesn't need to be faxed-they will see in care everywhere

## 2024-09-17 DIAGNOSIS — E03.9 ACQUIRED HYPOTHYROIDISM: ICD-10-CM

## 2024-09-17 RX ORDER — LEVOTHYROXINE SODIUM 88 UG/1
88 TABLET ORAL DAILY
Qty: 90 TABLET | Refills: 0 | Status: SHIPPED | OUTPATIENT
Start: 2024-09-17

## 2024-09-17 RX ORDER — LEVOTHYROXINE SODIUM 88 UG/1
88 TABLET ORAL DAILY
Qty: 100 TABLET | Refills: 2 | OUTPATIENT
Start: 2024-09-17

## 2024-09-18 ENCOUNTER — TRANSFERRED RECORDS (OUTPATIENT)
Dept: HEALTH INFORMATION MANAGEMENT | Facility: CLINIC | Age: 69
End: 2024-09-18

## 2024-09-18 ENCOUNTER — PATIENT OUTREACH (OUTPATIENT)
Dept: CARE COORDINATION | Facility: CLINIC | Age: 69
End: 2024-09-18
Payer: COMMERCIAL

## 2024-09-24 ENCOUNTER — ANTICOAGULATION THERAPY VISIT (OUTPATIENT)
Dept: ANTICOAGULATION | Facility: CLINIC | Age: 69
End: 2024-09-24
Payer: COMMERCIAL

## 2024-09-24 DIAGNOSIS — Z95.2 H/O AORTIC VALVE REPLACEMENT: ICD-10-CM

## 2024-09-24 DIAGNOSIS — Z95.2 H/O MECHANICAL AORTIC VALVE REPLACEMENT: Primary | ICD-10-CM

## 2024-09-24 LAB — INR HOME MONITORING: 2.5 (ref 2–3)

## 2024-09-24 NOTE — PROGRESS NOTES
ANTICOAGULATION MANAGEMENT     Tammy Law 68 year old female is on warfarin with therapeutic INR result. (Goal INR 2.0-3.0)    Recent labs: (last 7 days)     09/24/24  0000   INR 2.5       ASSESSMENT     Source(s): Chart Review  Previous INR was Therapeutic last 2(+) visits  Medication, diet, health changes since last INR chart reviewed; none identified  9/18 biopsy, [employer] sent for updates if any from patient          PLAN     Recommended plan for no diet, medication or health factor changes affecting INR     Dosing Instructions: Continue your current warfarin dose with next INR in 1 week       Summary  As of 9/24/2024      Full warfarin instructions:  5 mg every day   Next INR check:  10/1/2024               Sent [employer] message with dosing and follow up instructions    Patient to recheck with home meter    Education provided: Contact [phone] with any changes, questions or concerns.     Plan made per [facility] anticoagulation protocol    Shea Meadows RN  9/24/2024  Anticoagulation Clinic  [facility] for routing messages: p ANTICOAG HOME MONITORING  [facility] patient phone line: [phone]        _______________________________________________________________________     Anticoagulation Episode Summary       Current INR goal:  2.0-3.0   TTR:  70.3% (1 y)   Target end date:  Indefinite   Send INR reminders to:  ANTICOAG HOME MONITORING    Indications    H/O mechanical aortic valve replacement [Z95.2]  H/O aortic valve replacement [Z95.2]             Comments:  [employer] home monitor- managed by exception  Goal range changed during 8/28-9/15/23  hospitalization             Anticoagulation Care Providers       Provider Role Specialty Phone number    Arnold Anderson MD Referring Internal Medicine [phone]    Christina Hernandez MD Referring Family Medicine [phone]    Karina Cifuentes MD Referring Family Medicine [phone]

## 2024-09-27 DIAGNOSIS — E78.00 HYPERCHOLESTEROLEMIA: ICD-10-CM

## 2024-09-30 RX ORDER — ATORVASTATIN CALCIUM 40 MG/1
40 TABLET, FILM COATED ORAL DAILY
Qty: 100 TABLET | Refills: 2 | Status: SHIPPED | OUTPATIENT
Start: 2024-09-30

## 2024-10-04 SDOH — HEALTH STABILITY: PHYSICAL HEALTH: ON AVERAGE, HOW MANY DAYS PER WEEK DO YOU ENGAGE IN MODERATE TO STRENUOUS EXERCISE (LIKE A BRISK WALK)?: 2 DAYS

## 2024-10-04 SDOH — HEALTH STABILITY: PHYSICAL HEALTH: ON AVERAGE, HOW MANY MINUTES DO YOU ENGAGE IN EXERCISE AT THIS LEVEL?: 0 MIN

## 2024-10-04 ASSESSMENT — SOCIAL DETERMINANTS OF HEALTH (SDOH): HOW OFTEN DO YOU GET TOGETHER WITH FRIENDS OR RELATIVES?: ONCE A WEEK

## 2024-10-07 ENCOUNTER — TRANSFERRED RECORDS (OUTPATIENT)
Dept: HEALTH INFORMATION MANAGEMENT | Facility: CLINIC | Age: 69
End: 2024-10-07
Payer: COMMERCIAL

## 2024-10-08 ENCOUNTER — ANTICOAGULATION THERAPY VISIT (OUTPATIENT)
Dept: ANTICOAGULATION | Facility: CLINIC | Age: 69
End: 2024-10-08
Payer: COMMERCIAL

## 2024-10-08 DIAGNOSIS — Z95.2 H/O MECHANICAL AORTIC VALVE REPLACEMENT: Primary | ICD-10-CM

## 2024-10-08 DIAGNOSIS — Z95.2 H/O AORTIC VALVE REPLACEMENT: ICD-10-CM

## 2024-10-08 LAB — INR HOME MONITORING: 1.8 (ref 2–3)

## 2024-10-08 NOTE — PROGRESS NOTES
ANTICOAGULATION MANAGEMENT     Tammy Law 69 year old female is on warfarin with subtherapeutic INR result. (Goal INR 2.0-3.0)    Recent labs: (last 7 days)     10/08/24  0000   INR 1.8*       ASSESSMENT     Source(s): Chart Review and Patient/Caregiver Call     Warfarin doses taken: Warfarin taken as instructed  Diet: No new diet changes identified  Medication/supplement changes: None noted  New illness, injury, or hospitalization: No.  Patient reports that she is healing well from her biopsy that she had a couple weeks ago.  Signs or symptoms of bleeding or clotting: No  Previous result: Therapeutic last 2(+) visits  Additional findings: None       PLAN     Recommended plan for no diet, medication or health factor changes affecting INR     Dosing Instructions: Continue your current warfarin dose with next INR in 1 week       Summary  As of 10/8/2024      Full warfarin instructions:  5 mg every day   Next INR check:  10/15/2024               Telephone call with Bisi who verbalizes understanding and agrees to plan    Patient to recheck with home meter    Education provided: Please call back if any changes to your diet, medications or how you've been taking warfarin    Plan made per [facility] anticoagulation protocol    Judith Boykin RN  10/8/2024  Anticoagulation Clinic  [facility] for routing messages: p ANTICOAG HOME MONITORING  [facility] patient phone line: [phone]        _______________________________________________________________________     Anticoagulation Episode Summary       Current INR goal:  2.0-3.0   TTR:  71.7% (1 y)   Target end date:  Indefinite   Send INR reminders to:  ANTICOAG HOME MONITORING    Indications    H/O mechanical aortic valve replacement [Z95.2]  H/O aortic valve replacement [Z95.2]             Comments:  [employer] home monitor- managed by exception  Goal range changed during 8/28-9/15/23  hospitalization             Anticoagulation Care Providers       Provider Role Specialty  Phone number    Arnold Anderson MD Referring Internal Medicine [phone]    Christina Hernandez MD Referring Family Medicine [phone]    Karina Cifuentes MD Referring Family Medicine [phone]

## 2024-10-09 ENCOUNTER — OFFICE VISIT (OUTPATIENT)
Dept: FAMILY MEDICINE | Facility: CLINIC | Age: 69
End: 2024-10-09
Payer: COMMERCIAL

## 2024-10-09 VITALS
SYSTOLIC BLOOD PRESSURE: 129 MMHG | HEIGHT: 64 IN | HEART RATE: 60 BPM | BODY MASS INDEX: 23.1 KG/M2 | OXYGEN SATURATION: 99 % | DIASTOLIC BLOOD PRESSURE: 67 MMHG | WEIGHT: 135.3 LBS | TEMPERATURE: 97 F | RESPIRATION RATE: 14 BRPM

## 2024-10-09 DIAGNOSIS — Z29.11 NEED FOR VACCINATION AGAINST RESPIRATORY SYNCYTIAL VIRUS: ICD-10-CM

## 2024-10-09 DIAGNOSIS — C43.59 MALIGNANT MELANOMA OF SKIN OF TRUNK (H): ICD-10-CM

## 2024-10-09 DIAGNOSIS — R10.11 RUQ ABDOMINAL PAIN: ICD-10-CM

## 2024-10-09 DIAGNOSIS — M81.0 AGE-RELATED OSTEOPOROSIS WITHOUT CURRENT PATHOLOGICAL FRACTURE: ICD-10-CM

## 2024-10-09 DIAGNOSIS — Z00.00 MEDICARE ANNUAL WELLNESS VISIT, SUBSEQUENT: Primary | ICD-10-CM

## 2024-10-09 DIAGNOSIS — R79.89 ELEVATED SERUM CREATININE: ICD-10-CM

## 2024-10-09 DIAGNOSIS — E03.9 ACQUIRED HYPOTHYROIDISM: ICD-10-CM

## 2024-10-09 PROBLEM — I49.5 SICK SINUS SYNDROME (H): Status: ACTIVE | Noted: 2020-02-04

## 2024-10-09 LAB
ALBUMIN UR-MCNC: NEGATIVE MG/DL
APPEARANCE UR: CLEAR
BACTERIA #/AREA URNS HPF: ABNORMAL /HPF
BASOPHILS # BLD AUTO: 0 10E3/UL (ref 0–0.2)
BASOPHILS NFR BLD AUTO: 1 %
BILIRUB UR QL STRIP: NEGATIVE
COLOR UR AUTO: YELLOW
EOSINOPHIL # BLD AUTO: 0.1 10E3/UL (ref 0–0.7)
EOSINOPHIL NFR BLD AUTO: 2 %
ERYTHROCYTE [DISTWIDTH] IN BLOOD BY AUTOMATED COUNT: 13.8 % (ref 10–15)
GLUCOSE UR STRIP-MCNC: NEGATIVE MG/DL
HCT VFR BLD AUTO: 41.9 % (ref 35–47)
HGB BLD-MCNC: 13.5 G/DL (ref 11.7–15.7)
HGB UR QL STRIP: ABNORMAL
IMM GRANULOCYTES # BLD: 0 10E3/UL
IMM GRANULOCYTES NFR BLD: 0 %
KETONES UR STRIP-MCNC: NEGATIVE MG/DL
LEUKOCYTE ESTERASE UR QL STRIP: ABNORMAL
LYMPHOCYTES # BLD AUTO: 1.4 10E3/UL (ref 0.8–5.3)
LYMPHOCYTES NFR BLD AUTO: 23 %
MCH RBC QN AUTO: 28.7 PG (ref 26.5–33)
MCHC RBC AUTO-ENTMCNC: 32.2 G/DL (ref 31.5–36.5)
MCV RBC AUTO: 89 FL (ref 78–100)
MONOCYTES # BLD AUTO: 0.4 10E3/UL (ref 0–1.3)
MONOCYTES NFR BLD AUTO: 7 %
MUCOUS THREADS #/AREA URNS LPF: PRESENT /LPF
NEUTROPHILS # BLD AUTO: 4 10E3/UL (ref 1.6–8.3)
NEUTROPHILS NFR BLD AUTO: 67 %
NITRATE UR QL: NEGATIVE
PH UR STRIP: 6 [PH] (ref 5–8)
PLATELET # BLD AUTO: 214 10E3/UL (ref 150–450)
RBC # BLD AUTO: 4.71 10E6/UL (ref 3.8–5.2)
RBC #/AREA URNS AUTO: ABNORMAL /HPF
SP GR UR STRIP: 1.02 (ref 1–1.03)
SQUAMOUS #/AREA URNS AUTO: ABNORMAL /LPF
UROBILINOGEN UR STRIP-ACNC: 0.2 E.U./DL
WBC # BLD AUTO: 6 10E3/UL (ref 4–11)
WBC #/AREA URNS AUTO: ABNORMAL /HPF

## 2024-10-09 PROCEDURE — 81001 URINALYSIS AUTO W/SCOPE: CPT | Performed by: FAMILY MEDICINE

## 2024-10-09 PROCEDURE — 36415 COLL VENOUS BLD VENIPUNCTURE: CPT | Performed by: FAMILY MEDICINE

## 2024-10-09 PROCEDURE — 82306 VITAMIN D 25 HYDROXY: CPT | Performed by: FAMILY MEDICINE

## 2024-10-09 PROCEDURE — 80053 COMPREHEN METABOLIC PANEL: CPT | Performed by: FAMILY MEDICINE

## 2024-10-09 PROCEDURE — 99214 OFFICE O/P EST MOD 30 MIN: CPT | Mod: 25 | Performed by: FAMILY MEDICINE

## 2024-10-09 PROCEDURE — G0008 ADMIN INFLUENZA VIRUS VAC: HCPCS | Performed by: FAMILY MEDICINE

## 2024-10-09 PROCEDURE — 85025 COMPLETE CBC W/AUTO DIFF WBC: CPT | Performed by: FAMILY MEDICINE

## 2024-10-09 PROCEDURE — 87086 URINE CULTURE/COLONY COUNT: CPT | Performed by: FAMILY MEDICINE

## 2024-10-09 PROCEDURE — 84443 ASSAY THYROID STIM HORMONE: CPT | Performed by: FAMILY MEDICINE

## 2024-10-09 PROCEDURE — 90662 IIV NO PRSV INCREASED AG IM: CPT | Performed by: FAMILY MEDICINE

## 2024-10-09 PROCEDURE — 84439 ASSAY OF FREE THYROXINE: CPT | Performed by: FAMILY MEDICINE

## 2024-10-09 PROCEDURE — G0439 PPPS, SUBSEQ VISIT: HCPCS | Performed by: FAMILY MEDICINE

## 2024-10-09 ASSESSMENT — PATIENT HEALTH QUESTIONNAIRE - PHQ9: SUM OF ALL RESPONSES TO PHQ QUESTIONS 1-9: 6

## 2024-10-09 ASSESSMENT — PAIN SCALES - GENERAL: PAINLEVEL: MILD PAIN (3)

## 2024-10-09 NOTE — PROGRESS NOTES
Preventive Care Visit  [facility]  CUCO ALEMAN MD, Family Medicine  Oct 9, 2024      Assessment & Plan     Medicare annual wellness visit, subsequent  She does not use any opioids or controlled substances and has no prescriptions for them. PDMP reviewed.    RUQ abdominal pain  If labs are normal, the next step will be an ultrasound.  - Comprehensive metabolic panel (BMP + Alb, Alk Phos, ALT, AST, Total. Bili, TP)  - CBC with platelets and differential  - UA Macroscopic with reflex to Microscopic and Culture - Lab Collect  - UA Microscopic with Reflex to Culture  - Urine Culture    Need for vaccination against respiratory syncytial virus  - RSV vaccine, bivalent (ABRYSVO) injection 0.5 mL    Age-related osteoporosis without current pathological fracture  Continue alendronate  - Vitamin D Deficiency    Acquired hypothyroidism  Will refill levothyroxin appropriately  - TSH  - T4, free    Malignant melanoma of skin of trunk (H)    Return in six months.    Counseling  Appropriate preventive services were addressed with this patient via screening, questionnaire, or discussion as appropriate for fall prevention, nutrition, physical activity, Tobacco-use cessation, social engagement, weight loss and cognition.  Checklist reviewing preventive services available has been given to the patient.  Reviewed patient's diet, addressing concerns and/or questions.   She is at risk for lack of exercise and has been provided with information to increase physical activity for the benefit of her well-being.   She is at risk for psychosocial distress and has been provided with information to reduce risk.   The patient's PHQ-9 score is consistent with mild depression. She was provided with information regarding depression.     Lauri Mathews is a 69 year old, presenting for the following:  Physical        10/9/2024    10:24 AM   Additional Questions   Roomed by yaneth fisher         Health Care Directive  Patient does  not have a Health Care Directive or Living Will: Patient states has Advance Directive and will bring in a copy to clinic.    Had a melanoma removed. Had one lymph node removed which was negative.  Does gardening and shovels snow.          10/4/2024   General Health   How would you rate your overall physical health? (!) FAIR   Feel stress (tense, anxious, or unable to sleep) Only a little      (!) STRESS CONCERN      10/4/2024   Nutrition   Diet: Regular (no restrictions)          10/4/2024   Exercise   Days per week of moderate/strenous exercise 2 days   Average minutes spent exercising at this level 0 min      (!) EXERCISE CONCERN      10/4/2024   Social Factors   Frequency of gathering with friends or relatives Once a week   Worry food won't last until get money to buy more No   Food not last or not have enough money for food? No   Do you have housing? (Housing is defined as stable permanent housing and does not include staying ouside in a car, in a tent, in an abandoned building, in an overnight shelter, or couch-surfing.) Yes   Are you worried about losing your housing? No   Lack of transportation? No   Unable to get utilities (heat,electricity)? No          10/4/2024   Fall Risk   Fallen 2 or more times in the past year? No    No   Trouble with walking or balance? No    No       Multiple values from one day are sorted in reverse-chronological order          10/4/2024   Activities of Daily Living- Home Safety   Needs help with the following daily activites None of the above   Safety concerns in the home None of the above          10/4/2024   Dental   Dentist two times every year? Yes          10/4/2024   Hearing Screening   Hearing concerns? None of the above          10/4/2024   Driving Risk Screening   Patient/family members have concerns about driving No          10/4/2024   General Alertness/Fatigue Screening   Have you been more tired than usual lately? No          10/4/2024   Urinary Incontinence Screening    Bothered by leaking urine in past 6 months No          10/4/2024   TB Screening   Were you born outside of the [place]? No      Today's PHQ-9 Score:       10/9/2024    10:32 AM   PHQ-9 SCORE   PHQ-9 Total Score 6         10/4/2024   Substance Use   Alcohol more than 3/day or more than 7/wk No   Do you have a current opioid prescription? No   How severe/bad is pain from 1 to 10? 1/10   Do you use any other substances recreationally? No      Social History     Tobacco Use    Smoking status: Never    Smokeless tobacco: Never   Vaping Use    Vaping status: Never Used   Substance Use Topics    Alcohol use: Yes     Comment: Alcoholic Drinks/day: twice per year    Drug use: No           6/25/2024   LAST FHS-7 RESULTS   1st degree relative breast or ovarian cancer No   Any relative bilateral breast cancer No   Any male have breast cancer No   Any ONE woman have BOTH breast AND ovarian cancer No   Any woman with breast cancer before 50yrs No   2 or more relatives with breast AND/OR ovarian cancer No   2 or more relatives with breast AND/OR bowel cancer No         Mammogram Screening - Mammogram every 1-2 years updated in Health Maintenance based on mutual decision making      History of abnormal Pap smear: No - age 65 or older with adequate negative prior screening test results (3 consecutive negative cytology results, 2 consecutive negative cotesting results, or 2 consecutive negative HrHPV test results within 10 years, with the most recent test occurring within the recommended screening interval for the test used)       ASCVD Risk   The 10-year ASCVD risk score (Mekhi AVELAR, et al., 2019) is: 6.6%    Values used to calculate the score:      Age: 69 years      Sex: Female      Is Non- : No      Diabetic: No      Tobacco smoker: No      Systolic Blood Pressure: 129 mmHg      Is BP treated: No      HDL Cholesterol: 67 mg/dL      Total Cholesterol: 152 mg/dL        Reviewed and updated as needed  this visit by Provider                  Past Medical History:   Diagnosis Date    Anxiety     Chronic anticoagulation     Depression     Disease of thyroid gland     Hypothyroidism    High cholesterol     Hyperlipidemia     Hypertension     Hypothyroidism     SSS (sick sinus syndrome) (H) 02/04/2020     Past Surgical History:   Procedure Laterality Date    AORTIC VALVE REPLACEMENT      APPENDECTOMY      BIOPSY BREAST Left 2015    BREAST CYST EXCISION      CARDIAC CATHETERIZATION      HC PART REMV BONE METATARSAL HEAD,EA Right 06/23/2017    Procedure: EXOSTECTOMY 2ND METATARSAL RIGHT FOOT;  Surgeon: Jessee Mccauley DPM;  Location: [facility];  Service: Podiatry    HYSTERECTOMY  2000    IMPLANT PACEMAKER      IMPLANT PACEMAKER      MIDLINE SINGLE LUMEN PLACEMENT  10/13/2023    OOPHORECTOMY  2000    OTHER SURGICAL HISTORY      Hemmorhoidectomy    RELEASE CARPAL TUNNEL Bilateral     TOE SURGERY      TYMPANOSTOMY TUBE PLACEMENT      ZZC REPLACE AORT VALV,PROSTH VALV      Description: Aortic Valve Replacement;  Proc Date: 01/01/1995;     OB History   No obstetric history on file.     Current Outpatient Medications   Medication Sig Dispense Refill    alendronate (FOSAMAX) 70 MG tablet Take 1 tablet (70 mg) by mouth every 7 days 13 tablet 3    atorvastatin (LIPITOR) 40 MG tablet TAKE 1 TABLET BY MOUTH DAILY 100 tablet 2    buPROPion (WELLBUTRIN XL) 150 MG 24 hr tablet TAKE 2 TABLETS BY MOUTH EVERY  MORNING 180 tablet 0    calcium carbonate 500 mg, elemental, (OSCAL 500) 1250 (500 Ca) MG TABS tablet Take 1 tablet by mouth daily Take 1 tab by mouth every am      levothyroxine (SYNTHROID/LEVOTHROID) 88 MCG tablet Take 1 tablet (88 mcg) by mouth daily. 90 tablet 0    vitamin D3 (CHOLECALCIFEROL) 50 mcg (2000 units) tablet Take 1 tablet by mouth daily      warfarin ANTICOAGULANT (COUMADIN) 2.5 MG tablet Take 2 tablets (5 mg) by mouth every Day or as directed by your ACC team based on INR results. 200 tablet 1     furosemide (LASIX) 40 MG tablet Take 1 tablet (40 mg) by mouth daily as needed (Patient not taking: Reported on 9/10/2024) 90 tablet 1     Allergies   Allergen Reactions    Demerol [Meperidine]      Hallucinations      Misc. Sulfonamide Containing Compounds Hives    Morphine     Sulfa Antibiotics      Current providers sharing in care for this patient include:  Patient Care Team:  Karina Cifuentes MD as PCP - General (Family Medicine)  Kirsten Vora MD as Assigned Heart and Vascular Provider  Karina Cifuentes MD as Assigned PCP  Houston Sandoval MD as MD (Neurology)  Houston Sandoval MD as Assigned Neuroscience Provider  Elisa Sneed MD as Assigned Infectious Disease Provider    The following health maintenance items are reviewed in Epic and correct as of today:  Health Maintenance   Topic Date Due    DEPRESSION ACTION PLAN  Never done    RSV VACCINE (1 - Risk 60-74 years 1-dose series) Never done    MEDICARE ANNUAL WELLNESS VISIT  08/11/2024    INFLUENZA VACCINE (1) 09/01/2024    COVID-19 Vaccine (6 - 2024-25 season) 09/01/2024    LIPID  02/14/2025    PHQ-9  04/09/2025    TSH W/FREE T4 REFLEX  09/10/2025    ANNUAL REVIEW OF HM ORDERS  09/10/2025    FALL RISK ASSESSMENT  10/09/2025    MAMMO SCREENING  06/25/2026    GLUCOSE  02/14/2027    ADVANCE CARE PLANNING  09/10/2029    COLORECTAL CANCER SCREENING  03/13/2031    DTAP/TDAP/TD IMMUNIZATION (3 - Td or Tdap) 04/23/2031    DEXA  01/31/2038    HEPATITIS C SCREENING  Completed    Pneumococcal Vaccine: 65+ Years  Completed    ZOSTER IMMUNIZATION  Completed    HPV IMMUNIZATION  Aged Out    MENINGITIS IMMUNIZATION  Aged Out    RSV MONOCLONAL ANTIBODY  Aged Out       Review of Systems  Constitutional, HEENT, cardiovascular, pulmonary, GI, , musculoskeletal, neuro, skin, endocrine and psych systems are negative, except as otherwise noted.     Objective    Exam  /67 (BP Location: Left arm, Patient Position: Sitting, Cuff Size: Adult  "Regular)   Pulse 60   Temp 97  F (36.1  C)   Resp 14   Ht 1.626 m (5' 4\")   Wt 61.4 kg (135 lb 4.8 oz)   LMP 12/13/2000 (Approximate)   SpO2 99%   Breastfeeding No   BMI 23.22 kg/m     Estimated body mass index is 23.22 kg/m  as calculated from the following:    Height as of this encounter: 1.626 m (5' 4\").    Weight as of this encounter: 61.4 kg (135 lb 4.8 oz).    Physical Exam  GENERAL: alert and no distress  EYES: Eyes grossly normal to inspection, PERRL and conjunctivae and sclerae normal  HENT: ear canals and TM's normal, nose and mouth without ulcers or lesions  NECK: no adenopathy, no asymmetry, masses, or scars  RESP: lungs clear to auscultation - no rales, rhonchi or wheezes  CV: regular rate and rhythm, normal S1 S2, no S3 or S4, no murmur, click or rub, no peripheral edema  ABDOMEN: soft, nontender, no hepatosplenomegaly, no masses and bowel sounds normal  MS: no gross musculoskeletal defects noted, no edema  SKIN: no suspicious lesions or rashes. Scar on back well healed.  NEURO: Normal strength and tone, mentation intact and speech normal  PSYCH: mentation appears normal, affect normal/bright        10/9/2024   Mini Cog   Clock Draw Score 2 Normal   3 Item Recall 3 objects recalled   Mini Cog Total Score 5      Signed Electronically by: CUCO ALEMAN MD    "

## 2024-10-09 NOTE — NURSING NOTE
Patient tolerated Flu injection without incident. Injection site free from redness, swelling, and pain. Patient discharged in the care of lab. Patient aware of next appointment.

## 2024-10-10 LAB
ALBUMIN SERPL BCG-MCNC: 4.5 G/DL (ref 3.5–5.2)
ALP SERPL-CCNC: 75 U/L (ref 40–150)
ALT SERPL W P-5'-P-CCNC: 31 U/L (ref 0–50)
ANION GAP SERPL CALCULATED.3IONS-SCNC: 8 MMOL/L (ref 7–15)
AST SERPL W P-5'-P-CCNC: 28 U/L (ref 0–45)
BACTERIA UR CULT: NORMAL
BILIRUB SERPL-MCNC: 0.7 MG/DL
BUN SERPL-MCNC: 9.8 MG/DL (ref 8–23)
CALCIUM SERPL-MCNC: 9.5 MG/DL (ref 8.8–10.4)
CHLORIDE SERPL-SCNC: 104 MMOL/L (ref 98–107)
CREAT SERPL-MCNC: 1.03 MG/DL (ref 0.51–0.95)
EGFRCR SERPLBLD CKD-EPI 2021: 59 ML/MIN/1.73M2
GLUCOSE SERPL-MCNC: 72 MG/DL (ref 70–99)
HCO3 SERPL-SCNC: 30 MMOL/L (ref 22–29)
POTASSIUM SERPL-SCNC: 3.9 MMOL/L (ref 3.4–5.3)
PROT SERPL-MCNC: 7.6 G/DL (ref 6.4–8.3)
SODIUM SERPL-SCNC: 142 MMOL/L (ref 135–145)
T4 FREE SERPL-MCNC: 1.16 NG/DL (ref 0.9–1.7)
TSH SERPL DL<=0.005 MIU/L-ACNC: 29.9 UIU/ML (ref 0.3–4.2)
VIT D+METAB SERPL-MCNC: 44 NG/ML (ref 20–50)

## 2024-10-15 ENCOUNTER — ANTICOAGULATION THERAPY VISIT (OUTPATIENT)
Dept: ANTICOAGULATION | Facility: CLINIC | Age: 69
End: 2024-10-15
Payer: COMMERCIAL

## 2024-10-15 DIAGNOSIS — Z95.2 H/O MECHANICAL AORTIC VALVE REPLACEMENT: Primary | ICD-10-CM

## 2024-10-15 LAB — INR HOME MONITORING: 1.7 (ref 2–3)

## 2024-10-15 NOTE — PROGRESS NOTES
ANTICOAGULATION MANAGEMENT     Tammy Law 69 year old female is on warfarin with subtherapeutic INR result. (Goal INR 2.0-3.0)    Recent labs: (last 7 days)     10/15/24  0000   INR 1.7*       ASSESSMENT     Source(s): Chart Review and Patient/Caregiver Call     Warfarin doses taken: Warfarin taken as instructed  Diet: No new diet changes identified  Medication/supplement changes: None noted  New illness, injury, or hospitalization: No  Signs or symptoms of bleeding or clotting: No  Previous result: Subtherapeutic  Additional findings: None       PLAN     Recommended plan for no diet, medication or health factor changes affecting INR     Dosing Instructions: Increase your warfarin dose (7.1% change) with next INR in 1 week       Summary  As of 10/15/2024      Full warfarin instructions:  7.5 mg every Tue; 5 mg all other days   Next INR check:  10/22/2024               Telephone call with Bisi who agrees to plan and repeated back plan correctly    Patient to recheck with home meter    Education provided: Please call back if any changes to your diet, medications or how you've been taking warfarin  Goal range and lab monitoring: goal range and significance of current result    Plan made per [facility] anticoagulation protocol    Kassy Borges RN  10/15/2024  Anticoagulation Clinic  [employer] for routing messages: p ANTICOAG HOME MONITORING  [facility] patient phone line: [phone]        _______________________________________________________________________     Anticoagulation Episode Summary       Current INR goal:  2.0-3.0   TTR:  70.2% (1 y)   Target end date:  Indefinite   Send INR reminders to:  ANTICOAG HOME MONITORING    Indications    H/O mechanical aortic valve replacement [Z95.2]             Comments:  [employer] home monitor- managed by exception  Goal range changed during 8/28-9/15/23  hospitalization             Anticoagulation Care Providers       Provider Role Specialty Phone number    Arnold Anderson  MD AJ Referring Internal Medicine [phone]    Christina Hernandez MD Referring Family Medicine [phone]    Karina Cifuentes MD Referring Family Medicine [phone]

## 2024-10-22 ENCOUNTER — ANTICOAGULATION THERAPY VISIT (OUTPATIENT)
Dept: ANTICOAGULATION | Facility: CLINIC | Age: 69
End: 2024-10-22
Payer: COMMERCIAL

## 2024-10-22 DIAGNOSIS — Z95.2 H/O MECHANICAL AORTIC VALVE REPLACEMENT: Primary | ICD-10-CM

## 2024-10-22 LAB — INR HOME MONITORING: 2.3 (ref 2–3)

## 2024-10-22 NOTE — PROGRESS NOTES
ANTICOAGULATION MANAGEMENT     Tammy Law 69 year old female is on warfarin with therapeutic INR result. (Goal INR 2.0-3.0)    Recent labs: (last 7 days)     10/22/24  0000   INR 2.3       ASSESSMENT     Source(s): Chart Review  Previous INR was Subtherapeutic  Medication, diet, health changes since last INR chart reviewed; none identified         PLAN     Recommended plan for no diet, medication or health factor changes affecting INR     Dosing Instructions: Continue your current warfarin dose with next INR in 1 week       Summary  As of 10/22/2024      Full warfarin instructions:  7.5 mg every Tue; 5 mg all other days   Next INR check:  10/29/2024               Detailed voice message left for Bisi with dosing instructions and follow up date.     Patient to recheck with home meter    Education provided: Please call back if any changes to your diet, medications or how you've been taking warfarin    Plan made per [facility] anticoagulation protocol    Petrona Phan RN  10/22/2024  Anticoagulation Clinic  [facility] for routing messages: p ANTICOAG HOME MONITORING  [facility] patient phone line: [phone]        _______________________________________________________________________     Anticoagulation Episode Summary       Current INR goal:  2.0-3.0   TTR:  69.9% (1 y)   Target end date:  Indefinite   Send INR reminders to:  ANTICOAG HOME MONITORING    Indications    H/O mechanical aortic valve replacement [Z95.2]             Comments:  [employer] home monitor- managed by exception  Goal range changed during 8/28-9/15/23  hospitalization             Anticoagulation Care Providers       Provider Role Specialty Phone number    Arnold Anderson MD Referring Internal Medicine [phone]    Christina Hernandez MD Referring Family Medicine [phone]    Karina Cifuentes MD Referring Family Medicine [phone]

## 2024-10-29 ENCOUNTER — ANTICOAGULATION THERAPY VISIT (OUTPATIENT)
Dept: ANTICOAGULATION | Facility: CLINIC | Age: 69
End: 2024-10-29
Payer: COMMERCIAL

## 2024-10-29 DIAGNOSIS — Z95.2 H/O MECHANICAL AORTIC VALVE REPLACEMENT: Primary | ICD-10-CM

## 2024-10-29 LAB — INR HOME MONITORING: 2.4 (ref 2–3)

## 2024-10-29 NOTE — PROGRESS NOTES
ANTICOAGULATION MANAGEMENT     Tammy Law 69 year old female is on warfarin with therapeutic INR result. (Goal INR 2.0-3.0)    Recent labs: (last 7 days)     10/29/24  0000   INR 2.4       ASSESSMENT     Source(s): Chart Review  Previous INR was Therapeutic last visit; previously outside of goal range  Medication, diet, health changes since last INR chart reviewed; none identified         PLAN     Recommended plan for no diet, medication or health factor changes affecting INR     Dosing Instructions: Continue your current warfarin dose with next INR in 1 week       Summary  As of 10/29/2024      Full warfarin instructions:  7.5 mg every Tue; 5 mg all other days   Next INR check:  11/5/2024               Detailed voice message left for Bisi with dosing instructions and follow up date.     Patient to recheck with home meter    Education provided: Please call back if any changes to your diet, medications or how you've been taking warfarin  Resume manage by exception with home monitor. Continue to submit INR results to home monitor company.You will only be called when your result is out of range. Please call and notify [facility] if new medication started, dose missed, signs or symptoms of bleeding or clotting, or a surgery/procedure is scheduled. Due for next call no later than: 1/27/25.  Contact [phone] with any changes, questions or concerns.     Plan made per [facility] anticoagulation protocol    Carolynn Lucas RN  10/29/2024  Anticoagulation Clinic  [facility] for routing messages: p ANTICOAG HOME MONITORING  [facility] patient phone line: [phone]        _______________________________________________________________________     Anticoagulation Episode Summary       Current INR goal:  2.0-3.0   TTR:  69.9% (1 y)   Target end date:  Indefinite   Send INR reminders to:  ANTICOAG HOME MONITORING    Indications    H/O mechanical aortic valve replacement [Z95.2]             Comments:  [employer] home monitor- managed by  exception  Goal range changed during 8/28-9/15/23  hospitalization             Anticoagulation Care Providers       Provider Role Specialty Phone number    Arnold Anderson MD Referring Internal Medicine [phone]    Christina Hernandez MD Referring Family Medicine [phone]    Karina Cifuentes MD Referring Family Medicine [phone]

## 2024-11-07 DIAGNOSIS — F33.1 MODERATE RECURRENT MAJOR DEPRESSION (H): ICD-10-CM

## 2024-11-07 DIAGNOSIS — Z95.2 H/O MECHANICAL AORTIC VALVE REPLACEMENT: ICD-10-CM

## 2024-11-07 RX ORDER — WARFARIN SODIUM 2.5 MG/1
TABLET ORAL
Qty: 200 TABLET | Refills: 1 | Status: SHIPPED | OUTPATIENT
Start: 2024-11-07

## 2024-11-07 NOTE — TELEPHONE ENCOUNTER
ANTICOAGULATION MANAGEMENT:  Medication Refill    Anticoagulation Summary  As of 10/29/2024      Warfarin maintenance plan:  7.5 mg (2.5 mg x 3) every Tue; 5 mg (2.5 mg x 2) all other days   Next INR check:  11/5/2024   Target end date:  Indefinite    Indications    H/O mechanical aortic valve replacement [Z95.2]                 Anticoagulation Care Providers       Provider Role Specialty Phone number    Arnold Anderson MD Referring Internal Medicine [phone]    Christina Hernandez MD Referring Family Medicine [phone]    Karina Cifuentes MD Referring Family Medicine [phone]            Refill Criteria    Visit with referring provider/group: Meets criteria: visit within referring provider group in the last 15 months on 10/9/24    [facility] referral last signed: 02/15/2024; within last year: Yes    Lab monitoring not exceeding 2 weeks overdue: Yes    Tammy meets all criteria for refill. Rx instructions and quantity supplied updated to match patient's current dosing plan. Warfarin 90 day supply with 1 refill granted per [facility] protocol     Pamella Sy RN  Anticoagulation Clinic

## 2024-11-08 RX ORDER — BUPROPION HYDROCHLORIDE 150 MG/1
300 TABLET ORAL EVERY MORNING
Qty: 180 TABLET | Refills: 0 | Status: SHIPPED | OUTPATIENT
Start: 2024-11-08

## 2024-11-12 ENCOUNTER — MYC MEDICAL ADVICE (OUTPATIENT)
Dept: ANTICOAGULATION | Facility: CLINIC | Age: 69
End: 2024-11-12
Payer: COMMERCIAL

## 2024-11-12 ENCOUNTER — DOCUMENTATION ONLY (OUTPATIENT)
Dept: ANTICOAGULATION | Facility: CLINIC | Age: 69
End: 2024-11-12
Payer: COMMERCIAL

## 2024-11-12 DIAGNOSIS — Z95.2 H/O MECHANICAL AORTIC VALVE REPLACEMENT: Primary | ICD-10-CM

## 2024-11-12 LAB — INR HOME MONITORING: 2.4 (ref 2–3)

## 2024-11-12 NOTE — PROGRESS NOTES
ANTICOAGULATION  MANAGEMENT-Home Monitor Managed by Exception    Tammyrober Law 69 year old female is on warfarin with therapeutic INR result. (Goal INR 2.0-3.0)    Recent labs: (last 7 days)     11/12/24  0000   INR 2.4       Previous INR was Therapeutic  Medication, diet, health changes since last INR:chart reviewed; none identified  Contacted within the last 12 weeks by phone on 10/29/24  Due for next call no later than: 1/27/25.   Last [facility] referral date: 02/15/2024      ZAIN Munoz was NOT contacted regarding therapeutic result today per home monitoring policy manage by exception agreement.   Current warfarin dose is to be continued:     Summary  As of 11/12/2024      Full warfarin instructions:  7.5 mg every Tue; 5 mg all other days   Next INR check:  11/26/2024             ?   Petrona Phan RN  Anticoagulation Clinic  11/12/2024    _______________________________________________________________________     Anticoagulation Episode Summary       Current INR goal:  2.0-3.0   TTR:  70.8% (1 y)   Target end date:  Indefinite   Send INR reminders to:  [facility]    Indications    H/O mechanical aortic valve replacement [Z95.2]             Comments:  [employer] home monitor- managed by exception  Goal range changed during 8/28-9/15/23  hospitalization             Anticoagulation Care Providers       Provider Role Specialty Phone number    Arnold Anderson MD Referring Internal Medicine [phone]    Christina Hernandez MD Referring Family Medicine [phone]    Karina Cifuentes MD Referring Family Medicine [phone]

## 2024-11-12 NOTE — TELEPHONE ENCOUNTER
ANTICOAGULATION     Tammy Law is overdue for an INR check.     Spotlight message sent     Carolynn Morley RN  11/12/2024  Anticoagulation Clinic  [facility] for routing messages: [facility]  [facility] patient phone line: [phone]

## 2024-11-19 DIAGNOSIS — M81.0 AGE-RELATED OSTEOPOROSIS WITHOUT CURRENT PATHOLOGICAL FRACTURE: ICD-10-CM

## 2024-11-19 RX ORDER — ALENDRONATE SODIUM 70 MG/1
TABLET ORAL
Qty: 12 TABLET | Refills: 3 | OUTPATIENT
Start: 2024-11-19

## 2024-11-19 RX ORDER — ALENDRONATE SODIUM 70 MG/1
70 TABLET ORAL
Qty: 13 TABLET | Refills: 0 | Status: SHIPPED | OUTPATIENT
Start: 2024-11-19

## 2024-11-26 LAB — INR HOME MONITORING: 3 (ref 2–3)

## 2024-11-27 ENCOUNTER — DOCUMENTATION ONLY (OUTPATIENT)
Dept: ANTICOAGULATION | Facility: CLINIC | Age: 69
End: 2024-11-27
Payer: COMMERCIAL

## 2024-11-27 DIAGNOSIS — Z95.2 H/O MECHANICAL AORTIC VALVE REPLACEMENT: Primary | ICD-10-CM

## 2024-11-27 NOTE — CONFIDENTIAL NOTE
ANTICOAGULATION  MANAGEMENT-Home Monitor Managed by Exception    Tammyrober Law 69 year old female is on warfarin with therapeutic INR result. (Goal INR 2.0-3.0)    Recent labs: (last 7 days)     11/26/24  0000   INR 3.0       Previous INR was Therapeutic  Medication, diet, health changes since last INR:chart reviewed; none identified  Contacted within the last 12 weeks by phone on 10/29/24  Due for next call no later than: 1/27/25.   Last [facility] referral date: 02/15/2024      ZAIN Munoz was NOT contacted regarding therapeutic result today per home monitoring policy manage by exception agreement.   Current warfarin dose is to be continued:     Summary  As of 11/27/2024      Full warfarin instructions:  7.5 mg every Tue; 5 mg all other days   Next INR check:  12/10/2024             ?   Nesha Anders RN  Anticoagulation Clinic  11/27/2024    _______________________________________________________________________     Anticoagulation Episode Summary       Current INR goal:  2.0-3.0   TTR:  70.8% (1 y)   Target end date:  Indefinite   Send INR reminders to:  [facility]    Indications    H/O mechanical aortic valve replacement [Z95.2]             Comments:  [employer] home monitor- managed by exception  Goal range changed during 8/28-9/15/23  hospitalization             Anticoagulation Care Providers       Provider Role Specialty Phone number    Arnold Anderson MD Referring Internal Medicine [phone]    Christina Hernandez MD Referring Family Medicine [phone]    Karina Cifuentes MD Referring Family Medicine [phone]

## 2024-12-02 ENCOUNTER — OFFICE VISIT (OUTPATIENT)
Dept: FAMILY MEDICINE | Facility: CLINIC | Age: 69
End: 2024-12-02
Payer: COMMERCIAL

## 2024-12-02 VITALS
DIASTOLIC BLOOD PRESSURE: 70 MMHG | BODY MASS INDEX: 23.64 KG/M2 | HEART RATE: 63 BPM | RESPIRATION RATE: 15 BRPM | TEMPERATURE: 98.1 F | HEIGHT: 64 IN | OXYGEN SATURATION: 99 % | SYSTOLIC BLOOD PRESSURE: 113 MMHG | WEIGHT: 138.5 LBS

## 2024-12-02 DIAGNOSIS — M25.50 MULTIPLE JOINT PAIN: ICD-10-CM

## 2024-12-02 DIAGNOSIS — H55.01 CONGENITAL NYSTAGMUS: ICD-10-CM

## 2024-12-02 DIAGNOSIS — E03.9 ACQUIRED HYPOTHYROIDISM: ICD-10-CM

## 2024-12-02 DIAGNOSIS — R79.89 ELEVATED SERUM CREATININE: ICD-10-CM

## 2024-12-02 DIAGNOSIS — R94.6 THYROID FUNCTION TEST ABNORMAL: ICD-10-CM

## 2024-12-02 DIAGNOSIS — C43.59 MALIGNANT MELANOMA OF SKIN OF TRUNK (H): Primary | ICD-10-CM

## 2024-12-02 LAB
CYSTATIN C (ROCHE): 1.1 MG/L (ref 0.6–1)
GFR/BSA.PRED SERPLBLD CYS-BASED-ARV: 62 ML/MIN/1.73M2
MAGNESIUM SERPL-MCNC: 2.1 MG/DL (ref 1.7–2.3)
PHOSPHATE SERPL-MCNC: 3.1 MG/DL (ref 2.5–4.5)
T4 FREE SERPL-MCNC: 1.35 NG/DL (ref 0.9–1.7)
TSH SERPL DL<=0.005 MIU/L-ACNC: 7.14 UIU/ML (ref 0.3–4.2)

## 2024-12-02 PROCEDURE — 84439 ASSAY OF FREE THYROXINE: CPT | Performed by: FAMILY MEDICINE

## 2024-12-02 PROCEDURE — 36415 COLL VENOUS BLD VENIPUNCTURE: CPT | Performed by: FAMILY MEDICINE

## 2024-12-02 PROCEDURE — 82610 CYSTATIN C: CPT | Performed by: FAMILY MEDICINE

## 2024-12-02 PROCEDURE — 99214 OFFICE O/P EST MOD 30 MIN: CPT | Performed by: FAMILY MEDICINE

## 2024-12-02 PROCEDURE — 83735 ASSAY OF MAGNESIUM: CPT | Performed by: FAMILY MEDICINE

## 2024-12-02 PROCEDURE — 84100 ASSAY OF PHOSPHORUS: CPT | Performed by: FAMILY MEDICINE

## 2024-12-02 PROCEDURE — G2211 COMPLEX E/M VISIT ADD ON: HCPCS | Performed by: FAMILY MEDICINE

## 2024-12-02 PROCEDURE — 84443 ASSAY THYROID STIM HORMONE: CPT | Performed by: FAMILY MEDICINE

## 2024-12-02 ASSESSMENT — PAIN SCALES - GENERAL: PAINLEVEL_OUTOF10: MODERATE PAIN (4)

## 2024-12-02 ASSESSMENT — PATIENT HEALTH QUESTIONNAIRE - PHQ9: SUM OF ALL RESPONSES TO PHQ QUESTIONS 1-9: 6

## 2024-12-02 NOTE — PROGRESS NOTES
"  Assessment & Plan     Congenital nystagmus  I suspect this with normal aging is causing the vague symptoms.    Thyroid function test abnormal  She reports taking the thyroid replacement an hour before her other medications now.  - TSH with free T4 reflex    Acquired hypothyroidism  - TSH with free T4 reflex    Elevated serum creatinine  - Magnesium  - Phosphorus  - Cystatin C with GFR    Multiple joint pain  This is consistent with osteoarthritis. No concerning signs or symptoms.  She used to be very active.  Discussed doing stretches and exercises to maintain mobility and lessen pain.  Tylenol is okay.    Return in six months    The longitudinal plan of care for the diagnosis(es)/condition(s) as documented were addressed during this visit. Due to the added complexity in care, I will continue to support Bisi in the subsequent management and with ongoing continuity of care.    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 Seeing her dermatologist every three months. She had a few small facial lesions frozen last week.    Subjective   Bisi is a 69 year old, presenting for the following health issues:  office visit (Patient reports they are here following up on kidneys and thyroid. Reports her joints have been hurting recently, especially shoulders and elbows. Also notices she has been feeling light headed and off balance. )        2024     8:12 AM   Additional Questions   Roomed by Gorge   Accompanied by alone         2024     8:12 AM   Patient Reported Additional Medications   Patient reports taking the following new medications none     Just back from seeing her 92 yo mother and her uncle. While there her cousin at age 57 yo  in his cabin up there.  Aches in both shoulders, elbows, knees and right hip. No joint swelling. Sometimes her hands swell. This started a year to six months. Uses Tylenol, heat and ice. Stretches \"I try.\" Not bad enough for physical therapy.  Exercises is " "going up and down 14 steps 3-4 times. Retired from housekeeping.  Gets lightheaded when she turns. Slight off balance. Not a spin.    History of Present Illness       Reason for visit:  Follow up from last visit    She eats 0-1 servings of fruits and vegetables daily.She consumes 1 sweetened beverage(s) daily.She exercises with enough effort to increase her heart rate 9 or less minutes per day.  She exercises with enough effort to increase her heart rate 3 or less days per week.   She is taking medications regularly.       Objective    /70 (BP Location: Left arm, Patient Position: Sitting, Cuff Size: Adult Regular)   Pulse 63   Temp 98.1  F (36.7  C) (Tympanic)   Resp 15   Ht 1.626 m (5' 4\")   Wt 62.8 kg (138 lb 8 oz)   LMP 12/13/2000 (Approximate)   SpO2 99%   Breastfeeding No   BMI 23.77 kg/m    Body mass index is 23.77 kg/m .  Physical Exam   GENERAL: alert and no distress  EYES: Eyes grossly normal to inspection aside from her chronic nystagmus which appears unchanged, PERRL and conjunctivae and sclerae normal  HENT: ear canals and TM's normal, nose and mouth without ulcers or lesions  NECK: no adenopathy, no asymmetry, masses, or scars  RESP: lungs clear to auscultation - no rales, rhonchi or wheezes  CV: regular rate and rhythm, normal S1 S2, no S3 or S4, no murmur, click or rub, no peripheral edema  ABDOMEN: soft, nontender, no hepatosplenomegaly, no masses and bowel sounds normal  MS: no gross musculoskeletal defects noted, no edema  SKIN: no suspicious lesions or rashes  NEURO: Normal strength and tone, mentation intact and speech normal  PSYCH: mentation appears normal, affect normal/bright  Signed Electronically by: CUCO ALEMAN MD    "

## 2024-12-03 DIAGNOSIS — E03.9 ACQUIRED HYPOTHYROIDISM: ICD-10-CM

## 2024-12-03 RX ORDER — LEVOTHYROXINE SODIUM 88 UG/1
88 TABLET ORAL DAILY
Qty: 90 TABLET | Refills: 3 | Status: SHIPPED | OUTPATIENT
Start: 2024-12-03

## 2024-12-09 ENCOUNTER — ANCILLARY PROCEDURE (OUTPATIENT)
Dept: CARDIOLOGY | Facility: CLINIC | Age: 69
End: 2024-12-09
Attending: INTERNAL MEDICINE
Payer: COMMERCIAL

## 2024-12-09 DIAGNOSIS — I49.5 SICK SINUS SYNDROME (H): ICD-10-CM

## 2024-12-09 DIAGNOSIS — Z95.0 CARDIAC PACEMAKER IN SITU: ICD-10-CM

## 2024-12-09 LAB
MDC_IDC_EPISODE_DTM: NORMAL
MDC_IDC_EPISODE_ID: 33
MDC_IDC_EPISODE_TYPE: NORMAL
MDC_IDC_EPISODE_TYPE_INDUCED: NO
MDC_IDC_EPISODE_VENDOR_TYPE: NORMAL
MDC_IDC_LEAD_CONNECTION_STATUS: NORMAL
MDC_IDC_LEAD_CONNECTION_STATUS: NORMAL
MDC_IDC_LEAD_IMPLANT_DT: NORMAL
MDC_IDC_LEAD_IMPLANT_DT: NORMAL
MDC_IDC_LEAD_LOCATION: NORMAL
MDC_IDC_LEAD_LOCATION: NORMAL
MDC_IDC_LEAD_LOCATION_DETAIL_1: NORMAL
MDC_IDC_LEAD_LOCATION_DETAIL_1: NORMAL
MDC_IDC_LEAD_MFG: NORMAL
MDC_IDC_LEAD_MFG: NORMAL
MDC_IDC_LEAD_MODEL: NORMAL
MDC_IDC_LEAD_MODEL: NORMAL
MDC_IDC_LEAD_POLARITY_TYPE: NORMAL
MDC_IDC_LEAD_POLARITY_TYPE: NORMAL
MDC_IDC_LEAD_SERIAL: NORMAL
MDC_IDC_LEAD_SERIAL: NORMAL
MDC_IDC_LEAD_SPECIAL_FUNCTION: NORMAL
MDC_IDC_LEAD_SPECIAL_FUNCTION: NORMAL
MDC_IDC_MSMT_BATTERY_DTM: NORMAL
MDC_IDC_MSMT_BATTERY_REMAINING_PERCENTAGE: 45 %
MDC_IDC_MSMT_BATTERY_STATUS: NORMAL
MDC_IDC_MSMT_LEADCHNL_RA_IMPEDANCE_VALUE: 683 OHM
MDC_IDC_MSMT_LEADCHNL_RA_LEAD_CHANNEL_STATUS: NORMAL
MDC_IDC_MSMT_LEADCHNL_RV_IMPEDANCE_VALUE: 585 OHM
MDC_IDC_MSMT_LEADCHNL_RV_LEAD_CHANNEL_STATUS: NORMAL
MDC_IDC_PG_IMPLANT_DTM: NORMAL
MDC_IDC_PG_MFG: NORMAL
MDC_IDC_PG_MODEL: NORMAL
MDC_IDC_PG_SERIAL: NORMAL
MDC_IDC_PG_TYPE: NORMAL
MDC_IDC_SESS_CLINIC_NAME: NORMAL
MDC_IDC_SESS_DTM: NORMAL
MDC_IDC_SESS_REPROGRAMMED: NO
MDC_IDC_SESS_TYPE: NORMAL
MDC_IDC_SET_BRADY_AT_MODE_SWITCH_MODE: NORMAL
MDC_IDC_SET_BRADY_AT_MODE_SWITCH_RATE: 160 {BEATS}/MIN
MDC_IDC_SET_BRADY_LOWRATE: 60 {BEATS}/MIN
MDC_IDC_SET_BRADY_MAX_SENSOR_RATE: 120 {BEATS}/MIN
MDC_IDC_SET_BRADY_MAX_TRACKING_RATE: 130 {BEATS}/MIN
MDC_IDC_SET_BRADY_MODE: NORMAL
MDC_IDC_SET_BRADY_PAV_DELAY_LOW: 200 MS
MDC_IDC_SET_BRADY_SAV_DELAY_LOW: 180 MS
MDC_IDC_SET_LEADCHNL_RA_PACING_AMPLITUDE: 1.6 V
MDC_IDC_SET_LEADCHNL_RA_PACING_POLARITY: NORMAL
MDC_IDC_SET_LEADCHNL_RA_PACING_PULSEWIDTH: 0.4 MS
MDC_IDC_SET_LEADCHNL_RA_SENSING_ADAPTATION_MODE: NORMAL
MDC_IDC_SET_LEADCHNL_RA_SENSING_POLARITY: NORMAL
MDC_IDC_SET_LEADCHNL_RV_PACING_AMPLITUDE: 2.4 V
MDC_IDC_SET_LEADCHNL_RV_PACING_POLARITY: NORMAL
MDC_IDC_SET_LEADCHNL_RV_PACING_PULSEWIDTH: 0.4 MS
MDC_IDC_SET_LEADCHNL_RV_SENSING_ADAPTATION_MODE: NORMAL
MDC_IDC_SET_LEADCHNL_RV_SENSING_POLARITY: NORMAL
MDC_IDC_STAT_AT_BURDEN_PERCENT: 0 %
MDC_IDC_STAT_AT_DTM_END: NORMAL
MDC_IDC_STAT_AT_DTM_START: NORMAL
MDC_IDC_STAT_AT_MODE_SW_COUNT_PER_DAY: 4
MDC_IDC_STAT_AT_MODE_SW_PERCENT_TIME_PER_DAY: 0 %
MDC_IDC_STAT_BRADY_AP_VP_PERCENT: 46 %
MDC_IDC_STAT_BRADY_AP_VS_PERCENT: 42 %
MDC_IDC_STAT_BRADY_AS_VP_PERCENT: 1 %
MDC_IDC_STAT_BRADY_AS_VS_PERCENT: 11 %
MDC_IDC_STAT_BRADY_DTM_END: NORMAL
MDC_IDC_STAT_BRADY_DTM_START: NORMAL
MDC_IDC_STAT_BRADY_RA_PERCENT_PACED: 88 %
MDC_IDC_STAT_BRADY_RV_PERCENT_PACED: 47 %
MDC_IDC_STAT_HEART_RATE_ATRIAL_MEAN: 72 {BEATS}/MIN
MDC_IDC_STAT_HEART_RATE_DTM_END: NORMAL
MDC_IDC_STAT_HEART_RATE_DTM_START: NORMAL
MDC_IDC_STAT_HEART_RATE_VENTRICULAR_MEAN: 72 {BEATS}/MIN

## 2024-12-09 PROCEDURE — 93296 REM INTERROG EVL PM/IDS: CPT | Performed by: INTERNAL MEDICINE

## 2024-12-09 PROCEDURE — 93294 REM INTERROG EVL PM/LDLS PM: CPT | Performed by: INTERNAL MEDICINE

## 2024-12-10 ENCOUNTER — DOCUMENTATION ONLY (OUTPATIENT)
Dept: ANTICOAGULATION | Facility: CLINIC | Age: 69
End: 2024-12-10
Payer: COMMERCIAL

## 2024-12-10 DIAGNOSIS — Z95.2 H/O MECHANICAL AORTIC VALVE REPLACEMENT: Primary | ICD-10-CM

## 2024-12-10 LAB — INR HOME MONITORING: 2.3 (ref 2–3)

## 2024-12-10 NOTE — PROGRESS NOTES
ANTICOAGULATION  MANAGEMENT-Home Monitor Managed by Exception    Tammyrober Law 69 year old female is on warfarin with therapeutic INR result. (Goal INR 2.0-3.0)    Recent labs: (last 7 days)     12/10/24  0000   INR 2.3       Previous INR was Therapeutic  Medication, diet, health changes since last INR:chart reviewed; none identified  Contacted within the last 12 weeks by phone on 10/29/24  Due for next call no later than: 1/27/25.   Last [facility] referral date: 02/15/2024      ZAIN Munoz was NOT contacted regarding therapeutic result today per home monitoring policy manage by exception agreement.   Current warfarin dose is to be continued:     Summary  As of 12/10/2024      Full warfarin instructions:  7.5 mg every Tue; 5 mg all other days   Next INR check:  12/24/2024             ?   Carolynn Lucas RN  Anticoagulation Clinic  12/10/2024    _______________________________________________________________________     Anticoagulation Episode Summary       Current INR goal:  2.0-3.0   TTR:  74.4% (1 y)   Target end date:  Indefinite   Send INR reminders to:  [facility]    Indications    H/O mechanical aortic valve replacement [Z95.2]             Comments:  [employer] home monitor- managed by exception  Goal range changed during 8/28-9/15/23  hospitalization             Anticoagulation Care Providers       Provider Role Specialty Phone number    Arnold Anderson MD Referring Internal Medicine [phone]    Christina Hernandez MD Referring Family Medicine [phone]    Karina Cifuentes MD Referring Family Medicine [phone]

## 2024-12-24 ENCOUNTER — DOCUMENTATION ONLY (OUTPATIENT)
Dept: ANTICOAGULATION | Facility: CLINIC | Age: 69
End: 2024-12-24
Payer: COMMERCIAL

## 2024-12-24 DIAGNOSIS — Z95.2 H/O MECHANICAL AORTIC VALVE REPLACEMENT: Primary | ICD-10-CM

## 2024-12-24 LAB — INR HOME MONITORING: 2.4 (ref 2–3)

## 2024-12-24 NOTE — PROGRESS NOTES
ANTICOAGULATION  MANAGEMENT-Home Monitor Managed by Exception    Tammy Law 69 year old female is on warfarin with therapeutic INR result. (Goal INR 2.0-3.0)    Recent labs: (last 7 days)     12/24/24  0000   INR 2.4       Previous INR was Therapeutic  Medication, diet, health changes since last INR:chart reviewed; none identified  Contacted within the last 12 weeks by phone on 10/29/24  Last [facility] referral date: 02/15/2024      ZAIN     Tammy was NOT contacted regarding therapeutic result today per home monitoring policy manage by exception agreement.   Current warfarin dose is to be continued:     Summary  As of 12/24/2024      Full warfarin instructions:  7.5 mg every Tue; 5 mg all other days   Next INR check:  1/7/2025             ?   Petrona Phan RN  Anticoagulation Clinic  12/24/2024    _______________________________________________________________________     Anticoagulation Episode Summary       Current INR goal:  2.0-3.0   TTR:  78.2% (1 y)   Target end date:  Indefinite   Send INR reminders to:  [facility]    Indications    H/O mechanical aortic valve replacement [Z95.2]             Comments:  [employer] home monitor- managed by exception  Goal range changed during 8/28-9/15/23  hospitalization             Anticoagulation Care Providers       Provider Role Specialty Phone number    Arnold Anderson MD Referring Internal Medicine [phone]    Christina Hernandez MD Referring Family Medicine [phone]    Karina Cifuentes MD Referring Family Medicine [phone]

## 2025-01-07 ENCOUNTER — DOCUMENTATION ONLY (OUTPATIENT)
Dept: ANTICOAGULATION | Facility: CLINIC | Age: 70
End: 2025-01-07
Payer: COMMERCIAL

## 2025-01-07 LAB — INR HOME MONITORING: 2.9 (ref 2–3)

## 2025-01-07 NOTE — PROGRESS NOTES
ANTICOAGULATION  MANAGEMENT-Home Monitor Managed by Exception    Tammy M Ani 69 year old female is on warfarin with therapeutic INR result. (Goal INR 2.0-3.0)    Recent labs: (last 7 days)     01/07/25  0000   INR 2.9       Previous INR was Therapeutic  Medication, diet, health changes since last INR:chart reviewed; none identified  Contacted within the last 12 weeks by phone on 10/29/24  Last [facility] referral date: 02/15/2024      ZAIN     Tammy was NOT contacted regarding therapeutic result today per home monitoring policy manage by exception agreement.   Current warfarin dose is to be continued:       ?   Carolynn Morley RN  Anticoagulation Clinic  1/7/2025    _______________________________________________________________________     Anticoagulation Episode Summary       Current INR goal:  2.0-3.0   TTR:  82.1% (1 y)   Target end date:  Indefinite   Send INR reminders to:  [facility]    Indications    H/O mechanical aortic valve replacement [Z95.2]             Comments:  [employer] home monitor- managed by exception  Goal range changed during 8/28-9/15/23  hospitalization             Anticoagulation Care Providers       Provider Role Specialty Phone number    Arnold Anderson MD Referring Internal Medicine [phone]    Christina Hernandez MD Referring Family Medicine [phone]    Karina Cifuentes MD Referring Family Medicine [phone]

## 2025-01-21 ENCOUNTER — ANTICOAGULATION THERAPY VISIT (OUTPATIENT)
Dept: ANTICOAGULATION | Facility: CLINIC | Age: 70
End: 2025-01-21
Payer: COMMERCIAL

## 2025-01-21 DIAGNOSIS — Z95.2 H/O MECHANICAL AORTIC VALVE REPLACEMENT: Primary | ICD-10-CM

## 2025-01-21 LAB — INR HOME MONITORING: 2.5 (ref 2–3)

## 2025-01-21 NOTE — PROGRESS NOTES
ANTICOAGULATION MANAGEMENT     Tammy Law 69 year old female is on warfarin with therapeutic INR result. (Goal INR 2.0-3.0)    Recent labs: (last 7 days)     01/21/25  0000   INR 2.5       ASSESSMENT     Source(s): Chart Review and Patient/Caregiver Call     Warfarin doses taken: Warfarin taken as instructed  Diet: No new diet changes identified  Medication/supplement changes:  Amoxicillin took prophylactic dose 4 caps) prior to dental appt and then will be  taking  1 capsule TID or 7 days subsequent INRs may be increased. Closer INR monitoring recommended.  New illness, injury, or hospitalization: No  Signs or symptoms of bleeding or clotting: No  Previous result: Therapeutic last 2(+) visits  Additional findings:  Tooth extraction  done today  Quarterly patient check in per home monitor protocol.  Made aware that she will get a call regarding next INR result .       PLAN     Recommended plan for temporary change(s) affecting INR     Dosing Instructions: Continue your current warfarin dose with next INR in 3 days       Summary  As of 1/21/2025      Full warfarin instructions:  7.5 mg every Tue; 5 mg all other days   Next INR check:  1/24/2025               Telephone call with Bisi who verbalizes understanding and agrees to plan and who agrees to plan and repeated back plan correctly    Patient to recheck with home meter    Education provided: Interaction IS anticipated between warfarin and cephalexin  Contact [phone] with any changes, questions or concerns.     Plan made per [facility] anticoagulation protocol    Carolynn Lucas RN  1/21/2025  Anticoagulation Clinic  [employer] for routing messages: p [facility]AG [facility]IT[facility]  [facility] patient phone line: [phone]        _______________________________________________________________________     Anticoagulation Episode Summary       Current INR goal:  2.0-3.0   TTR:  83.4% (1 y)   Target end date:  Indefinite   Send INR reminders to:  [facility]     Indications    H/O mechanical aortic valve replacement [Z95.2]             Comments:  [employer] home monitor- managed by exception  Goal range changed during 8/28-9/15/23  hospitalization             Anticoagulation Care Providers       Provider Role Specialty Phone number    Arnold Anderson MD Referring Internal Medicine [phone]    Christina Hernandez MD Referring Family Medicine [phone]    Karina Cifuentes MD Referring Family Medicine [phone]

## 2025-01-27 ENCOUNTER — ANTICOAGULATION THERAPY VISIT (OUTPATIENT)
Dept: ANTICOAGULATION | Facility: CLINIC | Age: 70
End: 2025-01-27
Payer: COMMERCIAL

## 2025-01-27 DIAGNOSIS — Z95.2 H/O MECHANICAL AORTIC VALVE REPLACEMENT: ICD-10-CM

## 2025-01-27 DIAGNOSIS — Z95.2 H/O MECHANICAL AORTIC VALVE REPLACEMENT: Primary | ICD-10-CM

## 2025-01-27 LAB — INR HOME MONITORING: 2 (ref 2–3)

## 2025-01-27 RX ORDER — WARFARIN SODIUM 2.5 MG/1
TABLET ORAL
Qty: 200 TABLET | Refills: 1 | Status: SHIPPED | OUTPATIENT
Start: 2025-01-27

## 2025-01-27 NOTE — PROGRESS NOTES
ANTICOAGULATION MANAGEMENT     Tammy aLw 69 year old female is on warfarin with therapeutic INR result. (Goal INR 2.0-3.0)    Recent labs: (last 7 days)     01/27/25  0000   INR 2.0       ASSESSMENT     Source(s): Chart Review and Patient/Caregiver Call     Warfarin doses taken: held dose on Tuesday   Diet: No new diet changes identified  Medication/supplement changes: took Amoxicillin for 7 days following dental procedure (1/21-1/28)  New illness, injury, or hospitalization: No  Signs or symptoms of bleeding or clotting: had bleeding follow dental procedure on 1/21 so she held coumadin on Tuesday   Previous result: Therapeutic last 2(+) visits  Additional findings: None      Pt going to [place] 2/10-2/17. She will check INR on 2/3      OK to go back to [facility] as long as criteria is followed (3 months - 4/27/25)         PLAN     Recommended plan for temporary change(s) affecting INR     Dosing Instructions: Continue your current warfarin dose with next INR in 1 week       Summary  As of 1/27/2025      Full warfarin instructions:  7.5 mg every Tue; 5 mg all other days   Next INR check:  2/3/2025               Telephone call with Bisi who verbalizes understanding and agrees to plan    Patient to recheck with home meter    Education provided: Please call back if any changes to your diet, medications or how you've been taking warfarin    Plan made per [facility] anticoagulation protocol    Imani Moody, RN  1/27/2025  Anticoagulation Clinic  [facility] for routing messages: p ANTICOAG HOME MONITORING  [facility] patient phone line: [phone]        _______________________________________________________________________     Anticoagulation Episode Summary       Current INR goal:  2.0-3.0   TTR:  83.4% (1 y)   Target end date:  Indefinite   Send INR reminders to:  [facility]    Indications    H/O mechanical aortic valve replacement [Z95.2]             Comments:  [employer] home monitor- managed by  exception  Goal range changed during 8/28-9/15/23  hospitalization             Anticoagulation Care Providers       Provider Role Specialty Phone number    Arnold Anderson MD Referring Internal Medicine [phone]    Christina Hernandez MD Referring Family Medicine [phone]    Karina Cifuentes MD Referring Family Medicine [phone]

## 2025-01-27 NOTE — TELEPHONE ENCOUNTER
ANTICOAGULATION MANAGEMENT:  Medication Refill    Anticoagulation Summary  As of 1/21/2025      Warfarin maintenance plan:  7.5 mg (2.5 mg x 3) every Tue; 5 mg (2.5 mg x 2) all other days   Next INR check:  1/24/2025   Target end date:  Indefinite    Indications    H/O mechanical aortic valve replacement [Z95.2]                 Anticoagulation Care Providers       Provider Role Specialty Phone number    Arnold Anderson MD Referring Internal Medicine [phone]    Christina Hernandez MD Referring Family Medicine [phone]    Karina Cifuentes MD Referring Family Medicine [phone]            Refill Criteria    Visit with referring provider/group: Meets criteria: visit within referring provider group in the last 15 months on 12/2/24    [facility] referral last signed: 02/15/2024; within last year:  Yes    Lab monitoring is up to date (not exceeding 2 weeks overdue): Yes    Tammy meets all criteria for refill. Rx instructions and quantity supplied updated to match patient's current dosing plan. Warfarin 90 day supply with 1 refill granted per [facility] protocol     Pamella Sy RN  Anticoagulation Clinic

## 2025-02-04 ENCOUNTER — DOCUMENTATION ONLY (OUTPATIENT)
Dept: ANTICOAGULATION | Facility: CLINIC | Age: 70
End: 2025-02-04
Payer: COMMERCIAL

## 2025-02-04 ENCOUNTER — ANTICOAGULATION THERAPY VISIT (OUTPATIENT)
Dept: ANTICOAGULATION | Facility: CLINIC | Age: 70
End: 2025-02-04
Payer: COMMERCIAL

## 2025-02-04 DIAGNOSIS — Z79.01 LONG TERM CURRENT USE OF ANTICOAGULANT THERAPY: ICD-10-CM

## 2025-02-04 DIAGNOSIS — Z95.2 H/O MECHANICAL AORTIC VALVE REPLACEMENT: Primary | ICD-10-CM

## 2025-02-04 LAB — INR HOME MONITORING: 2.4 (ref 2–3)

## 2025-02-04 NOTE — PROGRESS NOTES
ANTICOAGULATION MANAGEMENT     Tammy Law 69 year old female is on warfarin with therapeutic INR result. (Goal INR 2.0-3.0)    Recent labs: (last 7 days)     02/04/25  0000   INR 2.4       ASSESSMENT     Source(s): Chart Review and Patient/Caregiver Call     Warfarin doses taken: Warfarin taken as instructed  Diet: No new diet changes identified  Medication/supplement changes:  Finished Amoxicillin last week 1/28/25  New illness, injury, or hospitalization: No issues post recent dental extraction  Signs or symptoms of bleeding or clotting: No  Previous result: Therapeutic last 2(+) visits  Additional findings: None       PLAN     Recommended plan for no diet, medication or health factor changes affecting INR     Dosing Instructions: Continue your current warfarin dose with next INR in 2 weeks       Summary  As of 2/4/2025      Full warfarin instructions:  7.5 mg every Tue; 5 mg all other days   Next INR check:  2/18/2025               Telephone call with Bisi who verbalizes understanding and agrees to plan    Patient to recheck with home meter    Education provided: Please call back if any changes to your diet, medications or how you've been taking warfarin  Symptom monitoring: monitoring for bleeding signs and symptoms and monitoring for clotting signs and symptoms    Plan made per [facility] anticoagulation protocol    Vernell Hoang, RN  2/4/2025  Anticoagulation Clinic  [employer] for routing messages: p ANTICOAG HOME MONITORING  [facility] patient phone line: [phone]        _______________________________________________________________________     Anticoagulation Episode Summary       Current INR goal:  2.0-3.0   TTR:  85.0% (1 y)   Target end date:  Indefinite   Send INR reminders to:  [facility]    Indications    H/O mechanical aortic valve replacement [Z95.2]             Comments:  [employer] home monitor- managed by exception  Goal range changed during 8/28-9/15/23  hospitalization              Anticoagulation Care Providers       Provider Role Specialty Phone number    Arnold Anderson MD Referring Internal Medicine [phone]    Christina Hernandez MD Referring Family Medicine [phone]    Karina Cifuentes MD Referring Family Medicine [phone]

## 2025-02-04 NOTE — PROGRESS NOTES
ANTICOAGULATION CLINIC REFERRAL RENEWAL REQUEST       An annual renewal order is required for all patients referred to [facility] Anticoagulation [facility].?  Please review and sign the pended referral order for Tammy Law.       ANTICOAGULATION SUMMARY      Warfarin indication(s)   Mechanical AVR    Mechanical heart valve present?  Mechanical  AVR-Bileaflet       Current goal range   INR: 2.0-3.0     Goal appropriate for indication? Goal INR 2-3, standard for indication(s) above     Time in Therapeutic Range (TTR)  (Goal > 60%) 85.0%       Office visit with referring provider's group within last year yes on 12/2/24       Vernell Hoang RN  [facility] Anticoagulation [facility]

## 2025-02-07 DIAGNOSIS — M81.0 AGE-RELATED OSTEOPOROSIS WITHOUT CURRENT PATHOLOGICAL FRACTURE: ICD-10-CM

## 2025-02-10 RX ORDER — ALENDRONATE SODIUM 70 MG/1
TABLET ORAL
Qty: 12 TABLET | Refills: 3 | Status: SHIPPED | OUTPATIENT
Start: 2025-02-10

## 2025-02-18 ENCOUNTER — ANTICOAGULATION THERAPY VISIT (OUTPATIENT)
Dept: ANTICOAGULATION | Facility: CLINIC | Age: 70
End: 2025-02-18
Payer: COMMERCIAL

## 2025-02-18 DIAGNOSIS — Z79.01 LONG TERM CURRENT USE OF ANTICOAGULANT THERAPY: ICD-10-CM

## 2025-02-18 DIAGNOSIS — Z95.2 H/O MECHANICAL AORTIC VALVE REPLACEMENT: Primary | ICD-10-CM

## 2025-02-18 LAB — INR HOME MONITORING: 2.6 (ref 2–3)

## 2025-02-22 ENCOUNTER — ANCILLARY PROCEDURE (OUTPATIENT)
Dept: GENERAL RADIOLOGY | Facility: CLINIC | Age: 70
End: 2025-02-22
Attending: PHYSICIAN ASSISTANT
Payer: COMMERCIAL

## 2025-02-22 ENCOUNTER — OFFICE VISIT (OUTPATIENT)
Dept: URGENT CARE | Facility: URGENT CARE | Age: 70
End: 2025-02-22
Payer: COMMERCIAL

## 2025-02-22 VITALS
SYSTOLIC BLOOD PRESSURE: 149 MMHG | RESPIRATION RATE: 22 BRPM | DIASTOLIC BLOOD PRESSURE: 82 MMHG | OXYGEN SATURATION: 100 % | HEART RATE: 76 BPM | TEMPERATURE: 98.9 F | BODY MASS INDEX: 23.22 KG/M2 | HEIGHT: 64 IN | WEIGHT: 136 LBS

## 2025-02-22 DIAGNOSIS — S49.91XA SHOULDER INJURY, RIGHT, INITIAL ENCOUNTER: Primary | ICD-10-CM

## 2025-02-22 DIAGNOSIS — S49.91XA SHOULDER INJURY, RIGHT, INITIAL ENCOUNTER: ICD-10-CM

## 2025-02-22 PROCEDURE — 73030 X-RAY EXAM OF SHOULDER: CPT | Mod: TC | Performed by: RADIOLOGY

## 2025-02-22 PROCEDURE — 99213 OFFICE O/P EST LOW 20 MIN: CPT | Performed by: PHYSICIAN ASSISTANT

## 2025-02-22 RX ORDER — ACETAMINOPHEN AND CODEINE PHOSPHATE 300; 30 MG/1; MG/1
1 TABLET ORAL EVERY 6 HOURS PRN
Qty: 9 TABLET | Refills: 0 | Status: SHIPPED | OUTPATIENT
Start: 2025-02-22 | End: 2025-02-25

## 2025-02-22 ASSESSMENT — PAIN SCALES - GENERAL: PAINLEVEL_OUTOF10: SEVERE PAIN (10)

## 2025-02-22 NOTE — PATIENT INSTRUCTIONS
Patient was educated on the natural course of injury.  A radiologist will read your x-ray.  If there is a discrepancy in the interpretation of the x-ray you will be notified. Conservative measures include  rest, ice, compression, elevation, and over-the-counter analgesics (Tylenol or Ibuprofen) as needed. See your primary care provider if symptoms worsen or do not improve in 7 days. Seek emergency care if you develop severe pain/swelling, inability to move extremity, skin paleness, or weakness.

## 2025-02-22 NOTE — PROGRESS NOTES
URGENT CARE VISIT:    SUBJECTIVE:   Chief Complaint   Patient presents with    Urgent Care    Shoulder Pain     Pt in clinic to have eval for right shoulder pain due to injury.      Tammy Law is a 69 year old female who presents with a chief complaint of right shoulder pain.  Symptoms began 5 day(s) ago, are moderate and sudden onset  She fell on her side.   Pain exacerbated by movement. Relieved by rest.  She treated it initially with Tylenol. This is the first time this type of injury has occurred to this patient.     PMH:   Past Medical History:   Diagnosis Date    Anxiety     Chronic anticoagulation     Depression     Disease of thyroid gland     Hypothyroidism    High cholesterol     Hyperlipidemia     Hypertension     Hypothyroidism     SSS (sick sinus syndrome) (H) 02/04/2020     Allergies: Demerol [meperidine], Misc. sulfonamide containing compounds, Morphine, and Sulfa antibiotics   Medications:   Current Outpatient Medications   Medication Sig Dispense Refill    acetaminophen-codeine (TYLENOL #3) 300-30 MG per tablet Take 1 tablet by mouth every 6 hours as needed for severe pain. 9 tablet 0    alendronate (FOSAMAX) 70 MG tablet TAKE 1 TABLET BY MOUTH WEEKLY  WITH 8 OZ OF PLAIN WATER 30  MINUTES BEFORE FIRST FOOD, DRINK OR MEDS. STAY UPRIGHT FOR 30  MINS 12 tablet 3    atorvastatin (LIPITOR) 40 MG tablet TAKE 1 TABLET BY MOUTH DAILY 100 tablet 2    calcium carbonate 500 mg, elemental, (OSCAL 500) 1250 (500 Ca) MG TABS tablet Take 1 tablet by mouth daily Take 1 tab by mouth every am      furosemide (LASIX) 40 MG tablet Take 1 tablet (40 mg) by mouth daily as needed 90 tablet 1    levothyroxine (SYNTHROID/LEVOTHROID) 88 MCG tablet Take 1 tablet (88 mcg) by mouth daily. 90 tablet 3    vitamin D3 (CHOLECALCIFEROL) 50 mcg (2000 units) tablet Take 1 tablet by mouth daily      warfarin ANTICOAGULANT (COUMADIN) 2.5 MG tablet TAKE 2 TO 3 TABLETS (5 MG - 7.5  MG) BY MOUTH DAILY OR AS  DIRECTED BY YOUR ACC  "TEAM BASED  ON INR RESULTS 200 tablet 1    buPROPion (WELLBUTRIN XL) 150 MG 24 hr tablet TAKE 2 TABLETS BY MOUTH EVERY  MORNING (Patient not taking: Reported on 2/22/2025) 180 tablet 1     Social History:   Social History     Tobacco Use    Smoking status: Never    Smokeless tobacco: Never   Substance Use Topics    Alcohol use: Yes     Comment: Alcoholic Drinks/day: twice per year       ROS:  Review of systems negative except as stated above.    OBJECTIVE:  BP (!) 149/82   Pulse 76   Temp 98.9  F (37.2  C) (Temporal)   Resp 22   Ht 1.626 m (5' 4\")   Wt 61.7 kg (136 lb)   LMP 12/13/2000 (Approximate)   SpO2 100%   BMI 23.34 kg/m    GENERAL APPEARANCE: healthy, alert and no distress  MUSCULOSKELETAL: Moderate TTP over right ac joint. NTTP over clavicle, humerus, or trapezius. Limited abduction due to pain  EXTREMITIES: peripheral pulses normal  SKIN: no rashes  NEURO: sensation intact     IMAGING:  Results for orders placed or performed in visit on 02/22/25   XR Shoulder Right G/E 3 Views     Status: None    Narrative    EXAM: XR SHOULDER RIGHT G/E 3 VIEWS  LOCATION: [facility]  DATE: 2/22/2025    INDICATION:  Shoulder injury, right, initial encounter  COMPARISON: None.      Impression    IMPRESSION: No acute fracture or malalignment. Moderate degenerative arthritis acromioclavicular joint.       ASSESSMENT:    ICD-10-CM    1. Shoulder injury, right, initial encounter  S49.91XA XR Shoulder Right G/E 3 Views     acetaminophen-codeine (TYLENOL #3) 300-30 MG per tablet     CANCELED: XR Shoulder Right G/E 3 Views     CANCELED: XR Shoulder Right G/E 3 Views          PLAN:  Patient Instructions   Patient was educated on the natural course of injury.  X-ray is negative for fracture. A radiologist will read your x-ray.  If there is a discrepancy in the interpretation of the x-ray you will be notified. Conservative measures include  rest, ice, compression, elevation, and over-the-counter " analgesics (Tylenol or Ibuprofen) as needed. See your primary care provider if symptoms worsen or do not improve in 7 days. Seek emergency care if you develop severe pain/swelling, inability to move extremity, skin paleness, or weakness.     Patient verbalized understanding and is agreeable to plan. The patient was discharged ambulatory and in stable condition.    Deanna Taylor PA-C on 2/22/2025 at 1:28 PM

## 2025-03-03 ENCOUNTER — PATIENT OUTREACH (OUTPATIENT)
Dept: CARE COORDINATION | Facility: CLINIC | Age: 70
End: 2025-03-03
Payer: COMMERCIAL

## 2025-03-04 ENCOUNTER — DOCUMENTATION ONLY (OUTPATIENT)
Dept: ANTICOAGULATION | Facility: CLINIC | Age: 70
End: 2025-03-04
Payer: COMMERCIAL

## 2025-03-04 DIAGNOSIS — Z95.2 H/O MECHANICAL AORTIC VALVE REPLACEMENT: Primary | ICD-10-CM

## 2025-03-04 DIAGNOSIS — Z79.01 LONG TERM CURRENT USE OF ANTICOAGULANT THERAPY: ICD-10-CM

## 2025-03-04 LAB — INR HOME MONITORING: 2.4 (ref 2–3)

## 2025-03-04 NOTE — PROGRESS NOTES
ANTICOAGULATION  MANAGEMENT-Home Monitor Managed by Exception    Tammy Law 69 year old female is on warfarin with therapeutic INR result. (Goal INR 2.0-3.0)    Recent labs: (last 7 days)     03/04/25  0000   INR 2.4       Previous INR was Therapeutic  Medication, diet, health changes since last INR:Yes: acetaminophen-codeine 300-30 mg (9 tablets) for right shoulder pain, but not anticipated to affect INR  Contacted within the last 12 weeks by phone on 2/18/25  Last ACC referral date: 02/04/2025  Due for next call no later than: 5/19/25      ZAIN Munoz was NOT contacted regarding therapeutic result today per home monitoring policy manage by exception agreement.   Current warfarin dose is to be continued:     Summary  As of 3/4/2025      Full warfarin instructions:  7.5 mg every Tue; 5 mg all other days   Next INR check:  3/18/2025             ?   Sunita Rose RN  Anticoagulation Clinic  3/4/2025    _______________________________________________________________________     Anticoagulation Episode Summary       Current INR goal:  2.0-3.0   TTR:  87.2% (1 y)   Target end date:  Indefinite   Send INR reminders to:  [facility]    Indications    H/O mechanical aortic valve replacement [Z95.2]  Long term current use of anticoagulant therapy [Z79.01]             Comments:  [employer] home monitor- managed by exception  Goal range changed during 8/28-9/15/23  hospitalization             Anticoagulation Care Providers       Provider Role Specialty Phone number    Arnold Anderson MD Referring Internal Medicine [phone]    Christina Hernandez MD Referring Family Medicine [phone]    Karina Cifuentes MD Referring Family Medicine [phone]

## 2025-03-18 ENCOUNTER — ANTICOAGULATION THERAPY VISIT (OUTPATIENT)
Dept: ANTICOAGULATION | Facility: CLINIC | Age: 70
End: 2025-03-18
Payer: COMMERCIAL

## 2025-03-18 DIAGNOSIS — Z95.2 H/O MECHANICAL AORTIC VALVE REPLACEMENT: Primary | ICD-10-CM

## 2025-03-18 DIAGNOSIS — Z79.01 LONG TERM CURRENT USE OF ANTICOAGULANT THERAPY: ICD-10-CM

## 2025-03-18 LAB — INR HOME MONITORING: 3.1 (ref 2–3)

## 2025-03-18 NOTE — PROGRESS NOTES
ANTICOAGULATION MANAGEMENT     Tammy Law 69 year old female is on warfarin with supratherapeutic INR result. (Goal INR 2.0-3.0)    Recent labs: (last 7 days)     03/18/25  0000   INR 3.1*       ASSESSMENT     Source(s): Chart Review and Patient/Caregiver Call     Warfarin doses taken: Warfarin taken as instructed  Diet: No new diet changes identified  Medication/supplement changes:  Tylenol PRN but does not help the shoulder pain.  New illness, injury, or hospitalization: Yes: Shoulder pain since 2/22/25. See OV notes.   Signs or symptoms of bleeding or clotting: No  Previous result: Therapeutic last 2(+) visits  Additional findings: None       PLAN     Recommended plan for temporary change(s) affecting INR     Dosing Instructions: Continue your current warfarin dose with next INR in 2 weeks       Summary  As of 3/18/2025      Full warfarin instructions:  7.5 mg every Tue; 5 mg all other days   Next INR check:  4/1/2025               Telephone call with Bisi who verbalizes understanding and agrees to plan    Patient to recheck with home meter    Education provided: Contact [phone] with any changes, questions or concerns.     Plan made per [facility] anticoagulation protocol    Brielle AMBROCIO RN  3/18/2025  Anticoagulation Clinic  [employer] [place] for routing messages: p ANTICOAG HOME MONITORING  [facility] patient phone line: [phone]        _______________________________________________________________________     Anticoagulation Episode Summary       Current INR goal:  2.0-3.0   TTR:  90.2% (1 y)   Target end date:  Indefinite   Send INR reminders to:  [facility]    Indications    H/O mechanical aortic valve replacement [Z95.2]  Long term current use of anticoagulant therapy [Z79.01]             Comments:  [employer] home monitor- managed by exception  Goal range changed during 8/28-9/15/23  hospitalization             Anticoagulation Care Providers       Provider Role Specialty Phone number    Justin  Arnold ROCHA MD Referring Internal Medicine [phone]    Christina Hernandez MD Referring Family Medicine [phone]    Karina Cifuentes MD Referring Family Medicine [phone]             Home WDL

## 2025-04-01 ENCOUNTER — ANTICOAGULATION THERAPY VISIT (OUTPATIENT)
Dept: ANTICOAGULATION | Facility: CLINIC | Age: 70
End: 2025-04-01
Payer: COMMERCIAL

## 2025-04-01 DIAGNOSIS — Z79.01 LONG TERM CURRENT USE OF ANTICOAGULANT THERAPY: ICD-10-CM

## 2025-04-01 DIAGNOSIS — Z95.2 H/O MECHANICAL AORTIC VALVE REPLACEMENT: Primary | ICD-10-CM

## 2025-04-01 LAB — INR HOME MONITORING: 3 (ref 2–3)

## 2025-04-01 NOTE — PROGRESS NOTES
ANTICOAGULATION MANAGEMENT     Tammy Law 69 year old female is on warfarin with therapeutic INR result. (Goal INR 2.0-3.0)    Recent labs: (last 7 days)     04/01/25  0000   INR 3.0       ASSESSMENT     Source(s): Chart Review and Patient/Caregiver Call     Warfarin doses taken: Warfarin taken as instructed  Diet: No new diet changes identified  Medication/supplement changes: Only using Tylenol in the PM  New illness, injury, or hospitalization: No. Patient continues to have shoulder pain, but notes an improvement since last INR.  Signs or symptoms of bleeding or clotting: No  Previous result: Supratherapeutic  Additional findings: None       PLAN     Recommended plan for no diet, medication or health factor changes affecting INR     Dosing Instructions: Continue your current warfarin dose with next INR in 2 weeks       Summary  As of 4/1/2025      Full warfarin instructions:  7.5 mg every Tue; 5 mg all other days   Next INR check:  4/15/2025               Telephone call with Bisi who verbalizes understanding and agrees to plan    Patient to recheck with home meter    Education provided: Please call back if any changes to your diet, medications or how you've been taking warfarin    Plan made per [facility] anticoagulation protocol    Carolynn Morley, RN  4/1/2025  Anticoagulation Clinic  [employer] for routing messages: p ANTICOAG HOME MONITORING  [facility] patient phone line: [phone]        _______________________________________________________________________     Anticoagulation Episode Summary       Current INR goal:  2.0-3.0   TTR:  87.2% (1 y)   Target end date:  Indefinite   Send INR reminders to:  [facility]ICOAG HOME MONITOR[facility]    Indications    H/O mechanical aortic valve replacement [Z95.2]  Long term current use of anticoagulant therapy [Z79.01]             Comments:  [employer] home monitor- managed by exception  Goal range changed during 8/28-9/15/23  hospitalization             Anticoagulation Care Providers        Provider Role Specialty Phone number    Arnold Anderson MD Referring Internal Medicine [phone]    Christina Hernandez MD Referring Family Medicine [phone]    Karina Cifuentes MD Referring Family Medicine [phone]

## 2025-04-02 ASSESSMENT — PATIENT HEALTH QUESTIONNAIRE - PHQ9
SUM OF ALL RESPONSES TO PHQ QUESTIONS 1-9: 5
SUM OF ALL RESPONSES TO PHQ QUESTIONS 1-9: 5
10. IF YOU CHECKED OFF ANY PROBLEMS, HOW DIFFICULT HAVE THESE PROBLEMS MADE IT FOR YOU TO DO YOUR WORK, TAKE CARE OF THINGS AT HOME, OR GET ALONG WITH OTHER PEOPLE: SOMEWHAT DIFFICULT

## 2025-04-03 ENCOUNTER — OFFICE VISIT (OUTPATIENT)
Dept: FAMILY MEDICINE | Facility: CLINIC | Age: 70
End: 2025-04-03
Attending: FAMILY MEDICINE
Payer: COMMERCIAL

## 2025-04-03 ENCOUNTER — ANCILLARY PROCEDURE (OUTPATIENT)
Dept: GENERAL RADIOLOGY | Facility: CLINIC | Age: 70
End: 2025-04-03
Attending: FAMILY MEDICINE
Payer: COMMERCIAL

## 2025-04-03 ENCOUNTER — MYC MEDICAL ADVICE (OUTPATIENT)
Dept: FAMILY MEDICINE | Facility: CLINIC | Age: 70
End: 2025-04-03

## 2025-04-03 ENCOUNTER — ANCILLARY ORDERS (OUTPATIENT)
Dept: FAMILY MEDICINE | Facility: CLINIC | Age: 70
End: 2025-04-03

## 2025-04-03 VITALS
TEMPERATURE: 97.7 F | OXYGEN SATURATION: 99 % | DIASTOLIC BLOOD PRESSURE: 71 MMHG | HEART RATE: 66 BPM | BODY MASS INDEX: 23.22 KG/M2 | WEIGHT: 136 LBS | HEIGHT: 64 IN | SYSTOLIC BLOOD PRESSURE: 117 MMHG | RESPIRATION RATE: 13 BRPM

## 2025-04-03 DIAGNOSIS — M81.0 AGE-RELATED OSTEOPOROSIS WITHOUT CURRENT PATHOLOGICAL FRACTURE: ICD-10-CM

## 2025-04-03 DIAGNOSIS — R10.11 CHRONIC RUQ PAIN: ICD-10-CM

## 2025-04-03 DIAGNOSIS — M54.12 CERVICAL RADICULAR PAIN: ICD-10-CM

## 2025-04-03 DIAGNOSIS — M75.01 ADHESIVE CAPSULITIS OF RIGHT SHOULDER: ICD-10-CM

## 2025-04-03 DIAGNOSIS — E78.00 HYPERCHOLESTEROLEMIA: Primary | ICD-10-CM

## 2025-04-03 DIAGNOSIS — C43.9 MALIGNANT MELANOMA OF SKIN, UNSPECIFIED (H): ICD-10-CM

## 2025-04-03 DIAGNOSIS — F33.1 MODERATE RECURRENT MAJOR DEPRESSION (H): ICD-10-CM

## 2025-04-03 DIAGNOSIS — E03.9 ACQUIRED HYPOTHYROIDISM: ICD-10-CM

## 2025-04-03 DIAGNOSIS — G89.29 CHRONIC RUQ PAIN: ICD-10-CM

## 2025-04-03 PROBLEM — K92.1 MELENA: Status: RESOLVED | Noted: 2023-10-17 | Resolved: 2025-04-03

## 2025-04-03 PROBLEM — D62 ACUTE BLOOD LOSS ANEMIA: Status: RESOLVED | Noted: 2023-08-29 | Resolved: 2025-04-03

## 2025-04-03 PROBLEM — S22.42XA CLOSED FRACTURE OF MULTIPLE RIBS OF LEFT SIDE: Status: RESOLVED | Noted: 2023-08-28 | Resolved: 2025-04-03

## 2025-04-03 PROBLEM — K92.0 HEMATEMESIS: Status: RESOLVED | Noted: 2023-10-17 | Resolved: 2025-04-03

## 2025-04-03 PROBLEM — C43.59 MALIGNANT MELANOMA OF UPPER BACK (H): Status: ACTIVE | Noted: 2024-09-18

## 2025-04-03 PROBLEM — D12.3 BENIGN NEOPLASM OF TRANSVERSE COLON: Status: RESOLVED | Noted: 2024-03-15 | Resolved: 2025-04-03

## 2025-04-03 PROBLEM — I49.5 SICK SINUS SYNDROME (H): Status: RESOLVED | Noted: 2020-02-04 | Resolved: 2025-04-03

## 2025-04-03 PROBLEM — S27.1XXA TRAUMATIC HEMOTHORAX: Status: RESOLVED | Noted: 2023-08-28 | Resolved: 2025-04-03

## 2025-04-03 LAB
CHOLEST SERPL-MCNC: 153 MG/DL
FASTING STATUS PATIENT QL REPORTED: YES
HDLC SERPL-MCNC: 71 MG/DL
LDLC SERPL CALC-MCNC: 67 MG/DL
NONHDLC SERPL-MCNC: 82 MG/DL
TRIGL SERPL-MCNC: 75 MG/DL
TSH SERPL DL<=0.005 MIU/L-ACNC: 4.15 UIU/ML (ref 0.3–4.2)

## 2025-04-03 RX ORDER — BUPROPION HYDROCHLORIDE 300 MG/1
300 TABLET ORAL EVERY MORNING
Qty: 100 TABLET | Refills: 3 | Status: SHIPPED | OUTPATIENT
Start: 2025-04-03 | End: 2025-04-03

## 2025-04-03 RX ORDER — BUPROPION HYDROCHLORIDE 300 MG/1
300 TABLET ORAL EVERY MORNING
Qty: 100 TABLET | Refills: 3 | Status: SHIPPED | OUTPATIENT
Start: 2025-04-03

## 2025-04-03 ASSESSMENT — PAIN SCALES - GENERAL: PAINLEVEL_OUTOF10: MODERATE PAIN (4)
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
## 2025-04-04 ENCOUNTER — HOSPITAL ENCOUNTER (OUTPATIENT)
Dept: ULTRASOUND IMAGING | Facility: HOSPITAL | Age: 70
Discharge: HOME | End: 2025-04-04
Attending: FAMILY MEDICINE | Admitting: FAMILY MEDICINE
Payer: COMMERCIAL

## 2025-04-04 DIAGNOSIS — G89.29 CHRONIC RUQ PAIN: ICD-10-CM

## 2025-04-04 DIAGNOSIS — R10.11 CHRONIC RUQ PAIN: ICD-10-CM

## 2025-04-04 PROCEDURE — 76705 ECHO EXAM OF ABDOMEN: CPT

## 2025-04-08 ENCOUNTER — OFFICE VISIT (OUTPATIENT)
Dept: ORTHOPEDICS | Facility: CLINIC | Age: 70
End: 2025-04-08
Attending: FAMILY MEDICINE
Payer: COMMERCIAL

## 2025-04-08 DIAGNOSIS — M75.01 ADHESIVE CAPSULITIS OF RIGHT SHOULDER: Primary | ICD-10-CM

## 2025-04-08 DIAGNOSIS — M54.12 CERVICAL RADICULAR PAIN: ICD-10-CM

## 2025-04-08 DIAGNOSIS — M81.0 AGE-RELATED OSTEOPOROSIS WITHOUT CURRENT PATHOLOGICAL FRACTURE: ICD-10-CM

## 2025-04-08 PROCEDURE — 99204 OFFICE O/P NEW MOD 45 MIN: CPT | Performed by: STUDENT IN AN ORGANIZED HEALTH CARE EDUCATION/TRAINING PROGRAM

## 2025-04-08 NOTE — PROGRESS NOTES
ASSESSMENT & PLAN    Bisi was seen today for pain.    Diagnoses and all orders for this visit:    Adhesive capsulitis of right shoulder  -     Orthopedic  Referral  -     Physical Therapy  Referral; Future    Cervical radicular pain  -     Orthopedic  Referral  -     Physical Therapy  Referral; Future    Age-related osteoporosis without current pathological fracture  -     Orthopedic  Referral  -     Physical Therapy  Referral; Future        69 year old female presents with right shoulder pain and range of motion limitation since a fall a few months ago.  Initially pain improved, however has been having worsening motion and this has not resolved.  On exam today, she is limited to active range of motion due to pain and passive range of motion is limited due to stiffness and pain consistent with adhesive capsulitis.    Plan:  -Return Friday morning for glenohumeral joint injection with capsular stretch  -Check INR on the morning of the procedure   -Start gentle range of motion of the shoulder as long as it does not exacerbate symptoms   -Avoid sleeping on right side  -Plan for PT for a few sessions after injection     Dr. Gladys Lantigua, DO, CAQ  [facility]  Sports Medicine     -----  Chief Complaint   Patient presents with    Right Shoulder - Pain       SUBJECTIVE  Tammy Law is a/an 69 year old female who is seen in consultation at the request of  Karina Cifuentes M.D. for evaluation of right shoulder pain. Acute onset that started 2 months ago after falling with shoulder flexed. Pain is located in the right anterolateral shoulder and radiates to the elbow. Symptoms are worsened by laying on it, and shoulder movement in all directions. She has tried heat, ice, OTC medications and home exercises. Associated symptoms include: locking/catching and radiating/referred pain to the dorsal hand and ulnar side of the forearm .    The  patient is seen alone    Prior injury/Surgical history of affected joint: No  Social History/Occupation: Retired; enjoys light impact activities, and this has been affecting her ADLs    REVIEW OF SYSTEMS:  Pertinent positives/negative: As stated above in HPI    OBJECTIVE:  LMP 12/13/2000 (Approximate)    General: Alert and in no distress  CV: Extremities appear well perfused   Resp: normal respiratory effort  MSK:  Right Shoulder Exam:  Appearance: Symmetric shoulder heights, no scapular winging observed  Palpation: Tender to palpation of no specific areas   Rotator cuff strength:    Supraspinatus: 4/5   ER: 4/5   IR: 5/5  AROM:    Forward flexion: 100   Abduction: 100   ER at side: 70   IR: To lateral hip  Special tests: + for Empty can- remainder deferred secondary to pain       RADIOLOGY:  Final results and radiologist's interpretation available in the [facility] record.  Images below were personally reviewed and discussed with the patient in the office today.  My personal interpretation of the performed imaging: X-ray of the right shoulder from 4/3/2025 reveals moderate degenerative changes of the AC joint, visible sternal hardware, no significant glenohumeral degenerative changes, no fractures                 Disclaimer: This note consists of text and symbols derived from dictation and/or voice recognition software. As a result, there may be errors in the script that have gone undetected. Please consider this when interpreting information found in this chart.

## 2025-04-08 NOTE — LETTER
4/8/2025      Tammy Law  [address] [address]      Dear Colleague,    Thank you for referring your patient, Tammy Law, to the [facility]. Please see a copy of my visit note below.    ASSESSMENT & PLAN    Bisi was seen today for pain.    Diagnoses and all orders for this visit:    Adhesive capsulitis of right shoulder  -     Orthopedic  Referral  -     Physical Therapy  Referral; Future    Cervical radicular pain  -     Orthopedic  Referral  -     Physical Therapy  Referral; Future    Age-related osteoporosis without current pathological fracture  -     Orthopedic  Referral  -     Physical Therapy  Referral; Future        69 year old female presents with right shoulder pain and range of motion limitation since a fall a few months ago.  Initially pain improved, however has been having worsening motion and this has not resolved.  On exam today, she is limited to active range of motion due to pain and passive range of motion is limited due to stiffness and pain consistent with adhesive capsulitis.    Plan:  -Return Friday morning for glenohumeral joint injection with capsular stretch  -Check INR on the morning of the procedure   -Start gentle range of motion of the shoulder as long as it does not exacerbate symptoms   -Avoid sleeping on right side  -Plan for PT for a few sessions after injection     Dr. Gladys Lantigua DO, CAQ  [facility]  Sports Medicine     -----  Chief Complaint   Patient presents with     Right Shoulder - Pain       SUBJECTIVE  Tammy Law is a/an 69 year old female who is seen in consultation at the request of  Karina Cifuentes M.D. for evaluation of right shoulder pain. Acute onset that started 2 months ago after falling with shoulder flexed. Pain is located in the right anterolateral shoulder and radiates to the  elbow. Symptoms are worsened by laying on it, and shoulder movement in all directions. She has tried heat, ice, OTC medications and home exercises. Associated symptoms include: locking/catching and radiating/referred pain to the dorsal hand and ulnar side of the forearm .    The patient is seen alone    Prior injury/Surgical history of affected joint: No  Social History/Occupation: Retired; enjoys light impact activities, and this has been affecting her ADLs    REVIEW OF SYSTEMS:  Pertinent positives/negative: As stated above in HPI    OBJECTIVE:  LMP 12/13/2000 (Approximate)    General: Alert and in no distress  CV: Extremities appear well perfused   Resp: normal respiratory effort  MSK:  Right Shoulder Exam:  Appearance: Symmetric shoulder heights, no scapular winging observed  Palpation: Tender to palpation of no specific areas   Rotator cuff strength:    Supraspinatus: 4/5   ER: 4/5   IR: 5/5  AROM:    Forward flexion: 100   Abduction: 100   ER at side: 70   IR: To lateral hip  Special tests: + for Empty can- remainder deferred secondary to pain       RADIOLOGY:  Final results and radiologist's interpretation available in the [facility] health record.  Images below were personally reviewed and discussed with the patient in the office today.  My personal interpretation of the performed imaging: X-ray of the right shoulder from 4/3/2025 reveals moderate degenerative changes of the AC joint, visible sternal hardware, no significant glenohumeral degenerative changes, no fractures                 Disclaimer: This note consists of text and symbols derived from dictation and/or voice recognition software. As a result, there may be errors in the script that have gone undetected. Please consider this when interpreting information found in this chart.        Again, thank you for allowing me to participate in the care of your patient.        Sincerely,        Gladys Lantigua, DO    Electronically signed

## 2025-04-08 NOTE — PATIENT INSTRUCTIONS
1. Adhesive capsulitis of right shoulder    2. Cervical radicular pain    3. Age-related osteoporosis without current pathological fracture        Plan:  -Return Friday morning for glenohumeral joint injection with capsular stretch  -Check INR on the morning of the procedure   -Start gentle range of motion of the shoulder as long as it does not exacerbate symptoms   -Avoid sleeping on right side  -Plan for PT for a few sessions after injection       If you had imaging performed/ordered at your appointment today, you can expect to see your radiology results in [facility] within approximately 48 business hours.     If you have questions/concerns after your appointment, please send my team a BDA message or call the clinic at [phone].     Dr. Gladys Lantigua, DO, [facility]  Sports Medicine and Orthopedics    Dr. Lantigua's Clinic Locations and Contact Numbers:   [place] APPOINTMENTS: [phone]      [address] RADIOLOGY: [phone]   [place] [address] PHYSICAL THERAPY: [phone]    HAND THERAPY/OT: [phone]   [place] BILLING QUESTIONS: [phone] [address] FAX: [phone]   [place] [address]

## 2025-04-14 NOTE — PROGRESS NOTES
PHYSICAL THERAPY EVALUATION  Type of Visit: Evaluation        Fall Risk Screen:  Have you fallen 2 or more times in the past year?: No  Have you fallen and had an injury in the past year?: Yes  Is patient receiving Physical Therapy Services?: Yes      Subjective      Condition type:  Initial onset (within last 3 months)  Cause of current episode:  Traumatic (after a fall)     Nature of treatment:  Rehabilitative  Functional ability:  No functional limitations (since injection)  Documented POC (choose all that apply):  Measurable short and long term/discharge treatment goals related to physical and functional deficits.;Frequency of treatment visits and treatment activities to address deficit areas.;Patient agrees to program participation including home program  Briefly describe symptoms:  R shoulder pain with multidirectional ROM loss  How did the symptoms start:  after a fall in Feb 2025  Average pain/intensity last 24 hours:  2/10  Average pain/intensity past week:  9/10  Frequency of Symptoms:  Intermittently (0-25% of the time)  Symptom impact on ADLs:  Not at all (since injection)  Condition change since eval:  N/A (first visit)  General health reported by patient:  Good      Presenting condition or subjective complaint: therapy after shoulder injection  Date of onset: 02/15/25 (approximate onset)    Relevant medical history: Chest pain; Depression; Heart problems; Implanted device; Pacemaker; Thyroid problems; Vision problems   Dates & types of surgery: too many    Prior diagnostic imaging/testing results: X-ray     XR cervical 4/3  IMPRESSION: Vertebral body heights are maintained. Multilevel mild loss of disc height. The craniocervical junction is mildly degenerated. Bones are mildly osteopenic. No acute fracture. No prevertebral edema.  XR shoulder 4/3  IMPRESSION: Moderate degenerative arthrosis of the acromioclavicular joint, unchanged. Glenohumeral joint is normally aligned. No definite fracture.  Prior  therapy history for the same diagnosis, illness or injury: No      Pt is a 69 year old female presenting with complaints of R shoulder pain with intermittent radiation of pain down to elbow.   The symptoms started after a fall months ago - pain had been improving but ROM worsening.  Prior treatments: injection 4/11 with improved  Pain is worse with reaching behind back, reaching overhead.   Pain is better with nothing really.   Sleep Quality: good now since the injection   Current level of activity: nothing much    Goals of therapy: restore ROM       Prior Level of Function  Transfers:   Ambulation:   ADL:   IADL:     Living Environment  Social support: With a significant other or spouse   Type of home: House   Stairs to enter the home: Yes 2 Is there a railing: No     Ramp: No   Stairs inside the home: No       Help at home: Self Cares (home health aide/personal care attendant, family, etc)  Equipment owned:       Employment: No    Hobbies/Interests: [employer]    Patient goals for therapy: move my shoulder with out pain    Pain assessment: See objective evaluation for additional pain details     Objective       SHOULDER EVALUATION    Posture: rounded shoulders, FHP; unable to fully correct rounded shoulders (anticipating poor thoracic mobility)    Cervical Screen:  AROM:   Flexion: min loss   Extension: mod loss   Rotation: min-mod loss B   Side Bend: stretch in R side of neck, mod loss B  Retraction: mod loss     Scapular retraction: poor scapular mobility     ROM:   (Degrees) Right AROM Right PROM Left  AROM  Left PROM   Shoulder Flexion 126  135    Shoulder Extension       Shoulder Abduction 90  125    Shoulder Internal Rotation To low thoracic   To scapulas    Shoulder External Rotation 50  65        STRENGTH:   Pain: - none + mild ++ moderate +++ severe  Strength Scale: 0-5/5 Right Left   Shoulder Flexion 4+ 5   Shoulder Extension     Shoulder Abduction 4+ 5   Shoulder Adduction     Shoulder  Internal Rotation 4+ 5   Shoulder External Rotation 4+ 5     PALPATION: R infraspinatus           Assessment & Plan   CLINICAL IMPRESSIONS  Medical Diagnosis: Adhesive capsulitis of right shoulder,  Cervical radicular pain,  Age-related osteoporosis without current pathological fracture    Treatment Diagnosis: R shoulder pain with mutidirectional ROM loss   Impression/Assessment: Patient is a 69 year old female with R shoulder complaints.  The following significant findings have been identified: Pain, Decreased ROM/flexibility, Decreased strength, Impaired muscle performance, Decreased activity tolerance, and Impaired posture. These impairments interfere with their ability to perform self care tasks, recreational activities, and household chores as compared to previous level of function.     Clinical Decision Making (Complexity):  Clinical Presentation: Stable/Uncomplicated  Clinical Presentation Rationale: based on medical and personal factors listed in PT evaluation  Clinical Decision Making (Complexity): Low complexity    PLAN OF CARE  Treatment Interventions:  Modalities:   Interventions: Manual Therapy, Neuromuscular Re-education, Therapeutic Activity, Therapeutic Exercise    Long Term Goals     PT Goal 1  Goal Identifier: HEP  Goal Description: Pt will demonstrate mastery of HEP, completing at least 5x/week, without need for additional cuing in order to increase independence with self cares and self management of the condition.  Target Date: 07/13/25  PT Goal 2  Goal Identifier: ROM  Goal Description: Pt will be able to achieve >150 degrees of flexion and abduction ROM in R shoulder in order to better tolerate reaching  Target Date: 07/13/25      Frequency of Treatment: 1-2x/month  Duration of Treatment: up to 90 days    Recommended Referrals to Other Professionals:   Education Assessment:   Learner/Method: Patient  Education Comments: Eager to participate in therapy    Risks and benefits of  evaluation/treatment have been explained.   Patient/Family/caregiver agrees with Plan of Care.     Evaluation Time:     PT Eval, Low Complexity Minutes (02344): 22       Signing Clinician: Adrienne Good, ANITRA SEGOVIA [facility]                                                                                   OUTPATIENT PHYSICAL THERAPY      PLAN OF TREATMENT FOR OUTPATIENT REHABILITATION   Patient's Last Name, First Name, Tammy Chavez YOB: 1955   Provider's Name   [facility]   Medical Record No.  [id number]     Onset Date: 02/15/25 (approximate onset)  Start of Care Date: 04/15/25     Medical Diagnosis:  Adhesive capsulitis of right shoulder,  Cervical radicular pain,  Age-related osteoporosis without current pathological fracture      PT Treatment Diagnosis:  R shoulder pain with mutidirectional ROM loss Plan of Treatment  Frequency/Duration: 1-2x/month/ up to 90 days    Certification date from 04/15/25 to 07/13/25         See note for plan of treatment details and functional goals     Adrienne Good, PT                         I CERTIFY THE NEED FOR THESE SERVICES FURNISHED UNDER        THIS PLAN OF TREATMENT AND WHILE UNDER MY CARE     (Physician attestation of this document indicates review and certification of the therapy plan).              Referring Provider:  Gladys Lantigua    Initial Assessment  See Epic Evaluation- Start of Care Date: 04/15/25

## 2025-04-15 ENCOUNTER — OFFICE VISIT (OUTPATIENT)
Dept: SURGERY | Facility: CLINIC | Age: 70
End: 2025-04-15
Attending: FAMILY MEDICINE
Payer: COMMERCIAL

## 2025-04-15 ENCOUNTER — ANTICOAGULATION THERAPY VISIT (OUTPATIENT)
Dept: ANTICOAGULATION | Facility: CLINIC | Age: 70
End: 2025-04-15

## 2025-04-15 ENCOUNTER — THERAPY VISIT (OUTPATIENT)
Dept: PHYSICAL THERAPY | Facility: REHABILITATION | Age: 70
End: 2025-04-15
Attending: STUDENT IN AN ORGANIZED HEALTH CARE EDUCATION/TRAINING PROGRAM
Payer: COMMERCIAL

## 2025-04-15 ENCOUNTER — TELEPHONE (OUTPATIENT)
Dept: ANTICOAGULATION | Facility: CLINIC | Age: 70
End: 2025-04-15

## 2025-04-15 ENCOUNTER — LAB (OUTPATIENT)
Dept: LAB | Facility: CLINIC | Age: 70
End: 2025-04-15
Payer: COMMERCIAL

## 2025-04-15 ENCOUNTER — TELEPHONE (OUTPATIENT)
Dept: CARDIOLOGY | Facility: CLINIC | Age: 70
End: 2025-04-15

## 2025-04-15 ENCOUNTER — TELEPHONE (OUTPATIENT)
Dept: SURGERY | Facility: CLINIC | Age: 70
End: 2025-04-15

## 2025-04-15 VITALS — WEIGHT: 135 LBS | HEIGHT: 64 IN | BODY MASS INDEX: 23.05 KG/M2

## 2025-04-15 DIAGNOSIS — Z79.01 LONG TERM CURRENT USE OF ANTICOAGULANT THERAPY: ICD-10-CM

## 2025-04-15 DIAGNOSIS — Z95.2 H/O MECHANICAL AORTIC VALVE REPLACEMENT: Primary | ICD-10-CM

## 2025-04-15 DIAGNOSIS — R10.11 CHRONIC RUQ PAIN: ICD-10-CM

## 2025-04-15 DIAGNOSIS — G89.29 CHRONIC RUQ PAIN: ICD-10-CM

## 2025-04-15 DIAGNOSIS — M81.0 AGE-RELATED OSTEOPOROSIS WITHOUT CURRENT PATHOLOGICAL FRACTURE: ICD-10-CM

## 2025-04-15 DIAGNOSIS — M75.01 ADHESIVE CAPSULITIS OF RIGHT SHOULDER: ICD-10-CM

## 2025-04-15 DIAGNOSIS — M54.12 CERVICAL RADICULAR PAIN: ICD-10-CM

## 2025-04-15 DIAGNOSIS — K82.8 SLUDGE IN GALLBLADDER: ICD-10-CM

## 2025-04-15 LAB
ALBUMIN SERPL BCG-MCNC: 4.5 G/DL (ref 3.5–5.2)
ALP SERPL-CCNC: 67 U/L (ref 40–150)
ALT SERPL W P-5'-P-CCNC: 28 U/L (ref 0–50)
ANION GAP SERPL CALCULATED.3IONS-SCNC: 12 MMOL/L (ref 7–15)
AST SERPL W P-5'-P-CCNC: 26 U/L (ref 0–45)
BILIRUB SERPL-MCNC: 0.5 MG/DL
BUN SERPL-MCNC: 13.8 MG/DL (ref 8–23)
CALCIUM SERPL-MCNC: 10.3 MG/DL (ref 8.8–10.4)
CHLORIDE SERPL-SCNC: 102 MMOL/L (ref 98–107)
CREAT SERPL-MCNC: 0.93 MG/DL (ref 0.51–0.95)
EGFRCR SERPLBLD CKD-EPI 2021: 67 ML/MIN/1.73M2
ERYTHROCYTE [DISTWIDTH] IN BLOOD BY AUTOMATED COUNT: 13 % (ref 10–15)
GLUCOSE SERPL-MCNC: 80 MG/DL (ref 70–99)
HCO3 SERPL-SCNC: 25 MMOL/L (ref 22–29)
HCT VFR BLD AUTO: 41.3 % (ref 35–47)
HGB BLD-MCNC: 13.9 G/DL (ref 11.7–15.7)
INR HOME MONITORING: 2.2 (ref 2–3)
MCH RBC QN AUTO: 29.2 PG (ref 26.5–33)
MCHC RBC AUTO-ENTMCNC: 33.7 G/DL (ref 31.5–36.5)
MCV RBC AUTO: 87 FL (ref 78–100)
PLATELET # BLD AUTO: 245 10E3/UL (ref 150–450)
POTASSIUM SERPL-SCNC: 4.1 MMOL/L (ref 3.4–5.3)
PROT SERPL-MCNC: 7.3 G/DL (ref 6.4–8.3)
RBC # BLD AUTO: 4.76 10E6/UL (ref 3.8–5.2)
SODIUM SERPL-SCNC: 139 MMOL/L (ref 135–145)
WBC # BLD AUTO: 8.1 10E3/UL (ref 4–11)

## 2025-04-15 PROCEDURE — 97110 THERAPEUTIC EXERCISES: CPT | Mod: GP | Performed by: PHYSICAL THERAPIST

## 2025-04-15 PROCEDURE — 36415 COLL VENOUS BLD VENIPUNCTURE: CPT

## 2025-04-15 PROCEDURE — 97161 PT EVAL LOW COMPLEX 20 MIN: CPT | Mod: GP | Performed by: PHYSICAL THERAPIST

## 2025-04-15 PROCEDURE — 85027 COMPLETE CBC AUTOMATED: CPT

## 2025-04-15 PROCEDURE — 80053 COMPREHEN METABOLIC PANEL: CPT

## 2025-04-15 NOTE — LETTER
Pre-op Physical: Schedule a pre-op physical with your primary care doctor.  The pre-op physical must be 10-30 days before surgery and since it is required by anesthesia, your surgery will be cancelled if it's not done. Cardiac clearance as well.    Surgery Date: 4/30/2025     Location: [place] [address]    Approximate Arrival Time: 10:30 am  (Unless instructed differently by the pre-op call nurse)     Post op Appointment: RN will call 3-5 days post procedure to check in.    Pre-Surgical Tasks:     Review all medications with your primary care or prescribing physician; they will advise you which meds to stop and when, and when you can resume taking.  Certain medications like blood thinners and weight loss medications need to be stopped in advance of surgery to proceed safely.      Blood thinners including but not exclusive to drugs like Xarelto, Eliquis, Warfarin and Aspirin, should be stopped five days before surgery, if your prescribing provider agrees. Follow your provider's advice on stopping blood thinners because they know you best.  If you are unsure if your medication is a blood thinner, ask your prescribing provider.    Weight loss medications: There are multiple medications being used for weight management and diabetes today, and the list is growing.  Phentermine, Ozempic, Wegovy, Trulicity, and other similar medications need to be stopped one week before surgery to avoid being cancelled.  Victoza and Saxenda can be continued longer but must be stopped one full day before surgery.  Please ask your prescribing provider for advice.    Diabetic medications: in addition to the medications talked about above that are used for either weight loss or diabetes, some people are on insulin that may require adjustment.  Please discuss managing diabetic medications with your prescribing doctor as these medications may require modification prior to surgery.      Please shower the evening before and morning of surgery with Hibiclens soap.  Any [facility] can provide this to you at no cost, or it can be found at your local pharmacy.     Fasting instructions will be provided by the pre-op nurse who will call you 1-3 days before surgery.  Typically, we advise normal food up to 8 hours before you arrive for surgery. Clear liquids only from then until 2 hours before you arrive surgery, then nothing at all by mouth.  The nurse will review your specific instructions with you at the call.      Smoking impacts your body's ability to heal properly so we advise patients to quit if possible before surgery.  Plastic Surgery patients are required to be nicotine free for at least 8 weeks before surgery.      You will need an adult to drive you home and stay with you 24 hours after surgery. Public transportation or Medical Van Services are not permitted.    Visitor restrictions are subject to change, please verify with the pre-op nurse when they call how many people are permitted to accompany you.    We always encourage you to notify your insurance any time you have medical tests or procedures scheduled including surgery. The number is usually right on the back of your insurance card. To obtain pricing for surgery, please call [facility] Cost of Care at [phone] or email [email or web address].        Call our office if you have any questions! Thank you!     Jerald Abdi  Complex   [facility]  [phone]    Electronically signed         details

## 2025-04-15 NOTE — TELEPHONE ENCOUNTER
Rec'd call from patient stating she is scheduled for gallbladder surgery 4-30-25 and needs to know how long she should hold Warfarin - confirmed Warfarin managed by PCP.    Patient explained that she was instructed to contact cardiologist r.e. Warfarin because she had valve replacement and may need injections - patient also recalled being told that she needs to be off Warfarin 5 days prior, needs to see primary for preop and get approval from Dr. Vora - patient reported that she left [facility] for PCP [email or web address] preop.    Instructed patient to contact PCP brennen Warfarin mgmt for surgery and to schedule RAC visit for cardiac risk assessment since she has not been seen by Dr. Vora since 7-29-24 - patient verbalized understanding and agreed with recs - call transf to sched.  mg

## 2025-04-15 NOTE — PROGRESS NOTES
ANTICOAGULATION MANAGEMENT     Tammy Law 69 year old female is on warfarin with therapeutic INR result. (Goal INR 2.0-3.0)    Recent labs: (last 7 days)     04/15/25  0000   INR 2.2       ASSESSMENT     Source(s): Chart Review and Patient/Caregiver Call     Warfarin doses taken: Warfarin taken as instructed  Diet: No new diet changes identified  Medication/supplement changes: None noted  New illness, injury, or hospitalization: No: ongoing right shoulder pain. Patient has PT for this and had steroid injection 4/11. She has not been needing pain medication.   Signs or symptoms of bleeding or clotting: No  Previous result: Therapeutic last visit; previously outside of goal range  Additional findings:  Upcoming procedure 4/30 cholecystectomy; procedure plan sent today. Plan for recheck prior to procedure on 4/24.       PLAN     Recommended plan for no diet, medication or health factor changes affecting INR     Dosing Instructions: Continue your current warfarin dose with next INR in 1 week       Summary  As of 4/15/2025      Full warfarin instructions:  7.5 mg every Tue; 5 mg all other days   Next INR check:  4/24/2025               Telephone call with Bisi who verbalizes understanding and agrees to plan    Patient to recheck with home meter    Education provided: Contact [phone] with any changes, questions or concerns.     Plan made per [facility] anticoagulation protocol    Sunita Rose RN  4/15/2025  Anticoagulation Clinic  [employer] for routing messages: p ANTICOAG HOME MONITORING  [facility] patient phone line: [phone]        _______________________________________________________________________     Anticoagulation Episode Summary       Current INR goal:  2.0-3.0   TTR:  89.6% (1 y)   Target end date:  Indefinite   Send INR reminders to:  ANTICOAG HOME MONITORING    Indications    H/O mechanical aortic valve replacement [Z95.2]  Long term current use of anticoagulant therapy [Z79.01]              Comments:  [employer] home monitor- managed by exception  Goal range changed during 8/28-9/15/23  hospitalization             Anticoagulation Care Providers       Provider Role Specialty Phone number    Arnold Anderson MD Referring Internal Medicine [phone]    Christina Hernandez MD Referring Family Medicine [phone]    Karina Cifuentes MD Referring Family Medicine [phone]

## 2025-04-15 NOTE — TELEPHONE ENCOUNTER
[facility]-PROCEDURAL ANTICOAGULATION  MANAGEMENT    Tammy requesting pre-procedure hold orders for warfarin and review for bridging      Procedure date: 4/30/25       Procedure: CHOLECYSTECTOMY, LAPAROSCOPIC       Procedure location and phone number (if external): [place]     Number of warfarin hold days requested and/or target INR: 5 days    Pre-op date:  4/15/25      Routing to Anticoagulation Pharmacist for review.     ACC pool: p ANTICOAG HOME MONITORING     Sunita Rose RN

## 2025-04-15 NOTE — TELEPHONE ENCOUNTER
Spoke with patient today to schedule surgery as ordered by the provider.    WC/MVA Related?: No    Went over details/instructions as written on the letter.    Pre Op By: H&P by Primary MD  Post Op Scheduled : Not applicable    Medications:  Blood Thinners? YES  Weight Loss Meds? No  Diabetes Meds? No    Surgery Letter sent via  given in clinic.      (Please see LETTERS TAB in chart to retrieve a copy of this letter)

## 2025-04-15 NOTE — PROGRESS NOTES
General Surgery Consult    HPI:    History of Present Illness              Pain for 1 month, right upper quadrant radiating to back, every meal, associated nausea.  No appetite.  No weight loss.  Tried different diet, no change.      Allergies:Demerol [meperidine], Misc. 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    Chronic anticoagulation     Closed fracture of multiple ribs of left side 08/28/2023    Depression     Disease of thyroid gland     Hypothyroidism    Hematemesis 10/17/2023    High cholesterol     Hyperlipidemia     Hypertension     Hypothyroidism     Melena 10/17/2023    SSS (sick sinus syndrome) (H) 02/04/2020    Traumatic hemothorax 08/28/2023       Past Surgical History:   Procedure Laterality Date    AORTIC VALVE REPLACEMENT      APPENDECTOMY      BIOPSY BREAST Left 2015    BREAST CYST EXCISION      CARDIAC CATHETERIZATION      HC PART REMV BONE METATARSAL HEAD,EA Right 06/23/2017    Procedure: EXOSTECTOMY 2ND METATARSAL RIGHT FOOT;  Surgeon: Jessee Mccauley DPM;  Location: [facility];  Service: Podiatry    HYSTERECTOMY  2000    IMPLANT PACEMAKER      IMPLANT PACEMAKER      MIDLINE SINGLE LUMEN PLACEMENT  10/13/2023    OOPHORECTOMY  2000    OTHER SURGICAL HISTORY      Hemmorhoidectomy    RELEASE CARPAL TUNNEL Bilateral     TOE SURGERY      TYMPANOSTOMY TUBE PLACEMENT      ZZC REPLACE AORT VALV,PROSTH VALV      Description: Aortic Valve Replacement;  Proc Date: 01/01/1995;     Bilateral oophorectomy, appendectomy, open  Ex-lap for unclear reasons    CURRENT MEDS:  No current facility-administered medications for this visit.       Family History   Problem Relation Age of Onset    Pacemaker Mother     Diabetes Mother     Coronary Artery Disease Father     Pacemaker Father     Diabetes Father     Prostate Cancer Father      "Depression Sister     Thyroid Disease Sister     Depression Sister     Kidney Cancer Brother     Coronary Artery Disease Brother     Prostate Cancer Brother     Thyroid Disease Brother     No Known Problems Maternal Grandmother     Colon Cancer Maternal Grandfather     No Known Problems Paternal Grandmother     Cancer Paternal Grandfather         Stomach    No Known Problems Daughter     No Known Problems Maternal Aunt     No Known Problems Paternal Aunt     Ovarian Cancer Cousin     Hereditary Breast and Ovarian Cancer Syndrome No family hx of     Breast Cancer No family hx of     Endometrial Cancer No family hx of         reports that she has never smoked. She has never been exposed to tobacco smoke. She has never used smokeless tobacco. She reports current alcohol use. She reports that she does not use drugs.    Review of Systems - {Review Of Systems:83495}    LMP 12/13/2000 (Approximate)     EXAM:  GENERAL: NAD  MOUTH: Mucus membranes moist  SKIN: Grossly intact  EYES: No icterus  CARDIAC: RRR   CHEST/LUNG: NLB  ABDOMEN: Soft, RUQ non-tender***, Mays's sign negative  NEURO: Alert and oriented, nonfocal  PSYCHIATRIC: normal affect    Lower midline incision    LABS:    ***    IMAGES:     US 4/4:    IMPRESSION:  1.  Layering sludge in the gallbladder. No evidence of acute cholecystitis.  2.  Stable 1.2 cm lesion in the superior right hepatic lobe, probable hemangioma.    Assessment/Plan:     Pt with signs and symptoms consistent with {Blank single:14399::\"biliary colic\",\"symptomatic cholelithiasis\",\"choledocholithiasis\",\"acute cholecystitis\",\"chronic cholecystitis\"}.     I have recommended a {Blank single:25498::\"laparoscopic cholecystecomy\",\"laparoscopic cholecystectomy with cholangiogram\"}.  Discussed risks/benefits/alternatives to removal of the gallbladder.    The risk discussion included, but was not limited to, death, myocardial infarction, pneumonia, urinary tract infection, deep venous thrombosis with or " without pulmonary embolus, abdominal infection from bowel injury or abscess, bowel obstruction, wound infection, and bleeding.    Specific risks related to cholecystectomy include bile duct injury, bile leak, retained common bile duct stone, postcholecystectomy diarrhea and these complications may require additional treatment.    The potential that gallbladder removal will NOT be of benefit was also reviewed.    Furthermore, alternative therapies and the option for no therapy were also reviewed.     Consent was obtained from the patient to use an AI documentation tool in the creation of this note.    Rocael Bird MD

## 2025-04-21 RX ORDER — ENOXAPARIN SODIUM 100 MG/ML
0.75 INJECTION SUBCUTANEOUS EVERY 12 HOURS
Qty: 12 ML | Refills: 1 | Status: CANCELLED | OUTPATIENT
Start: 2025-04-21

## 2025-04-21 NOTE — TELEPHONE ENCOUNTER
[facility]-PROCEDURAL ANTICOAGULATION  MANAGEMENT    ASSESSMENT     Warfarin interruption plan for cholecystectomy on 2025.    Indication for Anticoagulation: Mechanical AVR    23mm [place] valve placed 1995  Rheumatic mitral stenosis, mild    Past procedure management  Warfarin held, Not bridged for colonoscopy 2022  Warfarin held, Bridged for rib surgery 2023  Warfarin held, Not bridged for 2024    Josselin-Procedure Risk stratification for thromboembolism: moderate/high ( Chest guidelines)    AVR:  AHA/ACC Management of Valvular Heart disease guidelines recommend no bridge in bileaflet mechanical AVR patients with NO other risk factors for thrombosis (Afib, previous thromboembolism, hypercoagulable condition, LV systolic dysfunction, older generation valves, or > 1 valve)  AVR/MVR:  Chest Perioperative Management guideline suggests no bridging for mechanical heart valves except select patients at high thromboembolic risk such as with an older-generation mechanical heart valve, MVR with one more more risk factors for thromboembolism, a recent (within 3 months) thromboembolic event, or with prior perioperative stroke    RECOMMENDATION     Pre-Procedure:  Hold warfarin for 5 days, until after procedure startin2025   No bridge    Post-Procedure:  Resume warfarin dose if okay with provider doing procedure on night of procedure, 2025 PM: 10mg  Recheck INR ~5 days after resuming warfarin   ?       Plan routed to referring provider for approval  ?   Karlene Sheriff [facility]    SUBJECTIVE/OBJECTIVE     Tammy Law, a 69 year old female    Goal INR Range: 2.0-3.0     Wt Readings from Last 3 Encounters:   04/15/25 61.2 kg (135 lb)   25 61.7 kg (136 lb)   25 61.7 kg (136 lb)      Ideal body weight: 54.7 kg (120 lb 9.5 oz)  Adjusted ideal body weight: 57.3 kg (126 lb 5.7 oz)     Estimated body mass index is 23.17 kg/m  as calculated from the following:    Height as of an  "earlier encounter on 4/15/25: 1.626 m (5' 4\").    Weight as of an earlier encounter on 4/15/25: 61.2 kg (135 lb).    Lab Results   Component Value Date    INR 2.2 04/15/2025    INR 3.0 04/01/2025    INR 3.1 (H) 03/18/2025     Lab Results   Component Value Date    HGB 13.9 04/15/2025    HCT 41.3 04/15/2025     04/15/2025     Lab Results   Component Value Date    CR 0.93 04/15/2025    CR 1.03 (H) 10/09/2024    CR 0.80 02/14/2024     Estimated Creatinine Clearance: 55.9 mL/min (based on SCr of 0.93 mg/dL).    "

## 2025-04-23 ENCOUNTER — OFFICE VISIT (OUTPATIENT)
Dept: CARDIOLOGY | Facility: CLINIC | Age: 70
End: 2025-04-23
Payer: COMMERCIAL

## 2025-04-23 VITALS
RESPIRATION RATE: 16 BRPM | HEART RATE: 64 BPM | HEIGHT: 64 IN | WEIGHT: 133.2 LBS | SYSTOLIC BLOOD PRESSURE: 114 MMHG | BODY MASS INDEX: 22.74 KG/M2 | DIASTOLIC BLOOD PRESSURE: 66 MMHG

## 2025-04-23 DIAGNOSIS — I05.1 RHEUMATIC MITRAL REGURGITATION: Primary | ICD-10-CM

## 2025-04-23 DIAGNOSIS — R07.2 PRECORDIAL PAIN: ICD-10-CM

## 2025-04-23 PROCEDURE — 99214 OFFICE O/P EST MOD 30 MIN: CPT | Performed by: INTERNAL MEDICINE

## 2025-04-23 PROCEDURE — 3078F DIAST BP <80 MM HG: CPT | Performed by: INTERNAL MEDICINE

## 2025-04-23 PROCEDURE — 3074F SYST BP LT 130 MM HG: CPT | Performed by: INTERNAL MEDICINE

## 2025-04-23 PROCEDURE — G2211 COMPLEX E/M VISIT ADD ON: HCPCS | Performed by: INTERNAL MEDICINE

## 2025-04-23 NOTE — LETTER
4/23/2025    CUCO ALEMAN MD  [address] [facility] [address]    RE: Tammy Law       Dear Colleague,     I had the pleasure of seeing Tammy Law in the [facility].        Thank you, Dr. Vora, for asking [facility] to evaluate Ms. Tammy Law.      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  The longitudinal plan of care for the diagnosis(es)/condition(s) as documented were addressed during this visit. Due to the added complexity in care, I will continue to support Bisi in the subsequent management and with ongoing continuity of care.      History of Present Illness    Ms. Tammy Law is a 69 year old female who comes in to rapid access clinic for cardiac evaluation prior to elective cholecystectomy.  She has had abdominal pain and gallbladder sludge.  She is scheduled to undergo surgery in about a week from now.  She has a history of rheumatic fever and she underwent aortic valve replacement about 30 years ago.  The most recent echo showed normal function of the mechanical prosthesis.  She has been compliant with warfarin therapy and she has not had suppressed subtherapeutic INRs.  She has had number of surgeries before and she had never any  "problems with a temporary discontinuation of warfarin.    No history of atrial fibrillation.  Her recent pacemaker interrogation shows no arrhythmias.    She does get mild shortness of breath at certain times.  She has not had weight gain, PND, orthopnea.  She is not known to have obstructive coronary artery disease    ECG: Personally reviewed.  A paced right bundle branch block.  Device interrogation: March 2025  Normal function no arrhythmias  Echocardiogram: August 2024  The left ventricle is normal in size.  Left ventricular function is normal.The ejection fraction is 55-60%.  There is mild concentric left ventricular hypertrophy.  Diastolic Doppler findings (E/E' ratio and/or other parameters) suggest left  ventricular filling pressures are increased.  Normal right ventricle size and systolic function.  There is a pacemaker lead in the right ventricle.  The left atrium is severely dilated.  Aortic valve has been replaced by [employer] mechanical prosthesis functioning  well with peak velocity 2.7 m/s, mean gradient of 17 mmHg, dimensionless index  0.30 with an acceleration time of 90 ms. Mild paravalvular leak.  There is mild to moderate mitral stenosis.  There is mild to moderate (1-2+) mitral regurgitation.  Compared to prior study, there is no significant change.       Physical Examination Review of Systems   /66 (BP Location: Left arm, Patient Position: Sitting, Cuff Size: Adult Regular)   Pulse 64   Resp 16   Ht 1.626 m (5' 4\")   Wt 60.4 kg (133 lb 3.2 oz)   LMP 12/13/2000 (Approximate)   BMI 22.86 kg/m    Body mass index is 22.86 kg/m .  Wt Readings from Last 3 Encounters:   04/23/25 60.4 kg (133 lb 3.2 oz)   04/15/25 61.2 kg (135 lb)   04/03/25 61.7 kg (136 lb)     General Appearance:   Alert, cooperative, no distress, appears stated age   Head/ENT: Normocephalic, without obvious abnormality. Membranes moist      EYES:  no scleral icterus, normal conjunctivae   Neck: Supple, symmetrical, trachea " "midline, no adenopathy, thyroid: not enlarged, symmetric, no carotid bruit or JVD   Chest/Lungs:   Lungs are clear to auscultation, respirations unlabored. No tenderness or deformity    Cardiovascular:   Regular rhythm, S1, crisp clicks of artificial aortic valve, 2/6 systolic ejection murmur at the aortic area and 3/6 holosystolic murmur at the apex no gallop or rub   Abdomen:  Soft, non-tender, bowel sounds active all four quadrants,  no masses, no organomegaly   Extremities: no cyanosis or clubbing. No edema   Skin: Skin color, texture, turgor normal, no rashes or lesions.    Psychiatric: Normal affect, calm   Neurologic: Alert and oriented x 3, moving all four extremities.     Encounter Vitals  BP: 114/66  Pulse: 64  Resp: 16  Weight: 60.4 kg (133 lb 3.2 oz)  Height: 162.6 cm (5' 4\")                                          Lab Results    Chemistry/lipid CBC Cardiac Enzymes/BNP/TSH/INR   Recent Labs   Lab Test 04/03/25  0844   CHOL 153   HDL 71   LDL 67   TRIG 75     Recent Labs   Lab Test 04/03/25  0844 02/14/24  1126 10/02/20  0850   LDL 67 70 82     Recent Labs   Lab Test 04/15/25  1138      POTASSIUM 4.1   CHLORIDE 102   CO2 25   GLC 80   BUN 13.8   CR 0.93   GFRESTIMATED 67   ALVAREZ 10.3     Recent Labs   Lab Test 04/15/25  1138 10/09/24  1129 02/14/24  1126   CR 0.93 1.03* 0.80     Recent Labs   Lab Test 02/14/24  1126   A1C 5.7*          Recent Labs   Lab Test 04/15/25  1138   WBC 8.1   HGB 13.9   HCT 41.3   MCV 87        Recent Labs   Lab Test 04/15/25  1138 10/09/24  1139 09/10/24  1501   HGB 13.9 13.5 13.9    Recent Labs   Lab Test 01/06/20  1520 09/24/18  0856 09/23/18  1139   TROPONINI <0.01 <0.01 <0.01     Recent Labs   Lab Test 10/29/22  0802 01/11/22  2214   NTBNPI [id number]     Recent Labs   Lab Test 04/03/25  0844   TSH 4.15     Recent Labs   Lab Test 04/15/25  0000 04/01/25  0000 03/18/25  0000   INR 2.2 3.0 3.1*        Medical History  Surgical History Family History Social History "   Past Medical History:   Diagnosis Date     Acute blood loss anemia 08/29/2023     Anxiety      Benign neoplasm of transverse colon 03/15/2024     Chronic anticoagulation      Closed fracture of multiple ribs of left side 08/28/2023     Depression      Disease of thyroid gland     Hypothyroidism     Hematemesis 10/17/2023     High cholesterol      Hyperlipidemia      Hypertension      Hypothyroidism      Melena 10/17/2023     SSS (sick sinus syndrome) (H) 02/04/2020     Traumatic hemothorax 08/28/2023     Past Surgical History:   Procedure Laterality Date     AORTIC VALVE REPLACEMENT       APPENDECTOMY       BIOPSY BREAST Left 2015     BREAST CYST EXCISION       CARDIAC CATHETERIZATION       HC PART REMV BONE METATARSAL HEAD,EA Right 06/23/2017    Procedure: EXOSTECTOMY 2ND METATARSAL RIGHT FOOT;  Surgeon: Jessee Mccauley DPM;  Location: [facility];  Service: Podiatry     HYSTERECTOMY  2000     IMPLANT PACEMAKER       IMPLANT PACEMAKER       MIDLINE SINGLE LUMEN PLACEMENT  10/13/2023     OOPHORECTOMY  2000     OTHER SURGICAL HISTORY      Hemmorhoidectomy     RELEASE CARPAL TUNNEL Bilateral      TOE SURGERY       TYMPANOSTOMY TUBE PLACEMENT       ZZC REPLACE AORT VALV,PROSTH VALV      Description: Aortic Valve Replacement;  Proc Date: 01/01/1995;     No premature CAD, SCD,cardiomyopathy   Social History     Socioeconomic History     Marital status: Single     Spouse name: Not on file     Number of children: 0     Years of education: Not on file     Highest education level: Not on file   Occupational History     Not on file   Tobacco Use     Smoking status: Never     Passive exposure: Never     Smokeless tobacco: Never   Vaping Use     Vaping status: Never Used   Substance and Sexual Activity     Alcohol use: Yes     Comment: Alcoholic Drinks/day: twice per year     Drug use: No     Sexual activity: Not Currently     Birth control/protection: None   Other Topics Concern     Parent/sibling w/ CABG, MI or  angioplasty before 65F 55M? No   Social History Narrative     Not on file     Social Drivers of Health     Financial Resource Strain: Low Risk  (10/4/2024)    Financial Resource Strain      Within the past 12 months, have you or your family members you live with been unable to get utilities (heat, electricity) when it was really needed?: No   Food Insecurity: Low Risk  (10/4/2024)    Food Insecurity      Within the past 12 months, did you worry that your food would run out before you got money to buy more?: No      Within the past 12 months, did the food you bought just not last and you didn t have money to get more?: No   Transportation Needs: Low Risk  (10/4/2024)    Transportation Needs      Within the past 12 months, has lack of transportation kept you from medical appointments, getting your medicines, non-medical meetings or appointments, work, or from getting things that you need?: No   Physical Activity: Inactive (10/4/2024)    Exercise Vital Sign      Days of Exercise per Week: 2 days      Minutes of Exercise per Session: 0 min   Stress: No Stress Concern Present (10/4/2024)    Citizen of the [place] [employer] Occupational [employer] - Occupational Stress Questionnaire      Feeling of Stress : Only a little   Social Connections: Unknown (10/4/2024)    Social Connection and Isolation Panel [NHANES]      Frequency of Communication with Friends and Family: Not on file      Frequency of Social Gatherings with Friends and Family: Once a week      Attends Mormonism Services: Not on file      Active Member of Clubs or Organizations: Not on file      Attends Club or Organization Meetings: Not on file      Marital Status: Not on file   Interpersonal Safety: Low Risk  (4/15/2025)    Interpersonal Safety      Do you feel physically and emotionally safe where you currently live?: Yes      Within the past 12 months, have you been hit, slapped, kicked or otherwise physically hurt by someone?: No      Within the past 12 months, have you  been humiliated or emotionally abused in other ways by your partner or ex-partner?: No   Housing Stability: Low Risk  (10/4/2024)    Housing Stability      Do you have housing? : Yes      Are you worried about losing your housing?: No         Medications  Allergies   Current Outpatient Medications   Medication Sig Dispense Refill     alendronate (FOSAMAX) 70 MG tablet TAKE 1 TABLET BY MOUTH WEEKLY  WITH 8 OZ OF PLAIN WATER 30  MINUTES BEFORE FIRST FOOD, DRINK OR MEDS. STAY UPRIGHT FOR 30  MINS 12 tablet 3     atorvastatin (LIPITOR) 40 MG tablet Take 1 tablet (40 mg) by mouth daily. 100 tablet 3     buPROPion (WELLBUTRIN XL) 300 MG 24 hr tablet Take 1 tablet (300 mg) by mouth every morning. 100 tablet 3     calcium carbonate 500 mg, elemental, (OSCAL 500) 1250 (500 Ca) MG TABS tablet Take by mouth daily. One-half of a tab by mouth every am       furosemide (LASIX) 40 MG tablet Take 1 tablet (40 mg) by mouth daily as needed 90 tablet 1     levothyroxine (SYNTHROID/LEVOTHROID) 88 MCG tablet Take 1 tablet (88 mcg) by mouth daily. 90 tablet 3     vitamin D3 (CHOLECALCIFEROL) 50 mcg (2000 units) tablet Take 1 tablet by mouth daily       warfarin ANTICOAGULANT (COUMADIN) 2.5 MG tablet TAKE 2 TO 3 TABLETS (5 MG - 7.5  MG) BY MOUTH DAILY OR AS  DIRECTED BY YOUR ACC TEAM BASED  ON INR RESULTS 200 tablet 1        Allergies   Allergen Reactions     Demerol [Meperidine]      Hallucinations       Misc. Sulfonamide Containing Compounds Hives     Morphine      Sulfa Antibiotics                         Thank you for allowing me to participate in the care of your patient.      Sincerely,     Nicho Lira MD     [facility]  cc:   Kirsten Vora MD  [address], [address]  [place] [address]

## 2025-04-23 NOTE — PROGRESS NOTES
Thank you, Dr. Vora, for asking [facility] to evaluate Ms. Tammy Law.      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  The longitudinal plan of care for the diagnosis(es)/condition(s) as documented were addressed during this visit. Due to the added complexity in care, I will continue to support Bisi in the subsequent management and with ongoing continuity of care.      History of Present Illness    Ms. Tammy Law is a 69 year old female who comes in to rapid access clinic for cardiac evaluation prior to elective cholecystectomy.  She has had abdominal pain and gallbladder sludge.  She is scheduled to undergo surgery in about a week from now.  She has a history of rheumatic fever and she underwent aortic valve replacement about 30 years ago.  The most recent echo showed normal function of the mechanical prosthesis.  She has been compliant with warfarin therapy and she has not had suppressed subtherapeutic INRs.  She has had number of surgeries before and she had never any problems with a temporary discontinuation of warfarin.    No history of atrial fibrillation.  Her recent pacemaker interrogation shows no arrhythmias.    She does get mild shortness of breath at certain times.  She  "has not had weight gain, PND, orthopnea.  She is not known to have obstructive coronary artery disease    ECG: Personally reviewed.  A paced right bundle branch block.  Device interrogation: March 2025  Normal function no arrhythmias  Echocardiogram: August 2024  The left ventricle is normal in size.  Left ventricular function is normal.The ejection fraction is 55-60%.  There is mild concentric left ventricular hypertrophy.  Diastolic Doppler findings (E/E' ratio and/or other parameters) suggest left  ventricular filling pressures are increased.  Normal right ventricle size and systolic function.  There is a pacemaker lead in the right ventricle.  The left atrium is severely dilated.  Aortic valve has been replaced by [employer] mechanical prosthesis functioning  well with peak velocity 2.7 m/s, mean gradient of 17 mmHg, dimensionless index  0.30 with an acceleration time of 90 ms. Mild paravalvular leak.  There is mild to moderate mitral stenosis.  There is mild to moderate (1-2+) mitral regurgitation.  Compared to prior study, there is no significant change.       Physical Examination Review of Systems   /66 (BP Location: Left arm, Patient Position: Sitting, Cuff Size: Adult Regular)   Pulse 64   Resp 16   Ht 1.626 m (5' 4\")   Wt 60.4 kg (133 lb 3.2 oz)   LMP 12/13/2000 (Approximate)   BMI 22.86 kg/m    Body mass index is 22.86 kg/m .  Wt Readings from Last 3 Encounters:   04/23/25 60.4 kg (133 lb 3.2 oz)   04/15/25 61.2 kg (135 lb)   04/03/25 61.7 kg (136 lb)     General Appearance:   Alert, cooperative, no distress, appears stated age   Head/ENT: Normocephalic, without obvious abnormality. Membranes moist      EYES:  no scleral icterus, normal conjunctivae   Neck: Supple, symmetrical, trachea midline, no adenopathy, thyroid: not enlarged, symmetric, no carotid bruit or JVD   Chest/Lungs:   Lungs are clear to auscultation, respirations unlabored. No tenderness or deformity    Cardiovascular:   Regular " "rhythm, S1, crisp clicks of artificial aortic valve, 2/6 systolic ejection murmur at the aortic area and 3/6 holosystolic murmur at the apex no gallop or rub   Abdomen:  Soft, non-tender, bowel sounds active all four quadrants,  no masses, no organomegaly   Extremities: no cyanosis or clubbing. No edema   Skin: Skin color, texture, turgor normal, no rashes or lesions.    Psychiatric: Normal affect, calm   Neurologic: Alert and oriented x 3, moving all four extremities.     Encounter Vitals  BP: 114/66  Pulse: 64  Resp: 16  Weight: 60.4 kg (133 lb 3.2 oz)  Height: 162.6 cm (5' 4\")                                          Lab Results    Chemistry/lipid CBC Cardiac Enzymes/BNP/TSH/INR   Recent Labs   Lab Test 04/03/25  0844   CHOL 153   HDL 71   LDL 67   TRIG 75     Recent Labs   Lab Test 04/03/25  0844 02/14/24  1126 10/02/20  0850   LDL 67 70 82     Recent Labs   Lab Test 04/15/25  1138      POTASSIUM 4.1   CHLORIDE 102   CO2 25   GLC 80   BUN 13.8   CR 0.93   GFRESTIMATED 67   ALVAREZ 10.3     Recent Labs   Lab Test 04/15/25  1138 10/09/24  1129 02/14/24  1126   CR 0.93 1.03* 0.80     Recent Labs   Lab Test 02/14/24  1126   A1C 5.7*          Recent Labs   Lab Test 04/15/25  1138   WBC 8.1   HGB 13.9   HCT 41.3   MCV 87        Recent Labs   Lab Test 04/15/25  1138 10/09/24  1139 09/10/24  1501   HGB 13.9 13.5 13.9    Recent Labs   Lab Test 01/06/20  1520 09/24/18  0856 09/23/18  1139   TROPONINI <0.01 <0.01 <0.01     Recent Labs   Lab Test 10/29/22  0802 01/11/22  2214   NTBNPI [id number]     Recent Labs   Lab Test 04/03/25  0844   TSH 4.15     Recent Labs   Lab Test 04/15/25  0000 04/01/25  0000 03/18/25  0000   INR 2.2 3.0 3.1*        Medical History  Surgical History Family History Social History   Past Medical History:   Diagnosis Date    Acute blood loss anemia 08/29/2023    Anxiety     Benign neoplasm of transverse colon 03/15/2024    Chronic anticoagulation     Closed fracture of multiple ribs of left " side 08/28/2023    Depression     Disease of thyroid gland     Hypothyroidism    Hematemesis 10/17/2023    High cholesterol     Hyperlipidemia     Hypertension     Hypothyroidism     Melena 10/17/2023    SSS (sick sinus syndrome) (H) 02/04/2020    Traumatic hemothorax 08/28/2023     Past Surgical History:   Procedure Laterality Date    AORTIC VALVE REPLACEMENT      APPENDECTOMY      BIOPSY BREAST Left 2015    BREAST CYST EXCISION      CARDIAC CATHETERIZATION      HC PART REMV BONE METATARSAL HEAD,EA Right 06/23/2017    Procedure: EXOSTECTOMY 2ND METATARSAL RIGHT FOOT;  Surgeon: Jessee Mccauley DPM;  Location: [facility];  Service: Podiatry    HYSTERECTOMY  2000    IMPLANT PACEMAKER      IMPLANT PACEMAKER      MIDLINE SINGLE LUMEN PLACEMENT  10/13/2023    OOPHORECTOMY  2000    OTHER SURGICAL HISTORY      Hemmorhoidectomy    RELEASE CARPAL TUNNEL Bilateral     TOE SURGERY      TYMPANOSTOMY TUBE PLACEMENT      ZZC REPLACE AORT VALV,PROSTH VALV      Description: Aortic Valve Replacement;  Proc Date: 01/01/1995;     No premature CAD, SCD,cardiomyopathy   Social History     Socioeconomic History    Marital status: Single     Spouse name: Not on file    Number of children: 0    Years of education: Not on file    Highest education level: Not on file   Occupational History    Not on file   Tobacco Use    Smoking status: Never     Passive exposure: Never    Smokeless tobacco: Never   Vaping Use    Vaping status: Never Used   Substance and Sexual Activity    Alcohol use: Yes     Comment: Alcoholic Drinks/day: twice per year    Drug use: No    Sexual activity: Not Currently     Birth control/protection: None   Other Topics Concern    Parent/sibling w/ CABG, MI or angioplasty before 65F 55M? No   Social History Narrative    Not on file     Social Drivers of Health     Financial Resource Strain: Low Risk  (10/4/2024)    Financial Resource Strain     Within the past 12 months, have you or your family members you live  with been unable to get utilities (heat, electricity) when it was really needed?: No   Food Insecurity: Low Risk  (10/4/2024)    Food Insecurity     Within the past 12 months, did you worry that your food would run out before you got money to buy more?: No     Within the past 12 months, did the food you bought just not last and you didn t have money to get more?: No   Transportation Needs: Low Risk  (10/4/2024)    Transportation Needs     Within the past 12 months, has lack of transportation kept you from medical appointments, getting your medicines, non-medical meetings or appointments, work, or from getting things that you need?: No   Physical Activity: Inactive (10/4/2024)    Exercise Vital Sign     Days of Exercise per Week: 2 days     Minutes of Exercise per Session: 0 min   Stress: No Stress Concern Present (10/4/2024)    [employer] [facility] [employer] - Occupational Stress Questionnaire     Feeling of Stress : Only a little   Social Connections: Unknown (10/4/2024)    Social Connection and Isolation Panel [NHANES]     Frequency of Communication with Friends and Family: Not on file     Frequency of Social Gatherings with Friends and Family: Once a week     Attends Orthodox Services: Not on file     Active Member of Clubs or Organizations: Not on file     Attends Club or Organization Meetings: Not on file     Marital Status: Not on file   Interpersonal Safety: Low Risk  (4/15/2025)    Interpersonal Safety     Do you feel physically and emotionally safe where you currently live?: Yes     Within the past 12 months, have you been hit, slapped, kicked or otherwise physically hurt by someone?: No     Within the past 12 months, have you been humiliated or emotionally abused in other ways by your partner or ex-partner?: No   Housing Stability: Low Risk  (10/4/2024)    Housing Stability     Do you have housing? : Yes     Are you worried about losing your housing?: No         Medications  Allergies   Current  Outpatient Medications   Medication Sig Dispense Refill    alendronate (FOSAMAX) 70 MG tablet TAKE 1 TABLET BY MOUTH WEEKLY  WITH 8 OZ OF PLAIN WATER 30  MINUTES BEFORE FIRST FOOD, DRINK OR MEDS. STAY UPRIGHT FOR 30  MINS 12 tablet 3    atorvastatin (LIPITOR) 40 MG tablet Take 1 tablet (40 mg) by mouth daily. 100 tablet 3    buPROPion (WELLBUTRIN XL) 300 MG 24 hr tablet Take 1 tablet (300 mg) by mouth every morning. 100 tablet 3    calcium carbonate 500 mg, elemental, (OSCAL 500) 1250 (500 Ca) MG TABS tablet Take by mouth daily. One-half of a tab by mouth every am      furosemide (LASIX) 40 MG tablet Take 1 tablet (40 mg) by mouth daily as needed 90 tablet 1    levothyroxine (SYNTHROID/LEVOTHROID) 88 MCG tablet Take 1 tablet (88 mcg) by mouth daily. 90 tablet 3    vitamin D3 (CHOLECALCIFEROL) 50 mcg (2000 units) tablet Take 1 tablet by mouth daily      warfarin ANTICOAGULANT (COUMADIN) 2.5 MG tablet TAKE 2 TO 3 TABLETS (5 MG - 7.5  MG) BY MOUTH DAILY OR AS  DIRECTED BY YOUR ACC TEAM BASED  ON INR RESULTS 200 tablet 1        Allergies   Allergen Reactions    Demerol [Meperidine]      Hallucinations      Misc. Sulfonamide Containing Compounds Hives    Morphine     Sulfa Antibiotics

## 2025-04-23 NOTE — TELEPHONE ENCOUNTER
Called and reviewed the procedure plan below with the patient. Up dated the calendar to reflect the plan. Patient notes that she was advised that she may need to spend one night in the hospital.    Jacqueline Juárez RN    [facility]

## 2025-04-23 NOTE — PATIENT INSTRUCTIONS
Tammy Law,    It was a pleasure to see you today at [facility].     My recommendations after this visit include:    Stress test and echo before surgery    You do not need warfarin bridging before surgery    LIA Lira MD, FACC, [facility]

## 2025-04-23 NOTE — TELEPHONE ENCOUNTER
Per OV notes from cardiology today and confirmation from message to cardiology  team:    Matthew Lira MD Johnson, Karlene LANCE, [facility]  She does not need a bridge    Plan updated to reflect no bridge.  Post procedure guidelines/surgeon may decide to bridge post procedure due to surgical intervention related risk, ACC will review at discharge.    Karlene Sheriff, PharmD BCACP, CACP  Anticoagulation Clinical Pharmacist

## 2025-04-24 ENCOUNTER — ANTICOAGULATION THERAPY VISIT (OUTPATIENT)
Dept: ANTICOAGULATION | Facility: CLINIC | Age: 70
End: 2025-04-24

## 2025-04-24 ENCOUNTER — OFFICE VISIT (OUTPATIENT)
Dept: FAMILY MEDICINE | Facility: CLINIC | Age: 70
End: 2025-04-24
Payer: COMMERCIAL

## 2025-04-24 VITALS
HEART RATE: 66 BPM | HEIGHT: 64 IN | SYSTOLIC BLOOD PRESSURE: 116 MMHG | WEIGHT: 132 LBS | RESPIRATION RATE: 12 BRPM | TEMPERATURE: 97 F | BODY MASS INDEX: 22.53 KG/M2 | OXYGEN SATURATION: 99 % | DIASTOLIC BLOOD PRESSURE: 64 MMHG

## 2025-04-24 DIAGNOSIS — Z95.2 H/O MECHANICAL AORTIC VALVE REPLACEMENT: Primary | ICD-10-CM

## 2025-04-24 DIAGNOSIS — Z01.818 PREOP GENERAL PHYSICAL EXAM: Primary | ICD-10-CM

## 2025-04-24 DIAGNOSIS — Z79.01 LONG TERM CURRENT USE OF ANTICOAGULANT THERAPY: ICD-10-CM

## 2025-04-24 DIAGNOSIS — R11.0 NAUSEA: ICD-10-CM

## 2025-04-24 DIAGNOSIS — K82.8 GALLBLADDER SLUDGE: ICD-10-CM

## 2025-04-24 DIAGNOSIS — R10.11 RUQ ABDOMINAL PAIN: ICD-10-CM

## 2025-04-24 PROBLEM — F33.42 RECURRENT MAJOR DEPRESSIVE DISORDER, IN FULL REMISSION: Status: RESOLVED | Noted: 2023-02-08 | Resolved: 2025-04-24

## 2025-04-24 LAB — INR HOME MONITORING: 2.6 (ref 2–3)

## 2025-04-24 ASSESSMENT — PAIN SCALES - GENERAL: PAINLEVEL_OUTOF10: MILD PAIN (3)

## 2025-04-24 NOTE — PROGRESS NOTES
Preoperative Evaluation  [facility]  [address] [place] [address]  Phone: [phone]  Fax: [phone]  Primary Provider: CUCO ALEMAN MD  Pre-op Performing Provider: CCUO ALEMAN MD  Apr 24, 2025 4/19/2025   Surgical Information   What procedure is being done? going to have my gall bladder taking out   Facility or Hospital where procedure/surgery will be performed: [facility]   Who is doing the procedure / surgery? Dr Nicho Lira   Date of surgery / procedure: April 30   Time of surgery / procedure: do not know yet   Where do you plan to recover after surgery? at home with Home Care     Fax number for surgical facility: Note does not need to be faxed, will be available electronically in [employer].   [facility].    Assessment & Plan   The proposed surgical procedure is considered INTERMEDIATE risk.    Preop general physical exam    Gallbladder sludge    RUQ abdominal pain    Nausea  Should resolve with surgery     Implanted Device   - Type of device: Eluna, dual chamber pacemaker Patient advised to bring device information on day of surgery.   - For full details on implanted device, refer to device management note in Epic from date: 10/21/2016     - No identified additional risk factors other than previously addressed    Preoperative Medication Instructions  Antiplatelet or Anticoagulation Medication Instructions   - warfarin: Thromboembolic risk is moderate (4-10%/year). DO NOT TAKE warfarin 5 days. Thromboembolic risk is moderate (4-10%/year). DO NOT TAKE warfarin 5 days.   - Bridging therapy ordered     Additional Medication Instructions  Take all scheduled medications on the day of surgery EXCEPT for modifications listed below:   - Herbal medications and vitamins: DO NOT TAKE 14 days prior to surgery.    Recommendation  Cardiac workup tomorrow    Subjective   Bisi is a 69 year old, presenting for the following:  Pre-Op Exam  (Cholecystectomy - on 4/30 at [facility] - with Dr. Lira.)        4/24/2025     1:12 PM   Additional Questions   Roomed by roel ESQUIVEL   Accompanied by alone     HPI: Has sludge in the gallbladder and has constant nausea. Some dry heaves.  Having a stress test and echocardiogram tomorrow. The cardiologist said she didn't need Lovenox bridging. So she stops her warfarin as of tomorrow.          4/19/2025   Pre-Op Questionnaire   Have you ever had a heart attack or stroke? No   Have you ever had surgery on your heart or blood vessels, such as a stent placement, a coronary artery bypass, or surgery on an artery in your head, neck, heart, or legs? (!) YES Aortic valve replaced in 1995.   Do you have chest pain with activity? No   Do you have a history of heart failure? No   Do you currently have a cold, bronchitis or symptoms of other infection? No   Do you have a cough, shortness of breath, or wheezing? No   Do you or anyone in your family have previous history of blood clots? No   Do you or does anyone in your family have a serious bleeding problem such as prolonged bleeding following surgeries or cuts? No   Have you ever had problems with anemia or been told to take iron pills? (!) YES November to December 2023 after ribs were fractured and pneumothorax.   Have you had any abnormal blood loss such as black, tarry or bloody stools, or abnormal vaginal bleeding? No   Have you ever had a blood transfusion? (!) YES with the pneumothorax. Had a perforated esophagus.   Have you ever had a transfusion reaction? No   Are you willing to have a blood transfusion if it is medically needed before, during, or after your surgery? Yes   Have you or any of your relatives ever had problems with anesthesia? No   Do you have sleep apnea, excessive snoring or daytime drowsiness? No   Do you have any artifical heart valves or other implanted medical devices like a pacemaker, defibrillator, or continuous glucose monitor? (!) YES  Aortic  valve and pacemaker   What type of device do you have? valve replacement  and a pacemaker   Name of the clinic that manages your device Health check  ( [employer])   Do you have artificial joints? No   Are you allergic to latex? No     Advance Care Planning    Discussed advance care planning with patient; informed AVS has link to [employer].    Preoperative Review of    reviewed - Had hydrocodone in October 2024    Patient Active Problem List    Diagnosis Date Noted    Rheumatic mitral regurgitation 04/23/2025     Priority: Medium    Precordial pain 04/23/2025     Priority: Medium    Moderate recurrent major depression (H) 04/03/2025     Priority: Medium    Adhesive capsulitis of right shoulder 04/03/2025     Priority: Medium    Hypercholesterolemia 04/03/2025     Priority: Medium    Long term current use of anticoagulant therapy 02/04/2025     Priority: Medium    Malignant melanoma of upper back (H) 09/18/2024     Priority: Medium    Cervico-occipital neuralgia of left side 04/10/2024     Priority: Medium    Cervical radicular pain 04/10/2024     Priority: Medium    History of pacemaker 08/28/2023     Priority: Medium     Formatting of this note might be different from the original. [facility] DX: SSS 10/7/16 [id number] Raheemuna 8 KEENAN [id number] 10/7/16 RARiver OLIVERA [id number] 10/7/16 RVRvier OLIVERA [id number] 10/7/16 8/6/23 Intrinsic Sinus 76 bpm- Non-dependent Pacemaker Above Umbilicus/> 5 seconds cautery. No Magnet needed.  (If magnet was used would be DOO 90 bpm)           Pacemaker pocket = left upper chest      History of colonic polyps 08/13/2023     Priority: Medium    Age-related osteoporosis without current pathological fracture 02/08/2023     Priority: Medium    Medication management 02/08/2023     Priority: Medium    Recurrent major depressive disorder, in full remission 02/08/2023     Priority: Medium    Chronic anticoagulation      Priority: Medium    Chest pain,  unspecified type 01/12/2022     Priority: Medium    Acquired hypothyroidism 07/21/2021     Priority: Medium    Congenital nystagmus 07/21/2021     Priority: Medium     Formatting of this note might be different from the original.  Created by Conversion      Right upper quadrant pain 07/21/2021     Priority: Medium    H/O mechanical aortic valve replacement 07/11/2021     Priority: Medium    Cardiac pacemaker in situ 10/21/2016     Priority: Medium     Formatting of this note might be different from the original.  Biotronik Eluna, dual chamber pacemaker, RA Lead: Biotronik [id number] Solia S 45, RV Lead: Biotronik [id number] Solia S 53  DOI: 10/7/2016 by Dr. Dustin Lott.      CARDIOVASCULAR SCREENING; LDL GOAL LESS THAN 130 10/31/2010     Priority: Medium      Past Medical History:   Diagnosis Date    Acute blood loss anemia 08/29/2023    Anxiety     Benign neoplasm of transverse colon 03/15/2024    Chronic anticoagulation     Closed fracture of multiple ribs of left side 08/28/2023    Depression     Disease of thyroid gland     Hypothyroidism    Hematemesis 10/17/2023    High cholesterol     Hyperlipidemia     Hypertension     Hypothyroidism     Melena 10/17/2023    SSS (sick sinus syndrome) (H) 02/04/2020    Traumatic hemothorax 08/28/2023     Past Surgical History:   Procedure Laterality Date    AORTIC VALVE REPLACEMENT      APPENDECTOMY      BIOPSY BREAST Left 2015    BREAST CYST EXCISION      CARDIAC CATHETERIZATION      HC PART REMV BONE METATARSAL HEAD,EA Right 06/23/2017    Procedure: EXOSTECTOMY 2ND METATARSAL RIGHT FOOT;  Surgeon: Jessee Mccauley DPM;  Location: [facility];  Service: Podiatry    HYSTERECTOMY  2000    IMPLANT PACEMAKER      IMPLANT PACEMAKER      MIDLINE SINGLE LUMEN PLACEMENT  10/13/2023    OOPHORECTOMY  2000    OTHER SURGICAL HISTORY      Hemmorhoidectomy    RELEASE CARPAL TUNNEL Bilateral     TOE SURGERY      TYMPANOSTOMY TUBE PLACEMENT      ZZC REPLACE AORT VALV,PROSTH  "LUCI      Description: Aortic Valve Replacement;  Proc Date: 01/01/1995;     Current Outpatient Medications   Medication Sig Dispense Refill    alendronate (FOSAMAX) 70 MG tablet TAKE 1 TABLET BY MOUTH WEEKLY  WITH 8 OZ OF PLAIN WATER 30  MINUTES BEFORE FIRST FOOD, DRINK OR MEDS. STAY UPRIGHT FOR 30  MINS 12 tablet 3    atorvastatin (LIPITOR) 40 MG tablet Take 1 tablet (40 mg) by mouth daily. 100 tablet 3    buPROPion (WELLBUTRIN XL) 300 MG 24 hr tablet Take 1 tablet (300 mg) by mouth every morning. 100 tablet 3    calcium carbonate 500 mg, elemental, (OSCAL 500) 1250 (500 Ca) MG TABS tablet Take by mouth daily. One-half of a tab by mouth every am      furosemide (LASIX) 40 MG tablet Take 1 tablet (40 mg) by mouth daily as needed 90 tablet 1    levothyroxine (SYNTHROID/LEVOTHROID) 88 MCG tablet Take 1 tablet (88 mcg) by mouth daily. 90 tablet 3    vitamin D3 (CHOLECALCIFEROL) 50 mcg (2000 units) tablet Take 1 tablet by mouth daily      warfarin ANTICOAGULANT (COUMADIN) 2.5 MG tablet TAKE 2 TO 3 TABLETS (5 MG - 7.5  MG) BY MOUTH DAILY OR AS  DIRECTED BY YOUR ACC TEAM BASED  ON INR RESULTS 200 tablet 1       Allergies   Allergen Reactions    Demerol [Meperidine]      Hallucinations      Misc. Sulfonamide Containing Compounds Hives    Morphine     Sulfa Antibiotics         Social History     Tobacco Use    Smoking status: Never     Passive exposure: Never    Smokeless tobacco: Never   Substance Use Topics    Alcohol use: Yes     Comment: Alcoholic Drinks/day: twice per year     History   Drug Use No       Review of Systems  Constitutional, HEENT, cardiovascular, pulmonary, GI, , musculoskeletal, neuro, skin, endocrine and psych systems are negative, except as otherwise noted.    Objective    /64 (BP Location: Left arm, Patient Position: Sitting, Cuff Size: Adult Regular)   Pulse 66   Temp 97  F (36.1  C) (Temporal)   Resp 12   Ht 1.625 m (5' 3.98\")   Wt 59.9 kg (132 lb)   LMP 12/13/2000 (Approximate)   " "SpO2 99%   BMI 22.67 kg/m     Estimated body mass index is 22.67 kg/m  as calculated from the following:    Height as of this encounter: 1.625 m (5' 3.98\").    Weight as of this encounter: 59.9 kg (132 lb).  Physical Exam  GENERAL: alert and no distress  EYES: Eyes grossly normal to inspection, PERRL and conjunctivae and sclerae normal  HENT: ear canals and TM's normal, nose and mouth without ulcers or lesions  NECK: no adenopathy, no asymmetry, masses, or scars  RESP: lungs clear to auscultation - no rales, rhonchi or wheezes  CV: regular rate and rhythm, normal S1 S2, no S3 or S4, no murmur, click or rub, no peripheral edema  ABDOMEN: soft, nontender, no hepatosplenomegaly, no masses and bowel sounds normal  MS: no gross musculoskeletal defects noted, no edema  SKIN: no suspicious lesions or rashes  NEURO: Normal strength and tone, mentation intact and speech normal  PSYCH: mentation appears normal, affect normal/bright    Recent Labs   Lab Test 04/24/25  0000 04/15/25  1138 04/15/25  0000 10/15/24  0000 10/09/24  1139 10/09/24  1129   HGB  --  13.9  --   --  13.5  --    PLT  --  245  --   --  214  --    INR 2.6  --  2.2   < >  --   --    NA  --  139  --   --   --  142   POTASSIUM  --  4.1  --   --   --  3.9   CR  --  0.93  --   --   --  1.03*    < > = values in this interval not displayed.        Diagnostics  Recent Results (from the past 720 hours)   INR (External Result)    Collection Time: 04/01/25 12:00 AM   Result Value Ref Range    INR HOME MONITORING 3.0 2.000 - 3.000   Lipid panel reflex to direct LDL Non-fasting    Collection Time: 04/03/25  8:44 AM   Result Value Ref Range    Cholesterol 153 <200 mg/dL    Triglycerides 75 <150 mg/dL    Direct Measure HDL 71 >=50 mg/dL    LDL Cholesterol Calculated 67 <100 mg/dL    Non HDL Cholesterol 82 <130 mg/dL    Patient Fasting > 8hrs? Yes    TSH with free T4 reflex    Collection Time: 04/03/25  8:44 AM   Result Value Ref Range    TSH 4.15 0.30 - 4.20 uIU/mL   INR " (External Result)    Collection Time: 04/15/25 12:00 AM   Result Value Ref Range    INR HOME MONITORING 2.2 2.000 - 3.000   Comprehensive metabolic panel (BMP + Alb, Alk Phos, ALT, AST, Total. Bili, TP)    Collection Time: 04/15/25 11:38 AM   Result Value Ref Range    Sodium 139 135 - 145 mmol/L    Potassium 4.1 3.4 - 5.3 mmol/L    Carbon Dioxide (CO2) 25 22 - 29 mmol/L    Anion Gap 12 7 - 15 mmol/L    Urea Nitrogen 13.8 8.0 - 23.0 mg/dL    Creatinine 0.93 0.51 - 0.95 mg/dL    GFR Estimate 67 >60 mL/min/1.73m2    Calcium 10.3 8.8 - 10.4 mg/dL    Chloride 102 98 - 107 mmol/L    Glucose 80 70 - 99 mg/dL    Alkaline Phosphatase 67 40 - 150 U/L    AST 26 0 - 45 U/L    ALT 28 0 - 50 U/L    Protein Total 7.3 6.4 - 8.3 g/dL    Albumin 4.5 3.5 - 5.2 g/dL    Bilirubin Total 0.5 <=1.2 mg/dL   CBC with platelets    Collection Time: 04/15/25 11:38 AM   Result Value Ref Range    WBC Count 8.1 4.0 - 11.0 10e3/uL    RBC Count 4.76 3.80 - 5.20 10e6/uL    Hemoglobin 13.9 11.7 - 15.7 g/dL    Hematocrit 41.3 35.0 - 47.0 %    MCV 87 78 - 100 fL    MCH 29.2 26.5 - 33.0 pg    MCHC 33.7 31.5 - 36.5 g/dL    RDW 13.0 10.0 - 15.0 %    Platelet Count 245 150 - 450 10e3/uL   INR (External Result)    Collection Time: 04/24/25 12:00 AM   Result Value Ref Range    INR HOME MONITORING 2.6 2.000 - 3.000    Cardiac workup tomorrow.    Revised Cardiac Risk Index (RCRI)  The patient has the following serious cardiovascular risks for perioperative complications:   - No serious cardiac risks = 0 points     RCRI Interpretation: 0 points: Class I (very low risk - 0.4% complication rate)  Pending cardiac workup and clearance tomorrow.  Signed Electronically by: CUCO ALEMAN MD  A copy of this evaluation report is provided to the requesting physician.

## 2025-04-24 NOTE — H&P (VIEW-ONLY)
Preoperative Evaluation  [facility]  [address] [place] [address]  Phone: [phone]  Fax: [phone]  Primary Provider: CUCO ALEMAN MD  Pre-op Performing Provider: CUCO ALEMAN MD  Apr 24, 2025 4/19/2025   Surgical Information   What procedure is being done? going to have my gall bladder taking out   Facility or Hospital where procedure/surgery will be performed: [facility]   Who is doing the procedure / surgery? Dr Nicho Lira   Date of surgery / procedure: April 30   Time of surgery / procedure: do not know yet   Where do you plan to recover after surgery? at home with Home Care     Fax number for surgical facility: Note does not need to be faxed, will be available electronically in "[employer].   [facility].    Assessment & Plan   The proposed surgical procedure is considered INTERMEDIATE risk.    Preop general physical exam    Gallbladder sludge    RUQ abdominal pain    Nausea  Should resolve with surgery     Implanted Device   - Type of device: Eluna, dual chamber pacemaker Patient advised to bring device information on day of surgery.   - For full details on implanted device, refer to device management note in Epic from date: 10/21/2016     - No identified additional risk factors other than previously addressed    Preoperative Medication Instructions  Antiplatelet or Anticoagulation Medication Instructions   - warfarin: Thromboembolic risk is moderate (4-10%/year). DO NOT TAKE warfarin 5 days. Thromboembolic risk is moderate (4-10%/year). DO NOT TAKE warfarin 5 days.   - Bridging therapy ordered     Additional Medication Instructions  Take all scheduled medications on the day of surgery EXCEPT for modifications listed below:   - Herbal medications and vitamins: DO NOT TAKE 14 days prior to surgery.    Recommendation  Cardiac workup tomorrow    Subjective   Bisi is a 69 year old, presenting for the following:  Pre-Op Exam  (Cholecystectomy - on 4/30 at [facility] - with Dr. Lira.)        4/24/2025     1:12 PM   Additional Questions   Roomed by roel ESQUIVEL   Accompanied by alone     HPI: Has sludge in the gallbladder and has constant nausea. Some dry heaves.  Having a stress test and echocardiogram tomorrow. The cardiologist said she didn't need Lovenox bridging. So she stops her warfarin as of tomorrow.          4/19/2025   Pre-Op Questionnaire   Have you ever had a heart attack or stroke? No   Have you ever had surgery on your heart or blood vessels, such as a stent placement, a coronary artery bypass, or surgery on an artery in your head, neck, heart, or legs? (!) YES Aortic valve replaced in 1995.   Do you have chest pain with activity? No   Do you have a history of heart failure? No   Do you currently have a cold, bronchitis or symptoms of other infection? No   Do you have a cough, shortness of breath, or wheezing? No   Do you or anyone in your family have previous history of blood clots? No   Do you or does anyone in your family have a serious bleeding problem such as prolonged bleeding following surgeries or cuts? No   Have you ever had problems with anemia or been told to take iron pills? (!) YES November to December 2023 after ribs were fractured and pneumothorax.   Have you had any abnormal blood loss such as black, tarry or bloody stools, or abnormal vaginal bleeding? No   Have you ever had a blood transfusion? (!) YES with the pneumothorax. Had a perforated esophagus.   Have you ever had a transfusion reaction? No   Are you willing to have a blood transfusion if it is medically needed before, during, or after your surgery? Yes   Have you or any of your relatives ever had problems with anesthesia? No   Do you have sleep apnea, excessive snoring or daytime drowsiness? No   Do you have any artifical heart valves or other implanted medical devices like a pacemaker, defibrillator, or continuous glucose monitor? (!) YES  Aortic  valve and pacemaker   What type of device do you have? valve replacement  and a pacemaker   Name of the clinic that manages your device Health check  ( [employer])   Do you have artificial joints? No   Are you allergic to latex? No     Advance Care Planning    Discussed advance care planning with patient; informed AVS has link to [employer].    Preoperative Review of    reviewed - Had hydrocodone in October 2024    Patient Active Problem List    Diagnosis Date Noted    Rheumatic mitral regurgitation 04/23/2025     Priority: Medium    Precordial pain 04/23/2025     Priority: Medium    Moderate recurrent major depression (H) 04/03/2025     Priority: Medium    Adhesive capsulitis of right shoulder 04/03/2025     Priority: Medium    Hypercholesterolemia 04/03/2025     Priority: Medium    Long term current use of anticoagulant therapy 02/04/2025     Priority: Medium    Malignant melanoma of upper back (H) 09/18/2024     Priority: Medium    Cervico-occipital neuralgia of left side 04/10/2024     Priority: Medium    Cervical radicular pain 04/10/2024     Priority: Medium    History of pacemaker 08/28/2023     Priority: Medium     Formatting of this note might be different from the original. [facility] DX: SSS 10/7/16 [id number] Raheemuna 8 KEENAN SN#[id number] 10/7/16 RARiver OLIVERA [id number] 10/7/16 RVRiver OLIVERA SN#[id number] 10/7/16 8/6/23 Intrinsic Sinus 76 bpm- Non-dependent Pacemaker Above Umbilicus/> 5 seconds cautery. No Magnet needed.  (If magnet was used would be DOO 90 bpm)           Pacemaker pocket = left upper chest      History of colonic polyps 08/13/2023     Priority: Medium    Age-related osteoporosis without current pathological fracture 02/08/2023     Priority: Medium    Medication management 02/08/2023     Priority: Medium    Recurrent major depressive disorder, in full remission 02/08/2023     Priority: Medium    Chronic anticoagulation      Priority: Medium    Chest pain,  unspecified type 01/12/2022     Priority: Medium    Acquired hypothyroidism 07/21/2021     Priority: Medium    Congenital nystagmus 07/21/2021     Priority: Medium     Formatting of this note might be different from the original.  Created by Conversion      Right upper quadrant pain 07/21/2021     Priority: Medium    H/O mechanical aortic valve replacement 07/11/2021     Priority: Medium    Cardiac pacemaker in situ 10/21/2016     Priority: Medium     Formatting of this note might be different from the original.  Biotronik Eluna, dual chamber pacemaker, RA Lead: Biotronik [id number] Solia S 45, RV Lead: Biotronik [id number] Solia S 53  DOI: 10/7/2016 by Dr. Dustin Lott.      CARDIOVASCULAR SCREENING; LDL GOAL LESS THAN 130 10/31/2010     Priority: Medium      Past Medical History:   Diagnosis Date    Acute blood loss anemia 08/29/2023    Anxiety     Benign neoplasm of transverse colon 03/15/2024    Chronic anticoagulation     Closed fracture of multiple ribs of left side 08/28/2023    Depression     Disease of thyroid gland     Hypothyroidism    Hematemesis 10/17/2023    High cholesterol     Hyperlipidemia     Hypertension     Hypothyroidism     Melena 10/17/2023    SSS (sick sinus syndrome) (H) 02/04/2020    Traumatic hemothorax 08/28/2023     Past Surgical History:   Procedure Laterality Date    AORTIC VALVE REPLACEMENT      APPENDECTOMY      BIOPSY BREAST Left 2015    BREAST CYST EXCISION      CARDIAC CATHETERIZATION      HC PART REMV BONE METATARSAL HEAD,EA Right 06/23/2017    Procedure: EXOSTECTOMY 2ND METATARSAL RIGHT FOOT;  Surgeon: Jessee Mccauley DPM;  Location: [facility];  Service: Podiatry    HYSTERECTOMY  2000    IMPLANT PACEMAKER      IMPLANT PACEMAKER      MIDLINE SINGLE LUMEN PLACEMENT  10/13/2023    OOPHORECTOMY  2000    OTHER SURGICAL HISTORY      Hemmorhoidectomy    RELEASE CARPAL TUNNEL Bilateral     TOE SURGERY      TYMPANOSTOMY TUBE PLACEMENT      ZZC REPLACE AORT VALV,PROSTH  "LUCI      Description: Aortic Valve Replacement;  Proc Date: 01/01/1995;     Current Outpatient Medications   Medication Sig Dispense Refill    alendronate (FOSAMAX) 70 MG tablet TAKE 1 TABLET BY MOUTH WEEKLY  WITH 8 OZ OF PLAIN WATER 30  MINUTES BEFORE FIRST FOOD, DRINK OR MEDS. STAY UPRIGHT FOR 30  MINS 12 tablet 3    atorvastatin (LIPITOR) 40 MG tablet Take 1 tablet (40 mg) by mouth daily. 100 tablet 3    buPROPion (WELLBUTRIN XL) 300 MG 24 hr tablet Take 1 tablet (300 mg) by mouth every morning. 100 tablet 3    calcium carbonate 500 mg, elemental, (OSCAL 500) 1250 (500 Ca) MG TABS tablet Take by mouth daily. One-half of a tab by mouth every am      furosemide (LASIX) 40 MG tablet Take 1 tablet (40 mg) by mouth daily as needed 90 tablet 1    levothyroxine (SYNTHROID/LEVOTHROID) 88 MCG tablet Take 1 tablet (88 mcg) by mouth daily. 90 tablet 3    vitamin D3 (CHOLECALCIFEROL) 50 mcg (2000 units) tablet Take 1 tablet by mouth daily      warfarin ANTICOAGULANT (COUMADIN) 2.5 MG tablet TAKE 2 TO 3 TABLETS (5 MG - 7.5  MG) BY MOUTH DAILY OR AS  DIRECTED BY YOUR ACC TEAM BASED  ON INR RESULTS 200 tablet 1       Allergies   Allergen Reactions    Demerol [Meperidine]      Hallucinations      Misc. Sulfonamide Containing Compounds Hives    Morphine     Sulfa Antibiotics         Social History     Tobacco Use    Smoking status: Never     Passive exposure: Never    Smokeless tobacco: Never   Substance Use Topics    Alcohol use: Yes     Comment: Alcoholic Drinks/day: twice per year     History   Drug Use No       Review of Systems  Constitutional, HEENT, cardiovascular, pulmonary, GI, , musculoskeletal, neuro, skin, endocrine and psych systems are negative, except as otherwise noted.    Objective    /64 (BP Location: Left arm, Patient Position: Sitting, Cuff Size: Adult Regular)   Pulse 66   Temp 97  F (36.1  C) (Temporal)   Resp 12   Ht 1.625 m (5' 3.98\")   Wt 59.9 kg (132 lb)   LMP 12/13/2000 (Approximate)   " "SpO2 99%   BMI 22.67 kg/m     Estimated body mass index is 22.67 kg/m  as calculated from the following:    Height as of this encounter: 1.625 m (5' 3.98\").    Weight as of this encounter: 59.9 kg (132 lb).  Physical Exam  GENERAL: alert and no distress  EYES: Eyes grossly normal to inspection, PERRL and conjunctivae and sclerae normal  HENT: ear canals and TM's normal, nose and mouth without ulcers or lesions  NECK: no adenopathy, no asymmetry, masses, or scars  RESP: lungs clear to auscultation - no rales, rhonchi or wheezes  CV: regular rate and rhythm, normal S1 S2, no S3 or S4, no murmur, click or rub, no peripheral edema  ABDOMEN: soft, nontender, no hepatosplenomegaly, no masses and bowel sounds normal  MS: no gross musculoskeletal defects noted, no edema  SKIN: no suspicious lesions or rashes  NEURO: Normal strength and tone, mentation intact and speech normal  PSYCH: mentation appears normal, affect normal/bright    Recent Labs   Lab Test 04/24/25  0000 04/15/25  1138 04/15/25  0000 10/15/24  0000 10/09/24  1139 10/09/24  1129   HGB  --  13.9  --   --  13.5  --    PLT  --  245  --   --  214  --    INR 2.6  --  2.2   < >  --   --    NA  --  139  --   --   --  142   POTASSIUM  --  4.1  --   --   --  3.9   CR  --  0.93  --   --   --  1.03*    < > = values in this interval not displayed.        Diagnostics  Recent Results (from the past 720 hours)   INR (External Result)    Collection Time: 04/01/25 12:00 AM   Result Value Ref Range    INR HOME MONITORING 3.0 2.000 - 3.000   Lipid panel reflex to direct LDL Non-fasting    Collection Time: 04/03/25  8:44 AM   Result Value Ref Range    Cholesterol 153 <200 mg/dL    Triglycerides 75 <150 mg/dL    Direct Measure HDL 71 >=50 mg/dL    LDL Cholesterol Calculated 67 <100 mg/dL    Non HDL Cholesterol 82 <130 mg/dL    Patient Fasting > 8hrs? Yes    TSH with free T4 reflex    Collection Time: 04/03/25  8:44 AM   Result Value Ref Range    TSH 4.15 0.30 - 4.20 uIU/mL   INR " (External Result)    Collection Time: 04/15/25 12:00 AM   Result Value Ref Range    INR HOME MONITORING 2.2 2.000 - 3.000   Comprehensive metabolic panel (BMP + Alb, Alk Phos, ALT, AST, Total. Bili, TP)    Collection Time: 04/15/25 11:38 AM   Result Value Ref Range    Sodium 139 135 - 145 mmol/L    Potassium 4.1 3.4 - 5.3 mmol/L    Carbon Dioxide (CO2) 25 22 - 29 mmol/L    Anion Gap 12 7 - 15 mmol/L    Urea Nitrogen 13.8 8.0 - 23.0 mg/dL    Creatinine 0.93 0.51 - 0.95 mg/dL    GFR Estimate 67 >60 mL/min/1.73m2    Calcium 10.3 8.8 - 10.4 mg/dL    Chloride 102 98 - 107 mmol/L    Glucose 80 70 - 99 mg/dL    Alkaline Phosphatase 67 40 - 150 U/L    AST 26 0 - 45 U/L    ALT 28 0 - 50 U/L    Protein Total 7.3 6.4 - 8.3 g/dL    Albumin 4.5 3.5 - 5.2 g/dL    Bilirubin Total 0.5 <=1.2 mg/dL   CBC with platelets    Collection Time: 04/15/25 11:38 AM   Result Value Ref Range    WBC Count 8.1 4.0 - 11.0 10e3/uL    RBC Count 4.76 3.80 - 5.20 10e6/uL    Hemoglobin 13.9 11.7 - 15.7 g/dL    Hematocrit 41.3 35.0 - 47.0 %    MCV 87 78 - 100 fL    MCH 29.2 26.5 - 33.0 pg    MCHC 33.7 31.5 - 36.5 g/dL    RDW 13.0 10.0 - 15.0 %    Platelet Count 245 150 - 450 10e3/uL   INR (External Result)    Collection Time: 04/24/25 12:00 AM   Result Value Ref Range    INR HOME MONITORING 2.6 2.000 - 3.000    Cardiac workup tomorrow.    Revised Cardiac Risk Index (RCRI)  The patient has the following serious cardiovascular risks for perioperative complications:   - No serious cardiac risks = 0 points     RCRI Interpretation: 0 points: Class I (very low risk - 0.4% complication rate)  Pending cardiac workup and clearance tomorrow.  Signed Electronically by: CUCO ALEMAN MD  A copy of this evaluation report is provided to the requesting physician.

## 2025-04-24 NOTE — PATIENT INSTRUCTIONS
How to Take Your Medication Before Surgery  Preoperative Medication Instructions   Antiplatelet or Anticoagulation Medication Instructions   - warfarin: Thromboembolic risk is moderate (4-10%/year). DO NOT TAKE warfarin 5 days. Thromboembolic risk is moderate (4-10%/year). DO NOT TAKE warfarin 5 days.   - Bridging therapy ordered     Additional Medication Instructions  Take all scheduled medications on the day of surgery EXCEPT for modifications listed below:   - Herbal medications and vitamins: DO NOT TAKE 14 days prior to surgery.   Her alendronate is taken on Saturday.    Patient Education   Preparing for Your Surgery  For Adults  Getting started  In most cases, a nurse will call to review your health history and instructions. They will give you an arrival time based on your scheduled surgery time. Please be ready to share:  Your doctor's clinic name and phone number  Your medical, surgical, and anesthesia history  A list of allergies and sensitivities  A list of medicines, including herbal treatments and over-the-counter drugs  Whether the patient has a legal guardian (ask how to send us the papers in advance)  Note: You may not receive a call if you were seen at our PAC (Preoperative Assessment Center).  Please tell us if you're pregnant--or if there's any chance you might be pregnant. Some surgeries may injure a fetus (unborn baby), so they require a pregnancy test. Surgeries that are safe for a fetus don't always need a test, and you can choose whether to have one.   Preparing for surgery  Within 10 to 30 days of surgery: Have a pre-op exam (sometimes called an H&P, or History and Physical). This can be done at a clinic or pre-operative center.  If you're having a , you may not need this exam. Talk to your care team.  At your pre-op exam, talk to your care team about all medicines you take. (This includes CBD oil and any drugs, such as THC, marijuana, and other forms of cannabis.) If you need to stop  any medicine before surgery, ask when to start taking it again.  This is for your safety. Many medicines and drugs can make you bleed too much during surgery. Some change how well surgery (anesthesia) drugs work.  Call your insurance company to let them know you're having surgery. (If you don't have insurance, call [phone].)  Call your clinic if there's any change in your health. This includes a scrape or scratch near the surgery site, or any signs of a cold (sore throat, runny nose, cough, rash, fever).  Eating and drinking guidelines  For your safety: Unless your surgeon tells you otherwise, follow the guidelines below.  Eat and drink as normal until 8 hours before you arrive for surgery. After that, no food or milk. You can spit out gum when you arrive.  Drink clear liquids until 2 hours before you arrive. These are liquids you can see through, like water, Gatorade, and Propel Water. They also include plain black coffee and tea (no cream or milk).  No alcohol for 24 hours before you arrive. The night before surgery, stop any drinks that contain THC.  If your care team tells you to take medicine on the morning of surgery, it's okay to take it with a sip of water. No other medicines or drugs are allowed (including CBD oil)--follow your care team's instructions.  If you have questions the day of surgery, call your hospital or surgery center.   Preventing infection  Shower or bathe the night before and the morning of surgery. Follow the instructions your clinic gave you. (If no instructions, use regular soap.)  Don't shave or clip hair near your surgery site. We'll remove the hair if needed.  Don't smoke or vape the morning of surgery. No chewing tobacco for 6 hours before you arrive. A nicotine patch is okay. You may spit out nicotine gum when you arrive.  For some surgeries, the surgeon will tell you to fully quit smoking and nicotine.  We will make every effort to keep you safe from infection. We will:  Clean  our hands often with soap and water (or an alcohol-based hand rub).  Clean the skin at your surgery site with a special soap that kills germs.  Give you a special gown to keep you warm. (Cold raises the risk of infection.)  Wear hair covers, masks, gowns, and gloves during surgery.  Give antibiotic medicine, if prescribed. Not all surgeries need this medicine.  What to bring on the day of surgery  Photo ID and insurance card  Copy of your health care directive, if you have one  Glasses and hearing aids (bring cases)  You can't wear contacts during surgery  Inhaler and eye drops, if you use them (tell us about these when you arrive)  CPAP machine or breathing device, if you use them  A few personal items, if spending the night  If you have . . .  A pacemaker, ICD (cardiac defibrillator), or other implant: Bring the ID card.  An implanted stimulator: Bring the remote control.  A legal guardian: Bring a copy of the certified (court-stamped) guardianship papers.  Please remove any jewelry, including body piercings. Leave jewelry and other valuables at home.  If you're going home the day of surgery  You must have a responsible adult drive you home. They should stay with you overnight as well.  If you don't have someone to stay with you, and you aren't safe to go home alone, we may keep you overnight. Insurance often won't pay for this.  After surgery  If it's hard to control your pain or you need more pain medicine, please call your surgeon's office.  Questions?   If you have any questions for your care team, list them here:   ____________________________________________________________________________________________________________________________________________________________________________________________________________________________________________________________  For informational purposes only. Not to replace the advice of your health care provider. Copyright   2003, 2019 [facility]. All  rights reserved. Clinically reviewed by Ata Rm MD. [employer] [id number] - REV 08/24.

## 2025-04-24 NOTE — PROGRESS NOTES
ANTICOAGULATION MANAGEMENT     Tammy Law 69 year old female is on warfarin with therapeutic INR result. (Goal INR 2.0-3.0)    Recent labs: (last 7 days)     25  0000   INR 2.6       ASSESSMENT     Source(s): Chart Review and Patient/Caregiver Call     Warfarin doses taken: Warfarin taken as instructed  Diet: No new diet changes identified  Medication/supplement changes: None noted  New illness, injury, or hospitalization: No  Signs or symptoms of bleeding or clotting: No  Previous result: Therapeutic last 2(+) visits  Additional findings: Upcoming surgery/procedure 25    Pre-Procedure:  Hold warfarin for 5 days, until after procedure startin2025   No bridge     Post-Procedure:  Resume warfarin dose if okay with provider doing procedure on night of procedure, 2025 PM: 10mg  Recheck INR ~5 days after resuming warfarin      PLAN     Recommended plan for temporary change(s) affecting INR     Dosing Instructions:  Start Coumadin Hold tomorrow; then boost dose at night of procedure, then resume normal dosing there after  with next INR in 11 days       Summary  As of 2025      Full warfarin instructions:  : Hold; : Hold; : Hold; : Hold; : Hold; : 10 mg; Otherwise 7.5 mg every Tue; 5 mg all other days   Next INR check:  2025               Telephone call with Bisi who verbalizes understanding and agrees to plan and who agrees to plan and repeated back plan correctly    Patient to recheck with home meter    Education provided: Please call back if any changes to your diet, medications or how you've been taking warfarin    Plan made per [facility] anticoagulation protocol    Petrona Phan, RN  2025  Anticoagulation Clinic  ICONIC for routing messages: phillip ANTICOLU HOME MONITORING  ACC patient phone line: [phone]        _______________________________________________________________________     Anticoagulation Episode Summary       Current INR goal:   2.0-3.0   TTR:  91.6% (1 y)   Target end date:  Indefinite   Send INR reminders to:  ANTICOAG HOME MONITORING    Indications    H/O mechanical aortic valve replacement [Z95.2]  Long term current use of anticoagulant therapy [Z79.01]             Comments:  [employer] home monitor- managed by exception  Goal range changed during 8/28-9/15/23  hospitalization             Anticoagulation Care Providers       Provider Role Specialty Phone number    Arnold Anderson MD Referring Internal Medicine [phone]    Christina Hernandez MD Referring Family Medicine [phone]    Karina Cifuentes MD Referring Family Medicine [phone]

## 2025-04-25 ENCOUNTER — HOSPITAL ENCOUNTER (OUTPATIENT)
Dept: CARDIOLOGY | Facility: HOSPITAL | Age: 70
Discharge: HOME | End: 2025-04-25
Attending: INTERNAL MEDICINE
Payer: COMMERCIAL

## 2025-04-25 ENCOUNTER — HOSPITAL ENCOUNTER (OUTPATIENT)
Dept: NUCLEAR MEDICINE | Facility: HOSPITAL | Age: 70
Discharge: HOME | End: 2025-04-25
Attending: INTERNAL MEDICINE
Payer: COMMERCIAL

## 2025-04-25 DIAGNOSIS — R07.2 PRECORDIAL PAIN: ICD-10-CM

## 2025-04-25 DIAGNOSIS — I05.1 RHEUMATIC MITRAL REGURGITATION: ICD-10-CM

## 2025-04-25 LAB
CV STRESS CURRENT BP HE: NORMAL
CV STRESS CURRENT HR HE: 68
CV STRESS CURRENT HR HE: 70
CV STRESS CURRENT HR HE: 72
CV STRESS CURRENT HR HE: 73
CV STRESS CURRENT HR HE: 75
CV STRESS CURRENT HR HE: 75
CV STRESS CURRENT HR HE: 77
CV STRESS CURRENT HR HE: 77
CV STRESS CURRENT HR HE: 78
CV STRESS CURRENT HR HE: 79
CV STRESS CURRENT HR HE: 80
CV STRESS CURRENT HR HE: 83
CV STRESS CURRENT HR HE: 84
CV STRESS DEVIATION TIME HE: NORMAL
CV STRESS ECHO PERCENT HR HE: NORMAL
CV STRESS EXERCISE STAGE HE: NORMAL
CV STRESS FINAL RESTING BP HE: NORMAL
CV STRESS FINAL RESTING HR HE: 75
CV STRESS MAX HR HE: 84
CV STRESS MAX TREADMILL GRADE HE: 0
CV STRESS MAX TREADMILL SPEED HE: 0
CV STRESS PEAK DIA BP HE: NORMAL
CV STRESS PEAK SYS BP HE: NORMAL
CV STRESS PHASE HE: NORMAL
CV STRESS PROTOCOL HE: NORMAL
CV STRESS RESTING PT POSITION HE: NORMAL
CV STRESS ST DEVIATION AMOUNT HE: NORMAL
CV STRESS ST DEVIATION ELEVATION HE: NORMAL
CV STRESS ST EVELATION AMOUNT HE: NORMAL
CV STRESS TEST TYPE HE: NORMAL
CV STRESS TOTAL STAGE TIME MIN 1 HE: NORMAL
LVEF ECHO: NORMAL
NUC STRESS EJECTION FRACTION: 76 %
RATE PRESSURE PRODUCT: NORMAL
STRESS ECHO BASELINE DIASTOLIC HE: 63
STRESS ECHO BASELINE HR: 60
STRESS ECHO BASELINE SYSTOLIC BP: 133
STRESS ECHO CALCULATED PERCENT HR: 55 %
STRESS ECHO LAST STRESS DIASTOLIC BP: 68
STRESS ECHO LAST STRESS HR: 79
STRESS ECHO LAST STRESS SYSTOLIC BP: 141
STRESS ECHO TARGET HR: 152

## 2025-04-25 PROCEDURE — 78452 HT MUSCLE IMAGE SPECT MULT: CPT

## 2025-04-25 PROCEDURE — 343N000001 HC RX 343 MED OP 636: Performed by: INTERNAL MEDICINE

## 2025-04-25 PROCEDURE — A9500 TC99M SESTAMIBI: HCPCS | Performed by: INTERNAL MEDICINE

## 2025-04-25 PROCEDURE — 93016 CV STRESS TEST SUPVJ ONLY: CPT | Performed by: STUDENT IN AN ORGANIZED HEALTH CARE EDUCATION/TRAINING PROGRAM

## 2025-04-25 PROCEDURE — 93017 CV STRESS TEST TRACING ONLY: CPT

## 2025-04-25 PROCEDURE — 78452 HT MUSCLE IMAGE SPECT MULT: CPT | Mod: 26 | Performed by: INTERNAL MEDICINE

## 2025-04-25 PROCEDURE — 250N000011 HC RX IP 250 OP 636: Mod: JZ | Performed by: INTERNAL MEDICINE

## 2025-04-25 PROCEDURE — 93018 CV STRESS TEST I&R ONLY: CPT | Performed by: INTERNAL MEDICINE

## 2025-04-25 PROCEDURE — 93306 TTE W/DOPPLER COMPLETE: CPT | Mod: 26 | Performed by: INTERNAL MEDICINE

## 2025-04-25 PROCEDURE — 93306 TTE W/DOPPLER COMPLETE: CPT

## 2025-04-25 RX ORDER — REGADENOSON 0.08 MG/ML
0.4 INJECTION, SOLUTION INTRAVENOUS ONCE
Status: COMPLETED | OUTPATIENT
Start: 2025-04-25 | End: 2025-04-25

## 2025-04-25 RX ORDER — ALBUTEROL SULFATE 0.83 MG/ML
2.5 SOLUTION RESPIRATORY (INHALATION)
Status: DISCONTINUED | OUTPATIENT
Start: 2025-04-25 | End: 2025-04-25 | Stop reason: HOSPADM

## 2025-04-25 RX ORDER — CAFFEINE CITRATE 20 MG/ML
60 SOLUTION INTRAVENOUS
Status: DISCONTINUED | OUTPATIENT
Start: 2025-04-25 | End: 2025-04-25 | Stop reason: HOSPADM

## 2025-04-25 RX ORDER — AMINOPHYLLINE 25 MG/ML
50-100 INJECTION, SOLUTION INTRAVENOUS
Status: COMPLETED | OUTPATIENT
Start: 2025-04-25 | End: 2025-04-25

## 2025-04-25 RX ORDER — CAFFEINE 200 MG
200 TABLET ORAL
Status: DISCONTINUED | OUTPATIENT
Start: 2025-04-25 | End: 2025-04-25 | Stop reason: HOSPADM

## 2025-04-25 RX ADMIN — Medication 30.9 MILLICURIE: at 12:29

## 2025-04-25 RX ADMIN — Medication 9 MILLICURIE: at 09:56

## 2025-04-25 RX ADMIN — REGADENOSON 0.4 MG: 0.08 INJECTION, SOLUTION INTRAVENOUS at 11:29

## 2025-04-25 RX ADMIN — AMINOPHYLLINE 50 MG: 25 INJECTION, SOLUTION INTRAVENOUS at 11:36

## 2025-04-28 ENCOUNTER — ANESTHESIA EVENT (OUTPATIENT)
Dept: SURGERY | Facility: HOSPITAL | Age: 70
End: 2025-04-28
Payer: COMMERCIAL

## 2025-04-28 NOTE — OR NURSING
Biotronick device: Dr. Behrens checked form for requiring [employer] representative. Made contact [employer] representative for date and time of procedure.

## 2025-04-30 ENCOUNTER — HOSPITAL ENCOUNTER (OUTPATIENT)
Facility: HOSPITAL | Age: 70
Discharge: HOME | End: 2025-05-01
Attending: SURGERY | Admitting: SURGERY
Payer: COMMERCIAL

## 2025-04-30 ENCOUNTER — ANESTHESIA (OUTPATIENT)
Dept: SURGERY | Facility: HOSPITAL | Age: 70
End: 2025-04-30
Payer: COMMERCIAL

## 2025-04-30 DIAGNOSIS — K81.1 CHRONIC CHOLECYSTITIS: Primary | ICD-10-CM

## 2025-04-30 DIAGNOSIS — Z95.2 H/O MECHANICAL AORTIC VALVE REPLACEMENT: ICD-10-CM

## 2025-04-30 LAB
INR PPP: 1.07 (ref 0.85–1.15)
PROTHROMBIN TIME: 14.1 SECONDS (ref 11.8–14.8)

## 2025-04-30 PROCEDURE — 258N000003 HC RX IP 258 OP 636: Performed by: GENERAL ACUTE CARE HOSPITAL

## 2025-04-30 PROCEDURE — 250N000009 HC RX 250: Performed by: SURGERY

## 2025-04-30 PROCEDURE — 258N000003 HC RX IP 258 OP 636: Performed by: SURGERY

## 2025-04-30 PROCEDURE — 250N000011 HC RX IP 250 OP 636: Performed by: HOSPITALIST

## 2025-04-30 PROCEDURE — 250N000013 HC RX MED GY IP 250 OP 250 PS 637: Performed by: GENERAL ACUTE CARE HOSPITAL

## 2025-04-30 PROCEDURE — 272N000001 HC OR GENERAL SUPPLY STERILE: Performed by: SURGERY

## 2025-04-30 PROCEDURE — 999N000141 HC STATISTIC PRE-PROCEDURE NURSING ASSESSMENT: Performed by: SURGERY

## 2025-04-30 PROCEDURE — 250N000011 HC RX IP 250 OP 636: Mod: JZ | Performed by: GENERAL ACUTE CARE HOSPITAL

## 2025-04-30 PROCEDURE — 258N000003 HC RX IP 258 OP 636: Performed by: NURSE ANESTHETIST, CERTIFIED REGISTERED

## 2025-04-30 PROCEDURE — 250N000011 HC RX IP 250 OP 636: Performed by: NURSE ANESTHETIST, CERTIFIED REGISTERED

## 2025-04-30 PROCEDURE — 250N000009 HC RX 250: Performed by: NURSE ANESTHETIST, CERTIFIED REGISTERED

## 2025-04-30 PROCEDURE — 250N000011 HC RX IP 250 OP 636: Performed by: SURGERY

## 2025-04-30 PROCEDURE — 250N000013 HC RX MED GY IP 250 OP 250 PS 637

## 2025-04-30 PROCEDURE — 250N000013 HC RX MED GY IP 250 OP 250 PS 637: Performed by: SURGERY

## 2025-04-30 PROCEDURE — 360N000077 HC SURGERY LEVEL 4, PER MIN: Performed by: SURGERY

## 2025-04-30 PROCEDURE — 85610 PROTHROMBIN TIME: CPT | Performed by: SURGERY

## 2025-04-30 PROCEDURE — 250N000011 HC RX IP 250 OP 636: Mod: JZ | Performed by: SURGERY

## 2025-04-30 PROCEDURE — 710N000009 HC RECOVERY PHASE 1, LEVEL 1, PER MIN: Performed by: SURGERY

## 2025-04-30 PROCEDURE — 88304 TISSUE EXAM BY PATHOLOGIST: CPT | Mod: TC | Performed by: SURGERY

## 2025-04-30 PROCEDURE — 370N000017 HC ANESTHESIA TECHNICAL FEE, PER MIN: Performed by: SURGERY

## 2025-04-30 PROCEDURE — 47562 LAPAROSCOPIC CHOLECYSTECTOMY: CPT | Performed by: SURGERY

## 2025-04-30 PROCEDURE — 99204 OFFICE O/P NEW MOD 45 MIN: CPT | Mod: FS | Performed by: HOSPITALIST

## 2025-04-30 PROCEDURE — 710N000012 HC RECOVERY PHASE 2, PER MINUTE: Performed by: SURGERY

## 2025-04-30 PROCEDURE — 99207 PR APP CREDIT; MD BILLING SHARED VISIT: CPT | Mod: FS

## 2025-04-30 PROCEDURE — 250N000025 HC SEVOFLURANE, PER MIN: Performed by: SURGERY

## 2025-04-30 PROCEDURE — S2900 ROBOTIC SURGICAL SYSTEM: HCPCS | Performed by: SURGERY

## 2025-04-30 PROCEDURE — 36415 COLL VENOUS BLD VENIPUNCTURE: CPT | Performed by: SURGERY

## 2025-04-30 RX ORDER — ONDANSETRON 4 MG/1
4 TABLET, ORALLY DISINTEGRATING ORAL EVERY 6 HOURS PRN
Status: DISCONTINUED | OUTPATIENT
Start: 2025-04-30 | End: 2025-05-01 | Stop reason: HOSPADM

## 2025-04-30 RX ORDER — POLYETHYLENE GLYCOL 3350 17 G/17G
17 POWDER, FOR SOLUTION ORAL DAILY
Status: DISCONTINUED | OUTPATIENT
Start: 2025-05-01 | End: 2025-05-01 | Stop reason: HOSPADM

## 2025-04-30 RX ORDER — ONDANSETRON 2 MG/ML
4 INJECTION INTRAMUSCULAR; INTRAVENOUS EVERY 30 MIN PRN
Status: DISCONTINUED | OUTPATIENT
Start: 2025-04-30 | End: 2025-04-30

## 2025-04-30 RX ORDER — OXYCODONE HYDROCHLORIDE 5 MG/1
5 TABLET ORAL
Status: COMPLETED | OUTPATIENT
Start: 2025-04-30 | End: 2025-04-30

## 2025-04-30 RX ORDER — BUPIVACAINE HYDROCHLORIDE AND EPINEPHRINE 2.5; 5 MG/ML; UG/ML
INJECTION, SOLUTION INFILTRATION; PERINEURAL PRN
Status: DISCONTINUED | OUTPATIENT
Start: 2025-04-30 | End: 2025-04-30 | Stop reason: HOSPADM

## 2025-04-30 RX ORDER — PROCHLORPERAZINE MALEATE 5 MG/1
5 TABLET ORAL EVERY 6 HOURS PRN
Status: DISCONTINUED | OUTPATIENT
Start: 2025-04-30 | End: 2025-05-01 | Stop reason: HOSPADM

## 2025-04-30 RX ORDER — FUROSEMIDE 20 MG/1
40 TABLET ORAL DAILY PRN
Status: DISCONTINUED | OUTPATIENT
Start: 2025-04-30 | End: 2025-05-01 | Stop reason: HOSPADM

## 2025-04-30 RX ORDER — DEXAMETHASONE SODIUM PHOSPHATE 10 MG/ML
INJECTION, SOLUTION INTRAMUSCULAR; INTRAVENOUS PRN
Status: DISCONTINUED | OUTPATIENT
Start: 2025-04-30 | End: 2025-04-30

## 2025-04-30 RX ORDER — HEPARIN SODIUM 10000 [USP'U]/100ML
0-5000 INJECTION, SOLUTION INTRAVENOUS CONTINUOUS
Status: DISCONTINUED | OUTPATIENT
Start: 2025-04-30 | End: 2025-05-01

## 2025-04-30 RX ORDER — LIDOCAINE 40 MG/G
CREAM TOPICAL
Status: DISCONTINUED | OUTPATIENT
Start: 2025-04-30 | End: 2025-05-01 | Stop reason: HOSPADM

## 2025-04-30 RX ORDER — LEVOTHYROXINE SODIUM 88 UG/1
88 TABLET ORAL DAILY
Status: DISCONTINUED | OUTPATIENT
Start: 2025-05-01 | End: 2025-05-01 | Stop reason: HOSPADM

## 2025-04-30 RX ORDER — CEFAZOLIN SODIUM/WATER 2 G/20 ML
2 SYRINGE (ML) INTRAVENOUS SEE ADMIN INSTRUCTIONS
Status: DISCONTINUED | OUTPATIENT
Start: 2025-04-30 | End: 2025-04-30 | Stop reason: HOSPADM

## 2025-04-30 RX ORDER — SODIUM CHLORIDE, SODIUM LACTATE, POTASSIUM CHLORIDE, CALCIUM CHLORIDE 600; 310; 30; 20 MG/100ML; MG/100ML; MG/100ML; MG/100ML
INJECTION, SOLUTION INTRAVENOUS CONTINUOUS
Status: DISCONTINUED | OUTPATIENT
Start: 2025-04-30 | End: 2025-04-30 | Stop reason: HOSPADM

## 2025-04-30 RX ORDER — NALOXONE HYDROCHLORIDE 0.4 MG/ML
0.4 INJECTION, SOLUTION INTRAMUSCULAR; INTRAVENOUS; SUBCUTANEOUS
Status: DISCONTINUED | OUTPATIENT
Start: 2025-04-30 | End: 2025-05-01 | Stop reason: HOSPADM

## 2025-04-30 RX ORDER — OXYCODONE HYDROCHLORIDE 5 MG/1
10 TABLET ORAL
Status: DISCONTINUED | OUTPATIENT
Start: 2025-04-30 | End: 2025-04-30

## 2025-04-30 RX ORDER — DEXAMETHASONE SODIUM PHOSPHATE 10 MG/ML
4 INJECTION, SOLUTION INTRAMUSCULAR; INTRAVENOUS
Status: DISCONTINUED | OUTPATIENT
Start: 2025-04-30 | End: 2025-04-30

## 2025-04-30 RX ORDER — HYDROCODONE BITARTRATE AND ACETAMINOPHEN 5; 325 MG/1; MG/1
1-2 TABLET ORAL EVERY 4 HOURS PRN
Qty: 12 TABLET | Refills: 0 | Status: SHIPPED | OUTPATIENT
Start: 2025-04-30

## 2025-04-30 RX ORDER — ACETAMINOPHEN 325 MG/1
975 TABLET ORAL ONCE
Status: COMPLETED | OUTPATIENT
Start: 2025-04-30 | End: 2025-04-30

## 2025-04-30 RX ORDER — KETOROLAC TROMETHAMINE 15 MG/ML
15 INJECTION, SOLUTION INTRAMUSCULAR; INTRAVENOUS ONCE
Status: COMPLETED | OUTPATIENT
Start: 2025-04-30 | End: 2025-04-30

## 2025-04-30 RX ORDER — FENTANYL CITRATE 50 UG/ML
50 INJECTION, SOLUTION INTRAMUSCULAR; INTRAVENOUS EVERY 5 MIN PRN
Status: DISCONTINUED | OUTPATIENT
Start: 2025-04-30 | End: 2025-04-30 | Stop reason: HOSPADM

## 2025-04-30 RX ORDER — HYDROMORPHONE HCL IN WATER/PF 6 MG/30 ML
0.4 PATIENT CONTROLLED ANALGESIA SYRINGE INTRAVENOUS
Status: DISCONTINUED | OUTPATIENT
Start: 2025-04-30 | End: 2025-05-01

## 2025-04-30 RX ORDER — NALOXONE HYDROCHLORIDE 0.4 MG/ML
0.1 INJECTION, SOLUTION INTRAMUSCULAR; INTRAVENOUS; SUBCUTANEOUS
Status: DISCONTINUED | OUTPATIENT
Start: 2025-04-30 | End: 2025-04-30

## 2025-04-30 RX ORDER — NALOXONE HYDROCHLORIDE 0.4 MG/ML
0.1 INJECTION, SOLUTION INTRAMUSCULAR; INTRAVENOUS; SUBCUTANEOUS
Status: DISCONTINUED | OUTPATIENT
Start: 2025-04-30 | End: 2025-04-30 | Stop reason: HOSPADM

## 2025-04-30 RX ORDER — FENTANYL CITRATE 50 UG/ML
25 INJECTION, SOLUTION INTRAMUSCULAR; INTRAVENOUS EVERY 5 MIN PRN
Status: DISCONTINUED | OUTPATIENT
Start: 2025-04-30 | End: 2025-04-30 | Stop reason: HOSPADM

## 2025-04-30 RX ORDER — WARFARIN SODIUM 5 MG/1
10 TABLET ORAL ONCE
Status: COMPLETED | OUTPATIENT
Start: 2025-04-30 | End: 2025-04-30

## 2025-04-30 RX ORDER — LIDOCAINE HYDROCHLORIDE 10 MG/ML
INJECTION, SOLUTION INFILTRATION; PERINEURAL PRN
Status: DISCONTINUED | OUTPATIENT
Start: 2025-04-30 | End: 2025-04-30

## 2025-04-30 RX ORDER — ACETAMINOPHEN 325 MG/1
975 TABLET ORAL EVERY 8 HOURS
Status: DISCONTINUED | OUTPATIENT
Start: 2025-04-30 | End: 2025-05-01 | Stop reason: HOSPADM

## 2025-04-30 RX ORDER — ONDANSETRON 4 MG/1
4 TABLET, ORALLY DISINTEGRATING ORAL EVERY 30 MIN PRN
Status: DISCONTINUED | OUTPATIENT
Start: 2025-04-30 | End: 2025-04-30 | Stop reason: HOSPADM

## 2025-04-30 RX ORDER — IBUPROFEN 200 MG
600 TABLET ORAL
Status: DISCONTINUED | OUTPATIENT
Start: 2025-04-30 | End: 2025-04-30

## 2025-04-30 RX ORDER — OXYCODONE HYDROCHLORIDE 5 MG/1
5 TABLET ORAL EVERY 4 HOURS PRN
Status: DISCONTINUED | OUTPATIENT
Start: 2025-04-30 | End: 2025-05-01 | Stop reason: HOSPADM

## 2025-04-30 RX ORDER — BUPROPION HYDROCHLORIDE 300 MG/1
300 TABLET ORAL EVERY MORNING
Status: DISCONTINUED | OUTPATIENT
Start: 2025-05-01 | End: 2025-05-01 | Stop reason: HOSPADM

## 2025-04-30 RX ORDER — PROPOFOL 10 MG/ML
INJECTION, EMULSION INTRAVENOUS PRN
Status: DISCONTINUED | OUTPATIENT
Start: 2025-04-30 | End: 2025-04-30

## 2025-04-30 RX ORDER — ONDANSETRON 4 MG/1
4 TABLET, ORALLY DISINTEGRATING ORAL EVERY 30 MIN PRN
Status: DISCONTINUED | OUTPATIENT
Start: 2025-04-30 | End: 2025-04-30

## 2025-04-30 RX ORDER — HYDROMORPHONE HCL IN WATER/PF 6 MG/30 ML
0.2 PATIENT CONTROLLED ANALGESIA SYRINGE INTRAVENOUS
Status: DISCONTINUED | OUTPATIENT
Start: 2025-04-30 | End: 2025-05-01

## 2025-04-30 RX ORDER — AMOXICILLIN 250 MG
1 CAPSULE ORAL 2 TIMES DAILY
Status: DISCONTINUED | OUTPATIENT
Start: 2025-04-30 | End: 2025-05-01 | Stop reason: HOSPADM

## 2025-04-30 RX ORDER — DEXAMETHASONE SODIUM PHOSPHATE 4 MG/ML
4 INJECTION, SOLUTION INTRA-ARTICULAR; INTRALESIONAL; INTRAMUSCULAR; INTRAVENOUS; SOFT TISSUE
Status: DISCONTINUED | OUTPATIENT
Start: 2025-04-30 | End: 2025-04-30 | Stop reason: HOSPADM

## 2025-04-30 RX ORDER — BISACODYL 10 MG
10 SUPPOSITORY, RECTAL RECTAL DAILY PRN
Status: DISCONTINUED | OUTPATIENT
Start: 2025-05-03 | End: 2025-05-01 | Stop reason: HOSPADM

## 2025-04-30 RX ORDER — ONDANSETRON 2 MG/ML
4 INJECTION INTRAMUSCULAR; INTRAVENOUS EVERY 6 HOURS PRN
Status: DISCONTINUED | OUTPATIENT
Start: 2025-04-30 | End: 2025-05-01 | Stop reason: HOSPADM

## 2025-04-30 RX ORDER — LIDOCAINE 40 MG/G
CREAM TOPICAL
Status: DISCONTINUED | OUTPATIENT
Start: 2025-04-30 | End: 2025-04-30 | Stop reason: HOSPADM

## 2025-04-30 RX ORDER — ONDANSETRON 2 MG/ML
4 INJECTION INTRAMUSCULAR; INTRAVENOUS EVERY 30 MIN PRN
Status: DISCONTINUED | OUTPATIENT
Start: 2025-04-30 | End: 2025-04-30 | Stop reason: HOSPADM

## 2025-04-30 RX ORDER — HYDROMORPHONE HCL IN WATER/PF 6 MG/30 ML
0.2 PATIENT CONTROLLED ANALGESIA SYRINGE INTRAVENOUS EVERY 5 MIN PRN
Status: DISCONTINUED | OUTPATIENT
Start: 2025-04-30 | End: 2025-04-30 | Stop reason: HOSPADM

## 2025-04-30 RX ORDER — ONDANSETRON 2 MG/ML
INJECTION INTRAMUSCULAR; INTRAVENOUS PRN
Status: DISCONTINUED | OUTPATIENT
Start: 2025-04-30 | End: 2025-04-30

## 2025-04-30 RX ORDER — NALOXONE HYDROCHLORIDE 0.4 MG/ML
0.2 INJECTION, SOLUTION INTRAMUSCULAR; INTRAVENOUS; SUBCUTANEOUS
Status: DISCONTINUED | OUTPATIENT
Start: 2025-04-30 | End: 2025-05-01 | Stop reason: HOSPADM

## 2025-04-30 RX ORDER — OXYCODONE HYDROCHLORIDE 5 MG/1
10 TABLET ORAL EVERY 4 HOURS PRN
Status: DISCONTINUED | OUTPATIENT
Start: 2025-04-30 | End: 2025-05-01 | Stop reason: HOSPADM

## 2025-04-30 RX ORDER — SODIUM CHLORIDE, SODIUM LACTATE, POTASSIUM CHLORIDE, AND CALCIUM CHLORIDE .6; .31; .03; .02 G/100ML; G/100ML; G/100ML; G/100ML
IRRIGANT IRRIGATION PRN
Status: DISCONTINUED | OUTPATIENT
Start: 2025-04-30 | End: 2025-04-30 | Stop reason: HOSPADM

## 2025-04-30 RX ORDER — CEFAZOLIN SODIUM/WATER 2 G/20 ML
2 SYRINGE (ML) INTRAVENOUS
Status: COMPLETED | OUTPATIENT
Start: 2025-04-30 | End: 2025-04-30

## 2025-04-30 RX ORDER — HYDROMORPHONE HCL IN WATER/PF 6 MG/30 ML
0.4 PATIENT CONTROLLED ANALGESIA SYRINGE INTRAVENOUS EVERY 5 MIN PRN
Status: DISCONTINUED | OUTPATIENT
Start: 2025-04-30 | End: 2025-04-30 | Stop reason: HOSPADM

## 2025-04-30 RX ORDER — PROPOFOL 10 MG/ML
INJECTION, EMULSION INTRAVENOUS CONTINUOUS PRN
Status: DISCONTINUED | OUTPATIENT
Start: 2025-04-30 | End: 2025-04-30

## 2025-04-30 RX ORDER — FENTANYL CITRATE 50 UG/ML
INJECTION, SOLUTION INTRAMUSCULAR; INTRAVENOUS PRN
Status: DISCONTINUED | OUTPATIENT
Start: 2025-04-30 | End: 2025-04-30

## 2025-04-30 RX ORDER — HYDROCODONE BITARTRATE AND ACETAMINOPHEN 5; 325 MG/1; MG/1
1 TABLET ORAL
Status: DISCONTINUED | OUTPATIENT
Start: 2025-04-30 | End: 2025-04-30

## 2025-04-30 RX ORDER — ATORVASTATIN CALCIUM 40 MG/1
40 TABLET, FILM COATED ORAL DAILY
Status: DISCONTINUED | OUTPATIENT
Start: 2025-05-01 | End: 2025-05-01 | Stop reason: HOSPADM

## 2025-04-30 RX ADMIN — PROPOFOL 150 MG: 10 INJECTION, EMULSION INTRAVENOUS at 11:41

## 2025-04-30 RX ADMIN — DEXMEDETOMIDINE HYDROCHLORIDE 20 MCG: 100 INJECTION, SOLUTION INTRAVENOUS at 11:27

## 2025-04-30 RX ADMIN — ROCURONIUM 10 MG: 50 INJECTION, SOLUTION INTRAVENOUS at 12:19

## 2025-04-30 RX ADMIN — FENTANYL CITRATE 50 MCG: 50 INJECTION, SOLUTION INTRAMUSCULAR; INTRAVENOUS at 13:30

## 2025-04-30 RX ADMIN — SODIUM CHLORIDE, SODIUM LACTATE, POTASSIUM CHLORIDE, AND CALCIUM CHLORIDE: .6; .31; .03; .02 INJECTION, SOLUTION INTRAVENOUS at 12:42

## 2025-04-30 RX ADMIN — ROCURONIUM 50 MG: 50 INJECTION, SOLUTION INTRAVENOUS at 11:41

## 2025-04-30 RX ADMIN — FENTANYL CITRATE 50 MCG: 50 INJECTION, SOLUTION INTRAMUSCULAR; INTRAVENOUS at 11:41

## 2025-04-30 RX ADMIN — HYDROMORPHONE HYDROCHLORIDE 0.4 MG: 0.2 INJECTION, SOLUTION INTRAMUSCULAR; INTRAVENOUS; SUBCUTANEOUS at 14:11

## 2025-04-30 RX ADMIN — Medication 2 G: at 11:27

## 2025-04-30 RX ADMIN — PHENYLEPHRINE HYDROCHLORIDE 100 MCG: 10 INJECTION INTRAVENOUS at 11:54

## 2025-04-30 RX ADMIN — PROPOFOL 50 MCG/KG/MIN: 10 INJECTION, EMULSION INTRAVENOUS at 11:52

## 2025-04-30 RX ADMIN — ACETAMINOPHEN 975 MG: 325 TABLET ORAL at 19:55

## 2025-04-30 RX ADMIN — SUGAMMADEX 130 MG: 100 INJECTION, SOLUTION INTRAVENOUS at 12:54

## 2025-04-30 RX ADMIN — LIDOCAINE HYDROCHLORIDE 5 ML: 10 INJECTION, SOLUTION INFILTRATION; PERINEURAL at 11:41

## 2025-04-30 RX ADMIN — FENTANYL CITRATE 50 MCG: 50 INJECTION, SOLUTION INTRAMUSCULAR; INTRAVENOUS at 12:03

## 2025-04-30 RX ADMIN — SENNOSIDES AND DOCUSATE SODIUM 1 TABLET: 50; 8.6 TABLET ORAL at 19:56

## 2025-04-30 RX ADMIN — PHENYLEPHRINE HYDROCHLORIDE 100 MCG: 10 INJECTION INTRAVENOUS at 12:40

## 2025-04-30 RX ADMIN — WARFARIN SODIUM 10 MG: 5 TABLET ORAL at 21:07

## 2025-04-30 RX ADMIN — PHENYLEPHRINE HYDROCHLORIDE 100 MCG: 10 INJECTION INTRAVENOUS at 12:27

## 2025-04-30 RX ADMIN — PHENYLEPHRINE HYDROCHLORIDE 100 MCG: 10 INJECTION INTRAVENOUS at 11:41

## 2025-04-30 RX ADMIN — SODIUM CHLORIDE, SODIUM LACTATE, POTASSIUM CHLORIDE, AND CALCIUM CHLORIDE 500 ML: .6; .31; .03; .02 INJECTION, SOLUTION INTRAVENOUS at 16:34

## 2025-04-30 RX ADMIN — FENTANYL CITRATE 50 MCG: 50 INJECTION, SOLUTION INTRAMUSCULAR; INTRAVENOUS at 13:40

## 2025-04-30 RX ADMIN — DEXAMETHASONE SODIUM PHOSPHATE 10 MG: 10 INJECTION, SOLUTION INTRAMUSCULAR; INTRAVENOUS at 11:41

## 2025-04-30 RX ADMIN — OXYCODONE HYDROCHLORIDE 5 MG: 5 TABLET ORAL at 15:42

## 2025-04-30 RX ADMIN — KETOROLAC TROMETHAMINE 15 MG: 15 INJECTION, SOLUTION INTRAMUSCULAR; INTRAVENOUS at 15:40

## 2025-04-30 RX ADMIN — ONDANSETRON 4 MG: 2 INJECTION INTRAMUSCULAR; INTRAVENOUS at 12:53

## 2025-04-30 RX ADMIN — PHENYLEPHRINE HYDROCHLORIDE 0.3 MCG/KG/MIN: 10 INJECTION INTRAVENOUS at 12:28

## 2025-04-30 RX ADMIN — ONDANSETRON 4 MG: 2 INJECTION, SOLUTION INTRAMUSCULAR; INTRAVENOUS at 15:20

## 2025-04-30 RX ADMIN — ACETAMINOPHEN 975 MG: 325 TABLET ORAL at 11:08

## 2025-04-30 RX ADMIN — HEPARIN SODIUM 750 UNITS/HR: 10000 INJECTION, SOLUTION INTRAVENOUS at 21:49

## 2025-04-30 RX ADMIN — PHENYLEPHRINE HYDROCHLORIDE 100 MCG: 10 INJECTION INTRAVENOUS at 12:17

## 2025-04-30 RX ADMIN — PROCHLORPERAZINE EDISYLATE 5 MG: 5 INJECTION INTRAMUSCULAR; INTRAVENOUS at 16:44

## 2025-04-30 RX ADMIN — HYDROMORPHONE HYDROCHLORIDE 0.2 MG: 0.2 INJECTION, SOLUTION INTRAMUSCULAR; INTRAVENOUS; SUBCUTANEOUS at 14:27

## 2025-04-30 RX ADMIN — HYDROMORPHONE HYDROCHLORIDE 0.4 MG: 0.2 INJECTION, SOLUTION INTRAMUSCULAR; INTRAVENOUS; SUBCUTANEOUS at 13:51

## 2025-04-30 RX ADMIN — SODIUM CHLORIDE, SODIUM LACTATE, POTASSIUM CHLORIDE, AND CALCIUM CHLORIDE: .6; .31; .03; .02 INJECTION, SOLUTION INTRAVENOUS at 11:05

## 2025-04-30 ASSESSMENT — ACTIVITIES OF DAILY LIVING (ADL)
ADLS_ACUITY_SCORE: 19
ADLS_ACUITY_SCORE: 21
ADLS_ACUITY_SCORE: 21
ADLS_ACUITY_SCORE: 22
ADLS_ACUITY_SCORE: 22
ADLS_ACUITY_SCORE: 21
ADLS_ACUITY_SCORE: 22
ADLS_ACUITY_SCORE: 21
ADLS_ACUITY_SCORE: 21
ADLS_ACUITY_SCORE: 22
ADLS_ACUITY_SCORE: 21
WALKING_OR_CLIMBING_STAIRS_DIFFICULTY: NO
ADLS_ACUITY_SCORE: 21

## 2025-04-30 ASSESSMENT — COPD QUESTIONNAIRES: COPD: 0

## 2025-04-30 ASSESSMENT — ENCOUNTER SYMPTOMS: SEIZURES: 0

## 2025-04-30 NOTE — PHARMACY-ADMISSION MEDICATION HISTORY
Pharmacist Admission Medication History    Admission medication history is complete. The information provided in this note is only as accurate as the sources available at the time of the update.    Information Source(s): Patient, Clinic records, and CareEverywhere/SureScripts via in-person    Pertinent Information: Per preop H&P:  -Instructed to hold warfarin x 5 days before procedure. Plan for 'booster dose' of 10 mg on 4/30 then to resume home dosing of 7.5 mg Tu and 5 mg row.    Allergies reviewed with patient and updates made in EHR: yes    Medication History Completed By: Clay Murillo [facility] 4/30/2025 11:28 AM    PTA Med List   Medication Sig Note Last Dose/Taking    alendronate (FOSAMAX) 70 MG tablet TAKE 1 TABLET BY MOUTH WEEKLY  WITH 8 OZ OF PLAIN WATER 30  MINUTES BEFORE FIRST FOOD, DRINK OR MEDS. STAY UPRIGHT FOR 30  MINS  4/26/2025    atorvastatin (LIPITOR) 40 MG tablet Take 1 tablet (40 mg) by mouth daily.  4/29/2025 Morning    buPROPion (WELLBUTRIN XL) 300 MG 24 hr tablet Take 1 tablet (300 mg) by mouth every morning.  4/29/2025    calcium carbonate 500 mg, elemental, (OSCAL 500) 1250 (500 Ca) MG TABS tablet Take by mouth daily. One-half of a tab by mouth every am  Past Week    furosemide (LASIX) 40 MG tablet Take 1 tablet (40 mg) by mouth daily as needed  More than a month    levothyroxine (SYNTHROID/LEVOTHROID) 88 MCG tablet Take 1 tablet (88 mcg) by mouth daily.  4/30/2025 Morning    vitamin D3 (CHOLECALCIFEROL) 50 mcg (2000 units) tablet Take 1 tablet by mouth daily  Past Week    warfarin ANTICOAGULANT (COUMADIN) 2.5 MG tablet TAKE 2 TO 3 TABLETS (5 MG - 7.5  MG) BY MOUTH DAILY OR AS  DIRECTED BY YOUR ACC TEAM BASED  ON INR RESULTS (Patient taking differently: Take by mouth See Admin Instructions. 7.5 mg on Tuesdays and 5 mg the rest of the week) 4/29/2025: warfarin: Thromboembolic risk is moderate (4-10%/year). DO NOT TAKE warfarin 5 days. Thromboembolic risk is moderate (4-10%/year). DO NOT  TAKE warfarin 5 days.  4/25/2025

## 2025-04-30 NOTE — ANESTHESIA CARE TRANSFER NOTE
Patient: Tammy Law    Procedure: Procedure(s):  CHOLECYSTECTOMY, LAPAROSCOPIC       Diagnosis: Chronic RUQ pain [R10.11, G89.29]  Sludge in gallbladder [K82.8]  Diagnosis Additional Information: No value filed.    Anesthesia Type:   General     Note:    Oropharynx: oropharynx clear of all foreign objects and spontaneously breathing  Level of Consciousness: drowsy  Oxygen Supplementation: face mask  Level of Supplemental Oxygen (L/min / FiO2): 8  Independent Airway: airway patency satisfactory and stable  Dentition: dentition unchanged  Vital Signs Stable: post-procedure vital signs reviewed and stable  Report to RN Given: handoff report given  Patient transferred to: PACU    Handoff Report: Identifed the Patient, Identified the Reponsible Provider, Reviewed the pertinent medical history, Discussed the surgical course, Reviewed Intra-OP anesthesia mangement and issues during anesthesia, Set expectations for post-procedure period and Allowed opportunity for questions and acknowledgement of understanding      Vitals:  Vitals Value Taken Time   /58 04/30/25 1321   Temp 36.2  C (97.16  F) 04/30/25 1324   Pulse 62 04/30/25 1324   Resp 21 04/30/25 1324   SpO2 100 % 04/30/25 1324   Vitals shown include unfiled device data.    Electronically Signed By: DAVONTE Orozco CRNA  April 30, 2025  1:25 PM

## 2025-04-30 NOTE — PROVIDER NOTIFICATION
Pt having persistent nausea and pain continues to rated 6/10, despite prn med interventions.     Response: Will admit patient.

## 2025-04-30 NOTE — ANESTHESIA PROCEDURE NOTES
Airway       Patient location during procedure: OR  Staff -        CRNA: Michelle Aguirre APRN CRNA       Performed By: CRNA  Consent for Airway        Urgency: elective  Indications and Patient Condition       Indications for airway management: kian-procedural       Induction type:intravenous       Mask difficulty assessment: 2 - vent by mask + OA or adjuvant +/- NMBA    Final Airway Details       Final airway type: endotracheal airway       Successful airway: ETT - single and Oral  Endotracheal Airway Details        ETT size (mm): 7.0       Cuffed: yes       Cuff volume (mL): 5       Successful intubation technique: direct laryngoscopy       DL Blade Type: Persaud 2       Grade View of Cords: 1       Adjucts: stylet       Position: Right       Measured from: lips       Secured at (cm): 20       Bite block used: None    Post intubation assessment        Placement verified by: capnometry, equal breath sounds and chest rise        Number of attempts at approach: 1       Number of other approaches attempted: 0       Secured with: tape       Ease of procedure: easy       Dentition: Intact and Unchanged       Dental guard used and removed. Dental Guard Type: Standard White.

## 2025-04-30 NOTE — ANESTHESIA POSTPROCEDURE EVALUATION
Patient: Tammy Law    Procedure: Procedure(s):  CHOLECYSTECTOMY, LAPAROSCOPIC       Anesthesia Type:  General    Note:  Disposition: Outpatient   Postop Pain Control: Uneventful            Sign Out: Well controlled pain   PONV: No   Neuro/Psych: Uneventful            Sign Out: Acceptable/Baseline neuro status   Airway/Respiratory: Uneventful            Sign Out: Acceptable/Baseline resp. status   CV/Hemodynamics: Uneventful            Sign Out: Acceptable CV status; No obvious hypovolemia; No obvious fluid overload   Other NRE: NONE   DID A NON-ROUTINE EVENT OCCUR? No           Last vitals:  Vitals Value Taken Time   /59 04/30/25 1330   Temp 36.3  C (97.34  F) 04/30/25 1341   Pulse 65 04/30/25 1341   Resp 10 04/30/25 1341   SpO2 100 % 04/30/25 1341   Vitals shown include unfiled device data.    Electronically Signed By: Frandy Rowell MD  April 30, 2025  1:42 PM

## 2025-04-30 NOTE — INTERVAL H&P NOTE
I have reviewed the surgical (or preoperative) H&P that is linked to this encounter, and examined the patient. There are no significant changes    Again discussed risk, benefits, alternatives, to laparoscopic cholecystectomy including but not limited to bleeding, infection, bile leak, common duct injury, open procedure, chance this does not relieve her symptoms.  She is at a higher risk for open procedure as well as bleeding due to her prior laparotomies as well as mechanical aortic valve requiring anticoagulation.  Risks and benefits of surgery explained and they wish to proceed.    Clinical Conditions Present on Arrival:  Clinically Significant Risk Factors Present on Admission                 # Drug Induced Coagulation Defect: home medication list includes an anticoagulant medication

## 2025-04-30 NOTE — PROVIDER NOTIFICATION
Dr. Rowell (anesthesia) notified regarding pt feeling lightheaded/dizzy while sitting in recliner. VSS on RA.     Response Lactated Ringer 500mL bolus, unfise over 30 minutes.

## 2025-04-30 NOTE — OP NOTE
[facility]    Operative Note    Pre-operative diagnosis: Chronic RUQ pain [R10.11, G89.29]  Sludge in gallbladder [K82.8];   Post-operative diagnosis Chronic cholecystitis   Procedure: Procedure(s):  CHOLECYSTECTOMY, LAPAROSCOPIC, LYSIS OF ADHESIONS   Surgeon: Surgeons and Role:     * Rocael Bird MD - Primary   Anesthesia: General    Estimated blood loss: Minimal   Drains: None   Specimens: ID Type Source Tests Collected by Time Destination   1 : GALLBLADDER Tissue Gallbladder SURGICAL PATHOLOGY EXAM Rocael Bird MD 4/30/2025 12:52 PM       Findings: The gallbladder was minimally inflamed; There were numerous adhesions of omentum to the gallbladder; There were gallstones.   Complications: None.   Implants: None.       OPERATIVE INDICATIONS:  Tammy Law is a 69 year old female with a history of RUQ abdominal pain associated with cholelithiasis on right upper quadrant ultrasound.      After understanding the risks and benefits of proceeding with surgery, the patient has an indication for laparoscopic cholecystectomy and consented to undergo surgery.    Prior to surgery, I reviewed the risks of gallbladder removal with this patient.    The risk discussion included, but was not limited to, death, myocardial infarction, pneumonia, urinary tract infection, deep venous thrombosis with or without pulmonary embolus, abdominal infection from bowel injury or abscess, bowel obstruction, wound infection, and bleeding.    Specific risks related to cholecystectomy include bile duct injury (3-4/1000), bile leak (10/1000), retained common bile duct stone (10/1000), postcholecystectomy diarrhea (1-2%) and these complications may require additional treatment.    The potential that gallbladder removal will NOT be of benefit was also reviewed.    Furthermore, alternative therapies and the option for no therapy were also reviewed.    Postoperative treatment and expected follow-up  were also reviewed.    Additional risk specifically related to this patient's preoperative condition were also emphasized due to prior abdominal surgery and mechanical aortic valve.    OPERATIVE DETAILS:      The patient was brought to the operating room and prepared in a routine fashion.  A timeout was performed prior to surgery and documented by the nursing team.     Under the benefits of general anesthesia, and infra-umbilical incision was made and the umbilical stalk grasped and lifted.  A Veress needle was inserted and pneumoperitoneum was established using carbon dioxide gas to a maximum pressure of 15 mmHg.      I first placed a port in the LUQ to assess the extent and location of adhesions.  She had a fair amount of adhesions along the entirety of the midline.  While her prior incision as just to her umbilicus, she has a very short abdomen.  I therefore needed to lyse adhesions with a combinations of sharp and blunt dissection down to just below the umbilicus.    A total of 5 ports were placed and the laparoscope was utilized from the infra-umbilical port.     The patient was moved into a steep reverse Trendelenberg position.    Numerous adhesions to the gallbladder were taken down with a combination of blunt and sharp dissection.     The gallbladder was grasped at its fundus and retracted cephalad.  It was also grasped at its infundibulum and retracted laterally.       Findings related to the gallbladder are as noted above.     A complete dissection starting on the gallbladder, identifying the cystic artery on the surface of the gallbladder, was performed.  The cystic artery in this manner was  away from the gallbladder. The infundibulum of the gallbladder and the junction of gallbladder and cystic duct were clearly and completely identified with blunt dissection.  All of this dissection was performed on the gallbladder wall and clearly above the triangle of Calot.    The peritoneum on each side of  gallbladder was divided at least one half of the way up towards the top of the gallbladder.     Next, a complete dissection of the upper aspect of the triangle of Calot was performed and the critical view of safety was achieved.  The cystic artery and cystic duct were then the ONLY two structures traversing from gallbladder towards the dayami hepatis.     The cystic artery and cystic duct were clipped securely and divided between clips. The gallbladder was then removed out of its fossa using electrocautery.  It was placed into an Endocatch bag and removed from the abdomen.     Next, the gallbladder fossa was irrigated with saline and the saline was aspirated.     Complete hemostasis was achieved.     The epigastric incision was closed using 0 Vicryl suture.     The skin was closed using 4-0 monocryl suture and sterile dressing was applied.     All needle and sponge counts were correct x2 at the end of the procedure.    Rocael Bird MD

## 2025-04-30 NOTE — PROVIDER NOTIFICATION
Notified Dr. Bird, pt rating pain 6/10 in abdomen. Describing as a cramping.     Response: One time dose Toradol IV 15mg.

## 2025-04-30 NOTE — ANESTHESIA PREPROCEDURE EVALUATION
Anesthesia Pre-Procedure Evaluation    Patient: Tammy Law   MRN: [id number] : 1955        Procedure : Procedure(s):  CHOLECYSTECTOMY, LAPAROSCOPIC          Past Medical History:   Diagnosis Date    Acute blood loss anemia 2023    Anxiety     Benign neoplasm of transverse colon 03/15/2024    Cardiac pacemaker in situ     Chronic anticoagulation     Closed fracture of multiple ribs of left side 2023    Depression     Disease of thyroid gland     Hypothyroidism    Gallbladder sludge     H/O: rheumatic fever     Hematemesis 10/17/2023    High cholesterol     History of blood transfusion     History of colonic polyps     Hyperlipidemia     Hypertension     Hypothyroidism     Melena 10/17/2023    Recurrent major depressive disorder, in full remission 2023    SSS (sick sinus syndrome) (H) 2020    Traumatic hemothorax 2023      Past Surgical History:   Procedure Laterality Date    AORTIC VALVE REPLACEMENT      APPENDECTOMY      BIOPSY BREAST Left     BREAST CYST EXCISION      CARDIAC CATHETERIZATION      HC PART REMV BONE METATARSAL HEAD,EA Right 2017    Procedure: EXOSTECTOMY 2ND METATARSAL RIGHT FOOT;  Surgeon: Jessee Mccauley DPM;  Location: [facility];  Service: Podiatry    HYSTERECTOMY  2000    IMPLANT PACEMAKER      IMPLANT PACEMAKER      MIDLINE SINGLE LUMEN PLACEMENT  10/13/2023    OOPHORECTOMY      OTHER SURGICAL HISTORY      Hemmorhoidectomy    RELEASE CARPAL TUNNEL Bilateral     TOE SURGERY      TYMPANOSTOMY TUBE PLACEMENT      ZZC REPLACE AORT VALV,PROSTH VALV      Description: Aortic Valve Replacement;  Proc Date: 1995;      Allergies   Allergen Reactions    Demerol [Meperidine]      Hallucinations      Misc. Sulfonamide Containing Compounds Hives    Morphine     Sulfa Antibiotics       Social History     Tobacco Use    Smoking status: Never     Passive exposure: Never    Smokeless tobacco: Never   Substance Use Topics    Alcohol  use: Yes     Comment: Alcoholic Drinks/day: twice per year      Wt Readings from Last 1 Encounters:   04/30/25 61.2 kg (135 lb)        Anesthesia Evaluation            ROS/MED HX  ENT/Pulmonary:    (-) asthma, COPD and recent URI   Neurologic:    (-) no seizures, no CVA and no TIA   Cardiovascular: Comment: H/o AVR    (+)  hypertension- -   -  - -   Taking blood thinners           pacemaker, Reason placed: SSS. type: : Biotronik Eluna 8 DR-T, settings: DDDR, - Patientt is not dependent on pacemaker.         valvular problems/murmurs type: MR     Previous cardiac testing   Echo: Date: 4/25/25 Results:  The left ventricle is normal in size. There is mild concentric left  ventricular hypertrophy. The visual ejection fraction is 55-60%.  The right ventricle is normal size. Mildly decreased right ventricular  systolic function  The mitral valve appears rheumatic. There is mild (1+) mitral regurgitation.  There is mild mitral stenosis.  s/p [employer] mechanical prosthesis in the aortic position; no prosthetic  stenosis (peak velocity 2.6m/s, mean gradient 14mmHg, DI 0.34, AT 70 m), mild  paravalvular regurgitation  Compared to prior study 8/8/2024, mildly decreased RV function is new    Stress Test:  Date: Results:    ECG Reviewed:  Date: Results:    Cath:  Date: Results:   (-) CAD and CHF   METS/Exercise Tolerance:     Hematologic:    (-) anemia   Musculoskeletal:    (-) arthritis   GI/Hepatic:    (-) GERD and liver disease   Renal/Genitourinary:    (-) renal disease   Endo:     (+)          thyroid problem, hypothyroidism,        (-) Type II DM and obesity   Psychiatric/Substance Use:       Infectious Disease:       Malignancy:       Other:            Physical Exam    Airway  airway exam normal      Mallampati: II   TM distance: > 3 FB   Neck ROM: full   Mouth opening: > 3 cm    Respiratory Devices and Support         Dental       (+) Completely normal teeth      Cardiovascular          Rhythm and rate: regular and normal  "    Pulmonary           breath sounds clear to auscultation           OUTSIDE LABS:  CBC:   Lab Results   Component Value Date    WBC 8.1 04/15/2025    WBC 6.0 10/09/2024    HGB 13.9 04/15/2025    HGB 13.5 10/09/2024    HCT 41.3 04/15/2025    HCT 41.9 10/09/2024     04/15/2025     10/09/2024     BMP:   Lab Results   Component Value Date     04/15/2025     10/09/2024    POTASSIUM 4.1 04/15/2025    POTASSIUM 3.9 10/09/2024    CHLORIDE 102 04/15/2025    CHLORIDE 104 10/09/2024    CO2 25 04/15/2025    CO2 30 (H) 10/09/2024    BUN 13.8 04/15/2025    BUN 9.8 10/09/2024    CR 0.93 04/15/2025    CR 1.03 (H) 10/09/2024    GLC 80 04/15/2025    GLC 72 10/09/2024     COAGS:   Lab Results   Component Value Date    PTT 43 (H) 01/12/2022    INR 2.6 04/24/2025    FIBR 383 01/12/2022     POC: No results found for: \"BGM\", \"HCG\", \"HCGS\"  HEPATIC:   Lab Results   Component Value Date    ALBUMIN 4.5 04/15/2025    PROTTOTAL 7.3 04/15/2025    ALT 28 04/15/2025    AST 26 04/15/2025    ALKPHOS 67 04/15/2025    BILITOTAL 0.5 04/15/2025     OTHER:   Lab Results   Component Value Date    PH 7.36 01/11/2022    LACT 0.5 01/11/2022    A1C 5.7 (H) 02/14/2024    ALVAREZ 10.3 04/15/2025    PHOS 3.1 12/02/2024    MAG 2.1 12/02/2024    LIPASE 124 01/11/2022    AMYLASE 69 01/25/2021    TSH 4.15 04/03/2025    T4 1.35 12/02/2024    CRP 6.8 01/12/2022    SED 53 (H) 09/25/2023       Anesthesia Plan    ASA Status:  3    NPO Status:  NPO Appropriate    Anesthesia Type: General.     - Airway: ETT   Induction: Intravenous, Propofol.   Maintenance: Inhalation.        Consents    Anesthesia Plan(s) and associated risks, benefits, and realistic alternatives discussed. Questions answered and patient/representative(s) expressed understanding.     - Discussed: Risks, Benefits and Alternatives for the PROCEDURE were discussed     - Discussed with:  Patient            Postoperative Care    Pain management: IV analgesics, Multi-modal analgesia. "   PONV prophylaxis: Ondansetron (or other 5HT-3), Dexamethasone or Solumedrol     Comments:    Other Comments: Will place magnet over pacemaker to initiate asynchronous mode           Frandy Rowlel MD    Clinically Significant Risk Factors Present on Admission                # Drug Induced Coagulation Defect: home medication list includes an anticoagulant medication                   # Pacemaker present

## 2025-05-01 ENCOUNTER — APPOINTMENT (OUTPATIENT)
Dept: PHYSICAL THERAPY | Facility: HOSPITAL | Age: 70
End: 2025-05-01
Payer: COMMERCIAL

## 2025-05-01 ENCOUNTER — DOCUMENTATION ONLY (OUTPATIENT)
Dept: ANTICOAGULATION | Facility: CLINIC | Age: 70
End: 2025-05-01
Payer: COMMERCIAL

## 2025-05-01 VITALS
TEMPERATURE: 98 F | OXYGEN SATURATION: 98 % | WEIGHT: 139.99 LBS | BODY MASS INDEX: 23.9 KG/M2 | HEART RATE: 63 BPM | HEIGHT: 64 IN | DIASTOLIC BLOOD PRESSURE: 58 MMHG | RESPIRATION RATE: 16 BRPM | SYSTOLIC BLOOD PRESSURE: 103 MMHG

## 2025-05-01 DIAGNOSIS — Z95.2 H/O MECHANICAL AORTIC VALVE REPLACEMENT: Primary | ICD-10-CM

## 2025-05-01 DIAGNOSIS — Z79.01 LONG TERM CURRENT USE OF ANTICOAGULANT THERAPY: ICD-10-CM

## 2025-05-01 LAB
ALBUMIN SERPL BCG-MCNC: 3.6 G/DL (ref 3.5–5.2)
ALP SERPL-CCNC: 69 U/L (ref 40–150)
ALT SERPL W P-5'-P-CCNC: 47 U/L (ref 0–50)
ANION GAP SERPL CALCULATED.3IONS-SCNC: 4 MMOL/L (ref 7–15)
AST SERPL W P-5'-P-CCNC: 50 U/L (ref 0–45)
BILIRUB SERPL-MCNC: 0.7 MG/DL
BUN SERPL-MCNC: 10.1 MG/DL (ref 8–23)
CALCIUM SERPL-MCNC: 8.6 MG/DL (ref 8.8–10.4)
CHLORIDE SERPL-SCNC: 107 MMOL/L (ref 98–107)
CREAT SERPL-MCNC: 0.79 MG/DL (ref 0.51–0.95)
EGFRCR SERPLBLD CKD-EPI 2021: 81 ML/MIN/1.73M2
ERYTHROCYTE [DISTWIDTH] IN BLOOD BY AUTOMATED COUNT: 13.2 % (ref 10–15)
GLUCOSE BLDC GLUCOMTR-MCNC: 118 MG/DL (ref 70–99)
GLUCOSE SERPL-MCNC: 140 MG/DL (ref 70–99)
HCO3 SERPL-SCNC: 29 MMOL/L (ref 22–29)
HCT VFR BLD AUTO: 35 % (ref 35–47)
HGB BLD-MCNC: 11.6 G/DL (ref 11.7–15.7)
INR PPP: 1.09 (ref 0.85–1.15)
MCH RBC QN AUTO: 29 PG (ref 26.5–33)
MCHC RBC AUTO-ENTMCNC: 33.1 G/DL (ref 31.5–36.5)
MCV RBC AUTO: 88 FL (ref 78–100)
PLATELET # BLD AUTO: 167 10E3/UL (ref 150–450)
POTASSIUM SERPL-SCNC: 4.6 MMOL/L (ref 3.4–5.3)
PROT SERPL-MCNC: 6 G/DL (ref 6.4–8.3)
PROTHROMBIN TIME: 14.3 SECONDS (ref 11.8–14.8)
RBC # BLD AUTO: 4 10E6/UL (ref 3.8–5.2)
SODIUM SERPL-SCNC: 140 MMOL/L (ref 135–145)
UFH PPP CHRO-ACNC: 0.17 IU/ML
UFH PPP CHRO-ACNC: 0.63 IU/ML
WBC # BLD AUTO: 7.4 10E3/UL (ref 4–11)

## 2025-05-01 PROCEDURE — 85027 COMPLETE CBC AUTOMATED: CPT

## 2025-05-01 PROCEDURE — 36415 COLL VENOUS BLD VENIPUNCTURE: CPT

## 2025-05-01 PROCEDURE — 85520 HEPARIN ASSAY: CPT | Performed by: HOSPITALIST

## 2025-05-01 PROCEDURE — 96372 THER/PROPH/DIAG INJ SC/IM: CPT

## 2025-05-01 PROCEDURE — 82962 GLUCOSE BLOOD TEST: CPT

## 2025-05-01 PROCEDURE — 97116 GAIT TRAINING THERAPY: CPT | Mod: GP

## 2025-05-01 PROCEDURE — 99207 PR APP CREDIT; MD BILLING SHARED VISIT: CPT

## 2025-05-01 PROCEDURE — 250N000013 HC RX MED GY IP 250 OP 250 PS 637: Performed by: HOSPITALIST

## 2025-05-01 PROCEDURE — 97162 PT EVAL MOD COMPLEX 30 MIN: CPT | Mod: GP

## 2025-05-01 PROCEDURE — 84155 ASSAY OF PROTEIN SERUM: CPT | Performed by: HOSPITALIST

## 2025-05-01 PROCEDURE — 250N000011 HC RX IP 250 OP 636: Mod: JZ | Performed by: SURGERY

## 2025-05-01 PROCEDURE — 250N000011 HC RX IP 250 OP 636: Performed by: HOSPITALIST

## 2025-05-01 PROCEDURE — 36415 COLL VENOUS BLD VENIPUNCTURE: CPT | Performed by: HOSPITALIST

## 2025-05-01 PROCEDURE — 250N000011 HC RX IP 250 OP 636

## 2025-05-01 PROCEDURE — 97530 THERAPEUTIC ACTIVITIES: CPT | Mod: GP

## 2025-05-01 PROCEDURE — 250N000013 HC RX MED GY IP 250 OP 250 PS 637: Performed by: SURGERY

## 2025-05-01 PROCEDURE — 99239 HOSP IP/OBS DSCHRG MGMT >30: CPT | Mod: FS | Performed by: INTERNAL MEDICINE

## 2025-05-01 PROCEDURE — 85610 PROTHROMBIN TIME: CPT | Performed by: SURGERY

## 2025-05-01 RX ORDER — ENOXAPARIN SODIUM 100 MG/ML
1 INJECTION SUBCUTANEOUS ONCE
Status: COMPLETED | OUTPATIENT
Start: 2025-05-01 | End: 2025-05-01

## 2025-05-01 RX ORDER — AMOXICILLIN 250 MG
1 CAPSULE ORAL 2 TIMES DAILY
Qty: 30 TABLET | Refills: 0 | Status: SHIPPED | OUTPATIENT
Start: 2025-05-01

## 2025-05-01 RX ORDER — WARFARIN SODIUM 5 MG/1
10 TABLET ORAL
Status: DISCONTINUED | OUTPATIENT
Start: 2025-05-01 | End: 2025-05-01 | Stop reason: HOSPADM

## 2025-05-01 RX ORDER — WARFARIN SODIUM 10 MG/1
10 TABLET ORAL EVERY EVENING
Qty: 1 TABLET | Refills: 0 | Status: SHIPPED | OUTPATIENT
Start: 2025-05-01

## 2025-05-01 RX ORDER — WARFARIN SODIUM 2.5 MG/1
TABLET ORAL
Qty: 200 TABLET | Refills: 1 | Status: SHIPPED | OUTPATIENT
Start: 2025-05-02

## 2025-05-01 RX ADMIN — ONDANSETRON 4 MG: 2 INJECTION, SOLUTION INTRAMUSCULAR; INTRAVENOUS at 00:40

## 2025-05-01 RX ADMIN — SENNOSIDES AND DOCUSATE SODIUM 1 TABLET: 50; 8.6 TABLET ORAL at 08:34

## 2025-05-01 RX ADMIN — ACETAMINOPHEN 975 MG: 325 TABLET ORAL at 03:42

## 2025-05-01 RX ADMIN — HYDROMORPHONE HYDROCHLORIDE 0.2 MG: 0.2 INJECTION, SOLUTION INTRAMUSCULAR; INTRAVENOUS; SUBCUTANEOUS at 07:00

## 2025-05-01 RX ADMIN — OXYCODONE HYDROCHLORIDE 5 MG: 5 TABLET ORAL at 03:42

## 2025-05-01 RX ADMIN — ENOXAPARIN SODIUM 60 MG: 60 INJECTION SUBCUTANEOUS at 13:08

## 2025-05-01 RX ADMIN — POLYETHYLENE GLYCOL 3350 17 G: 17 POWDER, FOR SOLUTION ORAL at 08:34

## 2025-05-01 RX ADMIN — ACETAMINOPHEN 975 MG: 325 TABLET ORAL at 11:12

## 2025-05-01 RX ADMIN — HYDROMORPHONE HYDROCHLORIDE 0.4 MG: 0.2 INJECTION, SOLUTION INTRAMUSCULAR; INTRAVENOUS; SUBCUTANEOUS at 00:41

## 2025-05-01 RX ADMIN — OXYCODONE HYDROCHLORIDE 10 MG: 5 TABLET ORAL at 13:14

## 2025-05-01 RX ADMIN — BUPROPION HYDROCHLORIDE 300 MG: 300 TABLET, EXTENDED RELEASE ORAL at 08:34

## 2025-05-01 RX ADMIN — ATORVASTATIN CALCIUM 40 MG: 40 TABLET, FILM COATED ORAL at 08:34

## 2025-05-01 ASSESSMENT — ACTIVITIES OF DAILY LIVING (ADL)
ADLS_ACUITY_SCORE: 28
ADLS_ACUITY_SCORE: 21
ADLS_ACUITY_SCORE: 21
ADLS_ACUITY_SCORE: 28
ADLS_ACUITY_SCORE: 21
ADLS_ACUITY_SCORE: 28
ADLS_ACUITY_SCORE: 21
ADLS_ACUITY_SCORE: 21

## 2025-05-01 NOTE — PROGRESS NOTES
"General Surgery Progress Note:    Hospital Day # 0    ASSESSMENT:  1. Chronic cholecystitis      Tammy Law is a 69 year old female with a history mechanical AVR on warfarin who is s/p cholecystectomy and RYAN on 4/30/2025.  Admitted overnight for pain control and nausea which are improving this morning. Overall progressing well post-operatively. Tolerating a regular diet with no N/V. Pain adequately controlled and discussed utilizing PO meds. Appreciate medical management including anticoagulation regimen per [facility]. Likely discharge later today.    PLAN:  - Regular diet  - PO pain medications  - Encourage activity and ambulation to promote bowel function  - Monitor for bleeding from incision sites, may need pressure dressing  - Appreciate medical management including anticoagulation regimen per [facility]  - Anticipate discharge later today.    SUBJECTIVE:   She is doing well this morning. Reports abdominal pain which is worse over her incision sites but is tolerable with pain medications. Tolerating bites of regular diet for breakfast with no nausea or vomiting. Has been able to ambulate to the bathroom with no issues. Denies fever, chills. Reports some bloody oozing from her incision sites which have been improving.    Patient Vitals for the past 24 hrs:   BP Temp Temp src Pulse Resp SpO2 Height Weight   05/01/25 0800 105/59 97.8  F (36.6  C) Oral 64 18 95 % -- --   05/01/25 0423 130/66 97.9  F (36.6  C) Oral 60 18 -- -- --   05/01/25 0020 -- -- -- 69 -- 97 % -- --   04/30/25 2300 100/55 98.1  F (36.7  C) Oral 60 16 97 % -- --   04/30/25 2200 114/58 -- -- 65 -- 98 % -- --   04/30/25 2100 131/65 -- -- 62 -- 97 % -- --   04/30/25 2030 108/53 -- -- 60 -- 96 % -- --   04/30/25 2000 126/59 -- -- 64 -- 97 % -- --   04/30/25 1945 119/53 -- -- 61 -- 98 % -- --   04/30/25 1930 132/58 -- -- 60 -- 98 % -- --   04/30/25 1915 121/59 98.5  F (36.9  C) Oral 62 16 98 % 1.626 m (5' 4\") 63.5 kg (139 lb 15.9 oz)   04/30/25 1830 " "108/61 -- -- 60 16 94 % -- --   04/30/25 1800 116/63 97.8  F (36.6  C) -- 67 16 94 % -- --   04/30/25 1730 129/66 -- -- 66 -- 97 % -- --   04/30/25 1700 118/54 -- -- 63 11 95 % -- --   04/30/25 1630 114/55 -- -- 60 15 98 % -- --   04/30/25 1600 115/59 -- -- 63 14 97 % -- --   04/30/25 1530 116/63 -- -- -- -- -- -- --   04/30/25 1515 115/55 97.8  F (36.6  C) -- 63 14 98 % -- --   04/30/25 1445 112/59 -- -- 66 12 99 % -- --   04/30/25 1430 116/62 98.1  F (36.7  C) -- 65 11 100 % -- --   04/30/25 1427 -- 98.1  F (36.7  C) -- 65 25 100 % -- --   04/30/25 1415 117/57 97.7  F (36.5  C) Core 65 19 100 % -- --   04/30/25 1411 -- 97.7  F (36.5  C) -- 65 15 99 % -- --   04/30/25 1400 122/64 97.5  F (36.4  C) -- 65 13 100 % -- --   04/30/25 1345 124/60 97.5  F (36.4  C) Core 63 17 100 % -- --   04/30/25 1330 121/59 97.3  F (36.3  C) -- 63 19 100 % -- --   04/30/25 1320 122/58 97.3  F (36.3  C) Core 63 21 100 % -- --   04/30/25 1027 135/68 97.5  F (36.4  C) -- 71 16 100 % 1.626 m (5' 4\") 61.2 kg (135 lb)     PHYSICAL EXAM:  General: patient seen resting in bed, no acute distress  Resp: no respiratory distress, breathing comfortably on RA  Abdomen: soft, non-distended, appropriately tender postoperatively especially around incision sites. Incisions clean, dry, intact with Steri-Strips and Band-Aids. No active bleeding or drainage noted from incision sites with dry overlying gauze although was recently replaced.  Extremities: warm and well perfused    04/30 0700 - 05/01 0659  In: 2220 [P.O.:120; I.V.:1600]  Out: 135 [Urine:125]    Admission on 04/30/2025   Component Date Value    INR 04/30/2025 1.07     PT 04/30/2025 14.1     Anti Xa Unfractionated H* 05/01/2025 0.17     INR 05/01/2025 1.09     PT 05/01/2025 14.3     WBC Count 05/01/2025 7.4     RBC Count 05/01/2025 4.00     Hemoglobin 05/01/2025 11.6 (L)     Hematocrit 05/01/2025 35.0     MCV 05/01/2025 88     MCH 05/01/2025 29.0     MCHC 05/01/2025 33.1     RDW 05/01/2025 13.2  "    Platelet Count 05/01/2025 167     Sodium 05/01/2025 140     Potassium 05/01/2025 4.6     Carbon Dioxide (CO2) 05/01/2025 29     Anion Gap 05/01/2025 4 (L)     Urea Nitrogen 05/01/2025 10.1     Creatinine 05/01/2025 0.79     GFR Estimate 05/01/2025 81     Calcium 05/01/2025 8.6 (L)     Chloride 05/01/2025 107     Glucose 05/01/2025 140 (H)     Alkaline Phosphatase 05/01/2025 69     AST 05/01/2025 50 (H)     ALT 05/01/2025 47     Protein Total 05/01/2025 6.0 (L)     Albumin 05/01/2025 3.6     Bilirubin Total 05/01/2025 0.7     Anti Xa Unfractionated H* 05/01/2025 0.63     GLUCOSE BY METER POCT 05/01/2025 118 (H)         Stefani Archer PA-C  [facility] General Surgery  [facility]  [place] [address]

## 2025-05-01 NOTE — PLAN OF CARE
Physical Therapy Discharge Summary    Reason for therapy discharge:    Discharged to home.    Progress towards therapy goal(s). See goals on Care Plan in [facility] electronic health record for goal details.  Goals not met.  Barriers to achieving goals:   discharge from facility.    Therapy recommendation(s):    Continued therapy is recommended.  Rationale/Recommendations:  Pt did refuse home PT. Pt must have assist with mobilat home. .

## 2025-05-01 NOTE — PHARMACY-ANTICOAGULATION SERVICE
Clinical Pharmacy - Warfarin Dosing Consult     Pharmacy has been consulted to manage this patient s warfarin therapy.  Indication: Mechanical Aortic Valve Replacement  Therapy Goal: INR 2-3  Provider/Team: Dr. Pelon VELAZQUEZ [facility] Clinic: [facility]  Warfarin Prior to Admission: Yes  Warfarin PTA Regimen: 7.5 mg on Tuesdays and 5 mg the rest of the week  Significant drug interactions: Home meds:  atorvastatin, levothyroxine  Recent documented change in oral intake/nutrition: No  Dose Comments: Warfarin held for the past five days due to plan for lap zari today.  Okay given by surgeon to resume warfarin this evening and hospitalist team ordered 10 mg of warfarin today, which is appropriate.    INR   Date Value Ref Range Status   04/30/2025 1.07 0.85 - 1.15 Final     INR HOME MONITORING   Date Value Ref Range Status   04/24/2025 2.6 2.000 - 3.000 Final       Recommend warfarin 10 mg today.  Pharmacy will monitor Tammy Law daily and order warfarin doses to achieve specified goal.      Please contact pharmacy as soon as possible if the warfarin needs to be held for a procedure or if the warfarin goals change.

## 2025-05-01 NOTE — PLAN OF CARE
"PRIMARY DIAGNOSIS: \"GENERIC\" NURSING  OUTPATIENT/OBSERVATION GOALS TO BE MET BEFORE DISCHARGE:  ADLs back to baseline: Yes    Activity and level of assistance: ax1 w/gb    Pain status: Improved but still requiring IV narcotics.    Return to near baseline physical activity: Yes     Discharge Planner Nurse   Safe discharge environment identified: Yes  Barriers to discharge: No       Entered by: Rodolfo Hallman RN 05/01/2025 3:03 PM     Please review provider order for any additional goals.   Nurse to notify provider when observation goals have been met and patient is ready for discharge.  "

## 2025-05-01 NOTE — PROGRESS NOTES
05/01/25 1100   Appointment Info   Signing Clinician's Name / Credentials (PT) Ann Chavira PT   Quick Adds   Quick Adds Certification;[facility] Auth & Certification   Living Environment   People in Home spouse   Current Living Arrangements house  (2 level but can stay on one level)   Home Accessibility stairs to enter home   Number of Stairs, Main Entrance 3   Stair Railings, Main Entrance railing on left side (ascending)   Living Environment Comments Has a lower level but she can stay on one level and have family member get her mail.   Self-Care   Equipment Currently Used at Home none  (has a SEC and can get a fWW from her sister if needed.)   Fall history within last six months no   General Information   Onset of Illness/Injury or Date of Surgery 04/30/25   Referring Physician Petrona Ziegler NP   Patient/Family Therapy Goals Statement (PT) To go home   Pertinent History of Current Problem (include personal factors and/or comorbidities that impact the POC) Per the chart, Patient is a 69-year-old female with mechanical aortic valve on Coumadin admitted by general surgery for chronic cholecystitis.  Underwent laparoscopic cholecystectomy today. 4/30   Existing Precautions/Restrictions fall  (Bed alarm on and call light by the pt.)   Weight-Bearing Status - LLE weight-bearing as tolerated   Weight-Bearing Status - RLE weight-bearing as tolerated   Cognition   Orientation Status (Cognition) oriented x 4   Follows Commands (Cognition) WFL   Pain Assessment   Patient Currently in Pain   (Pt reported abd pain at a 5/10 with mobility and some nausea.  PT did tell the nurse.)   Posture    Posture Comments slightly flexed posture due to increased Abd pain.   Range of Motion (ROM)   ROM Comment AROM bilat LEs  WFL but limited with bilat Hip flex due to increased pain.   Strength (Manual Muscle Testing)   Strength (Manual Muscle Testing) Deficits observed during functional mobility   Strength Comments distal LE MMT at the knees  and feet WFL. Limited with hip flex due to increased pain.  Increased fatigue with gait.   Bed Mobility   Comment, (Bed Mobility) Sit>supine with SBA with cues for technique.  Pt moves slowly due to increased pain.   Transfers   Comment, (Transfers) Sit>stand with no AD with CGA/SBA with cues for hand placement.  Pt did have increased pain and needed increased time due to pain.   Gait/Stairs (Locomotion)   [place] Level (Gait) verbal cues;contact guard;1 person assist   Assistive Device (Gait) other (see comments)  (Pt pushing the IV pole)   Distance in Feet (Gait) 30'   Pattern (Gait) step-through;swing-through   Deviations/Abnormal Patterns (Gait) gait speed decreased   Balance   Balance Comments CGA without AD.  PT did recommend the pt have assist with walking at home or use the FWW if needed. She does not want to use any AD at home but would use one if the pain is bad. Pt said her  can assist with walking if needed.   Sensory Examination   Sensory Perception WNL   Sensory Perception Comments bilat LEs   Clinical Impression   Criteria for Skilled Therapeutic Intervention Yes, treatment indicated   PT Diagnosis (PT) Impaired functional mobility.   Influenced by the following impairments increased abd pain, dec bal,  dec endurance.   Functional limitations due to impairments bed mobility, gait, transfers and steps.   Clinical Presentation (PT Evaluation Complexity) stable   Clinical Presentation Rationale Pt presents medically diagnosed.   Clinical Decision Making (Complexity) moderate complexity   Planned Therapy Interventions (PT) bed mobility training;gait training;home exercise program;stair training;strengthening;transfer training   Risk & Benefits of therapy have been explained evaluation/treatment results reviewed;care plan/treatment goals reviewed;risks/benefits reviewed;participants voiced agreement with care plan;patient   PT Total Evaluation Time   PT [place], Moderate Complexity Minutes (35717)  13   Therapy Certification   Start of care date 05/01/25   Certification date from 05/01/25   Certification date to 05/03/25   Medical Diagnosis Chronic cholecystitis 4/30/2025   [facility] Authorization Information   Condition type Initial onset (within last 3 months)   Cause of current episode Unspecified   Nature of treatment Rehabilitative   Functional ability Moderate functional limitations   Documented POC (choose all that apply) Measurable short and long term/discharge treatment goals related to physical and functional deficits.   Briefly describe symptoms abdominal pain   Last 24H: avg pain/symptom intensity  5/10   Past wk: avg pain/symptom intensity 5/10   Frequency of Symptoms Constantly (% of the time)   Symptom impact on ADLs Moderately   Condition change since eval N/A (first visit)   General health reported by patient Excellent   Physical Therapy Goals   PT Frequency Daily   PT Predicted Duration/Target Date for Goal Attainment 05/03/25   PT Goals Bed Mobility;Transfers;Gait;Stairs   PT: Bed Mobility Independent;Supine to/from sit;Rolling   PT: Transfers Supervision/stand-by assist;Sit to/from stand;Bed to/from chair   PT: Gait Supervision/stand-by assist;Greater than 200 feet   PT: Stairs Supervision/stand-by assist;3 stairs   Interventions   Interventions Quick Adds Gait Training;Therapeutic Activity   Therapeutic Activity   Therapeutic Activities: dynamic activities to improve functional performance Minutes (73727) 13   Treatment Detail/Skilled Intervention Pt was in the bathroom when PT started with the pt.  She needed CGA with sit>stand and she moves slowly due to increased abd pain.  Pt washed her hands at the sink with SBA Increased time needed in the bathroom  PT educated the pt in seated bilat AP, ankle circles, LAQ and bilat Hip abd to do at home.  PT did recommend th pt walk at home with her  to assist and continue with LE ex as tabatha  Sit>supine with CGA with cues for technique. Call  light by the pt and  bed alarm is on.   Gait Training   Gait Training Minutes (37875) 13   Symptoms Noted During/After Treatment (Gait Training) increased pain;fatigue   Treatment Detail/Skilled Intervention Pt walked  200' without the AD with CGA and walked another 30' with the pt hanging onto the IV.  Pt said she was getting tired with walking at the end with increased abd pain.  PT does recommend the pt have assist at home with walking and to continue to walk a few times in the day at home.  Pt did go up and down 3 steps with bilat rails with cues for sequence with min A x 1   Distance in Feet 230'   [place] Level (Gait Training) contact guard   Physical Assistance Level (Gait Training) verbal cues;1 person assist   Weight Bearing (Gait Training) weight-bearing as tolerated   Assistive Device (Gait Training) other (see comments)  (No AD and use the IV at the end of walking due to increased pain.)   Pattern Analysis (Gait Training) swing-through gait   Gait Analysis Deviations decreased lilia;decreased step length   Impairments (Gait Analysis/Training) balance impaired;pain;strength decreased   Stair Railings present at both sides   Physical Assist/Nonphysical Assist (Stairs) verbal cues;1 person assist   Level of [place] (Stairs) contact guard   PT Discharge Planning   PT Plan bed mobility, transfers, gait w/o AD, LE ex.   PT Discharge Recommendation (DC Rec) home with assist   PT Rationale for DC Rec She did have a lot of abd pain today and did have increased fatigue with mobility.  PT does recommend the pt have assist with walking at home and steps.   She should be able to progress to dc to home with family to assist.  Home PT is recommended if she would want it.   PT Brief overview of current status PT eval, transfers and gait with CGA.  steps with min A x 1.  LE ex.   PT Total Distance Amb During Session (feet) 260   PT Equipment Needed at Discharge cane, straight  (SEC or FWW PRN for pain)    Physical Therapy Time and Intention   Timed Code Treatment Minutes 26   Total Session Time (sum of timed and untimed services) 39   [facility]                                                                                   OUTPATIENT PHYSICAL THERAPY    PLAN OF TREATMENT FOR OUTPATIENT REHABILITATION   Patient's Last Name, First Name, KANDY LawTammy  M YOB: 1955   Provider's Name   [facility]   Medical Record No.  [id number]     Onset Date: 04/30/25 Start of Care Date: 05/01/25     Medical Diagnosis:  Chronic cholecystitis 4/30/2025               PT Diagnosis:  Impaired functional mobility. Certification Dates:  From: 05/01/25  To: 05/03/25       See note for plan of treatment, functional goals, and certification details.    I CERTIFY THE NEED FOR THESE SERVICES FURNISHED UNDER        THIS PLAN OF TREATMENT AND WHILE UNDER MY CARE (Physician co-signature of this document indicates review and certification of the therapy plan).

## 2025-05-01 NOTE — PLAN OF CARE
PRIMARY DIAGNOSIS: s/p lap zari  OUTPATIENT/OBSERVATION GOALS TO BE MET BEFORE DISCHARGE:  1. Stable vital signs Yes  2. Tolerating diet:Yes, clears, will need to advance today  3. Pain controlled with oral pain medications:   IV & po pain meds  4. Positive bowel sounds:  Yes  5. Voiding without difficulty:  Yes  6. Able to ambulate:  No  7. Provider specific discharge goals met:  No    Discharge Planner Nurse   Safe discharge environment identified: Yes  Barriers to discharge: Yes       Entered by: Yara Rebolledo RN 05/01/2025 5:28 AM     Please review provider order for any additional goals.   Nurse to notify provider when observation goals have been met and patient is ready for discharge.

## 2025-05-01 NOTE — PROGRESS NOTES
ANTICOAGULATION  MANAGEMENT: Discharge Review    Tammy Law chart reviewed for anticoagulation continuity of care    Outpatient surgery/procedure on 4/30/25 for cholecystectomy.    Discharge disposition: Home    Results:    Recent labs: (last 7 days)     04/30/25  1920 05/01/25  0251   INR 1.07 1.09   AAUFH  --  0.17     Anticoagulation inpatient management:      Okay given by surgeon to resume warfarin this evening and hospitalist team ordered 10 mg of warfarin today, which is appropriate.    Anticoagulation discharge instructions:     Warfarin dosing: home regimen continued   Bridging: No   INR goal change: No      Medication changes affecting anticoagulation: Yes: started on hydrocodone-acetaminophen for pain    Additional factors affecting anticoagulation: Yes: postprocedure pain     PLAN     No adjustment to anticoagulation plan needed    Recommended follow up is scheduled, patient was given procedure plan on 4/24/25, 10 mg boost was given by hospital evening following procedure. INR on day of procedure was 1.07 which is appropriate following a 5 day hold.  Patient to recheck with home meter on 5/5/25.    Anticoagulation Calendar updated    Sarah Gaona RN  5/1/2025  Anticoagulation Clinic  [facility] for routing messages: phillip [facility]OR[facility]  ACC patient phone line: [phone]

## 2025-05-01 NOTE — PLAN OF CARE
"PRIMARY DIAGNOSIS: \"GENERIC\" NURSING  OUTPATIENT/OBSERVATION GOALS TO BE MET BEFORE DISCHARGE:  ADLs back to baseline: Yes    Activity and level of assistance: ax1 w/gb    Pain status: Improved but still requiring IV narcotics.    Return to near baseline physical activity: Yes     Discharge Planner Nurse   Safe discharge environment identified: Yes  Barriers to discharge: Yes       Entered by: Rodolfo Hallman RN 05/01/2025 8:21 AM     Please review provider order for any additional goals.   Nurse to notify provider when observation goals have been met and patient is ready for discharge.  "

## 2025-05-01 NOTE — PLAN OF CARE
PRIMARY DIAGNOSIS: s/p lap zari  OUTPATIENT/OBSERVATION GOALS TO BE MET BEFORE DISCHARGE:  1. Stable vital signs Yes  2. Tolerating diet:Yes, clears, will need to advance today  3. Pain controlled with oral pain medications:   IV & po pain meds  4. Positive bowel sounds:  Yes  5. Voiding without difficulty:  Yes  6. Able to ambulate:  No  7. Provider specific discharge goals met:  No    Discharge Planner Nurse   Safe discharge environment identified: Yes  Barriers to discharge: Yes            Pt has been up to BR x2, increased pain w/ activity. Able to walk in halls w/ staff at this time, c/o pain, but better tolerated. +gas, +BS. Will need to eat real food & have pain controlled prior to discharge today.   Please review provider order for any additional goals.   Nurse to notify provider when observation goals have been met and patient is ready for discharge.

## 2025-05-01 NOTE — CONSULTS
[facility]  Consult Note - Hospitalist Service  Date of Admission:  4/30/2025  Consult Requested by: Dr. Clay Bird  Reason for Consult: CHF, mechanical valve s/p cholecystectomy    Assessment & Plan   Tammy Law is a 69 year old female with PMH anxiety, AVR on Coumadin, hyperlipidemia, and hypothyroidism was admitted on 4/30/2025 after having laparoscopic cholecystectomy.  [facility] consulted to manage and monitor CHF and mechanical valve.  Patient was off Coumadin for 5 days prior to surgery and then was advised to take 10 mg after surgery and then resume home dosing of 5 mg-7.5 mg as directed    Laparoscopic cholecystectomy  -- Patient stable postprocedure.  Minimal pain.  Patient denies nausea  -- Surgery team managing pain, nausea, and bowel regimen  -- Lap sites CDI    AVR on Coumadin  -- 10 mg of Coumadin ordered for tonight  -- Pharmacy consulted  --Daily INR-goal range 2-3    CHF  --Patient takes furosemide as needed at home  -- Patient appears euvolemic    Hyperlipidemia  -- PTA atorvastatin held while inpatient    Hypothyroidism  -- PTA levothyroxine    Anxiety  -- PTA bupropion       The patient's care was discussed with the Attending Physician, Dr. Cervantes and Patient.    Clinically Significant Risk Factors Present on Admission                # Drug Induced Coagulation Defect: home medication list includes an anticoagulant medication                   # Pacemaker present       Vero Skelton NP  Hospitalist Service  Securely message with [employer] (more info)  Text page via [facility] Paging/Directory   ______________________________________________________________________    Chief Complaint   Cholelithiasis    History is obtained from the patient    History of Present Illness   Tammy Law is a 69 year old female who was admitted to the hospital for scheduled laparoscopic cholecystectomy.  Patient was diagnosed mid April and needed clearance for surgery due to her  history of valve replacement.  Discussion regarding risks of holding anticoagulation for an elective procedure was discussed and she agreed to recommendations by her PCP.  Patient held her warfarin for 5 days prior to surgery and was advised to start on 10 mg postprocedure.    On exam patient reports mild discomfort in her abdomen diffusely.  Lap sites x 5 CDI.  Patient denies any nausea or vomiting.  Patient states she was a little lightheaded after surgery but that has since resolved.  Patient denies flatus at this point.  Patient denies fevers, chills, chest pain, shortness of breath, or dysuria.      Past Medical History    Past Medical History:   Diagnosis Date    Acute blood loss anemia 08/29/2023    Anxiety     Benign neoplasm of transverse colon 03/15/2024    Cardiac pacemaker in situ     Chronic anticoagulation     Closed fracture of multiple ribs of left side 08/28/2023    Depression     Disease of thyroid gland     Hypothyroidism    Gallbladder sludge     H/O: rheumatic fever     Hematemesis 10/17/2023    High cholesterol     History of blood transfusion     History of colonic polyps     Hyperlipidemia     Hypertension     Hypothyroidism     Melena 10/17/2023    Recurrent major depressive disorder, in full remission 02/08/2023    SSS (sick sinus syndrome) (H) 02/04/2020    Traumatic hemothorax 08/28/2023       Past Surgical History   Past Surgical History:   Procedure Laterality Date    AORTIC VALVE REPLACEMENT      APPENDECTOMY      BIOPSY BREAST Left 2015    BREAST CYST EXCISION      CARDIAC CATHETERIZATION      HC PART REMV BONE METATARSAL HEAD,EA Right 06/23/2017    Procedure: EXOSTECTOMY 2ND METATARSAL RIGHT FOOT;  Surgeon: Jessee Mccauley DPM;  Location: [facility];  Service: Podiatry    HYSTERECTOMY  2000    IMPLANT PACEMAKER      IMPLANT PACEMAKER      MIDLINE SINGLE LUMEN PLACEMENT  10/13/2023    OOPHORECTOMY  2000    OTHER SURGICAL HISTORY      Hemmorhoidectomy    RELEASE CARPAL  TUNNEL Bilateral     TOE SURGERY      TYMPANOSTOMY TUBE PLACEMENT      ZZC REPLACE AORT VALV,PROSTH VALV      Description: Aortic Valve Replacement;  Proc Date: 01/01/1995;       Medications   I have reviewed this patient's current medications       Review of Systems    The 10 point Review of Systems is negative other than noted in the HPI or here.      Physical Exam   Vital Signs: Temp: 98.5  F (36.9  C) Temp src: Oral BP: 132/58 Pulse: 61   Resp: 16 SpO2: 98 % O2 Device: None (Room air) Oxygen Delivery: 2 LPM  Weight: 139 lbs 15.87 oz    Constitutional: awake, alert, cooperative, no apparent distress, and appears stated age  Respiratory: No increased work of breathing, good air exchange, clear to auscultation bilaterally, no crackles or wheezing  Cardiovascular: Normal apical impulse, regular rate and rhythm, normal S1 and S2, no S3 or S4, and no murmur noted  GI: Normal bowel sounds, soft, non-distended, Mildly tender, lap sites x 5 CDI    Medical Decision Making       45 MINUTES SPENT BY ME on the date of service doing chart review, history, exam, documentation & further activities per the note.      Data     I have personally reviewed the following data over the past 24 hrs:    INR:  1.07 PTT:  N/A   D-dimer:  N/A Fibrinogen:  N/A       Imaging results reviewed over the past 24 hrs:   No results found for this or any previous visit (from the past 24 hours).

## 2025-05-01 NOTE — PROGRESS NOTES
Discussed updates with MD. Patient will need home care for INR checks and home PT. [facility] accepted referral for RN and PT. Patient will be seen within 24-48 hours for start of care and INR check.     1:15 PM  Received update indicating patient declines home care. SW cancelled referral, updated [facility] liaison.       LUANA Friedman

## 2025-05-01 NOTE — SUMMARY OF CARE
Patient admitted to room 08 at approximately 1905 via cart from surgery.    Patient ambulated/transferred:  with one assist. air sis.    Detailed List of Belongings (be very specific listing out each item):  Clothes  Phone  Shoes  Bag    Glasses

## 2025-05-01 NOTE — PLAN OF CARE
Patient discharged to home at 1556 via Private Car.  Accompanied by self and staff.  Discharge instructions were reviewed with patient, opportunity offered to ask questions.    Prescriptions printed and given to patient/family.  Access discontinued: Yes  Care plan and education discontinued: Yes  Home meds retrieved from pharmacy: N/A  Belongings were sent home with patient/family:  Cell phone/electronics: 1, Clothing: Shirt(s): 1, Pants: 1, Underclothes: 1, and Outerwear: 1, Medications: 3 (discharge meds), and Shoes: 2 .

## 2025-05-01 NOTE — DISCHARGE SUMMARY
[facility]  Hospitalist Discharge Summary      Date of Admission:  4/30/2025  Date of Discharge:  5/1/2025  Discharging Provider: Petrona Ziegler NP  Discharge Service: Hospitalist Service    Discharge Diagnoses   Cholecystitis  Cholecystectomy  Anticoagulation for aortic valve replacement    Clinically Significant Risk Factors          Follow-ups Needed After Discharge   Follow-up Appointments       Hospital Follow-up with Existing Primary Care Provider (PCP)          Schedule Primary Care visit within: 7 Days               Unresulted Labs Ordered in the Past 30 Days of this Admission       Date and Time Order Name Status Description    4/30/2025 12:52 PM Surgical Pathology Exam In process             Discharge Disposition   Discharged to home  Condition at discharge: Stable    Hospital Course    Tammy Law is a 69 year old female with PMH anxiety, AVR on Coumadin, hyperlipidemia, and hypothyroidism was admitted on 4/30/2025 after having laparoscopic cholecystectomy.[facility] consulted to manage and monitor CHF and mechanical valve.  Patient was off Coumadin for 5 days prior to surgery and then was advised to take 10 mg after surgery and then resume home dosing of 5 mg-7.5 mg as directed.  Patient states she feels well and would like to discharge to home.  Patient ambulated with PT, recommend home with assist.  Patient is a plan to discharge to home and follow-up with primary care doctor.      Laparoscopic cholecystectomy  Postop day 1 cholecystectomy on 4/30/2025  -- Patient stable postprocedure.  Minimal pain.  Patient denies nausea  -- Surgery team managing pain, nausea, and bowel regimen, recommend okay to discharge to home  -- Lap sites CDI  Surgery recommendation:Regular diet PO pain medications,Encourage activity and ambulation to promote bowel function,Monitor for bleeding from incision sites, may need pressure dressing  Appreciate medical management including anticoagulation  regimen per [facility],Anticipate discharge later today.     AVR on Coumadin  -- 10 mg of Coumadin ordered for tonight  -Patient placed on IV heparin last night  -- Pharmacy consulted: Recommend discontinue heparin 1 dose Lovenox 1 mg/kg today before discharge  -Coumadin 10 mg tonight, then resume normal dosing  -Recheck INR Friday and Monday and follow-up with home management INR team  --Daily INR-goal range 2-3     CHF  --Patient takes furosemide as needed at home  -- Patient appears euvolemic     Hyperlipidemia  -- PTA atorvastatin held while inpatient     Hypothyroidism  -- PTA levothyroxine     Anxiety  -- PTA bupropion          Consultations This Hospital Stay   HOSPITALIST IP CONSULT  PHARMACY TO DOSE WARFARIN  PHARMACY IP CONSULT  PHYSICAL THERAPY ADULT IP CONSULT    Code Status   Full Code    Time Spent on this Encounter   I, Petrona Ziegler NP, personally saw the patient today and spent greater than 30 minutes discharging this patient.       Petrona Ziegler NP  [facility] EXTENDED [facility]OV[facility] SHORT STAY  [address] [place] [id number]  Phone: [phone]  Fax: [phone]  ______________________________________________________________________    Physical Exam   Vital Signs: Temp: 98  F (36.7  C) Temp src: Oral BP: 103/58 Pulse: 63   Resp: 16 SpO2: 98 % O2 Device: None (Room air) Oxygen Delivery: 2 LPM  Weight: 139 lbs 15.87 oz  Constitutional: awake, alert, cooperative, no apparent distress, and appears stated age  Hematologic / Lymphatic: no cervical lymphadenopathy and no supraclavicular lymphadenopathy  Respiratory: No increased work of breathing, good air exchange, clear to auscultation bilaterally, no crackles or wheezing  Cardiovascular: Normal apical impulse, regular rate and rhythm, normal S1 and S2, no S3 or S4, and no murmur noted  GI: No scars, normal bowel sounds, soft, non-distended, non-tender, no masses palpated, no hepatosplenomegally  Skin: Abdominal  postoperative sites bleeding controlled with dressing; clean dry intact  Musculoskeletal: There is no redness, warmth, or swelling of the joints.    Neurologic: Awake, alert, oriented to name, place and time.  Cranial nerves II-XII are grossly intact.  Motor is 5 out of 5 bilaterally.  Cerebellar finger to nose, heel to shin intact.  Sensory is intact.  Babinski down going, Romberg negative, and gait is normal.  Neuropsychiatric: General: normal, calm, and normal eye contact       Primary Care Physician   CUCO ALEMAN    Discharge Orders      When to call - Contact Surgeon Team    You may experience symptoms that require follow-up before your scheduled appointment. Contact your Surgeon Team if you are concerned about pain control, large amount of bleeding, blood clots, constipation, or if you experience signs of infection (fever, growing tenderness at the surgery site, a large amount of drainage, severe pain, foul-smelling drainage, redness or swelling.  Please call [phone] if you do have any questions or concerns     When to call - Reasons to Call 911    Call 911 immediately if you experience sudden-onset chest pain, arm weakness/numbness, slurred speech, or shortness of breath     Symptoms - Fever Management    A low grade fever can be expected after surgery. Your Provider many have prescribed an Opioid pain medication that also contains acetaminophen (TYLENOL) that may help with Fever management.  Do NOT take additional acetaminophen (TYLENOL) in combination with an Opioid/acetaminophen (TYLENOL) product. Read the labels on your Over The Counter (OTC) medications with care.     Diet Instructions    You may drink clear liquids (apple juice, ginger ale, 7-up, broth, etc.), and progress to your regular diet as you feel able. It is important to stay well-hydrated after surgery and drink plenty of water.     Shower/Bathing - Restrictions: Let water run over incisions and pat dry. No tub baths until bleeding  stops.    Shower/Bathing - Restrictions: Let water run over incisions and pat dry. Do NOT soak in any body of water (lake, pool, bath, etc.) for 7-10 days postoperatively.     Dressing / Wound Care - Wound    Remove Band-Aids in 1 to 2 days.  Leave Steri-Strips for 7 to 10 days.  The sutures are underneath the skin and will dissolve on their own.     Discharge Instructions - Comfort and Pain Management    Pain after surgery is normal and expected. You will have some amount of pain after surgery. Your pain will improve with time. There are several things you can do to help reduce your pain including: rest, ice, and using pain medications as needed. Use pain interventions and don't wait until pain level is out of control. Contact your Surgeon Team if you have pain that persists or worsens after surgery despite rest, ice, and taking your medication(s) as prescribed. You may have a dry mouth, a sore throat, muscles aches or trouble sleeping, and these symptoms should go away after 24 hours.     Discharge Instructions - Rest    Rest and relax for the next 24 hours. Make arrangements to have someone stay with you overnight, and avoid hazardous and strenuous activities.  Do NOT make any important decisions for the next 24 hours.     Return to normal activity as tolerated    Return to normal activity as tolerated     May return to work (WITHOUT restrictions)    May return to work as you feel up to it.  Everyone is different.  Average time off work is approximately 1 week.     Reason for your hospital stay    Cholecystitis  Cholecystectomy  Anticoagulation, with history of aortic valve replacement     Activity    Your activity upon discharge: activity as tolerated     Discharge Instructions    Check your INR on Friday and again on Monday follow-up with  home management team     Diet    Follow this diet upon discharge: Current Diet:Orders Placed This Encounter      Regular Diet Adult     Hospital Follow-up with Existing  Primary Care Provider (PCP)            Significant Results and Procedures   Most Recent 3 CBC's:  Recent Labs   Lab Test 25  0251 04/15/25  1138 10/09/24  1139   WBC 7.4 8.1 6.0   HGB 11.6* 13.9 13.5   MCV 88 87 89    245 214     Most Recent 3 BMP's:  Recent Labs   Lab Test 25  0615 25  0251 04/15/25  1138 10/09/24  1129   NA  --  140 139 142   POTASSIUM  --  4.6 4.1 3.9   CHLORIDE  --  107 102 104   CO2  --  29 25 30*   BUN  --  10.1 13.8 9.8   CR  --  0.79 0.93 1.03*   ANIONGAP  --  4* 12 8   ALVAREZ  --  8.6* 10.3 9.5   * 140* 80 72   ,   Results for orders placed or performed during the hospital encounter of 25   Echocardiogram Complete     Value    LVEF  55-60%    Narrative    [id number]  [id number]  [id number]  [id number]^JOHN^[place] [address]     Name: DONNA FIGUEROA  MRN: [id number]  : 1955  Study Date: 2025 01:24 PM  Age: 69 yrs  Gender: Female  Patient Location: [facility]  Reason For Study: Rheumatic mitral regurgitation  Ordering Physician: RENE LOPEZ  Referring Physician: RENE LOPEZ  Performed By: MB     BSA: 1.6 m2  Height: 63 in  Weight: 132 lb  BP: 120/75 mmHg  ______________________________________________________________________________  Procedure  Echocardiogram with two-dimensional, color and spectral Doppler. Adequate  quality two-dimensional was performed and interpreted.  ______________________________________________________________________________  Interpretation Summary     The left ventricle is normal in size. There is mild concentric left  ventricular hypertrophy. The visual ejection fraction is 55-60%.  The right ventricle is normal size. Mildly decreased right ventricular  systolic function  The mitral valve appears rheumatic. There is mild (1+) mitral regurgitation.  There is mild mitral stenosis.  s/p [employer] Daron mechanical prosthesis in the aortic position; no  prosthetic  stenosis (peak velocity 2.6m/s, mean gradient 14mmHg, DI 0.34, AT 70 m), mild  paravalvular regurgitation  Compared to prior study 8/8/2024, mildly decreased RV function is new  ______________________________________________________________________________  Left Ventricle  The left ventricle is normal in size. Proximal septal thickening is noted.  There is mild concentric left ventricular hypertrophy. Left ventricular  systolic function is normal. The visual ejection fraction is 55-60%. No  regional wall motion abnormalities noted.     Right Ventricle  The right ventricle is normal size. Mildly decreased right ventricular  systolic function. There is a pacemaker lead in the right ventricle.     Atria  Normal left atrial size. The left atrium is severely dilated. Right atrial  size is normal.     Mitral Valve  Calcified mitral apparatus. The mitral valve appears rheumatic. There is mild  (1+) mitral regurgitation. There is mild mitral stenosis.     Tricuspid Valve  Tricuspid valve leaflets appear normal. There is mild (1+) tricuspid  regurgitation. The right ventricular systolic pressure is approximated at 19.6  mmHg plus the right atrial pressure.     Aortic Valve  s/p St Daron mechanical prosthesis in the aortic position; no prosthetic  stenosis (peak velocity 2.6m/s, mean gradient 14mmHg, DI 0.34, AT 70 m), mild  paravalvular regurgitation.     Pulmonic Valve  The pulmonic valve is not well visualized. There is trace pulmonic valvular  regurgitation.     Vessels  The aorta root is normal. IVC diameter <2.1 cm collapsing >50% with sniff  suggests a normal RA pressure of 3 mmHg.     Pericardium  There is no pericardial effusion.     Rhythm  Sinus rhythm was noted.  ______________________________________________________________________________  MMode/2D Measurements & Calculations     IVSd: 1.4 cm  LVIDd: 3.7 cm  LVIDs: 2.3 cm  LVPWd: 1.1 cm  FS: 37.4 %  LV mass(C)d: 157.7 grams  LV mass(C)dI: 97.3  grams/m2  LA dimension: 4.5 cm  asc Aorta Diam: 3.2 cm  LVOT diam: 1.8 cm  LVOT area: 2.5 cm2  Asc Ao diam index BSA (cm/m2): 2.0  Asc Ao diam index Ht(cm/m): 2.0  LA Volume (BP): 101.0 ml     LA Volume Index (BP): 62.3 ml/m2  LA Volume Indexed (AL/bp): 64.7 ml/m2  RV Base: 3.2 cm  RWT: 0.58  TAPSE: 1.4 cm     Doppler Measurements & Calculations  MV E max luis m: 112.5 cm/sec  MV A max luis m: 130.0 cm/sec  MV E/A: 0.87  MV max PG: 10.8 mmHg  MV mean P.0 mmHg  MV V2 VTI: 44.2 cm  MVA(VTI): 1.1 cm2     MV P1/2t max luis m: 143.2 cm/sec  MV P1/2t: 107.1 msec  MVA(P1/2t): 2.1 cm2  MV dec slope: 391.8 cm/sec2  MV dec time: 0.35 sec  Ao V2 max: 264.2 cm/sec  Ao max P.0 mmHg  Ao V2 mean: 173.5 cm/sec  Ao mean P.3 mmHg  Ao V2 VTI: 51.8 cm  ODILON(I,D): 0.94 cm2  ODILON(V,D): 0.85 cm2  AI P1/2t: 547.1 msec  Ao acc time: 0.07 sec  LV V1 max PG: 3.3 mmHg  LV V1 max: 90.7 cm/sec  LV V1 VTI: 19.7 cm  SV(LVOT): 48.7 ml  SI(LVOT): 30.0 ml/m2  PA acc time: 0.14 sec  TR max luis m: 221.5 cm/sec  TR max P.6 mmHg  AV Lui Sm Ratio (DI): 0.34  ODILON Index (cm2/m2): 0.58  E/E' av.6  Lateral E/e': 25.2  Medial E/e': 22.0  RV S Luis M: 9.1 cm/sec     ______________________________________________________________________________  Report approved by: Mukesh Chaidez on 2025 02:34 PM           *Note: Due to a large number of results and/or encounters for the requested time period, some results have not been displayed. A complete set of results can be found in Results Review.       Discharge Medications   Current Discharge Medication List        START taking these medications    Details   HYDROcodone-acetaminophen (NORCO) 5-325 MG tablet Take 1-2 tablets by mouth every 4 hours as needed for moderate to severe pain.  Qty: 12 tablet, Refills: 0    Associated Diagnoses: Chronic cholecystitis      senna-docusate (SENOKOT-S/PERICOLACE) 8.6-50 MG tablet Take 1 tablet by mouth 2 times daily.  Qty: 30 tablet, Refills: 0    Associated Diagnoses:  Chronic cholecystitis           CONTINUE these medications which have CHANGED    Details   !! warfarin ANTICOAGULANT (COUMADIN) 10 MG tablet Take 1 tablet (10 mg) by mouth every evening.  Qty: 1 tablet, Refills: 0    Associated Diagnoses: H/O mechanical aortic valve replacement      !! warfarin ANTICOAGULANT (COUMADIN) 2.5 MG tablet TAKE 2 TO 3 TABLETS (5 MG - 7.5  MG) BY MOUTH DAILY OR AS  DIRECTED BY YOUR ACC TEAM BASED  ON INR RESULTS  Qty: 200 tablet, Refills: 1    Associated Diagnoses: H/O mechanical aortic valve replacement       !! - Potential duplicate medications found. Please discuss with provider.        CONTINUE these medications which have NOT CHANGED    Details   alendronate (FOSAMAX) 70 MG tablet TAKE 1 TABLET BY MOUTH WEEKLY  WITH 8 OZ OF PLAIN WATER 30  MINUTES BEFORE FIRST FOOD, DRINK OR MEDS. STAY UPRIGHT FOR 30  MINS  Qty: 12 tablet, Refills: 3    Comments: Requesting 1 year supply  Associated Diagnoses: Age-related osteoporosis without current pathological fracture      atorvastatin (LIPITOR) 40 MG tablet Take 1 tablet (40 mg) by mouth daily.  Qty: 100 tablet, Refills: 3    Associated Diagnoses: Hypercholesterolemia      buPROPion (WELLBUTRIN XL) 300 MG 24 hr tablet Take 1 tablet (300 mg) by mouth every morning.  Qty: 100 tablet, Refills: 3    Associated Diagnoses: Moderate recurrent major depression (H)      calcium carbonate 500 mg, elemental, (OSCAL 500) 1250 (500 Ca) MG TABS tablet Take by mouth daily. One-half of a tab by mouth every am      furosemide (LASIX) 40 MG tablet Take 1 tablet (40 mg) by mouth daily as needed  Qty: 90 tablet, Refills: 1    Associated Diagnoses: H/O mechanical aortic valve replacement      levothyroxine (SYNTHROID/LEVOTHROID) 88 MCG tablet Take 1 tablet (88 mcg) by mouth daily.  Qty: 90 tablet, Refills: 3    Associated Diagnoses: Acquired hypothyroidism      vitamin D3 (CHOLECALCIFEROL) 50 mcg (2000 units) tablet Take 1 tablet by mouth daily           Allergies    Allergies   Allergen Reactions    Demerol [Meperidine]      Hallucinations      Misc. Sulfonamide Containing Compounds Hives    Morphine     Sulfa Antibiotics Hives

## 2025-05-05 ENCOUNTER — TELEPHONE (OUTPATIENT)
Dept: SURGERY | Facility: CLINIC | Age: 70
End: 2025-05-05
Payer: COMMERCIAL

## 2025-05-05 LAB
PATH REPORT.COMMENTS IMP SPEC: NORMAL
PATH REPORT.COMMENTS IMP SPEC: NORMAL
PATH REPORT.FINAL DX SPEC: NORMAL
PATH REPORT.GROSS SPEC: NORMAL
PATH REPORT.MICROSCOPIC SPEC OTHER STN: NORMAL
PATH REPORT.RELEVANT HX SPEC: NORMAL
PHOTO IMAGE: NORMAL

## 2025-05-05 PROCEDURE — 88304 TISSUE EXAM BY PATHOLOGIST: CPT | Mod: 26 | Performed by: PATHOLOGY

## 2025-05-05 NOTE — TELEPHONE ENCOUNTER
[facility] Post-Op Phone Call                     Surgeon: Rocael Bird    Date of Surgery: 4/30/25  Surgery: Laparoscopic Cholecystectomy  Discharge Date: 5/1/25    Date/Time Called:   Date: 5/5/2025 Time: 9:06 AM   Attempt: First    Pain Control:  Intensity: Mild (1 - 3)  Duration/Location/Explain: soreness with movement but somewhat managed with tylenol  What makes it better/worse?     Medications:  Narcotic Use - Yes  Drug type: Norco  Frequency: Trying not to take them, has been taking at nighttime    Incisions:  Drainage? clean and dry  Any fever type symptoms? No  Comment: bruising    GI:  Nausea? No  Vomiting? No  BM? Yes  Gas? Yes  Voiding Frequency? 4 or more/day   Appetite? Good    Activity:  Walking activity? Yes  Frequency/Type: as tolerated  Restrictions: May return to work as you feel up to it.      Post-op appointment made? No          Thank you for your time. Please do not hesitate to call us with any questions or concerns.    Call completed by: Vaishnavi Rodriguez RN

## 2025-05-06 ENCOUNTER — ANTICOAGULATION THERAPY VISIT (OUTPATIENT)
Dept: ANTICOAGULATION | Facility: CLINIC | Age: 70
End: 2025-05-06
Payer: COMMERCIAL

## 2025-05-06 DIAGNOSIS — Z79.01 LONG TERM CURRENT USE OF ANTICOAGULANT THERAPY: ICD-10-CM

## 2025-05-06 DIAGNOSIS — Z95.2 H/O MECHANICAL AORTIC VALVE REPLACEMENT: Primary | ICD-10-CM

## 2025-05-06 LAB — INR HOME MONITORING: 2.2 (ref 2–3)

## 2025-05-06 NOTE — PROGRESS NOTES
ANTICOAGULATION MANAGEMENT     Tammy Law 69 year old female is on warfarin with therapeutic INR result. (Goal INR 2.0-3.0)    Recent labs: (last 7 days)     05/06/25  0000   INR 2.2       ASSESSMENT     Source(s): Chart Review and Patient/Caregiver Call     Warfarin doses taken: Warfarin taken as instructed  Diet: No new diet changes identified  Medication/supplement changes:  Norco  New illness, injury, or hospitalization: No  Signs or symptoms of bleeding or clotting: No  Previous result: Subtherapeutic  Additional findings: None       PLAN     Recommended plan for no diet, medication or health factor changes affecting INR     Dosing Instructions: Continue your current warfarin dose with next INR in 2 week       Summary  As of 5/6/2025      Full warfarin instructions:  7.5 mg every Tue; 5 mg all other days   Next INR check:  5/20/2025               Telephone call with Bisi who verbalizes understanding and agrees to plan and who agrees to plan and repeated back plan correctly    Patient to recheck with home meter    Education provided: Please call back if any changes to your diet, medications or how you've been taking warfarin  Contact [phone] with any changes, questions or concerns.     Plan made per [facility] anticoagulation protocol    Arnold Espinoza RN  5/6/2025  Anticoagulation Clinic  [employer] for routing messages: p ANTICOAG HOME MONITORING  [facility] patient phone line: [phone]        _______________________________________________________________________     Anticoagulation Episode Summary       Current INR goal:  2.0-3.0   TTR:  89.3% (1 y)   Target end date:  Indefinite   Send INR reminders to:  ANTICOAG HOME MONITORING    Indications    H/O mechanical aortic valve replacement [Z95.2]  Long term current use of anticoagulant therapy [Z79.01]             Comments:  [employer] home monitor- managed by exception  Goal range changed during 8/28-9/15/23  hospitalization             Anticoagulation  Care Providers       Provider Role Specialty Phone number    Arnold Anderson MD Referring Internal Medicine [phone]    Christina Hernandez MD Referring Family Medicine [phone]    Karina Cifuentes MD Referring Family Medicine [phone]

## 2025-05-15 ENCOUNTER — VIRTUAL VISIT (OUTPATIENT)
Dept: FAMILY MEDICINE | Facility: CLINIC | Age: 70
End: 2025-05-15
Payer: COMMERCIAL

## 2025-05-15 DIAGNOSIS — Z79.01 CHRONIC ANTICOAGULATION: ICD-10-CM

## 2025-05-15 DIAGNOSIS — C43.59 MALIGNANT MELANOMA OF SKIN OF TRUNK (H): ICD-10-CM

## 2025-05-15 DIAGNOSIS — Z95.0 CARDIAC PACEMAKER IN SITU: ICD-10-CM

## 2025-05-15 DIAGNOSIS — Z95.2 H/O MECHANICAL AORTIC VALVE REPLACEMENT: ICD-10-CM

## 2025-05-15 DIAGNOSIS — K81.1 CHRONIC CHOLECYSTITIS: ICD-10-CM

## 2025-05-15 DIAGNOSIS — I05.1 RHEUMATIC MITRAL REGURGITATION: ICD-10-CM

## 2025-05-15 DIAGNOSIS — Z90.49 S/P LAPAROSCOPIC CHOLECYSTECTOMY: Primary | ICD-10-CM

## 2025-05-15 NOTE — PROGRESS NOTES
Bisi is a 69 year old who is being evaluated via a billable video visit.    How would you like to obtain your AVS? MyChart  If the video visit is dropped, the invitation should be resent by: Send to e-mail at: [email or web address]  Will anyone else be joining your video visit?       Assessment & Plan     S/P laparoscopic cholecystectomy  Feeling better.  Strongly recommended that she follow the surgeon's instructions.    Chronic cholecystitis  Resolved surgically.    Rheumatic mitral regurgitation  Mild per echo last month    Chronic anticoagulation  Back on track since her surgery.    H/O mechanical aortic valve replacement  Doing well.    Cardiac pacemaker in situ  Working well.    Malignant melanoma of skin of trunk (H)  Every three month dermatology visits.    Will return in October for Medicare wellness (already scheduled.) See me sooner if needed.    MED REC REQUIRED  Post Medication Reconciliation Status:       Subjective   Bisi is a 69 year old, presenting for the following health issues:  Hospital F/U (4/30/2025 - 5/1/2025 (30 hours)/[facility]/Admitted for following procedure: /CHOLECYSTECTOMY, LAPAROSCOPIC (Abdomen)/LYSIS OF ADHESIONS (Abdomen)//)    Feels better. Feeling better. The abdomen was bruised but getting better. Eating normally. Coumadin dose is stable.  Over did it after surgery, so achey.  Still on iron from November. Never hungry. Review of her weight, it has been in the lower 130's since .  Kidney function was better in the hospital. She could see where she needs more water daily.        Hospital Follow-up Visit:  Hospital/Nursing Home/IP Rehab Facility: [facility]  Most Recent Admission Date: 4/30/2025   Most Recent Admission Diagnosis: Chronic RUQ pain - R10.11, G89.29  Sludge in gallbladder - K82.8   Most Recent Discharge Date: 5/1/2025   Most Recent Discharge Diagnosis: Chronic cholecystitis - K81.1  H/O mechanical aortic valve  replacement - Z95.2   Was the patient in the ICU or did the patient experience delirium during hospitalization?  No  Do you have any other stressors you would like to discuss with your provider? No    Problems taking medications regularly:  None  Medication changes since discharge: None  Problems adhering to non-medication therapy:  None    Summary of hospitalization:  [facility] discharge summary reviewed  Diagnostic Tests/Treatments reviewed.  Follow up needed: none  Other Healthcare Providers Involved in Patient s Care:         None  Update since discharge: improved.     Plan of care communicated with patient       Objective    Vitals - Patient Reported  Pain Score: Mild Pain (3)  Physical Exam   GENERAL: alert and no distress  EYES: Eyes grossly normal to inspection.  No discharge or erythema, or obvious scleral/conjunctival abnormalities.  RESP: No audible wheeze, cough, or visible cyanosis.    SKIN: Visible skin clear. No significant rash, abnormal pigmentation or lesions.  NEURO: Cranial nerves grossly intact.  Mentation and speech appropriate for age.  PSYCH: Appropriate affect, tone, and pace of words    She asked about her heart tests, they were reviewed with her in detail.  She was concerned about her blood sugar being high. That was only the night after surgery and was 140, then rest were 103 or less in the long term. I suspect the 140 was either a stress reaction or due to being drawn from the vein that held her IV with dextrose infusing.      Video-Visit Details    Type of service:  Video Visit    3:38:08 pm to 3:51:37 pm. Total: 13 minutes 28 seconds.  Originating Location (pt. Location): Home    Distant Location (provider location):  On-site  Platform used for Video Visit: Fuad  Signed Electronically by: CUCO ALEMAN MD

## 2025-05-20 ENCOUNTER — RESULTS FOLLOW-UP (OUTPATIENT)
Dept: ANTICOAGULATION | Facility: CLINIC | Age: 70
End: 2025-05-20

## 2025-05-20 ENCOUNTER — ANTICOAGULATION THERAPY VISIT (OUTPATIENT)
Dept: ANTICOAGULATION | Facility: CLINIC | Age: 70
End: 2025-05-20
Payer: COMMERCIAL

## 2025-05-20 DIAGNOSIS — Z95.2 H/O MECHANICAL AORTIC VALVE REPLACEMENT: Primary | ICD-10-CM

## 2025-05-20 DIAGNOSIS — Z79.01 LONG TERM CURRENT USE OF ANTICOAGULANT THERAPY: ICD-10-CM

## 2025-05-20 LAB — INR HOME MONITORING: 2.5 (ref 2–3)

## 2025-05-20 NOTE — PROGRESS NOTES
ANTICOAGULATION MANAGEMENT     Tammy Law 69 year old female is on warfarin with therapeutic INR result. (Goal INR 2.0-3.0)    Recent labs: (last 7 days)     05/20/25  0000   INR 2.5       ASSESSMENT     Source(s): Chart Review and Patient/Caregiver Call     Warfarin doses taken: Warfarin taken as instructed  Diet: No new diet changes identified  Medication/supplement changes: None noted  New illness, injury, or hospitalization: No  Signs or symptoms of bleeding or clotting: No  Previous result: Therapeutic last visit; previously outside of goal range  Additional findings: None       PLAN     Recommended plan for no diet, medication or health factor changes affecting INR     Dosing Instructions: Continue your current warfarin dose with next INR in 2 weeks       Summary  As of 5/20/2025      Full warfarin instructions:  7.5 mg every Tue; 5 mg all other days   Next INR check:  6/3/2025               Telephone call with Bisi who verbalizes understanding and agrees to plan and who agrees to plan and repeated back plan correctly    Patient to recheck with home meter    Education provided: Resume manage by exception with home monitor. Continue to submit INR results to home monitor company.You will only be called when your result is out of range and at 90 day check in. Please call and notify [facility] if new medication started, dose missed, signs or symptoms of bleeding or clotting, or a surgery/procedure is scheduled. Due for next call no later than: 8/18/25.  Contact [phone] with any changes, questions or concerns.     Plan made per [facility] anticoagulation protocol    Shea Day RN  5/20/2025  Anticoagulation Clinic  [facility] for routing messages: [employer]  [facility] patient phone line: [phone]        _______________________________________________________________________     Anticoagulation Episode Summary       Current INR goal:  2.0-3.0   TTR:  89.3% (1 y)   Target end date:  Indefinite    Send INR reminders to:  [facility]    Indications    H/O mechanical aortic valve replacement [Z95.2]  Long term current use of anticoagulant therapy [Z79.01]             Comments:  [employer] home monitor- managed by exception  Goal range changed during 8/28-9/15/23  hospitalization             Anticoagulation Care Providers       Provider Role Specialty Phone number    Arnold Anderson MD Referring Internal Medicine [phone]    Christina Hernandez MD Referring Family Medicine [phone]    Karina Cifuentes MD Referring Family Medicine [phone]

## 2025-06-06 ENCOUNTER — RESULTS FOLLOW-UP (OUTPATIENT)
Dept: ANTICOAGULATION | Facility: CLINIC | Age: 70
End: 2025-06-06

## 2025-06-06 ENCOUNTER — ANCILLARY PROCEDURE (OUTPATIENT)
Dept: CARDIOLOGY | Facility: CLINIC | Age: 70
End: 2025-06-06
Attending: INTERNAL MEDICINE
Payer: COMMERCIAL

## 2025-06-06 DIAGNOSIS — Z95.0 CARDIAC PACEMAKER IN SITU: ICD-10-CM

## 2025-06-06 DIAGNOSIS — I49.5 SICK SINUS SYNDROME (H): ICD-10-CM

## 2025-06-06 LAB
MDC_IDC_LEAD_CONNECTION_STATUS: NORMAL
MDC_IDC_LEAD_CONNECTION_STATUS: NORMAL
MDC_IDC_LEAD_IMPLANT_DT: NORMAL
MDC_IDC_LEAD_IMPLANT_DT: NORMAL
MDC_IDC_LEAD_LOCATION: NORMAL
MDC_IDC_LEAD_LOCATION: NORMAL
MDC_IDC_LEAD_LOCATION_DETAIL_1: NORMAL
MDC_IDC_LEAD_LOCATION_DETAIL_1: NORMAL
MDC_IDC_LEAD_MFG: NORMAL
MDC_IDC_LEAD_MFG: NORMAL
MDC_IDC_LEAD_MODEL: NORMAL
MDC_IDC_LEAD_MODEL: NORMAL
MDC_IDC_LEAD_POLARITY_TYPE: NORMAL
MDC_IDC_LEAD_POLARITY_TYPE: NORMAL
MDC_IDC_LEAD_SERIAL: NORMAL
MDC_IDC_LEAD_SERIAL: NORMAL
MDC_IDC_LEAD_SPECIAL_FUNCTION: NORMAL
MDC_IDC_LEAD_SPECIAL_FUNCTION: NORMAL
MDC_IDC_MSMT_BATTERY_DTM: NORMAL
MDC_IDC_MSMT_BATTERY_REMAINING_PERCENTAGE: 40 %
MDC_IDC_MSMT_BATTERY_STATUS: NORMAL
MDC_IDC_MSMT_LEADCHNL_RA_IMPEDANCE_VALUE: 605 OHM
MDC_IDC_MSMT_LEADCHNL_RA_LEAD_CHANNEL_STATUS: NORMAL
MDC_IDC_MSMT_LEADCHNL_RV_IMPEDANCE_VALUE: 566 OHM
MDC_IDC_MSMT_LEADCHNL_RV_LEAD_CHANNEL_STATUS: NORMAL
MDC_IDC_PG_IMPLANT_DTM: NORMAL
MDC_IDC_PG_MFG: NORMAL
MDC_IDC_PG_MODEL: NORMAL
MDC_IDC_PG_SERIAL: NORMAL
MDC_IDC_PG_TYPE: NORMAL
MDC_IDC_SESS_CLINIC_NAME: NORMAL
MDC_IDC_SESS_DTM: NORMAL
MDC_IDC_SESS_REPROGRAMMED: NO
MDC_IDC_SESS_TYPE: NORMAL
MDC_IDC_SET_BRADY_AT_MODE_SWITCH_MODE: NORMAL
MDC_IDC_SET_BRADY_AT_MODE_SWITCH_RATE: 160 {BEATS}/MIN
MDC_IDC_SET_BRADY_LOWRATE: 60 {BEATS}/MIN
MDC_IDC_SET_BRADY_MAX_SENSOR_RATE: 120 {BEATS}/MIN
MDC_IDC_SET_BRADY_MAX_TRACKING_RATE: 130 {BEATS}/MIN
MDC_IDC_SET_BRADY_MODE: NORMAL
MDC_IDC_SET_BRADY_PAV_DELAY_LOW: 200 MS
MDC_IDC_SET_BRADY_SAV_DELAY_LOW: 180 MS
MDC_IDC_SET_LEADCHNL_RA_PACING_AMPLITUDE: 1.6 V
MDC_IDC_SET_LEADCHNL_RA_PACING_POLARITY: NORMAL
MDC_IDC_SET_LEADCHNL_RA_PACING_PULSEWIDTH: 0.4 MS
MDC_IDC_SET_LEADCHNL_RA_SENSING_ADAPTATION_MODE: NORMAL
MDC_IDC_SET_LEADCHNL_RA_SENSING_POLARITY: NORMAL
MDC_IDC_SET_LEADCHNL_RV_PACING_AMPLITUDE: 2.4 V
MDC_IDC_SET_LEADCHNL_RV_PACING_POLARITY: NORMAL
MDC_IDC_SET_LEADCHNL_RV_PACING_PULSEWIDTH: 0.4 MS
MDC_IDC_SET_LEADCHNL_RV_SENSING_ADAPTATION_MODE: NORMAL
MDC_IDC_SET_LEADCHNL_RV_SENSING_POLARITY: NORMAL
MDC_IDC_STAT_AT_BURDEN_PERCENT: 0 %
MDC_IDC_STAT_AT_DTM_END: NORMAL
MDC_IDC_STAT_AT_DTM_START: NORMAL
MDC_IDC_STAT_AT_MODE_SW_COUNT_PER_DAY: 4
MDC_IDC_STAT_AT_MODE_SW_PERCENT_TIME_PER_DAY: 0 %
MDC_IDC_STAT_BRADY_AP_VP_PERCENT: 41 %
MDC_IDC_STAT_BRADY_AP_VS_PERCENT: 46 %
MDC_IDC_STAT_BRADY_AS_VP_PERCENT: 1 %
MDC_IDC_STAT_BRADY_AS_VS_PERCENT: 11 %
MDC_IDC_STAT_BRADY_DTM_END: NORMAL
MDC_IDC_STAT_BRADY_DTM_START: NORMAL
MDC_IDC_STAT_BRADY_RA_PERCENT_PACED: 88 %
MDC_IDC_STAT_BRADY_RV_PERCENT_PACED: 42 %
MDC_IDC_STAT_EPISODE_RECENT_COUNT: 1
MDC_IDC_STAT_EPISODE_TYPE: NORMAL
MDC_IDC_STAT_EPISODE_VENDOR_TYPE: NORMAL
MDC_IDC_STAT_HEART_RATE_ATRIAL_MEAN: 71 {BEATS}/MIN
MDC_IDC_STAT_HEART_RATE_DTM_END: NORMAL
MDC_IDC_STAT_HEART_RATE_DTM_START: NORMAL
MDC_IDC_STAT_HEART_RATE_VENTRICULAR_MEAN: 71 {BEATS}/MIN

## 2025-06-06 PROCEDURE — 93294 REM INTERROG EVL PM/LDLS PM: CPT | Performed by: INTERNAL MEDICINE

## 2025-06-06 PROCEDURE — 93296 REM INTERROG EVL PM/IDS: CPT | Performed by: INTERNAL MEDICINE

## 2025-06-17 ENCOUNTER — DOCUMENTATION ONLY (OUTPATIENT)
Dept: ANTICOAGULATION | Facility: CLINIC | Age: 70
End: 2025-06-17
Payer: COMMERCIAL

## 2025-06-17 ENCOUNTER — RESULTS FOLLOW-UP (OUTPATIENT)
Dept: ANTICOAGULATION | Facility: CLINIC | Age: 70
End: 2025-06-17

## 2025-06-17 DIAGNOSIS — Z79.01 LONG TERM CURRENT USE OF ANTICOAGULANT THERAPY: ICD-10-CM

## 2025-06-17 DIAGNOSIS — Z95.2 H/O MECHANICAL AORTIC VALVE REPLACEMENT: Primary | ICD-10-CM

## 2025-06-17 LAB — INR HOME MONITORING: 2.9 (ref 2–3)

## 2025-06-17 NOTE — PROGRESS NOTES
ANTICOAGULATION  MANAGEMENT-Home Monitor Managed by Exception    Tammyrober Law 69 year old female is on warfarin with therapeutic INR result. (Goal INR 2.0-3.0)    Recent labs: (last 7 days)     06/17/25  0000   INR 2.9       Previous INR was Therapeutic  Medication, diet, health changes since last INR:chart reviewed; none identified  Contacted within the last 12 weeks by phone on 05/20/25  Due for next call no later than: 8/18/25.   Last [facility] referral date: 02/04/2025      ZAIN Munoz was NOT contacted regarding therapeutic result today per home monitoring policy manage by exception agreement.   Current warfarin dose is to be continued:     Summary  As of 6/17/2025      Full warfarin instructions:  7.5 mg every Tue; 5 mg all other days   Next INR check:  7/1/2025             ?   Jacqueline Juárez, RN  Anticoagulation Clinic  6/17/2025    _______________________________________________________________________     Anticoagulation Episode Summary       Current INR goal:  2.0-3.0   TTR:  89.3% (1 y)   Target end date:  Indefinite   Send INR reminders to:  [facility]    Indications    H/O mechanical aortic valve replacement [Z95.2]  Long term current use of anticoagulant therapy [Z79.01]             Comments:  [employer] home monitor- managed by exception  Goal range changed during 8/28-9/15/23  hospitalization             Anticoagulation Care Providers       Provider Role Specialty Phone number    Arnold Anderson MD Referring Internal Medicine [phone]    Christina Hernandez MD Referring Family Medicine [phone]    Karina Cifuentes MD Referring Family Medicine [phone]

## 2025-07-01 ENCOUNTER — RESULTS FOLLOW-UP (OUTPATIENT)
Dept: ANTICOAGULATION | Facility: CLINIC | Age: 70
End: 2025-07-01
Payer: COMMERCIAL

## 2025-07-01 ENCOUNTER — ANTICOAGULATION THERAPY VISIT (OUTPATIENT)
Dept: ANTICOAGULATION | Facility: CLINIC | Age: 70
End: 2025-07-01
Payer: COMMERCIAL

## 2025-07-01 DIAGNOSIS — Z79.01 LONG TERM CURRENT USE OF ANTICOAGULANT THERAPY: ICD-10-CM

## 2025-07-01 DIAGNOSIS — Z95.2 H/O MECHANICAL AORTIC VALVE REPLACEMENT: Primary | ICD-10-CM

## 2025-07-01 LAB — INR HOME MONITORING: 3.3 (ref 2–3)

## 2025-07-01 NOTE — PROGRESS NOTES
ANTICOAGULATION MANAGEMENT     Tammy Law 69 year old female is on warfarin with supratherapeutic INR result. (Goal INR 2.0-3.0)    Recent labs: (last 7 days)     07/01/25  0000   INR 3.3*       ASSESSMENT     Source(s): Chart Review and Patient/Caregiver Call     Warfarin doses taken: Warfarin taken as instructed  Diet: No new diet changes identified  Medication/supplement changes: Yes: she is taking Tylenol cold and flu (4 tablets total in the last 24 hours)  New illness, injury, or hospitalization: Yes: cold symptoms and constipation  Signs or symptoms of bleeding or clotting: Yes: bruising on legs from recent yard work  Previous result: Therapeutic last 2(+) visits  Additional findings: Instructed patient to notify [facility] if symptoms are not improving or if she starts new medications as a sooner recheck might be needed       PLAN     Recommended plan for temporary change(s) affecting INR     Dosing Instructions: partial hold then continue your current warfarin dose with next INR in 2 weeks       Summary  As of 7/1/2025      Full warfarin instructions:  7/1: 5 mg; Otherwise 7.5 mg every Tue; 5 mg all other days   Next INR check:  7/15/2025               Telephone call with Bisi who verbalizes understanding and agrees to plan    Patient to recheck with home meter    Education provided: Importance of notifying anticoagulation clinic for: changes in medications; a sooner lab recheck maybe needed and diarrhea, nausea/vomiting, reduced intake, cold/flu, and/or infections; a sooner lab recheck maybe needed  Contact [phone] with any changes, questions or concerns.     Plan made per [facility] anticoagulation protocol    Sunita Rose RN  7/1/2025  Anticoagulation Clinic  [employer] for routing messages: [employer]  [facility] patient phone line: [phone]        _______________________________________________________________________     Anticoagulation Episode Summary       Current INR goal:   2.0-3.0   TTR:  86.5% (1 y)   Target end date:  Indefinite   Send INR reminders to:  [facility]    Indications    H/O mechanical aortic valve replacement [Z95.2]  Long term current use of anticoagulant therapy [Z79.01]             Comments:  [employer] home monitor- managed by exception  Goal range changed during 8/28-9/15/23  hospitalization             Anticoagulation Care Providers       Provider Role Specialty Phone number    Arnold Anderson MD Referring Internal Medicine [phone]    Christina Hernandez MD Referring Family Medicine [phone]    Karina Cifuentes MD Referring Family Medicine [phone]

## 2025-07-02 ENCOUNTER — TRANSFERRED RECORDS (OUTPATIENT)
Dept: HEALTH INFORMATION MANAGEMENT | Facility: CLINIC | Age: 70
End: 2025-07-02
Payer: COMMERCIAL

## 2025-07-03 ENCOUNTER — VIRTUAL VISIT (OUTPATIENT)
Dept: FAMILY MEDICINE | Facility: CLINIC | Age: 70
End: 2025-07-03
Payer: COMMERCIAL

## 2025-07-03 DIAGNOSIS — J06.9 VIRAL UPPER RESPIRATORY TRACT INFECTION: Primary | ICD-10-CM

## 2025-07-03 DIAGNOSIS — R10.32 LLQ ABDOMINAL PAIN: ICD-10-CM

## 2025-07-03 PROCEDURE — 98013 SYNCH AUDIO-ONLY EST LOW 20: CPT | Performed by: FAMILY MEDICINE

## 2025-07-03 NOTE — PROGRESS NOTES
"Bisi is a 69 year old who is being evaluated via a billable telephone visit.    What phone number would you like to be contacted at? [phone]  How would you like to obtain your AVS? Daryl  Originating Location (pt. Location): Home  {PROVIDER LOCATION On-site should be selected for visits conducted from your clinic location or adjoining [facility] hospital, academic office, or other nearby [facility] building. Off-site should be selected for all other provider locations, including home:412178}  Distant Location (provider location):  On-site  Telephone visit completed due to the patient did not consent to a video visit.    {PROVIDER CHARTING PREFERENCE:110264}    Subjective   Bisi is a 69 year old, presenting for the following health issues:  URI (Eyes burning, headache, runny nose and stuffed up, achy x4 days)      7/3/2025     2:29 PM   Additional Questions   Roomed by roel ESQUIVEL   Accompanied by alone     URI    History of Present Illness       Headaches:   Since the patient's last clinic visit, headaches are: no change  The patient is getting headaches:  Often  during the day  She is able to do normal daily activities when she has a migraine.  The patient is taking the following rescue/relief medications:  Tylenol   Patient states \"I get only a small amount of relief\" from the rescue/relief medications.   The patient is taking the following medications to prevent migraines:  No medications to prevent migraines  In the past 4 weeks, the patient has gone to an Urgent Care or Emergency Room 0 times times due to headaches.    Reason for visit:  I can't seem to get rid of my cold or what ever it is  Symptom onset:  3-7 days ago  Symptoms include:  Headache  runny nose  eyes burning ocer all just feel crap  Symptom intensity:  Moderate  Symptom progression:  Staying the same  Had these symptoms before:  Yes  Has tried/received treatment for these symptoms:  No  What makes it worse:  Not really  What makes it better:  No   She " is taking medications regularly.      Mark got sick end of last weekend  I cought whatever he had   Stated getting sick 5 days ago,  sore thorat, nose is so sutffed up, eyes are burning, headache uner eyes and back of head, no fever, no shortness of breath    Throat is not as sore    Robittusin  Afrin - short         Anoterh issue  When she lies on her right sie she gets a pin on her left side way down low. A week or longer. Not killing.  When hspeter lies on her left it   Constpated  Hasn't pooped for 4 days  Tried Miralax - a lite bit not much  No diverticulie  No pain wihturnation  Stools are dark brown    5:22 - 5:40      {SUPERLIST (Optional):408999}  {additonal problems for provider to add (Optional):330202}    {ROS Picklists (Optional):079572}      Objective           Vitals:  No vitals were obtained today due to virtual visit.    Physical Exam   General: Alert and no distress //Respiratory: No audible wheeze, cough, or shortness of breath // Psychiatric:  Appropriate affect, tone, and pace of words      {Diagnostic Test Results (Optional):765958}      Phone call duration: *** minutes  Signed Electronically by: Carolynn De Anda MD  {Email feedback regarding this note to [email or web address]   :189188}   Appropriate affect, tone, and pace of words          Phone call duration: 18 minutes : 5:22 - 5:40 PM  Signed Electronically by: Carolynn De Anda MD

## 2025-07-10 ENCOUNTER — TELEPHONE (OUTPATIENT)
Dept: FAMILY MEDICINE | Facility: CLINIC | Age: 70
End: 2025-07-10
Payer: COMMERCIAL

## 2025-07-10 NOTE — TELEPHONE ENCOUNTER
----- Message from KARINA ALEMAN sent at 7/10/2025  3:45 PM CDT -----  Regarding: schedule within a month  Linda Mathews needs to be seen within 30 days. Same day or auth visit is fine. Note: weight loss  Thank you.  Karina Aleman MD

## 2025-07-15 ENCOUNTER — ANTICOAGULATION THERAPY VISIT (OUTPATIENT)
Dept: ANTICOAGULATION | Facility: CLINIC | Age: 70
End: 2025-07-15
Payer: COMMERCIAL

## 2025-07-15 DIAGNOSIS — Z95.2 H/O MECHANICAL AORTIC VALVE REPLACEMENT: Primary | ICD-10-CM

## 2025-07-15 DIAGNOSIS — Z79.01 LONG TERM CURRENT USE OF ANTICOAGULANT THERAPY: ICD-10-CM

## 2025-07-15 LAB — INR HOME MONITORING: 2.9 (ref 2–3)

## 2025-07-15 NOTE — PROGRESS NOTES
ANTICOAGULATION MANAGEMENT     Tammy Law 69 year old female is on warfarin with therapeutic INR result. (Goal INR 2.0-3.0)    Recent labs: (last 7 days)     07/15/25  0000   INR 2.9       ASSESSMENT     Source(s): Chart Review and Patient/Caregiver Call     Warfarin doses taken: Warfarin taken as instructed  Diet: No new diet changes identified  Medication/supplement changes: None noted  New illness, injury, or hospitalization: No  Signs or symptoms of bleeding or clotting: No  Previous result: Supratherapeutic  Additional findings: patient seeing PCP next Monday for ongoing unexpected weight loss, denies any changes to BM, does report having a small appetite but no major changes in lifestyle habits        PLAN     Recommended plan for no diet, medication or health factor changes affecting INR     Dosing Instructions: Continue your current warfarin dose with next INR in 2 weeks       Summary  As of 7/15/2025      Full warfarin instructions:  7.5 mg every Tue; 5 mg all other days   Next INR check:  7/29/2025               Telephone call with Bisi who verbalizes understanding and agrees to plan and who agrees to plan and repeated back plan correctly    Patient to recheck with home meter    Education provided: Resume manage by exception with home monitor. Continue to submit INR results to home monitor company.You will only be called when your result is out of range and at 90 day check in. Please call and notify [facility] if new medication started, dose missed, signs or symptoms of bleeding or clotting, or a surgery/procedure is scheduled. Due for next call no later than: 10/13/25.  Contact [phone] with any changes, questions or concerns.     Plan made per [facility] anticoagulation protocol    Shea Day RN  7/15/2025  Anticoagulation Clinic  [employer] for routing messages: [employer]  [facility] patient phone line:  [phone]        _______________________________________________________________________     Anticoagulation Episode Summary       Current INR goal:  2.0-3.0   TTR:  83.6% (1 y)   Target end date:  Indefinite   Send INR reminders to:  [facility]    Indications    H/O mechanical aortic valve replacement [Z95.2]  Long term current use of anticoagulant therapy [Z79.01]             Comments:  [employer] home monitor- managed by exception  Goal range changed during 8/28-9/15/23  hospitalization             Anticoagulation Care Providers       Provider Role Specialty Phone number    Arnold Anderson MD Referring Internal Medicine [phone]    Christina Hernandez MD Referring Family Medicine [phone]    Karina Cifuentes MD Referring Family Medicine [phone]

## 2025-07-16 DIAGNOSIS — I49.5 SICK SINUS SYNDROME (H): ICD-10-CM

## 2025-07-16 DIAGNOSIS — Z95.0 CARDIAC PACEMAKER IN SITU: Primary | ICD-10-CM

## 2025-07-21 ENCOUNTER — ANCILLARY PROCEDURE (OUTPATIENT)
Dept: GENERAL RADIOLOGY | Facility: CLINIC | Age: 70
End: 2025-07-21
Attending: FAMILY MEDICINE
Payer: COMMERCIAL

## 2025-07-21 ENCOUNTER — OFFICE VISIT (OUTPATIENT)
Dept: FAMILY MEDICINE | Facility: CLINIC | Age: 70
End: 2025-07-21
Payer: COMMERCIAL

## 2025-07-21 VITALS
BODY MASS INDEX: 22.13 KG/M2 | WEIGHT: 129.6 LBS | DIASTOLIC BLOOD PRESSURE: 70 MMHG | OXYGEN SATURATION: 99 % | HEIGHT: 64 IN | HEART RATE: 64 BPM | SYSTOLIC BLOOD PRESSURE: 105 MMHG | TEMPERATURE: 98.2 F | RESPIRATION RATE: 14 BRPM

## 2025-07-21 DIAGNOSIS — R73.9 ELEVATED RANDOM BLOOD GLUCOSE LEVEL: ICD-10-CM

## 2025-07-21 DIAGNOSIS — R74.01 ELEVATED AST (SGOT): ICD-10-CM

## 2025-07-21 DIAGNOSIS — R07.89 CHEST FULLNESS: ICD-10-CM

## 2025-07-21 DIAGNOSIS — D64.9 ANEMIA, UNSPECIFIED TYPE: ICD-10-CM

## 2025-07-21 DIAGNOSIS — E03.9 ACQUIRED HYPOTHYROIDISM: Primary | ICD-10-CM

## 2025-07-21 LAB
BASOPHILS # BLD AUTO: 0 10E3/UL (ref 0–0.2)
BASOPHILS NFR BLD AUTO: 1 %
EOSINOPHIL # BLD AUTO: 0.1 10E3/UL (ref 0–0.7)
EOSINOPHIL NFR BLD AUTO: 1 %
ERYTHROCYTE [DISTWIDTH] IN BLOOD BY AUTOMATED COUNT: 12.8 % (ref 10–15)
EST. AVERAGE GLUCOSE BLD GHB EST-MCNC: 114 MG/DL
HBA1C MFR BLD: 5.6 % (ref 0–5.6)
HCT VFR BLD AUTO: 41.8 % (ref 35–47)
HGB BLD-MCNC: 13.7 G/DL (ref 11.7–15.7)
IMM GRANULOCYTES # BLD: 0 10E3/UL
IMM GRANULOCYTES NFR BLD: 0 %
LYMPHOCYTES # BLD AUTO: 1.4 10E3/UL (ref 0.8–5.3)
LYMPHOCYTES NFR BLD AUTO: 19 %
MCH RBC QN AUTO: 28.9 PG (ref 26.5–33)
MCHC RBC AUTO-ENTMCNC: 32.8 G/DL (ref 31.5–36.5)
MCV RBC AUTO: 88 FL (ref 78–100)
MONOCYTES # BLD AUTO: 0.5 10E3/UL (ref 0–1.3)
MONOCYTES NFR BLD AUTO: 7 %
NEUTROPHILS # BLD AUTO: 5.5 10E3/UL (ref 1.6–8.3)
NEUTROPHILS NFR BLD AUTO: 72 %
PLATELET # BLD AUTO: 252 10E3/UL (ref 150–450)
RBC # BLD AUTO: 4.74 10E6/UL (ref 3.8–5.2)
WBC # BLD AUTO: 7.6 10E3/UL (ref 4–11)

## 2025-07-21 PROCEDURE — 1125F AMNT PAIN NOTED PAIN PRSNT: CPT | Performed by: FAMILY MEDICINE

## 2025-07-21 PROCEDURE — 3074F SYST BP LT 130 MM HG: CPT | Performed by: FAMILY MEDICINE

## 2025-07-21 PROCEDURE — 3078F DIAST BP <80 MM HG: CPT | Performed by: FAMILY MEDICINE

## 2025-07-21 PROCEDURE — 84443 ASSAY THYROID STIM HORMONE: CPT | Performed by: FAMILY MEDICINE

## 2025-07-21 PROCEDURE — G2211 COMPLEX E/M VISIT ADD ON: HCPCS | Performed by: FAMILY MEDICINE

## 2025-07-21 PROCEDURE — 71046 X-RAY EXAM CHEST 2 VIEWS: CPT | Mod: TC | Performed by: RADIOLOGY

## 2025-07-21 PROCEDURE — 84439 ASSAY OF FREE THYROXINE: CPT | Performed by: FAMILY MEDICINE

## 2025-07-21 PROCEDURE — 99214 OFFICE O/P EST MOD 30 MIN: CPT | Mod: 24 | Performed by: FAMILY MEDICINE

## 2025-07-21 PROCEDURE — 36415 COLL VENOUS BLD VENIPUNCTURE: CPT | Performed by: FAMILY MEDICINE

## 2025-07-21 PROCEDURE — 83036 HEMOGLOBIN GLYCOSYLATED A1C: CPT | Performed by: FAMILY MEDICINE

## 2025-07-21 PROCEDURE — 85025 COMPLETE CBC W/AUTO DIFF WBC: CPT | Performed by: FAMILY MEDICINE

## 2025-07-21 PROCEDURE — 3044F HG A1C LEVEL LT 7.0%: CPT | Performed by: FAMILY MEDICINE

## 2025-07-21 PROCEDURE — 84450 TRANSFERASE (AST) (SGOT): CPT | Performed by: FAMILY MEDICINE

## 2025-07-21 ASSESSMENT — PAIN SCALES - GENERAL: PAINLEVEL_OUTOF10: MODERATE PAIN (4)

## 2025-07-21 NOTE — PROGRESS NOTES
Assessment & Plan     Acquired hypothyroidism  Her main concern was weight loss. It appears to be as much as 5#. (She notes the highest at the hospital was inaccurate. Her weight has varied, generally 132-135# over the last several months.  Her thyroid dose has varied, but seems to settle down when she started taking the levothroid on an empty stomach. Will confirm she is still doing that, since the Free T-4 is normal and the TSH indicates it is low.  - TSH with free T4 reflex  - T4 free    Anemia, unspecified type  Normalized since the cholecystectomy.  - CBC with platelets and differential    Elevated random blood glucose level  HgA1c is now normal.  - Hemoglobin A1c    Elevated AST (SGOT)  Elevated with surgery, now normalized.  - AST    Chest fullness  This is in the area of previous rib fractures. No other chest symptoms.  - XR Chest 2 Views; Future    The longitudinal plan of care for the diagnosis(es)/condition(s) as documented were addressed during this visit. Due to the added complexity in care, I will continue to support Bisi in the subsequent management and with ongoing continuity of care.    Follow-up   Annual scheduled for mid-October.    Subjective   Bisi is a 69 year old, presenting for the following health issues:  Weight Loss (Patient is concerned about her weight loss)      7/21/2025    11:30 AM   Additional Questions   Roomed by Senia kumar   Accompanied by linda     Energy level is okay once she gets moving, but hard to wake up.  Gets an ache in the left buttock to hip and sometimes down the leg when she lays on it.  Having another skin lesion removed that is precancer. Had melanoma twice.  Digestion is normal aside from mild bloating since the gallbladder was removed for chronic cholecystitis and sludge. No abnormal BM's.  Felt a pop in her right lower chest while leaning forward.  Has a constant feeling in her left lower chest where the rib fractures from 2 years ago are.  Sometimes feels off  "balance when she leaning forward. No change in her thinking. No nausea or tinnitis. No abnormal sweating.    History of Present Illness      She is taking medications regularly.        Review of Systems  Constitutional, HEENT, cardiovascular, pulmonary, GI, , musculoskeletal, neuro, skin, endocrine and psych systems are negative, except as otherwise noted.      Objective    /70 (BP Location: Left arm, Patient Position: Sitting)   Pulse 64   Temp 98.2  F (36.8  C) (Temporal)   Resp 14   Ht 1.626 m (5' 4\")   Wt 58.8 kg (129 lb 9.6 oz)   LMP 12/13/2000 (Approximate)   SpO2 99%   BMI 22.25 kg/m    Body mass index is 22.25 kg/m .  Physical Exam   GENERAL: alert and no distress  EYES: Eyes grossly normal to inspection, PERRL and conjunctivae and sclerae normal  HENT: ear canals and TM's normal, nose and mouth without ulcers or lesions  NECK: no adenopathy, no asymmetry, masses, or scars  RESP: lungs clear to auscultation - no rales, rhonchi or wheezes  CV: regular rate and rhythm, normal S1 S2, no S3 or S4, no murmur, click or rub, no peripheral edema  ABDOMEN: soft, minimally tender at the RUQ, no hepatosplenomegaly, no masses and bowel sounds normal  MS: no gross musculoskeletal defects noted, no edema  SKIN: no suspicious lesions or rashes  NEURO: Normal strength and tone, mentation intact and speech normal  PSYCH: mentation appears normal, affect normal/bright    Results for orders placed or performed in visit on 07/21/25   XR Chest 2 Views     Status: None    Narrative    EXAM: XR CHEST 2 VIEWS  LOCATION: [facility]  DATE: 7/21/2025    INDICATION:  Chest fullness  COMPARISON: 2/14/2024      Impression    IMPRESSION: Dual lead left chest cardiac device. Leads are intact. No fractured or abandoned leads. The lungs are hyperinflated. Minimal left basilar atelectasis/scarring. No effusions or pneumothorax. CABG changes and sternotomy wires. Heart size is   normal. ORIF hardware of " multiple left rib fractures are intact. Degenerative changes of the spine.   Results for orders placed or performed in visit on 07/21/25   Hemoglobin A1c     Status: Normal   Result Value Ref Range    Estimated Average Glucose 114 <117 mg/dL    Hemoglobin A1C 5.6 0.0 - 5.6 %   AST     Status: Normal   Result Value Ref Range    AST 27 0 - 45 U/L   TSH with free T4 reflex     Status: Abnormal   Result Value Ref Range    TSH 10.20 (H) 0.30 - 4.20 uIU/mL   CBC with platelets and differential     Status: None   Result Value Ref Range    WBC Count 7.6 4.0 - 11.0 10e3/uL    RBC Count 4.74 3.80 - 5.20 10e6/uL    Hemoglobin 13.7 11.7 - 15.7 g/dL    Hematocrit 41.8 35.0 - 47.0 %    MCV 88 78 - 100 fL    MCH 28.9 26.5 - 33.0 pg    MCHC 32.8 31.5 - 36.5 g/dL    RDW 12.8 10.0 - 15.0 %    Platelet Count 252 150 - 450 10e3/uL    % Neutrophils 72 %    % Lymphocytes 19 %    % Monocytes 7 %    % Eosinophils 1 %    % Basophils 1 %    % Immature Granulocytes 0 %    Absolute Neutrophils 5.5 1.6 - 8.3 10e3/uL    Absolute Lymphocytes 1.4 0.8 - 5.3 10e3/uL    Absolute Monocytes 0.5 0.0 - 1.3 10e3/uL    Absolute Eosinophils 0.1 0.0 - 0.7 10e3/uL    Absolute Basophils 0.0 0.0 - 0.2 10e3/uL    Absolute Immature Granulocytes 0.0 <=0.4 10e3/uL   T4 free     Status: Normal   Result Value Ref Range    Free T4 1.32 0.90 - 1.70 ng/dL   CBC with platelets and differential     Status: None    Narrative    The following orders were created for panel order CBC with platelets and differential.  Procedure                               Abnormality         Status                     ---------                               -----------         ------                     CBC with platelets and ...[[id number]]                      Final result                 Please view results for these tests on the individual orders.   Signed Electronically by: CUCO ALEMAN MD

## 2025-07-22 LAB
AST SERPL W P-5'-P-CCNC: 27 U/L (ref 0–45)
T4 FREE SERPL-MCNC: 1.32 NG/DL (ref 0.9–1.7)
TSH SERPL DL<=0.005 MIU/L-ACNC: 10.2 UIU/ML (ref 0.3–4.2)

## 2025-07-23 ENCOUNTER — OFFICE VISIT (OUTPATIENT)
Dept: CARDIOLOGY | Facility: CLINIC | Age: 70
End: 2025-07-23
Payer: COMMERCIAL

## 2025-07-23 ENCOUNTER — ANCILLARY PROCEDURE (OUTPATIENT)
Dept: MAMMOGRAPHY | Facility: HOSPITAL | Age: 70
End: 2025-07-23
Attending: FAMILY MEDICINE
Payer: COMMERCIAL

## 2025-07-23 VITALS
DIASTOLIC BLOOD PRESSURE: 62 MMHG | SYSTOLIC BLOOD PRESSURE: 104 MMHG | HEIGHT: 64 IN | BODY MASS INDEX: 22.25 KG/M2 | HEART RATE: 59 BPM

## 2025-07-23 DIAGNOSIS — E78.00 HYPERCHOLESTEROLEMIA: ICD-10-CM

## 2025-07-23 DIAGNOSIS — Z95.2 S/P AVR (AORTIC VALVE REPLACEMENT): ICD-10-CM

## 2025-07-23 DIAGNOSIS — Z95.0 CARDIAC PACEMAKER IN SITU: ICD-10-CM

## 2025-07-23 DIAGNOSIS — Z95.2 H/O MECHANICAL AORTIC VALVE REPLACEMENT: Primary | ICD-10-CM

## 2025-07-23 DIAGNOSIS — Z12.31 VISIT FOR SCREENING MAMMOGRAM: ICD-10-CM

## 2025-07-23 DIAGNOSIS — I05.1 RHEUMATIC MITRAL REGURGITATION: ICD-10-CM

## 2025-07-23 PROCEDURE — 77063 BREAST TOMOSYNTHESIS BI: CPT

## 2025-07-23 NOTE — PATIENT INSTRUCTIONS
Tammy Law,    It was a pleasure to see you today at the [facility].     My recommendations after this visit include:    - Continue current medications.   - Try to increase activity  - Limit saturated fats and sodium  - Continue device checks  - Follow up with Dr. Vora in 1 year with echocardiogram at that time    - Please call [phone], if you have any questions or concerns      Michelle Fields PA-C

## 2025-07-23 NOTE — LETTER
7/23/2025    CUCO ALEMAN MD  [address] [facility] [address]    RE: Tammy Law       Dear Colleague,     I had the pleasure of seeing Tammy Law in the [facility].    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  On warfarin.  INRs have been therapeutic.  Sick sinus syndrome, syncope: Status post dual-chamber pacemaker placement 10/7/2016.  Device check today showed 3 episodes of NSVT 11-13 beats which patient was asymptomatic for.  No atrial high rates with normal device function atrial pacing 80% of the time and ventricular pacing 35%.  Nuclear stress test 4/25/2025 negative for ischemia with EF on echo 55 to 60%.  Hyperlipidemia: On atorvastatin 40 mg daily.  Last lipids 4/3/2025 showed LDL of 67  NSVT: 11-13 beats seen on device check today.  Negative nuclear stress test 4/2025 with normal EF on echo.    Plan:   Continue current medications  Recommend increasing physical activity both for heart health and as a way to monitor symptoms  Recommend limiting saturated fats and sodium  Continue device checks  Echocardiogram at 1 year follow-up      Follow up in 1 year Dr. Vora     History of Present Illness/Subjective    HPI: Tammy Law is a 69 year old female with PMHx of rheumatic valve disease status post mechanical aortic valve replacement in 1994 as well as rheumatic mild mitral valve stenosis and regurgitation, sick sinus syndrome status post dual-chamber pacemaker placement 10/6/2016, hyperlipidemia presents for  follow-up.  Patient saw Dr. Vora 7/29/2024 where patient was continuing to note symptoms of fatigue with echocardiogram showing normal prosthetic valve function though evidence of mild aortic stenosis which has remained stable.  Patient also noting fatigue felt to be multifactorial.  Patient more recently saw Dr. Lira for preop cardiac assessment before cholecystectomy.  Patient underwent nuclear stress test 4/25/2025 that was negative for ischemia and echocardiogram showing normal LVEF 55 to 60%, stable mild mitral regurgitation and stenosis and no prosthetic stenosis of the aortic valve with only mild paravalvular regurgitation.  There was a mildly decreased RV function.    Patient denies any major changes in the last year.  Denies any chest discomfort.  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  Does do yard work and chores around the house but does not go for walks or do any structured activity.  Denies palpitations, lower extremity edema, lightheadedness.  Patient does note significant family history of coronary artery disease including father and brother 1 of which had bypass surgery.        Device check 7/23/2025  Encounter Type: Patient seen in clinic for annual device evaluation and iterative programming, followed by Michelle Fields  Device: [email or web address] Eluna (D) pacemaker  Pacing %/Programmed: AP 88%,  35%, DDD 60-130bpm  Lead(s): Stable  Battery longevity: Estimating 3.8 years remaining  Presenting rhythm: APVP 60bpm with PAC's  Underlying rhythm: Junctional 40's w/rare sinus beats and PAC's  Heart rates: Stable, HR's per histogram range primarily 60-100bpm  Atrial High rates: None  Anticoagulant: Warfarin  Ventricular High rates: 3 VHR episodes logged, EGM's suggest 11-13 beats NSVT, patient denies symptoms. EF 55-60% per echo 4/25/25.  Comments: Normal device function. No programming changes.  Plan: Remote  device check scheduled for 10/21/25. Lisa Cannon RN       Echocardiogram 4/25/2025 results:  The left ventricle is normal in size. There is mild concentric left  ventricular hypertrophy. The visual ejection fraction is 55-60%.  The right ventricle is normal size. Mildly decreased right ventricular  systolic function  The mitral valve appears rheumatic. There is mild (1+) mitral regurgitation.  There is mild mitral stenosis.  s/p [employer] mechanical prosthesis in the aortic position; no prosthetic  stenosis (peak velocity 2.6m/s, mean gradient 14mmHg, DI 0.34, AT 70 m), mild  paravalvular regurgitation  Compared to prior study 8/8/2024, mildly decreased RV function is new    Nuclear stress test 4/25/2025     1.Nondiagnostic pharmacological regadenoson ECG for ischemia given ventricular pacing.     2.The nuclear stress test is negative for inducible myocardial ischemia or infarction.     3.The left ventricular ejection fraction at stress is 76%.     A prior study was conducted on 7/13/2022.  This study has no change when compared with the prior study.    Echocardiogram 8/8/2024  The left ventricle is normal in size.  Left ventricular function is normal.The ejection fraction is 55-60%.  There is mild concentric left ventricular hypertrophy.  Diastolic Doppler findings (E/E' ratio and/or other parameters) suggest left  ventricular filling pressures are increased.  Normal right ventricle size and systolic function.  There is a pacemaker lead in the right ventricle.  The left atrium is severely dilated.  Aortic valve has been replaced by [employer] mechanical prosthesis functioning  well with peak velocity 2.7 m/s, mean gradient of 17 mmHg, dimensionless index  0.30 with an acceleration time of 90 ms. Mild paravalvular leak.  There is mild to moderate mitral stenosis.  There is mild to moderate (1-2+) mitral regurgitation.  Compared to prior study, there is no significant change.         Physical Examination  Review of  "Systems   Vitals: /62 (BP Location: Right arm, Patient Position: Sitting, Cuff Size: Adult Small)   Pulse 59   Ht 1.626 m (5' 4\")   LMP 12/13/2000 (Approximate)   BMI 22.25 kg/m    BMI= Body mass index is 22.25 kg/m .  Wt Readings from Last 3 Encounters:   07/21/25 58.8 kg (129 lb 9.6 oz)   04/30/25 63.5 kg (139 lb 15.9 oz)   04/24/25 59.9 kg (132 lb)           ENT/Mouth: membranes moist, no oral lesions or bleeding gums.      EYES:  no scleral icterus, normal conjunctivae       Chest/Lungs:   lungs are clear to auscultation, no rales or wheezing,  equal chest wall expansion    Cardiovascular:   Regular. Normal first and second heart sounds with no murmurs, rubs, or gallops; the carotid, radial and posterior tibial pulses are intact, no edema bilaterally        Extremities: no cyanosis or clubbing   Skin: no xanthelasma, warm.    Neurologic: no tremors     Psychiatric: alert and oriented x3, calm        Please refer above for cardiac ROS details.        Medical History  Surgical History Family History Social History   Past Medical History:   Diagnosis Date     Acute blood loss anemia 08/29/2023     Anxiety      Benign neoplasm of transverse colon 03/15/2024     Cardiac pacemaker in situ      Chronic anticoagulation      Chronic cholecystitis 04/30/2025    on pathology     Closed fracture of multiple ribs of left side 08/28/2023     Depression      Disease of thyroid gland     Hypothyroidism     Gallbladder sludge      H/O: rheumatic fever      Hematemesis 10/17/2023     High cholesterol      History of blood transfusion      History of colonic polyps      Hyperlipidemia      Hypertension      Hypothyroidism      Melena 10/17/2023     Recurrent major depressive disorder, in full remission 02/08/2023     SSS (sick sinus syndrome) (H) 02/04/2020     Traumatic hemothorax 08/28/2023     Past Surgical History:   Procedure Laterality Date     AORTIC VALVE REPLACEMENT       APPENDECTOMY       BIOPSY BREAST Left " 2015     BREAST CYST EXCISION       CARDIAC CATHETERIZATION       HC PART REMV BONE METATARSAL HEAD,EA Right 06/23/2017    Procedure: EXOSTECTOMY 2ND METATARSAL RIGHT FOOT;  Surgeon: Jessee Mccauley DPM;  [facility]: [facility];  Service: Podiatry     HYSTERECTOMY  2000     IMPLANT PACEMAKER       IMPLANT PACEMAKER       LAPAROSCOPIC CHOLECYSTECTOMY  04/30/2025     LAPAROSCOPIC LYSIS ADHESIONS N/A 04/30/2025    Procedure: LYSIS OF ADHESIONS;  Surgeon: Rocael Bird MD;  Location: [facility]     MIDLINE SINGLE LUMEN PLACEMENT  10/13/2023     OOPHORECTOMY  2000     OTHER SURGICAL HISTORY      Hemmorhoidectomy     RELEASE CARPAL TUNNEL Bilateral      TOE SURGERY       TYMPANOSTOMY TUBE PLACEMENT       ZZC REPLACE AORT VALV,PROSTH VALV      Description: Aortic Valve Replacement;  Proc Date: 01/01/1995;     Family History   Problem Relation Age of Onset     Pacemaker Mother      Diabetes Mother      Coronary Artery Disease Father      Pacemaker Father      Diabetes Father      Prostate Cancer Father      Depression Sister      Thyroid Disease Sister      Depression Sister      Kidney Cancer Brother      Coronary Artery Disease Brother      Prostate Cancer Brother      Thyroid Disease Brother      No Known Problems Maternal Grandmother      Colon Cancer Maternal Grandfather      No Known Problems Paternal Grandmother      Cancer Paternal Grandfather         Stomach     No Known Problems Daughter      No Known Problems Maternal Aunt      No Known Problems Paternal Aunt      Ovarian Cancer Cousin      Hereditary Breast and Ovarian Cancer Syndrome No family hx of      Breast Cancer No family hx of      Endometrial Cancer No family hx of         Social History     Socioeconomic History     Marital status: Single     Spouse name: Not on file     Number of children: 0     Years of education: Not on file     Highest education level: Not on file   Occupational History     Not on file   Tobacco Use      Smoking status: Never     Passive exposure: Never     Smokeless tobacco: Never   Vaping Use     Vaping status: Never Used   Substance and Sexual Activity     Alcohol use: Yes     Comment: Alcoholic Drinks/day: twice per year     Drug use: No     Sexual activity: Not Currently     Birth control/protection: None   Other Topics Concern     Parent/sibling w/ CABG, MI or angioplasty before 65F 55M? No   Social History Narrative     Not on file     Social Drivers of Health     Financial Resource Strain: Low Risk  (4/30/2025)    Financial Resource Strain      Within the past 12 months, have you or your family members you live with been unable to get utilities (heat, electricity) when it was really needed?: No   Food Insecurity: Low Risk  (4/30/2025)    Food Insecurity      Within the past 12 months, did you worry that your food would run out before you got money to buy more?: No      Within the past 12 months, did the food you bought just not last and you didn t have money to get more?: No   Transportation Needs: Low Risk  (4/30/2025)    Transportation Needs      Within the past 12 months, has lack of transportation kept you from medical appointments, getting your medicines, non-medical meetings or appointments, work, or from getting things that you need?: No   Physical Activity: Inactive (10/4/2024)    Exercise Vital Sign      Days of Exercise per Week: 2 days      Minutes of Exercise per Session: 0 min   Stress: No Stress Concern Present (10/4/2024)    [employer] [place] [employer] - Occupational Stress Questionnaire      Feeling of Stress : Only a little   Social Connections: Unknown (10/4/2024)    Social Connection and Isolation Panel [NHANES]      Frequency of Communication with Friends and Family: Not on file      Frequency of Social Gatherings with Friends and Family: Once a week      Attends Mosque Services: Not on file      Active Member of Clubs or Organizations: Not on file      Attends Club or  Organization Meetings: Not on file      Marital Status: Not on file   Interpersonal Safety: Low Risk  (4/30/2025)    Interpersonal Safety      Do you feel physically and emotionally safe where you currently live?: Yes      Within the past 12 months, have you been hit, slapped, kicked or otherwise physically hurt by someone?: No      Within the past 12 months, have you been humiliated or emotionally abused in other ways by your partner or ex-partner?: No   Housing Stability: High Risk (4/30/2025)    Housing Stability      Do you have housing? : No      Are you worried about losing your housing?: No           Medications  Allergies   Current Outpatient Medications   Medication Sig Dispense Refill     alendronate (FOSAMAX) 70 MG tablet TAKE 1 TABLET BY MOUTH WEEKLY  WITH 8 OZ OF PLAIN WATER 30  MINUTES BEFORE FIRST FOOD, DRINK OR MEDS. STAY UPRIGHT FOR 30  MINS 12 tablet 3     atorvastatin (LIPITOR) 40 MG tablet Take 1 tablet (40 mg) by mouth daily. 100 tablet 3     buPROPion (WELLBUTRIN XL) 300 MG 24 hr tablet Take 1 tablet (300 mg) by mouth every morning. 100 tablet 3     calcium carbonate 500 mg, elemental, (OSCAL 500) 1250 (500 Ca) MG TABS tablet Take by mouth daily. One-half of a tab by mouth every am       furosemide (LASIX) 40 MG tablet Take 1 tablet (40 mg) by mouth daily as needed 90 tablet 1     HYDROcodone-acetaminophen (NORCO) 5-325 MG tablet Take 1-2 tablets by mouth every 4 hours as needed for moderate to severe pain. 12 tablet 0     levothyroxine (SYNTHROID/LEVOTHROID) 88 MCG tablet Take 1 tablet (88 mcg) by mouth daily. 90 tablet 3     senna-docusate (SENOKOT-S/PERICOLACE) 8.6-50 MG tablet Take 1 tablet by mouth 2 times daily. 30 tablet 0     vitamin D3 (CHOLECALCIFEROL) 50 mcg (2000 units) tablet Take 1 tablet by mouth daily       warfarin ANTICOAGULANT (COUMADIN) 10 MG tablet Take 1 tablet (10 mg) by mouth every evening. 1 tablet 0     warfarin ANTICOAGULANT (COUMADIN) 2.5 MG tablet TAKE 2 TO 3  TABLETS (5 MG - 7.5  MG) BY MOUTH DAILY OR AS  DIRECTED BY YOUR ACC TEAM BASED  ON INR RESULTS 200 tablet 1       Allergies   Allergen Reactions     Demerol [Meperidine]      Hallucinations       Misc. Sulfonamide Containing Compounds Hives     Morphine      Sulfa Antibiotics Hives          Lab Results    Chemistry/lipid CBC Cardiac Enzymes/BNP/TSH/INR   Recent Labs   Lab Test 04/03/25  0844   CHOL 153   HDL 71   LDL 67   TRIG 75     Recent Labs   Lab Test 04/03/25  0844 02/14/24  1126 10/02/20  0850   LDL 67 70 82     Recent Labs   Lab Test 05/01/25  0615 05/01/25  0251   NA  --  140   POTASSIUM  --  4.6   CHLORIDE  --  107   CO2  --  29   * 140*   BUN  --  10.1   CR  --  0.79   GFRESTIMATED  --  81   ALVAREZ  --  8.6*     Recent Labs   Lab Test 05/01/25  0251 04/15/25  1138 10/09/24  1129   CR 0.79 0.93 1.03*     Recent Labs   Lab Test 07/21/25  1232 02/14/24  1126   A1C 5.6 5.7*          Recent Labs   Lab Test 07/21/25  1232   WBC 7.6   HGB 13.7   HCT 41.8   MCV 88        Recent Labs   Lab Test 07/21/25  1232 05/01/25  0251 04/15/25  1138   HGB 13.7 11.6* 13.9    Recent Labs   Lab Test 01/06/20  1520 09/24/18  0856 09/23/18  1139   TROPONINI <0.01 <0.01 <0.01     Recent Labs   Lab Test 10/29/22  0802 01/11/22  2214   NTBNPI 278 445     Recent Labs   Lab Test 07/21/25  1232   TSH 10.20*     Recent Labs   Lab Test 07/15/25  0000 07/01/25  0000 06/17/25  0000   INR 2.9 3.3* 2.9          Michelle Fields PA-C                                         Thank you for allowing me to participate in the care of your patient.      Sincerely,     Michelle Fields PA-C     [facility]  cc:   Kt Murray MD  [address] [facility] [address]  [place] [address]

## 2025-07-23 NOTE — PROGRESS NOTES
HEART CARE ENCOUNTER NOTE      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  On warfarin.  INRs have been therapeutic.  Sick sinus syndrome, syncope: Status post dual-chamber pacemaker placement 10/7/2016.  Device check today showed 3 episodes of NSVT 11-13 beats which patient was asymptomatic for.  No atrial high rates with normal device function atrial pacing 80% of the time and ventricular pacing 35%.  Nuclear stress test 4/25/2025 negative for ischemia with EF on echo 55 to 60%.  Hyperlipidemia: On atorvastatin 40 mg daily.  Last lipids 4/3/2025 showed LDL of 67  NSVT: 11-13 beats seen on device check today.  Negative nuclear stress test 4/2025 with normal EF on echo.    Plan:   Continue current medications  Recommend increasing physical activity both for heart health and as a way to monitor symptoms  Recommend limiting saturated fats and sodium  Continue device checks  Echocardiogram at 1 year follow-up      Follow up in 1 year Dr. Vora     History of Present Illness/Subjective    HPI: Tammy Law is a 69 year old female with PMHx of rheumatic valve disease status post mechanical aortic valve replacement in 1994 as well as rheumatic mild mitral valve stenosis and regurgitation, sick sinus syndrome status post dual-chamber pacemaker placement 10/6/2016, hyperlipidemia presents for follow-up.  Patient saw Dr. Vora 7/29/2024 where patient was continuing to note symptoms of fatigue with echocardiogram showing normal prosthetic valve function though evidence of mild aortic stenosis which has remained  stable.  Patient also noting fatigue felt to be multifactorial.  Patient more recently saw Dr. Lira for preop cardiac assessment before cholecystectomy.  Patient underwent nuclear stress test 4/25/2025 that was negative for ischemia and echocardiogram showing normal LVEF 55 to 60%, stable mild mitral regurgitation and stenosis and no prosthetic stenosis of the aortic valve with only mild paravalvular regurgitation.  There was a mildly decreased RV function.    Patient denies any major changes in the last year.  Denies any chest discomfort.  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  Does do yard work and chores around the house but does not go for walks or do any structured activity.  Denies palpitations, lower extremity edema, lightheadedness.  Patient does note significant family history of coronary artery disease including father and brother 1 of which had bypass surgery.        Device check 7/23/2025  Encounter Type: Patient seen in clinic for annual device evaluation and iterative programming, followed by Michelle Fields  Device: [employer] Eluna (D) pacemaker  Pacing %/Programmed: AP 88%,  35%, DDD 60-130bpm  Lead(s): Stable  Battery longevity: Estimating 3.8 years remaining  Presenting rhythm: APVP 60bpm with PAC's  Underlying rhythm: Junctional 40's w/rare sinus beats and PAC's  Heart rates: Stable, HR's per histogram range primarily 60-100bpm  Atrial High rates: None  Anticoagulant: Warfarin  Ventricular High rates: 3 VHR episodes logged, EGM's suggest 11-13 beats NSVT, patient denies symptoms. EF 55-60% per echo 4/25/25.  Comments: Normal device function. No programming changes.  Plan: Remote device check scheduled for 10/21/25. Lisa Cannon RN       Echocardiogram 4/25/2025 results:  The left ventricle is normal in size. There is mild concentric left  ventricular hypertrophy. The visual ejection fraction is  "55-60%.  The right ventricle is normal size. Mildly decreased right ventricular  systolic function  The mitral valve appears rheumatic. There is mild (1+) mitral regurgitation.  There is mild mitral stenosis.  s/p [employer] mechanical prosthesis in the aortic position; no prosthetic  stenosis (peak velocity 2.6m/s, mean gradient 14mmHg, DI 0.34, AT 70 m), mild  paravalvular regurgitation  Compared to prior study 8/8/2024, mildly decreased RV function is new    Nuclear stress test 4/25/2025    1.Nondiagnostic pharmacological regadenoson ECG for ischemia given ventricular pacing.    2.The nuclear stress test is negative for inducible myocardial ischemia or infarction.    3.The left ventricular ejection fraction at stress is 76%.    A prior study was conducted on 7/13/2022.  This study has no change when compared with the prior study.    Echocardiogram 8/8/2024  The left ventricle is normal in size.  Left ventricular function is normal.The ejection fraction is 55-60%.  There is mild concentric left ventricular hypertrophy.  Diastolic Doppler findings (E/E' ratio and/or other parameters) suggest left  ventricular filling pressures are increased.  Normal right ventricle size and systolic function.  There is a pacemaker lead in the right ventricle.  The left atrium is severely dilated.  Aortic valve has been replaced by [employer] mechanical prosthesis functioning  well with peak velocity 2.7 m/s, mean gradient of 17 mmHg, dimensionless index  0.30 with an acceleration time of 90 ms. Mild paravalvular leak.  There is mild to moderate mitral stenosis.  There is mild to moderate (1-2+) mitral regurgitation.  Compared to prior study, there is no significant change.         Physical Examination  Review of Systems   Vitals: /62 (BP Location: Right arm, Patient Position: Sitting, Cuff Size: Adult Small)   Pulse 59   Ht 1.626 m (5' 4\")   LMP 12/13/2000 (Approximate)   BMI 22.25 kg/m    BMI= Body mass index is 22.25 " kg/m .  Wt Readings from Last 3 Encounters:   07/21/25 58.8 kg (129 lb 9.6 oz)   04/30/25 63.5 kg (139 lb 15.9 oz)   04/24/25 59.9 kg (132 lb)           ENT/Mouth: membranes moist, no oral lesions or bleeding gums.      EYES:  no scleral icterus, normal conjunctivae       Chest/Lungs:   lungs are clear to auscultation, no rales or wheezing,  equal chest wall expansion    Cardiovascular:   Regular. Normal first and second heart sounds with no murmurs, rubs, or gallops; the carotid, radial and posterior tibial pulses are intact, no edema bilaterally        Extremities: no cyanosis or clubbing   Skin: no xanthelasma, warm.    Neurologic: no tremors     Psychiatric: alert and oriented x3, calm        Please refer above for cardiac ROS details.        Medical History  Surgical History Family History Social History   Past Medical History:   Diagnosis Date    Acute blood loss anemia 08/29/2023    Anxiety     Benign neoplasm of transverse colon 03/15/2024    Cardiac pacemaker in situ     Chronic anticoagulation     Chronic cholecystitis 04/30/2025    on pathology    Closed fracture of multiple ribs of left side 08/28/2023    Depression     Disease of thyroid gland     Hypothyroidism    Gallbladder sludge     H/O: rheumatic fever     Hematemesis 10/17/2023    High cholesterol     History of blood transfusion     History of colonic polyps     Hyperlipidemia     Hypertension     Hypothyroidism     Melena 10/17/2023    Recurrent major depressive disorder, in full remission 02/08/2023    SSS (sick sinus syndrome) (H) 02/04/2020    Traumatic hemothorax 08/28/2023     Past Surgical History:   Procedure Laterality Date    AORTIC VALVE REPLACEMENT      APPENDECTOMY      BIOPSY BREAST Left 2015    BREAST CYST EXCISION      CARDIAC CATHETERIZATION      HC PART REMV BONE METATARSAL HEAD,EA Right 06/23/2017    Procedure: EXOSTECTOMY 2ND METATARSAL RIGHT FOOT;  Surgeon: Jessee Mccauley DPM;  Location: [facility];  Service:  Podiatry    HYSTERECTOMY  2000    IMPLANT PACEMAKER      IMPLANT PACEMAKER      LAPAROSCOPIC CHOLECYSTECTOMY  04/30/2025    LAPAROSCOPIC LYSIS ADHESIONS N/A 04/30/2025    Procedure: LYSIS OF ADHESIONS;  Surgeon: Rocael Bird MD;  Location: [facility] OR    MIDLINE SINGLE LUMEN PLACEMENT  10/13/2023    OOPHORECTOMY  2000    OTHER SURGICAL HISTORY      Hemmorhoidectomy    RELEASE CARPAL TUNNEL Bilateral     TOE SURGERY      TYMPANOSTOMY TUBE PLACEMENT      ZZC REPLACE AORT VALV,PROSTH VALV      Description: Aortic Valve Replacement;  Proc Date: 01/01/1995;     Family History   Problem Relation Age of Onset    Pacemaker Mother     Diabetes Mother     Coronary Artery Disease Father     Pacemaker Father     Diabetes Father     Prostate Cancer Father     Depression Sister     Thyroid Disease Sister     Depression Sister     Kidney Cancer Brother     Coronary Artery Disease Brother     Prostate Cancer Brother     Thyroid Disease Brother     No Known Problems Maternal Grandmother     Colon Cancer Maternal Grandfather     No Known Problems Paternal Grandmother     Cancer Paternal Grandfather         Stomach    No Known Problems Daughter     No Known Problems Maternal Aunt     No Known Problems Paternal Aunt     Ovarian Cancer Cousin     Hereditary Breast and Ovarian Cancer Syndrome No family hx of     Breast Cancer No family hx of     Endometrial Cancer No family hx of         Social History     Socioeconomic History    Marital status: Single     Spouse name: Not on file    Number of children: 0    Years of education: Not on file    Highest education level: Not on file   Occupational History    Not on file   Tobacco Use    Smoking status: Never     Passive exposure: Never    Smokeless tobacco: Never   Vaping Use    Vaping status: Never Used   Substance and Sexual Activity    Alcohol use: Yes     Comment: Alcoholic Drinks/day: twice per year    Drug use: No    Sexual activity: Not Currently     Birth  control/protection: None   Other Topics Concern    Parent/sibling w/ CABG, MI or angioplasty before 65F 55M? No   Social History Narrative    Not on file     Social Drivers of Health     Financial Resource Strain: Low Risk  (4/30/2025)    Financial Resource Strain     Within the past 12 months, have you or your family members you live with been unable to get utilities (heat, electricity) when it was really needed?: No   Food Insecurity: Low Risk  (4/30/2025)    Food Insecurity     Within the past 12 months, did you worry that your food would run out before you got money to buy more?: No     Within the past 12 months, did the food you bought just not last and you didn t have money to get more?: No   Transportation Needs: Low Risk  (4/30/2025)    Transportation Needs     Within the past 12 months, has lack of transportation kept you from medical appointments, getting your medicines, non-medical meetings or appointments, work, or from getting things that you need?: No   Physical Activity: Inactive (10/4/2024)    Exercise Vital Sign     Days of Exercise per Week: 2 days     Minutes of Exercise per Session: 0 min   Stress: No Stress Concern Present (10/4/2024)    [place] [employer] - Occupational Stress Questionnaire     Feeling of Stress : Only a little   Social Connections: Unknown (10/4/2024)    Social Connection and Isolation Panel [NHANES]     Frequency of Communication with Friends and Family: Not on file     Frequency of Social Gatherings with Friends and Family: Once a week     Attends Voodoo Services: Not on file     Active Member of Clubs or Organizations: Not on file     Attends Club or Organization Meetings: Not on file     Marital Status: Not on file   Interpersonal Safety: Low Risk  (4/30/2025)    Interpersonal Safety     Do you feel physically and emotionally safe where you currently live?: Yes     Within the past 12 months, have you been hit, slapped, kicked or otherwise  physically hurt by someone?: No     Within the past 12 months, have you been humiliated or emotionally abused in other ways by your partner or ex-partner?: No   Housing Stability: High Risk (4/30/2025)    Housing Stability     Do you have housing? : No     Are you worried about losing your housing?: No           Medications  Allergies   Current Outpatient Medications   Medication Sig Dispense Refill    alendronate (FOSAMAX) 70 MG tablet TAKE 1 TABLET BY MOUTH WEEKLY  WITH 8 OZ OF PLAIN WATER 30  MINUTES BEFORE FIRST FOOD, DRINK OR MEDS. STAY UPRIGHT FOR 30  MINS 12 tablet 3    atorvastatin (LIPITOR) 40 MG tablet Take 1 tablet (40 mg) by mouth daily. 100 tablet 3    buPROPion (WELLBUTRIN XL) 300 MG 24 hr tablet Take 1 tablet (300 mg) by mouth every morning. 100 tablet 3    calcium carbonate 500 mg, elemental, (OSCAL 500) 1250 (500 Ca) MG TABS tablet Take by mouth daily. One-half of a tab by mouth every am      furosemide (LASIX) 40 MG tablet Take 1 tablet (40 mg) by mouth daily as needed 90 tablet 1    HYDROcodone-acetaminophen (NORCO) 5-325 MG tablet Take 1-2 tablets by mouth every 4 hours as needed for moderate to severe pain. 12 tablet 0    levothyroxine (SYNTHROID/LEVOTHROID) 88 MCG tablet Take 1 tablet (88 mcg) by mouth daily. 90 tablet 3    senna-docusate (SENOKOT-S/PERICOLACE) 8.6-50 MG tablet Take 1 tablet by mouth 2 times daily. 30 tablet 0    vitamin D3 (CHOLECALCIFEROL) 50 mcg (2000 units) tablet Take 1 tablet by mouth daily      warfarin ANTICOAGULANT (COUMADIN) 10 MG tablet Take 1 tablet (10 mg) by mouth every evening. 1 tablet 0    warfarin ANTICOAGULANT (COUMADIN) 2.5 MG tablet TAKE 2 TO 3 TABLETS (5 MG - 7.5  MG) BY MOUTH DAILY OR AS  DIRECTED BY YOUR ACC TEAM BASED  ON INR RESULTS 200 tablet 1       Allergies   Allergen Reactions    Demerol [Meperidine]      Hallucinations      Misc. Sulfonamide Containing Compounds Hives    Morphine     Sulfa Antibiotics Hives          Lab Results    Chemistry/lipid  CBC Cardiac Enzymes/BNP/TSH/INR   Recent Labs   Lab Test 04/03/25  0844   CHOL 153   HDL 71   LDL 67   TRIG 75     Recent Labs   Lab Test 04/03/25  0844 02/14/24  1126 10/02/20  0850   LDL 67 70 82     Recent Labs   Lab Test 05/01/25  0615 05/01/25  0251   NA  --  140   POTASSIUM  --  4.6   CHLORIDE  --  107   CO2  --  29   * 140*   BUN  --  10.1   CR  --  0.79   GFRESTIMATED  --  81   ALVAREZ  --  8.6*     Recent Labs   Lab Test 05/01/25  0251 04/15/25  1138 10/09/24  1129   CR 0.79 0.93 1.03*     Recent Labs   Lab Test 07/21/25  1232 02/14/24  1126   A1C 5.6 5.7*          Recent Labs   Lab Test 07/21/25  1232   WBC 7.6   HGB 13.7   HCT 41.8   MCV 88        Recent Labs   Lab Test 07/21/25  1232 05/01/25  0251 04/15/25  1138   HGB 13.7 11.6* 13.9    Recent Labs   Lab Test 01/06/20  1520 09/24/18  0856 09/23/18  1139   TROPONINI <0.01 <0.01 <0.01     Recent Labs   Lab Test 10/29/22  0802 01/11/22  2214   NTBNPI 278 445     Recent Labs   Lab Test 07/21/25  1232   TSH 10.20*     Recent Labs   Lab Test 07/15/25  0000 07/01/25  0000 06/17/25  0000   INR 2.9 3.3* 2.9          Michelle Fields PA-C

## 2025-07-29 ENCOUNTER — ANTICOAGULATION THERAPY VISIT (OUTPATIENT)
Dept: ANTICOAGULATION | Facility: CLINIC | Age: 70
End: 2025-07-29
Payer: COMMERCIAL

## 2025-07-29 DIAGNOSIS — Z95.2 H/O MECHANICAL AORTIC VALVE REPLACEMENT: Primary | ICD-10-CM

## 2025-07-29 DIAGNOSIS — Z79.01 LONG TERM CURRENT USE OF ANTICOAGULANT THERAPY: ICD-10-CM

## 2025-07-29 LAB — INR HOME MONITORING: 3.6 (ref 2–3)

## 2025-07-29 NOTE — PROGRESS NOTES
ANTICOAGULATION MANAGEMENT     Tammy Law 69 year old female is on warfarin with supratherapeutic INR result. (Goal INR 2.0-3.0)    Recent labs: (last 7 days)     07/29/25  0000   INR 3.6*       ASSESSMENT     Source(s): Chart Review and Patient/Caregiver Call     Warfarin doses taken: Warfarin taken as instructed  Diet: No new diet changes identified  Medication/supplement changes: None noted  New illness, injury, or hospitalization: No  Signs or symptoms of bleeding or clotting: No  Previous result: Therapeutic last visit; previously outside of goal range  Additional findings: None       PLAN     Recommended plan for no diet, medication or health factor changes affecting INR     Dosing Instructions: decrease your warfarin dose (6.7% change) with next INR in 2 weeks       Summary  As of 7/29/2025      Full warfarin instructions:  5 mg every day   Next INR check:  8/12/2025               Telephone call with Bisi who verbalizes understanding and agrees to plan and who agrees to plan and repeated back plan correctly    Patient to recheck with home meter    Education provided: Goal range and lab monitoring: goal range and significance of current result, Importance of therapeutic range, and Importance of following up at instructed interval  Symptom monitoring: monitoring for bleeding signs and symptoms, monitoring for clotting signs and symptoms, and when to seek medical attention/emergency care  Contact [phone] with any changes, questions or concerns.     Plan made per [facility] anticoagulation protocol    Zulema Gregg RN  7/29/2025  Anticoagulation Clinic  [email or web address] for routing messages: p ANTICOAG HOME MONITORING  [facility] patient phone line: [phone]        _______________________________________________________________________     Anticoagulation Episode Summary       Current INR goal:  2.0-3.0   TTR:  80.3% (1 y)   Target end date:  Indefinite   Send INR reminders to:  ANTICOAG HOME MONITORING     Indications    H/O mechanical aortic valve replacement [Z95.2]  Long term current use of anticoagulant therapy [Z79.01]             Comments:  [employer] home monitor- managed by exception  Goal range changed during 8/28-9/15/23  hospitalization             Anticoagulation Care Providers       Provider Role Specialty Phone number    Arnold Anderson MD Referring Internal Medicine [phone]    Christina Hernandez MD Referring Family Medicine [phone]    Karina Cifuentes MD Referring Family Medicine [phone]

## 2025-08-12 ENCOUNTER — ANTICOAGULATION THERAPY VISIT (OUTPATIENT)
Dept: ANTICOAGULATION | Facility: CLINIC | Age: 70
End: 2025-08-12
Payer: COMMERCIAL

## 2025-08-12 DIAGNOSIS — Z95.2 H/O MECHANICAL AORTIC VALVE REPLACEMENT: Primary | ICD-10-CM

## 2025-08-12 DIAGNOSIS — Z79.01 LONG TERM CURRENT USE OF ANTICOAGULANT THERAPY: ICD-10-CM

## 2025-08-12 LAB — INR HOME MONITORING: 3.1 (ref 2–3)

## 2025-08-26 ENCOUNTER — ANTICOAGULATION THERAPY VISIT (OUTPATIENT)
Dept: ANTICOAGULATION | Facility: CLINIC | Age: 70
End: 2025-08-26
Payer: COMMERCIAL

## 2025-08-26 DIAGNOSIS — Z79.01 LONG TERM CURRENT USE OF ANTICOAGULANT THERAPY: ICD-10-CM

## 2025-08-26 DIAGNOSIS — Z95.2 H/O MECHANICAL AORTIC VALVE REPLACEMENT: Primary | ICD-10-CM

## 2025-08-26 LAB — INR HOME MONITORING: 2 (ref 2–3)

## 2025-09-04 ENCOUNTER — ANCILLARY PROCEDURE (OUTPATIENT)
Dept: CARDIOLOGY | Facility: CLINIC | Age: 70
End: 2025-09-04
Attending: INTERNAL MEDICINE
Payer: COMMERCIAL

## 2025-09-04 DIAGNOSIS — I49.5 SICK SINUS SYNDROME (H): ICD-10-CM

## 2025-09-04 DIAGNOSIS — Z95.0 CARDIAC PACEMAKER IN SITU: ICD-10-CM

## 2025-09-04 LAB
MDC_IDC_EPISODE_DTM: NORMAL
MDC_IDC_EPISODE_ID: 37
MDC_IDC_EPISODE_TYPE: NORMAL
MDC_IDC_EPISODE_TYPE_INDUCED: NO
MDC_IDC_EPISODE_VENDOR_TYPE: NORMAL
MDC_IDC_LEAD_CONNECTION_STATUS: NORMAL
MDC_IDC_LEAD_CONNECTION_STATUS: NORMAL
MDC_IDC_LEAD_IMPLANT_DT: NORMAL
MDC_IDC_LEAD_IMPLANT_DT: NORMAL
MDC_IDC_LEAD_LOCATION: NORMAL
MDC_IDC_LEAD_LOCATION: NORMAL
MDC_IDC_LEAD_LOCATION_DETAIL_1: NORMAL
MDC_IDC_LEAD_LOCATION_DETAIL_1: NORMAL
MDC_IDC_LEAD_MFG: NORMAL
MDC_IDC_LEAD_MFG: NORMAL
MDC_IDC_LEAD_MODEL: NORMAL
MDC_IDC_LEAD_MODEL: NORMAL
MDC_IDC_LEAD_POLARITY_TYPE: NORMAL
MDC_IDC_LEAD_POLARITY_TYPE: NORMAL
MDC_IDC_LEAD_SERIAL: NORMAL
MDC_IDC_LEAD_SERIAL: NORMAL
MDC_IDC_LEAD_SPECIAL_FUNCTION: NORMAL
MDC_IDC_LEAD_SPECIAL_FUNCTION: NORMAL
MDC_IDC_MSMT_BATTERY_DTM: NORMAL
MDC_IDC_MSMT_BATTERY_REMAINING_PERCENTAGE: 35 %
MDC_IDC_MSMT_BATTERY_STATUS: NORMAL
MDC_IDC_MSMT_LEADCHNL_RA_IMPEDANCE_VALUE: 624 OHM
MDC_IDC_MSMT_LEADCHNL_RA_LEAD_CHANNEL_STATUS: NORMAL
MDC_IDC_MSMT_LEADCHNL_RV_IMPEDANCE_VALUE: 546 OHM
MDC_IDC_MSMT_LEADCHNL_RV_LEAD_CHANNEL_STATUS: NORMAL
MDC_IDC_PG_IMPLANT_DTM: NORMAL
MDC_IDC_PG_MFG: NORMAL
MDC_IDC_PG_MODEL: NORMAL
MDC_IDC_PG_SERIAL: NORMAL
MDC_IDC_PG_TYPE: NORMAL
MDC_IDC_SESS_CLINIC_NAME: NORMAL
MDC_IDC_SESS_DTM: NORMAL
MDC_IDC_SESS_REPROGRAMMED: NO
MDC_IDC_SESS_TYPE: NORMAL
MDC_IDC_SET_BRADY_AT_MODE_SWITCH_MODE: NORMAL
MDC_IDC_SET_BRADY_AT_MODE_SWITCH_RATE: 160 {BEATS}/MIN
MDC_IDC_SET_BRADY_LOWRATE: 60 {BEATS}/MIN
MDC_IDC_SET_BRADY_MAX_SENSOR_RATE: 120 {BEATS}/MIN
MDC_IDC_SET_BRADY_MAX_TRACKING_RATE: 130 {BEATS}/MIN
MDC_IDC_SET_BRADY_MODE: NORMAL
MDC_IDC_SET_BRADY_PAV_DELAY_LOW: 200 MS
MDC_IDC_SET_BRADY_SAV_DELAY_LOW: 180 MS
MDC_IDC_SET_LEADCHNL_RA_PACING_AMPLITUDE: 1.6 V
MDC_IDC_SET_LEADCHNL_RA_PACING_POLARITY: NORMAL
MDC_IDC_SET_LEADCHNL_RA_PACING_PULSEWIDTH: 0.4 MS
MDC_IDC_SET_LEADCHNL_RA_SENSING_ADAPTATION_MODE: NORMAL
MDC_IDC_SET_LEADCHNL_RA_SENSING_POLARITY: NORMAL
MDC_IDC_SET_LEADCHNL_RV_PACING_AMPLITUDE: 2.4 V
MDC_IDC_SET_LEADCHNL_RV_PACING_POLARITY: NORMAL
MDC_IDC_SET_LEADCHNL_RV_PACING_PULSEWIDTH: 0.4 MS
MDC_IDC_SET_LEADCHNL_RV_SENSING_ADAPTATION_MODE: NORMAL
MDC_IDC_SET_LEADCHNL_RV_SENSING_POLARITY: NORMAL
MDC_IDC_STAT_AT_BURDEN_PERCENT: 0 %
MDC_IDC_STAT_AT_DTM_END: NORMAL
MDC_IDC_STAT_AT_DTM_START: NORMAL
MDC_IDC_STAT_AT_MODE_SW_COUNT_PER_DAY: 6
MDC_IDC_STAT_AT_MODE_SW_PERCENT_TIME_PER_DAY: 0 %
MDC_IDC_STAT_BRADY_AP_VP_PERCENT: 35 %
MDC_IDC_STAT_BRADY_AP_VS_PERCENT: 45 %
MDC_IDC_STAT_BRADY_AS_VP_PERCENT: 2 %
MDC_IDC_STAT_BRADY_AS_VS_PERCENT: 17 %
MDC_IDC_STAT_BRADY_DTM_END: NORMAL
MDC_IDC_STAT_BRADY_DTM_START: NORMAL
MDC_IDC_STAT_BRADY_RA_PERCENT_PACED: 81 %
MDC_IDC_STAT_BRADY_RV_PERCENT_PACED: 38 %
MDC_IDC_STAT_HEART_RATE_ATRIAL_MEAN: 75 {BEATS}/MIN
MDC_IDC_STAT_HEART_RATE_DTM_END: NORMAL
MDC_IDC_STAT_HEART_RATE_DTM_START: NORMAL
MDC_IDC_STAT_HEART_RATE_VENTRICULAR_MEAN: 75 {BEATS}/MIN

## (undated) DEVICE — CLIP APPLIER ENDO ROTATING 10MM MED/LG ER320

## (undated) DEVICE — VIAL DECANTER STERILE WHITE DYNJDEC06

## (undated) DEVICE — ENDO TROCAR SLEEVE KII Z-THREADED 05X100MM CTS02

## (undated) DEVICE — SOL RINGERS LACTATED 1000ML BAG 2B2324X

## (undated) DEVICE — TUBING SMOKE EVAC PNEUMOCLEAR HIGH FLOW 0620050250

## (undated) DEVICE — ENDO SHEARS RENEW LAP ENDOCUT SCISSOR TIP 16.5MM 3142

## (undated) DEVICE — CUSTOM PACK LAP CHOLE SBA5BLCHEA

## (undated) DEVICE — SU VICRYL+ 0 27IN CT-1 UND VCP260H

## (undated) DEVICE — SOL WATER IRRIG 1000ML BOTTLE 2F7114

## (undated) DEVICE — SUCTION MANIFOLD NEPTUNE 2 SYS 1 PORT 702-025-000

## (undated) DEVICE — ENDO TROCAR FIRST ENTRY KII FIOS Z-THRD 05X100MM CTF03

## (undated) DEVICE — GLOVE PI ULTRATCH M LF SZ 6.5 PF CUFF TEXT STRL LF 42665

## (undated) DEVICE — SUCTION STRYKERFLOW II 250-070-500

## (undated) DEVICE — NDL INSUFFLATION 13GA 120MM C2201

## (undated) DEVICE — GLOVE UNDER INDICATOR PI SZ 7.0 LF 41670

## (undated) DEVICE — ENDO TROCAR FIRST ENTRY KII FIOS Z-THRD 11X100MM CTF33

## (undated) DEVICE — GOWN LG DISP 9515

## (undated) DEVICE — SU MONOCRYL+ 4-0 18IN PS2 UND MCP496G

## (undated) DEVICE — PREP CHLORAPREP 26ML TINTED HI-LITE ORANGE 930815

## (undated) DEVICE — Device

## (undated) DEVICE — ESU GROUND PAD ADULT REM W/15' CORD E7507DB

## (undated) RX ORDER — BUPIVACAINE HYDROCHLORIDE AND EPINEPHRINE 2.5; 5 MG/ML; UG/ML
INJECTION, SOLUTION INFILTRATION; PERINEURAL
Status: DISPENSED
Start: 2025-04-30

## (undated) RX ORDER — FENTANYL CITRATE 50 UG/ML
INJECTION, SOLUTION INTRAMUSCULAR; INTRAVENOUS
Status: DISPENSED
Start: 2025-04-30